# Patient Record
Sex: MALE | Race: WHITE | NOT HISPANIC OR LATINO | URBAN - METROPOLITAN AREA
[De-identification: names, ages, dates, MRNs, and addresses within clinical notes are randomized per-mention and may not be internally consistent; named-entity substitution may affect disease eponyms.]

---

## 2021-07-28 ENCOUNTER — INPATIENT (INPATIENT)
Facility: HOSPITAL | Age: 46
LOS: 14 days | Discharge: HOME | DRG: 20 | End: 2021-08-12
Attending: NEUROLOGICAL SURGERY | Admitting: NEUROLOGICAL SURGERY
Payer: SELF-PAY

## 2021-07-28 VITALS
OXYGEN SATURATION: 98 % | HEIGHT: 75 IN | WEIGHT: 227.08 LBS | DIASTOLIC BLOOD PRESSURE: 133 MMHG | RESPIRATION RATE: 18 BRPM | HEART RATE: 88 BPM | SYSTOLIC BLOOD PRESSURE: 213 MMHG

## 2021-07-28 LAB
A1C WITH ESTIMATED AVERAGE GLUCOSE RESULT: 5.2 % — SIGNIFICANT CHANGE UP (ref 4–5.6)
ALBUMIN SERPL ELPH-MCNC: 4.7 G/DL — SIGNIFICANT CHANGE UP (ref 3.3–5)
ALP SERPL-CCNC: 68 U/L — SIGNIFICANT CHANGE UP (ref 40–120)
ALT FLD-CCNC: 23 U/L — SIGNIFICANT CHANGE UP (ref 10–45)
AMPHET UR-MCNC: NEGATIVE — SIGNIFICANT CHANGE UP
ANION GAP SERPL CALC-SCNC: 13 MMOL/L — SIGNIFICANT CHANGE UP (ref 5–17)
ANION GAP SERPL CALC-SCNC: 15 MMOL/L — SIGNIFICANT CHANGE UP (ref 5–17)
APPEARANCE UR: CLEAR — SIGNIFICANT CHANGE UP
APTT BLD: 26.8 SEC — LOW (ref 27.5–35.5)
AST SERPL-CCNC: 25 U/L — SIGNIFICANT CHANGE UP (ref 10–40)
BACTERIA # UR AUTO: PRESENT /HPF
BARBITURATES UR SCN-MCNC: NEGATIVE — SIGNIFICANT CHANGE UP
BASOPHILS # BLD AUTO: 0.08 K/UL — SIGNIFICANT CHANGE UP (ref 0–0.2)
BASOPHILS NFR BLD AUTO: 1 % — SIGNIFICANT CHANGE UP (ref 0–2)
BENZODIAZ UR-MCNC: NEGATIVE — SIGNIFICANT CHANGE UP
BILIRUB SERPL-MCNC: 1 MG/DL — SIGNIFICANT CHANGE UP (ref 0.2–1.2)
BILIRUB UR-MCNC: NEGATIVE — SIGNIFICANT CHANGE UP
BLD GP AB SCN SERPL QL: NEGATIVE — SIGNIFICANT CHANGE UP
BUN SERPL-MCNC: 14 MG/DL — SIGNIFICANT CHANGE UP (ref 7–23)
BUN SERPL-MCNC: 15 MG/DL — SIGNIFICANT CHANGE UP (ref 7–23)
CALCIUM SERPL-MCNC: 9.1 MG/DL — SIGNIFICANT CHANGE UP (ref 8.4–10.5)
CALCIUM SERPL-MCNC: 9.2 MG/DL — SIGNIFICANT CHANGE UP (ref 8.4–10.5)
CHLORIDE SERPL-SCNC: 102 MMOL/L — SIGNIFICANT CHANGE UP (ref 96–108)
CHLORIDE SERPL-SCNC: 104 MMOL/L — SIGNIFICANT CHANGE UP (ref 96–108)
CHOLEST SERPL-MCNC: 218 MG/DL — HIGH
CO2 SERPL-SCNC: 23 MMOL/L — SIGNIFICANT CHANGE UP (ref 22–31)
CO2 SERPL-SCNC: 23 MMOL/L — SIGNIFICANT CHANGE UP (ref 22–31)
COCAINE METAB.OTHER UR-MCNC: NEGATIVE — SIGNIFICANT CHANGE UP
COLOR SPEC: YELLOW — SIGNIFICANT CHANGE UP
CREAT SERPL-MCNC: 1.22 MG/DL — SIGNIFICANT CHANGE UP (ref 0.5–1.3)
CREAT SERPL-MCNC: 1.37 MG/DL — HIGH (ref 0.5–1.3)
DIFF PNL FLD: ABNORMAL
EOSINOPHIL # BLD AUTO: 0.13 K/UL — SIGNIFICANT CHANGE UP (ref 0–0.5)
EOSINOPHIL NFR BLD AUTO: 1.7 % — SIGNIFICANT CHANGE UP (ref 0–6)
EPI CELLS # UR: SIGNIFICANT CHANGE UP /HPF (ref 0–5)
ESTIMATED AVERAGE GLUCOSE: 103 MG/DL — SIGNIFICANT CHANGE UP (ref 68–114)
GLUCOSE BLDC GLUCOMTR-MCNC: 122 MG/DL — HIGH (ref 70–99)
GLUCOSE SERPL-MCNC: 128 MG/DL — HIGH (ref 70–99)
GLUCOSE SERPL-MCNC: 145 MG/DL — HIGH (ref 70–99)
GLUCOSE UR QL: NEGATIVE — SIGNIFICANT CHANGE UP
HCT VFR BLD CALC: 47.5 % — SIGNIFICANT CHANGE UP (ref 39–50)
HCT VFR BLD CALC: 49.8 % — SIGNIFICANT CHANGE UP (ref 39–50)
HDLC SERPL-MCNC: 57 MG/DL — SIGNIFICANT CHANGE UP
HGB BLD-MCNC: 15.9 G/DL — SIGNIFICANT CHANGE UP (ref 13–17)
HGB BLD-MCNC: 16.5 G/DL — SIGNIFICANT CHANGE UP (ref 13–17)
IMM GRANULOCYTES NFR BLD AUTO: 0.3 % — SIGNIFICANT CHANGE UP (ref 0–1.5)
INR BLD: 1.03 — SIGNIFICANT CHANGE UP (ref 0.88–1.16)
KETONES UR-MCNC: NEGATIVE — SIGNIFICANT CHANGE UP
LEUKOCYTE ESTERASE UR-ACNC: NEGATIVE — SIGNIFICANT CHANGE UP
LIPID PNL WITH DIRECT LDL SERPL: 124 MG/DL — HIGH
LYMPHOCYTES # BLD AUTO: 3.7 K/UL — HIGH (ref 1–3.3)
LYMPHOCYTES # BLD AUTO: 47.3 % — HIGH (ref 13–44)
MCHC RBC-ENTMCNC: 29.1 PG — SIGNIFICANT CHANGE UP (ref 27–34)
MCHC RBC-ENTMCNC: 30.1 PG — SIGNIFICANT CHANGE UP (ref 27–34)
MCHC RBC-ENTMCNC: 33.1 GM/DL — SIGNIFICANT CHANGE UP (ref 32–36)
MCHC RBC-ENTMCNC: 33.5 GM/DL — SIGNIFICANT CHANGE UP (ref 32–36)
MCV RBC AUTO: 87.8 FL — SIGNIFICANT CHANGE UP (ref 80–100)
MCV RBC AUTO: 89.8 FL — SIGNIFICANT CHANGE UP (ref 80–100)
METHADONE UR-MCNC: NEGATIVE — SIGNIFICANT CHANGE UP
MONOCYTES # BLD AUTO: 0.52 K/UL — SIGNIFICANT CHANGE UP (ref 0–0.9)
MONOCYTES NFR BLD AUTO: 6.6 % — SIGNIFICANT CHANGE UP (ref 2–14)
NEUTROPHILS # BLD AUTO: 3.37 K/UL — SIGNIFICANT CHANGE UP (ref 1.8–7.4)
NEUTROPHILS NFR BLD AUTO: 43.1 % — SIGNIFICANT CHANGE UP (ref 43–77)
NITRITE UR-MCNC: NEGATIVE — SIGNIFICANT CHANGE UP
NON HDL CHOLESTEROL: 161 MG/DL — HIGH
NRBC # BLD: 0 /100 WBCS — SIGNIFICANT CHANGE UP (ref 0–0)
NRBC # BLD: 0 /100 WBCS — SIGNIFICANT CHANGE UP (ref 0–0)
OPIATES UR-MCNC: NEGATIVE — SIGNIFICANT CHANGE UP
PCP SPEC-MCNC: SIGNIFICANT CHANGE UP
PCP UR-MCNC: NEGATIVE — SIGNIFICANT CHANGE UP
PH UR: 8 — SIGNIFICANT CHANGE UP (ref 5–8)
PLATELET # BLD AUTO: 240 K/UL — SIGNIFICANT CHANGE UP (ref 150–400)
PLATELET # BLD AUTO: 242 K/UL — SIGNIFICANT CHANGE UP (ref 150–400)
POTASSIUM SERPL-MCNC: 3.7 MMOL/L — SIGNIFICANT CHANGE UP (ref 3.5–5.3)
POTASSIUM SERPL-MCNC: 4.8 MMOL/L — SIGNIFICANT CHANGE UP (ref 3.5–5.3)
POTASSIUM SERPL-SCNC: 3.7 MMOL/L — SIGNIFICANT CHANGE UP (ref 3.5–5.3)
POTASSIUM SERPL-SCNC: 4.8 MMOL/L — SIGNIFICANT CHANGE UP (ref 3.5–5.3)
PROT SERPL-MCNC: 7.1 G/DL — SIGNIFICANT CHANGE UP (ref 6–8.3)
PROT UR-MCNC: 100 MG/DL
PROTHROM AB SERPL-ACNC: 12.3 SEC — SIGNIFICANT CHANGE UP (ref 10.6–13.6)
RBC # BLD: 5.29 M/UL — SIGNIFICANT CHANGE UP (ref 4.2–5.8)
RBC # BLD: 5.67 M/UL — SIGNIFICANT CHANGE UP (ref 4.2–5.8)
RBC # FLD: 12.9 % — SIGNIFICANT CHANGE UP (ref 10.3–14.5)
RBC # FLD: 13.3 % — SIGNIFICANT CHANGE UP (ref 10.3–14.5)
RBC CASTS # UR COMP ASSIST: < 5 /HPF — SIGNIFICANT CHANGE UP
RH IG SCN BLD-IMP: POSITIVE — SIGNIFICANT CHANGE UP
SARS-COV-2 RNA SPEC QL NAA+PROBE: NEGATIVE — SIGNIFICANT CHANGE UP
SODIUM SERPL-SCNC: 140 MMOL/L — SIGNIFICANT CHANGE UP (ref 135–145)
SODIUM SERPL-SCNC: 140 MMOL/L — SIGNIFICANT CHANGE UP (ref 135–145)
SP GR SPEC: 1.01 — SIGNIFICANT CHANGE UP (ref 1–1.03)
THC UR QL: NEGATIVE — SIGNIFICANT CHANGE UP
TRIGL SERPL-MCNC: 184 MG/DL — HIGH
TROPONIN T SERPL-MCNC: 0.01 NG/ML — SIGNIFICANT CHANGE UP (ref 0–0.01)
TROPONIN T SERPL-MCNC: <0.01 NG/ML — SIGNIFICANT CHANGE UP (ref 0–0.01)
TSH SERPL-MCNC: 1.54 UIU/ML — SIGNIFICANT CHANGE UP (ref 0.27–4.2)
UROBILINOGEN FLD QL: 0.2 E.U./DL — SIGNIFICANT CHANGE UP
WBC # BLD: 14.19 K/UL — HIGH (ref 3.8–10.5)
WBC # BLD: 7.82 K/UL — SIGNIFICANT CHANGE UP (ref 3.8–10.5)
WBC # FLD AUTO: 14.19 K/UL — HIGH (ref 3.8–10.5)
WBC # FLD AUTO: 7.82 K/UL — SIGNIFICANT CHANGE UP (ref 3.8–10.5)
WBC UR QL: < 5 /HPF — SIGNIFICANT CHANGE UP

## 2021-07-28 PROCEDURE — 71045 X-RAY EXAM CHEST 1 VIEW: CPT | Mod: 26,76

## 2021-07-28 PROCEDURE — 70498 CT ANGIOGRAPHY NECK: CPT | Mod: 26,MA

## 2021-07-28 PROCEDURE — 70450 CT HEAD/BRAIN W/O DYE: CPT | Mod: 26,76,59

## 2021-07-28 PROCEDURE — 99291 CRITICAL CARE FIRST HOUR: CPT | Mod: 25

## 2021-07-28 PROCEDURE — 31500 INSERT EMERGENCY AIRWAY: CPT | Mod: 59

## 2021-07-28 PROCEDURE — 99291 CRITICAL CARE FIRST HOUR: CPT

## 2021-07-28 PROCEDURE — 93010 ELECTROCARDIOGRAM REPORT: CPT | Mod: 59

## 2021-07-28 PROCEDURE — 70496 CT ANGIOGRAPHY HEAD: CPT | Mod: 26,MA

## 2021-07-28 RX ORDER — ACETAMINOPHEN 500 MG
650 TABLET ORAL EVERY 6 HOURS
Refills: 0 | Status: DISCONTINUED | OUTPATIENT
Start: 2021-07-28 | End: 2021-07-28

## 2021-07-28 RX ORDER — DEXTROSE 50 % IN WATER 50 %
25 SYRINGE (ML) INTRAVENOUS ONCE
Refills: 0 | Status: DISCONTINUED | OUTPATIENT
Start: 2021-07-28 | End: 2021-07-29

## 2021-07-28 RX ORDER — SODIUM CHLORIDE 5 G/100ML
500 INJECTION, SOLUTION INTRAVENOUS
Refills: 0 | Status: DISCONTINUED | OUTPATIENT
Start: 2021-07-28 | End: 2021-07-28

## 2021-07-28 RX ORDER — PROPOFOL 10 MG/ML
10 INJECTION, EMULSION INTRAVENOUS
Qty: 1000 | Refills: 0 | Status: DISCONTINUED | OUTPATIENT
Start: 2021-07-28 | End: 2021-07-29

## 2021-07-28 RX ORDER — SENNA PLUS 8.6 MG/1
10 TABLET ORAL AT BEDTIME
Refills: 0 | Status: DISCONTINUED | OUTPATIENT
Start: 2021-07-28 | End: 2021-07-31

## 2021-07-28 RX ORDER — ONDANSETRON 8 MG/1
4 TABLET, FILM COATED ORAL EVERY 6 HOURS
Refills: 0 | Status: DISCONTINUED | OUTPATIENT
Start: 2021-07-28 | End: 2021-08-12

## 2021-07-28 RX ORDER — SENNA PLUS 8.6 MG/1
2 TABLET ORAL AT BEDTIME
Refills: 0 | Status: DISCONTINUED | OUTPATIENT
Start: 2021-07-28 | End: 2021-07-28

## 2021-07-28 RX ORDER — GLUCAGON INJECTION, SOLUTION 0.5 MG/.1ML
1 INJECTION, SOLUTION SUBCUTANEOUS ONCE
Refills: 0 | Status: DISCONTINUED | OUTPATIENT
Start: 2021-07-28 | End: 2021-08-12

## 2021-07-28 RX ORDER — DEXTROSE 50 % IN WATER 50 %
12.5 SYRINGE (ML) INTRAVENOUS ONCE
Refills: 0 | Status: DISCONTINUED | OUTPATIENT
Start: 2021-07-28 | End: 2021-07-29

## 2021-07-28 RX ORDER — MANNITOL
50 POWDER (GRAM) MISCELLANEOUS ONCE
Refills: 0 | Status: COMPLETED | OUTPATIENT
Start: 2021-07-28 | End: 2021-07-28

## 2021-07-28 RX ORDER — DEXTROSE 50 % IN WATER 50 %
15 SYRINGE (ML) INTRAVENOUS ONCE
Refills: 0 | Status: DISCONTINUED | OUTPATIENT
Start: 2021-07-28 | End: 2021-07-29

## 2021-07-28 RX ORDER — ACETAMINOPHEN 500 MG
650 TABLET ORAL EVERY 6 HOURS
Refills: 0 | Status: DISCONTINUED | OUTPATIENT
Start: 2021-07-28 | End: 2021-07-31

## 2021-07-28 RX ORDER — CHLORHEXIDINE GLUCONATE 213 G/1000ML
15 SOLUTION TOPICAL EVERY 12 HOURS
Refills: 0 | Status: DISCONTINUED | OUTPATIENT
Start: 2021-07-28 | End: 2021-07-29

## 2021-07-28 RX ORDER — LABETALOL HCL 100 MG
20 TABLET ORAL ONCE
Refills: 0 | Status: COMPLETED | OUTPATIENT
Start: 2021-07-28 | End: 2021-07-28

## 2021-07-28 RX ORDER — ETOMIDATE 2 MG/ML
30 INJECTION INTRAVENOUS ONCE
Refills: 0 | Status: COMPLETED | OUTPATIENT
Start: 2021-07-28 | End: 2021-07-28

## 2021-07-28 RX ORDER — SUCCINYLCHOLINE CHLORIDE 100 MG/5ML
150 SYRINGE (ML) INTRAVENOUS ONCE
Refills: 0 | Status: COMPLETED | OUTPATIENT
Start: 2021-07-28 | End: 2021-07-28

## 2021-07-28 RX ORDER — METOCLOPRAMIDE HCL 10 MG
10 TABLET ORAL ONCE
Refills: 0 | Status: COMPLETED | OUTPATIENT
Start: 2021-07-28 | End: 2021-07-28

## 2021-07-28 RX ORDER — PANTOPRAZOLE SODIUM 20 MG/1
40 TABLET, DELAYED RELEASE ORAL DAILY
Refills: 0 | Status: DISCONTINUED | OUTPATIENT
Start: 2021-07-28 | End: 2021-07-29

## 2021-07-28 RX ORDER — SODIUM CHLORIDE 9 MG/ML
1000 INJECTION, SOLUTION INTRAVENOUS
Refills: 0 | Status: DISCONTINUED | OUTPATIENT
Start: 2021-07-28 | End: 2021-07-29

## 2021-07-28 RX ORDER — LEVETIRACETAM 250 MG/1
1000 TABLET, FILM COATED ORAL ONCE
Refills: 0 | Status: COMPLETED | OUTPATIENT
Start: 2021-07-28 | End: 2021-07-28

## 2021-07-28 RX ORDER — FENTANYL CITRATE 50 UG/ML
100 INJECTION INTRAVENOUS ONCE
Refills: 0 | Status: DISCONTINUED | OUTPATIENT
Start: 2021-07-28 | End: 2021-07-28

## 2021-07-28 RX ORDER — INSULIN LISPRO 100/ML
VIAL (ML) SUBCUTANEOUS EVERY 6 HOURS
Refills: 0 | Status: DISCONTINUED | OUTPATIENT
Start: 2021-07-28 | End: 2021-08-01

## 2021-07-28 RX ORDER — SENNA PLUS 8.6 MG/1
10 TABLET ORAL AT BEDTIME
Refills: 0 | Status: DISCONTINUED | OUTPATIENT
Start: 2021-07-28 | End: 2021-07-28

## 2021-07-28 RX ORDER — DEXTROSE 50 % IN WATER 50 %
25 SYRINGE (ML) INTRAVENOUS ONCE
Refills: 0 | Status: DISCONTINUED | OUTPATIENT
Start: 2021-07-28 | End: 2021-08-12

## 2021-07-28 RX ORDER — ONDANSETRON 8 MG/1
4 TABLET, FILM COATED ORAL ONCE
Refills: 0 | Status: COMPLETED | OUTPATIENT
Start: 2021-07-28 | End: 2021-07-28

## 2021-07-28 RX ORDER — FENTANYL CITRATE 50 UG/ML
50 INJECTION INTRAVENOUS ONCE
Refills: 0 | Status: DISCONTINUED | OUTPATIENT
Start: 2021-07-28 | End: 2021-07-28

## 2021-07-28 RX ORDER — NICARDIPINE HYDROCHLORIDE 30 MG/1
5 CAPSULE, EXTENDED RELEASE ORAL
Qty: 40 | Refills: 0 | Status: DISCONTINUED | OUTPATIENT
Start: 2021-07-28 | End: 2021-07-28

## 2021-07-28 RX ORDER — SODIUM CHLORIDE 5 G/100ML
500 INJECTION, SOLUTION INTRAVENOUS
Refills: 0 | Status: DISCONTINUED | OUTPATIENT
Start: 2021-07-28 | End: 2021-07-29

## 2021-07-28 RX ORDER — ATORVASTATIN CALCIUM 80 MG/1
40 TABLET, FILM COATED ORAL AT BEDTIME
Refills: 0 | Status: DISCONTINUED | OUTPATIENT
Start: 2021-07-28 | End: 2021-07-31

## 2021-07-28 RX ORDER — NICARDIPINE HYDROCHLORIDE 30 MG/1
5 CAPSULE, EXTENDED RELEASE ORAL
Qty: 40 | Refills: 0 | Status: DISCONTINUED | OUTPATIENT
Start: 2021-07-28 | End: 2021-07-29

## 2021-07-28 RX ORDER — FENTANYL CITRATE 50 UG/ML
0.5 INJECTION INTRAVENOUS
Qty: 2500 | Refills: 0 | Status: DISCONTINUED | OUTPATIENT
Start: 2021-07-28 | End: 2021-07-29

## 2021-07-28 RX ADMIN — LEVETIRACETAM 400 MILLIGRAM(S): 250 TABLET, FILM COATED ORAL at 15:50

## 2021-07-28 RX ADMIN — NICARDIPINE HYDROCHLORIDE 25 MG/HR: 30 CAPSULE, EXTENDED RELEASE ORAL at 17:14

## 2021-07-28 RX ADMIN — SODIUM CHLORIDE 55 MILLILITER(S): 5 INJECTION, SOLUTION INTRAVENOUS at 17:58

## 2021-07-28 RX ADMIN — NICARDIPINE HYDROCHLORIDE 25 MG/HR: 30 CAPSULE, EXTENDED RELEASE ORAL at 17:43

## 2021-07-28 RX ADMIN — PROPOFOL 6.18 MICROGRAM(S)/KG/MIN: 10 INJECTION, EMULSION INTRAVENOUS at 23:14

## 2021-07-28 RX ADMIN — Medication 500 GRAM(S): at 16:55

## 2021-07-28 RX ADMIN — FENTANYL CITRATE 5.15 MICROGRAM(S)/KG/HR: 50 INJECTION INTRAVENOUS at 17:13

## 2021-07-28 RX ADMIN — FENTANYL CITRATE 50 MICROGRAM(S): 50 INJECTION INTRAVENOUS at 20:31

## 2021-07-28 RX ADMIN — ONDANSETRON 4 MILLIGRAM(S): 8 TABLET, FILM COATED ORAL at 16:00

## 2021-07-28 RX ADMIN — FENTANYL CITRATE 50 MICROGRAM(S): 50 INJECTION INTRAVENOUS at 14:37

## 2021-07-28 RX ADMIN — ETOMIDATE 30 MILLIGRAM(S): 2 INJECTION INTRAVENOUS at 16:12

## 2021-07-28 RX ADMIN — Medication 20 MILLIGRAM(S): at 15:16

## 2021-07-28 RX ADMIN — PROPOFOL 6.18 MICROGRAM(S)/KG/MIN: 10 INJECTION, EMULSION INTRAVENOUS at 17:14

## 2021-07-28 RX ADMIN — FENTANYL CITRATE 100 MICROGRAM(S): 50 INJECTION INTRAVENOUS at 16:36

## 2021-07-28 RX ADMIN — ONDANSETRON 4 MILLIGRAM(S): 8 TABLET, FILM COATED ORAL at 15:09

## 2021-07-28 RX ADMIN — Medication 104 MILLIGRAM(S): at 15:50

## 2021-07-28 RX ADMIN — Medication 150 MILLIGRAM(S): at 16:13

## 2021-07-28 RX ADMIN — FENTANYL CITRATE 100 MICROGRAM(S): 50 INJECTION INTRAVENOUS at 20:56

## 2021-07-28 RX ADMIN — Medication 20 MILLIGRAM(S): at 15:35

## 2021-07-28 NOTE — H&P ADULT - HISTORY OF PRESENT ILLNESS
46 M pt with PMHx of HTN, noncompliant with meds (Metoprolol, Nifedipine, and ASA), reports no recent use of ASA or metoprolol, presents to ED c/o acute onset and worsening dizziness, R arm paresthesia, and vomiting x 1hr prior to arrival while at work. Pt somnolent in ED, difficulty obtaining hx from pt. Per EMS, pt had SBP in the 200s, given nitro SL x 2 in field. Patient found to have a left side cerebellar hemmorrhage and left pontine hemmorrhage with extension to the subarachnoid space. Patient was started on a nicardipene drip in the ED for SBP to 230's, given manitol 50g and intubated for concern of potential for aspiration with declining exam. Patient reported that he lives alone and has no close contacts in the area.     NIHSS 6  ICH 2

## 2021-07-28 NOTE — PROGRESS NOTE ADULT - ASSESSMENT
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 Paitient was admitted for femoral cerebral angiogram.    Plan:  Neuro:   - NC/VS q1hr  - neurologically stable  - repeat CT head stable  - sBP 110-140 mmHg (ideal 130's)   - Plans for femoral cerebral angio 7/29  - Fentanyl, propopfol infusions for sedation --> precedex infusion  - Tylenol prn   - HOB 15-30    CV:   - Cardene @ 5mgl/hr for -140   - inverted T waves noted on telemetry   - restart home MTP, CCB in AM    - pending TTE   - keep Mg > 2    Pulm:  - currently AC ventilation  - CXR clear    Renal:   - Cr 1.4 - monitor with hydration and HTN (end-organ disease)   - assess for abdo US to r/o renal artery stenosis   - Repeat BMP @ 10pm 7/28   - 3% @ 75cc/hr   - Na goal 140-145 (closer to 145)   - Farias in place    GI:   - bowel reg   - protonix   - NGT placed    Endo:   - ISS   - HbA1c 5.2, , TSH 1.54    Heme:   - SCD's   - Hb 16.5 --> will hydrate    ID:   - afebrile, no leukocytosis    Disposition: ICU status, full code    *****    CORE MEASURES    ICH  1.  NIHSS = 6  2.  ICH Score = 2  3.  VTE Prophylaxis:  [] administered within 24 hours of admission OR [X] reason not done:  fresh ICH  4.  Dysphagia screening:  [X] performed before any oral meds / liquids / food    *****    ATTENDING ATTESTATION:  I was physically present for the key portions of the evaluation and management (E/M) service provided.  I agree with the above history, physical and plan, which I have reviewed and edited where appropriate.    Patient at high risk for neurological deterioration or death due to:  ICH, DVT.  Critical Care Time:  I have personally provided 45 minutes of critical care time excluding time spent on separate procedures.    *****

## 2021-07-28 NOTE — ED ADULT NURSE NOTE - OBJECTIVE STATEMENT
46y M, hx of HTN, hasn't been compliant with medications, presents to ed for notification as possible stemi alert. PT presented x1 hr prior to arrival of numbness, diaphoresis and n/v. On arrival, cardiology at bedside and ekg performed with dr jenny mike. Noted no stemi however pt endorsed headache, noted mild slurring of speech with left sided facial droop and weakness to arm. Stroke code activated and brought to CT per protocol.

## 2021-07-28 NOTE — ED PROVIDER NOTE - CLINICAL SUMMARY MEDICAL DECISION MAKING FREE TEXT BOX
Pt w nausea, vomiting, dizziness, headache, R arm numbness, made code stroke +cerebllar bleed w extension. BP goals disc with nsgy <140 SBP. Intubated for airway protection. mannitol and 3% saline to be initiated after disc w nsgy. Pt w nausea, vomiting, dizziness, headache, R arm numbness, made code stroke +cerebellar bleed w extension. BP goals disc with nsgy <140 SBP. Intubated for airway protection. mannitol and 3% saline to be initiated after disc w nsgy.

## 2021-07-28 NOTE — ED ADULT NURSE REASSESSMENT NOTE - NS ED NURSE REASSESS COMMENT FT1
Discussed with Dr. Cullen and NEurosurgy paged. stated that repeat CT can be performed enroute to room upstairs. PEnding bed assignment. Continues to be intubated in ED, responsive to verbal stimuli.

## 2021-07-28 NOTE — H&P ADULT - ASSESSMENT
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 Paitient was admitted for femoral cerebral angiogram.    Plan:  Neuro:   - NC/VS q1hr  - Repeat CTH for stability 2-4hr post initial.  - Plans for femoral cerebral angio 7/29 pending consent (?)  - Fentanyl, propropfol infusions for sedation  - Tylenol prn   - HOB 15-30    CV:   - Cardene @ 5mgl/hr for -140   - A-line pending    Pulm:  - Intubated/sedated    Renal:   - Repeat BMP @ 10pm 7/28   - 3% @ 50cc/hr   - Na goal 140-145   - Farias in place   - trend lytes    GI:   - bowel reg   - protonix   - NGT placed    Endo:   - ISS    Heme:   - SCD's   - trend CBC    ID:   - afebrile, trend WBC    Disposition: ICU status, full code    Plan d/w Dr. D'Amico and Dr. Gonsalves

## 2021-07-28 NOTE — H&P ADULT - NSHPPHYSICALEXAM_GEN_ALL_CORE
Constitutional: A&Ox3, intermittently somnolent and nauseas  Respiratory: breathing non-labored, symmetrical chest wall movement  Cardiovascular: RRR, + S1, S2  Gastrointestinal: abdomen soft, non tender  Neurological: left facial asymmetry, mildly dysarthric, tongue midline, PERRL 3mm brisk symmetric, EOMI w/slight R upper gaze preference, visual fields intact to threat in all 4 quadrants  Cranial Nerves: II-XII grossly intact  Motor: 5/5 power in b/l lower extremities and 4/5 b/l upper extremities  Sensation: with acception of Right thumb, IF and distal forearm sensation symmetric and intact to light touch throughout upper and lower extremities  Pronator Drift: RUE   Dysmetria: L>R Dysmetria UE and R LE dysmetria  Extremities: distal pulses 2+ RP/DP

## 2021-07-28 NOTE — CONSULT NOTE ADULT - SUBJECTIVE AND OBJECTIVE BOX
**STROKE CODE CONSULT NOTE**    Last known well time/Time of onset of symptoms:    HPI: 46y Male with PMH HTN (noncompliant with meds), presented in hypertensive emergency and right sided numbness. Initially STEMI code called, emergent echo at bedside was unremarkable, then stroke code was called. Pt also had nausea s/p zofran in the ER.  Pt lethargic but following commands and answering questions with multiple prompts. States that his right arm feels tingly. Denies any headache or chest pain. Otherwise pt not forthcoming with history, mostly keeping his eyes closed.         T(C): --  HR: 88 (07-28-21 @ 15:01) (88 - 88)  BP: 213/133 (07-28-21 @ 15:01) (213/133 - 213/133)  RR: 18 (07-28-21 @ 15:01) (18 - 18)  SpO2: 98% (07-28-21 @ 15:01) (98% - 98%)    PAST MEDICAL & SURGICAL HISTORY:  HTN     FAMILY HISTORY:  family history of "heart problems"      SOCIAL HISTORY:   Smoking status: denies  Drug Use: denies    ROS:   Constitutional: No fever, weight loss or fatigue  Eyes: No eye pain  ENMT:  No difficulty hearing  Neck: No pain or stiffness  Respiratory: No cough, wheezing, chills or hemoptysis  Cardiovascular: No chest pain, palpitations, shortness of breath, or leg swelling  Gastrointestinal: No abdominal pain. No hematemesis; No diarrhea or constipation. Nohematochezia.  Genitourinary: No dysuria  Neurological: As per HPI      MEDICATIONS  (STANDING):  etomidate Injectable 30 milliGRAM(s) IV Push once  fentaNYL    Injectable 100 MICROGram(s) IV Push Once  labetalol Injectable 20 milliGRAM(s) IV Push Once  labetalol Injectable 20 milliGRAM(s) IV Push Once  levETIRAcetam  IVPB 1000 milliGRAM(s) IV Intermittent once  metoclopramide  IVPB 10 milliGRAM(s) IV Intermittent Once  niCARdipine Infusion 5 mG/Hr (25 mL/Hr) IV Continuous <Continuous>  ondansetron Injectable 4 milliGRAM(s) IV Push once  propofol Infusion 10 MICROgram(s)/kG/Min (6.18 mL/Hr) IV Continuous <Continuous>  succinylcholine Injectable 150 milliGRAM(s) IV Push Once    MEDICATIONS  (PRN):    Allergies    No Known Allergies    Intolerances      Vital Signs Last 24 Hrs  T(C): --  T(F): --  HR: 88 (28 Jul 2021 15:01) (88 - 88)  BP: 213/133 (28 Jul 2021 15:01) (213/133 - 213/133)  BP(mean): --  RR: 18 (28 Jul 2021 15:01) (18 - 18)  SpO2: 98% (28 Jul 2021 15:01) (98% - 98%)    Physical exam:  Constitutional: Some distress evident  Eyes: Anicteric sclerae, moist conjunctivae, see below for CNs  Neck: trachea midline  Cardiovascular: Regular rate and rhythm on monitor  Pulmonary:  No use of accessory muscles  GI: Abdomen soft, non-distended, non-tender    Neurologic:  -Mental status: lethargic but awakens to voice, alert, oriented to person, place, and time. Speech is fluent with intact naming, follows commands. +dysarthria.  -Cranial nerves:   II: Visual fields are full to confrontation.  III, IV, VI: Extraocular movements are intact with slight right gaze, but able to bury left. +rotary nystagmus. Pupils equally round and reactive to light  VII: Slight left nasolabial fold flattening  Motor: Normal bulk and tone. No pronator drift, +left cerebellar drift. Strength bilateral upper extremity at least 4/5 and bilateral lower extremities at least 4/5, no drift  Sensation: Intact to light touch bilaterally. No neglect or extinction on double simultaneous testing.  Coordination: +dysmetria left finger to nose with left cerebellar drift    NIHSS: 5 ASPECT Score: 10 ICH Score: 2 (GCS 14)    Fingerstick Blood Glucose: CAPILLARY BLOOD GLUCOSE  141 (28 Jul 2021 15:38)      POCT Blood Glucose.: 141 mg/dL (28 Jul 2021 15:01)    LABS:                        16.5   7.82  )-----------( 242      ( 28 Jul 2021 15:14 )             49.8     07-28    140  |  102  |  14  ----------------------------<  145<H>  3.7   |  23  |  1.22    Ca    9.2      28 Jul 2021 15:14    TPro  7.1  /  Alb  4.7  /  TBili  1.0  /  DBili  x   /  AST  25  /  ALT  23  /  AlkPhos  68  07-28    PT/INR - ( 28 Jul 2021 15:14 )   PT: 12.3 sec;   INR: 1.03          PTT - ( 28 Jul 2021 15:14 )  PTT:26.8 sec  CARDIAC MARKERS ( 28 Jul 2021 15:14 )  x     / 0.01 ng/mL / x     / x     / x              RADIOLOGY & ADDITIONAL STUDIES:  < from: CT Brain Stroke Protocol (07.28.21 @ 15:22) >  IMPRESSION:    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    < end of copied text >      -----------------------------------------------------------------------------------------------------------------  IV-tPA (Y/N):    no               Reason IV-tPA not given: intracerebral hemorrhage

## 2021-07-28 NOTE — PROGRESS NOTE ADULT - SUBJECTIVE AND OBJECTIVE BOX
NEUROCRITICAL CARE ATTENDING NOTE (2021 Evening)    CRISTI JENNINGS  MRN-[id number]    46 M pt with PMHx of HTN, noncompliant with meds (Metoprolol, Nifedipine, and ASA), reports no recent use of ASA or metoprolol, presents to ED c/o acute onset and worsening dizziness, R arm paresthesia, and vomiting x 1hr prior to arrival while at work. Pt somnolent in ED, difficulty obtaining hx from pt. Per EMS, pt had SBP in the 200s, given nitro SL x 2 in field. 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 Patient was started on a nicardipine drip in the ED for SBP to 230's, given mannitol 50g and intubated for concern of potential for aspiration with declining exam. Patient reported that he lives alone and has no close contacts in the area. NIHSS 6.  ICH 2    . - currently intubated and sedated on propofol/fentanyl, nicardipine 7.5 mg/h, sBP 120s.  Pupils 2 mm reactive, no vertical skew, no horizontal/vertical nystagmus.  LE w/d to stimuli.  Repeat CT stable.    Past Medical History:  HTN (hypertension)    Allergies:  Unknown    Home Meds:    Current Meds:  MEDICATIONS  (STANDING):  fentaNYL   Infusion. 0.5 MICROgram(s)/kG/Hr (5.15 mL/Hr) IV Continuous <Continuous>  insulin lispro (ADMELOG) corrective regimen sliding scale   SubCutaneous every 6 hours  multivitamin 1 Tablet(s) Oral daily  niCARdipine Infusion 5 mG/Hr (25 mL/Hr) IV Continuous <Continuous>  pantoprazole  Injectable 40 milliGRAM(s) IV Push daily  propofol Infusion 10 MICROgram(s)/kG/Min (6.18 mL/Hr) IV Continuous <Continuous>  sodium chloride 3%. 500 milliLiter(s) (55 mL/Hr) IV Continuous <Continuous>    MEDICATIONS  (PRN):  acetaminophen   Tablet .. 650 milliGRAM(s) Oral every 6 hours PRN Temp greater or equal to 38C (100.4F), Mild Pain (1 - 3)  ondansetron Injectable 4 milliGRAM(s) IV Push every 6 hours PRN Nausea and/or Vomiting  senna Syrup 10 milliLiter(s) Oral at bedtime PRN Constipation    PHYSICAL EXAMINATION    ICU Vital Signs Last 24 Hrs  T(C): 36.6 (2021 15:38), Max: 36.6 (2021 15:38)  T(F): 97.9 (2021 15:38), Max: 97.9 (2021 15:38)  HR: 89 (2021 21:47) (70 - 104)  BP: 133/73 (2021 21:47) (116/66 - 213/133)  RR: 15 (2021 20:59) (14 - 20)  SpO2: 96% (2021 20:59) (94% - 98%)    ED Examination:  Neurologic:  -Mental status: lethargic but awakens to voice, alert, oriented to person, place, and time. Speech is fluent with intact naming, follows commands. +dysarthria.  -Cranial nerves:   II: Visual fields are full to confrontation.  III, IV, VI: Extraocular movements are intact with slight right gaze, but able to bury left. +rotary nystagmus. Pupils equally round and reactive to light  VII: Slight left nasolabial fold flattening  Motor: Normal bulk and tone. No pronator drift, +left cerebellar drift. Strength bilateral upper extremity at least 4/5 and bilateral lower extremities at least 4/5, no drift  Sensation: Intact to light touch bilaterally. No neglect or extinction on double simultaneous testing.  Coordination: +dysmetria left finger to nose with left cerebellar drift  NIHSS: 5 ASPECT Score: 10 ICH Score: 2 (GCS 14)    NeuroICU Exam: LAZARUS 2 mm reactive, no vertical skew, no vertical/horizontal/rotatory nystagmus, +oculocephalics, left LMN facial asymmetry, UE no reaction to stimuli, LE withdrawal to stimuli, intact tone  Mode: AC, RR (machine): 15, TV (machine): 500, FiO2: 100, PEEP: 5, ITime: 1, MAP: 8, PIP: 17  CVS:  S1S2 noS3S4 noM (inverted T waves)  PUL:  equal to bases  ABDO:  soft, +BS  EXTREM:  warm PPP    I/O's    21 @ 07:01  -  07-28-21 @ 22:43  --------------------------------------------------------  IN: 0 mL / OUT: 750 mL / NET: -750 mL    LABS:                        16.5   7.82  )-----------( 242      ( 2021 15:14 )             49.8         140  |  104  |  15  ----------------------------<  128<H>  x    |  23  |  1.37<H>    Ca    9.1      2021 21:55    TPro  7.1  /  Alb  4.7  /  TBili  1.0  /  DBili  x   /  AST  25  /  ALT  23  /  AlkPhos  68      PT/INR - ( 2021 15:14 )   PT: 12.3 sec;   INR: 1.03     PTT - ( 2021 15:14 )  PTT:26.8 sec    Urinalysis Basic - ( 2021 16:33 )    Color: Yellow / Appearance: Clear / S.015 / pH: x  Gluc: x / Ketone: NEGATIVE  / Bili: Negative / Urobili: 0.2 E.U./dL   Blood: x / Protein: 100 mg/dL / Nitrite: NEGATIVE   Leuk Esterase: NEGATIVE / RBC: < 5 /HPF / WBC < 5 /HPF   Sq Epi: x / Non Sq Epi: 0-5 /HPF / Bacteria: Present /HPF      CARDIAC MARKERS ( 2021 15:14 )  x     / 0.01 ng/mL / x     / x     / x          CAPILLARY BLOOD GLUCOSE  141 (2021 15:38)      POCT Blood Glucose.: 122 mg/dL (2021 20:53)  POCT Blood Glucose.: 141 mg/dL (2021 15:01)        MEDICATIONS: []    IV FLUIDS:  []  DRIPS:  []  LINES:  []  DRAINS:  []  WOUNDS:  []    CODE STATUS:  []  GOALS OF CARE:  []  DISPOSITION:  []    ASSESSMENT & PLAN    ASSESSMENT    PLAN -   NEURO -   CV -  PUL -  ENDO -  GI/ -  DIET -   HEM/ONC -  VTE Prophylaxis -  ID -   Social -    CORE MEASURES    SAH  1.  Hall and Godfrey Score =  2.  VTE Prophylaxis:  [ ] administered within 24 hours or admission OR [ ] reason not done: _____  3.  Dysphagia Screening:  [] performed before any oral meds / liquids / food  4.  Nimodipine treatment:  [] administered within 24 hours of admission OR [] reason not done    ICH  1.  NIHSS =  2.  ICH Score =  3.  VTE Prophylaxis:  [] administered within 24 hours of admission OR [] reason not done  4.  Dysphagia screening:  [] performed before any oral meds / liquids / food    STROKE  1.  NIHSS =  2.  Antithrombotic therapy:  [] administered within 24 hours of admission OR [] reason not done:  _____  3.  VTE prophylaxis:  [] administered within 24 hours of admission or [] reason not done:  _____  4.  Dysphagia screening:  [] performed before any oral meds / liquids / food  5.  HbA1c and lipid panel:  [] ordered on admission    *****    Quality Measures    Feeding  Analgesia  Sedation  Thromboprophylaxis  HOB  Ulcer prevention  Glucose control    SAT (Spontaneous Awakening Trial)  SBT (Sponteneous Breathing Trial)    *****    ATTENDING ATTESTATION:  I was physically present for the key portions of the evaluation and management (E/M) service provided.  I agree with the above history, physical and plan, which I have reviewed and edited where appropriate.    Patient at high risk for neurological deterioriation or death due to:  _____.  Critical Care Time:  I have personally provided ___ minutes of critical care time excluding time spent on separate procedures.    *****    Discharge Checklist:    Completed:     [X] hemodynamically stable – VS WNL and stable x 24hours, UO adequate  [X] if  previously on HDA - off pressors x 24h with stable neuro exam    [X] no new symptoms x 24h (i.e. new fever, new-onset nausea/vomiting)  [X] stable labs: (i.e. WBC not rising, sodium not dropping)  [X] if high aspiration risk (modified MASA score):                  [ ] should have definitive plans for trach/PEG (alternative option is to discharge from ICU to facilty)                  [ ] stepdown to bed close to nurse’s station  [X] low suctioning requirements (i.e. q4h or less)  [X] sign-off from primary RN*  [X] drains do not require ICU level of care  [X] if patient previously agitated or with behavioral issues – controlled   [X] pain control    *****    Lateralizing Patient Criteria:  [X] does not require hemodynamic augmentation for cerebrovascular insufficiency  [X] does not require close neurologic monitoring and hemodynamic augmentation for vasospasm or delayed cerebral ischemia in subarachnoid hemorrhage  [X] does not have an external ventricular drain  [X] does not have a lumbar drain  [X] does not require active titration of medications for uncontrolled sympathetic storming   NEUROCRITICAL CARE ATTENDING NOTE (2021 Evening)    CRISTI JENNINGS  MRN-[id number]    46 M pt with PMHx of HTN, noncompliant with meds (Metoprolol, Nifedipine, and ASA), reports no recent use of ASA or metoprolol, presents to ED c/o acute onset and worsening dizziness, R arm paresthesia, and vomiting x 1hr prior to arrival while at work. Pt somnolent in ED, difficulty obtaining hx from pt. Per EMS, pt had SBP in the 200s, given nitro SL x 2 in field. 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 Patient was started on a nicardipine drip in the ED for SBP to 230's, given mannitol 50g and intubated for concern of potential for aspiration with declining exam. Patient reported that he lives alone and has no close contacts in the area. NIHSS 6.  ICH 2    . - currently intubated and sedated on propofol/fentanyl, nicardipine 7.5 mg/h, sBP 120s.  Pupils 2 mm reactive, no vertical skew, no horizontal/vertical nystagmus.  LE w/d to stimuli.  Repeat CT stable.    Past Medical History:  HTN (hypertension)    Allergies:  Unknown    Home Meds:    Current Meds:  MMEDICATIONS  (STANDING):  atorvastatin 40 milliGRAM(s) Oral at bedtime  fentaNYL   Infusion. 0.5 MICROgram(s)/kG/Hr (5.15 mL/Hr) IV Continuous <Continuous>  insulin lispro (ADMELOG) corrective regimen sliding scale   SubCutaneous every 6 hours  multivitamin 1 Tablet(s) Oral daily  niCARdipine Infusion 5 mG/Hr (25 mL/Hr) IV Continuous <Continuous>  pantoprazole  Injectable 40 milliGRAM(s) IV Push daily  propofol Infusion 10 MICROgram(s)/kG/Min (6.18 mL/Hr) IV Continuous <Continuous>  sodium chloride 3%. 500 milliLiter(s) (75 mL/Hr) IV Continuous <Continuous>    MEDICATIONS  (PRN):  acetaminophen   Tablet .. 650 milliGRAM(s) Oral every 6 hours PRN Temp greater or equal to 38C (100.4F), Mild Pain (1 - 3)  ondansetron Injectable 4 milliGRAM(s) IV Push every 6 hours PRN Nausea and/or Vomiting  senna Syrup 10 milliLiter(s) Oral at bedtime PRN Constipation    PHYSICAL EXAMINATION    ICU Vital Signs Last 24 Hrs  T(C): 36.6 (2021 15:38), Max: 36.6 (2021 15:38)  T(F): 97.9 (2021 15:38), Max: 97.9 (2021 15:38)  HR: 89 (2021 21:47) (70 - 104)  BP: 133/73 (2021 21:47) (116/66 - 213/133)  RR: 15 (2021 20:59) (14 - 20)  SpO2: 96% (2021 20:59) (94% - 98%)    ED Examination:  Neurologic:  -Mental status: lethargic but awakens to voice, alert, oriented to person, place, and time. Speech is fluent with intact naming, follows commands. +dysarthria.  -Cranial nerves:   II: Visual fields are full to confrontation.  III, IV, VI: Extraocular movements are intact with slight right gaze, but able to bury left. +rotary nystagmus. Pupils equally round and reactive to light  VII: Slight left nasolabial fold flattening  Motor: Normal bulk and tone. No pronator drift, +left cerebellar drift. Strength bilateral upper extremity at least 4/5 and bilateral lower extremities at least 4/5, no drift  Sensation: Intact to light touch bilaterally. No neglect or extinction on double simultaneous testing.  Coordination: +dysmetria left finger to nose with left cerebellar drift  NIHSS: 5 ASPECT Score: 10 ICH Score: 2 (GCS 14)    NeuroICU Exam: LAZARUS 2 mm reactive, no vertical skew, no vertical/horizontal/rotatory nystagmus, +oculocephalics, EOMI, left LMN facial asymmetry, tongue midline, obeys 1st order commands X 4 (equal strength X 4 extremities)  Mode: AC, RR (machine): 15, TV (machine): 500, FiO2: 100, PEEP: 5, ITime: 1, MAP: 8, PIP: 17  CVS:  S1S2 noS3S4 noM (inverted T waves)  PUL:  equal to bases  ABDO:  soft, +BS  EXTREM:  warm PPP    I/O's    07-28-21 @ 07:01  -  21 @ 22:43  --------------------------------------------------------  IN: 0 mL / OUT: 750 mL / NET: -750 mL    LABS:                        16.5   7.82  )-----------( 242      ( 2021 15:14 )             49.8         140  |  104  |  15  ----------------------------<  128<H>  x    |  23  |  1.37<H>    Ca    9.1      2021 21:55    TPro  7.1  /  Alb  4.7  /  TBili  1.0  /  DBili  x   /  AST  25  /  ALT  23  /  AlkPhos  68      PT/INR - ( 2021 15:14 )   PT: 12.3 sec;   INR: 1.03     PTT - ( 2021 15:14 )  PTT:26.8 sec    Urinalysis Basic - ( 2021 16:33 )    Color: Yellow / Appearance: Clear / S.015 / pH: x  Gluc: x / Ketone: NEGATIVE  / Bili: Negative / Urobili: 0.2 E.U./dL   Blood: x / Protein: 100 mg/dL / Nitrite: NEGATIVE   Leuk Esterase: NEGATIVE / RBC: < 5 /HPF / WBC < 5 /HPF   Sq Epi: x / Non Sq Epi: 0-5 /HPF / Bacteria: Present /HPF    CARDIAC MARKERS ( 2021 15:14 )  x     / 0.01 ng/mL / x     / x     / x        CAPILLARY BLOOD GLUCOSE  141 (2021 15:38)    POCT Blood Glucose.: 122 mg/dL (2021 20:53)  POCT Blood Glucose.: 141 mg/dL (2021 15:01)    IV FLUIDS:  3% NaCl 75 mL/h  DRIPS:  propofol, fentanyl  LINES:  rad art line  DRAINS:  []  WOUNDS:  []    CODE STATUS:  [Full]  GOALS OF CARE:  [Aggressive]  DISPOSITION:  [ICU]    ASSESSMENT & PLAN    46 M PMH HTN, non-compliant on home meds nifedipine, ASA 81, and metoprolol presented with 2 hr onset gradual worsening R UE paresthesia, weakness, dizziness, N&V found to have an Acute left cerebellar intraparenchymal hemorrhage and left pontine hemorrhage with extension into the subarachnoid space. Paitient was admitted for femoral cerebral angiogram.    Plan:  Neuro:   - NC/VS q1hr  - neurologically stable  - repeat CT head stable  - sBP 110-140 mmHg (ideal 130's)   - Plans for femoral cerebral angio   - Fentanyl, propopfol infusions for sedation --> precedex infusion  - Tylenol prn   - HOB 15-    CV:   - Cardene @ 5mgl/hr for -140   - inverted T waves noted on telemetry   - restart home MTP, CCB in AM    - pending TTE   - keep Mg > 2    Pulm:  - currently AC ventilation  - CXR clear    Renal:   - Cr 1.4 - monitor with hydration and HTN (end-organ disease)   - assess for abdo US to r/o renal artery stenosis   - Repeat BMP @ 10pm    - 3% @ 75cc/hr   - Na goal 140-145 (closer to 145)   - Farias in place    GI:   - bowel reg   - protonix   - NGT placed    Endo:   - ISS   - HbA1c 5.2, , TSH 1.54    Heme:   - SCD's   - Hb 16.5 --> will hydrate    ID:   - afebrile, no leukocytosis    Disposition: ICU status, full code        *****    CORE MEASURES    ICH  1.  NIHSS = 6  2.  ICH Score = 2  3.  VTE Prophylaxis:  [] administered within 24 hours of admission OR [X] reason not done:  fresh ICH  4.  Dysphagia screening:  [X] performed before any oral meds / liquids / food    *****    ATTENDING ATTESTATION:  I was physically present for the key portions of the evaluation and management (E/M) service provided.  I agree with the above history, physical and plan, which I have reviewed and edited where appropriate.    Patient at high risk for neurological deterioration or death due to:  ICH, DVT.  Critical Care Time:  I have personally provided 45 minutes of critical care time excluding time spent on separate procedures.    *****     NEUROCRITICAL CARE ATTENDING NOTE (2021 Evening)    CRISTI JENNINGS  [id number]    46 M pt with PMHx of HTN, noncompliant with meds (Metoprolol, Nifedipine, and ASA), reports no recent use of ASA or metoprolol, presents to ED c/o acute onset and worsening dizziness, R arm paresthesia, and vomiting x 1hr prior to arrival while at work. Pt somnolent in ED, difficulty obtaining hx from pt. Per EMS, pt had SBP in the 200s, given nitro SL x 2 in field. 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 Patient was started on a nicardipine drip in the ED for SBP to 230's, given mannitol 50g and intubated for concern of potential for aspiration with declining exam. Patient reported that he lives alone and has no close contacts in the area. NIHSS 6.  ICH 2    2021 - currently intubated and sedated on propofol/fentanyl, nicardipine 7.5 mg/h, sBP 120s. Repeat CT stable.    Past Medical History:  HTN (hypertension)    Allergies:  Unknown    Home Meds:    Current Meds:  MMEDICATIONS  (STANDING):  atorvastatin 40 milliGRAM(s) Oral at bedtime  fentaNYL   Infusion. 0.5 MICROgram(s)/kG/Hr (5.15 mL/Hr) IV Continuous <Continuous>  insulin lispro (ADMELOG) corrective regimen sliding scale   SubCutaneous every 6 hours  multivitamin 1 Tablet(s) Oral daily  niCARdipine Infusion 5 mG/Hr (25 mL/Hr) IV Continuous <Continuous>  pantoprazole  Injectable 40 milliGRAM(s) IV Push daily  propofol Infusion 10 MICROgram(s)/kG/Min (6.18 mL/Hr) IV Continuous <Continuous>  sodium chloride 3%. 500 milliLiter(s) (75 mL/Hr) IV Continuous <Continuous>    MEDICATIONS  (PRN):  acetaminophen   Tablet .. 650 milliGRAM(s) Oral every 6 hours PRN Temp greater or equal to 38C (100.4F), Mild Pain (1 - 3)  ondansetron Injectable 4 milliGRAM(s) IV Push every 6 hours PRN Nausea and/or Vomiting  senna Syrup 10 milliLiter(s) Oral at bedtime PRN Constipation    PHYSICAL EXAMINATION    ICU Vital Signs Last 24 Hrs  T(C): 36.6 (2021 15:38), Max: 36.6 (2021 15:38)  T(F): 97.9 (2021 15:38), Max: 97.9 (2021 15:38)  HR: 89 (2021 21:47) (70 - 104)  BP: 133/73 (2021 21:47) (116/66 - 213/133)  RR: 15 (2021 20:59) (14 - 20)  SpO2: 96% (2021 20:59) (94% - 98%)    ED Examination:  Neurologic:  -Mental status: lethargic but awakens to voice, alert, oriented to person, place, and time. Speech is fluent with intact naming, follows commands. +dysarthria.  -Cranial nerves:   II: Visual fields are full to confrontation.  III, IV, VI: Extraocular movements are intact with slight right gaze, but able to bury left. +rotary nystagmus. Pupils equally round and reactive to light  VII: Slight left nasolabial fold flattening  Motor: Normal bulk and tone. No pronator drift, +left cerebellar drift. Strength bilateral upper extremity at least 4/5 and bilateral lower extremities at least 4/5, no drift  Sensation: Intact to light touch bilaterally. No neglect or extinction on double simultaneous testing.  Coordination: +dysmetria left finger to nose with left cerebellar drift  NIHSS: 5 ASPECT Score: 10 ICH Score: 2 (GCS 14)    NeuroICU Exam: LAZARUS 2 mm reactive, no vertical skew, no vertical/horizontal/rotatory nystagmus, +oculocephalics, EOMI, left LMN facial asymmetry, tongue midline, obeys 1st order commands X 4 (equal strength X 4 extremities)  Mode: AC, RR (machine): 15, TV (machine): 500, FiO2: 100, PEEP: 5, ITime: 1, MAP: 8, PIP: 17  CVS:  S1S2 noS3S4 noM (inverted T waves)  PUL:  equal to bases  ABDO:  soft, +BS  EXTREM:  warm PPP    I/O's    21 @ 07:01  -  21 @ 22:43  --------------------------------------------------------  IN: 0 mL / OUT: 750 mL / NET: -750 mL    LABS:                        16.5   7.82  )-----------( 242      ( 2021 15:14 )             49.8         140  |  104  |  15  ----------------------------<  128<H>  x    |  23  |  1.37<H>    Ca    9.1      2021 21:55    TPro  7.1  /  Alb  4.7  /  TBili  1.0  /  DBili  x   /  AST  25  /  ALT  23  /  AlkPhos  68      PT/INR - ( 2021 15:14 )   PT: 12.3 sec;   INR: 1.03     PTT - ( 2021 15:14 )  PTT:26.8 sec    Urinalysis Basic - ( 2021 16:33 )    Color: Yellow / Appearance: Clear / S.015 / pH: x  Gluc: x / Ketone: NEGATIVE  / Bili: Negative / Urobili: 0.2 E.U./dL   Blood: x / Protein: 100 mg/dL / Nitrite: NEGATIVE   Leuk Esterase: NEGATIVE / RBC: < 5 /HPF / WBC < 5 /HPF   Sq Epi: x / Non Sq Epi: 0-5 /HPF / Bacteria: Present /HPF    CARDIAC MARKERS ( 2021 15:14 )  x     / 0.01 ng/mL / x     / x     / x        CAPILLARY BLOOD GLUCOSE  141 (2021 15:38)    POCT Blood Glucose.: 122 mg/dL (2021 20:53)  POCT Blood Glucose.: 141 mg/dL (2021 15:01)    IV FLUIDS:  3% NaCl 75 mL/h  DRIPS:  propofol, fentanyl  LINES:  rad art line  DRAINS:  []  WOUNDS:  []    Acute left cerebellar intraparenchymal hemorrhage and left pontine hemorrhage with extension into the subarachnoid space.  Irregularity of the intradural segment of the left vertebral artery with suggestion of a intimal flap and this may be secondary to nonocclusive dissection injury.    CODE STATUS:  [Full]  GOALS OF CARE:  [Aggressive]  DISPOSITION:  [ICU]

## 2021-07-28 NOTE — ED PROVIDER NOTE - PHYSICAL EXAMINATION
CONSTITUTIONAL: somnolent, vomiting,   HEENT: Head is atraumatic. Eyes clear bilaterally, normal EOMI. Airway patent.  CARDIAC: Normal rate, regular rhythm.  Heart sounds S1, S2.   RESPIRATORY: Breath sounds clear and equal bilaterally. no tachypnea, respiratory distress.   GASTROINTESTINAL: Abdomen soft, non-tender, no guarding, distension.  MUSCULOSKELETAL: Spine appears normal, no midline spinal tenderness, range of motion is not limited, no muscle or joint tenderness. no bony tenderness.   SKIN: Skin normal color for race, warm, dry and intact. No evidence of rash.  PSYCHIATRIC: unable to assess  Neuro:  somnolent, vomiting, eye opening to pain, oriented, obeys commands  +L minor paralysis on L side, flattened nasal labial fold, dysarthria CONSTITUTIONAL: somnolent, vomiting,   HEENT: Head is atraumatic. Eyes clear bilaterally, normal EOMI. Airway patent.  CARDIAC: Normal rate, regular rhythm.  Heart sounds S1, S2.   RESPIRATORY: Breath sounds clear and equal bilaterally. no tachypnea, respiratory distress.   GASTROINTESTINAL: Abdomen soft, non-tender, no guarding, distension.  MUSCULOSKELETAL: Spine appears normal, no midline spinal tenderness, range of motion is not limited, no muscle or joint tenderness. no bony tenderness.   SKIN: Skin normal color for race, warm, dry and intact. No evidence of rash.  PSYCHIATRIC: unable to assess  Neuro:  somnolent, vomiting, eye opening to pain, oriented, obeys commands  +L minor paralysis on L side, flattened nasal labial fold, dysarthria  4/5 strength in upper ext and lower ext  rotary nystagmus  subjective numbness to R arm

## 2021-07-28 NOTE — ED PROVIDER NOTE - OBJECTIVE STATEMENT
45 y/o M pt with PMHx of HTN, noncompliant with meds (Metoprolol, Nifedipine, and ASA), presents to ED c/o dizziness, R arm numbness, and vomiting x 1hr prior to arrival. Pt somewhat somnolent in ED, difficulty obtaining hx from pt. Per EMS, pt had SBP in the 200s. 45 y/o M pt with PMHx of HTN, noncompliant with meds (Metoprolol, Nifedipine, and ASA), presents to ED c/o dizziness, R arm numbness, and vomiting x 1hr prior to arrival while at work. Pt somolent in ED, difficulty obtaining hx from pt. Per EMS, pt had SBP in the 200s, given nitroSL x2. 45 y/o M pt with PMHx of HTN, noncompliant with meds (Metoprolol, Nifedipine, and ASA), presents to ED c/o dizziness, R arm numbness, and vomiting x 1hr prior to arrival while at work. Pt somolent in ED, difficulty obtaining hx from pt. Per EMS, pt had SBP in the 200s, given nitroSL x2. Initial concern for STEMI per EMS but rpt EKG not showing STEMI, ECHO completed bedside w no gross wall motion abnormalities.

## 2021-07-28 NOTE — ED ADULT NURSE REASSESSMENT NOTE - NS ED NURSE REASSESS COMMENT FT1
PT educated that he will be intubated for prophalaxis of airway protection. Pt is drowsy by arousable. Intubated by dr alvarado, respiratory at bedside, rn arlene, pct and pharm tucker at bedside. Intubated 7.5 ET tube at 26 lip. +color change, auscultated and confirmed by xray. Vent settings FIo2 100  peeep 5 rate 15. tolerated well. on end tidal co2 40-45.

## 2021-07-28 NOTE — ED ADULT TRIAGE NOTE - OTHER COMPLAINTS
biba from work c/o sudden onset right sided numbness and nausea, EMS activated stemi alert for elevations in ekg

## 2021-07-28 NOTE — H&P ADULT - NSHPLABSRESULTS_GEN_ALL_CORE
CT BRAIN STROKE PROTOCOL 07/28/2021 @ 3:14 PM        INTERPRETATION: I, Asha Cobb MD, have reviewed the images and the report and agree with the findings, with the following modification:    Curvilinear hyperdense focus in the left midbrain and quadrigeminal plate cistern appears contiguous with hemorrhage in the fourth ventricle. There is also an additional thin linear focus of hyperdensity in the anterior third ventricle and the medial aspect of the right frontal horn, presumed hemorrhage. There is subarachnoid extension of the left cerebellar hematoma into cerebellar sulci.    FINDINGS:  VENTRICLES AND SULCI: The ventricles and sulci are normal in appearance and commensurate with the patient's age. No hydrocephalus.  EXTRA-AXIAL: No extra-axial fluid collection is present.  INTRA-AXIAL: Acute left cerebellar intraparenchymal hemorrhage. Left pontine hemorrhage with extension into the subarachnoid space. Patchy periventricular and subcortical white matter hypodensities consistent with microvascular disease.  VISUALIZED ORBITS: Grossly unremarkable.  VISUALIZED SINUSES: Clear.  VISUALIZED MASTOIDS: Well aerated.  CALVARIUM: No fracture identified.    IMPRESSION:  Acute left cerebellar intraparenchymal hemorrhage and left pontine hemorrhage with extension into the subarachnoid space.      EXAM: CT ANGIO BRAIN (W)AW IC & NECK (W)AW IC    PROCEDURE DATE: 07/28/2021    FINDINGS: The CTA examination demonstrates the internal carotid arteries to be normal in caliber. There is a normal bifurcation into the A1 and M1 segments. 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 There is a normal MCA bifurcation. There is dolichoectasia of the vertebrobasilar system. There is irregularity of the intradural segment of the left vertebral artery (V4) segment prior to the of the left posterior inferior cerebellar artery (PICA). There is a suggestion of an intimal flap within the mesial aspect of the vessel (series 10 images [id number]). The right vertebral and basilar artery appear intact. There is a normal bifurcation into the posterior cerebral arteries bilaterally.    No nidus or arteriovenous shunting is identified.    IMPRESSION: Irregularity of the intradural segment of the left vertebral artery with suggestion of a intimal flap and this may be secondary to nonocclusive dissection injury.    CT ANGIO NECK (W)AW IC    PROCEDURE DATE: 07/28/2021    FINDINGS: The CTA examination demonstrates the right common carotid artery to be normal in caliber. There is a normal bifurcation into the right internal and external carotid arteries. There is no hemodynamically significant stenosis.    The left common carotid artery is normal in caliber. There is a normal bifurcation into the left internal and external carotid arteries. There is no hemodynamically significant stenosis.    The vertebral arteries are normal in caliber.    The aortic arch appears intact without narrowing of the origin of the great vessels.    IMPRESSION: Normal CTA of the neck.

## 2021-07-28 NOTE — ED ADULT NURSE REASSESSMENT NOTE - NS ED NURSE REASSESS COMMENT FT1
OG tube placed by Dr. Cullen. taped to ET tube. Tolerated well. COnfirmed with auscultation and xray per dr. cullen.

## 2021-07-29 LAB
A1C WITH ESTIMATED AVERAGE GLUCOSE RESULT: 5.1 % — SIGNIFICANT CHANGE UP (ref 4–5.6)
ANION GAP SERPL CALC-SCNC: 10 MMOL/L — SIGNIFICANT CHANGE UP (ref 5–17)
ANION GAP SERPL CALC-SCNC: 13 MMOL/L — SIGNIFICANT CHANGE UP (ref 5–17)
ANION GAP SERPL CALC-SCNC: 9 MMOL/L — SIGNIFICANT CHANGE UP (ref 5–17)
APPEARANCE UR: CLEAR — SIGNIFICANT CHANGE UP
BACTERIA # UR AUTO: PRESENT /HPF
BASE EXCESS BLDA CALC-SCNC: -0.8 MMOL/L — SIGNIFICANT CHANGE UP (ref -2–3)
BASE EXCESS BLDA CALC-SCNC: 1.7 MMOL/L — SIGNIFICANT CHANGE UP (ref -2–3)
BILIRUB UR-MCNC: NEGATIVE — SIGNIFICANT CHANGE UP
BUN SERPL-MCNC: 16 MG/DL — SIGNIFICANT CHANGE UP (ref 7–23)
BUN SERPL-MCNC: 17 MG/DL — SIGNIFICANT CHANGE UP (ref 7–23)
CALCIUM SERPL-MCNC: 8.4 MG/DL — SIGNIFICANT CHANGE UP (ref 8.4–10.5)
CALCIUM SERPL-MCNC: 8.5 MG/DL — SIGNIFICANT CHANGE UP (ref 8.4–10.5)
CALCIUM SERPL-MCNC: 8.6 MG/DL — SIGNIFICANT CHANGE UP (ref 8.4–10.5)
CALCIUM SERPL-MCNC: 8.8 MG/DL — SIGNIFICANT CHANGE UP (ref 8.4–10.5)
CHLORIDE SERPL-SCNC: 102 MMOL/L — SIGNIFICANT CHANGE UP (ref 96–108)
CHLORIDE SERPL-SCNC: 111 MMOL/L — HIGH (ref 96–108)
CHLORIDE SERPL-SCNC: 115 MMOL/L — HIGH (ref 96–108)
CHLORIDE SERPL-SCNC: 117 MMOL/L — HIGH (ref 96–108)
CO2 BLDA-SCNC: 26 MMOL/L — HIGH (ref 19–24)
CO2 SERPL-SCNC: 20 MMOL/L — LOW (ref 22–31)
CO2 SERPL-SCNC: 24 MMOL/L — SIGNIFICANT CHANGE UP (ref 22–31)
CO2 SERPL-SCNC: 25 MMOL/L — SIGNIFICANT CHANGE UP (ref 22–31)
CO2 SERPL-SCNC: 25 MMOL/L — SIGNIFICANT CHANGE UP (ref 22–31)
COLOR SPEC: YELLOW — SIGNIFICANT CHANGE UP
COVID-19 SPIKE DOMAIN AB INTERP: POSITIVE
COVID-19 SPIKE DOMAIN ANTIBODY RESULT: 209 U/ML — HIGH
CREAT SERPL-MCNC: 1.36 MG/DL — HIGH (ref 0.5–1.3)
CREAT SERPL-MCNC: 1.39 MG/DL — HIGH (ref 0.5–1.3)
CREAT SERPL-MCNC: 1.39 MG/DL — HIGH (ref 0.5–1.3)
CREAT SERPL-MCNC: 1.42 MG/DL — HIGH (ref 0.5–1.3)
DIFF PNL FLD: ABNORMAL
EPI CELLS # UR: SIGNIFICANT CHANGE UP /HPF (ref 0–5)
ESTIMATED AVERAGE GLUCOSE: 100 MG/DL — SIGNIFICANT CHANGE UP (ref 68–114)
GAS PNL BLDA: SIGNIFICANT CHANGE UP
GAS PNL BLDA: SIGNIFICANT CHANGE UP
GLUCOSE BLDC GLUCOMTR-MCNC: 104 MG/DL — HIGH (ref 70–99)
GLUCOSE BLDC GLUCOMTR-MCNC: 119 MG/DL — HIGH (ref 70–99)
GLUCOSE BLDC GLUCOMTR-MCNC: 134 MG/DL — HIGH (ref 70–99)
GLUCOSE BLDC GLUCOMTR-MCNC: 91 MG/DL — SIGNIFICANT CHANGE UP (ref 70–99)
GLUCOSE BLDC GLUCOMTR-MCNC: 99 MG/DL — SIGNIFICANT CHANGE UP (ref 70–99)
GLUCOSE SERPL-MCNC: 102 MG/DL — HIGH (ref 70–99)
GLUCOSE SERPL-MCNC: 107 MG/DL — HIGH (ref 70–99)
GLUCOSE SERPL-MCNC: 123 MG/DL — HIGH (ref 70–99)
GLUCOSE SERPL-MCNC: 176 MG/DL — HIGH (ref 70–99)
GLUCOSE UR QL: NEGATIVE — SIGNIFICANT CHANGE UP
HCO3 BLDA-SCNC: 24 MMOL/L — SIGNIFICANT CHANGE UP (ref 21–28)
HCO3 BLDA-SCNC: 28 MMOL/L — SIGNIFICANT CHANGE UP (ref 21–28)
HCT VFR BLD CALC: 38.3 % — LOW (ref 39–50)
HCT VFR BLD CALC: 41.4 % — SIGNIFICANT CHANGE UP (ref 39–50)
HGB BLD-MCNC: 12.3 G/DL — LOW (ref 13–17)
HGB BLD-MCNC: 13.8 G/DL — SIGNIFICANT CHANGE UP (ref 13–17)
HYALINE CASTS # UR AUTO: ABNORMAL /LPF (ref 0–2)
KETONES UR-MCNC: ABNORMAL MG/DL
LEUKOCYTE ESTERASE UR-ACNC: NEGATIVE — SIGNIFICANT CHANGE UP
MAGNESIUM SERPL-MCNC: 1.8 MG/DL — SIGNIFICANT CHANGE UP (ref 1.6–2.6)
MAGNESIUM SERPL-MCNC: 1.9 MG/DL — SIGNIFICANT CHANGE UP (ref 1.6–2.6)
MAGNESIUM SERPL-MCNC: 1.9 MG/DL — SIGNIFICANT CHANGE UP (ref 1.6–2.6)
MCHC RBC-ENTMCNC: 29.2 PG — SIGNIFICANT CHANGE UP (ref 27–34)
MCHC RBC-ENTMCNC: 30.1 PG — SIGNIFICANT CHANGE UP (ref 27–34)
MCHC RBC-ENTMCNC: 32.1 GM/DL — SIGNIFICANT CHANGE UP (ref 32–36)
MCHC RBC-ENTMCNC: 33.3 GM/DL — SIGNIFICANT CHANGE UP (ref 32–36)
MCV RBC AUTO: 90.4 FL — SIGNIFICANT CHANGE UP (ref 80–100)
MCV RBC AUTO: 91 FL — SIGNIFICANT CHANGE UP (ref 80–100)
NITRITE UR-MCNC: NEGATIVE — SIGNIFICANT CHANGE UP
NRBC # BLD: 0 /100 WBCS — SIGNIFICANT CHANGE UP (ref 0–0)
NRBC # BLD: 0 /100 WBCS — SIGNIFICANT CHANGE UP (ref 0–0)
PCO2 BLDA: 41 MMHG — SIGNIFICANT CHANGE UP (ref 35–48)
PCO2 BLDA: 48 MMHG — SIGNIFICANT CHANGE UP (ref 35–48)
PH BLDA: 7.37 — SIGNIFICANT CHANGE UP (ref 7.35–7.45)
PH BLDA: 7.38 — SIGNIFICANT CHANGE UP (ref 7.35–7.45)
PH UR: 5.5 — SIGNIFICANT CHANGE UP (ref 5–8)
PHOSPHATE SERPL-MCNC: 4.3 MG/DL — SIGNIFICANT CHANGE UP (ref 2.5–4.5)
PHOSPHATE SERPL-MCNC: 4.8 MG/DL — HIGH (ref 2.5–4.5)
PHOSPHATE SERPL-MCNC: 5.7 MG/DL — HIGH (ref 2.5–4.5)
PLATELET # BLD AUTO: 179 K/UL — SIGNIFICANT CHANGE UP (ref 150–400)
PLATELET # BLD AUTO: 197 K/UL — SIGNIFICANT CHANGE UP (ref 150–400)
PO2 BLDA: 341 MMHG — HIGH (ref 83–108)
PO2 BLDA: 67 MMHG — LOW (ref 83–108)
POTASSIUM SERPL-MCNC: 4.2 MMOL/L — SIGNIFICANT CHANGE UP (ref 3.5–5.3)
POTASSIUM SERPL-MCNC: 4.3 MMOL/L — SIGNIFICANT CHANGE UP (ref 3.5–5.3)
POTASSIUM SERPL-MCNC: 4.4 MMOL/L — SIGNIFICANT CHANGE UP (ref 3.5–5.3)
POTASSIUM SERPL-MCNC: 4.9 MMOL/L — SIGNIFICANT CHANGE UP (ref 3.5–5.3)
POTASSIUM SERPL-SCNC: 4.2 MMOL/L — SIGNIFICANT CHANGE UP (ref 3.5–5.3)
POTASSIUM SERPL-SCNC: 4.3 MMOL/L — SIGNIFICANT CHANGE UP (ref 3.5–5.3)
POTASSIUM SERPL-SCNC: 4.4 MMOL/L — SIGNIFICANT CHANGE UP (ref 3.5–5.3)
POTASSIUM SERPL-SCNC: 4.9 MMOL/L — SIGNIFICANT CHANGE UP (ref 3.5–5.3)
PROT UR-MCNC: ABNORMAL MG/DL
RBC # BLD: 4.21 M/UL — SIGNIFICANT CHANGE UP (ref 4.2–5.8)
RBC # BLD: 4.58 M/UL — SIGNIFICANT CHANGE UP (ref 4.2–5.8)
RBC # FLD: 13.2 % — SIGNIFICANT CHANGE UP (ref 10.3–14.5)
RBC # FLD: 13.6 % — SIGNIFICANT CHANGE UP (ref 10.3–14.5)
RBC CASTS # UR COMP ASSIST: > 10 /HPF
SAO2 % BLDA: 100 % — HIGH (ref 94–98)
SAO2 % BLDA: 94.3 % — SIGNIFICANT CHANGE UP (ref 94–98)
SARS-COV-2 IGG+IGM SERPL QL IA: 209 U/ML — HIGH
SARS-COV-2 IGG+IGM SERPL QL IA: POSITIVE
SODIUM SERPL-SCNC: 140 MMOL/L — SIGNIFICANT CHANGE UP (ref 135–145)
SODIUM SERPL-SCNC: 145 MMOL/L — SIGNIFICANT CHANGE UP (ref 135–145)
SODIUM SERPL-SCNC: 146 MMOL/L — HIGH (ref 135–145)
SODIUM SERPL-SCNC: 150 MMOL/L — HIGH (ref 135–145)
SP GR SPEC: 1.02 — SIGNIFICANT CHANGE UP (ref 1–1.03)
UROBILINOGEN FLD QL: 0.2 E.U./DL — SIGNIFICANT CHANGE UP
WBC # BLD: 10.77 K/UL — HIGH (ref 3.8–10.5)
WBC # BLD: 8.06 K/UL — SIGNIFICANT CHANGE UP (ref 3.8–10.5)
WBC # FLD AUTO: 10.77 K/UL — HIGH (ref 3.8–10.5)
WBC # FLD AUTO: 8.06 K/UL — SIGNIFICANT CHANGE UP (ref 3.8–10.5)
WBC UR QL: < 5 /HPF — SIGNIFICANT CHANGE UP

## 2021-07-29 PROCEDURE — 93306 TTE W/DOPPLER COMPLETE: CPT | Mod: 26

## 2021-07-29 PROCEDURE — 70450 CT HEAD/BRAIN W/O DYE: CPT | Mod: 26

## 2021-07-29 PROCEDURE — 75898 FOLLOW-UP ANGIOGRAPHY: CPT | Mod: 26

## 2021-07-29 PROCEDURE — 36227 PLACE CATH XTRNL CAROTID: CPT | Mod: LT

## 2021-07-29 PROCEDURE — 36224 PLACE CATH CAROTD ART: CPT | Mod: LT

## 2021-07-29 PROCEDURE — 75894 X-RAYS TRANSCATH THERAPY: CPT | Mod: 26

## 2021-07-29 PROCEDURE — 71045 X-RAY EXAM CHEST 1 VIEW: CPT | Mod: 26,77

## 2021-07-29 PROCEDURE — 99291 CRITICAL CARE FIRST HOUR: CPT

## 2021-07-29 PROCEDURE — 61624 TCAT PERM OCCLS/EMBOLJ CNS: CPT

## 2021-07-29 PROCEDURE — 71045 X-RAY EXAM CHEST 1 VIEW: CPT | Mod: 26

## 2021-07-29 PROCEDURE — 36223 PLACE CATH CAROTID/INOM ART: CPT | Mod: 59,RT

## 2021-07-29 PROCEDURE — 76377 3D RENDER W/INTRP POSTPROCES: CPT | Mod: 26

## 2021-07-29 PROCEDURE — 36226 PLACE CATH VERTEBRAL ART: CPT | Mod: 50

## 2021-07-29 PROCEDURE — 99291 CRITICAL CARE FIRST HOUR: CPT | Mod: 24

## 2021-07-29 PROCEDURE — 93970 EXTREMITY STUDY: CPT | Mod: 26

## 2021-07-29 RX ORDER — SODIUM CHLORIDE 9 MG/ML
1000 INJECTION INTRAMUSCULAR; INTRAVENOUS; SUBCUTANEOUS
Refills: 0 | Status: DISCONTINUED | OUTPATIENT
Start: 2021-07-29 | End: 2021-07-29

## 2021-07-29 RX ORDER — IPRATROPIUM/ALBUTEROL SULFATE 18-103MCG
3 AEROSOL WITH ADAPTER (GRAM) INHALATION EVERY 6 HOURS
Refills: 0 | Status: DISCONTINUED | OUTPATIENT
Start: 2021-07-29 | End: 2021-08-02

## 2021-07-29 RX ORDER — MAGNESIUM SULFATE 500 MG/ML
1 VIAL (ML) INJECTION ONCE
Refills: 0 | Status: COMPLETED | OUTPATIENT
Start: 2021-07-29 | End: 2021-07-29

## 2021-07-29 RX ORDER — SODIUM CHLORIDE 9 MG/ML
1000 INJECTION INTRAMUSCULAR; INTRAVENOUS; SUBCUTANEOUS
Refills: 0 | Status: DISCONTINUED | OUTPATIENT
Start: 2021-07-29 | End: 2021-07-30

## 2021-07-29 RX ORDER — FENTANYL CITRATE 50 UG/ML
0.5 INJECTION INTRAVENOUS
Qty: 2500 | Refills: 0 | Status: DISCONTINUED | OUTPATIENT
Start: 2021-07-29 | End: 2021-07-31

## 2021-07-29 RX ORDER — FENTANYL CITRATE 50 UG/ML
100 INJECTION INTRAVENOUS ONCE
Refills: 0 | Status: DISCONTINUED | OUTPATIENT
Start: 2021-07-29 | End: 2021-07-29

## 2021-07-29 RX ORDER — PIPERACILLIN AND TAZOBACTAM 4; .5 G/20ML; G/20ML
3.38 INJECTION, POWDER, LYOPHILIZED, FOR SOLUTION INTRAVENOUS EVERY 6 HOURS
Refills: 0 | Status: DISCONTINUED | OUTPATIENT
Start: 2021-07-29 | End: 2021-08-04

## 2021-07-29 RX ORDER — SODIUM CHLORIDE 9 MG/ML
1000 INJECTION INTRAMUSCULAR; INTRAVENOUS; SUBCUTANEOUS ONCE
Refills: 0 | Status: COMPLETED | OUTPATIENT
Start: 2021-07-29 | End: 2021-07-29

## 2021-07-29 RX ORDER — FENTANYL CITRATE 50 UG/ML
25 INJECTION INTRAVENOUS
Refills: 0 | Status: DISCONTINUED | OUTPATIENT
Start: 2021-07-29 | End: 2021-07-29

## 2021-07-29 RX ORDER — PROPOFOL 10 MG/ML
10 INJECTION, EMULSION INTRAVENOUS
Qty: 1000 | Refills: 0 | Status: DISCONTINUED | OUTPATIENT
Start: 2021-07-29 | End: 2021-07-29

## 2021-07-29 RX ORDER — ACETAMINOPHEN 500 MG
1000 TABLET ORAL ONCE
Refills: 0 | Status: COMPLETED | OUTPATIENT
Start: 2021-07-29 | End: 2021-07-29

## 2021-07-29 RX ORDER — SODIUM CHLORIDE 5 G/100ML
500 INJECTION, SOLUTION INTRAVENOUS
Refills: 0 | Status: DISCONTINUED | OUTPATIENT
Start: 2021-07-29 | End: 2021-07-29

## 2021-07-29 RX ORDER — PIPERACILLIN AND TAZOBACTAM 4; .5 G/20ML; G/20ML
3.38 INJECTION, POWDER, LYOPHILIZED, FOR SOLUTION INTRAVENOUS ONCE
Refills: 0 | Status: COMPLETED | OUTPATIENT
Start: 2021-07-29 | End: 2021-07-29

## 2021-07-29 RX ORDER — PROPOFOL 10 MG/ML
0.02 INJECTION, EMULSION INTRAVENOUS
Qty: 1000 | Refills: 0 | Status: DISCONTINUED | OUTPATIENT
Start: 2021-07-29 | End: 2021-07-29

## 2021-07-29 RX ORDER — DEXMEDETOMIDINE HYDROCHLORIDE IN 0.9% SODIUM CHLORIDE 4 UG/ML
0.2 INJECTION INTRAVENOUS
Qty: 400 | Refills: 0 | Status: DISCONTINUED | OUTPATIENT
Start: 2021-07-29 | End: 2021-07-29

## 2021-07-29 RX ORDER — DEXMEDETOMIDINE HYDROCHLORIDE IN 0.9% SODIUM CHLORIDE 4 UG/ML
0.2 INJECTION INTRAVENOUS
Qty: 400 | Refills: 0 | Status: DISCONTINUED | OUTPATIENT
Start: 2021-07-29 | End: 2021-08-01

## 2021-07-29 RX ORDER — VANCOMYCIN HCL 1 G
1500 VIAL (EA) INTRAVENOUS EVERY 12 HOURS
Refills: 0 | Status: DISCONTINUED | OUTPATIENT
Start: 2021-07-29 | End: 2021-08-04

## 2021-07-29 RX ORDER — FENTANYL CITRATE 50 UG/ML
25 INJECTION INTRAVENOUS
Refills: 0 | Status: DISCONTINUED | OUTPATIENT
Start: 2021-07-29 | End: 2021-07-31

## 2021-07-29 RX ORDER — NOREPINEPHRINE BITARTRATE/D5W 8 MG/250ML
0.05 PLASTIC BAG, INJECTION (ML) INTRAVENOUS
Qty: 8 | Refills: 0 | Status: DISCONTINUED | OUTPATIENT
Start: 2021-07-29 | End: 2021-07-30

## 2021-07-29 RX ORDER — CHLORHEXIDINE GLUCONATE 213 G/1000ML
15 SOLUTION TOPICAL EVERY 12 HOURS
Refills: 0 | Status: DISCONTINUED | OUTPATIENT
Start: 2021-07-29 | End: 2021-07-30

## 2021-07-29 RX ADMIN — SODIUM CHLORIDE 1000 MILLILITER(S): 9 INJECTION INTRAMUSCULAR; INTRAVENOUS; SUBCUTANEOUS at 19:57

## 2021-07-29 RX ADMIN — CHLORHEXIDINE GLUCONATE 15 MILLILITER(S): 213 SOLUTION TOPICAL at 05:28

## 2021-07-29 RX ADMIN — Medication 1000 MILLIGRAM(S): at 17:45

## 2021-07-29 RX ADMIN — Medication 400 MILLIGRAM(S): at 17:15

## 2021-07-29 RX ADMIN — DEXMEDETOMIDINE HYDROCHLORIDE IN 0.9% SODIUM CHLORIDE 5.15 MICROGRAM(S)/KG/HR: 4 INJECTION INTRAVENOUS at 10:48

## 2021-07-29 RX ADMIN — CHLORHEXIDINE GLUCONATE 15 MILLILITER(S): 213 SOLUTION TOPICAL at 18:20

## 2021-07-29 RX ADMIN — PROPOFOL 6.18 MICROGRAM(S)/KG/MIN: 10 INJECTION, EMULSION INTRAVENOUS at 18:00

## 2021-07-29 RX ADMIN — SODIUM CHLORIDE 75 MILLILITER(S): 5 INJECTION, SOLUTION INTRAVENOUS at 02:26

## 2021-07-29 RX ADMIN — PANTOPRAZOLE SODIUM 40 MILLIGRAM(S): 20 TABLET, DELAYED RELEASE ORAL at 13:16

## 2021-07-29 RX ADMIN — Medication 3 MILLILITER(S): at 21:42

## 2021-07-29 RX ADMIN — PIPERACILLIN AND TAZOBACTAM 200 GRAM(S): 4; .5 INJECTION, POWDER, LYOPHILIZED, FOR SOLUTION INTRAVENOUS at 18:21

## 2021-07-29 RX ADMIN — FENTANYL CITRATE 5.15 MICROGRAM(S)/KG/HR: 50 INJECTION INTRAVENOUS at 19:00

## 2021-07-29 RX ADMIN — Medication 300 MILLIGRAM(S): at 20:45

## 2021-07-29 RX ADMIN — Medication 100 GRAM(S): at 10:48

## 2021-07-29 RX ADMIN — ATORVASTATIN CALCIUM 40 MILLIGRAM(S): 80 TABLET, FILM COATED ORAL at 00:09

## 2021-07-29 RX ADMIN — PROPOFOL 6.18 MICROGRAM(S)/KG/MIN: 10 INJECTION, EMULSION INTRAVENOUS at 05:31

## 2021-07-29 NOTE — PROGRESS NOTE ADULT - ASSESSMENT
46y/M with  L cerebellar hematoma, VA dissection, brain comprestable CTssion; IVH  HTN (hypertension)    PLAN:   NEURO: neurochecks q1h, PRN pain meds with Tylenol  angio, VA takedown?  REHAB:  physical therapy evaluation and management    EARLY MOB:  bed rest    PULM:  intubated  CARDIO:  SBP goal 100-140mm Hg, off cardene, [place]  ENDO:  Blood sugar goals 140-180 mg/dL, continue insulin sliding scale  GI:  PPI for GI prophylaxis  DIET: NPO for procedure  RENAL:  140-145, on 3%; Farias   HEM/ONC: Hb stable  VTE Prophylaxis: SCDs, no DVT chemoprophylaxis for now as patient is high risk for bleed (fresh bleed)  ID: afebrile, no leukocytosis  Social: will update family; 2 physician consent    ATTENDING ATTESTATION:  I was physically present for the key portions of the evaluation and management (E/M) service provided.  I agree with the above history, physical and plan, which I have reviewed and edited where appropriate.    Patient at high risk for neurological deterioration or death due to:  ICU delirium, aspiration PNA, DVT / PE.  Critical care time:  I have personally provided 45 minutes of critical care time, excluding time spent on separate procedures.      Plan discussed with RN, house staff. 46y/M with  L cerebellar hematoma, VA dissection, brain compression, IVH  HTN (hypertension)  elevated creatinine    PLAN:   NEURO: neurochecks q1h, PRN pain meds with acetaminophen, propofol to RASS of 0 to -1 - switch to precedex   angio, VA takedown?; BP control, MRI on stepdown  REHAB:  physical therapy evaluation and management    EARLY MOB:  bed rest    PULM:  intubated - possible extubation   CARDIO:  SBP goal 100-140mm Hg, continue cardene, [place]  ENDO:  Blood sugar goals 140-180 mg/dL, continue insulin sliding scale  GI:  PPI for GI prophylaxis  DIET: NPO for procedure  RENAL:  140-145, Farias, NS@100cc/hr, recheck BMP   HEM/ONC: Hb stable  VTE Prophylaxis: SCDs, no DVT chemoprophylaxis for now as patient is high risk for bleed (fresh bleed)  ID: afebrile, no leukocytosis  Social: will update family; 2 physician consent    ATTENDING ATTESTATION:  I was physically present for the key portions of the evaluation and management (E/M) service provided.  I agree with the above history, physical and plan, which I have reviewed and edited where appropriate.    Patient at high risk for neurological deterioration or death due to:  ICU delirium, aspiration PNA, DVT / PE.  Critical care time:  I have personally provided 45 minutes of critical care time, excluding time spent on separate procedures.      Plan discussed with RN, house staff.

## 2021-07-29 NOTE — PROGRESS NOTE ADULT - SUBJECTIVE AND OBJECTIVE BOX
=================================  NEUROCRITICAL CARE ATTENDING NOTE  =================================    CRISTI JENNINGS   MRN-[id number]  Summary:  46y/M  with PMHx of HTN, noncompliant with meds (Metoprolol, Nifedipine, and ASA), reports no recent use of ASA or metoprolol, presents to ED c/o acute onset and worsening dizziness, R arm paresthesia, and vomiting x 1hr prior to arrival while at work. Pt somnolent in ED, difficulty obtaining hx from pt. Per EMS, pt had SBP in the 200s, given nitro SL x 2 in field. Patient found to have a left side cerebellar hemmorrhage and left pontine hemmorrhage with extension to the subarachnoid space. Patient was started on a nicardipene drip in the ED for SBP to 230's, given manitol 50g and intubated for concern of potential for aspiration with declining exam. Patient reported that he lives alone and has no close contacts in the area.  (28 Jul 2021 20:40)    COURSE IN THE HOSPITAL:  07/28 intubated  07/29 remained intubated    Past Medical History: HTN (hypertension)  Allergies:  No Known Allergies  Home meds:     PHYSICAL EXAMINATION  T(C): 37.3 (07-29 @ 05:45), Max: 37.3 (07-29 @ 05:45) HR: 88 (07-29 @ 06:09) (70 - 110) BP: 118/75 (07-29 @ 06:00) (88/49 - 213/133) RR: 15 (07-29 @ 06:00) (13 - 24) SpO2: 99% (07-29 @ 06:09) (94% - 100%)  NEUROLOGIC EXAMINATION:  Patient is awake, alert, fully oriented, pupils 2-3mm equal and briskly reactive to light, following commands, EOMs intact, moves all 4s equally  GENERAL: not intubated, not in cardiorespiratory distress  EENT:  anicteric  CARDIOVASCULAR: (+) S1 S2, normal rate and regular rhythm  PULMONARY: clear to auscultation bilaterally  ABDOMEN: soft, nontender with normoactive bowel sounds  EXTREMITIES: no edema  SKIN: no rash    LABS:  CAPILLARY BLOOD GLUCOSE 91 104 122 141                        13.8   10.77 )-----------( 197      ( 29 Jul 2021 03:56 )             41.4     146<H>  |  111<H>  |  16  ----------------------------<  107<H>  4.4   |  25  |  1.36<H>    Ca    8.6      29 Jul 2021 03:56  Phos  4.8     07-29  Mg     1.9     07-29    TPro  7.1  /  Alb  4.7  /  TBili  1.0  /  DBili  x   /  AST  25  /  ALT  23  /  AlkPhos  68  07-28 07-28 @ 07:01  -  07-29 @ 06:43  IN: 752.5 mL / OUT: 1325 mL / NET: -572.5 mL    Bacteriology:  CSF studies:  EEG:  Neuroimaging:  Other imaging:    MEDICATIONS:  ·	acetaminophen   Tablet .. 650 milliGRAM(s) Oral every 6 hours PRN  ·	fentaNYL   Infusion. 0.5 MICROgram(s)/kG/Hr IV Continuous <Continuous>  ·	ondansetron Injectable 4 milliGRAM(s) IV Push every 6 hours PRN  ·	pantoprazole  Injectable 40 milliGRAM(s) IV Push daily  ·	senna Syrup 10 milliLiter(s) Oral at bedtime PRN  ·	atorvastatin 40 milliGRAM(s) Oral at bedtime  ·	insulin lispro (ADMELOG) corrective regimen sliding scale   SubCutaneous every 6 hours  ·	multivitamin 1 Tablet(s) Oral daily    IV FLUIDS: 3%  DRIPS:  ·	propofol Infusion 10 MICROgram(s)/kG/Min IV Continuous <Continuous>  ·	niCARdipine Infusion 5 mG/Hr IV Continuous <Continuous>  DIET:  Lines: Steffanie  Drains:      Wounds:    CODE STATUS:  Full Code                       GOALS OF CARE:  aggressive                      DISPOSITION:  ICU  NIHSS 6 ICH 2 =================================  NEUROCRITICAL CARE ATTENDING NOTE  =================================    CRISTI JENNINGS   MRN-[id number]  Summary:  46y/M  with PMHx of HTN, noncompliant with meds (Metoprolol, Nifedipine, and ASA), reports no recent use of ASA or metoprolol, presents to ED c/o acute onset and worsening dizziness, R arm paresthesia, and vomiting x 1hr prior to arrival while at work. Pt somnolent in ED, difficulty obtaining hx from pt. Per EMS, pt had SBP in the 200s, given nitro SL x 2 in field. Patient found to have a left side cerebellar hemmorrhage and left pontine hemmorrhage with extension to the subarachnoid space. Patient was started on a nicardipene drip in the ED for SBP to 230's, given manitol 50g and intubated for concern of potential for aspiration with declining exam. Patient reported that he lives alone and has no close contacts in the area.  (28 Jul 2021 20:40)    COURSE IN THE HOSPITAL:  07/28 intubated  07/29 remained intubated    Past Medical History: HTN (hypertension)  Allergies:  No Known Allergies  Home meds:     PHYSICAL EXAMINATION  T(C): 37.3 (07-29 @ 05:45), Max: 37.3 (07-29 @ 05:45) HR: 88 (07-29 @ 06:09) (70 - 110) BP: 118/75 (07-29 @ 06:00) (88/49 - 213/133) RR: 15 (07-29 @ 06:00) (13 - 24) SpO2: 99% (07-29 @ 06:09) (94% - 100%)  NEUROLOGIC EXAMINATION:  Patient is following commands commands  GENERAL: AC full code  EENT:  anicteric  CARDIOVASCULAR: (+) S1 S2, normal rate and regular rhythm  PULMONARY: clear to auscultation bilaterally  ABDOMEN: soft, nontender with normoactive bowel sounds  EXTREMITIES: no edema  SKIN: no rash    LABS:  CAPILLARY BLOOD GLUCOSE 91 104 122 141             (14)     13.8   10.77 )-----------( 197      ( 29 Jul 2021 03:56 )             41.4     146<H>  |  111<H>  |  16  ----------------------------<  107<H>  4.4   |  25  |  1.36<H> (1.39)    Ca    8.6      29 Jul 2021 03:56  Phos  4.8     07-29  Mg     1.9     07-29    TPro  7.1  /  Alb  4.7  /  TBili  1.0  /  DBili  x   /  AST  25  /  ALT  23  /  AlkPhos  68  07-28 07-28 @ 07:01  -  07-29 @ 06:43  IN: 752.5 mL / OUT: 1325 mL / NET: -572.5 mL    7.37/48/341/28    Bacteriology:  CSF studies:  EEG:  Neuroimaging:  Other imaging:    MEDICATIONS:  ·	acetaminophen   Tablet .. 650 milliGRAM(s) Oral every 6 hours PRN  ·	fentaNYL   Infusion. 0.5 MICROgram(s)/kG/Hr IV Continuous <Continuous>  ·	ondansetron Injectable 4 milliGRAM(s) IV Push every 6 hours PRN  ·	pantoprazole  Injectable 40 milliGRAM(s) IV Push daily  ·	senna Syrup 10 milliLiter(s) Oral at bedtime PRN  ·	atorvastatin 40 milliGRAM(s) Oral at bedtime  ·	insulin lispro (ADMELOG) corrective regimen sliding scale   SubCutaneous every 6 hours  ·	multivitamin 1 Tablet(s) Oral daily    IV FLUIDS: NS@100cc/hr (was on 3%@50)  DRIPS:  ·	propofol Infusion 10 MICROgram(s)/kG/Min IV Continuous <Continuous>  ·	niCARdipine Infusion 5 mG/Hr IV Continuous <Continuous>  DIET:  Lines: Steffanie  Drains:  Farias  Wounds:    CODE STATUS:  Full Code                       GOALS OF CARE:  aggressive                      DISPOSITION:  ICU  NIHSS 6 ICH 2

## 2021-07-29 NOTE — BRIEF OPERATIVE NOTE - NSICDXBRIEFPREOP_GEN_ALL_CORE_FT
PRE-OP DIAGNOSIS:  SAH (subarachnoid hemorrhage) 29-Jul-2021 09:10:46  Eligio Quiñones  Dissection of cerebral artery 29-Jul-2021 09:11:52 Left Vertebral artery dissection Eligio Quiñones

## 2021-07-29 NOTE — BRIEF OPERATIVE NOTE - NSICDXBRIEFPROCEDURE_GEN_ALL_CORE_FT
PROCEDURES:  Angiogram, blood vessel, cerebral, with embolization 29-Jul-2021 09:09:53 Left Vertebral artery occlusion Eligio Quiñones

## 2021-07-29 NOTE — PROGRESS NOTE ADULT - SUBJECTIVE AND OBJECTIVE BOX
Surgery: femoral cerebral angiogram for possible vertebral artery take down   Consent: Signed by: 2 physician consent, in chart                    NAME/NUMBER of HCP: unkown at this time, patient intubated and sedated     No Known Allergies      OVERNIGHT EVENTS:    T(C): 36.9 (07-29-21 @ 00:41), Max: 36.9 (07-29-21 @ 00:41)  HR: 89 (07-29-21 @ 02:00) (70 - 110)  BP: 120/68 (07-29-21 @ 02:00) (88/49 - 213/133)  RR: 16 (07-29-21 @ 02:00) (13 - 24)  SpO2: 100% (07-29-21 @ 02:00) (94% - 100%)  Wt(kg): --    EXAM:      07-28    140  |  102  |  16  ----------------------------<  123<H>  4.9   |  25  |  1.39<H>    Ca    8.8      28 Jul 2021 23:36  Phos  5.7     07-28  Mg     1.9     07-28    TPro  7.1  /  Alb  4.7  /  TBili  1.0  /  DBili  x   /  AST  25  /  ALT  23  /  AlkPhos  68  07-28    CBC Full  -  ( 28 Jul 2021 23:36 )  WBC Count : 14.19 K/uL  RBC Count : 5.29 M/uL  Hemoglobin : 15.9 g/dL  Hematocrit : 47.5 %  Platelet Count - Automated : 240 K/uL  Mean Cell Volume : 89.8 fl  Mean Cell Hemoglobin : 30.1 pg  Mean Cell Hemoglobin Concentration : 33.5 gm/dL  Auto Neutrophil # : x  Auto Lymphocyte # : x  Auto Monocyte # : x  Auto Eosinophil # : x  Auto Basophil # : x  Auto Neutrophil % : x  Auto Lymphocyte % : x  Auto Monocyte % : x  Auto Eosinophil % : x  Auto Basophil % : x    PT/INR - ( 28 Jul 2021 15:14 )   PT: 12.3 sec;   INR: 1.03          PTT - ( 28 Jul 2021 15:14 )  PTT:26.8 sec    Pregnancy test: N/A   Type & Screen (in past 72hrs):   Type + Screen (07.28.21 @ 15:38)   ABO Interpretation: B   Rh Interpretation: Positive   Antibody Screen: Negative   CXR: no acute infiltrates   EKG: NSR, inverted T-waves throughout   ECHO: pend   Medical Clearances: ICU patient   Other Clearances:      Last dose of antiplatelet/anticoagulation drug: unknown, patient non-compliant with medications     Implanted Devices (pacemaker, drug pump...etc):  []YES   [X] NO                   If yes --> EPS consulted to interrogate/adjust device:    3M nasal swab ordered?  _Y  _N -     N/A   Cranial surgery: Order written for hair to be shampooed night before surgery  [] yes   []no - N/A                  Assessment:   46 M PMH HTN, non-compliant on home meds nifedipine, ASA 81, and metoprolol presented with 2 hr onset gradual worsening R UE paresthesia, weakness, dizziness, N&V found to have an Acute left cerebellar intraparenchymal hemorrhage and left pontine hemorrhage with extension into the subarachnoid space. Patient was admitted for femoral cerebral angiogram  for possible vertebral artery cutdown.       Plan:    - NPO after midnight for procedure  - IVF while NPO   - angio consent signed and in chart   - CXR and EKG completed   - type and screen and coags on admission  - COVID negative   - rahel braun   - SBP goal 100-140     Assessment and plan discussed w/ Dr. D'Amico, Dr. Rubio, and Dr. Oneill       Assessment:  Present when checked    []  GCS  E   V  M     Heart Failure: []Acute, [] acute on chronic , []chronic  Heart Failure:  [] Diastolic (HFpEF), [] Systolic (HFrEF), []Combined (HFpEF and HFrEF), [] RHF, [] Pulm HTN, [] Other    [] TATIANA, [] ATN, [] AIN, [] other  [] CKD1, [] CKD2, [] CKD 3, [] CKD 4, [] CKD 5, []ESRD    Encephalopathy: [] Metabolic, [] Hepatic, [] toxic, [] Neurological, [] Other    Abnormal Nurtitional Status: [] malnurtition (see nutrition note), [ ]underweight: BMI < 19, [] morbid obesity: BMI >40, [] Cachexia    [] Sepsis  [] hypovolemic shock,[] cardiogenic shock, [] hemorrhagic shock, [] neuogenic shock  [] Acute Respiratory Failure  []Cerebral edema, [] Brain compression/ herniation,   [] Functional quadriplegia  [] Acute blood loss anemia

## 2021-07-29 NOTE — BRIEF OPERATIVE NOTE - NSICDXBRIEFPOSTOP_GEN_ALL_CORE_FT
POST-OP DIAGNOSIS:  SAH (subarachnoid hemorrhage) 29-Jul-2021 09:10:56  Eligio Quiñones  Dissection of cerebral artery 29-Jul-2021 09:12:24 Left Vertebral artery dissection Eligio Quiñones

## 2021-07-29 NOTE — PROGRESS NOTE ADULT - SUBJECTIVE AND OBJECTIVE BOX
NEUROCRITICAL CARE ATTENDING NOTE (2021 Evening)    CRISTI JENNINGS  MRN-[id number]    46 M pt with PMHx of HTN, noncompliant with meds (Metoprolol, Nifedipine, and ASA), reports no recent use of ASA or metoprolol, presents to ED c/o acute onset and worsening dizziness, R arm paresthesia, and vomiting x 1hr prior to arrival while at work. Pt somnolent in ED, difficulty obtaining hx from pt. Per EMS, pt had SBP in the 200s, given nitro SL x 2 in field. 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 Patient was started on a nicardipine drip in the ED for SBP to 230's, given mannitol 50g and intubated for concern of potential for aspiration with declining exam. Patient reported that he lives alone and has no close contacts in the area. NIHSS 6.  ICH 2    2021 - currently intubated and sedated on propofol/fentanyl, nicardipine 7.5 mg/h, sBP 120s. Repeat CT stable.  2021 - POD# 0 S/P femoral cerebral angiogram, findings of left vertebral artery irregularity at level below PICA with segment suggestive of intimal flap, left vertebral artery sacrifice performed. CT shows stable ICH.  Trial extubation and reintubation during dayshift (volume overload), neurologically stable.      Past Medical History:  HTN (hypertension)    Allergies:  Unknown    Home Meds:    Current Meds:  MEDICATIONS  (STANDING):  albuterol/ipratropium for Nebulization 3 milliLiter(s) Nebulizer every 6 hours  atorvastatin 40 milliGRAM(s) Oral at bedtime  insulin lispro (ADMELOG) corrective regimen sliding scale   SubCutaneous every 6 hours  multivitamin 1 Tablet(s) Oral daily  piperacillin/tazobactam IVPB.. 3.375 Gram(s) IV Intermittent every 6 hours  sodium chloride 0.9%. 1000 milliLiter(s) (100 mL/Hr) IV Continuous <Continuous>  vancomycin  IVPB 1500 milliGRAM(s) IV Intermittent every 12 hours    MEDICATIONS  (PRN):  acetaminophen   Tablet .. 650 milliGRAM(s) Oral every 6 hours PRN Temp greater or equal to 38C (100.4F), Mild Pain (1 - 3)  fentaNYL    Injectable 25 MICROGram(s) IV Push every 3 hours PRN tachypneia, agitation  ondansetron Injectable 4 milliGRAM(s) IV Push every 6 hours PRN Nausea and/or Vomiting  senna Syrup 10 milliLiter(s) Oral at bedtime PRN Constipation    PHYSICAL EXAMINATION    ICU Vital Signs Last 24 Hrs  T(C): 38.9 (2021 17:21), Max: 38.9 (2021 17:21)  T(F): 102 (2021 17:21), Max: 102 (2021 17:21)  HR: 66 (2021 17:00) (63 - 110)  BP: 117/79 (2021 17:00) (88/49 - 155/88)  BP(mean): 94 (2021 17:00) (64 - 101)  ABP: 110/79 (2021 17:00) (91/57 - 156/89)  ABP(mean): 92 (2021 17:00) (68 - 111)  RR: 23 (2021 17:00) (13 - 24)  SpO2: 90% (2021 17:00) (90% - 100%)    ED Examination:  Neurologic:  -Mental status: lethargic but awakens to voice, alert, oriented to person, place, and time. Speech is fluent with intact naming, follows commands. +dysarthria.  -Cranial nerves:   II: Visual fields are full to confrontation.  III, IV, VI: Extraocular movements are intact with slight right gaze, but able to bury left. +rotary nystagmus. Pupils equally round and reactive to light  VII: Slight left nasolabial fold flattening  Motor: Normal bulk and tone. No pronator drift, +left cerebellar drift. Strength bilateral upper extremity at least 4/5 and bilateral lower extremities at least 4/5, no drift  Sensation: Intact to light touch bilaterally. No neglect or extinction on double simultaneous testing.  Coordination: +dysmetria left finger to nose with left cerebellar drift  NIHSS: 5 ASPECT Score: 10 ICH Score: 2 (GCS 14)    NeuroICU Exam: LAZARUS 2 mm reactive, no vertical skew, no vertical/horizontal/rotatory nystagmus, EOMI, left LMN facial asymmetry, tongue midline, no scanning speech, obeys 1st order commands X 4 (equal strength X 4 extremities)  CVS:  S1S2 noS3S4 noM  PUL:  equal to bases  ABDO:  soft, +BS  EXTREM:  warm PPP    I/O's    21 @ 07:01  -  21 @ 07:00  --------------------------------------------------------  IN: 958.7 mL / OUT: 1375 mL / NET: -416.3 mL    21 @ :  -  21 @ 18:20  --------------------------------------------------------  IN: 1024.4 mL / OUT: 565 mL / NET: 459.4 mL    LABS:                        12.3   8.06  )-----------( 179      ( 2021 10:17 )             38.3         150<H>  |  117<H>  |  16  ----------------------------<  102<H>  4.2   |  24  |  1.39<H>    Ca    8.5      2021 10:17  Phos  4.3       Mg     1.8         TPro  7.1  /  Alb  4.7  /  TBili  1.0  /  DBili  x   /  AST  25  /  ALT  23  /  AlkPhos  68      PT/INR - ( 2021 15:14 )   PT: 12.3 sec;   INR: 1.03     PTT - ( 2021 15:14 )  PTT:26.8 sec    Urinalysis Basic - ( 2021 16:33 )    Color: Yellow / Appearance: Clear / S.015 / pH: x  Gluc: x / Ketone: NEGATIVE  / Bili: Negative / Urobili: 0.2 E.U./dL   Blood: x / Protein: 100 mg/dL / Nitrite: NEGATIVE   Leuk Esterase: NEGATIVE / RBC: < 5 /HPF / WBC < 5 /HPF   Sq Epi: x / Non Sq Epi: 0-5 /HPF / Bacteria: Present /HPF    CARDIAC MARKERS ( 2021 23:36 )  x     / <0.01 ng/mL / x     / x     / x      CARDIAC MARKERS ( 2021 15:14 )  x     / 0.01 ng/mL / x     / x     / x        CAPILLARY BLOOD GLUCOSE    POCT Blood Glucose.: 119 mg/dL (2021 16:00)  POCT Blood Glucose.: 99 mg/dL (2021 11:28)  POCT Blood Glucose.: 91 mg/dL (2021 06:00)  POCT Blood Glucose.: 104 mg/dL (2021 00:16)  POCT Blood Glucose.: 122 mg/dL (2021 20:53)    IV FLUIDS:  []  DRIPS:    LINES:  rad art line  DRAINS:  []  WOUNDS:  []    Cerebral angiogram:  Left vertebral artery irregularity at level below PICA with segment suggestive of intimal flap ( Intradural segment).  Large right vertebral artery, anterior spinal artery demonstrated comes off right vertebral artery.  Following d/w vascular neurosurgeon, it was agreed to proceed with left vertebral artery sacrifice.  Patient was systemically heparinized to therapeutic goal, and endovascular sacrifice of left vertebral artery was performed using a combination of Microvascular plugs and microcoils.  Post procedure injections demonstrated complete antegrade occlusion of left VA with contralateral robust filling of left Vert and left PICA.  No evidence of thromboembolic events.    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  Irregularity of the intradural segment of the left vertebral artery with suggestion of a intimal flap and this may be secondary to nonocclusive dissection injury.    TTE:   1. Normal left ventricular systolic function.   2. Normal right ventricular size and systolic function.   3. Dilated left atrium.   4. No evidence of pulmonary hypertension.   5. No pericardial effusion.   6. Hypertrophic cardiomyopathy with assymetric septal hypertrophy and systolic anterior motion of the mitral valve.   7. There is severe left ventricular outflow tract obstruction with a resting gradient of 90 mm Hg.   8. There is at least moderate eccentric mitral regurgitation associated with the MACRINA.    Dopplers pending    CODE STATUS:  [Full]  GOALS OF CARE:  [Aggressive]  DISPOSITION:  [ICU]   NEUROCRITICAL CARE ATTENDING NOTE (2021 Evening)    CRISTI JENNINGS  [id number]    46 M pt with PMHx of HTN, noncompliant with meds (Metoprolol, Nifedipine, and ASA), reports no recent use of ASA or metoprolol, presents to ED c/o acute onset and worsening dizziness, R arm paresthesia, and vomiting x 1hr prior to arrival while at work. Pt somnolent in ED, difficulty obtaining hx from pt. Per EMS, pt had SBP in the 200s, given nitro SL x 2 in field. 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 Patient was started on a nicardipine drip in the ED for SBP to 230's, given mannitol 50g and intubated for concern of potential for aspiration with declining exam. Patient reported that he lives alone and has no close contacts in the area. NIHSS 6.  ICH 2    2021 - currently intubated and sedated on propofol/fentanyl, nicardipine 7.5 mg/h, sBP 120s. Repeat CT stable.  2021 - POD# 0 S/P femoral cerebral angiogram, findings of left vertebral artery irregularity at level below PICA with segment suggestive of intimal flap, left vertebral artery sacrifice performed. CT shows stable ICH.  Trial/self extubation and reintubation during dayshift (aspiration/volume), neurologically stable.      Past Medical History:  HTN (hypertension)    Allergies:  Unknown    Home Meds:    Current Meds:  MEDICATIONS  (STANDING):  albuterol/ipratropium for Nebulization 3 milliLiter(s) Nebulizer every 6 hours  atorvastatin 40 milliGRAM(s) Oral at bedtime  fentaNYL   Infusion. 0.5 MICROgram(s)/kG/Hr (5.15 mL/Hr) IV Continuous <Continuous>  insulin lispro (ADMELOG) corrective regimen sliding scale   SubCutaneous every 6 hours  multivitamin 1 Tablet(s) Oral daily  piperacillin/tazobactam IVPB.. 3.375 Gram(s) IV Intermittent every 6 hours  propofol Infusion 10 MICROgram(s)/kG/Min (6.18 mL/Hr) IV Continuous <Continuous>  sodium chloride 0.9%. 1000 milliLiter(s) (100 mL/Hr) IV Continuous <Continuous>  vancomycin  IVPB 1500 milliGRAM(s) IV Intermittent every 12 hours    MEDICATIONS  (PRN):  acetaminophen   Tablet .. 650 milliGRAM(s) Oral every 6 hours PRN Temp greater or equal to 38C (100.4F), Mild Pain (1 - 3)  fentaNYL    Injectable 25 MICROGram(s) IV Push every 2 hours PRN tachypnea, agitation  ondansetron Injectable 4 milliGRAM(s) IV Push every 6 hours PRN Nausea and/or Vomiting  senna Syrup 10 milliLiter(s) Oral at bedtime PRN Constipation    PHYSICAL EXAMINATION    ICU Vital Signs Last 24 Hrs  T(C): 38.9 (2021 17:21), Max: 38.9 (2021 17:21)  T(F): 102 (2021 17:21), Max: 102 (2021 17:21)  HR: 66 (2021 17:00) (63 - 110)  BP: 117/79 (2021 17:00) (88/49 - 155/88)  BP(mean): 94 (2021 17:00) (64 - 101)  ABP: 110/79 (2021 17:00) (91/57 - 156/89)  ABP(mean): 92 (2021 17:00) (68 - 111)  RR: 23 (2021 17:00) (13 - 24)  SpO2: 90% (2021 17:00) (90% - 100%)    ED Examination:  Neurologic:  -Mental status: lethargic but awakens to voice, alert, oriented to person, place, and time. Speech is fluent with intact naming, follows commands. +dysarthria.  -Cranial nerves:   II: Visual fields are full to confrontation.  III, IV, VI: Extraocular movements are intact with slight right gaze, but able to bury left. +rotary nystagmus. Pupils equally round and reactive to light  VII: Slight left nasolabial fold flattening  Motor: Normal bulk and tone. No pronator drift, +left cerebellar drift. Strength bilateral upper extremity at least 4/5 and bilateral lower extremities at least 4/5, no drift  Sensation: Intact to light touch bilaterally. No neglect or extinction on double simultaneous testing.  Coordination: +dysmetria left finger to nose with left cerebellar drift  NIHSS: 5 ASPECT Score: 10 ICH Score: 2 (GCS 14)    NeuroICU Exam (post-extubation): LAZARUS 2 mm reactive, no vertical skew, no vertical/horizontal/rotatory nystagmus, EOMI, left LMN facial asymmetry, tongue midline, no scanning speech, obeys 1st order commands X 4 (equal strength X 4 extremities)  CVS:  S1S2 noS3S4 noM  PUL:  equal to bases  ABDO:  soft, +BS  EXTREM:  warm PPP    I/O's    21 @ 07:01  -  21 @ 07:00  --------------------------------------------------------  IN: 958.7 mL / OUT: 1375 mL / NET: -416.3 mL    21 @ 07:  -  21 @ 18:20  --------------------------------------------------------  IN: 1024.4 mL / OUT: 565 mL / NET: 459.4 mL    LABS:                        12.3   8.06  )-----------( 179      ( 2021 10:17 )             38.3         150<H>  |  117<H>  |  16  ----------------------------<  102<H>  4.2   |  24  |  1.39<H>    Ca    8.5      2021 10:17  Phos  4.3       Mg     1.8         TPro  7.1  /  Alb  4.7  /  TBili  1.0  /  DBili  x   /  AST  25  /  ALT  23  /  AlkPhos  68      PT/INR - ( 2021 15:14 )   PT: 12.3 sec;   INR: 1.03     PTT - ( 2021 15:14 )  PTT:26.8 sec    Urinalysis Basic - ( 2021 16:33 )    Color: Yellow / Appearance: Clear / S.015 / pH: x  Gluc: x / Ketone: NEGATIVE  / Bili: Negative / Urobili: 0.2 E.U./dL   Blood: x / Protein: 100 mg/dL / Nitrite: NEGATIVE   Leuk Esterase: NEGATIVE / RBC: < 5 /HPF / WBC < 5 /HPF   Sq Epi: x / Non Sq Epi: 0-5 /HPF / Bacteria: Present /HPF    CARDIAC MARKERS ( 2021 23:36 )  x     / <0.01 ng/mL / x     / x     / x      CARDIAC MARKERS ( 2021 15:14 )  x     / 0.01 ng/mL / x     / x     / x        CAPILLARY BLOOD GLUCOSE    POCT Blood Glucose.: 119 mg/dL (2021 16:00)  POCT Blood Glucose.: 99 mg/dL (2021 11:28)  POCT Blood Glucose.: 91 mg/dL (2021 06:00)  POCT Blood Glucose.: 104 mg/dL (2021 00:16)  POCT Blood Glucose.: 122 mg/dL (2021 20:53)    IV FLUIDS:  []  DRIPS:    LINES:  rad art line  DRAINS:  []  WOUNDS:  []    Cerebral angiogram:  Left vertebral artery irregularity at level below PICA with segment suggestive of intimal flap ( Intradural segment).  Large right vertebral artery, anterior spinal artery demonstrated comes off right vertebral artery.  Following d/w vascular neurosurgeon, it was agreed to proceed with left vertebral artery sacrifice.  Patient was systemically heparinized to therapeutic goal, and endovascular sacrifice of left vertebral artery was performed using a combination of Microvascular plugs and microcoils.  Post procedure injections demonstrated complete antegrade occlusion of left VA with contralateral robust filling of left Vert and left PICA.  No evidence of thromboembolic events.    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  Irregularity of the intradural segment of the left vertebral artery with suggestion of a intimal flap and this may be secondary to nonocclusive dissection injury.    TTE:   1. Normal left ventricular systolic function.   2. Normal right ventricular size and systolic function.   3. Dilated left atrium.   4. No evidence of pulmonary hypertension.   5. No pericardial effusion.   6. Hypertrophic cardiomyopathy with assymetric septal hypertrophy and systolic anterior motion of the mitral valve.   7. There is severe left ventricular outflow tract obstruction with a resting gradient of 90 mm Hg.   8. There is at least moderate eccentric mitral regurgitation associated with the MACRINA.    CXR:  Congestion and/or infiltrates.    Dopplers pending    CODE STATUS:  [Full]  GOALS OF CARE:  [Aggressive]  DISPOSITION:  [ICU]   NEUROCRITICAL CARE ATTENDING NOTE (2021 Evening)    CRISTI JENNINGS  [id number]    46 M pt with PMHx of HTN, noncompliant with meds (Metoprolol, Nifedipine, and ASA), reports no recent use of ASA or metoprolol, presents to ED c/o acute onset and worsening dizziness, R arm paresthesia, and vomiting x 1hr prior to arrival while at work. Pt somnolent in ED, difficulty obtaining hx from pt. Per EMS, pt had SBP in the 200s, given nitro SL x 2 in field. 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 Patient was started on a nicardipine drip in the ED for SBP to 230's, given mannitol 50g and intubated for concern of potential for aspiration with declining exam. Patient reported that he lives alone and has no close contacts in the area. NIHSS 6.  ICH 2    2021 - currently intubated and sedated on propofol/fentanyl, nicardipine 7.5 mg/h, sBP 120s. Repeat CT stable.  2021 - POD# 0 S/P femoral cerebral angiogram, findings of left vertebral artery irregularity at level below PICA with segment suggestive of intimal flap, left vertebral artery sacrifice performed. CT shows stable ICH.  Trial/self extubation and reintubation during dayshift (aspiration/volume), neurologically stable.      Past Medical History:  HTN (hypertension)    Allergies:  Unknown    Home Meds:    Current Meds:  MEDICATIONS  (STANDING):  albuterol/ipratropium for Nebulization 3 milliLiter(s) Nebulizer every 6 hours  atorvastatin 40 milliGRAM(s) Oral at bedtime  dexMEDEtomidine Infusion 0.2 MICROgram(s)/kG/Hr (5.15 mL/Hr) IV Continuous <Continuous>  fentaNYL   Infusion. 0.5 MICROgram(s)/kG/Hr (5.15 mL/Hr) IV Continuous <Continuous>  insulin lispro (ADMELOG) corrective regimen sliding scale   SubCutaneous every 6 hours  multivitamin 1 Tablet(s) Oral daily  norepinephrine Infusion 0.05 MICROgram(s)/kG/Min (9.66 mL/Hr) IV Continuous <Continuous>  piperacillin/tazobactam IVPB.. 3.375 Gram(s) IV Intermittent every 6 hours  sodium chloride 0.9%. 1000 milliLiter(s) (50 mL/Hr) IV Continuous <Continuous>  vancomycin  IVPB 1500 milliGRAM(s) IV Intermittent every 12 hours    MEDICATIONS  (PRN):  acetaminophen   Tablet .. 650 milliGRAM(s) Oral every 6 hours PRN Temp greater or equal to 38C (100.4F), Mild Pain (1 - 3)  fentaNYL    Injectable 25 MICROGram(s) IV Push every 2 hours PRN tachypnea, agitation  ondansetron Injectable 4 milliGRAM(s) IV Push every 6 hours PRN Nausea and/or Vomiting  senna Syrup 10 milliLiter(s) Oral at bedtime PRN Constipation      PHYSICAL EXAMINATION    ICU Vital Signs Last 24 Hrs  T(C): 38.9 (2021 17:21), Max: 38.9 (2021 17:21)  T(F): 102 (2021 17:21), Max: 102 (2021 17:21)  HR: 66 (2021 17:00) (63 - 110)  BP: 117/79 (2021 17:00) (88/49 - 155/88)  BP(mean): 94 (2021 17:00) (64 - 101)  ABP: 110/79 (2021 17:00) (91/57 - 156/89)  ABP(mean): 92 (2021 17:00) (68 - 111)  RR: 23 (2021 17:00) (13 - 24)  SpO2: 90% (2021 17:00) (90% - 100%)    ED Examination:  Neurologic:  -Mental status: lethargic but awakens to voice, alert, oriented to person, place, and time. Speech is fluent with intact naming, follows commands. +dysarthria.  -Cranial nerves:   II: Visual fields are full to confrontation.  III, IV, VI: Extraocular movements are intact with slight right gaze, but able to bury left. +rotary nystagmus. Pupils equally round and reactive to light  VII: Slight left nasolabial fold flattening  Motor: Normal bulk and tone. No pronator drift, +left cerebellar drift. Strength bilateral upper extremity at least 4/5 and bilateral lower extremities at least 4/5, no drift  Sensation: Intact to light touch bilaterally. No neglect or extinction on double simultaneous testing.  Coordination: +dysmetria left finger to nose with left cerebellar drift  NIHSS: 5 ASPECT Score: 10 ICH Score: 2 (GCS 14)    NeuroICU Exam (post-extubation): LAZARUS 2 mm reactive, no vertical skew, no vertical/horizontal/rotatory nystagmus, EOMI, left LMN facial asymmetry, tongue midline, no scanning speech, obeys 1st order commands X 4 (equal strength X 4 extremities)  CVS:  S1S2 noS3S4 noM  PUL:  equal to bases  ABDO:  soft, +BS  EXTREM:  warm PPP    I/O's    21 @ 07:  -  21 @ 07:00  --------------------------------------------------------  IN: 958.7 mL / OUT: 1375 mL / NET: -416.3 mL    21 @ 07:  -  21 @ 18:20  --------------------------------------------------------  IN: 1024.4 mL / OUT: 565 mL / NET: 459.4 mL    LABS:                        12.3   8.06  )-----------( 179      ( 2021 10:17 )             38.3         150<H>  |  117<H>  |  16  ----------------------------<  102<H>  4.2   |  24  |  1.39<H>    Ca    8.5      2021 10:17  Phos  4.3       Mg     1.8         TPro  7.1  /  Alb  4.7  /  TBili  1.0  /  DBili  x   /  AST  25  /  ALT  23  /  AlkPhos  68      PT/INR - ( 2021 15:14 )   PT: 12.3 sec;   INR: 1.03     PTT - ( 2021 15:14 )  PTT:26.8 sec    Urinalysis Basic - ( 2021 16:33 )    Color: Yellow / Appearance: Clear / S.015 / pH: x  Gluc: x / Ketone: NEGATIVE  / Bili: Negative / Urobili: 0.2 E.U./dL   Blood: x / Protein: 100 mg/dL / Nitrite: NEGATIVE   Leuk Esterase: NEGATIVE / RBC: < 5 /HPF / WBC < 5 /HPF   Sq Epi: x / Non Sq Epi: 0-5 /HPF / Bacteria: Present /HPF    CARDIAC MARKERS ( 2021 23:36 )  x     / <0.01 ng/mL / x     / x     / x      CARDIAC MARKERS ( 2021 15:14 )  x     / 0.01 ng/mL / x     / x     / x        CAPILLARY BLOOD GLUCOSE    POCT Blood Glucose.: 119 mg/dL (2021 16:00)  POCT Blood Glucose.: 99 mg/dL (2021 11:28)  POCT Blood Glucose.: 91 mg/dL (2021 06:00)  POCT Blood Glucose.: 104 mg/dL (2021 00:16)  POCT Blood Glucose.: 122 mg/dL (2021 20:53)    IV FLUIDS:  []  DRIPS:    LINES:  rad art line  DRAINS:  []  WOUNDS:  []    Cerebral angiogram:  Left vertebral artery irregularity at level below PICA with segment suggestive of intimal flap ( Intradural segment).  Large right vertebral artery, anterior spinal artery demonstrated comes off right vertebral artery.  Following d/w vascular neurosurgeon, it was agreed to proceed with left vertebral artery sacrifice.  Patient was systemically heparinized to therapeutic goal, and endovascular sacrifice of left vertebral artery was performed using a combination of Microvascular plugs and microcoils.  Post procedure injections demonstrated complete antegrade occlusion of left VA with contralateral robust filling of left Vert and left PICA.  No evidence of thromboembolic events.    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  Irregularity of the intradural segment of the left vertebral artery with suggestion of a intimal flap and this may be secondary to nonocclusive dissection injury.    TTE:   1. Normal left ventricular systolic function.   2. Normal right ventricular size and systolic function.   3. Dilated left atrium.   4. No evidence of pulmonary hypertension.   5. No pericardial effusion.   6. Hypertrophic cardiomyopathy with assymetric septal hypertrophy and systolic anterior motion of the mitral valve.   7. There is severe left ventricular outflow tract obstruction with a resting gradient of 90 mm Hg.   8. There is at least moderate eccentric mitral regurgitation associated with the MACRINA.    CXR:  Congestion and/or infiltrates.    Dopplers pending    CODE STATUS:  [Full]  GOALS OF CARE:  [Aggressive]  DISPOSITION:  [ICU]

## 2021-07-29 NOTE — PROGRESS NOTE ADULT - ASSESSMENT
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 Paitient was admitted for femoral cerebral angiogram.    Plan:  Neuro:   - NC/VS q1hr  - neurologically stable  - sBP 110-140 mmHg (ideal 130's)   - s/p L VA sacrifice with adequate contralateral filling of L LA and L PICA  - Tylenol prn   - HOB 15-30    CV:   - Cardene @ 5mgl/hr for -140   - inverted T waves noted on telemetry on admission   - restart home MTP, CCB    - HOCM, asymmetric septal hypertrophy, MR with MACRINA (mitral valve), severe LVOT (resting gradient 90 mmHg)   - previously preload repleted, post-extubation volume overload   - requires judicious volume preload management and beta-blockade for management of HOCM MACRINA MR LVOT    Pulm:  - patient reintubated - CXR pulmonary congestion as above    Renal:   - Cr 1.4 --> 1.39 stable - monitor with hydration and HTN (end-organ disease)   - assess for abdo US to r/o renal artery stenosis   - Na goal 140-145 (150) - hypertonic therapy discontinued (07.28 evening CT no cerebellar perihematoma edema)   - Farias in place    GI:   - bowel reg   - protonix   - NGT placed    Endo:   - ISS   - HbA1c 5.2, , TSH 1.54    Heme:   - SCD's   - Hb 16.5 --> 12.3 with hydration    ID:   - Tmax 102, no leukocytosis; pancultures work-up pending (UA neg)    Disposition: ICU status, full code    *****    CORE MEASURES    ICH  1.  NIHSS = 6  2.  ICH Score = 2  3.  VTE Prophylaxis:  [] administered within 24 hours of admission OR [X] reason not done:  fresh ICH  4.  Dysphagia screening:  [X] performed before any oral meds / liquids / food    *****    ATTENDING ATTESTATION:  I was physically present for the key portions of the evaluation and management (E/M) service provided.  I agree with the above history, physical and plan, which I have reviewed and edited where appropriate.    Patient at high risk for neurological deterioration or death due to:  ICH, DVT.  Critical Care Time:  I have personally provided 45 minutes of critical care time excluding time spent on separate procedures.    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 Paitient was admitted for femoral cerebral angiogram.    Plan:  Neuro:   - NC/VS q1hr  - neurologically stable  - sBP 110-140 mmHg (ideal 130's)   - s/p L VA sacrifice with adequate contralateral filling of L LA and L PICA  - Tylenol prn   - HOB 15-30    CV:   - Cardene @ 5mgl/hr for -140   - inverted T waves noted on telemetry on admission   - restart home MTP, CCB when ready, volume management   - HOCM, asymmetric septal hypertrophy, MR with MACRINA (mitral valve), severe LVOT (resting gradient 90 mmHg)    Pulm:  - patient reintubated - CXR pulmonary congestion and infiltrates as above    Renal:   - Cr 1.4 --> 1.39 stable - monitor with hydration and HTN (end-organ disease)   - assess for abdo US to r/o renal artery stenosis   - Na goal 140-145 (150) - hypertonic therapy discontinued (07.28 evening CT no cerebellar perihematoma edema)   - Farias in place    GI:   - bowel reg   - protonix   - NGT placed    Endo:   - ISS   - HbA1c 5.2, , TSH 1.54    Heme:   - SCD's   - Hb 16.5 --> 12.3 with hydration    ID:   - Tmax 102, no leukocytosis; pancultures work-up pending (UA neg)    Disposition: ICU status, full code    *****    CORE MEASURES    ICH  1.  NIHSS = 6  2.  ICH Score = 2  3.  VTE Prophylaxis:  [] administered within 24 hours of admission OR [X] reason not done:  fresh ICH  4.  Dysphagia screening:  [X] performed before any oral meds / liquids / food    *****    ATTENDING ATTESTATION:  I was physically present for the key portions of the evaluation and management (E/M) service provided.  I agree with the above history, physical and plan, which I have reviewed and edited where appropriate.    Patient at high risk for neurological deterioration or death due to:  ICH, DVT.  Critical Care Time:  I have personally provided 45 minutes of critical care time excluding time spent on separate procedures.    *****

## 2021-07-29 NOTE — BRIEF OPERATIVE NOTE - OPERATION/FINDINGS
Under GA, using a 6 fr sheath right CFA, cerebral angiogram was performed.  Findings: No aneurysm, AVM, or early venous shunting.  There is a left vertebral artery irregularity at level below PICA with segment suggestive of intimal flap ( Intradural segment).  Large right vertebral artery, anterior spinal artery demonstrated comes off right vertebral artery.  Following d/w vascular neurosurgeon, it was agreed to proceed with left vertebral artery sacrifice.  Patient was systemically heparinized to therapeutic goal, and endovascular sacrifice of left vertebral artery was performed using a combination of Microvascular plugs and microcoils.   Post procedure injections demonstrated complete antegrade occlusion of left VA with cotralateral robust filling of left Vert and left PICA.  No evidence of thromboembolic events.  Full report to follow.  Patient tolerated procedure well, hemodynamically stable.  Right groin/vasc access site: Exoseal and  manual compression applied, hemostasis achieved, no hematoma.  Patient was transferred back to NSICU, remains intubated and sedated.  Above d/w Dr. Rubio

## 2021-07-29 NOTE — PROGRESS NOTE ADULT - SUBJECTIVE AND OBJECTIVE BOX
NEUROSURGERY POST OP NOTE:    POD# 0 S/P femoral cerebral angiogram, findings of left vertebral artery irregularity at level below PICA with segment suggestive of intimal flap, left vertebral artery sacrifice performed. Xper CT shows stable ICH.    S: Pt seen and examined at bedside in NSICU postop. Pt agitated and reaching for ETT. Brief CPAP trial given, and Pt was successfully extubated. Neuro exam stable. Pt denies pain or acute weakness of extremities.      T(C): 37.9 (07-29-21 @ 13:29), Max: 37.9 (07-29-21 @ 13:29)  HR: 77 (07-29-21 @ 14:00) (70 - 110)  BP: 118/77 (07-29-21 @ 14:00) (88/49 - 213/133)  RR: 24 (07-29-21 @ 14:00) (13 - 24)  SpO2: 99% (07-29-21 @ 14:00) (94% - 100%)      07-28-21 @ 07:01  -  07-29-21 @ 07:00  --------------------------------------------------------  IN: 958.7 mL / OUT: 1375 mL / NET: -416.3 mL    07-29-21 @ 07:01  -  07-29-21 @ 14:43  --------------------------------------------------------  IN: 616.7 mL / OUT: 305 mL / NET: 311.7 mL        acetaminophen   Tablet .. 650 milliGRAM(s) Oral every 6 hours PRN  atorvastatin 40 milliGRAM(s) Oral at bedtime  chlorhexidine 0.12% Liquid 15 milliLiter(s) Oral Mucosa every 12 hours  dexMEDEtomidine Infusion 0.2 MICROgram(s)/kG/Hr IV Continuous <Continuous>  dextrose 50% Injectable 25 Gram(s) IV Push once  fentaNYL   Infusion. 0.5 MICROgram(s)/kG/Hr IV Continuous <Continuous>  glucagon  Injectable 1 milliGRAM(s) IntraMuscular once  insulin lispro (ADMELOG) corrective regimen sliding scale   SubCutaneous every 6 hours  multivitamin 1 Tablet(s) Oral daily  niCARdipine Infusion 5 mG/Hr IV Continuous <Continuous>  ondansetron Injectable 4 milliGRAM(s) IV Push every 6 hours PRN  pantoprazole  Injectable 40 milliGRAM(s) IV Push daily  senna Syrup 10 milliLiter(s) Oral at bedtime PRN  sodium chloride 0.9%. 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 Pt is now s/p  femoral cerebral angiogram, findings of left vertebral artery irregularity at level below PICA with segment suggestive of intimal flap, left vertebral artery sacrifice performed.    Plan:  Neuro:   - Neuro checks/vital checks/groin/vascular checks q1hr  - Tylenol prn for pain control    CV:   - Cardene prn -140    Pulm:  - NC prn     Renal:   - Na goal 140-150   - Farias in place    GI:   - bowel reg    Endo:   - ISS    Heme:   - SCD's   - trend CBC    ID:   - afebrile, trend WBC    Disposition: ICU status, full code, dispo pending    Plan d/w Dr. D'Amico and Dr. Martinez         NEUROSURGERY POST OP NOTE:    POD# 0 S/P femoral cerebral angiogram, findings of left vertebral artery irregularity at level below PICA with segment suggestive of intimal flap, left vertebral artery sacrifice performed. Xper CT shows stable ICH.    S: Pt seen and examined at bedside in NSICU postop. Pt agitated and reaching for ETT. Brief CPAP trial given, and Pt was successfully extubated. Neuro exam stable. Pt denies pain or acute weakness of extremities.      T(C): 37.9 (07-29-21 @ 13:29), Max: 37.9 (07-29-21 @ 13:29)  HR: 77 (07-29-21 @ 14:00) (70 - 110)  BP: 118/77 (07-29-21 @ 14:00) (88/49 - 213/133)  RR: 24 (07-29-21 @ 14:00) (13 - 24)  SpO2: 99% (07-29-21 @ 14:00) (94% - 100%)      07-28-21 @ 07:01  -  07-29-21 @ 07:00  --------------------------------------------------------  IN: 958.7 mL / OUT: 1375 mL / NET: -416.3 mL    07-29-21 @ 07:01  -  07-29-21 @ 14:43  --------------------------------------------------------  IN: 616.7 mL / OUT: 305 mL / NET: 311.7 mL        acetaminophen   Tablet .. 650 milliGRAM(s) Oral every 6 hours PRN  atorvastatin 40 milliGRAM(s) Oral at bedtime  chlorhexidine 0.12% Liquid 15 milliLiter(s) Oral Mucosa every 12 hours  dexMEDEtomidine Infusion 0.2 MICROgram(s)/kG/Hr IV Continuous <Continuous>  dextrose 50% Injectable 25 Gram(s) IV Push once  fentaNYL   Infusion. 0.5 MICROgram(s)/kG/Hr IV Continuous <Continuous>  glucagon  Injectable 1 milliGRAM(s) IntraMuscular once  insulin lispro (ADMELOG) corrective regimen sliding scale   SubCutaneous every 6 hours  multivitamin 1 Tablet(s) Oral daily  niCARdipine Infusion 5 mG/Hr IV Continuous <Continuous>  ondansetron Injectable 4 milliGRAM(s) IV Push every 6 hours PRN  pantoprazole  Injectable 40 milliGRAM(s) IV Push daily  senna Syrup 10 milliLiter(s) Oral at bedtime PRN  sodium chloride 0.9%. 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 Pt is now s/p  femoral cerebral angiogram, findings of left vertebral artery irregularity at level below PICA with segment suggestive of intimal flap, left vertebral artery sacrifice performed.    Plan:  Neuro:   - Neuro checks/vital checks/groin/vascular checks q1hr  - Tylenol prn for pain control    CV:   - Cardene prn -140    Pulm:  - NC prn     Renal:   - Na goal 140-150   - Farias in place    GI:   - bowel reg    Endo:   - ISS    Heme:   - SCD's   - trend CBC    ID:   - afebrile, trend WBC    Disposition: ICU status, full code, dispo pending    Plan d/w Dr. D'Amico and Dr. Martinez

## 2021-07-30 LAB
ANION GAP SERPL CALC-SCNC: 8 MMOL/L — SIGNIFICANT CHANGE UP (ref 5–17)
APTT BLD: 29.9 SEC — SIGNIFICANT CHANGE UP (ref 27.5–35.5)
BASE EXCESS BLDA CALC-SCNC: -0.5 MMOL/L — SIGNIFICANT CHANGE UP (ref -2–3)
BASE EXCESS BLDA CALC-SCNC: 0 MMOL/L — SIGNIFICANT CHANGE UP (ref -2–3)
BASE EXCESS BLDA CALC-SCNC: 2 MMOL/L — SIGNIFICANT CHANGE UP (ref -2–3)
BUN SERPL-MCNC: 16 MG/DL — SIGNIFICANT CHANGE UP (ref 7–23)
CALCIUM SERPL-MCNC: 8.6 MG/DL — SIGNIFICANT CHANGE UP (ref 8.4–10.5)
CHLORIDE SERPL-SCNC: 116 MMOL/L — HIGH (ref 96–108)
CO2 BLDA-SCNC: 25 MMOL/L — HIGH (ref 19–24)
CO2 BLDA-SCNC: 27 MMOL/L — HIGH (ref 19–24)
CO2 BLDA-SCNC: 28 MMOL/L — HIGH (ref 19–24)
CO2 SERPL-SCNC: 23 MMOL/L — SIGNIFICANT CHANGE UP (ref 22–31)
CREAT SERPL-MCNC: 1.42 MG/DL — HIGH (ref 0.5–1.3)
CULTURE RESULTS: SIGNIFICANT CHANGE UP
GAS PNL BLDA: SIGNIFICANT CHANGE UP
GLUCOSE BLDC GLUCOMTR-MCNC: 114 MG/DL — HIGH (ref 70–99)
GLUCOSE BLDC GLUCOMTR-MCNC: 115 MG/DL — HIGH (ref 70–99)
GLUCOSE BLDC GLUCOMTR-MCNC: 128 MG/DL — HIGH (ref 70–99)
GLUCOSE BLDC GLUCOMTR-MCNC: 131 MG/DL — HIGH (ref 70–99)
GLUCOSE BLDC GLUCOMTR-MCNC: 134 MG/DL — HIGH (ref 70–99)
GLUCOSE SERPL-MCNC: 133 MG/DL — HIGH (ref 70–99)
GRAM STN FLD: SIGNIFICANT CHANGE UP
HCO3 BLDA-SCNC: 24 MMOL/L — SIGNIFICANT CHANGE UP (ref 21–28)
HCO3 BLDA-SCNC: 25 MMOL/L — SIGNIFICANT CHANGE UP (ref 21–28)
HCO3 BLDA-SCNC: 26 MMOL/L — SIGNIFICANT CHANGE UP (ref 21–28)
HCT VFR BLD CALC: 43.3 % — SIGNIFICANT CHANGE UP (ref 39–50)
HGB BLD-MCNC: 13.9 G/DL — SIGNIFICANT CHANGE UP (ref 13–17)
INR BLD: 1.13 — SIGNIFICANT CHANGE UP (ref 0.88–1.16)
MAGNESIUM SERPL-MCNC: 1.8 MG/DL — SIGNIFICANT CHANGE UP (ref 1.6–2.6)
MCHC RBC-ENTMCNC: 29.5 PG — SIGNIFICANT CHANGE UP (ref 27–34)
MCHC RBC-ENTMCNC: 32.1 GM/DL — SIGNIFICANT CHANGE UP (ref 32–36)
MCV RBC AUTO: 91.9 FL — SIGNIFICANT CHANGE UP (ref 80–100)
NRBC # BLD: 0 /100 WBCS — SIGNIFICANT CHANGE UP (ref 0–0)
PCO2 BLDA: 39 MMHG — SIGNIFICANT CHANGE UP (ref 35–48)
PCO2 BLDA: 40 MMHG — SIGNIFICANT CHANGE UP (ref 35–48)
PCO2 BLDA: 43 MMHG — SIGNIFICANT CHANGE UP (ref 35–48)
PH BLDA: 7.38 — SIGNIFICANT CHANGE UP (ref 7.35–7.45)
PH BLDA: 7.4 — SIGNIFICANT CHANGE UP (ref 7.35–7.45)
PH BLDA: 7.43 — SIGNIFICANT CHANGE UP (ref 7.35–7.45)
PHOSPHATE SERPL-MCNC: 3.2 MG/DL — SIGNIFICANT CHANGE UP (ref 2.5–4.5)
PLATELET # BLD AUTO: 168 K/UL — SIGNIFICANT CHANGE UP (ref 150–400)
PO2 BLDA: 100 MMHG — SIGNIFICANT CHANGE UP (ref 83–108)
PO2 BLDA: 130 MMHG — HIGH (ref 83–108)
PO2 BLDA: 133 MMHG — HIGH (ref 83–108)
POTASSIUM SERPL-MCNC: 3.8 MMOL/L — SIGNIFICANT CHANGE UP (ref 3.5–5.3)
POTASSIUM SERPL-SCNC: 3.8 MMOL/L — SIGNIFICANT CHANGE UP (ref 3.5–5.3)
PROTHROM AB SERPL-ACNC: 13.5 SEC — SIGNIFICANT CHANGE UP (ref 10.6–13.6)
RBC # BLD: 4.71 M/UL — SIGNIFICANT CHANGE UP (ref 4.2–5.8)
RBC # FLD: 13.6 % — SIGNIFICANT CHANGE UP (ref 10.3–14.5)
SAO2 % BLDA: 98.3 % — HIGH (ref 94–98)
SAO2 % BLDA: 98.8 % — HIGH (ref 94–98)
SAO2 % BLDA: 99.6 % — HIGH (ref 94–98)
SODIUM SERPL-SCNC: 147 MMOL/L — HIGH (ref 135–145)
SPECIMEN SOURCE: SIGNIFICANT CHANGE UP
VANCOMYCIN TROUGH SERPL-MCNC: 11 UG/ML — SIGNIFICANT CHANGE UP (ref 10–20)
WBC # BLD: 14.77 K/UL — HIGH (ref 3.8–10.5)
WBC # FLD AUTO: 14.77 K/UL — HIGH (ref 3.8–10.5)

## 2021-07-30 PROCEDURE — 71045 X-RAY EXAM CHEST 1 VIEW: CPT | Mod: 26,77

## 2021-07-30 PROCEDURE — 99291 CRITICAL CARE FIRST HOUR: CPT

## 2021-07-30 PROCEDURE — 70450 CT HEAD/BRAIN W/O DYE: CPT | Mod: 26

## 2021-07-30 PROCEDURE — 71045 X-RAY EXAM CHEST 1 VIEW: CPT | Mod: 26

## 2021-07-30 RX ORDER — FUROSEMIDE 40 MG
30 TABLET ORAL ONCE
Refills: 0 | Status: COMPLETED | OUTPATIENT
Start: 2021-07-30 | End: 2021-07-30

## 2021-07-30 RX ORDER — HYDRALAZINE HCL 50 MG
10 TABLET ORAL EVERY 6 HOURS
Refills: 0 | Status: DISCONTINUED | OUTPATIENT
Start: 2021-07-30 | End: 2021-07-30

## 2021-07-30 RX ORDER — METOPROLOL TARTRATE 50 MG
12.5 TABLET ORAL EVERY 12 HOURS
Refills: 0 | Status: DISCONTINUED | OUTPATIENT
Start: 2021-07-30 | End: 2021-07-31

## 2021-07-30 RX ORDER — ACETAMINOPHEN 500 MG
650 TABLET ORAL EVERY 6 HOURS
Refills: 0 | Status: DISCONTINUED | OUTPATIENT
Start: 2021-07-30 | End: 2021-08-03

## 2021-07-30 RX ORDER — POTASSIUM PHOSPHATE, MONOBASIC POTASSIUM PHOSPHATE, DIBASIC 236; 224 MG/ML; MG/ML
15 INJECTION, SOLUTION INTRAVENOUS ONCE
Refills: 0 | Status: COMPLETED | OUTPATIENT
Start: 2021-07-30 | End: 2021-07-30

## 2021-07-30 RX ORDER — HYDRALAZINE HCL 50 MG
5 TABLET ORAL ONCE
Refills: 0 | Status: COMPLETED | OUTPATIENT
Start: 2021-07-30 | End: 2021-07-30

## 2021-07-30 RX ORDER — MAGNESIUM SULFATE 500 MG/ML
1 VIAL (ML) INJECTION ONCE
Refills: 0 | Status: COMPLETED | OUTPATIENT
Start: 2021-07-30 | End: 2021-07-30

## 2021-07-30 RX ORDER — QUETIAPINE FUMARATE 200 MG/1
25 TABLET, FILM COATED ORAL ONCE
Refills: 0 | Status: COMPLETED | OUTPATIENT
Start: 2021-07-30 | End: 2021-07-30

## 2021-07-30 RX ORDER — CHLORHEXIDINE GLUCONATE 213 G/1000ML
1 SOLUTION TOPICAL DAILY
Refills: 0 | Status: DISCONTINUED | OUTPATIENT
Start: 2021-07-30 | End: 2021-08-05

## 2021-07-30 RX ORDER — PANTOPRAZOLE SODIUM 20 MG/1
40 TABLET, DELAYED RELEASE ORAL DAILY
Refills: 0 | Status: DISCONTINUED | OUTPATIENT
Start: 2021-07-30 | End: 2021-07-31

## 2021-07-30 RX ORDER — METOPROLOL TARTRATE 50 MG
5 TABLET ORAL ONCE
Refills: 0 | Status: COMPLETED | OUTPATIENT
Start: 2021-07-30 | End: 2021-07-30

## 2021-07-30 RX ORDER — NICARDIPINE HYDROCHLORIDE 30 MG/1
5 CAPSULE, EXTENDED RELEASE ORAL
Qty: 40 | Refills: 0 | Status: DISCONTINUED | OUTPATIENT
Start: 2021-07-30 | End: 2021-08-01

## 2021-07-30 RX ORDER — FUROSEMIDE 40 MG
40 TABLET ORAL ONCE
Refills: 0 | Status: COMPLETED | OUTPATIENT
Start: 2021-07-30 | End: 2021-07-30

## 2021-07-30 RX ADMIN — PIPERACILLIN AND TAZOBACTAM 200 GRAM(S): 4; .5 INJECTION, POWDER, LYOPHILIZED, FOR SOLUTION INTRAVENOUS at 11:04

## 2021-07-30 RX ADMIN — Medication 650 MILLIGRAM(S): at 07:11

## 2021-07-30 RX ADMIN — Medication 40 MILLIGRAM(S): at 04:00

## 2021-07-30 RX ADMIN — QUETIAPINE FUMARATE 25 MILLIGRAM(S): 200 TABLET, FILM COATED ORAL at 22:50

## 2021-07-30 RX ADMIN — FENTANYL CITRATE 25 MICROGRAM(S): 50 INJECTION INTRAVENOUS at 21:55

## 2021-07-30 RX ADMIN — CHLORHEXIDINE GLUCONATE 15 MILLILITER(S): 213 SOLUTION TOPICAL at 06:07

## 2021-07-30 RX ADMIN — Medication 3 MILLILITER(S): at 21:20

## 2021-07-30 RX ADMIN — Medication 300 MILLIGRAM(S): at 06:07

## 2021-07-30 RX ADMIN — Medication 5 MILLIGRAM(S): at 21:45

## 2021-07-30 RX ADMIN — DEXMEDETOMIDINE HYDROCHLORIDE IN 0.9% SODIUM CHLORIDE 5.15 MICROGRAM(S)/KG/HR: 4 INJECTION INTRAVENOUS at 04:24

## 2021-07-30 RX ADMIN — Medication 1 TABLET(S): at 11:04

## 2021-07-30 RX ADMIN — Medication 300 MILLIGRAM(S): at 18:29

## 2021-07-30 RX ADMIN — PANTOPRAZOLE SODIUM 40 MILLIGRAM(S): 20 TABLET, DELAYED RELEASE ORAL at 11:04

## 2021-07-30 RX ADMIN — PIPERACILLIN AND TAZOBACTAM 200 GRAM(S): 4; .5 INJECTION, POWDER, LYOPHILIZED, FOR SOLUTION INTRAVENOUS at 00:04

## 2021-07-30 RX ADMIN — Medication 100 GRAM(S): at 10:51

## 2021-07-30 RX ADMIN — Medication 3 MILLILITER(S): at 04:09

## 2021-07-30 RX ADMIN — Medication 5 MILLIGRAM(S): at 18:43

## 2021-07-30 RX ADMIN — POTASSIUM PHOSPHATE, MONOBASIC POTASSIUM PHOSPHATE, DIBASIC 62.5 MILLIMOLE(S): 236; 224 INJECTION, SOLUTION INTRAVENOUS at 10:55

## 2021-07-30 RX ADMIN — Medication 650 MILLIGRAM(S): at 07:36

## 2021-07-30 RX ADMIN — PIPERACILLIN AND TAZOBACTAM 200 GRAM(S): 4; .5 INJECTION, POWDER, LYOPHILIZED, FOR SOLUTION INTRAVENOUS at 18:29

## 2021-07-30 RX ADMIN — Medication 3 MILLILITER(S): at 10:41

## 2021-07-30 RX ADMIN — Medication 3 MILLILITER(S): at 15:59

## 2021-07-30 RX ADMIN — FENTANYL CITRATE 25 MICROGRAM(S): 50 INJECTION INTRAVENOUS at 22:20

## 2021-07-30 RX ADMIN — ATORVASTATIN CALCIUM 40 MILLIGRAM(S): 80 TABLET, FILM COATED ORAL at 21:24

## 2021-07-30 RX ADMIN — Medication 30 MILLIGRAM(S): at 21:24

## 2021-07-30 RX ADMIN — PIPERACILLIN AND TAZOBACTAM 200 GRAM(S): 4; .5 INJECTION, POWDER, LYOPHILIZED, FOR SOLUTION INTRAVENOUS at 06:07

## 2021-07-30 NOTE — PROGRESS NOTE ADULT - ASSESSMENT
46y/M with  L cerebellar hematoma, VA dissection, brain compression, IVH  HTN (hypertension)  elevated creatinine  HOCM    PLAN:   NEURO: neurochecks q1h, PRN pain meds with acetaminophen, fentanyl drip, continue Dexmedetomidine to RASS of 0 to -1  angio, VA takedown;  BP control, MRI on stepdown  REHAB:  physical therapy evaluation and management    EARLY MOB:  bed rest    PULM:  intubated - possible extubation; full vent support 10  60% 12 - repeat ABG  CARDIO:  SBP goal 100-140mm Hg, [place]; start metoprolol 12.5 BID with hold parameters  and HR 60  ENDO:  Blood sugar goals 140-180 mg/dL, continue insulin sliding scale  GI:  PPI for GI prophylaxis  DIET: tube feeds to start, insert NGT  RENAL:  140-150, d/c Farias, d/c IVF  HEM/ONC: Hb stable  VTE Prophylaxis: SCDs, start SQH if cleared by NS  ID: febrile, leukocytosis; piperacillin/tazobactam vancomycin (last day 08/03), vanc trough prior to 4th dose  Social: sister is [facility] [place] update      ATTENDING ATTESTATION:  I was physically present for the key portions of the evaluation and management (E/M) service provided.  I agree with the above history, physical and plan, which I have reviewed and edited where appropriate.    Patient at high risk for neurological deterioration or death due to:  ICU delirium, aspiration PNA, DVT / PE.  Critical care time:  I have personally provided 30 minutes of critical care time, excluding time spent on separate procedures.      Plan discussed with RN, house staff. 46y/M with  L cerebellar hematoma, VA dissection, brain compression, IVH  HTN (hypertension)  elevated creatinine  HOCM    PLAN:   NEURO: neurochecks q1h, PRN pain meds with acetaminophen, fentanyl drip, continue Dexmedetomidine to RASS of 0 to -1  angio, VA takedown;  BP control, MRI on stepdown  REHAB:  physical therapy evaluation and management    EARLY MOB:  bed rest    PULM:  intubated - possible extubation; full vent support 10  60% 12 - repeat ABG  CARDIO:  SBP goal 120-160 mm Hg, [place]; start metoprolol 12.5 BID with hold parameters  and HR 60  ENDO:  Blood sugar goals 140-180 mg/dL, continue insulin sliding scale  GI:  PPI for GI prophylaxis  DIET: tube feeds to start, insert NGT  RENAL:  140-150, d/c Farias, d/c IVF  HEM/ONC: Hb stable  VTE Prophylaxis: SCDs, start SQH if cleared by NS  ID: febrile, leukocytosis; piperacillin/tazobactam vancomycin (last day 08/03), vanc trough prior to 4th dose  Social: sister is [facility] [place] update      ATTENDING ATTESTATION:  I was physically present for the key portions of the evaluation and management (E/M) service provided.  I agree with the above history, physical and plan, which I have reviewed and edited where appropriate.    Patient at high risk for neurological deterioration or death due to:  ICU delirium, aspiration PNA, DVT / PE.  Critical care time:  I have personally provided 30 minutes of critical care time, excluding time spent on separate procedures.      Plan discussed with RN, house staff. 46y/M with  L cerebellar hematoma, VA dissection, brain compression, IVH  HTN (hypertension)  elevated creatinine  HOCM    PLAN:   NEURO: neurochecks q1h, PRN pain meds with acetaminophen  neurologically stable (ICU delirium)  angio, VA takedown;  BP control, MRI on stepdown  REHAB:  physical therapy evaluation and management    EARLY MOB:  bed rest    PULM:  self extubated on nasal cannula high flow  CARDIO:  SBP goal 120-160 mm Hg, [place]; start metoprolol 12.5 BID/5 mg IV q6h with hold parameters  and HR 60  ENDO:  Blood sugar goals 140-180 mg/dL, continue insulin sliding scale  GI:  PPI for GI prophylaxis  DIET: NPO overnight  RENAL:  Na 140-150 (Na 147), d/c Farisa, d/c IVF  HEM/ONC: Hb stable 13.9  VTE Prophylaxis: SCDs, start SQH if cleared by NS  ID: febrile, leukocytosis; piperacillin/tazobactam vancomycin (last day 08/03), vanc trough prior to 4th dose  Social: sister is [employer] update      ATTENDING ATTESTATION:  I was physically present for the key portions of the evaluation and management (E/M) service provided.  I agree with the above history, physical and plan, which I have reviewed and edited where appropriate.    Patient at high risk for neurological deterioration or death due to:  ICU delirium, aspiration PNA, DVT / PE.  Critical care time:  I have personally provided 30 minutes of critical care time, excluding time spent on separate procedures.      Plan discussed with RN, house staff.

## 2021-07-30 NOTE — PROGRESS NOTE ADULT - SUBJECTIVE AND OBJECTIVE BOX
HPI:  46 M pt with PMHx of HTN, noncompliant with meds (Metoprolol, Nifedipine, and ASA), reports no recent use of ASA or metoprolol, presents to ED c/o acute onset and worsening dizziness, R arm paresthesia, and vomiting x 1hr prior to arrival while at work. Pt somnolent in ED, difficulty obtaining hx from pt. Per EMS, pt had SBP in the 200s, given nitro SL x 2 in field. Patient found to have a left side cerebellar hemmorrhage and left pontine hemmorrhage with extension to the subarachnoid space. Patient was started on a nicardipene drip in the ED for SBP to 230's, given manitol 50g and intubated for concern of potential for aspiration with declining exam. Patient reported that he lives alone and has no close contacts in the area.     NIHSS 6  ICH 2   (2021 20:40)    OVERNIGHT EVENTS: OLESYA overnight, repeat CTH wet read stable     HOSPITAL COURSE:  : Admitted with left cerebellar ICH. Intubated in ED for lethargy and concern for airway protection. Repeat CTH stable.  : POD# 0 S/P femoral cerebral angiogram, findings of left vertebral artery irregularity at level below PICA with segment suggestive of intimal flap, left vertebral artery sacrifice performed. Hypertensive episode during angio case, Xper CT shows stable ICH. Pt seen and examined at bedside in NSICU postop. Pt agitated and reaching for ETT. Brief CPAP trial given, and Pt was successfully extubated. Patient then with many blood tinged secretions and CXR with infiltrated. Patient was reintubated. He then self-extubated and was emergently reintubated, placed in restraints now sedated on precedex and fentanyl. Received 1LNS bolus for hypotension and was also started on levophed for SBPs in 70s.   : POD1 cerebral angiogram with left vertebral takedown, OLESYA overnight, neuro stable remains sedated on precedex and intubated on full vent support. Repeat CTH stable.       Vital Signs Last 24 Hrs  T(C): 37.9 (2021 22:06), Max: 38.9 (2021 17:00)  T(F): 100.2 (2021 22:06), Max: 102 (2021 17:00)  HR: 69 (2021 02:00) (63 - 99)  BP: 121/79 (2021 02:00) (79/52 - 140/83)  BP(mean): 93 (2021 02:00) (62 - 103)  RR: 20 (2021 02:00) (14 - 25)  SpO2: 99% (2021 02:00) (90% - 100%)    I&O's Summary    2021 07:01  -  2021 07:00  --------------------------------------------------------  IN: 958.7 mL / OUT: 1375 mL / NET: -416.3 mL    2021 07:01  -  2021 02:35  --------------------------------------------------------  IN: 2433.4 mL / OUT: 1060 mL / NET: 1373.4 mL        PHYSICAL EXAM:  General: NAD, patient sedated on propofol, intubated to vent   HEENT: PERRL 3mm briskly reactive, EOMI b/l, +ET tube  Cardiovascular: RRR, normal S1 and S2   Respiratory: lungs with diminished breath sounds at bilateral bases, no wheezing.  GI: normoactive BS to auscultation, abd soft, NTND   Neuro: On sedation, opens eyes to noxious, CONWAY anti-gravity, localizes with bilateral upper extremities (tries to pull out ET tube), RUE shows thumbs up, RLE wiggle toes to command, LUE/LLE briskly withdraw to noxious, does not follow commands.    Extremities: distal pulses 2+ x4, DP and PT 2+ bilaterally.   Wound/incision: +right groin puncture site with dressing in place C/D/I      TUBES/LINES:  [] Farias  [] Lumbar Drain  [] Wound Drains  [X] Others +ET tube       DIET:  [X] NPO  [] Mechanical  [] Tube feeds    LABS:                        12.3   8.06  )-----------( 179      ( 2021 10:17 )             38.3         145  |  115<H>  |  17  ----------------------------<  176<H>  4.3   |  20<L>  |  1.42<H>    Ca    8.4      2021 22:23  Phos  4.3       Mg     1.8         TPro  7.1  /  Alb  4.7  /  TBili  1.0  /  DBili  x   /  AST  25  /  ALT  23  /  AlkPhos  68      PT/INR - ( 2021 15:14 )   PT: 12.3 sec;   INR: 1.03          PTT - ( 2021 15:14 )  PTT:26.8 sec  Urinalysis Basic - ( 2021 18:26 )    Color: Yellow / Appearance: Clear / S.025 / pH: x  Gluc: x / Ketone: Trace mg/dL  / Bili: Negative / Urobili: 0.2 E.U./dL   Blood: x / Protein: Trace mg/dL / Nitrite: NEGATIVE   Leuk Esterase: NEGATIVE / RBC: > 10 /HPF / WBC < 5 /HPF   Sq Epi: x / Non Sq Epi: 0-5 /HPF / Bacteria: Present /HPF      CARDIAC MARKERS ( 2021 23:36 )  x     / <0.01 ng/mL / x     / x     / x      CARDIAC MARKERS ( 2021 15:14 )  x     / 0.01 ng/mL / x     / x     / x          CAPILLARY BLOOD GLUCOSE      POCT Blood Glucose.: 134 mg/dL (2021 21:24)  POCT Blood Glucose.: 119 mg/dL (2021 16:00)  POCT Blood Glucose.: 99 mg/dL (2021 11:28)  POCT Blood Glucose.: 91 mg/dL (2021 06:00)      Drug Levels: [] N/A    CSF Analysis: [] N/A      Allergies    No Known Allergies    Intolerances      MEDICATIONS:  Antibiotics:  piperacillin/tazobactam IVPB.. 3.375 Gram(s) IV Intermittent every 6 hours  vancomycin  IVPB 1500 milliGRAM(s) IV Intermittent every 12 hours    Neuro:  acetaminophen   Tablet .. 650 milliGRAM(s) Oral every 6 hours PRN  dexMEDEtomidine Infusion 0.2 MICROgram(s)/kG/Hr IV Continuous <Continuous>  fentaNYL    Injectable 25 MICROGram(s) IV Push every 2 hours PRN  fentaNYL   Infusion. 0.5 MICROgram(s)/kG/Hr IV Continuous <Continuous>  ondansetron Injectable 4 milliGRAM(s) IV Push every 6 hours PRN    Anticoagulation:    OTHER:  albuterol/ipratropium for Nebulization 3 milliLiter(s) Nebulizer every 6 hours  atorvastatin 40 milliGRAM(s) Oral at bedtime  chlorhexidine 0.12% Liquid 15 milliLiter(s) Oral Mucosa every 12 hours  dextrose 50% Injectable 25 Gram(s) IV Push once  glucagon  Injectable 1 milliGRAM(s) IntraMuscular once  insulin lispro (ADMELOG) corrective regimen sliding scale   SubCutaneous every 6 hours  norepinephrine Infusion 0.05 MICROgram(s)/kG/Min IV Continuous <Continuous>  senna Syrup 10 milliLiter(s) Oral at bedtime PRN    IVF:  multivitamin 1 Tablet(s) Oral daily  sodium chloride 0.9%. 1000 milliLiter(s) IV Continuous <Continuous>    CULTURES:    RADIOLOGY & ADDITIONAL TESTS:  CTH 2021: Wet read stable in comparison to CTH on 2021    CXR 2021: Frontal examination of the chest demonstrates the heart to be within normal limits in transverse diameter. Congestion and/or infiltrates. Visualized osseous structures are within normal limits.  IMPRESSION: Congestion and/or infiltrates    ASSESSMENT:  46 M PMH HTN, non-compliant on home meds nifedipine, ASA 81, and metoprolol presented with 2 hr onset gradual worsening R UE paresthesia, weakness, dizziness, N&V found to have an Acute left cerebellar intraparenchymal hemorrhage and left pontine hemorrhage with extension into the subarachnoid space. Pt is now s/p  femoral cerebral angiogram, findings of left vertebral artery irregularity at level below PICA with segment suggestive of intimal flap, left vertebral artery sacrifice performed (2021), post op was extubated, needed to be reintubated for secretion burden and unable to protect airway, patient self-extubated and then reintubated again now sedated on precedex and fentanyl.     CHEST PAIN    No pertinent family history in first degree relatives    Handoff    MEWS Score    HTN (hypertension)    HTN (hypertension)    SAH (subarachnoid hemorrhage)    Dissection of cerebral artery    SAH (subarachnoid hemorrhage)    Dissection of cerebral artery    Cerebellar bleed    Angiogram, blood vessel, cerebral, with embolization    No significant past surgical history    CHEST PAIN    SysAdmin_VisitLink      PLAN:  Neuro:   - Neuro checks/vital checks/groin/vascular checks q1hr  - Tylenol prn for pain control  - Precedex/fentanyl for sedation  - Repeat CTH  wet read stable f/u final read     CV:   - Levophed for -140  - TTE- hypertrophic cardiomyopathy with severe left ventricular outflow tract obstruction and moderate mitral regurg   - Careful fluid balance    Pulm:  - full vent support  - blood tinged sputum  - Repeat CXR  - ABG in AM  - Obtain sputum culture if possible     Renal:   - Na goal 140-145   - Farias in place  - Replete electrolytes PRN    GI:   - bowel reg  - NPO     Endo:   - ISS    Heme:   - SCD's   - trend CBC    ID:   - afebrile, trend WBC    Disposition: ICU status, full code, dispo pending, family updated with plan.    Plan d/w Dr. D'Amico and Dr. Oneill    Assessment:  Present when checked    []  GCS  E   V  M     Heart Failure: []Acute, [] acute on chronic , []chronic  Heart Failure:  [] Diastolic (HFpEF), [] Systolic (HFrEF), []Combined (HFpEF and HFrEF), [] RHF, [] Pulm HTN, [] Other    [] TATIANA, [] ATN, [] AIN, [] other  [] CKD1, [] CKD2, [] CKD 3, [] CKD 4, [] CKD 5, []ESRD    Encephalopathy: [] Metabolic, [] Hepatic, [] toxic, [] Neurological, [] Other    Abnormal Nurtitional Status: [] malnurtition (see nutrition note), [ ]underweight: BMI < 19, [] morbid obesity: BMI >40, [] Cachexia    [] Sepsis  [] hypovolemic shock,[] cardiogenic shock, [] hemorrhagic shock, [] neuogenic shock  [] Acute Respiratory Failure  []Cerebral edema, [] Brain compression/ herniation,   [] Functional quadriplegia  [] Acute blood loss anemia   HPI:  46 M pt with PMHx of HTN, noncompliant with meds (Metoprolol, Nifedipine, and ASA), reports no recent use of ASA or metoprolol, presents to ED c/o acute onset and worsening dizziness, R arm paresthesia, and vomiting x 1hr prior to arrival while at work. Pt somnolent in ED, difficulty obtaining hx from pt. Per EMS, pt had SBP in the 200s, given nitro SL x 2 in field. Patient found to have a left side cerebellar hemmorrhage and left pontine hemmorrhage with extension to the subarachnoid space. Patient was started on a nicardipene drip in the ED for SBP to 230's, given manitol 50g and intubated for concern of potential for aspiration with declining exam. Patient reported that he lives alone and has no close contacts in the area.     NIHSS 6  ICH 2   (2021 20:40)    OVERNIGHT EVENTS: OLESYA overnight, repeat CTH wet read stable     HOSPITAL COURSE:  : Admitted with left cerebellar ICH. Intubated in ED for lethargy and concern for airway protection. Repeat CTH stable.  : POD# 0 S/P femoral cerebral angiogram, findings of left vertebral artery irregularity at level below PICA with segment suggestive of intimal flap, left vertebral artery sacrifice performed. Hypertensive episode during angio case, Xper CT shows stable ICH. Pt seen and examined at bedside in NSICU postop. Pt agitated and reaching for ETT. Brief CPAP trial given, and Pt was successfully extubated. Patient then with many blood tinged secretions and CXR with infiltrated. Patient was reintubated. He then self-extubated and was emergently reintubated, placed in restraints now sedated on precedex and fentanyl. Received 1LNS bolus for hypotension and was also started on levophed for SBPs in 70s.   : POD1 cerebral angiogram with left vertebral takedown, OLESYA overnight, neuro stable remains sedated on precedex and intubated on full vent support. Repeat CTH stable.       Vital Signs Last 24 Hrs  T(C): 37.9 (2021 22:06), Max: 38.9 (2021 17:00)  T(F): 100.2 (2021 22:06), Max: 102 (2021 17:00)  HR: 69 (2021 02:00) (63 - 99)  BP: 121/79 (2021 02:00) (79/52 - 140/83)  BP(mean): 93 (2021 02:00) (62 - 103)  RR: 20 (2021 02:00) (14 - 25)  SpO2: 99% (2021 02:00) (90% - 100%)    I&O's Summary    2021 07:01  -  2021 07:00  --------------------------------------------------------  IN: 958.7 mL / OUT: 1375 mL / NET: -416.3 mL    2021 07:01  -  2021 02:35  --------------------------------------------------------  IN: 2433.4 mL / OUT: 1060 mL / NET: 1373.4 mL        PHYSICAL EXAM:  General: NAD, patient sedated on propofol, intubated to vent   HEENT: PERRL 3mm briskly reactive, EOMI b/l, +ET tube  Cardiovascular: RRR, normal S1 and S2   Respiratory: lungs with diminished breath sounds at bilateral bases, no wheezing.  GI: normoactive BS to auscultation, abd soft, NTND   Neuro: On sedation, opens eyes to noxious, CONWAY anti-gravity, localizes with bilateral upper extremities (tries to pull out ET tube), RUE shows thumbs up, RLE wiggle toes to command, LUE/LLE briskly withdraw to noxious, does not follow commands.    Extremities: distal pulses 2+ x4, DP and PT 2+ bilaterally.   Wound/incision: +right groin puncture site with dressing in place C/D/I      TUBES/LINES:  [X] Farias  [] Lumbar Drain  [] Wound Drains  [X] Others +ET tube       DIET:  [X] NPO  [] Mechanical  [] Tube feeds    LABS:                        12.3   8.06  )-----------( 179      ( 2021 10:17 )             38.3         145  |  115<H>  |  17  ----------------------------<  176<H>  4.3   |  20<L>  |  1.42<H>    Ca    8.4      2021 22:23  Phos  4.3       Mg     1.8         TPro  7.1  /  Alb  4.7  /  TBili  1.0  /  DBili  x   /  AST  25  /  ALT  23  /  AlkPhos  68      PT/INR - ( 2021 15:14 )   PT: 12.3 sec;   INR: 1.03          PTT - ( 2021 15:14 )  PTT:26.8 sec  Urinalysis Basic - ( 2021 18:26 )    Color: Yellow / Appearance: Clear / S.025 / pH: x  Gluc: x / Ketone: Trace mg/dL  / Bili: Negative / Urobili: 0.2 E.U./dL   Blood: x / Protein: Trace mg/dL / Nitrite: NEGATIVE   Leuk Esterase: NEGATIVE / RBC: > 10 /HPF / WBC < 5 /HPF   Sq Epi: x / Non Sq Epi: 0-5 /HPF / Bacteria: Present /HPF      CARDIAC MARKERS ( 2021 23:36 )  x     / <0.01 ng/mL / x     / x     / x      CARDIAC MARKERS ( 2021 15:14 )  x     / 0.01 ng/mL / x     / x     / x          CAPILLARY BLOOD GLUCOSE      POCT Blood Glucose.: 134 mg/dL (2021 21:24)  POCT Blood Glucose.: 119 mg/dL (2021 16:00)  POCT Blood Glucose.: 99 mg/dL (2021 11:28)  POCT Blood Glucose.: 91 mg/dL (2021 06:00)      Drug Levels: [] N/A    CSF Analysis: [] N/A      Allergies    No Known Allergies    Intolerances      MEDICATIONS:  Antibiotics:  piperacillin/tazobactam IVPB.. 3.375 Gram(s) IV Intermittent every 6 hours  vancomycin  IVPB 1500 milliGRAM(s) IV Intermittent every 12 hours    Neuro:  acetaminophen   Tablet .. 650 milliGRAM(s) Oral every 6 hours PRN  dexMEDEtomidine Infusion 0.2 MICROgram(s)/kG/Hr IV Continuous <Continuous>  fentaNYL    Injectable 25 MICROGram(s) IV Push every 2 hours PRN  fentaNYL   Infusion. 0.5 MICROgram(s)/kG/Hr IV Continuous <Continuous>  ondansetron Injectable 4 milliGRAM(s) IV Push every 6 hours PRN    Anticoagulation:    OTHER:  albuterol/ipratropium for Nebulization 3 milliLiter(s) Nebulizer every 6 hours  atorvastatin 40 milliGRAM(s) Oral at bedtime  chlorhexidine 0.12% Liquid 15 milliLiter(s) Oral Mucosa every 12 hours  dextrose 50% Injectable 25 Gram(s) IV Push once  glucagon  Injectable 1 milliGRAM(s) IntraMuscular once  insulin lispro (ADMELOG) corrective regimen sliding scale   SubCutaneous every 6 hours  norepinephrine Infusion 0.05 MICROgram(s)/kG/Min IV Continuous <Continuous>  senna Syrup 10 milliLiter(s) Oral at bedtime PRN    IVF:  multivitamin 1 Tablet(s) Oral daily  sodium chloride 0.9%. 1000 milliLiter(s) IV Continuous <Continuous>    CULTURES:    RADIOLOGY & ADDITIONAL TESTS:  CTH 2021: Wet read stable in comparison to CTH on 2021    CXR 2021: Frontal examination of the chest demonstrates the heart to be within normal limits in transverse diameter. Congestion and/or infiltrates. Visualized osseous structures are within normal limits.  IMPRESSION: Congestion and/or infiltrates    ASSESSMENT:  46 M PMH HTN, non-compliant on home meds nifedipine, ASA 81, and metoprolol presented with 2 hr onset gradual worsening R UE paresthesia, weakness, dizziness, N&V found to have an Acute left cerebellar intraparenchymal hemorrhage and left pontine hemorrhage with extension into the subarachnoid space. Pt is now s/p  femoral cerebral angiogram, findings of left vertebral artery irregularity at level below PICA with segment suggestive of intimal flap, left vertebral artery sacrifice performed (2021), post op was extubated, needed to be reintubated for secretion burden and unable to protect airway, patient self-extubated and then reintubated again now sedated on precedex and fentanyl.     CHEST PAIN    No pertinent family history in first degree relatives    Handoff    MEWS Score    HTN (hypertension)    HTN (hypertension)    SAH (subarachnoid hemorrhage)    Dissection of cerebral artery    SAH (subarachnoid hemorrhage)    Dissection of cerebral artery    Cerebellar bleed    Angiogram, blood vessel, cerebral, with embolization    No significant past surgical history    CHEST PAIN    SysAdmin_VisitLink      PLAN:  Neuro:   - Neuro checks/vital checks/groin/vascular checks q1hr  - Tylenol prn for pain control  - Precedex/fentanyl for sedation  - Repeat CTH  wet read stable f/u final read     CV:   - Levophed for -140  - TTE- hypertrophic cardiomyopathy with severe left ventricular outflow tract obstruction and moderate mitral regurg   - Careful fluid balance    Pulm:  - full vent support  - blood tinged sputum  - Repeat CXR  - ABG in AM  - Obtain sputum culture if possible     Renal:   - Na goal 140-145   - Farias in place  - Replete electrolytes PRN    GI:   - bowel reg  - NPO     Endo:   - ISS    Heme:   - SCD's   - trend CBC    ID:   - afebrile, trend WBC    Disposition: ICU status, full code, dispo pending, family updated with plan.    Plan d/w Dr. D'Amico and Dr. Oneill    Assessment:  Present when checked    []  GCS  E   V  M     Heart Failure: []Acute, [] acute on chronic , []chronic  Heart Failure:  [] Diastolic (HFpEF), [] Systolic (HFrEF), []Combined (HFpEF and HFrEF), [] RHF, [] Pulm HTN, [] Other    [] TATIANA, [] ATN, [] AIN, [] other  [] CKD1, [] CKD2, [] CKD 3, [] CKD 4, [] CKD 5, []ESRD    Encephalopathy: [] Metabolic, [] Hepatic, [] toxic, [] Neurological, [] Other    Abnormal Nurtitional Status: [] malnurtition (see nutrition note), [ ]underweight: BMI < 19, [] morbid obesity: BMI >40, [] Cachexia    [] Sepsis  [] hypovolemic shock,[] cardiogenic shock, [] hemorrhagic shock, [] neuogenic shock  [] Acute Respiratory Failure  []Cerebral edema, [] Brain compression/ herniation,   [] Functional quadriplegia  [] Acute blood loss anemia

## 2021-07-30 NOTE — DIETITIAN INITIAL EVALUATION ADULT. - ENTERAL
If team wishes to initiate EN, recommend Jevity 1.2 @ 85 ml/hr x24hrs via accessible route with x1 LPS daily (100 kcal, 15g pro) providing a total of 2040 ml TV, 2548 kcal, 128g pro, 1646 ml free water. FWF per team.

## 2021-07-30 NOTE — DIETITIAN INITIAL EVALUATION ADULT. - OTHER INFO
46M PMHx of HTN, noncompliant with meds (Metoprolol, Nifedipine, and ASA), reports no recent use of ASA or metoprolol, presents to ED c/o acute onset and worsening dizziness, R arm paresthesia, and vomiting x 1hr prior to arrival while at work. Pt somnolent in ED, difficulty obtaining hx from pt. Per EMS, pt had SBP in the 200s, given nitro SL x 2 in field. Patient found to have a left side cerebellar hemorrhage and left pontine hemorrhage with extension to the subarachnoid space- started on nicardipine drip, and mannitol. Pt was intubated for concern of potential aspiration and was transferred to SICU for further assessment. Pt s/p cerebral angiogram with left vertebral takedown 7/29. pt started on brief CPAP trial given, and was successfully extubated. Team noticed pt had many blood tinged secretions and CXR with infiltrated. Patient was reintubated. He then self-extubated and was emergently reintubated, placed in restraints now sedated on precedex and fentanyl. Repeat CTH stable.     On assessment, pt resting in bed resting comfortably. Currently on full vent support. Vent mode: AC/VC. Tmax 103H, MAP 89- currently ordered for precedex, fentanyl, and levo. Currently NPO- if team wishes to initiate EN,  please see recs below. No reported n/v/d/c. No abd distention. Skin: surgical incision, alfonso score 19. No pain noted. Unable to assess UBW nor appetite PTA. NKFA. Education deferred. Pertinent Labs: POCT 115H, Na 147H, CL 116H, Cre 1.42H, Glu 133H. Please see nutr recs below. RD to follow.

## 2021-07-30 NOTE — PROGRESS NOTE ADULT - ASSESSMENT
46y/M with  L cerebellar hematoma, VA dissection, brain compression, IVH  HTN (hypertension)  elevated creatinine    PLAN:   NEURO: neurochecks q1h, PRN pain meds with acetaminophen, propofol to RASS of 0 to -1 - switch to precedex   angio, VA takedown?; BP control, MRI on stepdown  REHAB:  physical therapy evaluation and management    EARLY MOB:  bed rest    PULM:  intubated - possible extubation   CARDIO:  SBP goal 100-140mm Hg, continue cardene, Ennis  ENDO:  Blood sugar goals 140-180 mg/dL, continue insulin sliding scale  GI:  PPI for GI prophylaxis  DIET: NPO for procedure  RENAL:  140-145, Farias, NS@100cc/hr, recheck BMP   HEM/ONC: Hb stable  VTE Prophylaxis: SCDs, no DVT chemoprophylaxis for now as patient is high risk for bleed (fresh bleed)  ID: afebrile, no leukocytosis  Social: will update family; 2 physician consent    ATTENDING ATTESTATION:  I was physically present for the key portions of the evaluation and management (E/M) service provided.  I agree with the above history, physical and plan, which I have reviewed and edited where appropriate.    Patient at high risk for neurological deterioration or death due to:  ICU delirium, aspiration PNA, DVT / PE.  Critical care time:  I have personally provided 45 minutes of critical care time, excluding time spent on separate procedures.      Plan discussed with RN, house staff. 46y/M with  L cerebellar hematoma, VA dissection, brain compression, IVH  HTN (hypertension)  elevated creatinine  HOCM    PLAN:   NEURO: neurochecks q1h, PRN pain meds with acetaminophen, fentanyl drip, continue Dexmedetomidine to RASS of 0 to -1  angio, VA takedown;  BP control, MRI on stepdown  REHAB:  physical therapy evaluation and management    EARLY MOB:  bed rest    PULM:  intubated - possible extubation; full vent support 10  60% 12 - repeat ABG  CARDIO:  SBP goal 100-140mm Hg, Steffanie; start metoprolol 12.5 BID with hold parameters  and HR 60  ENDO:  Blood sugar goals 140-180 mg/dL, continue insulin sliding scale  GI:  PPI for GI prophylaxis  DIET: tube feeds to start, insert NGT  RENAL:  140-150, d/c Farias, d/c IVF  HEM/ONC: Hb stable  VTE Prophylaxis: SCDs, start SQH if cleared by NS  ID: febrile, leukocytosis; piperacillin/tazobactam vancomycin (last day 08/03), vanc trough prior to 4th dose  Social: sister is [facility] - [place] update      ATTENDING ATTESTATION:  I was physically present for the key portions of the evaluation and management (E/M) service provided.  I agree with the above history, physical and plan, which I have reviewed and edited where appropriate.    Patient at high risk for neurological deterioration or death due to:  ICU delirium, aspiration PNA, DVT / PE.  Critical care time:  I have personally provided 45 minutes of critical care time, excluding time spent on separate procedures.      Plan discussed with RN, house staff.

## 2021-07-30 NOTE — DIETITIAN INITIAL EVALUATION ADULT. - ADD RECOMMEND
1. For EN regimen, recommend start at 20 ml/hr and increase 10 ml q4hrs until goal rate of 85 ml/hr is met. 2. Maintain aspiration precautions at all times 3. Pain and bowel regimen per team 4. Cont to monitor lytes and replete prn

## 2021-07-30 NOTE — PROGRESS NOTE ADULT - SUBJECTIVE AND OBJECTIVE BOX
=================================  NEUROCRITICAL CARE ATTENDING NOTE  =================================    CRISTI JENNINGS   MRN-[id number]  Summary:  46y/M  with PMHx of HTN, noncompliant with meds (Metoprolol, Nifedipine, and ASA), reports no recent use of ASA or metoprolol, presents to ED c/o acute onset and worsening dizziness, R arm paresthesia, and vomiting x 1hr prior to arrival while at work. Pt somnolent in ED, difficulty obtaining hx from pt. Per EMS, pt had SBP in the 200s, given nitro SL x 2 in field. Patient found to have a left side cerebellar hemmorrhage and left pontine hemmorrhage with extension to the subarachnoid space. Patient was started on a nicardipene drip in the ED for SBP to 230's, given manitol 50g and intubated for concern of potential for aspiration with declining exam. Patient reported that he lives alone and has no close contacts in the area.  (28 Jul 2021 20:40)    COURSE IN THE HOSPITAL:  07/28 intubated  07/29 remained intubated - s/p angio, vertebral artery takedown, extubated, desaturated, reintubated, self-extubated, reintubated  07/30 Tmax 39.4, remained intubated, CT overnight    Past Medical History: HTN (hypertension)  Allergies:  No Known Allergies  Home meds:     PHYSICAL EXAMINATION  T(C): 39.4 (07-30 @ 07:00), Max: 39.4 (07-30 @ 07:00) HR: 80 (07-30 @ 05:48) (63 - 99) BP: 116/74 (07-30 @ 05:00) (79/52 - 140/83) RR: 20 (07-30 @ 05:48) (14 - 25) SpO2: 99% (07-30 @ 05:48) (90% - 100%)   NEUROLOGIC EXAMINATION:  Patient is   GENERAL:   EENT:  anicteric  CARDIOVASCULAR: (+) S1 S2, normal rate and regular rhythm  PULMONARY: clear to auscultation bilaterally  ABDOMEN: soft, nontender with normoactive bowel sounds  EXTREMITIES: no edema  SKIN: no rash    LABS: 07-30  CAPILLARY BLOOD GLUCOSE 134 119 99            (8.06)   13.9   14.77 )-----------( 168      ( 30 Jul 2021 05:36 )             43.3   (145)  147<H>  |  116<H>  |  16  ----------------------------<  133<H>  3.8   |  23  |  1.42<H> (1.42)    Ca    8.6      30 Jul 2021 05:36  Phos  3.2     07-30  Mg     1.8     07-30    TPro  7.1  /  Alb  4.7  /  TBili  1.0  /  DBili  x   /  AST  25  /  ALT  23  /  AlkPhos  68  07-28 07-28 @ 07:01  -  07-29 @ 07:00  IN: 958.7 mL / OUT: 1375 mL / NET: -416.3 mL    7.37/48/341/28    Bacteriology:  CSF studies:  EEG:  Neuroimaging:  Other imaging:    MEDICATIONS: 07-30    ·	piperacillin/tazobactam IVPB.. 3.375 IV Intermittent every 6 hours  ·	vancomycin  IVPB 1500 IV Intermittent every 12 hours  ·	albuterol/ipratropium for Nebulization 3 Nebulizer every 6 hours  ·	atorvastatin 40 Oral at bedtime  ·	dextrose 50% Injectable 25 IV Push once  ·	glucagon  Injectable 1 IntraMuscular once  ·	insulin lispro (ADMELOG) corrective regimen sliding scale  SubCutaneous every 6 hours  ·	multivitamin 1 Oral daily  ·	acetaminophen   Tablet .. 650 Oral every 6 hours PRN  ·	fentaNYL    Injectable 25 IV Push every 2 hours PRN  ·	ondansetron Injectable 4 IV Push every 6 hours PRN  ·	senna Syrup 10 Oral at bedtime PRN  ·	dexMEDEtomidine Infusion 0.2 MICROgram(s)/kG/Hr (5.15 mL/Hr) IV Continuous <Continuous>  ·	norepinephrine Infusion 0.05 MICROgram(s)/kG/Min (9.66 mL/Hr) IV Continuous <Continuous>      MEDICATIONS:  ·	acetaminophen   Tablet .. 650 milliGRAM(s) Oral every 6 hours PRN  ·	fentaNYL   Infusion. 0.5 MICROgram(s)/kG/Hr IV Continuous <Continuous>  ·	ondansetron Injectable 4 milliGRAM(s) IV Push every 6 hours PRN  ·	pantoprazole  Injectable 40 milliGRAM(s) IV Push daily  ·	senna Syrup 10 milliLiter(s) Oral at bedtime PRN  ·	atorvastatin 40 milliGRAM(s) Oral at bedtime  ·	insulin lispro (ADMELOG) corrective regimen sliding scale   SubCutaneous every 6 hours  ·	multivitamin 1 Tablet(s) Oral daily    IV FLUIDS: NS@100cc/hr (was on 3%@50)  DRIPS:  ·	dexMEDEtomidine Infusion 0.2 IV Continuous <Continuous>  ·	fentaNYL   Infusion. 0.5 IV Continuous <Continuous>  ·	norepinephrine Infusion 0.05 MICROgram(s)/kG/Min IV Continuous <Continuous>  DIET:  Lines: [place]  Drains:  [place]  Wounds:    CODE STATUS:  Full Code                       GOALS OF CARE:  aggressive                      DISPOSITION:  ICU  NIHSS 6 ICH 2 =================================  NEUROCRITICAL CARE ATTENDING NOTE  =================================    CRISTI JENNINGS   MRN-[id number]  Summary:  46y/M  with PMHx of HTN, noncompliant with meds (Metoprolol, Nifedipine, and ASA), reports no recent use of ASA or metoprolol, presents to ED c/o acute onset and worsening dizziness, R arm paresthesia, and vomiting x 1hr prior to arrival while at work. Pt somnolent in ED, difficulty obtaining hx from pt. Per EMS, pt had SBP in the 200s, given nitro SL x 2 in field. Patient found to have a left side cerebellar hemmorrhage and left pontine hemmorrhage with extension to the subarachnoid space. Patient was started on a nicardipene drip in the ED for SBP to 230's, given manitol 50g and intubated for concern of potential for aspiration with declining exam. Patient reported that he lives alone and has no close contacts in the area.  (28 Jul 2021 20:40)    COURSE IN THE HOSPITAL:  07/28 intubated  07/29 remained intubated - s/p angio, vertebral artery takedown, extubated, desaturated, reintubated, self-extubated, reintubated  07/30 Tmax 39.4, remained intubated, CT overnight; given lasix overnight for pulmo congestion, SBP dropped with propofol, started on levophed, switched to precedex    Past Medical History: HTN (hypertension)  Allergies:  No Known Allergies  Home meds:     PHYSICAL EXAMINATION  T(C): 39.4 (07-30 @ 07:00), Max: 39.4 (07-30 @ 07:00) HR: 80 (07-30 @ 05:48) (63 - 99) BP: 116/74 (07-30 @ 05:00) (79/52 - 140/83) RR: 20 (07-30 @ 05:48) (14 - 25) SpO2: 99% (07-30 @ 05:48) (90% - 100%)   NEUROLOGIC EXAMINATION:  Patient is   GENERAL: intubated 60% 500 20 +8  EENT:  anicteric  CARDIOVASCULAR: (+) S1 S2, normal rate and regular rhythm  PULMONARY: clear to auscultation bilaterally  ABDOMEN: soft, nontender with normoactive bowel sounds  EXTREMITIES: no edema  SKIN: no rash    LABS: 07-30  CAPILLARY BLOOD GLUCOSE 134 119 99            (8.06)   13.9   14.77 )-----------( 168      ( 30 Jul 2021 05:36 )             43.3   (145)  147<H>  |  116<H>  |  16  ----------------------------<  133<H>  3.8   |  23  |  1.42<H> (1.42)    Ca    8.6      30 Jul 2021 05:36  Phos  3.2     07-30  Mg     1.8     07-30    TPro  7.1  /  Alb  4.7  /  TBili  1.0  /  DBili  x   /  AST  25  /  ALT  23  /  AlkPhos  68  07-28 07-28 @ 07:01  -  07-29 @ 07:00  IN: 958.7 mL / OUT: 1375 mL / NET: -416.3 mL    7.37/48/341/28  7.43/40/133/26 - 100%    Bacteriology:  CSF studies:  EEG:  Neuroimaging:  Other imaging:    MEDICATIONS: 07-30    ·	piperacillin/tazobactam IVPB.. 3.375 IV Intermittent every 6 hours  ·	vancomycin  IVPB 1500 IV Intermittent every 12 hours  ·	albuterol/ipratropium for Nebulization 3 Nebulizer every 6 hours  ·	atorvastatin 40 Oral at bedtime  ·	glucagon  Injectable 1 IntraMuscular once  ·	insulin lispro (ADMELOG) corrective regimen sliding scale  SubCutaneous every 6 hours  ·	multivitamin 1 Oral daily  ·	acetaminophen   Tablet .. 650 Oral every 6 hours PRN  ·	fentaNYL    Injectable 25 IV Push every 2 hours PRN  ·	ondansetron Injectable 4 IV Push every 6 hours PRN  ·	senna Syrup 10 Oral at bedtime PRN    IV FLUIDS: NS@50cc/hr  DRIPS:  ·	dexMEDEtomidine Infusion 0.2 IV Continuous <Continuous> 0.2  ·	fentaNYL   Infusion. 0.5 IV Continuous <Continuous> - 1.9  ·	norepinephrine Infusion 0.05 MICROgram(s)/kG/Min IV Continuous <Continuous>  DIET: NPO  Lines: [place]  Drains:  Farias  Wounds:    CODE STATUS:  Full Code                       GOALS OF CARE:  aggressive                      DISPOSITION:  ICU  NIHSS 6 ICH 2

## 2021-07-30 NOTE — DIETITIAN INITIAL EVALUATION ADULT. - OTHER CALCULATIONS
IBW used for calculations as pt >100% of IBW (116%). Needs adjusted for critical care requiring intubation. **Fluids per team.

## 2021-07-30 NOTE — PROGRESS NOTE ADULT - SUBJECTIVE AND OBJECTIVE BOX
===============================================   NEUROCRITICAL CARE ATTENDING NOTE (2021 Evening)  ===============================================    CRISTI JENNINGS   MRN-[id number]  Summary:  46y/M  with PMHx of HTN, noncompliant with meds (Metoprolol, Nifedipine, and ASA), reports no recent use of ASA or metoprolol, presents to ED c/o acute onset and worsening dizziness, R arm paresthesia, and vomiting x 1hr prior to arrival while at work. Pt somnolent in ED, difficulty obtaining hx from pt. Per EMS, pt had SBP in the 200s, given nitro SL x 2 in field. Patient found to have a left side cerebellar hemmorrhage and left pontine hemmorrhage with extension to the subarachnoid space. Patient was started on a nicardipene drip in the ED for SBP to 230's, given manitol 50g and intubated for concern of potential for aspiration with declining exam. Patient reported that he lives alone and has no close contacts in the area.  (2021 20:40)    COURSE IN THE HOSPITAL:   intubated   remained intubated - s/p angio, vertebral artery takedown, extubated, desaturated, reintubated, self-extubated, reintubated   Tmax 39.4, remained intubated, CT overnight; given lasix overnight for pulmo congestion, SBP dropped with propofol, started on levophed, switched to precedex    Past Medical History: HTN (hypertension)  Allergies:  No Known Allergies  Home meds:     Current Meds:  MEDICATIONS  (STANDING):  albuterol/ipratropium for Nebulization 3 milliLiter(s) Nebulizer every 6 hours  atorvastatin 40 milliGRAM(s) Oral at bedtime  dexMEDEtomidine Infusion 0.2 MICROgram(s)/kG/Hr (5.15 mL/Hr) IV Continuous <Continuous>  fentaNYL   Infusion. 0.5 MICROgram(s)/kG/Hr (5.15 mL/Hr) IV Continuous <Continuous>  insulin lispro (ADMELOG) corrective regimen sliding scale   SubCutaneous every 6 hours  metoprolol tartrate 12.5 milliGRAM(s) Oral every 12 hours  multivitamin 1 Tablet(s) Oral daily  pantoprazole   Suspension 40 milliGRAM(s) Oral daily  piperacillin/tazobactam IVPB.. 3.375 Gram(s) IV Intermittent every 6 hours  vancomycin  IVPB 1500 milliGRAM(s) IV Intermittent every 12 hours    MEDICATIONS  (PRN):  acetaminophen   Tablet .. 650 milliGRAM(s) Oral every 6 hours PRN Temp greater or equal to 38C (100.4F), Mild Pain (1 - 3)  acetaminophen  Suppository .. 650 milliGRAM(s) Rectal every 6 hours PRN Temp greater or equal to 38.5C (101.3F), Moderate Pain (4 - 6)  fentaNYL    Injectable 25 MICROGram(s) IV Push every 2 hours PRN tachypnea, agitation  ondansetron Injectable 4 milliGRAM(s) IV Push every 6 hours PRN Nausea and/or Vomiting  senna Syrup 10 milliLiter(s) Oral at bedtime PRN Constipation    PHYSICAL EXAMINATION    ICU Vital Signs Last 24 Hrs  T(C): 37.3 (2021 14:56), Max: 39.4 (2021 07:00)  T(F): 99.1 (2021 14:56), Max: 103 (2021 07:00)  HR: 60 (2021 16:00) (56 - 99)  BP: 126/79 (2021 16:00) (79/52 - 140/83)  BP(mean): 97 (2021 16:00) (62 - 103)  ABP: 136/84 (2021 16:00) (83/57 - 152/97)  ABP(mean): 101 (2021 16:00) (66 - 134)  RR: 16 (2021 16:00) (12 - 25)  SpO2: 99% (2021 16:00) (90% - 100%)    Neuro: On sedation, opens eyes to noxious, CONWAY anti-gravity, localizes with bilateral upper extremities (tries to pull out ET tube), RUE shows thumbs up, RLE wiggle toes to command, LUE/LLE briskly withdraw to noxious, does not follow commands.    GENERAL: AC FiO2 60  RR 12 PEEP 10 MAP 13 PIP 20  EENT:  anicteric  CARDIOVASCULAR: (+) S1 S2, normal rate and regular rhythm  PULMONARY: clear to auscultation bilaterally  ABDOMEN: soft, nontender with normoactive bowel sounds  EXTREMITIES: no edema  SKIN: no rash    I/O's    21 @ 07:  -  21 @ 07:00  --------------------------------------------------------  IN: 3296.4 mL / OUT: 1945 mL / NET: 1351.4 mL    21 @ 07:  -  21 @ 16:48  --------------------------------------------------------  IN: 774.6 mL / OUT: 305 mL / NET: 469.6 mL    LABS:                        13.9   14.77 )-----------( 168      ( 2021 05:36 )             43.3     07-30    147<H>  |  116<H>  |  16  ----------------------------<  133<H>  3.8   |  23  |  1.42<H>    Ca    8.6      2021 05:36  Phos  3.2     07-30  Mg     1.8     07-30    PT/INR - ( 2021 05:36 )   PT: 13.5 sec;   INR: 1.13     PTT - ( 2021 05:36 )  PTT:29.9 sec    Urinalysis Basic - ( 2021 18:26 )    Color: Yellow / Appearance: Clear / S.025 / pH: x  Gluc: x / Ketone: Trace mg/dL  / Bili: Negative / Urobili: 0.2 E.U./dL   Blood: x / Protein: Trace mg/dL / Nitrite: NEGATIVE   Leuk Esterase: NEGATIVE / RBC: > 10 /HPF / WBC < 5 /HPF   Sq Epi: x / Non Sq Epi: 0-5 /HPF / Bacteria: Present /HPF    CARDIAC MARKERS ( 2021 23:36 )  x     / <0.01 ng/mL / x     / x     / x        CAPILLARY BLOOD GLUCOSE    POCT Blood Glucose.: 131 mg/dL (2021 11:06)  POCT Blood Glucose.: 115 mg/dL (2021 07:35)  POCT Blood Glucose.: 134 mg/dL (2021 21:24)    Culture - Blood (collected 21 @ 21:27)  Source: .Blood Blood  Preliminary Report (21 @ 10:00):    No growth at 12 hours    Culture - Blood (collected 21 @ 21:27)  Source: .Blood Blood  Preliminary Report (21 @ 10:00):    No growth at 12 hours    CSF studies:  EEG:  Neuroimaging:  CT - Redemonstration of multi compartmental intraparenchymal/subarachnoid hemorrhage centered within the left cerebellar hemisphere with surrounding edema and extending into the cerebellar sulci, left midbrain/left quadrigeminalcistern, fourth ventricle, and proximal cisterna magna. Previously described areas of trace hemorrhage within the bilateral lateral ventricles and third ventricle are not well visualized on the current exam. Overall extent of hemorrhage is otherwise not significantly changed compared when compared to CT head 2021 approximately 10:00 PM.   Other imaging:  CXR - Persistent pleural effusions, right greater than left.    IV FLUIDS: NS@50cc/hr  DRIPS:  ·	dexMEDEtomidine Infusion 0.2 IV Continuous <Continuous> 0.2  ·	fentaNYL   Infusion. 0.5 IV Continuous <Continuous> - 1.9  DIET: NPO  Lines: [place]  Drains:  [place]  Wounds:    CODE STATUS:  Full Code                       GOALS OF CARE:  aggressive                      DISPOSITION:  ICU  NIHSS 6 ICH 2 ===============================================   NEUROCRITICAL CARE ATTENDING NOTE (2021 Evening)  ===============================================    CRISTI JENNINGS   MRN-[id number]  Summary:  46y/M  with PMHx of HTN, noncompliant with meds (Metoprolol, Nifedipine, and ASA), reports no recent use of ASA or metoprolol, presents to ED c/o acute onset and worsening dizziness, R arm paresthesia, and vomiting x 1hr prior to arrival while at work. Pt somnolent in ED, difficulty obtaining hx from pt. Per EMS, pt had SBP in the 200s, given nitro SL x 2 in field. Patient found to have a left side cerebellar hemmorrhage and left pontine hemmorrhage with extension to the subarachnoid space. Patient was started on a nicardipene drip in the ED for SBP to 230's, given manitol 50g and intubated for concern of potential for aspiration with declining exam. Patient reported that he lives alone and has no close contacts in the area.  (2021 20:40)    COURSE IN THE HOSPITAL:   intubated   remained intubated - s/p angio, vertebral artery takedown, extubated, desaturated, reintubated, self-extubated, reintubated   Tmax 39.4, remained intubated, CT overnight; given lasix overnight for pulmo congestion, SBP dropped with propofol, started on levophed, switched to precedex; self-extubated this evening, NC high flow    Past Medical History: HTN (hypertension)  Allergies:  No Known Allergies  Home meds:     Current Meds:  MEDICATIONS  (STANDING):  albuterol/ipratropium for Nebulization 3 milliLiter(s) Nebulizer every 6 hours  atorvastatin 40 milliGRAM(s) Oral at bedtime  furosemide   Injectable 30 milliGRAM(s) IV Push once  insulin lispro (ADMELOG) corrective regimen sliding scale   SubCutaneous every 6 hours  metoprolol tartrate 12.5 milliGRAM(s) Oral every 12 hours/5 mg IV q6h  multivitamin 1 Tablet(s) Oral daily  pantoprazole   Suspension 40 milliGRAM(s) Oral daily  piperacillin/tazobactam IVPB.. 3.375 Gram(s) IV Intermittent every 6 hours  vancomycin  IVPB 1500 milliGRAM(s) IV Intermittent every 12 hours    MEDICATIONS  (PRN):  acetaminophen   Tablet .. 650 milliGRAM(s) Oral every 6 hours PRN Temp greater or equal to 38C (100.4F), Mild Pain (1 - 3)  acetaminophen  Suppository .. 650 milliGRAM(s) Rectal every 6 hours PRN Temp greater or equal to 38.5C (101.3F), Moderate Pain (4 - 6)  fentaNYL    Injectable 25 MICROGram(s) IV Push every 2 hours PRN tachypnea, agitation  hydrALAZINE Injectable 10 milliGRAM(s) IV Push every 6 hours PRN SBP > 140  ondansetron Injectable 4 milliGRAM(s) IV Push every 6 hours PRN Nausea and/or Vomiting  senna Syrup 10 milliLiter(s) Oral at bedtime PRN Constipation    PHYSICAL EXAMINATION    ICU Vital Signs Last 24 Hrs  T(C): 37.3 (2021 14:56), Max: 39.4 (2021 07:00)  T(F): 99.1 (2021 14:56), Max: 103 (2021 07:00)  HR: 60 (2021 16:00) (56 - 99)  BP: 126/79 (2021 16:00) (79/52 - 140/83)  BP(mean): 97 (2021 16:00) (62 - 103)  ABP: 136/84 (2021 16:00) (83/57 - 152/97)  ABP(mean): 101 (2021 16:00) (66 - 134)  RR: 16 (2021 16:00) (12 - 25)  SpO2: 99% (2021 16:00) (90% - 100%)    Neuro: On sedation, opens eyes to noxious, CONWAY anti-gravity, localizes with bilateral upper extremities (tries to pull out ET tube), RUE shows thumbs up, RLE wiggle toes to command, LUE/LLE briskly withdraw to noxious, does not follow commands.    GENERAL: high flow NC  EENT:  anicteric  CARDIOVASCULAR: (+) S1 S2, normal rate and regular rhythm  PULMONARY: decreased bilaterally no adventitia  ABDOMEN: soft, nontender with normoactive bowel sounds  EXTREMITIES: no edema  SKIN: no rash    I/O's    21 @ 07:  -  21 @ 07:00  --------------------------------------------------------  IN: 3296.4 mL / OUT: 1945 mL / NET: 1351.4 mL    21 @ 07:  -  21 @ 16:48  --------------------------------------------------------  IN: 774.6 mL / OUT: 305 mL / NET: 469.6 mL    LABS:                        13.9   14.77 )-----------( 168      ( 2021 05:36 )             43.3     07-30    147<H>  |  116<H>  |  16  ----------------------------<  133<H>  3.8   |  23  |  1.42<H>    Ca    8.6      2021 05:36  Phos  3.2     07-30  Mg     1.8     07-30    PT/INR - ( 2021 05:36 )   PT: 13.5 sec;   INR: 1.13     PTT - ( 2021 05:36 )  PTT:29.9 sec    Urinalysis Basic - ( 2021 18:26 )    Color: Yellow / Appearance: Clear / S.025 / pH: x  Gluc: x / Ketone: Trace mg/dL  / Bili: Negative / Urobili: 0.2 E.U./dL   Blood: x / Protein: Trace mg/dL / Nitrite: NEGATIVE   Leuk Esterase: NEGATIVE / RBC: > 10 /HPF / WBC < 5 /HPF   Sq Epi: x / Non Sq Epi: 0-5 /HPF / Bacteria: Present /HPF    CARDIAC MARKERS ( 2021 23:36 )  x     / <0.01 ng/mL / x     / x     / x        CAPILLARY BLOOD GLUCOSE    POCT Blood Glucose.: 131 mg/dL (2021 11:06)  POCT Blood Glucose.: 115 mg/dL (2021 07:35)  POCT Blood Glucose.: 134 mg/dL (2021 21:24)    Culture - Blood (collected 21 @ 21:27)  Source: .Blood Blood  Preliminary Report (21 @ 10:00):    No growth at 12 hours    Culture - Blood (collected 21 @ 21:27)  Source: .Blood Blood  Preliminary Report (21 @ 10:00):    No growth at 12 hours    CSF studies:  EEG:  Neuroimaging:  CT - Redemonstration of multi compartmental intraparenchymal/subarachnoid hemorrhage centered within the left cerebellar hemisphere with surrounding edema and extending into the cerebellar sulci, left midbrain/left quadrigeminalcistern, fourth ventricle, and proximal cisterna magna. Previously described areas of trace hemorrhage within the bilateral lateral ventricles and third ventricle are not well visualized on the current exam. Overall extent of hemorrhage is otherwise not significantly changed compared when compared to CT head 2021 approximately 10:00 PM.   Other imaging:  CXR - Persistent pleural effusions, right greater than left (improved from prior).    IV FLUIDS:  DRIPS:  DIET: NPO  Lines: [place]  Drains:  [place]  Wounds:    CODE STATUS:  Full Code                       GOALS OF CARE:  aggressive                      DISPOSITION:  ICU  NIHSS 6 ICH 2

## 2021-07-31 LAB
ANION GAP SERPL CALC-SCNC: 10 MMOL/L — SIGNIFICANT CHANGE UP (ref 5–17)
ANION GAP SERPL CALC-SCNC: 7 MMOL/L — SIGNIFICANT CHANGE UP (ref 5–17)
BUN SERPL-MCNC: 15 MG/DL — SIGNIFICANT CHANGE UP (ref 7–23)
BUN SERPL-MCNC: 17 MG/DL — SIGNIFICANT CHANGE UP (ref 7–23)
CALCIUM SERPL-MCNC: 8.6 MG/DL — SIGNIFICANT CHANGE UP (ref 8.4–10.5)
CALCIUM SERPL-MCNC: 8.7 MG/DL — SIGNIFICANT CHANGE UP (ref 8.4–10.5)
CHLORIDE SERPL-SCNC: 112 MMOL/L — HIGH (ref 96–108)
CHLORIDE SERPL-SCNC: 113 MMOL/L — HIGH (ref 96–108)
CK MB CFR SERPL CALC: 1.5 NG/ML — SIGNIFICANT CHANGE UP (ref 0–6.7)
CK SERPL-CCNC: 157 U/L — SIGNIFICANT CHANGE UP (ref 30–200)
CO2 SERPL-SCNC: 26 MMOL/L — SIGNIFICANT CHANGE UP (ref 22–31)
CO2 SERPL-SCNC: 27 MMOL/L — SIGNIFICANT CHANGE UP (ref 22–31)
CREAT SERPL-MCNC: 1.04 MG/DL — SIGNIFICANT CHANGE UP (ref 0.5–1.3)
CREAT SERPL-MCNC: 1.12 MG/DL — SIGNIFICANT CHANGE UP (ref 0.5–1.3)
GLUCOSE BLDC GLUCOMTR-MCNC: 104 MG/DL — HIGH (ref 70–99)
GLUCOSE BLDC GLUCOMTR-MCNC: 110 MG/DL — HIGH (ref 70–99)
GLUCOSE BLDC GLUCOMTR-MCNC: 118 MG/DL — HIGH (ref 70–99)
GLUCOSE SERPL-MCNC: 116 MG/DL — HIGH (ref 70–99)
GLUCOSE SERPL-MCNC: 131 MG/DL — HIGH (ref 70–99)
HCT VFR BLD CALC: 42 % — SIGNIFICANT CHANGE UP (ref 39–50)
HGB BLD-MCNC: 13.5 G/DL — SIGNIFICANT CHANGE UP (ref 13–17)
MAGNESIUM SERPL-MCNC: 1.6 MG/DL — SIGNIFICANT CHANGE UP (ref 1.6–2.6)
MAGNESIUM SERPL-MCNC: 1.8 MG/DL — SIGNIFICANT CHANGE UP (ref 1.6–2.6)
MCHC RBC-ENTMCNC: 29.3 PG — SIGNIFICANT CHANGE UP (ref 27–34)
MCHC RBC-ENTMCNC: 32.1 GM/DL — SIGNIFICANT CHANGE UP (ref 32–36)
MCV RBC AUTO: 91.3 FL — SIGNIFICANT CHANGE UP (ref 80–100)
NRBC # BLD: 0 /100 WBCS — SIGNIFICANT CHANGE UP (ref 0–0)
PHOSPHATE SERPL-MCNC: 1.8 MG/DL — LOW (ref 2.5–4.5)
PHOSPHATE SERPL-MCNC: 2.5 MG/DL — SIGNIFICANT CHANGE UP (ref 2.5–4.5)
PLATELET # BLD AUTO: 153 K/UL — SIGNIFICANT CHANGE UP (ref 150–400)
POTASSIUM SERPL-MCNC: 3.4 MMOL/L — LOW (ref 3.5–5.3)
POTASSIUM SERPL-MCNC: 3.4 MMOL/L — LOW (ref 3.5–5.3)
POTASSIUM SERPL-SCNC: 3.4 MMOL/L — LOW (ref 3.5–5.3)
POTASSIUM SERPL-SCNC: 3.4 MMOL/L — LOW (ref 3.5–5.3)
RBC # BLD: 4.6 M/UL — SIGNIFICANT CHANGE UP (ref 4.2–5.8)
RBC # FLD: 13.3 % — SIGNIFICANT CHANGE UP (ref 10.3–14.5)
SODIUM SERPL-SCNC: 147 MMOL/L — HIGH (ref 135–145)
SODIUM SERPL-SCNC: 148 MMOL/L — HIGH (ref 135–145)
TROPONIN T SERPL-MCNC: 0.1 NG/ML — CRITICAL HIGH (ref 0–0.01)
TROPONIN T SERPL-MCNC: 0.13 NG/ML — CRITICAL HIGH (ref 0–0.01)
VANCOMYCIN TROUGH SERPL-MCNC: 14.5 UG/ML — SIGNIFICANT CHANGE UP (ref 10–20)
WBC # BLD: 13.57 K/UL — HIGH (ref 3.8–10.5)
WBC # FLD AUTO: 13.57 K/UL — HIGH (ref 3.8–10.5)

## 2021-07-31 PROCEDURE — 99024 POSTOP FOLLOW-UP VISIT: CPT

## 2021-07-31 PROCEDURE — 99291 CRITICAL CARE FIRST HOUR: CPT

## 2021-07-31 PROCEDURE — 71045 X-RAY EXAM CHEST 1 VIEW: CPT | Mod: 26

## 2021-07-31 PROCEDURE — 36415 COLL VENOUS BLD VENIPUNCTURE: CPT

## 2021-07-31 RX ORDER — METOPROLOL TARTRATE 50 MG
2.5 TABLET ORAL EVERY 6 HOURS
Refills: 0 | Status: DISCONTINUED | OUTPATIENT
Start: 2021-07-31 | End: 2021-07-31

## 2021-07-31 RX ORDER — POTASSIUM CHLORIDE 20 MEQ
10 PACKET (EA) ORAL
Refills: 0 | Status: DISCONTINUED | OUTPATIENT
Start: 2021-07-31 | End: 2021-07-31

## 2021-07-31 RX ORDER — ACETAMINOPHEN 500 MG
1000 TABLET ORAL ONCE
Refills: 0 | Status: COMPLETED | OUTPATIENT
Start: 2021-07-31 | End: 2021-07-31

## 2021-07-31 RX ORDER — HEPARIN SODIUM 5000 [USP'U]/ML
5000 INJECTION INTRAVENOUS; SUBCUTANEOUS EVERY 8 HOURS
Refills: 0 | Status: DISCONTINUED | OUTPATIENT
Start: 2021-07-31 | End: 2021-08-10

## 2021-07-31 RX ORDER — FUROSEMIDE 40 MG
40 TABLET ORAL ONCE
Refills: 0 | Status: COMPLETED | OUTPATIENT
Start: 2021-07-31 | End: 2021-07-31

## 2021-07-31 RX ORDER — MAGNESIUM SULFATE 500 MG/ML
2 VIAL (ML) INJECTION
Refills: 0 | Status: COMPLETED | OUTPATIENT
Start: 2021-07-31 | End: 2021-07-31

## 2021-07-31 RX ORDER — POTASSIUM PHOSPHATE, MONOBASIC POTASSIUM PHOSPHATE, DIBASIC 236; 224 MG/ML; MG/ML
30 INJECTION, SOLUTION INTRAVENOUS ONCE
Refills: 0 | Status: COMPLETED | OUTPATIENT
Start: 2021-07-31 | End: 2021-07-31

## 2021-07-31 RX ORDER — QUETIAPINE FUMARATE 200 MG/1
25 TABLET, FILM COATED ORAL EVERY 12 HOURS
Refills: 0 | Status: DISCONTINUED | OUTPATIENT
Start: 2021-07-31 | End: 2021-07-31

## 2021-07-31 RX ORDER — POTASSIUM CHLORIDE 20 MEQ
20 PACKET (EA) ORAL
Refills: 0 | Status: COMPLETED | OUTPATIENT
Start: 2021-07-31 | End: 2021-07-31

## 2021-07-31 RX ORDER — PANTOPRAZOLE SODIUM 20 MG/1
40 TABLET, DELAYED RELEASE ORAL DAILY
Refills: 0 | Status: DISCONTINUED | OUTPATIENT
Start: 2021-07-31 | End: 2021-07-31

## 2021-07-31 RX ORDER — METOPROLOL TARTRATE 50 MG
12.5 TABLET ORAL ONCE
Refills: 0 | Status: COMPLETED | OUTPATIENT
Start: 2021-07-31 | End: 2021-07-31

## 2021-07-31 RX ORDER — POTASSIUM PHOSPHATE, MONOBASIC POTASSIUM PHOSPHATE, DIBASIC 236; 224 MG/ML; MG/ML
30 INJECTION, SOLUTION INTRAVENOUS ONCE
Refills: 0 | Status: DISCONTINUED | OUTPATIENT
Start: 2021-07-31 | End: 2021-07-31

## 2021-07-31 RX ORDER — POTASSIUM CHLORIDE 20 MEQ
10 PACKET (EA) ORAL
Refills: 0 | Status: COMPLETED | OUTPATIENT
Start: 2021-07-31 | End: 2021-07-31

## 2021-07-31 RX ORDER — ATORVASTATIN CALCIUM 80 MG/1
40 TABLET, FILM COATED ORAL AT BEDTIME
Refills: 0 | Status: DISCONTINUED | OUTPATIENT
Start: 2021-07-31 | End: 2021-08-12

## 2021-07-31 RX ORDER — ACETAMINOPHEN 500 MG
650 TABLET ORAL EVERY 6 HOURS
Refills: 0 | Status: DISCONTINUED | OUTPATIENT
Start: 2021-07-31 | End: 2021-08-12

## 2021-07-31 RX ORDER — METOPROLOL TARTRATE 50 MG
12.5 TABLET ORAL EVERY 12 HOURS
Refills: 0 | Status: DISCONTINUED | OUTPATIENT
Start: 2021-07-31 | End: 2021-07-31

## 2021-07-31 RX ORDER — METOPROLOL TARTRATE 50 MG
2.5 TABLET ORAL EVERY 6 HOURS
Refills: 0 | Status: DISCONTINUED | OUTPATIENT
Start: 2021-07-31 | End: 2021-08-01

## 2021-07-31 RX ORDER — POTASSIUM CHLORIDE 20 MEQ
40 PACKET (EA) ORAL EVERY 4 HOURS
Refills: 0 | Status: COMPLETED | OUTPATIENT
Start: 2021-07-31 | End: 2021-07-31

## 2021-07-31 RX ORDER — SENNA PLUS 8.6 MG/1
2 TABLET ORAL AT BEDTIME
Refills: 0 | Status: DISCONTINUED | OUTPATIENT
Start: 2021-07-31 | End: 2021-08-12

## 2021-07-31 RX ADMIN — Medication 50 MILLIEQUIVALENT(S): at 17:31

## 2021-07-31 RX ADMIN — Medication 3 MILLILITER(S): at 15:54

## 2021-07-31 RX ADMIN — HEPARIN SODIUM 5000 UNIT(S): 5000 INJECTION INTRAVENOUS; SUBCUTANEOUS at 14:51

## 2021-07-31 RX ADMIN — Medication 40 MILLIEQUIVALENT(S): at 09:42

## 2021-07-31 RX ADMIN — Medication 50 MILLIEQUIVALENT(S): at 22:00

## 2021-07-31 RX ADMIN — Medication 300 MILLIGRAM(S): at 17:31

## 2021-07-31 RX ADMIN — Medication 40 MILLIGRAM(S): at 09:42

## 2021-07-31 RX ADMIN — Medication 3 MILLILITER(S): at 09:17

## 2021-07-31 RX ADMIN — Medication 3 MILLILITER(S): at 21:12

## 2021-07-31 RX ADMIN — Medication 1000 MILLIGRAM(S): at 10:35

## 2021-07-31 RX ADMIN — Medication 40 MILLIEQUIVALENT(S): at 07:03

## 2021-07-31 RX ADMIN — PIPERACILLIN AND TAZOBACTAM 200 GRAM(S): 4; .5 INJECTION, POWDER, LYOPHILIZED, FOR SOLUTION INTRAVENOUS at 11:29

## 2021-07-31 RX ADMIN — DEXMEDETOMIDINE HYDROCHLORIDE IN 0.9% SODIUM CHLORIDE 5.15 MICROGRAM(S)/KG/HR: 4 INJECTION INTRAVENOUS at 12:39

## 2021-07-31 RX ADMIN — PIPERACILLIN AND TAZOBACTAM 200 GRAM(S): 4; .5 INJECTION, POWDER, LYOPHILIZED, FOR SOLUTION INTRAVENOUS at 00:05

## 2021-07-31 RX ADMIN — Medication 3 MILLILITER(S): at 04:31

## 2021-07-31 RX ADMIN — PIPERACILLIN AND TAZOBACTAM 200 GRAM(S): 4; .5 INJECTION, POWDER, LYOPHILIZED, FOR SOLUTION INTRAVENOUS at 05:39

## 2021-07-31 RX ADMIN — Medication 100 MILLIEQUIVALENT(S): at 12:40

## 2021-07-31 RX ADMIN — Medication 400 MILLIGRAM(S): at 10:05

## 2021-07-31 RX ADMIN — HEPARIN SODIUM 5000 UNIT(S): 5000 INJECTION INTRAVENOUS; SUBCUTANEOUS at 21:59

## 2021-07-31 RX ADMIN — NICARDIPINE HYDROCHLORIDE 25 MG/HR: 30 CAPSULE, EXTENDED RELEASE ORAL at 10:05

## 2021-07-31 RX ADMIN — Medication 2.5 MILLIGRAM(S): at 22:01

## 2021-07-31 RX ADMIN — Medication 100 MILLIEQUIVALENT(S): at 12:00

## 2021-07-31 RX ADMIN — Medication 100 MILLIEQUIVALENT(S): at 14:50

## 2021-07-31 RX ADMIN — NICARDIPINE HYDROCHLORIDE 25 MG/HR: 30 CAPSULE, EXTENDED RELEASE ORAL at 08:04

## 2021-07-31 RX ADMIN — Medication 2.5 MILLIGRAM(S): at 15:01

## 2021-07-31 RX ADMIN — Medication 300 MILLIGRAM(S): at 06:58

## 2021-07-31 RX ADMIN — Medication 50 GRAM(S): at 15:52

## 2021-07-31 RX ADMIN — DEXMEDETOMIDINE HYDROCHLORIDE IN 0.9% SODIUM CHLORIDE 5.15 MICROGRAM(S)/KG/HR: 4 INJECTION INTRAVENOUS at 07:03

## 2021-07-31 RX ADMIN — PIPERACILLIN AND TAZOBACTAM 200 GRAM(S): 4; .5 INJECTION, POWDER, LYOPHILIZED, FOR SOLUTION INTRAVENOUS at 17:30

## 2021-07-31 RX ADMIN — Medication 50 GRAM(S): at 16:47

## 2021-07-31 RX ADMIN — Medication 2.5 MILLIGRAM(S): at 10:17

## 2021-07-31 RX ADMIN — DEXMEDETOMIDINE HYDROCHLORIDE IN 0.9% SODIUM CHLORIDE 5.15 MICROGRAM(S)/KG/HR: 4 INJECTION INTRAVENOUS at 15:00

## 2021-07-31 RX ADMIN — ATORVASTATIN CALCIUM 40 MILLIGRAM(S): 80 TABLET, FILM COATED ORAL at 22:00

## 2021-07-31 RX ADMIN — CHLORHEXIDINE GLUCONATE 1 APPLICATION(S): 213 SOLUTION TOPICAL at 05:40

## 2021-07-31 RX ADMIN — SENNA PLUS 2 TABLET(S): 8.6 TABLET ORAL at 21:59

## 2021-07-31 RX ADMIN — POTASSIUM PHOSPHATE, MONOBASIC POTASSIUM PHOSPHATE, DIBASIC 62.5 MILLIMOLE(S): 236; 224 INJECTION, SOLUTION INTRAVENOUS at 09:42

## 2021-07-31 RX ADMIN — Medication 50 MILLIEQUIVALENT(S): at 16:47

## 2021-07-31 NOTE — PROGRESS NOTE ADULT - SUBJECTIVE AND OBJECTIVE BOX
HPI: 46 M pt with PMHx of HTN, noncompliant with meds (Metoprolol, Nifedipine, and ASA), reports no recent use of ASA or metoprolol, presents to ED c/o acute onset and worsening dizziness, R arm paresthesia, and vomiting x 1hr prior to arrival while at work. Pt somnolent in ED, difficulty obtaining hx from pt. Per EMS, pt had SBP in the 200s, given nitro SL x 2 in field. Patient found to have a left side cerebellar hemmorrhage and left pontine hemmorrhage with extension to the subarachnoid space. Patient was started on a nicardipene drip in the ED for SBP to 230's, given manitol 50g and intubated for concern of potential for aspiration with declining exam. Patient reported that he lives alone and has no close contacts in the area.     NIHSS 6  ICH 2   (2021 20:40)      Hospital Course:   : Admitted with left cerebellar ICH. Intubated in ED for lethargy and concern for airway protection. Repeat CTH stable.  : POD# 0 S/P femoral cerebral angiogram, findings of left vertebral artery irregularity at level below PICA with segment suggestive of intimal flap, left vertebral artery sacrifice performed. Hypertensive episode during angio case, Xper CT shows stable ICH. Pt seen and examined at bedside in NSICU postop. Pt agitated and reaching for ETT. Brief CPAP trial given, and Pt was successfully extubated. Patient then with many blood tinged secretions and CXR with infiltrated. Patient was reintubated. He then self-extubated and was emergently reintubated, placed in restraints now sedated on precedex and fentanyl. Received 1LNS bolus for hypotension and was also started on levophed for SBPs in 70s.   : POD1 cerebral angiogram with left vertebral takedown, OLESYA overnight, neuro stable remains sedated on precedex and intubated on full vent support. Repeat CTH stable. Received 40 of lasix, increased PEEP from 7 to 8 and tapering off fentanyl drip. Self extubated and doing well on high flow NC, Kept on Precedex for agitation, started on Cardene for 's, will wean as appropriate.   : POD2 cerebral angio with L verterbal takedown. S/p Seroquel 25 and Precedex overnight for agitation, otherwise OLESYA. On Cardene gtt. Neuro stable. O2 sat stable on HFNC. S/p 30 Lasix.         Vital Signs Last 24 Hrs  T(C): 37.8 (2021 01:19), Max: 39.4 (2021 07:00)  T(F): 100 (2021 01:19), Max: 103 (2021 07:00)  HR: 75 (2021 02:00) (56 - 90)  BP: 148/98 (2021 02:00) (112/68 - 192/111)  BP(mean): 119 (:00) (84 - 142)  RR: 15 (:00) (11 - 29)  SpO2: 100% (2021 02:00) (96% - 100%)    I&O's Detail    2021 07:01  -  2021 07:00  --------------------------------------------------------  IN:    Dexmedetomidine: 38.5 mL    Dexmedetomidine: 83 mL    FentaNYL: 64 mL    FentaNYL: 18.6 mL    IV PiggyBack: 1250 mL    Norepinephrine: 41 mL    Propofol: 17.3 mL    Propofol: 34 mL    sodium chloride 0.9%: 1300 mL    sodium chloride 0.9%: 450 mL  Total IN: 3296.4 mL    OUT:    Indwelling Catheter - Urethral (mL): 1945 mL    NiCARdipine: 0 mL  Total OUT: 1945 mL    Total NET: 1351.4 mL      2021 07:01  -  2021 02:59  --------------------------------------------------------  IN:    Dexmedetomidine: 175.3 mL    FentaNYL: 38.5 mL    IV PiggyBack: 450 mL    Jevity 1.2: 80 mL    NiCARdipine: 80 mL    sodium chloride 0.9%: 150 mL  Total IN: 973.8 mL    OUT:    Indwelling Catheter - Urethral (mL): 305 mL    Intermittent Catheterization - Urethral (mL): 175 mL    Voided (mL): 1300 mL  Total OUT: 1780 mL    Total NET: -806.2 mL        I&O's Summary    2021 07:01  -  2021 07:00  --------------------------------------------------------  IN: 3296.4 mL / OUT: 1945 mL / NET: 1351.4 mL    2021 07:01  -  2021 02:59  --------------------------------------------------------  IN: 973.8 mL / OUT: 1780 mL / NET: -806.2 mL        PHYSICAL EXAM:  Constitutional: NAD, well groomed, well nourished  Respiratory: breathing non-labored, symmetrical chest wall movement  Cardiovascuar: RRR, no murmurs  Gastrointestinal: abdomen soft, non tender  Neurological:  AAOX3. Face symmetric, Verbal function intact, speech mildly dysarthric but comprehendible   Cranial Nerves: II-XII intact  Motor: 5/5 power in b/l upper extremities and lower extremities  Sensation: intact to light touch in all extremities  Pronator Drift: none  Dysmetria: none  Extremities: distal pulses 2+ x4  Wound/incision: angio puncture site C/D/I    TUBES/LINES:  [] CVC  [x] A-line  [] Lumbar Drain  [] Ventriculostomy  [] Other    DIET:  [x] NPO  [] Mechanical  [] Tube feeds    LABS:                        13.9   14.77 )-----------( 168      ( 2021 05:36 )             43.3         147<H>  |  116<H>  |  16  ----------------------------<  133<H>  3.8   |  23  |  1.42<H>    Ca    8.6      2021 05:36  Phos  3.2     07-30  Mg     1.8     07-30      PT/INR - ( 2021 05:36 )   PT: 13.5 sec;   INR: 1.13          PTT - ( 2021 05:36 )  PTT:29.9 sec  Urinalysis Basic - ( 2021 18:26 )    Color: Yellow / Appearance: Clear / S.025 / pH: x  Gluc: x / Ketone: Trace mg/dL  / Bili: Negative / Urobili: 0.2 E.U./dL   Blood: x / Protein: Trace mg/dL / Nitrite: NEGATIVE   Leuk Esterase: NEGATIVE / RBC: > 10 /HPF / WBC < 5 /HPF   Sq Epi: x / Non Sq Epi: 0-5 /HPF / Bacteria: Present /HPF          CAPILLARY BLOOD GLUCOSE      POCT Blood Glucose.: 114 mg/dL (2021 23:38)  POCT Blood Glucose.: 128 mg/dL (2021 19:48)  POCT Blood Glucose.: 134 mg/dL (2021 17:14)  POCT Blood Glucose.: 131 mg/dL (2021 11:06)  POCT Blood Glucose.: 115 mg/dL (2021 07:35)      Drug Levels: [] N/A  Vancomycin Level, Trough: 11.0 ug/mL ( @ 05:36)    CSF Analysis: [] N/A      Allergies    No Known Allergies    Intolerances      MEDICATIONS:  Antibiotics:  piperacillin/tazobactam IVPB.. 3.375 Gram(s) IV Intermittent every 6 hours  vancomycin  IVPB 1500 milliGRAM(s) IV Intermittent every 12 hours    Neuro:  acetaminophen   Tablet .. 650 milliGRAM(s) Oral every 6 hours PRN  acetaminophen  Suppository .. 650 milliGRAM(s) Rectal every 6 hours PRN  dexMEDEtomidine Infusion 0.2 MICROgram(s)/kG/Hr IV Continuous <Continuous>  fentaNYL    Injectable 25 MICROGram(s) IV Push every 2 hours PRN  ondansetron Injectable 4 milliGRAM(s) IV Push every 6 hours PRN    Anticoagulation:    OTHER:  albuterol/ipratropium for Nebulization 3 milliLiter(s) Nebulizer every 6 hours  atorvastatin 40 milliGRAM(s) Oral at bedtime  chlorhexidine 2% Cloths 1 Application(s) Topical daily  dextrose 50% Injectable 25 Gram(s) IV Push once  glucagon  Injectable 1 milliGRAM(s) IntraMuscular once  insulin lispro (ADMELOG) corrective regimen sliding scale   SubCutaneous every 6 hours  metoprolol tartrate 12.5 milliGRAM(s) Oral every 12 hours  niCARdipine Infusion 5 mG/Hr IV Continuous <Continuous>  pantoprazole   Suspension 40 milliGRAM(s) Oral daily  senna 2 Tablet(s) Oral at bedtime    IVF:  multivitamin 1 Tablet(s) Oral daily    CULTURES:  Culture Results:   Sputum specimen rejected.  Microscopic examination indicates  oropharyngeal contamination.  Please repeat. ( @ 14:46)  Culture Results:   No growth at 1 day. ( @ 21:27)    RADIOLOGY & ADDITIONAL TESTS:      ASSESSMENT:  ASSESSMENT:  46 M PMH HTN, non-compliant on home meds nifedipine, ASA 81, and metoprolol presented with 2 hr onset gradual worsening R UE paresthesia, weakness, dizziness, N&V found to have an Acute left cerebellar intraparenchymal hemorrhage and left pontine hemorrhage with extension into the subarachnoid space. Pt is now s/p  femoral cerebral angiogram, findings of left vertebral artery irregularity at level below PICA with segment suggestive of intimal flap, left vertebral artery sacrifice performed (2021), post op was extubated, needed to be reintubated for secretion burden and unable to protect airway, patient self-extubated X 2, now on high flow NC, satting well.    CHEST PAIN    No pertinent family history in first degree relatives    Handoff    MEWS Score    HTN (hypertension)    HTN (hypertension)    SAH (subarachnoid hemorrhage)    Dissection of cerebral artery    SAH (subarachnoid hemorrhage)    Dissection of cerebral artery    Cerebellar bleed    Angiogram, blood vessel, cerebral, with embolization    No significant past surgical history    CHEST PAIN    [email or web address]        Plan:  Neuro:   - Neuro checks/vital checks/groin/vascular checks q1hr  - Tylenol prn for pain control  - Precedex for agigtation   - Repeat CTH  wet read stable f/u final read     CV:   - -160, on cardene gtt  - TTE- hypertrophic cardiomyopathy with severe left ventricular outflow tract obstruction and moderate mitral regurg   - Careful fluid balance  - metoprolol 12.5mg BID started     Pulm:  - Extubated , on high flow nasal cannula  - blood tinged sputum  - Repeat CXR  - Pendng repeat sputum culture    Renal:   - Na goal 140-150  -  Replete electrolytes PRN    GI:  - bowel reg  - NPO overnight s/p self extubation, passed bedside dysphagia  - PPI while intubated    Endo:   - ISS    Heme:   - SCD's   - trend CBC  - f/u LE doppler  neg    ID:   - afebrile, trend WBC    Disposition: ICU status, full code, dispo pending, family updated with plan.    Plan d/w Dr. D'Amico and Dr. Oneill      FALL RISK:  [] Low Risk                                    [] Impulsive    DISPOSITION:       Assessment:  Present when checked    []  GCS  E   V  M     Heart Failure: []Acute, [] acute on chronic , []chronic  Heart Failure:  [] Diastolic (HFpEF), [] Systolic (HFrEF), []Combined (HFpEF and HFrEF), [] RHF, [] Pulm HTN, [] Other    [] TATIANA, [] ATN, [] AIN, [] other  [] CKD1, [] CKD2, [] CKD 3, [] CKD 4, [] CKD 5, []ESRD    Encephalopathy: [] Metabolic, [] Hepatic, [] toxic, [] Neurological, [] Other    Abnormal Nurtitional Status: [] malnurtition (see nutrition note), [ ]underweight: BMI < 19, [] morbid obesity: BMI >40, [] Cachexia    [] Sepsis  [] hypovolemic shock,[] cardiogenic shock, [] hemorrhagic shock, [] neuogenic shock  [] Acute Respiratory Failure  []Cerebral edema, [] Brain compression/ herniation,   [] Functional quadriplegia  [] Acute blood loss anemia

## 2021-07-31 NOTE — PROGRESS NOTE ADULT - ASSESSMENT
46y/M with  L cerebellar hematoma, VA dissection, brain compression, IVH  HTN (hypertension)  elevated creatinine  HOCM    PLAN:   NEURO: neurochecks q1h, PRN pain meds with acetaminophen, fentanyl drip, continue Dexmedetomidine to RASS of 0 to -1  angio, VA takedown;  BP control, MRI on stepdown  REHAB:  physical therapy evaluation and management    EARLY MOB:  bed rest    PULM:  intubated - possible extubation; full vent support 10  60% 12 - repeat ABG  CARDIO:  SBP goal 100-140mm Hg, [place]; start metoprolol 12.5 BID with hold parameters  and HR 60  ENDO:  Blood sugar goals 140-180 mg/dL, continue insulin sliding scale  GI:  PPI for GI prophylaxis  DIET: tube feeds to start, insert NGT  RENAL:  140-150, d/c Farias, d/c IVF  HEM/ONC: Hb stable  VTE Prophylaxis: SCDs, start SQH if cleared by NS  ID: febrile, leukocytosis; piperacillin/tazobactam vancomycin (last day 08/03), vanc trough prior to 4th dose  Social: sister is [facility] [place] update      ATTENDING ATTESTATION:  I was physically present for the key portions of the evaluation and management (E/M) service provided.  I agree with the above history, physical and plan, which I have reviewed and edited where appropriate.    Patient at high risk for neurological deterioration or death due to:  ICU delirium, aspiration PNA, DVT / PE.  Critical care time:  I have personally provided 45 minutes of critical care time, excluding time spent on separate procedures.      Plan discussed with RN, house staff. 46y/M with  L cerebellar hematoma, VA dissection, brain compression, IVH  HTN (hypertension)  elevated creatinine  HOCM    PLAN:   NEURO: neurochecks q1h, PRN pain meds with acetaminophen, continue Dexmedetomidine to RASS of 0 to -1  angio, VA takedown;  BP control, MRI on stepdown  agitation - likely delirium + congestion - seroquel 25 BID  REHAB:  physical therapy evaluation and management    EARLY MOB:  bed rest    PULM:  continue high flow, standing duonebs  CARDIO:  SBP goal 100-160mm Hg, [place]; continue metoprolol 12.5 BID with hold parameters  and HR 60; taper cardene off, EKG  ENDO:  Blood sugar goals 140-180 mg/dL, continue insulin sliding scale  GI:  d/c PPI for GI prophylaxis  DIET: start diet  RENAL:  140-150; furosemide 40mg IV x1, CXR in AM  HEM/ONC: Hb stable  VTE Prophylaxis: SCDs, start SQH if cleared by NS  ID: febrile, leukocytosis; piperacillin/tazobactam vancomycin (last day 08/03), vanc trough prior to 4th dose  Social: sister is [facility] [place] update      ATTENDING ATTESTATION:  I was physically present for the key portions of the evaluation and management (E/M) service provided.  I agree with the above history, physical and plan, which I have reviewed and edited where appropriate.    Patient at high risk for neurological deterioration or death due to:  ICU delirium, aspiration PNA, DVT / PE.  Critical care time:  I have personally provided 45 minutes of critical care time, excluding time spent on separate procedures.      Plan discussed with RN, house staff.

## 2021-07-31 NOTE — PROGRESS NOTE ADULT - SUBJECTIVE AND OBJECTIVE BOX
=================================  NEUROCRITICAL CARE ATTENDING NOTE  =================================    CRISTI JENNINGS   MRN-[id number]  Summary:  46y/M  with PMHx of HTN, noncompliant with meds (Metoprolol, Nifedipine, and ASA), reports no recent use of ASA or metoprolol, presents to ED c/o acute onset and worsening dizziness, R arm paresthesia, and vomiting x 1hr prior to arrival while at work. Pt somnolent in ED, difficulty obtaining hx from pt. Per EMS, pt had SBP in the 200s, given nitro SL x 2 in field. Patient found to have a left side cerebellar hemmorrhage and left pontine hemmorrhage with extension to the subarachnoid space. Patient was started on a nicardipene drip in the ED for SBP to 230's, given manitol 50g and intubated for concern of potential for aspiration with declining exam. Patient reported that he lives alone and has no close contacts in the area.  (28 Jul 2021 20:40)    COURSE IN THE HOSPITAL:  07/28 intubated  07/29 remained intubated - s/p angio, vertebral artery takedown, extubated, desaturated, reintubated, self-extubated, reintubated  07/30 Tmax 39.4, remained intubated, CT overnight; given lasix overnight for pulmo congestion, SBP dropped with propofol, started on levophed, switched to precedex; self-extubated, on NIV  07/31 tmax 38.4 No significant events overnight.     Past Medical History: HTN (hypertension)  Allergies:  No Known Allergies  Home meds:     PHYSICAL EXAMINATION  T(C): 37.6 (07-31 @ 04:56), Max: 38.4 (07-30 @ 08:00) HR: 79 (07-31 @ 07:00) (56 - 90) BP: 129/82 (07-31 @ 07:00) (112/68 - 192/111) RR: 19 (07-31 @ 07:00) (11 - 29) SpO2: 95% (07-31 @ 07:00) (95% - 100%)  NEUROLOGIC EXAMINATION:  Patient is   GENERAL:    EENT:  anicteric  CARDIOVASCULAR: (+) S1 S2, normal rate and regular rhythm  PULMONARY: clear to auscultation bilaterally  ABDOMEN: soft, nontender with normoactive bowel sounds  EXTREMITIES: no edema  SKIN: no rash    LABS: 07-31  CAPILLARY BLOOD GLUCOSE 118 114 128 134 131 115     (14.77)   13.5   13.57 )-----------( 153      ( 31 Jul 2021 03:16 )             42.0   (147)  147<H>  |  113<H>  |  17  ----------------------------<  131<H>  3.4<L>   |  27  |  1.12 (1.42)    Ca    8.7      31 Jul 2021 03:16  Phos  1.8     07-31  Mg     1.8     07-31 07-30 @ 07:01  -  07-31 @ 07:00  IN: 1734.8 mL / OUT: 2880 mL / NET: -1145.2 mL    7.37/48/341/28  7.43/40/133/26 - 100%    Bacteriology:  CSF studies:  EEG:  Neuroimaging:  Other imaging:    MEDICATIONS: 07-31    ·	piperacillin/tazobactam IVPB.. 3.375 IV Intermittent every 6 hours  ·	vancomycin  IVPB 1500 IV Intermittent every 12 hours  ·	metoprolol tartrate 12.5 milliGRAM(s) Oral every 12 hours  ·	albuterol/ipratropium for Nebulization 3 Nebulizer every 6 hours  ·	pantoprazole   Suspension 40 Oral daily  ·	senna 2 Oral at bedtime  ·	atorvastatin 40 Oral at bedtime  ·	insulin lispro (ADMELOG) corrective regimen sliding scale  SubCutaneous every 6 hours  ·	multivitamin 1 Oral daily  ·	acetaminophen   Tablet .. 650 Oral every 6 hours PRN  ·	acetaminophen  Suppository .. 650 Rectal every 6 hours PRN  ·	fentaNYL    Injectable 25 IV Push every 2 hours PRN  ·	ondansetron Injectable 4 IV Push every 6 hours PRN    IV FLUIDS: NS@50cc/hr  DRIPS:  ·	dexMEDEtomidine Infusion 0.2 IV Continuous <Continuous> 0.2  ·	niCARdipine Infusion 5 mG/Hr IV Continuous <Continuous>  DIET: NPO  Lines: Steffanie  Drains:  Farias  Wounds:    CODE STATUS:  Full Code                       GOALS OF CARE:  aggressive                      DISPOSITION:  ICU  NIHSS 6 ICH 2 =================================  NEUROCRITICAL CARE ATTENDING NOTE  =================================    CRISTI JENNINGS   MRN-[id number]  Summary:  46y/M  with PMHx of HTN, noncompliant with meds (Metoprolol, Nifedipine, and ASA), reports no recent use of ASA or metoprolol, presents to ED c/o acute onset and worsening dizziness, R arm paresthesia, and vomiting x 1hr prior to arrival while at work. Pt somnolent in ED, difficulty obtaining hx from pt. Per EMS, pt had SBP in the 200s, given nitro SL x 2 in field. Patient found to have a left side cerebellar hemmorrhage and left pontine hemmorrhage with extension to the subarachnoid space. Patient was started on a nicardipene drip in the ED for SBP to 230's, given manitol 50g and intubated for concern of potential for aspiration with declining exam. Patient reported that he lives alone and has no close contacts in the area.  (28 Jul 2021 20:40)    COURSE IN THE HOSPITAL:  07/28 intubated  07/29 remained intubated - s/p angio, vertebral artery takedown, extubated, desaturated, reintubated, self-extubated, reintubated  07/30 Tmax 39.4, remained intubated, CT overnight; given lasix overnight for pulmo congestion, SBP dropped with propofol, started on levophed, switched to precedex; self-extubated, on NIV  07/31 tmax 38.4 Agitated overnight.    Past Medical History: HTN (hypertension)  Allergies:  No Known Allergies  Home meds:     PHYSICAL EXAMINATION  T(C): 37.6 (07-31 @ 04:56), Max: 38.4 (07-30 @ 08:00) HR: 79 (07-31 @ 07:00) (56 - 90) BP: 129/82 (07-31 @ 07:00) (112/68 - 192/111) RR: 19 (07-31 @ 07:00) (11 - 29) SpO2: 95% (07-31 @ 07:00) (95% - 100%)  NEUROLOGIC EXAMINATION:  Patient is lethargic, easily rousable, moves all 4s, DEEDEE, oriented x3  GENERAL:  high flow O2 40% 40LPM  EENT:  anicteric  CARDIOVASCULAR: (+) S1 S2, normal rate and regular rhythm  PULMONARY: diminished  ABDOMEN: soft, nontender with normoactive bowel sounds  EXTREMITIES: no edema  SKIN: no rash    LABS: 07-31  CAPILLARY BLOOD GLUCOSE 118 114 128 134 131 115     (14.77)   13.5   13.57 )-----------( 153      ( 31 Jul 2021 03:16 )             42.0   (147)  147<H>  |  113<H>  |  17  ----------------------------<  131<H>  3.4<L>   |  27  |  1.12 (1.42)    Ca    8.7      31 Jul 2021 03:16  Phos  1.8     07-31  Mg     1.8     07-31 07-30 @ 07:01  -  07-31 @ 07:00  IN: 1734.8 mL / OUT: 2880 mL / NET: -1145.2 mL    7.37/48/341/28  7.43/40/133/26 - 100%    Bacteriology:  07/30 sputum: oropharyngeal contamination  07/29 Blood CS NG1D x2    Culture - Blood (collected 07-29)  CSF studies:  EEG:  Neuroimaging:  Other imaging:    MEDICATIONS: 07-31    ·	piperacillin/tazobactam IVPB.. 3.375 IV Intermittent every 6 hours  ·	vancomycin  IVPB 1500 IV Intermittent every 12 hours  ·	metoprolol tartrate 12.5 milliGRAM(s) Oral every 12 hours  ·	albuterol/ipratropium for Nebulization 3 Nebulizer every 6 hours  ·	pantoprazole   Suspension 40 Oral daily  ·	senna 2 Oral at bedtime  ·	atorvastatin 40 Oral at bedtime  ·	insulin lispro (ADMELOG) corrective regimen sliding scale  SubCutaneous every 6 hours  ·	multivitamin 1 Oral daily  ·	acetaminophen   Tablet .. 650 Oral every 6 hours PRN  ·	acetaminophen  Suppository .. 650 Rectal every 6 hours PRN  ·	fentaNYL    Injectable 25 IV Push every 2 hours PRN  ·	ondansetron Injectable 4 IV Push every 6 hours PRN    IV FLUIDS: NS@50cc/hr  DRIPS:  ·	dexMEDEtomidine Infusion 0.2 IV Continuous <Continuous> 0.2  ·	niCARdipine Infusion 5 mG/Hr IV Continuous <Continuous>  DIET: NPO  Lines: [place]  Drains:  Farias  Wounds:    CODE STATUS:  Full Code                       GOALS OF CARE:  aggressive                      DISPOSITION:  ICU  NIHSS 6 ICH 2

## 2021-08-01 LAB
ANION GAP SERPL CALC-SCNC: 10 MMOL/L — SIGNIFICANT CHANGE UP (ref 5–17)
ANION GAP SERPL CALC-SCNC: 10 MMOL/L — SIGNIFICANT CHANGE UP (ref 5–17)
BASE EXCESS BLDA CALC-SCNC: -0.2 MMOL/L — SIGNIFICANT CHANGE UP (ref -2–3)
BUN SERPL-MCNC: 21 MG/DL — SIGNIFICANT CHANGE UP (ref 7–23)
BUN SERPL-MCNC: 24 MG/DL — HIGH (ref 7–23)
CALCIUM SERPL-MCNC: 8.6 MG/DL — SIGNIFICANT CHANGE UP (ref 8.4–10.5)
CALCIUM SERPL-MCNC: 8.7 MG/DL — SIGNIFICANT CHANGE UP (ref 8.4–10.5)
CHLORIDE SERPL-SCNC: 112 MMOL/L — HIGH (ref 96–108)
CHLORIDE SERPL-SCNC: 112 MMOL/L — HIGH (ref 96–108)
CO2 BLDA-SCNC: 25 MMOL/L — HIGH (ref 19–24)
CO2 SERPL-SCNC: 23 MMOL/L — SIGNIFICANT CHANGE UP (ref 22–31)
CO2 SERPL-SCNC: 27 MMOL/L — SIGNIFICANT CHANGE UP (ref 22–31)
CREAT SERPL-MCNC: 1.07 MG/DL — SIGNIFICANT CHANGE UP (ref 0.5–1.3)
CREAT SERPL-MCNC: 1.23 MG/DL — SIGNIFICANT CHANGE UP (ref 0.5–1.3)
GAS PNL BLDA: SIGNIFICANT CHANGE UP
GLUCOSE BLDC GLUCOMTR-MCNC: 98 MG/DL — SIGNIFICANT CHANGE UP (ref 70–99)
GLUCOSE SERPL-MCNC: 109 MG/DL — HIGH (ref 70–99)
GLUCOSE SERPL-MCNC: 85 MG/DL — SIGNIFICANT CHANGE UP (ref 70–99)
HCO3 BLDA-SCNC: 24 MMOL/L — SIGNIFICANT CHANGE UP (ref 21–28)
HCT VFR BLD CALC: 41.3 % — SIGNIFICANT CHANGE UP (ref 39–50)
HGB BLD-MCNC: 13.5 G/DL — SIGNIFICANT CHANGE UP (ref 13–17)
MAGNESIUM SERPL-MCNC: 2.1 MG/DL — SIGNIFICANT CHANGE UP (ref 1.6–2.6)
MCHC RBC-ENTMCNC: 29.4 PG — SIGNIFICANT CHANGE UP (ref 27–34)
MCHC RBC-ENTMCNC: 32.7 GM/DL — SIGNIFICANT CHANGE UP (ref 32–36)
MCV RBC AUTO: 90 FL — SIGNIFICANT CHANGE UP (ref 80–100)
NRBC # BLD: 0 /100 WBCS — SIGNIFICANT CHANGE UP (ref 0–0)
PCO2 BLDA: 37 MMHG — SIGNIFICANT CHANGE UP (ref 35–48)
PH BLDA: 7.42 — SIGNIFICANT CHANGE UP (ref 7.35–7.45)
PHOSPHATE SERPL-MCNC: 3.2 MG/DL — SIGNIFICANT CHANGE UP (ref 2.5–4.5)
PLATELET # BLD AUTO: 163 K/UL — SIGNIFICANT CHANGE UP (ref 150–400)
PO2 BLDA: 80 MMHG — LOW (ref 83–108)
POTASSIUM SERPL-MCNC: 3.4 MMOL/L — LOW (ref 3.5–5.3)
POTASSIUM SERPL-MCNC: 3.5 MMOL/L — SIGNIFICANT CHANGE UP (ref 3.5–5.3)
POTASSIUM SERPL-SCNC: 3.4 MMOL/L — LOW (ref 3.5–5.3)
POTASSIUM SERPL-SCNC: 3.5 MMOL/L — SIGNIFICANT CHANGE UP (ref 3.5–5.3)
RBC # BLD: 4.59 M/UL — SIGNIFICANT CHANGE UP (ref 4.2–5.8)
RBC # FLD: 13 % — SIGNIFICANT CHANGE UP (ref 10.3–14.5)
SAO2 % BLDA: 96.7 % — SIGNIFICANT CHANGE UP (ref 94–98)
SODIUM SERPL-SCNC: 145 MMOL/L — SIGNIFICANT CHANGE UP (ref 135–145)
SODIUM SERPL-SCNC: 149 MMOL/L — HIGH (ref 135–145)
TROPONIN T SERPL-MCNC: 0.09 NG/ML — CRITICAL HIGH (ref 0–0.01)
WBC # BLD: 12.35 K/UL — HIGH (ref 3.8–10.5)
WBC # FLD AUTO: 12.35 K/UL — HIGH (ref 3.8–10.5)

## 2021-08-01 PROCEDURE — 71045 X-RAY EXAM CHEST 1 VIEW: CPT | Mod: 26

## 2021-08-01 PROCEDURE — 99024 POSTOP FOLLOW-UP VISIT: CPT

## 2021-08-01 PROCEDURE — 99291 CRITICAL CARE FIRST HOUR: CPT

## 2021-08-01 RX ORDER — POTASSIUM CHLORIDE 20 MEQ
20 PACKET (EA) ORAL
Refills: 0 | Status: COMPLETED | OUTPATIENT
Start: 2021-08-01 | End: 2021-08-01

## 2021-08-01 RX ORDER — AMLODIPINE BESYLATE 2.5 MG/1
5 TABLET ORAL DAILY
Refills: 0 | Status: DISCONTINUED | OUTPATIENT
Start: 2021-08-01 | End: 2021-08-01

## 2021-08-01 RX ORDER — METOPROLOL TARTRATE 50 MG
5 TABLET ORAL EVERY 6 HOURS
Refills: 0 | Status: DISCONTINUED | OUTPATIENT
Start: 2021-08-01 | End: 2021-08-01

## 2021-08-01 RX ORDER — METOPROLOL TARTRATE 50 MG
5 TABLET ORAL ONCE
Refills: 0 | Status: COMPLETED | OUTPATIENT
Start: 2021-08-01 | End: 2021-08-01

## 2021-08-01 RX ORDER — FUROSEMIDE 40 MG
20 TABLET ORAL ONCE
Refills: 0 | Status: COMPLETED | OUTPATIENT
Start: 2021-08-01 | End: 2021-08-01

## 2021-08-01 RX ORDER — NICARDIPINE HYDROCHLORIDE 30 MG/1
5 CAPSULE, EXTENDED RELEASE ORAL
Qty: 40 | Refills: 0 | Status: DISCONTINUED | OUTPATIENT
Start: 2021-08-01 | End: 2021-08-02

## 2021-08-01 RX ORDER — VERAPAMIL HCL 240 MG
40 CAPSULE, EXTENDED RELEASE PELLETS 24 HR ORAL EVERY 8 HOURS
Refills: 0 | Status: DISCONTINUED | OUTPATIENT
Start: 2021-08-01 | End: 2021-08-02

## 2021-08-01 RX ORDER — POTASSIUM CHLORIDE 20 MEQ
20 PACKET (EA) ORAL ONCE
Refills: 0 | Status: COMPLETED | OUTPATIENT
Start: 2021-08-01 | End: 2021-08-01

## 2021-08-01 RX ORDER — METOPROLOL TARTRATE 50 MG
12.5 TABLET ORAL
Refills: 0 | Status: DISCONTINUED | OUTPATIENT
Start: 2021-08-01 | End: 2021-08-01

## 2021-08-01 RX ORDER — FUROSEMIDE 40 MG
20 TABLET ORAL ONCE
Refills: 0 | Status: DISCONTINUED | OUTPATIENT
Start: 2021-08-01 | End: 2021-08-01

## 2021-08-01 RX ADMIN — Medication 650 MILLIGRAM(S): at 01:21

## 2021-08-01 RX ADMIN — Medication 3 MILLILITER(S): at 04:59

## 2021-08-01 RX ADMIN — Medication 20 MILLIGRAM(S): at 09:55

## 2021-08-01 RX ADMIN — Medication 12.5 MILLIGRAM(S): at 17:03

## 2021-08-01 RX ADMIN — Medication 20 MILLIEQUIVALENT(S): at 21:07

## 2021-08-01 RX ADMIN — Medication 3 MILLILITER(S): at 10:46

## 2021-08-01 RX ADMIN — Medication 650 MILLIGRAM(S): at 17:14

## 2021-08-01 RX ADMIN — CHLORHEXIDINE GLUCONATE 1 APPLICATION(S): 213 SOLUTION TOPICAL at 06:12

## 2021-08-01 RX ADMIN — Medication 5 MILLIGRAM(S): at 17:19

## 2021-08-01 RX ADMIN — PIPERACILLIN AND TAZOBACTAM 200 GRAM(S): 4; .5 INJECTION, POWDER, LYOPHILIZED, FOR SOLUTION INTRAVENOUS at 17:04

## 2021-08-01 RX ADMIN — Medication 650 MILLIGRAM(S): at 18:00

## 2021-08-01 RX ADMIN — Medication 300 MILLIGRAM(S): at 17:04

## 2021-08-01 RX ADMIN — HEPARIN SODIUM 5000 UNIT(S): 5000 INJECTION INTRAVENOUS; SUBCUTANEOUS at 21:07

## 2021-08-01 RX ADMIN — PIPERACILLIN AND TAZOBACTAM 200 GRAM(S): 4; .5 INJECTION, POWDER, LYOPHILIZED, FOR SOLUTION INTRAVENOUS at 06:03

## 2021-08-01 RX ADMIN — PIPERACILLIN AND TAZOBACTAM 200 GRAM(S): 4; .5 INJECTION, POWDER, LYOPHILIZED, FOR SOLUTION INTRAVENOUS at 00:03

## 2021-08-01 RX ADMIN — DEXMEDETOMIDINE HYDROCHLORIDE IN 0.9% SODIUM CHLORIDE 5.15 MICROGRAM(S)/KG/HR: 4 INJECTION INTRAVENOUS at 05:59

## 2021-08-01 RX ADMIN — ONDANSETRON 4 MILLIGRAM(S): 8 TABLET, FILM COATED ORAL at 00:58

## 2021-08-01 RX ADMIN — Medication 300 MILLIGRAM(S): at 06:12

## 2021-08-01 RX ADMIN — AMLODIPINE BESYLATE 5 MILLIGRAM(S): 2.5 TABLET ORAL at 09:55

## 2021-08-01 RX ADMIN — Medication 3 MILLILITER(S): at 16:39

## 2021-08-01 RX ADMIN — HEPARIN SODIUM 5000 UNIT(S): 5000 INJECTION INTRAVENOUS; SUBCUTANEOUS at 06:04

## 2021-08-01 RX ADMIN — Medication 650 MILLIGRAM(S): at 11:03

## 2021-08-01 RX ADMIN — Medication 20 MILLIEQUIVALENT(S): at 17:14

## 2021-08-01 RX ADMIN — Medication 20 MILLIEQUIVALENT(S): at 17:27

## 2021-08-01 RX ADMIN — ATORVASTATIN CALCIUM 40 MILLIGRAM(S): 80 TABLET, FILM COATED ORAL at 21:07

## 2021-08-01 RX ADMIN — Medication 40 MILLIGRAM(S): at 21:18

## 2021-08-01 RX ADMIN — Medication 5 MILLIGRAM(S): at 11:58

## 2021-08-01 RX ADMIN — SENNA PLUS 2 TABLET(S): 8.6 TABLET ORAL at 21:07

## 2021-08-01 RX ADMIN — Medication 20 MILLIEQUIVALENT(S): at 19:42

## 2021-08-01 RX ADMIN — Medication 20 MILLIEQUIVALENT(S): at 12:53

## 2021-08-01 RX ADMIN — PIPERACILLIN AND TAZOBACTAM 200 GRAM(S): 4; .5 INJECTION, POWDER, LYOPHILIZED, FOR SOLUTION INTRAVENOUS at 11:02

## 2021-08-01 RX ADMIN — Medication 20 MILLIEQUIVALENT(S): at 09:04

## 2021-08-01 RX ADMIN — Medication 650 MILLIGRAM(S): at 02:21

## 2021-08-01 RX ADMIN — HEPARIN SODIUM 5000 UNIT(S): 5000 INJECTION INTRAVENOUS; SUBCUTANEOUS at 13:00

## 2021-08-01 RX ADMIN — PIPERACILLIN AND TAZOBACTAM 200 GRAM(S): 4; .5 INJECTION, POWDER, LYOPHILIZED, FOR SOLUTION INTRAVENOUS at 23:16

## 2021-08-01 RX ADMIN — Medication 650 MILLIGRAM(S): at 11:33

## 2021-08-01 RX ADMIN — Medication 3 MILLILITER(S): at 22:10

## 2021-08-01 RX ADMIN — Medication 1 TABLET(S): at 11:03

## 2021-08-01 RX ADMIN — Medication 20 MILLIEQUIVALENT(S): at 11:03

## 2021-08-01 RX ADMIN — Medication 2.5 MILLIGRAM(S): at 06:04

## 2021-08-01 NOTE — PROGRESS NOTE ADULT - ASSESSMENT
46y/M with  L cerebellar hematoma, VA dissection, brain compression, IVH  HTN (hypertension)  elevated creatinine  HOCM    PLAN:   NEURO: neurochecks q1h, PRN pain meds with acetaminophen, continue Dexmedetomidine to RASS of 0 to -1  angio, VA takedown;  BP control, MRI on stepdown  agitation - likely delirium + congestion - seroquel 25 BID  REHAB:  physical therapy evaluation and management    EARLY MOB:  bed rest    PULM:  continue high flow, standing duonebs  CARDIO:  SBP goal 100-160mm Hg, [place]; continue metoprolol 12.5 BID with hold parameters  and HR 60; taper cardene off, EKG  ENDO:  Blood sugar goals 140-180 mg/dL, continue insulin sliding scale  GI:  d/c PPI for GI prophylaxis  DIET: start diet  RENAL:  140-150; furosemide 40mg IV x1, CXR in AM  HEM/ONC: Hb stable  VTE Prophylaxis: SCDs, start SQH if cleared by NS  ID: febrile, leukocytosis; piperacillin/tazobactam vancomycin (last day 08/03), vanc trough prior to 4th dose  Social: sister is [facility] [place] update      ATTENDING ATTESTATION:  I was physically present for the key portions of the evaluation and management (E/M) service provided.  I agree with the above history, physical and plan, which I have reviewed and edited where appropriate.    Patient at high risk for neurological deterioration or death due to:  ICU delirium, aspiration PNA, DVT / PE.  Critical care time:  I have personally provided 45 minutes of critical care time, excluding time spent on separate procedures.      Plan discussed with RN, house staff. 46y/M with  L cerebellar hematoma, VA dissection, brain compression, IVH  HTN (hypertension)  elevated creatinine  HOCM    PLAN:   NEURO: neurochecks q2h, VSq1h, PRN pain meds with acetaminophen, d/c Dexmedetomidine   angio, VA takedown;  BP control, MRI on stepdown  agitation - resolving  REHAB:  physical therapy evaluation and management    EARLY MOB:  OOB to chair ambulate    PULM:  continue high flow, drop FiO2 to 30%, may wean to nasal cannula, standing ipratropium / albuterol; 20 IV furosemide x1   CARDIO:  SBP goal 100-160mm Hg, Steffanie; switch metoprolol 12.5 BID with hold parameters  and HR 60; taper cardene off,   ENDO:  Blood sugar goals 140-180 mg/dL, d/c insulin sliding scale  GI:  bowel regimen   DIET: regular diet   RENAL:  140-150; CXR in AM; BMP 2pm  HEM/ONC: Hb stable  VTE Prophylaxis: SCDs, SQH   ID: febrile, leukocytosis improving; piperacillin/tazobactam vancomycin (last day 08/03), vanc trough prior to 4th dose  Social: sister is [facility] [place] update      ATTENDING ATTESTATION:  I was physically present for the key portions of the evaluation and management (E/M) service provided.  I agree with the above history, physical and plan, which I have reviewed and edited where appropriate.    Patient at high risk for neurological deterioration or death due to:  ICU delirium, aspiration PNA, DVT / PE.  Critical care time:  I have personally provided 45 minutes of critical care time, excluding time spent on separate procedures.      Plan discussed with RN, house staff.

## 2021-08-01 NOTE — CONSULT NOTE ADULT - SUBJECTIVE AND OBJECTIVE BOX
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 Also had been on cardene gtt for SBP target 100-160. TTE shows nl EF, asymmetric septal hypertrophy with HCM physiology with resting gradient 90 and MACRINA with at least mod eccentric MR with LA dilation. Pt currently denies CP/SOB/palpitations/lightheadedness. Baseline ET is 1-2 flights of stairs before dyspnea. Had mother who passed away unexpectedly from unknown cause.       HPI:  46 M pt with PMHx of HTN, noncompliant with meds (Metoprolol, Nifedipine, and ASA), reports no recent use of ASA or metoprolol, presents to ED c/o acute onset and worsening dizziness, R arm paresthesia, and vomiting x 1hr prior to arrival while at work. Pt somnolent in ED, difficulty obtaining hx from pt. Per EMS, pt had SBP in the 200s, given nitro SL x 2 in field. Patient found to have a left side cerebellar hemmorrhage and left pontine hemmorrhage with extension to the subarachnoid space. Patient was started on a nicardipene drip in the ED for SBP to 230's, given manitol 50g and intubated for concern of potential for aspiration with declining exam. Patient reported that he lives alone and has no close contacts in the area.     NIHSS 6  ICH 2   (28 Jul 2021 20:40)    PAST MEDICAL & SURGICAL HISTORY:  HTN (hypertension)    No significant past surgical history      FAMILY HISTORY:  No pertinent family history in first degree relatives    ALLERGIES/INTOLERANCES:  No Known Allergies    HOME MEDICATIONS:    INPATIENT MEDICATIONS:  amLODIPine   Tablet 5 milliGRAM(s) Oral daily  metoprolol tartrate Injectable 5 milliGRAM(s) IV Push every 6 hours    heparin   Injectable 5000 Unit(s) SubCutaneous every 8 hours    acetaminophen   Tablet .. 650 milliGRAM(s) Oral every 6 hours PRN  acetaminophen  Suppository .. 650 milliGRAM(s) Rectal every 6 hours PRN  albuterol/ipratropium for Nebulization 3 milliLiter(s) Nebulizer every 6 hours  atorvastatin 40 milliGRAM(s) Oral at bedtime  chlorhexidine 2% Cloths 1 Application(s) Topical daily  dextrose 50% Injectable 25 Gram(s) IV Push once  glucagon  Injectable 1 milliGRAM(s) IntraMuscular once  multivitamin 1 Tablet(s) Oral daily  ondansetron Injectable 4 milliGRAM(s) IV Push every 6 hours PRN  piperacillin/tazobactam IVPB.. 3.375 Gram(s) IV Intermittent every 6 hours  potassium chloride    Tablet ER 20 milliEquivalent(s) Oral every 2 hours  senna 2 Tablet(s) Oral at bedtime  vancomycin  IVPB 1500 milliGRAM(s) IV Intermittent every 12 hours      REVIEW OF SYSTEMS:    CONSTITUTIONAL: No weakness, F/C, wt loss/gain  EYES: No visual changes/disturbances  ENMT: No dry mouth, no vertigo  NECK: No pain or stiffness  RESPIRATORY: No cough, wheezing, hemoptysis; No shortness of breath  CARDIOVASCULAR: No chest pain, palpitations, lightheadedness/dizziness, LOC, or leg swelling  GASTROINTESTINAL: No abdominal or epigastric pain. No N/V/D/C. No melena, hematochezia, or hematemesis.  GENITOURINARY: No dysuria, increased frequency, hematuria, or incontinence  NEUROLOGICAL: No lightheadedness/dizziness, LOC, headaches, numbness or weakness  MUSCULOSKELETAL: No joint pain or swelling; No muscle, back, or extremity pain  SKIN: No itching, burning, rashes, or lesions   ENDOCRINE: No heat or cold intolerance; No hair loss  HEME/LYMPH: No easy bruising, or bleeding gums  PSYCHIATRIC: No depression, anxiety, mood swings, or difficulty sleeping    [ ] All other review of systems are negative unless indicated above.  [ ] Unable to obtain due to:    PHYSICAL EXAM:    T(C): 38.4 (08-01-21 @ 14:29), Max: 38.5 (08-01-21 @ 01:00)  HR: 114 (08-01-21 @ 18:00) (62 - 115)  BP: 129/80 (08-01-21 @ 18:00) (111/85 - 185/112)  RR: 18 (08-01-21 @ 18:00) (15 - 32)  SpO2: 95% (08-01-21 @ 18:00) (90% - 100%)  Wt(kg): --    I&O's Summary    31 Jul 2021 07:01  -  01 Aug 2021 07:00  --------------------------------------------------------  IN: 2725.3 mL / OUT: 2675 mL / NET: 50.3 mL    01 Aug 2021 07:01  -  01 Aug 2021 18:25  --------------------------------------------------------  IN: 1068 mL / OUT: 1760 mL / NET: -692 mL    NAD  No JVD  +crackles  Regular tachycardic, 3/6 late systolic murmur LLSB, 3/6 systolic murmur at apex  No edema          TELEMETRY: 	      ECG:  	  	  LABS:                        13.5   12.35 )-----------( 163      ( 01 Aug 2021 05:58 )             41.3     08-01    149<H>  |  112<H>  |  24<H>  ----------------------------<  85  3.4<L>   |  27  |  1.23    Ca    8.7      01 Aug 2021 15:18  Phos  3.2     08-01  Mg     2.1     08-01    TTE:   1. Normal left ventricular systolic function.   2. Normal right ventricular size and systolic function.   3. Dilated left atrium.   4. No evidence of pulmonary hypertension.   5. No pericardial effusion.   6. Hypertrophic cardiomyopathy with assymetric septal hypertrophy and systolic anterior motion of the mitral valve.   7. There is severe left ventricular outflow tract obstruction with a resting gradient of 90 mm Hg.   8. There is at least moderate eccentric mitral regurgitation associated with the MACRINA.      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 Possible family hx SCD (mother). Currently denies SOB but tachycardic to 110. SBP goal 100-160 by neuro.     #HCM: avoid dehydration, vasodilation, and tachycardia in HCM in order to improve gradient. 90 resting gradient is significant and explains baseline poor ET as well as hospital course (tenuous respiratory status, extreme sensitivity to diuresis). Improvement in gradient will also improve MR which will help pulmonary edema.   - start lopressor 12.5 bid. Verapamil can be added if BP trend goes up  - will diurse prn. No lasix now.  - if hypertensive, for SBP goals, cardene is reasonable.   - K>4, Mg>2  - outpatient f/u with cardiology upon DC. consider genetic testing for family members as well.    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 Also had been on cardene gtt for SBP target 100-160. TTE shows nl EF, asymmetric septal hypertrophy with HCM physiology with resting gradient 90 and MACRINA with at least mod eccentric MR with LA dilation. Pt currently denies CP/SOB/palpitations/lightheadedness. Baseline ET is 1-2 flights of stairs before dyspnea. Had mother who passed away unexpectedly from unknown cause.       HPI:  46 M pt with PMHx of HTN, noncompliant with meds (Metoprolol, Nifedipine, and ASA), reports no recent use of ASA or metoprolol, presents to ED c/o acute onset and worsening dizziness, R arm paresthesia, and vomiting x 1hr prior to arrival while at work. Pt somnolent in ED, difficulty obtaining hx from pt. Per EMS, pt had SBP in the 200s, given nitro SL x 2 in field. Patient found to have a left side cerebellar hemmorrhage and left pontine hemmorrhage with extension to the subarachnoid space. Patient was started on a nicardipene drip in the ED for SBP to 230's, given manitol 50g and intubated for concern of potential for aspiration with declining exam. Patient reported that he lives alone and has no close contacts in the area.     NIHSS 6  ICH 2   (28 Jul 2021 20:40)    PAST MEDICAL & SURGICAL HISTORY:  HTN (hypertension)    No significant past surgical history      FAMILY HISTORY:  No pertinent family history in first degree relatives    ALLERGIES/INTOLERANCES:  No Known Allergies    HOME MEDICATIONS:    INPATIENT MEDICATIONS:  amLODIPine   Tablet 5 milliGRAM(s) Oral daily  metoprolol tartrate Injectable 5 milliGRAM(s) IV Push every 6 hours    heparin   Injectable 5000 Unit(s) SubCutaneous every 8 hours    acetaminophen   Tablet .. 650 milliGRAM(s) Oral every 6 hours PRN  acetaminophen  Suppository .. 650 milliGRAM(s) Rectal every 6 hours PRN  albuterol/ipratropium for Nebulization 3 milliLiter(s) Nebulizer every 6 hours  atorvastatin 40 milliGRAM(s) Oral at bedtime  chlorhexidine 2% Cloths 1 Application(s) Topical daily  dextrose 50% Injectable 25 Gram(s) IV Push once  glucagon  Injectable 1 milliGRAM(s) IntraMuscular once  multivitamin 1 Tablet(s) Oral daily  ondansetron Injectable 4 milliGRAM(s) IV Push every 6 hours PRN  piperacillin/tazobactam IVPB.. 3.375 Gram(s) IV Intermittent every 6 hours  potassium chloride    Tablet ER 20 milliEquivalent(s) Oral every 2 hours  senna 2 Tablet(s) Oral at bedtime  vancomycin  IVPB 1500 milliGRAM(s) IV Intermittent every 12 hours      REVIEW OF SYSTEMS:    CONSTITUTIONAL: No weakness, F/C, wt loss/gain  EYES: No visual changes/disturbances  ENMT: No dry mouth, no vertigo  NECK: No pain or stiffness  RESPIRATORY: No cough, wheezing, hemoptysis; No shortness of breath  CARDIOVASCULAR: No chest pain, palpitations, lightheadedness/dizziness, LOC, or leg swelling  GASTROINTESTINAL: No abdominal or epigastric pain. No N/V/D/C. No melena, hematochezia, or hematemesis.  GENITOURINARY: No dysuria, increased frequency, hematuria, or incontinence  NEUROLOGICAL: No lightheadedness/dizziness, LOC, headaches, numbness or weakness  MUSCULOSKELETAL: No joint pain or swelling; No muscle, back, or extremity pain  SKIN: No itching, burning, rashes, or lesions   ENDOCRINE: No heat or cold intolerance; No hair loss  HEME/LYMPH: No easy bruising, or bleeding gums  PSYCHIATRIC: No depression, anxiety, mood swings, or difficulty sleeping    [ ] All other review of systems are negative unless indicated above.  [ ] Unable to obtain due to:    PHYSICAL EXAM:    T(C): 38.4 (08-01-21 @ 14:29), Max: 38.5 (08-01-21 @ 01:00)  HR: 114 (08-01-21 @ 18:00) (62 - 115)  BP: 129/80 (08-01-21 @ 18:00) (111/85 - 185/112)  RR: 18 (08-01-21 @ 18:00) (15 - 32)  SpO2: 95% (08-01-21 @ 18:00) (90% - 100%)  Wt(kg): --    I&O's Summary    31 Jul 2021 07:01  -  01 Aug 2021 07:00  --------------------------------------------------------  IN: 2725.3 mL / OUT: 2675 mL / NET: 50.3 mL    01 Aug 2021 07:01  -  01 Aug 2021 18:25  --------------------------------------------------------  IN: 1068 mL / OUT: 1760 mL / NET: -692 mL    NAD  No JVD  +crackles  Regular tachycardic, 3/6 late systolic murmur LLSB, 3/6 systolic murmur at apex  No edema          TELEMETRY: 	      ECG:  	  	  LABS:                        13.5   12.35 )-----------( 163      ( 01 Aug 2021 05:58 )             41.3     08-01    149<H>  |  112<H>  |  24<H>  ----------------------------<  85  3.4<L>   |  27  |  1.23    Ca    8.7      01 Aug 2021 15:18  Phos  3.2     08-01  Mg     2.1     08-01    TTE:   1. Normal left ventricular systolic function.   2. Normal right ventricular size and systolic function.   3. Dilated left atrium.   4. No evidence of pulmonary hypertension.   5. No pericardial effusion.   6. Hypertrophic cardiomyopathy with assymetric septal hypertrophy and systolic anterior motion of the mitral valve.   7. There is severe left ventricular outflow tract obstruction with a resting gradient of 90 mm Hg.   8. There is at least moderate eccentric mitral regurgitation associated with the MACRINA.      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 Possible family hx SCD (mother). Currently denies SOB but tachycardic to 110. SBP goal 100-160 by neuro.     #HCM: avoid dehydration, vasodilation, and tachycardia in HCM in order to improve gradient. 90 resting gradient is significant and explains baseline poor ET as well as hospital course (tenuous respiratory status, extreme sensitivity to diuresis). Improvement in gradient will also improve MR which will help pulmonary edema.   - dc amlodipine and lopressor. Start verapamil 40 q8h, will plan to uptitrate.  - will diurse prn. No lasix now.  - if hypertensive, for SBP goals, cardene is reasonable.   - K>4, Mg>2  - outpatient f/u with cardiology upon DC. consider genetic testing for family members as well.    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 Also had been on cardene gtt for SBP target 100-160. TTE shows nl EF, asymmetric septal hypertrophy with HCM physiology with resting gradient 90 and MACRINA with at least mod eccentric MR with LA dilation. Pt currently denies CP/SOB/palpitations/lightheadedness. Baseline ET is 1-2 flights of stairs before dyspnea. Had mother who passed away unexpectedly from unknown cause.       HPI:  46 M pt with PMHx of HTN, noncompliant with meds (Metoprolol, Nifedipine, and ASA), reports no recent use of ASA or metoprolol, presents to ED c/o acute onset and worsening dizziness, R arm paresthesia, and vomiting x 1hr prior to arrival while at work. Pt somnolent in ED, difficulty obtaining hx from pt. Per EMS, pt had SBP in the 200s, given nitro SL x 2 in field. Patient found to have a left side cerebellar hemmorrhage and left pontine hemmorrhage with extension to the subarachnoid space. Patient was started on a nicardipene drip in the ED for SBP to 230's, given manitol 50g and intubated for concern of potential for aspiration with declining exam. Patient reported that he lives alone and has no close contacts in the area.     NIHSS 6  ICH 2   (28 Jul 2021 20:40)    PAST MEDICAL & SURGICAL HISTORY:  HTN (hypertension)    No significant past surgical history      FAMILY HISTORY:  No pertinent family history in first degree relatives    ALLERGIES/INTOLERANCES:  No Known Allergies    HOME MEDICATIONS:    INPATIENT MEDICATIONS:  amLODIPine   Tablet 5 milliGRAM(s) Oral daily  metoprolol tartrate Injectable 5 milliGRAM(s) IV Push every 6 hours    heparin   Injectable 5000 Unit(s) SubCutaneous every 8 hours    acetaminophen   Tablet .. 650 milliGRAM(s) Oral every 6 hours PRN  acetaminophen  Suppository .. 650 milliGRAM(s) Rectal every 6 hours PRN  albuterol/ipratropium for Nebulization 3 milliLiter(s) Nebulizer every 6 hours  atorvastatin 40 milliGRAM(s) Oral at bedtime  chlorhexidine 2% Cloths 1 Application(s) Topical daily  dextrose 50% Injectable 25 Gram(s) IV Push once  glucagon  Injectable 1 milliGRAM(s) IntraMuscular once  multivitamin 1 Tablet(s) Oral daily  ondansetron Injectable 4 milliGRAM(s) IV Push every 6 hours PRN  piperacillin/tazobactam IVPB.. 3.375 Gram(s) IV Intermittent every 6 hours  potassium chloride    Tablet ER 20 milliEquivalent(s) Oral every 2 hours  senna 2 Tablet(s) Oral at bedtime  vancomycin  IVPB 1500 milliGRAM(s) IV Intermittent every 12 hours      REVIEW OF SYSTEMS:    CONSTITUTIONAL: No weakness, F/C, wt loss/gain  EYES: No visual changes/disturbances  ENMT: No dry mouth, no vertigo  NECK: No pain or stiffness  RESPIRATORY: No cough, wheezing, hemoptysis; No shortness of breath  CARDIOVASCULAR: No chest pain, palpitations, lightheadedness/dizziness, LOC, or leg swelling  GASTROINTESTINAL: No abdominal or epigastric pain. No N/V/D/C. No melena, hematochezia, or hematemesis.  GENITOURINARY: No dysuria, increased frequency, hematuria, or incontinence  NEUROLOGICAL: No lightheadedness/dizziness, LOC, headaches, numbness or weakness  MUSCULOSKELETAL: No joint pain or swelling; No muscle, back, or extremity pain  SKIN: No itching, burning, rashes, or lesions   ENDOCRINE: No heat or cold intolerance; No hair loss  HEME/LYMPH: No easy bruising, or bleeding gums  PSYCHIATRIC: No depression, anxiety, mood swings, or difficulty sleeping    [ ] All other review of systems are negative unless indicated above.  [ ] Unable to obtain due to:    PHYSICAL EXAM:    T(C): 38.4 (08-01-21 @ 14:29), Max: 38.5 (08-01-21 @ 01:00)  HR: 114 (08-01-21 @ 18:00) (62 - 115)  BP: 129/80 (08-01-21 @ 18:00) (111/85 - 185/112)  RR: 18 (08-01-21 @ 18:00) (15 - 32)  SpO2: 95% (08-01-21 @ 18:00) (90% - 100%)  Wt(kg): --    I&O's Summary    31 Jul 2021 07:01  -  01 Aug 2021 07:00  --------------------------------------------------------  IN: 2725.3 mL / OUT: 2675 mL / NET: 50.3 mL    01 Aug 2021 07:01  -  01 Aug 2021 18:25  --------------------------------------------------------  IN: 1068 mL / OUT: 1760 mL / NET: -692 mL    NAD  No JVD  +crackles  Regular tachycardic, 3/6 late systolic murmur LLSB, 3/6 systolic murmur at apex  No edema          TELEMETRY: 	      ECG:  	  	  LABS:                        13.5   12.35 )-----------( 163      ( 01 Aug 2021 05:58 )             41.3     08-01    149<H>  |  112<H>  |  24<H>  ----------------------------<  85  3.4<L>   |  27  |  1.23    Ca    8.7      01 Aug 2021 15:18  Phos  3.2     08-01  Mg     2.1     08-01    TTE:   1. Normal left ventricular systolic function.   2. Normal right ventricular size and systolic function.   3. Dilated left atrium.   4. No evidence of pulmonary hypertension.   5. No pericardial effusion.   6. Hypertrophic cardiomyopathy with assymetric septal hypertrophy and systolic anterior motion of the mitral valve.   7. There is severe left ventricular outflow tract obstruction with a resting gradient of 90 mm Hg.   8. There is at least moderate eccentric mitral regurgitation associated with the MACRINA.      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 Possible family hx SCD (mother). Currently denies SOB but tachycardic to 110. SBP goal 100-160 by neuro.     #HCM: avoid dehydration, vasodilation, and tachycardia in HCM in order to improve gradient. 90 resting gradient is significant and explains baseline poor ET as well as hospital course (tenuous respiratory status, extreme sensitivity to diuresis). Improvement in gradient will also improve MR which will help pulmonary edema.   - dc amlodipine and lopressor. Start verapamil 40 q8h, will plan to uptitrate.  - will diurse prn. No lasix now.  - if hypertensive, for SBP goals, cardene is reasonable.   - K>4, Mg>2  - outpatient f/u with cardiology upon DC. consider genetic testing for family members as well.    D/w cardiology attending  Austin Parker MD PGY5    Overnight, called to evaluate patient with chest pain and tachycardia (HR in the 120-130s). Recommend increasing verapamil to 80 PO Q8H.   Luis Antonio Benz MD   Cardiology Fellow

## 2021-08-01 NOTE — PROGRESS NOTE ADULT - SUBJECTIVE AND OBJECTIVE BOX
HPI: 46 M pt with PMHx of HTN, noncompliant with meds (Metoprolol, Nifedipine, and ASA), reports no recent use of ASA or metoprolol, presents to ED c/o acute onset and worsening dizziness, R arm paresthesia, and vomiting x 1hr prior to arrival while at work. Pt somnolent in ED, difficulty obtaining hx from pt. Per EMS, pt had SBP in the 200s, given nitro SL x 2 in field. Patient found to have a left side cerebellar hemmorrhage and left pontine hemmorrhage with extension to the subarachnoid space. Patient was started on a nicardipene drip in the ED for SBP to 230's, given manitol 50g and intubated for concern of potential for aspiration with declining exam. Patient reported that he lives alone and has no close contacts in the area.     NIHSS 6  ICH 2   (28 Jul 2021 20:40)      Hospital Course:   7/28: Admitted with left cerebellar ICH. Intubated in ED for lethargy and concern for airway protection. Repeat CTH stable.  7/29: POD# 0 S/P femoral cerebral angiogram, findings of left vertebral artery irregularity at level below PICA with segment suggestive of intimal flap, left vertebral artery sacrifice performed. Hypertensive episode during angio case, Xper CT shows stable ICH. Pt seen and examined at bedside in NSICU postop. Pt agitated and reaching for ETT. Brief CPAP trial given, and Pt was successfully extubated. Patient then with many blood tinged secretions and CXR with infiltrated. Patient was reintubated. He then self-extubated and was emergently reintubated, placed in restraints now sedated on precedex and fentanyl. Received 1LNS bolus for hypotension and was also started on levophed for SBPs in 70s.   7/30: POD1 cerebral angiogram with left vertebral takedown, OLESYA overnight, neuro stable remains sedated on precedex and intubated on full vent support. Repeat CTH stable. Received 40 of lasix, increased PEEP from 7 to 8 and tapering off fentanyl drip. Self extubated and doing well on high flow NC, Kept on Precedex for agitation, started on Cardene for 's, will wean as appropriate.   7/31: POD2 cerebral angio with L verterbal takedown. S/p Seroquel 25 and Precedex overnight for agitation, otherwise OLESYA. On Cardene gtt. Neuro stable. O2 sat stable on HFNC. S/p 30 Lasix. Uptrending troponin, EKG stable, trending q6H  8/1: POD3, OLESYA ovenright, on Precedex. Neuro stable.      Vital Signs Last 24 Hrs  T(C): 38.2 (31 Jul 2021 22:01), Max: 38.7 (31 Jul 2021 15:00)  T(F): 100.8 (31 Jul 2021 22:01), Max: 101.6 (31 Jul 2021 15:00)  HR: 88 (01 Aug 2021 01:00) (69 - 88)  BP: 123/76 (01 Aug 2021 01:00) (107/69 - 185/112)  BP(mean): 94 (01 Aug 2021 01:00) (81 - 143)  RR: 24 (01 Aug 2021 01:00) (15 - 30)  SpO2: 92% (01 Aug 2021 01:00) (92% - 100%)    I&O's Detail    30 Jul 2021 07:01  -  31 Jul 2021 07:00  --------------------------------------------------------  IN:    Dexmedetomidine: 265.3 mL    FentaNYL: 38.5 mL    IV PiggyBack: 1050 mL    Jevity 1.2: 80 mL    NiCARdipine: 151 mL    sodium chloride 0.9%: 150 mL  Total IN: 1734.8 mL    OUT:    Incontinent per Condom Catheter (mL): 1100 mL    Indwelling Catheter - Urethral (mL): 305 mL    Intermittent Catheterization - Urethral (mL): 175 mL    Voided (mL): 1300 mL  Total OUT: 2880 mL    Total NET: -1145.2 mL      31 Jul 2021 07:01  -  01 Aug 2021 01:34  --------------------------------------------------------  IN:    Dexmedetomidine: 254.8 mL    IV PiggyBack: 1700 mL    NiCARdipine: 122.5 mL  Total IN: 2077.3 mL    OUT:    Incontinent per Condom Catheter (mL): 1925 mL  Total OUT: 1925 mL    Total NET: 152.3 mL        I&O's Summary    30 Jul 2021 07:01  -  31 Jul 2021 07:00  --------------------------------------------------------  IN: 1734.8 mL / OUT: 2880 mL / NET: -1145.2 mL    31 Jul 2021 07:01  -  01 Aug 2021 01:34  --------------------------------------------------------  IN: 2077.3 mL / OUT: 1925 mL / NET: 152.3 mL      PHYSICAL EXAM: on precedex  Constitutional: NAD, well groomed, well nourished  Respiratory: breathing non-labored, symmetrical chest wall movement  Cardiovascuar: RRR, no murmurs  Gastrointestinal: abdomen soft, non tender  Neurological:  AAOX3. Face symmetric, Verbal function intact, speech mildly dysarthric but comprehendible   Cranial Nerves: II-XII intact  Motor: 5/5 power in b/l upper extremities and lower extremities  Sensation: intact to light touch in all extremities  Pronator Drift: none  Dysmetria: none  Extremities: distal pulses 2+ x4  Wound/incision: angio puncture site C/D/I    TUBES/LINES:  [] CVC  [x] A-line  [] Lumbar Drain  [] Ventriculostomy  [] Other    DIET:  [x] NPO  [] Mechanical  [] Tube feeds    LABS:                        13.5   13.57 )-----------( 153      ( 31 Jul 2021 03:16 )             42.0     07-31    148<H>  |  112<H>  |  15  ----------------------------<  116<H>  3.4<L>   |  26  |  1.04    Ca    8.6      31 Jul 2021 14:49  Phos  2.5     07-31  Mg     1.6     07-31      PT/INR - ( 30 Jul 2021 05:36 )   PT: 13.5 sec;   INR: 1.13          PTT - ( 30 Jul 2021 05:36 )  PTT:29.9 sec    CARDIAC MARKERS ( 31 Jul 2021 22:34 )  x     / 0.13 ng/mL / x     / x     / x      CARDIAC MARKERS ( 31 Jul 2021 14:49 )  x     / 0.10 ng/mL / 157 U/L / x     / 1.5 ng/mL      CAPILLARY BLOOD GLUCOSE      POCT Blood Glucose.: 104 mg/dL (31 Jul 2021 16:41)  POCT Blood Glucose.: 110 mg/dL (31 Jul 2021 11:25)  POCT Blood Glucose.: 118 mg/dL (31 Jul 2021 05:32)      Drug Levels: [] N/A  Vancomycin Level, Trough: 14.5 ug/mL (07-31 @ 17:00)  Vancomycin Level, Trough: 11.0 ug/mL (07-30 @ 05:36)    CSF Analysis: [] N/A      Allergies    No Known Allergies    Intolerances      MEDICATIONS:  Antibiotics:  piperacillin/tazobactam IVPB.. 3.375 Gram(s) IV Intermittent every 6 hours  vancomycin  IVPB 1500 milliGRAM(s) IV Intermittent every 12 hours    Neuro:  acetaminophen   Tablet .. 650 milliGRAM(s) Oral every 6 hours PRN  acetaminophen  Suppository .. 650 milliGRAM(s) Rectal every 6 hours PRN  dexMEDEtomidine Infusion 0.2 MICROgram(s)/kG/Hr IV Continuous <Continuous>  ondansetron Injectable 4 milliGRAM(s) IV Push every 6 hours PRN    Anticoagulation:  heparin   Injectable 5000 Unit(s) SubCutaneous every 8 hours    OTHER:  albuterol/ipratropium for Nebulization 3 milliLiter(s) Nebulizer every 6 hours  atorvastatin 40 milliGRAM(s) Oral at bedtime  chlorhexidine 2% Cloths 1 Application(s) Topical daily  dextrose 50% Injectable 25 Gram(s) IV Push once  glucagon  Injectable 1 milliGRAM(s) IntraMuscular once  insulin lispro (ADMELOG) corrective regimen sliding scale   SubCutaneous every 6 hours  metoprolol tartrate Injectable 2.5 milliGRAM(s) IV Push every 6 hours  niCARdipine Infusion 5 mG/Hr IV Continuous <Continuous>  senna 2 Tablet(s) Oral at bedtime    IVF:  multivitamin 1 Tablet(s) Oral daily    CULTURES:  Culture Results:   Sputum specimen rejected.  Microscopic examination indicates  oropharyngeal contamination.  Please repeat. (07-30 @ 14:46)  Culture Results:   No growth at 2 days. (07-29 @ 21:27)    RADIOLOGY & ADDITIONAL TESTS:      ASSESSMENT:  46 M PMH HTN, non-compliant on home meds nifedipine, ASA 81, and metoprolol presented with 2 hr onset gradual worsening R UE paresthesia, weakness, dizziness, N&V found to have an Acute left cerebellar intraparenchymal hemorrhage and left pontine hemorrhage with extension into the subarachnoid space. Pt is now s/p  femoral cerebral angiogram, findings of left vertebral artery irregularity at level below PICA with segment suggestive of intimal flap, left vertebral artery sacrifice performed (7/29/2021), post op was extubated, needed to be reintubated for secretion burden and unable to protect airway, patient self-extubated X 2, now on high flow NC, satting well.    CHEST PAIN    No pertinent family history in first degree relatives    Handoff    MEWS Score    HTN (hypertension)    HTN (hypertension)    SAH (subarachnoid hemorrhage)    Dissection of cerebral artery    SAH (subarachnoid hemorrhage)    Dissection of cerebral artery    Cerebellar bleed    Angiogram, blood vessel, cerebral, with embolization    No significant past surgical history    CHEST PAIN    [email or web address]        Plan:  Neuro:   - Neuro checks/vital checks/groin/vascular checks q1hr  - Tylenol prn for pain control  - Precedex for agigtation   - Repeat CTH 7/30 wet read stable f/u final read     CV:   - -160, on cardene gtt wean as tolerated  - TTE- hypertrophic cardiomyopathy with severe left ventricular outflow tract obstruction and moderate mitral regurg   - trending troponin q6 H  - Careful fluid balance  - metoprolol 12.5mg BID started     Pulm:  - Extubated 7/30, on high flow nasal cannula  - blood tinged sputum  - f/u Repeat CXR in AM  - Pendng repeat sputum culture    Renal:   - Na goal 140-150  -  Replete electrolytes PRN    GI:  - bowel reg  - NPO overnight s/p self extubation, passed bedside dysphagia    Endo:   - ISS    Heme:   - SCD's, SQH   - trend CBC  - f/u LE doppler 7/29 neg    ID:   - f/u panculture 7/31  - trend WBC    Disposition: ICU status, full code, dispo pending, family updated with plan.    Plan d/w Dr. D'Amico and Dr. Martinez  []  GCS  E   V  M     Heart Failure: []Acute, [] acute on chronic , []chronic  Heart Failure:  [] Diastolic (HFpEF), [] Systolic (HFrEF), []Combined (HFpEF and HFrEF), [] RHF, [] Pulm HTN, [] Other    [] TATIANA, [] ATN, [] AIN, [] other  [] CKD1, [] CKD2, [] CKD 3, [] CKD 4, [] CKD 5, []ESRD    Encephalopathy: [] Metabolic, [] Hepatic, [] toxic, [] Neurological, [] Other    Abnormal Nurtitional Status: [] malnurtition (see nutrition note), [ ]underweight: BMI < 19, [] morbid obesity: BMI >40, [] Cachexia    [] Sepsis  [] hypovolemic shock,[] cardiogenic shock, [] hemorrhagic shock, [] neuogenic shock  [] Acute Respiratory Failure  []Cerebral edema, [] Brain compression/ herniation,   [] Functional quadriplegia  [] Acute blood loss anemia

## 2021-08-01 NOTE — PROGRESS NOTE ADULT - SUBJECTIVE AND OBJECTIVE BOX
=================================  NEUROCRITICAL CARE ATTENDING NOTE  =================================    CRISTI JENNINGS   [id number]  Summary:  46y/M  with PMHx of HTN, noncompliant with meds (Metoprolol, Nifedipine, and ASA), reports no recent use of ASA or metoprolol, presents to ED c/o acute onset and worsening dizziness, R arm paresthesia, and vomiting x 1hr prior to arrival while at work. Pt somnolent in ED, difficulty obtaining hx from pt. Per EMS, pt had SBP in the 200s, given nitro SL x 2 in field. Patient found to have a left side cerebellar hemmorrhage and left pontine hemmorrhage with extension to the subarachnoid space. Patient was started on a nicardipene drip in the ED for SBP to 230's, given manitol 50g and intubated for concern of potential for aspiration with declining exam. Patient reported that he lives alone and has no close contacts in the area.  (28 Jul 2021 20:40)    COURSE IN THE HOSPITAL:  07/28 intubated  07/29 remained intubated - s/p angio, vertebral artery takedown, extubated, desaturated, reintubated, self-extubated, reintubated  07/30 Tmax 39.4, remained intubated, CT overnight; given lasix overnight for pulmo congestion, SBP dropped with propofol, started on levophed, switched to precedex; self-extubated, on NIV  07/31 Tmax 38.4 Agitated overnight.  08/01 Tmax 38.7    Past Medical History: HTN (hypertension)  Allergies:  No Known Allergies  Home meds:     PHYSICAL EXAMINATION  T(C): 37.7 (08-01 @ 06:00), Max: 38.7 (07-31 @ 15:00) HR: 71 (08-01 @ 07:00) (69 - 88) BP: 134/83 (08-01 @ 07:00) (107/69 - 185/112) RR: 21 (08-01 @ 07:00) (15 - 31) SpO2: 100% (08-01 @ 07:00) (90% - 100%)  NEUROLOGIC EXAMINATION:  Patient is lethargic, easily rousable, moves all 4s, DEEDEE, oriented x3  GENERAL:  high flow O2 40% 40LPM  EENT:  anicteric  CARDIOVASCULAR: (+) S1 S2, normal rate and regular rhythm  PULMONARY: diminished  ABDOMEN: soft, nontender with normoactive bowel sounds  EXTREMITIES: no edema  SKIN: no rash    LABS: 08-01  CAPILLARY BLOOD GLUCOSE 98 104 110     (13.57)  13.5   12.35 )-----------( 163      ( 01 Aug 2021 05:58 )             41.3   (148)  145  |  112<H>  |  21  ----------------------------<  109<H>  3.5   |  23  |  1.07    Ca    8.6      01 Aug 2021 05:58  Phos  3.2     08-01  Mg     2.1     08-01 07-31 @ 07:01  -  08-01 @ 07:00  IN: 2709.3 mL / OUT: 2675 mL / NET: 34.3 mL    Bacteriology:  07/30 sputum: oropharyngeal contamination  07/29 Blood CS NG2D x2     CSF studies:  EEG:  Neuroimaging:  Other imaging:    MEDICATIONS: 08-01    ·	heparin   Injectable 5000 SubCutaneous every 8 hours  ·	piperacillin/tazobactam IVPB.. 3.375 IV Intermittent every 6 hours  ·	vancomycin  IVPB 1500 IV Intermittent every 12 hours  ·	metoprolol tartrate Injectable 2.5 milliGRAM(s) IV Push every 6 hours  ·	albuterol/ipratropium for Nebulization 3 Nebulizer every 6 hours  ·	senna 2 Oral at bedtime  ·	atorvastatin 40 Oral at bedtime  ·	insulin lispro (ADMELOG) corrective regimen sliding scale  SubCutaneous every 6 hours  ·	multivitamin 1 Oral daily  ·	acetaminophen   Tablet .. 650 Oral every 6 hours PRN  ·	acetaminophen  Suppository .. 650 Rectal every 6 hours PRN  ·	ondansetron Injectable 4 IV Push every 6 hours PRN    IV FLUIDS: NS@50cc/hr  DRIPS:  ·	dexMEDEtomidine Infusion 0.2 IV Continuous <Continuous> 0.2  ·	niCARdipine Infusion 5 mG/Hr IV Continuous <Continuous>  DIET: NPO  Lines: Steffanie  Drains:  Farias  Wounds:    CODE STATUS:  Full Code                       GOALS OF CARE:  aggressive                      DISPOSITION:  ICU  NIHSS 6 ICH 2 =================================  NEUROCRITICAL CARE ATTENDING NOTE  =================================    CRISTI JENNINGS   [id number]  Summary:  46y/M  with PMHx of HTN, noncompliant with meds (Metoprolol, Nifedipine, and ASA), reports no recent use of ASA or metoprolol, presents to ED c/o acute onset and worsening dizziness, R arm paresthesia, and vomiting x 1hr prior to arrival while at work. Pt somnolent in ED, difficulty obtaining hx from pt. Per EMS, pt had SBP in the 200s, given nitro SL x 2 in field. Patient found to have a left side cerebellar hemmorrhage and left pontine hemmorrhage with extension to the subarachnoid space. Patient was started on a nicardipene drip in the ED for SBP to 230's, given manitol 50g and intubated for concern of potential for aspiration with declining exam. Patient reported that he lives alone and has no close contacts in the area.  (28 Jul 2021 20:40)    COURSE IN THE HOSPITAL:  07/28 intubated  07/29 remained intubated - s/p angio, vertebral artery takedown, extubated, desaturated, reintubated, self-extubated, reintubated  07/30 Tmax 39.4, remained intubated, CT overnight; given lasix overnight for pulmo congestion, SBP dropped with propofol, started on levophed, switched to precedex; self-extubated, on NIV  07/31 Tmax 38.4 Agitated overnight.  08/01 Tmax 38.7 improved agitation    Past Medical History: HTN (hypertension)  Allergies:  No Known Allergies  Home meds: metoprolol / nifedipine     PHYSICAL EXAMINATION  T(C): 37.7 (08-01 @ 06:00), Max: 38.7 (07-31 @ 15:00) HR: 71 (08-01 @ 07:00) (69 - 88) BP: 134/83 (08-01 @ 07:00) (107/69 - 185/112) RR: 21 (08-01 @ 07:00) (15 - 31) SpO2: 100% (08-01 @ 07:00) (90% - 100%)  NEUROLOGIC EXAMINATION:  Patient is awake, alert, oriented x4, moves all 4s, DEEDEE, oriented x3; moves all 4s with good strength, garbled speech  GENERAL:  high flow O2 40% 40LPM  EENT:  anicteric  CARDIOVASCULAR: (+) S1 S2, normal rate and regular rhythm  PULMONARY: rhonchi all lung fields, crackles   ABDOMEN: soft, nontender with normoactive bowel sounds  EXTREMITIES: no edema  SKIN: no rash    LABS: 08-01  CAPILLARY BLOOD GLUCOSE 98 104 110     (13.57)  13.5   12.35 )-----------( 163      ( 01 Aug 2021 05:58 )             41.3   (148)  145  |  112<H>  |  21  ----------------------------<  109<H>  3.5   |  23  |  1.07 (1.04)    Ca    8.6      01 Aug 2021 05:58  Phos  3.2     08-01  Mg     2.1     08-01 07-31 @ 07:01  -  08-01 @ 07:00  IN: 2709.3 mL / OUT: 2675 mL / NET: 34.3 mL    Bacteriology:  07/30 sputum: oropharyngeal contamination  07/29 Blood CS NG2D x2     CSF studies:  EEG:  Neuroimaging:  Other imaging:    MEDICATIONS: 08-01    ·	heparin   Injectable 5000 SubCutaneous every 8 hours  ·	piperacillin/tazobactam IVPB.. 3.375 IV Intermittent every 6 hours  ·	vancomycin  IVPB 1500 IV Intermittent every 12 hours  ·	metoprolol tartrate Injectable 2.5 milliGRAM(s) IV Push every 6 hours  ·	albuterol/ipratropium for Nebulization 3 Nebulizer every 6 hours  ·	senna 2 Oral at bedtime  ·	atorvastatin 40 Oral at bedtime  ·	insulin lispro (ADMELOG) corrective regimen sliding scale  SubCutaneous every 6 hours  ·	multivitamin 1 Oral daily  ·	acetaminophen   Tablet .. 650 Oral every 6 hours PRN  ·	acetaminophen  Suppository .. 650 Rectal every 6 hours PRN  ·	ondansetron Injectable 4 IV Push every 6 hours PRN    IV FLUIDS: IVL  DRIPS:  DIET: regular  Lines: Steffanie  Drains:    Wounds:    CODE STATUS:  Full Code                       GOALS OF CARE:  aggressive                      DISPOSITION:  ICU  NIHSS 6 ICH 2

## 2021-08-02 LAB
ANION GAP SERPL CALC-SCNC: 11 MMOL/L — SIGNIFICANT CHANGE UP (ref 5–17)
ANION GAP SERPL CALC-SCNC: 15 MMOL/L — SIGNIFICANT CHANGE UP (ref 5–17)
BUN SERPL-MCNC: 16 MG/DL — SIGNIFICANT CHANGE UP (ref 7–23)
BUN SERPL-MCNC: 21 MG/DL — SIGNIFICANT CHANGE UP (ref 7–23)
CALCIUM SERPL-MCNC: 8.4 MG/DL — SIGNIFICANT CHANGE UP (ref 8.4–10.5)
CALCIUM SERPL-MCNC: 8.8 MG/DL — SIGNIFICANT CHANGE UP (ref 8.4–10.5)
CHLORIDE SERPL-SCNC: 108 MMOL/L — SIGNIFICANT CHANGE UP (ref 96–108)
CHLORIDE SERPL-SCNC: 113 MMOL/L — HIGH (ref 96–108)
CO2 SERPL-SCNC: 21 MMOL/L — LOW (ref 22–31)
CO2 SERPL-SCNC: 22 MMOL/L — SIGNIFICANT CHANGE UP (ref 22–31)
CREAT SERPL-MCNC: 0.9 MG/DL — SIGNIFICANT CHANGE UP (ref 0.5–1.3)
CREAT SERPL-MCNC: 1.01 MG/DL — SIGNIFICANT CHANGE UP (ref 0.5–1.3)
GLUCOSE SERPL-MCNC: 105 MG/DL — HIGH (ref 70–99)
GLUCOSE SERPL-MCNC: 99 MG/DL — SIGNIFICANT CHANGE UP (ref 70–99)
HCT VFR BLD CALC: 45.8 % — SIGNIFICANT CHANGE UP (ref 39–50)
HGB BLD-MCNC: 15.4 G/DL — SIGNIFICANT CHANGE UP (ref 13–17)
MAGNESIUM SERPL-MCNC: 1.9 MG/DL — SIGNIFICANT CHANGE UP (ref 1.6–2.6)
MAGNESIUM SERPL-MCNC: 1.9 MG/DL — SIGNIFICANT CHANGE UP (ref 1.6–2.6)
MCHC RBC-ENTMCNC: 29.4 PG — SIGNIFICANT CHANGE UP (ref 27–34)
MCHC RBC-ENTMCNC: 33.6 GM/DL — SIGNIFICANT CHANGE UP (ref 32–36)
MCV RBC AUTO: 87.6 FL — SIGNIFICANT CHANGE UP (ref 80–100)
NRBC # BLD: 0 /100 WBCS — SIGNIFICANT CHANGE UP (ref 0–0)
PHOSPHATE SERPL-MCNC: 2.4 MG/DL — LOW (ref 2.5–4.5)
PLATELET # BLD AUTO: 201 K/UL — SIGNIFICANT CHANGE UP (ref 150–400)
POTASSIUM SERPL-MCNC: 3.7 MMOL/L — SIGNIFICANT CHANGE UP (ref 3.5–5.3)
POTASSIUM SERPL-MCNC: 3.8 MMOL/L — SIGNIFICANT CHANGE UP (ref 3.5–5.3)
POTASSIUM SERPL-MCNC: SIGNIFICANT CHANGE UP (ref 3.5–5.3)
POTASSIUM SERPL-SCNC: 3.7 MMOL/L — SIGNIFICANT CHANGE UP (ref 3.5–5.3)
POTASSIUM SERPL-SCNC: 3.8 MMOL/L — SIGNIFICANT CHANGE UP (ref 3.5–5.3)
POTASSIUM SERPL-SCNC: SIGNIFICANT CHANGE UP (ref 3.5–5.3)
PROCALCITONIN SERPL-MCNC: 0.5 NG/ML — HIGH (ref 0.02–0.1)
RBC # BLD: 5.23 M/UL — SIGNIFICANT CHANGE UP (ref 4.2–5.8)
RBC # FLD: 12.9 % — SIGNIFICANT CHANGE UP (ref 10.3–14.5)
SODIUM SERPL-SCNC: 144 MMOL/L — SIGNIFICANT CHANGE UP (ref 135–145)
SODIUM SERPL-SCNC: 146 MMOL/L — HIGH (ref 135–145)
TROPONIN T SERPL-MCNC: 0.08 NG/ML — CRITICAL HIGH (ref 0–0.01)
TROPONIN T SERPL-MCNC: 0.1 NG/ML — CRITICAL HIGH (ref 0–0.01)
TROPONIN T SERPL-MCNC: 0.11 NG/ML — CRITICAL HIGH (ref 0–0.01)
VANCOMYCIN TROUGH SERPL-MCNC: 11.6 UG/ML — SIGNIFICANT CHANGE UP (ref 10–20)
WBC # BLD: 8.97 K/UL — SIGNIFICANT CHANGE UP (ref 3.8–10.5)
WBC # FLD AUTO: 8.97 K/UL — SIGNIFICANT CHANGE UP (ref 3.8–10.5)

## 2021-08-02 PROCEDURE — 71045 X-RAY EXAM CHEST 1 VIEW: CPT | Mod: 26

## 2021-08-02 PROCEDURE — 99291 CRITICAL CARE FIRST HOUR: CPT

## 2021-08-02 PROCEDURE — 99233 SBSQ HOSP IP/OBS HIGH 50: CPT

## 2021-08-02 PROCEDURE — 36620 INSERTION CATHETER ARTERY: CPT

## 2021-08-02 RX ORDER — METOPROLOL TARTRATE 50 MG
5 TABLET ORAL ONCE
Refills: 0 | Status: COMPLETED | OUTPATIENT
Start: 2021-08-02 | End: 2021-08-02

## 2021-08-02 RX ORDER — SODIUM CHLORIDE 9 MG/ML
500 INJECTION INTRAMUSCULAR; INTRAVENOUS; SUBCUTANEOUS ONCE
Refills: 0 | Status: COMPLETED | OUTPATIENT
Start: 2021-08-02 | End: 2021-08-02

## 2021-08-02 RX ORDER — POTASSIUM CHLORIDE 20 MEQ
40 PACKET (EA) ORAL ONCE
Refills: 0 | Status: COMPLETED | OUTPATIENT
Start: 2021-08-02 | End: 2021-08-02

## 2021-08-02 RX ORDER — ADENOSINE 3 MG/ML
0.6 INJECTION INTRAVENOUS ONCE
Refills: 0 | Status: COMPLETED | OUTPATIENT
Start: 2021-08-02 | End: 2021-08-02

## 2021-08-02 RX ORDER — VERAPAMIL HCL 240 MG
80 CAPSULE, EXTENDED RELEASE PELLETS 24 HR ORAL EVERY 8 HOURS
Refills: 0 | Status: DISCONTINUED | OUTPATIENT
Start: 2021-08-02 | End: 2021-08-02

## 2021-08-02 RX ORDER — POTASSIUM PHOSPHATE, MONOBASIC POTASSIUM PHOSPHATE, DIBASIC 236; 224 MG/ML; MG/ML
30 INJECTION, SOLUTION INTRAVENOUS ONCE
Refills: 0 | Status: COMPLETED | OUTPATIENT
Start: 2021-08-02 | End: 2021-08-02

## 2021-08-02 RX ORDER — AMIODARONE HYDROCHLORIDE 400 MG/1
400 TABLET ORAL EVERY 8 HOURS
Refills: 0 | Status: DISCONTINUED | OUTPATIENT
Start: 2021-08-02 | End: 2021-08-02

## 2021-08-02 RX ORDER — AMIODARONE HYDROCHLORIDE 400 MG/1
TABLET ORAL
Refills: 0 | Status: DISCONTINUED | OUTPATIENT
Start: 2021-08-02 | End: 2021-08-05

## 2021-08-02 RX ORDER — AMIODARONE HYDROCHLORIDE 400 MG/1
400 TABLET ORAL
Refills: 0 | Status: DISCONTINUED | OUTPATIENT
Start: 2021-08-02 | End: 2021-08-05

## 2021-08-02 RX ORDER — POLYETHYLENE GLYCOL 3350 17 G/17G
17 POWDER, FOR SOLUTION ORAL DAILY
Refills: 0 | Status: DISCONTINUED | OUTPATIENT
Start: 2021-08-02 | End: 2021-08-12

## 2021-08-02 RX ORDER — MAGNESIUM SULFATE 500 MG/ML
1 VIAL (ML) INJECTION ONCE
Refills: 0 | Status: COMPLETED | OUTPATIENT
Start: 2021-08-02 | End: 2021-08-02

## 2021-08-02 RX ORDER — LOSARTAN POTASSIUM 100 MG/1
25 TABLET, FILM COATED ORAL DAILY
Refills: 0 | Status: DISCONTINUED | OUTPATIENT
Start: 2021-08-02 | End: 2021-08-03

## 2021-08-02 RX ORDER — DILTIAZEM HCL 120 MG
5 CAPSULE, EXT RELEASE 24 HR ORAL
Qty: 125 | Refills: 0 | Status: DISCONTINUED | OUTPATIENT
Start: 2021-08-02 | End: 2021-08-02

## 2021-08-02 RX ORDER — VERAPAMIL HCL 240 MG
10 CAPSULE, EXTENDED RELEASE PELLETS 24 HR ORAL ONCE
Refills: 0 | Status: COMPLETED | OUTPATIENT
Start: 2021-08-02 | End: 2021-08-02

## 2021-08-02 RX ORDER — HYDROMORPHONE HYDROCHLORIDE 2 MG/ML
0.5 INJECTION INTRAMUSCULAR; INTRAVENOUS; SUBCUTANEOUS ONCE
Refills: 0 | Status: DISCONTINUED | OUTPATIENT
Start: 2021-08-02 | End: 2021-08-02

## 2021-08-02 RX ORDER — NICARDIPINE HYDROCHLORIDE 30 MG/1
5 CAPSULE, EXTENDED RELEASE ORAL
Qty: 40 | Refills: 0 | Status: DISCONTINUED | OUTPATIENT
Start: 2021-08-02 | End: 2021-08-04

## 2021-08-02 RX ORDER — AMIODARONE HYDROCHLORIDE 400 MG/1
TABLET ORAL
Refills: 0 | Status: DISCONTINUED | OUTPATIENT
Start: 2021-08-02 | End: 2021-08-02

## 2021-08-02 RX ORDER — DILTIAZEM HCL 120 MG
60 CAPSULE, EXT RELEASE 24 HR ORAL EVERY 8 HOURS
Refills: 0 | Status: DISCONTINUED | OUTPATIENT
Start: 2021-08-02 | End: 2021-08-04

## 2021-08-02 RX ADMIN — SENNA PLUS 2 TABLET(S): 8.6 TABLET ORAL at 21:39

## 2021-08-02 RX ADMIN — Medication 5 MILLIGRAM(S): at 09:37

## 2021-08-02 RX ADMIN — Medication 650 MILLIGRAM(S): at 12:30

## 2021-08-02 RX ADMIN — SODIUM CHLORIDE 2000 MILLILITER(S): 9 INJECTION INTRAMUSCULAR; INTRAVENOUS; SUBCUTANEOUS at 08:35

## 2021-08-02 RX ADMIN — Medication 5 MILLIGRAM(S): at 08:19

## 2021-08-02 RX ADMIN — HEPARIN SODIUM 5000 UNIT(S): 5000 INJECTION INTRAVENOUS; SUBCUTANEOUS at 14:25

## 2021-08-02 RX ADMIN — Medication 5 MILLIGRAM(S): at 09:54

## 2021-08-02 RX ADMIN — LOSARTAN POTASSIUM 25 MILLIGRAM(S): 100 TABLET, FILM COATED ORAL at 22:50

## 2021-08-02 RX ADMIN — Medication 650 MILLIGRAM(S): at 23:12

## 2021-08-02 RX ADMIN — Medication 60 MILLIGRAM(S): at 14:25

## 2021-08-02 RX ADMIN — POLYETHYLENE GLYCOL 3350 17 GRAM(S): 17 POWDER, FOR SOLUTION ORAL at 05:55

## 2021-08-02 RX ADMIN — Medication 40 MILLIEQUIVALENT(S): at 00:41

## 2021-08-02 RX ADMIN — Medication 300 MILLIGRAM(S): at 18:05

## 2021-08-02 RX ADMIN — CHLORHEXIDINE GLUCONATE 1 APPLICATION(S): 213 SOLUTION TOPICAL at 05:56

## 2021-08-02 RX ADMIN — HYDROMORPHONE HYDROCHLORIDE 0.5 MILLIGRAM(S): 2 INJECTION INTRAMUSCULAR; INTRAVENOUS; SUBCUTANEOUS at 04:33

## 2021-08-02 RX ADMIN — PIPERACILLIN AND TAZOBACTAM 200 GRAM(S): 4; .5 INJECTION, POWDER, LYOPHILIZED, FOR SOLUTION INTRAVENOUS at 18:04

## 2021-08-02 RX ADMIN — PIPERACILLIN AND TAZOBACTAM 200 GRAM(S): 4; .5 INJECTION, POWDER, LYOPHILIZED, FOR SOLUTION INTRAVENOUS at 23:11

## 2021-08-02 RX ADMIN — ATORVASTATIN CALCIUM 40 MILLIGRAM(S): 80 TABLET, FILM COATED ORAL at 21:39

## 2021-08-02 RX ADMIN — Medication 10 MILLIGRAM(S): at 08:20

## 2021-08-02 RX ADMIN — Medication 40 MILLIGRAM(S): at 05:55

## 2021-08-02 RX ADMIN — HYDROMORPHONE HYDROCHLORIDE 0.5 MILLIGRAM(S): 2 INJECTION INTRAMUSCULAR; INTRAVENOUS; SUBCUTANEOUS at 04:12

## 2021-08-02 RX ADMIN — Medication 650 MILLIGRAM(S): at 05:43

## 2021-08-02 RX ADMIN — SODIUM CHLORIDE 2000 MILLILITER(S): 9 INJECTION INTRAMUSCULAR; INTRAVENOUS; SUBCUTANEOUS at 09:21

## 2021-08-02 RX ADMIN — HEPARIN SODIUM 5000 UNIT(S): 5000 INJECTION INTRAVENOUS; SUBCUTANEOUS at 05:55

## 2021-08-02 RX ADMIN — ADENOSINE 0.6 MILLIGRAM(S): 3 INJECTION INTRAVENOUS at 08:44

## 2021-08-02 RX ADMIN — Medication 60 MILLIGRAM(S): at 21:39

## 2021-08-02 RX ADMIN — PIPERACILLIN AND TAZOBACTAM 200 GRAM(S): 4; .5 INJECTION, POWDER, LYOPHILIZED, FOR SOLUTION INTRAVENOUS at 05:23

## 2021-08-02 RX ADMIN — HEPARIN SODIUM 5000 UNIT(S): 5000 INJECTION INTRAVENOUS; SUBCUTANEOUS at 21:39

## 2021-08-02 RX ADMIN — Medication 650 MILLIGRAM(S): at 23:49

## 2021-08-02 RX ADMIN — Medication 1 TABLET(S): at 12:25

## 2021-08-02 RX ADMIN — Medication 3 MILLILITER(S): at 05:20

## 2021-08-02 RX ADMIN — AMIODARONE HYDROCHLORIDE 400 MILLIGRAM(S): 400 TABLET ORAL at 18:04

## 2021-08-02 RX ADMIN — Medication 650 MILLIGRAM(S): at 13:00

## 2021-08-02 RX ADMIN — POTASSIUM PHOSPHATE, MONOBASIC POTASSIUM PHOSPHATE, DIBASIC 83.33 MILLIMOLE(S): 236; 224 INJECTION, SOLUTION INTRAVENOUS at 07:47

## 2021-08-02 RX ADMIN — Medication 100 GRAM(S): at 00:42

## 2021-08-02 RX ADMIN — SODIUM CHLORIDE 2000 MILLILITER(S): 9 INJECTION INTRAMUSCULAR; INTRAVENOUS; SUBCUTANEOUS at 08:20

## 2021-08-02 RX ADMIN — PIPERACILLIN AND TAZOBACTAM 200 GRAM(S): 4; .5 INJECTION, POWDER, LYOPHILIZED, FOR SOLUTION INTRAVENOUS at 12:24

## 2021-08-02 RX ADMIN — Medication 300 MILLIGRAM(S): at 07:06

## 2021-08-02 RX ADMIN — Medication 650 MILLIGRAM(S): at 04:43

## 2021-08-02 NOTE — PROCEDURE NOTE - NSPROCDETAILS_GEN_ALL_CORE
location identified, draped/prepped, sterile technique used, needle inserted/introduced/positive blood return obtained via catheter/connected to a pressurized flush line/sutured in place/all materials/supplies accounted for at end of procedure
location identified, draped/prepped, sterile technique used, needle inserted/introduced/positive blood return obtained via catheter/connected to a pressurized flush line/sutured in place/Seldinger technique

## 2021-08-02 NOTE — PROGRESS NOTE ADULT - SUBJECTIVE AND OBJECTIVE BOX
=================================  NEUROCRITICAL CARE ATTENDING NOTE  =================================    CRISTI JENNINGS   [id number]  Summary:  46y/M  with PMHx of HTN, noncompliant with meds (Metoprolol, Nifedipine, and ASA), reports no recent use of ASA or metoprolol, presents to ED c/o acute onset and worsening dizziness, R arm paresthesia, and vomiting x 1hr prior to arrival while at work. Pt somnolent in ED, difficulty obtaining hx from pt. Per EMS, pt had SBP in the 200s, given nitro SL x 2 in field. Patient found to have a left side cerebellar hemmorrhage and left pontine hemmorrhage with extension to the subarachnoid space. Patient was started on a nicardipene drip in the ED for SBP to 230's, given manitol 50g and intubated for concern of potential for aspiration with declining exam. Patient reported that he lives alone and has no close contacts in the area.  (28 Jul 2021 20:40)    COURSE IN THE HOSPITAL:  07/28 intubated  07/29 remained intubated - s/p angio, vertebral artery takedown, extubated, desaturated, reintubated, self-extubated, reintubated  07/30 Tmax 39.4, remained intubated, CT overnight; given lasix overnight for pulmo congestion, SBP dropped with propofol, started on levophed, switched to precedex; self-extubated, on NIV  07/31 Tmax 38.4 Agitated overnight.  08/01 Tmax 38.7 improved agitation  08/02 Tmax 38.4 c/o chest pain this morning    Past Medical History: HTN (hypertension)  Allergies:  No Known Allergies  Home meds: metoprolol / nifedipine     PHYSICAL EXAMINATION  T(C): 38 (08-02 @ 05:12), Max: 38.4 (08-01 @ 14:29) HR: 126 (08-02 @ 06:00) (62 - 126) BP: 164/98 (08-02 @ 06:00) (111/85 - 164/102) RR: 37 (08-02 @ 06:00) (18 - 38) SpO2: 96% (08-02 @ 06:00) (92% - 100%)   NEUROLOGIC EXAMINATION:  Patient is awake, alert, oriented x4, moves all 4s, DEEDEE, oriented x3; moves all 4s with good strength, garbled speech  GENERAL:  high flow O2 40% 40LPM  EENT:  anicteric  CARDIOVASCULAR: (+) S1 S2, tachycardic rate and regular rhythm  PULMONARY: rhonchi all lung fields, crackles   ABDOMEN: soft, nontender with normoactive bowel sounds  EXTREMITIES: no edema  SKIN: no rash    LABS: 08-02    (12.35)  15.4  (13.5)  8.97  )-----------( 201      ( 02 Aug 2021 05:17 )             45.8     144  |  108  |  16  ----------------------------<  105<H>  See Note   |  21<L>  |  0.90    Ca    8.8      02 Aug 2021 05:17  Phos  2.4     08-02  Mg     1.9     08-02 07-31 @ 07:01  -  08-01 @ 07:00  IN: 2725.3 mL / OUT: 2675 mL / NET: 50.3 mL    Troponin T,  serum  0.10 (08-02 @ 05:17) 0.11 (08-02 @ 00:06) 0.09 (08-01 @ 03:28) 0.13 (07-31 @ 22:34) 0.10 (07-31 @ 14:49) <0.01 (07-28 @ 23:36) 0.01 (07-28 @ 15:14)    Bacteriology:  07/31 Blood CS NG1D x2  07/30 sputum: oropharyngeal contamination  07/29 Blood CS NG2D x2    CSF studies:  EEG:  Neuroimaging:  Other imaging:    MEDICATIONS: 08-01    ·	heparin   Injectable 5000 SubCutaneous every 8 hours  ·	piperacillin/tazobactam IVPB.. 3.375 IV Intermittent every 6 hours  ·	vancomycin  IVPB 1500 IV Intermittent every 12 hours  ·	metoprolol tartrate Injectable 2.5 milliGRAM(s) IV Push every 6 hours  ·	albuterol/ipratropium for Nebulization 3 Nebulizer every 6 hours  ·	senna 2 Oral at bedtime  ·	atorvastatin 40 Oral at bedtime  ·	insulin lispro (ADMELOG) corrective regimen sliding scale  SubCutaneous every 6 hours  ·	multivitamin 1 Oral daily  ·	acetaminophen   Tablet .. 650 Oral every 6 hours PRN  ·	acetaminophen  Suppository .. 650 Rectal every 6 hours PRN  ·	ondansetron Injectable 4 IV Push every 6 hours PRN    MEDICATIONS: 08-02    ·	heparin   Injectable 5000 SubCutaneous every 8 hours  ·	piperacillin/tazobactam IVPB.. 3.375 IV Intermittent every 6 hours  ·	vancomycin  IVPB 1500 IV Intermittent every 12 hours  ·	verapamil 40 milliGRAM(s) Oral every 8 hours  ·	albuterol/ipratropium for Nebulization 3 Nebulizer every 6 hours  ·	polyethylene glycol 3350 17 Oral daily  ·	senna 2 Oral at bedtime  ·	atorvastatin 40 Oral at bedtime  ·	multivitamin 1 Oral daily  ·	acetaminophen   Tablet .. 650 Oral every 6 hours PRN  ·	acetaminophen  Suppository .. 650 Rectal every 6 hours PRN  ·	bisacodyl 5 Oral every 12 hours PRN  ·	ondansetron Injectable 4 IV Push every 6 hours PRN    IV FLUIDS: IVL  DRIPS:  ·	niCARdipine Infusion 5 mG/Hr IV Continuous <Continuous>  DIET: regular  Lines: [place]  Drains:    Wounds:    CODE STATUS:  Full Code                       GOALS OF CARE:  aggressive                      DISPOSITION:  ICU  NIHSS 6 ICH 2 =================================  NEUROCRITICAL CARE ATTENDING NOTE  =================================    CRISTI JENNINGS   MRN-[id number]  Summary:  46y/M  with PMHx of HTN, noncompliant with meds (Metoprolol, Nifedipine, and ASA), reports no recent use of ASA or metoprolol, presents to ED c/o acute onset and worsening dizziness, R arm paresthesia, and vomiting x 1hr prior to arrival while at work. Pt somnolent in ED, difficulty obtaining hx from pt. Per EMS, pt had SBP in the 200s, given nitro SL x 2 in field. Patient found to have a left side cerebellar hemmorrhage and left pontine hemmorrhage with extension to the subarachnoid space. Patient was started on a nicardipene drip in the ED for SBP to 230's, given manitol 50g and intubated for concern of potential for aspiration with declining exam. Patient reported that he lives alone and has no close contacts in the area.  (28 Jul 2021 20:40)    COURSE IN THE HOSPITAL:  07/28 intubated  07/29 remained intubated - s/p angio, vertebral artery takedown, extubated, desaturated, reintubated, self-extubated, reintubated  07/30 Tmax 39.4, remained intubated, CT overnight; given lasix overnight for pulmo congestion, SBP dropped with propofol, started on levophed, switched to precedex; self-extubated, on NIV  07/31 Tmax 38.4 Agitated overnight.  08/01 Tmax 38.7 improved agitation  08/02 Tmax 38.4 c/o chest pain this morning, new onset atrial fibrillation    Past Medical History: HTN (hypertension)  Allergies:  No Known Allergies  Home meds: metoprolol / nifedipine     PHYSICAL EXAMINATION  T(C): 38 (08-02 @ 05:12), Max: 38.4 (08-01 @ 14:29) HR: 126 (08-02 @ 06:00) (62 - 126) BP: 164/98 (08-02 @ 06:00) (111/85 - 164/102) RR: 37 (08-02 @ 06:00) (18 - 38) SpO2: 96% (08-02 @ 06:00) (92% - 100%)   NEUROLOGIC EXAMINATION:  Patient is awake, alert, oriented x4, moves all 4s, DEEDEE, oriented x3; moves all 4s with good strength, garbled speech  GENERAL:  room air   EENT:  anicteric  CARDIOVASCULAR: (+) S1 S2, tachycardic rate and regular rhythm  PULMONARY: rhonchi all lung fields, crackles   ABDOMEN: soft, nontender with normoactive bowel sounds  EXTREMITIES: no edema  SKIN: no rash    LABS: 08-02    (12.35)  15.4  (13.5)  8.97  )-----------( 201      ( 02 Aug 2021 05:17 )             45.8     144  |  108  |  16  ----------------------------<  105<H>  See Note   |  21<L>  |  0.90    Ca    8.8      02 Aug 2021 05:17  Phos  2.4     08-02  Mg     1.9     08-02 07-31 @ 07:01  -  08-01 @ 07:00  IN: 2725.3 mL / OUT: 2675 mL / NET: 50.3 mL    Troponin T,  serum  0.10 (08-02 @ 05:17) 0.11 (08-02 @ 00:06) 0.09 (08-01 @ 03:28) 0.13 (07-31 @ 22:34) 0.10 (07-31 @ 14:49) <0.01 (07-28 @ 23:36) 0.01 (07-28 @ 15:14)    Bacteriology:  07/31 Blood CS NG1D x2  07/30 sputum: oropharyngeal contamination  07/29 Blood CS NG2D x2    CSF studies:  EEG:  Neuroimaging:  Other imaging:    MEDICATIONS: 08-02    ·	heparin   Injectable 5000 SubCutaneous every 8 hours  ·	piperacillin/tazobactam IVPB.. 3.375 IV Intermittent every 6 hours  ·	vancomycin  IVPB 1500 IV Intermittent every 12 hours  ·	verapamil 40 milliGRAM(s) Oral every 8 hours  ·	albuterol/ipratropium for Nebulization 3 Nebulizer every 6 hours  ·	polyethylene glycol 3350 17 Oral daily  ·	senna 2 Oral at bedtime  ·	atorvastatin 40 Oral at bedtime  ·	multivitamin 1 Oral daily  ·	acetaminophen   Tablet .. 650 Oral every 6 hours PRN  ·	acetaminophen  Suppository .. 650 Rectal every 6 hours PRN  ·	bisacodyl 5 Oral every 12 hours PRN  ·	ondansetron Injectable 4 IV Push every 6 hours PRN    IV FLUIDS: IVL  DRIPS:  ·	niCARdipine Infusion 5 mG/Hr IV Continuous <Continuous>  ·	diltiazem drip - 10mg/hr  DIET: regular  Lines:   Drains:    Wounds:    CODE STATUS:  Full Code                       GOALS OF CARE:  aggressive                      DISPOSITION:  ICU  NIHSS 6 ICH 2

## 2021-08-02 NOTE — PROGRESS NOTE ADULT - ATTENDING COMMENTS
start diltiazem 60 mg po q8  goal will be to get his heart rate down as much as possible to decrease his outlfow gradient  I would start him on amiodarone to maintain sinus rhythm for the short term   will need long DOAC when surgically allowable start diltiazem 60 mg po q8  goal will be to get his heart rate down as much as possible to decrease his outlfow gradient can start amiodarone 400 mg po bid for 5 gm load then decrease to 200 mg a day  I would start him on amiodarone to maintain sinus rhythm for the short term   will need long DOAC when surgically allowable start diltiazem 60 mg po q8  can stop IV diltiazem   goal will be to get his heart rate down as much as possible to decrease his outlfow gradient can start amiodarone 400 mg po bid for 5 gm load then decrease to 200 mg a day  I would start him on amiodarone to maintain sinus rhythm for the short term   will need long DOAC when surgically allowable

## 2021-08-02 NOTE — PROVIDER CONTACT NOTE (CHANGE IN STATUS NOTIFICATION) - SITUATION
Pt with sustained HR above 200 in SVT Pt with sustained HR above 200 in SVT followed by Sinus Tachy and then converted to rapid Afib

## 2021-08-02 NOTE — PROGRESS NOTE ADULT - ASSESSMENT
46y/M with  L cerebellar hematoma, VA dissection, brain compression, IVH  HTN (hypertension)  elevated creatinine  HOCM    PLAN:   NEURO: neurochecks q2h, VSq1h, PRN pain meds with acetaminophen, d/c Dexmedetomidine   angio, VA takedown;  BP control, MRI on stepdown  agitation - resolving  REHAB:  physical therapy evaluation and management    EARLY MOB:  OOB to chair ambulate    PULM:  continue high flow, drop FiO2 to 30%, may wean to nasal cannula, standing ipratropium / albuterol; 20 IV furosemide x1   CARDIO:  SBP goal 100-160mm Hg, [place]; switch metoprolol 12.5 BID with hold parameters  and HR 60; taper cardene off,   ENDO:  Blood sugar goals 140-180 mg/dL, d/c insulin sliding scale  GI:  bowel regimen   DIET: regular diet   RENAL:  140-150; CXR in AM; BMP 2pm  HEM/ONC: Hb stable  VTE Prophylaxis: SCDs, SQH   ID: febrile, leukocytosis improving; piperacillin/tazobactam vancomycin (last day 08/03), vanc trough prior to 4th dose  Social: sister is [facility] - [place] update      ATTENDING ATTESTATION:  I was physically present for the key portions of the evaluation and management (E/M) service provided.  I agree with the above history, physical and plan, which I have reviewed and edited where appropriate.    Patient at high risk for neurological deterioration or death due to:  ICU delirium, aspiration PNA, DVT / PE.  Critical care time:  I have personally provided 45 minutes of critical care time, excluding time spent on separate procedures.      Plan discussed with RN, house staff. 46y/M with  L cerebellar hematoma, VA dissection, brain compression, IVH  HTN (hypertension)  elevated creatinine  HOCM    PLAN:   NEURO: neurochecks q2h, VSq1h, PRN pain meds with acetaminophen  angio, VA takedown;  BP control, MRI on stepdown  agitation - resolved   REHAB:  physical therapy evaluation and management    EARLY MOB:  bedrest    PULM:  room air, d/c duonebs  CARDIO:  SBP goal 100-160mm Hg, [place]; switch metoprolol 12.5 BID with hold parameters  and HR 60; taper cardene off,   ENDO:  Blood sugar goals 140-180 mg/dL  GI:  bowel regimen   DIET: regular diet   RENAL:  140-150; CXR in AM  HEM/ONC: Hb stable  VTE Prophylaxis: SCDs, SQH   ID: febrile - ?central, leukocytosis improving; piperacillin/tazobactam vancomycin (last day 08/03), vanc trough prior to 4th dose, check PCT  Social: sister is HCP - [place] update      ATTENDING ATTESTATION:  I was physically present for the key portions of the evaluation and management (E/M) service provided.  I agree with the above history, physical and plan, which I have reviewed and edited where appropriate.    Patient at high risk for neurological deterioration or death due to:  ICU delirium, aspiration PNA, DVT / PE.  Critical care time:  I have personally provided 45 minutes of critical care time, excluding time spent on separate procedures.      Plan discussed with RN, house staff.

## 2021-08-02 NOTE — PROCEDURE NOTE - NSINFORMCONSENT_GEN_A_CORE
Benefits, risks, and possible complications of procedure explained to patient/caregiver who verbalized understanding and gave verbal consent.
Benefits, risks, and possible complications of procedure explained to patient/caregiver who verbalized understanding and gave written consent.
This was an emergent procedure.
Benefits, risks, and possible complications of procedure explained to patient/caregiver who verbalized understanding and gave written consent.

## 2021-08-02 NOTE — PROGRESS NOTE ADULT - SUBJECTIVE AND OBJECTIVE BOX
O: today had SVT, high rate a.fib started on diltiazem   low grade fever     T(C): 37.6 (08-02-21 @ 18:01), Max: 38.1 (08-02-21 @ 12:00)  HR: 109 (08-02-21 @ 19:00) (84 - 214)  BP: 149/92 (08-02-21 @ 19:00) (108/64 - 191/128)  RR: 30 (08-02-21 @ 19:00) (20 - 39)  SpO2: 95% (08-02-21 @ 19:00) (59% - 100%)  08-01-21 @ 07:01  -  08-02-21 @ 07:00  --------------------------------------------------------  IN: 1930.5 mL / OUT: 4360 mL / NET: -2429.5 mL    08-02-21 @ 07:01  -  08-02-21 @ 19:51  --------------------------------------------------------  IN: 3214.9 mL / OUT: 2250 mL / NET: 964.9 mL    acetaminophen   Tablet .. 650 milliGRAM(s) Oral every 6 hours PRN  acetaminophen  Suppository .. 650 milliGRAM(s) Rectal every 6 hours PRN  aMIOdarone    Tablet   Oral   aMIOdarone    Tablet 400 milliGRAM(s) Oral two times a day  atorvastatin 40 milliGRAM(s) Oral at bedtime  bisacodyl 5 milliGRAM(s) Oral every 12 hours PRN  chlorhexidine 2% Cloths 1 Application(s) Topical daily  dextrose 50% Injectable 25 Gram(s) IV Push once  diltiazem    Tablet 60 milliGRAM(s) Oral every 8 hours  glucagon  Injectable 1 milliGRAM(s) IntraMuscular once  heparin   Injectable 5000 Unit(s) SubCutaneous every 8 hours  multivitamin 1 Tablet(s) Oral daily  niCARdipine Infusion 5 mG/Hr IV Continuous <Continuous>  ondansetron Injectable 4 milliGRAM(s) IV Push every 6 hours PRN  piperacillin/tazobactam IVPB.. 3.375 Gram(s) IV Intermittent every 6 hours  polyethylene glycol 3350 17 Gram(s) Oral daily  senna 2 Tablet(s) Oral at bedtime  vancomycin  IVPB 1500 milliGRAM(s) IV Intermittent every 12 hours    NEURO EXAM   Patient is awake, alert, oriented x4, moves all 4s, DEEDEE, oriented x3; moves all 4s with good strength, garbled speech    LABS:  Na: 144 (08-02 @ 05:17), 146 (08-02 @ 00:06), 149 (08-01 @ 15:18), 145 (08-01 @ 05:58), 148 (07-31 @ 14:49), 147 (07-31 @ 03:16)  K: 3.8 (08-02 @ 06:44), See Note (08-02 @ 05:17), 3.7 (08-02 @ 00:06), 3.4 (08-01 @ 15:18), 3.5 (08-01 @ 05:58), 3.4 (07-31 @ 14:49), 3.4 (07-31 @ 03:16)  Cl: 108 (08-02 @ 05:17), 113 (08-02 @ 00:06), 112 (08-01 @ 15:18), 112 (08-01 @ 05:58), 112 (07-31 @ 14:49), 113 (07-31 @ 03:16)  CO2: 21 (08-02 @ 05:17), 22 (08-02 @ 00:06), 27 (08-01 @ 15:18), 23 (08-01 @ 05:58), 26 (07-31 @ 14:49), 27 (07-31 @ 03:16)  BUN: 16 (08-02 @ 05:17), 21 (08-02 @ 00:06), 24 (08-01 @ 15:18), 21 (08-01 @ 05:58), 15 (07-31 @ 14:49), 17 (07-31 @ 03:16)  Cr: 0.90 (08-02 @ 05:17), 1.01 (08-02 @ 00:06), 1.23 (08-01 @ 15:18), 1.07 (08-01 @ 05:58), 1.04 (07-31 @ 14:49), 1.12 (07-31 @ 03:16)  Glu: 105(08-02 @ 05:17), 99(08-02 @ 00:06), 85(08-01 @ 15:18), 109(08-01 @ 05:58), 116(07-31 @ 14:49), 131(07-31 @ 03:16)    Hgb: 15.4 (08-02 @ 05:17), 13.5 (08-01 @ 05:58), 13.5 (07-31 @ 03:16)  Hct: 45.8 (08-02 @ 05:17), 41.3 (08-01 @ 05:58), 42.0 (07-31 @ 03:16)  WBC: 8.97 (08-02 @ 05:17), 12.35 (08-01 @ 05:58), 13.57 (07-31 @ 03:16)  Plt: 201 (08-02 @ 05:17), 163 (08-01 @ 05:58), 153 (07-31 @ 03:16)    INR:   PTT:             Culture - Blood (collected 31 Jul 2021 19:24)  Source: .Blood Blood-Peripheral  Preliminary Report (01 Aug 2021 20:00):    No growth at 1 day.    Culture - Blood (collected 31 Jul 2021 19:24)  Source: .Blood Blood-Peripheral  Preliminary Report (01 Aug 2021 20:00):    No growth at 1 day.      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   Brain edema,  , keep sodium in    140-150      neuro checks q 2 hr   CV : SBP goal   100-160 mmhg   has HOCM and was being diuresed, went into high rate a.fib today, now on amiodarone oral loading dose and diltiazem   avoid dehydration and tachycardia   cardiology on consult    Pulm: pulmonary congestion s/p diureses was on high flow, now improved  on RA   GI: regular diet  Renal: IVL , see neuro    ID low grade fever, on zosyn and vancomycin till tomorrow , emperic treatement, cx neg so far   Endo: target sugar 120-180   Hem: SCD,   heparin sc  thrombocytopenia stable, continue to monitor , vince medication induced     30 critical care time as patient is at risk for brain henrniation, ICP crisis, seizures, ICH  O: today had SVT, high rate a.fib started on diltiazem   low grade fever     T(C): 37.6 (08-02-21 @ 18:01), Max: 38.1 (08-02-21 @ 12:00)  HR: 109 (08-02-21 @ 19:00) (84 - 214)  BP: 149/92 (08-02-21 @ 19:00) (108/64 - 191/128)  RR: 30 (08-02-21 @ 19:00) (20 - 39)  SpO2: 95% (08-02-21 @ 19:00) (59% - 100%)  08-01-21 @ 07:01  -  08-02-21 @ 07:00  --------------------------------------------------------  IN: 1930.5 mL / OUT: 4360 mL / NET: -2429.5 mL      08-02-21 @ 07:01  -  08-02-21 @ 19:51  --------------------------------------------------------  IN: 3214.9 mL / OUT: 2250 mL / NET: 964.9 mL    acetaminophen   Tablet .. 650 milliGRAM(s) Oral every 6 hours PRN  acetaminophen  Suppository .. 650 milliGRAM(s) Rectal every 6 hours PRN  aMIOdarone    Tablet   Oral   aMIOdarone    Tablet 400 milliGRAM(s) Oral two times a day  atorvastatin 40 milliGRAM(s) Oral at bedtime  bisacodyl 5 milliGRAM(s) Oral every 12 hours PRN  chlorhexidine 2% Cloths 1 Application(s) Topical daily  dextrose 50% Injectable 25 Gram(s) IV Push once  diltiazem    Tablet 60 milliGRAM(s) Oral every 8 hours  glucagon  Injectable 1 milliGRAM(s) IntraMuscular once  heparin   Injectable 5000 Unit(s) SubCutaneous every 8 hours  multivitamin 1 Tablet(s) Oral daily  niCARdipine Infusion 5 mG/Hr IV Continuous <Continuous>  ondansetron Injectable 4 milliGRAM(s) IV Push every 6 hours PRN  piperacillin/tazobactam IVPB.. 3.375 Gram(s) IV Intermittent every 6 hours  polyethylene glycol 3350 17 Gram(s) Oral daily  senna 2 Tablet(s) Oral at bedtime  vancomycin  IVPB 1500 milliGRAM(s) IV Intermittent every 12 hours    NEURO EXAM   Patient is awake, alert, oriented x4, moves all 4s, DEEDEE, oriented x3; moves all 4s with good strength, dysmetria in the left hand     LABS:  Na: 144 (08-02 @ 05:17), 146 (08-02 @ 00:06), 149 (08-01 @ 15:18), 145 (08-01 @ 05:58), 148 (07-31 @ 14:49), 147 (07-31 @ 03:16)  K: 3.8 (08-02 @ 06:44), See Note (08-02 @ 05:17), 3.7 (08-02 @ 00:06), 3.4 (08-01 @ 15:18), 3.5 (08-01 @ 05:58), 3.4 (07-31 @ 14:49), 3.4 (07-31 @ 03:16)  Cl: 108 (08-02 @ 05:17), 113 (08-02 @ 00:06), 112 (08-01 @ 15:18), 112 (08-01 @ 05:58), 112 (07-31 @ 14:49), 113 (07-31 @ 03:16)  CO2: 21 (08-02 @ 05:17), 22 (08-02 @ 00:06), 27 (08-01 @ 15:18), 23 (08-01 @ 05:58), 26 (07-31 @ 14:49), 27 (07-31 @ 03:16)  BUN: 16 (08-02 @ 05:17), 21 (08-02 @ 00:06), 24 (08-01 @ 15:18), 21 (08-01 @ 05:58), 15 (07-31 @ 14:49), 17 (07-31 @ 03:16)  Cr: 0.90 (08-02 @ 05:17), 1.01 (08-02 @ 00:06), 1.23 (08-01 @ 15:18), 1.07 (08-01 @ 05:58), 1.04 (07-31 @ 14:49), 1.12 (07-31 @ 03:16)  Glu: 105(08-02 @ 05:17), 99(08-02 @ 00:06), 85(08-01 @ 15:18), 109(08-01 @ 05:58), 116(07-31 @ 14:49), 131(07-31 @ 03:16)    Hgb: 15.4 (08-02 @ 05:17), 13.5 (08-01 @ 05:58), 13.5 (07-31 @ 03:16)  Hct: 45.8 (08-02 @ 05:17), 41.3 (08-01 @ 05:58), 42.0 (07-31 @ 03:16)  WBC: 8.97 (08-02 @ 05:17), 12.35 (08-01 @ 05:58), 13.57 (07-31 @ 03:16)  Plt: 201 (08-02 @ 05:17), 163 (08-01 @ 05:58), 153 (07-31 @ 03:16)    INR:   PTT:             Culture - Blood (collected 31 Jul 2021 19:24)  Source: .Blood Blood-Peripheral  Preliminary Report (01 Aug 2021 20:00):    No growth at 1 day.    Culture - Blood (collected 31 Jul 2021 19:24)  Source: .Blood Blood-Peripheral  Preliminary Report (01 Aug 2021 20:00):    No growth at 1 day.      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   Brain edema,  , keep sodium in    140-150      neuro checks q 2 hr   CV : SBP goal   120-160 mmhg   has HOCM and was being diuresed, went into high rate a.fib today, now on amiodarone oral loading dose and diltiazem   avoid dehydration and tachycardia   cardiology on consult  on nicardipine drip, losartan 25 mg   back in sinus rythm     Pulm: pulmonary congestion s/p diureses was on high flow, now improved  on RA   GI: regular diet  Renal: IVL , see neuro    ID low grade fever, on zosyn and vancomycin till tomorrow , empiric treatement, cx neg so far   Endo: target sugar 120-180   Hem: SCD,   heparin sc    30 critical care time as patient is at risk for brain henrniation, ICP crisis, seizures, ICH

## 2021-08-02 NOTE — PROCEDURE NOTE - NSINDICATIONS_GEN_A_CORE
feeding difficulties
arterial puncture to obtain ABG's/critical patient/monitoring purposes
monitoring purposes

## 2021-08-02 NOTE — PROGRESS NOTE ADULT - SUBJECTIVE AND OBJECTIVE BOX
HPI:   46 M pt with PMHx of HTN, noncompliant with meds (Metoprolol, Nifedipine, and ASA), reports no recent use of ASA or metoprolol, presents to ED c/o acute onset and worsening dizziness, R arm paresthesia, and vomiting x 1hr prior to arrival while at work. Pt somnolent in ED, difficulty obtaining hx from pt. Per EMS, pt had SBP in the 200s, given nitro SL x 2 in field. Patient found to have a left side cerebellar hemmorrhage and left pontine hemmorrhage with extension to the subarachnoid space. Patient was started on a nicardipene drip in the ED for SBP to 230's, given manitol 50g and intubated for concern of potential for aspiration with declining exam. Patient reported that he lives alone and has no close contacts in the area.     NIHSS 6  ICH 2      S/Overnight events:  OLESYA overnight, neuro exam stable. 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 Per cardiology, rec starting verapamil 40mg q8hrs, and cardene gtt for SBP goal. Will uptitrate with goal of getting off of cardene.       Hospital Course:   7/28: Admitted with left cerebellar ICH. Intubated in ED for lethargy and concern for airway protection. Repeat CTH stable.  7/29: POD# 0 S/P femoral cerebral angiogram, findings of left vertebral artery irregularity at level below PICA with segment suggestive of intimal flap, left vertebral artery sacrifice performed. Hypertensive episode during angio case, Xper CT shows stable ICH. Pt seen and examined at bedside in NSICU postop. Pt agitated and reaching for ETT. Brief CPAP trial given, and Pt was successfully extubated. Patient then with many blood tinged secretions and CXR with infiltrated. Patient was reintubated. He then self-extubated and was emergently reintubated, placed in restraints now sedated on precedex and fentanyl. Received 1LNS bolus for hypotension and was also started on levophed for SBPs in 70s.   7/30: POD1 cerebral angiogram with left vertebral takedown, OLESYA overnight, neuro stable remains sedated on precedex and intubated on full vent support. Repeat CTH stable. Received 40 of lasix, increased PEEP from 7 to 8 and tapering off fentanyl drip. Self extubated and doing well on high flow NC, Kept on Precedex for agitation, started on Cardene for 's, will wean as appropriate.   7/31: POD2 cerebral angio with L verterbal takedown. S/p Seroquel 25 and Precedex overnight for agitation, otherwise OLESYA. On Cardene gtt. Neuro stable. O2 sat stable on HFNC. S/p 30 Lasix. Uptrending troponin, EKG stable, trending q6H  8/1: POD3, OLESYA overnight, on Precedex. Neuro stable.  8/2: POD#4, OLESYA overnight, neuro stable. Precedex off, tolerating RA. 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 Per cardiology, rec starting verapamil 40mg q8hrs, and cardene gtt for SBP goal. Will uptitrate with goal of getting off of cardene.       Vital Signs Last 24 Hrs  T(C): 37.9 (02 Aug 2021 00:30), Max: 38.4 (01 Aug 2021 14:29)  T(F): 100.2 (02 Aug 2021 00:30), Max: 101.1 (01 Aug 2021 14:29)  HR: 109 (02 Aug 2021 04:00) (62 - 115)  BP: 164/102 (02 Aug 2021 04:00) (111/85 - 164/102)  BP(mean): 128 (02 Aug 2021 04:00) (89 - 131)  RR: 34 (02 Aug 2021 04:00) (18 - 38)  SpO2: 96% (02 Aug 2021 04:00) (92% - 100%)    I&O's Detail    31 Jul 2021 07:01  -  01 Aug 2021 07:00  --------------------------------------------------------  IN:    Dexmedetomidine: 302.8 mL    IV PiggyBack: 2200 mL    IV PiggyBack: 100 mL    NiCARdipine: 122.5 mL  Total IN: 2725.3 mL    OUT:    Incontinent per Condom Catheter (mL): 2675 mL  Total OUT: 2675 mL    Total NET: 50.3 mL      01 Aug 2021 07:01  -  02 Aug 2021 04:48  --------------------------------------------------------  IN:    Dexmedetomidine: 8 mL    IV PiggyBack: 300 mL    NiCARdipine: 125 mL    Oral Fluid: 960 mL  Total IN: 1393 mL    OUT:    Intermittent Catheterization - Urethral (mL): 560 mL    NiCARdipine: 0 mL    Voided (mL): 3400 mL  Total OUT: 3960 mL    Total NET: -2567 mL        I&O's Summary    31 Jul 2021 07:01  -  01 Aug 2021 07:00  --------------------------------------------------------  IN: 2725.3 mL / OUT: 2675 mL / NET: 50.3 mL    01 Aug 2021 07:01  -  02 Aug 2021 04:48  --------------------------------------------------------  IN: 1393 mL / OUT: 3960 mL / NET: -2567 mL        PHYSICAL EXAM:  General: NAD, pt is comfortably sitting up in bed, A&O x3, on RA  HEENT: CN II-XII grossly intact, PERRL 3mm, EOMI b/l, +diplopia with downward gaze, face symmetric, tongue midline, neck FROM  Cardiovascular: RRR, normal S1 and S2   Respiratory: lungs CTAB, no wheezing, rhonchi, or crackles   GI: normoactive BS to auscultation, abd soft, NTND   Neuro: no aphasia, speech clear, no dysmetria, no pronator drift  strength 5/5 throughout all 4 extremities  sensation intact to light touch throughout   Extremities: distal pulses 2+ x4   Wound/incision: R groin site, no hematoma, C/D/I       LABS:                        13.5   12.35 )-----------( 163      ( 01 Aug 2021 05:58 )             41.3     08-02    146<H>  |  113<H>  |  21  ----------------------------<  99  3.7   |  22  |  1.01    Ca    8.4      02 Aug 2021 00:06  Phos  3.2     08-01  Mg     1.9     08-02          CARDIAC MARKERS ( 02 Aug 2021 00:06 )  x     / 0.11 ng/mL / x     / x     / x      CARDIAC MARKERS ( 01 Aug 2021 03:28 )  x     / 0.09 ng/mL / x     / x     / x      CARDIAC MARKERS ( 31 Jul 2021 22:34 )  x     / 0.13 ng/mL / x     / x     / x      CARDIAC MARKERS ( 31 Jul 2021 14:49 )  x     / 0.10 ng/mL / 157 U/L / x     / 1.5 ng/mL      CAPILLARY BLOOD GLUCOSE      POCT Blood Glucose.: 98 mg/dL (01 Aug 2021 06:05)      Drug Levels: [] N/A  Vancomycin Level, Trough: 14.5 ug/mL (07-31 @ 17:00)  Vancomycin Level, Trough: 11.0 ug/mL (07-30 @ 05:36)    CSF Analysis: [] N/A      Allergies    No Known Allergies    Intolerances      MEDICATIONS:  Antibiotics:  piperacillin/tazobactam IVPB.. 3.375 Gram(s) IV Intermittent every 6 hours  vancomycin  IVPB 1500 milliGRAM(s) IV Intermittent every 12 hours    Neuro:  acetaminophen   Tablet .. 650 milliGRAM(s) Oral every 6 hours PRN  acetaminophen  Suppository .. 650 milliGRAM(s) Rectal every 6 hours PRN  ondansetron Injectable 4 milliGRAM(s) IV Push every 6 hours PRN    Anticoagulation:  heparin   Injectable 5000 Unit(s) SubCutaneous every 8 hours    OTHER:  albuterol/ipratropium for Nebulization 3 milliLiter(s) Nebulizer every 6 hours  atorvastatin 40 milliGRAM(s) Oral at bedtime  bisacodyl 5 milliGRAM(s) Oral every 12 hours PRN  chlorhexidine 2% Cloths 1 Application(s) Topical daily  dextrose 50% Injectable 25 Gram(s) IV Push once  glucagon  Injectable 1 milliGRAM(s) IntraMuscular once  niCARdipine Infusion 5 mG/Hr IV Continuous <Continuous>  polyethylene glycol 3350 17 Gram(s) Oral daily  senna 2 Tablet(s) Oral at bedtime  verapamil 40 milliGRAM(s) Oral every 8 hours    IVF:  multivitamin 1 Tablet(s) Oral daily    CULTURES:  Culture Results:   No growth at 1 day. (07-31 @ 19:24)  Culture Results:   No growth at 1 day. (07-31 @ 19:24)    RADIOLOGY & ADDITIONAL TESTS:      ASSESSMENT:  46 M PMH HTN, non-compliant on home meds nifedipine, ASA 81, and metoprolol presented with 2 hr onset gradual worsening R UE paresthesia, weakness, dizziness, N&V found to have an Acute left cerebellar intraparenchymal hemorrhage and left pontine hemorrhage with extension into the subarachnoid space. Pt is now s/p  femoral cerebral angiogram, findings of left vertebral artery irregularity at level below PICA with segment suggestive of intimal flap, left vertebral artery sacrifice performed (7/29/2021), post op was extubated, needed to be reintubated for secretion burden and unable to protect airway,  patient self-extubated X 2, now on high flow NC, satting well.      CHEST PAIN    No pertinent family history in first degree relatives    Handoff    MEWS Score    HTN (hypertension)    HTN (hypertension)    SAH (subarachnoid hemorrhage)    Dissection of cerebral artery    SAH (subarachnoid hemorrhage)    Dissection of cerebral artery    Cerebellar bleed    Angiogram, blood vessel, cerebral, with embolization    No significant past surgical history    CHEST PAIN    [email or web address]        PLAN:  Neuro:   - Neuro checks/vital checks/groin/vascular checks q1hr  - Tylenol prn for pain control  - Precedex off   - Repeat CTH 7/30 wet read stable f/u final read     CV:   - -160, on cardene gtt wean as tolerated  - TTE- hypertrophic cardiomyopathy with severe left ventricular outflow tract obstruction and moderate mitral regurg   - trending troponin q6 H  - Careful fluid balance  - metoprolol and amlodipine d/c'd, start verapamil 40mg q8hrs per cardiology, ok to cont cardene gtt for SBP goal. Goal to avoid tachycardia in setting of HOCM.     Pulm:  - Extubated 7/30, off high flow nasal cannula  - blood tinged sputum  - f/u Repeat CXR in AM  - Pendng repeat sputum culture    Renal:   - Na goal 140-150  -  Replete electrolytes PRN    GI:  - bowel reg  - regular diet     Endo:   - ISS    Heme:   - SCD's, SQH   - trend CBC  - f/u LE doppler 7/29 neg    ID:   - f/u panculture 7/31  - trend WBC    Disposition: ICU status, full code, dispo pending, family updated with plan.    Plan d/w Dr. D'Amico and Dr. Martinez

## 2021-08-02 NOTE — PROGRESS NOTE ADULT - ASSESSMENT
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 Possible family hx SCD (mother). Course complicated by Afib RVR     #Afib RVR  - Currently on Dilt gtt @20  - start PO diltiazem 60mg Q8hr, will uptitrate  - wean cardene gtt as tolerated     #HCM: avoid dehydration, vasodilation, and tachycardia in HCM in order to improve gradient. 90 resting gradient is significant and explains baseline poor ET as well as hospital course (tenuous respiratory status, extreme sensitivity to diuresis). Improvement in gradient will also improve MR which will help pulmonary edema.   - K>4, Mg>2  - outpatient f/u with cardiology upon DC. consider genetic testing for family members as well.         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 Possible family hx SCD (mother). Course complicated by Afib RVR     #Afib RVR  - d/c Dilt gtt   - start amio load 400mg PO BID for 5 gm load then reduce to 200mg daily to maintain SR    - start PO diltiazem 60mg Q8hr, will uptitrate  - wean cardene gtt as tolerated     #HCM: avoid dehydration, vasodilation, and tachycardia in HCM in order to improve gradient. 90 resting gradient is significant and explains baseline poor ET as well as hospital course (tenuous respiratory status, extreme sensitivity to diuresis). Improvement in gradient will also improve MR which will help pulmonary edema.   - K>4, Mg>2  - outpatient f/u with cardiology upon DC. consider genetic testing of HOCM for family members as well.         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 Possible family hx SCD (mother). Course complicated by Afib RVR     #Afib RVR  - d/c Dilt gtt   - start amio load 400mg PO BID for 5 gm load then reduce to 200mg daily to maintain SR    - start PO diltiazem 60mg Q8hr, will uptitrate  - wean cardene gtt as tolerated   - Given HOCM and Afib will need AC, start DOAC when surgically allowable    #HCM: avoid dehydration, vasodilation, and tachycardia in HCM in order to improve gradient. 90 resting gradient is significant and explains baseline poor ET as well as hospital course (tenuous respiratory status, extreme sensitivity to diuresis). Improvement in gradient will also improve MR which will help pulmonary edema.   - K>4, Mg>2  - outpatient f/u with cardiology upon DC. consider genetic testing of HOCM for family members as well.

## 2021-08-03 LAB
ANION GAP SERPL CALC-SCNC: 12 MMOL/L — SIGNIFICANT CHANGE UP (ref 5–17)
BUN SERPL-MCNC: 13 MG/DL — SIGNIFICANT CHANGE UP (ref 7–23)
CALCIUM SERPL-MCNC: 8.9 MG/DL — SIGNIFICANT CHANGE UP (ref 8.4–10.5)
CHLORIDE SERPL-SCNC: 107 MMOL/L — SIGNIFICANT CHANGE UP (ref 96–108)
CO2 SERPL-SCNC: 22 MMOL/L — SIGNIFICANT CHANGE UP (ref 22–31)
CREAT SERPL-MCNC: 0.84 MG/DL — SIGNIFICANT CHANGE UP (ref 0.5–1.3)
CULTURE RESULTS: SIGNIFICANT CHANGE UP
CULTURE RESULTS: SIGNIFICANT CHANGE UP
GLUCOSE BLDC GLUCOMTR-MCNC: 88 MG/DL — SIGNIFICANT CHANGE UP (ref 70–99)
GLUCOSE BLDC GLUCOMTR-MCNC: 95 MG/DL — SIGNIFICANT CHANGE UP (ref 70–99)
GLUCOSE SERPL-MCNC: 109 MG/DL — HIGH (ref 70–99)
HCT VFR BLD CALC: 47.4 % — SIGNIFICANT CHANGE UP (ref 39–50)
HGB BLD-MCNC: 15.6 G/DL — SIGNIFICANT CHANGE UP (ref 13–17)
MAGNESIUM SERPL-MCNC: 1.9 MG/DL — SIGNIFICANT CHANGE UP (ref 1.6–2.6)
MCHC RBC-ENTMCNC: 29.1 PG — SIGNIFICANT CHANGE UP (ref 27–34)
MCHC RBC-ENTMCNC: 32.9 GM/DL — SIGNIFICANT CHANGE UP (ref 32–36)
MCV RBC AUTO: 88.4 FL — SIGNIFICANT CHANGE UP (ref 80–100)
NRBC # BLD: 0 /100 WBCS — SIGNIFICANT CHANGE UP (ref 0–0)
PHOSPHATE SERPL-MCNC: 3.2 MG/DL — SIGNIFICANT CHANGE UP (ref 2.5–4.5)
PLATELET # BLD AUTO: 229 K/UL — SIGNIFICANT CHANGE UP (ref 150–400)
POTASSIUM SERPL-MCNC: 3.4 MMOL/L — LOW (ref 3.5–5.3)
POTASSIUM SERPL-SCNC: 3.4 MMOL/L — LOW (ref 3.5–5.3)
PROCALCITONIN SERPL-MCNC: 0.4 NG/ML — HIGH (ref 0.02–0.1)
RBC # BLD: 5.36 M/UL — SIGNIFICANT CHANGE UP (ref 4.2–5.8)
RBC # FLD: 13.1 % — SIGNIFICANT CHANGE UP (ref 10.3–14.5)
SODIUM SERPL-SCNC: 141 MMOL/L — SIGNIFICANT CHANGE UP (ref 135–145)
SPECIMEN SOURCE: SIGNIFICANT CHANGE UP
SPECIMEN SOURCE: SIGNIFICANT CHANGE UP
TROPONIN T SERPL-MCNC: 0.06 NG/ML — CRITICAL HIGH (ref 0–0.01)
TROPONIN T SERPL-MCNC: 0.06 NG/ML — CRITICAL HIGH (ref 0–0.01)
WBC # BLD: 10.33 K/UL — SIGNIFICANT CHANGE UP (ref 3.8–10.5)
WBC # FLD AUTO: 10.33 K/UL — SIGNIFICANT CHANGE UP (ref 3.8–10.5)

## 2021-08-03 PROCEDURE — 99233 SBSQ HOSP IP/OBS HIGH 50: CPT

## 2021-08-03 PROCEDURE — 70450 CT HEAD/BRAIN W/O DYE: CPT | Mod: 26

## 2021-08-03 PROCEDURE — 71045 X-RAY EXAM CHEST 1 VIEW: CPT | Mod: 26

## 2021-08-03 PROCEDURE — 36415 COLL VENOUS BLD VENIPUNCTURE: CPT

## 2021-08-03 PROCEDURE — 93010 ELECTROCARDIOGRAM REPORT: CPT

## 2021-08-03 RX ORDER — POTASSIUM CHLORIDE 20 MEQ
20 PACKET (EA) ORAL
Refills: 0 | Status: COMPLETED | OUTPATIENT
Start: 2021-08-03 | End: 2021-08-03

## 2021-08-03 RX ORDER — LOSARTAN POTASSIUM 100 MG/1
75 TABLET, FILM COATED ORAL DAILY
Refills: 0 | Status: DISCONTINUED | OUTPATIENT
Start: 2021-08-04 | End: 2021-08-05

## 2021-08-03 RX ORDER — HYDRALAZINE HCL 50 MG
5 TABLET ORAL ONCE
Refills: 0 | Status: COMPLETED | OUTPATIENT
Start: 2021-08-03 | End: 2021-08-03

## 2021-08-03 RX ORDER — LOSARTAN POTASSIUM 100 MG/1
25 TABLET, FILM COATED ORAL ONCE
Refills: 0 | Status: COMPLETED | OUTPATIENT
Start: 2021-08-03 | End: 2021-08-03

## 2021-08-03 RX ORDER — POTASSIUM CHLORIDE 20 MEQ
40 PACKET (EA) ORAL EVERY 4 HOURS
Refills: 0 | Status: DISCONTINUED | OUTPATIENT
Start: 2021-08-03 | End: 2021-08-03

## 2021-08-03 RX ORDER — LOSARTAN POTASSIUM 100 MG/1
25 TABLET, FILM COATED ORAL DAILY
Refills: 0 | Status: DISCONTINUED | OUTPATIENT
Start: 2021-08-03 | End: 2021-08-03

## 2021-08-03 RX ORDER — TRAMADOL HYDROCHLORIDE 50 MG/1
25 TABLET ORAL EVERY 6 HOURS
Refills: 0 | Status: DISCONTINUED | OUTPATIENT
Start: 2021-08-03 | End: 2021-08-05

## 2021-08-03 RX ORDER — LOSARTAN POTASSIUM 100 MG/1
75 TABLET, FILM COATED ORAL DAILY
Refills: 0 | Status: DISCONTINUED | OUTPATIENT
Start: 2021-08-03 | End: 2021-08-03

## 2021-08-03 RX ORDER — TRAMADOL HYDROCHLORIDE 50 MG/1
50 TABLET ORAL EVERY 6 HOURS
Refills: 0 | Status: DISCONTINUED | OUTPATIENT
Start: 2021-08-03 | End: 2021-08-03

## 2021-08-03 RX ADMIN — Medication 60 MILLIGRAM(S): at 13:20

## 2021-08-03 RX ADMIN — HEPARIN SODIUM 5000 UNIT(S): 5000 INJECTION INTRAVENOUS; SUBCUTANEOUS at 05:14

## 2021-08-03 RX ADMIN — AMIODARONE HYDROCHLORIDE 400 MILLIGRAM(S): 400 TABLET ORAL at 05:14

## 2021-08-03 RX ADMIN — ATORVASTATIN CALCIUM 40 MILLIGRAM(S): 80 TABLET, FILM COATED ORAL at 21:38

## 2021-08-03 RX ADMIN — Medication 300 MILLIGRAM(S): at 18:13

## 2021-08-03 RX ADMIN — LOSARTAN POTASSIUM 25 MILLIGRAM(S): 100 TABLET, FILM COATED ORAL at 22:50

## 2021-08-03 RX ADMIN — HEPARIN SODIUM 5000 UNIT(S): 5000 INJECTION INTRAVENOUS; SUBCUTANEOUS at 21:38

## 2021-08-03 RX ADMIN — TRAMADOL HYDROCHLORIDE 50 MILLIGRAM(S): 50 TABLET ORAL at 21:38

## 2021-08-03 RX ADMIN — LOSARTAN POTASSIUM 25 MILLIGRAM(S): 100 TABLET, FILM COATED ORAL at 05:14

## 2021-08-03 RX ADMIN — Medication 50 MILLIEQUIVALENT(S): at 14:18

## 2021-08-03 RX ADMIN — AMIODARONE HYDROCHLORIDE 400 MILLIGRAM(S): 400 TABLET ORAL at 18:13

## 2021-08-03 RX ADMIN — NICARDIPINE HYDROCHLORIDE 25 MG/HR: 30 CAPSULE, EXTENDED RELEASE ORAL at 04:02

## 2021-08-03 RX ADMIN — ONDANSETRON 4 MILLIGRAM(S): 8 TABLET, FILM COATED ORAL at 03:45

## 2021-08-03 RX ADMIN — Medication 60 MILLIGRAM(S): at 21:38

## 2021-08-03 RX ADMIN — PIPERACILLIN AND TAZOBACTAM 200 GRAM(S): 4; .5 INJECTION, POWDER, LYOPHILIZED, FOR SOLUTION INTRAVENOUS at 18:13

## 2021-08-03 RX ADMIN — HEPARIN SODIUM 5000 UNIT(S): 5000 INJECTION INTRAVENOUS; SUBCUTANEOUS at 13:20

## 2021-08-03 RX ADMIN — Medication 650 MILLIGRAM(S): at 13:20

## 2021-08-03 RX ADMIN — SENNA PLUS 2 TABLET(S): 8.6 TABLET ORAL at 21:38

## 2021-08-03 RX ADMIN — Medication 650 MILLIGRAM(S): at 13:50

## 2021-08-03 RX ADMIN — Medication 60 MILLIGRAM(S): at 05:14

## 2021-08-03 RX ADMIN — Medication 5 MILLIGRAM(S): at 21:38

## 2021-08-03 RX ADMIN — LOSARTAN POTASSIUM 25 MILLIGRAM(S): 100 TABLET, FILM COATED ORAL at 14:18

## 2021-08-03 RX ADMIN — Medication 50 MILLIEQUIVALENT(S): at 18:13

## 2021-08-03 RX ADMIN — CHLORHEXIDINE GLUCONATE 1 APPLICATION(S): 213 SOLUTION TOPICAL at 06:42

## 2021-08-03 RX ADMIN — TRAMADOL HYDROCHLORIDE 50 MILLIGRAM(S): 50 TABLET ORAL at 14:18

## 2021-08-03 RX ADMIN — PIPERACILLIN AND TAZOBACTAM 200 GRAM(S): 4; .5 INJECTION, POWDER, LYOPHILIZED, FOR SOLUTION INTRAVENOUS at 11:17

## 2021-08-03 RX ADMIN — Medication 300 MILLIGRAM(S): at 05:14

## 2021-08-03 RX ADMIN — Medication 50 MILLIEQUIVALENT(S): at 11:25

## 2021-08-03 RX ADMIN — PIPERACILLIN AND TAZOBACTAM 200 GRAM(S): 4; .5 INJECTION, POWDER, LYOPHILIZED, FOR SOLUTION INTRAVENOUS at 05:14

## 2021-08-03 RX ADMIN — Medication 650 MILLIGRAM(S): at 05:42

## 2021-08-03 RX ADMIN — Medication 650 MILLIGRAM(S): at 04:42

## 2021-08-03 NOTE — PROVIDER CONTACT NOTE (CHANGE IN STATUS NOTIFICATION) - ACTION/TREATMENT ORDERED:
Cardiology called to bedside STAT; Adenosine 0.6 given x2, Verapamil 10mG IVP, Metoprolol 5MG IVP and NS 500mL Bolus given x2. Diltiazem drip to be started.
Neurosurgical team came to bedside
Per EDWIGE Luu, transient confusion previously seen. Continue to monitor and If persists then recall provider.

## 2021-08-03 NOTE — PROGRESS NOTE ADULT - SUBJECTIVE AND OBJECTIVE BOX
=================================  NEUROCRITICAL CARE ATTENDING NOTE  =================================    CRISTI JENNINGS   MRN-[id number]  Summary:  46y/M  with PMHx of HTN, noncompliant with meds (Metoprolol, Nifedipine, and ASA), reports no recent use of ASA or metoprolol, presents to ED c/o acute onset and worsening dizziness, R arm paresthesia, and vomiting x 1hr prior to arrival while at work. Pt somnolent in ED, difficulty obtaining hx from pt. Per EMS, pt had SBP in the 200s, given nitro SL x 2 in field. Patient found to have a left side cerebellar hemmorrhage and left pontine hemmorrhage with extension to the subarachnoid space. Patient was started on a nicardipene drip in the ED for SBP to 230's, given manitol 50g and intubated for concern of potential for aspiration with declining exam. Patient reported that he lives alone and has no close contacts in the area.  (28 Jul 2021 20:40)    COURSE IN THE HOSPITAL:  07/28 intubated  07/29 remained intubated - s/p angio, vertebral artery takedown, extubated, desaturated, reintubated, self-extubated, reintubated  07/30 Tmax 39.4, remained intubated, CT overnight; given lasix overnight for pulmo congestion, SBP dropped with propofol, started on levophed, switched to precedex; self-extubated, on NIV  07/31 Tmax 38.4 Agitated overnight.  08/01 Tmax 38.7 improved agitation  08/02 Tmax 38.4 c/o chest pain this morning, new onset atrial fibrillation      PHYSICAL EXAMINATION  T(C): 36.9 (08-03 @ 07:00), Max: 38.1 (08-02 @ 12:00)  HR: 91 (08-03 @ 07:00) (88 - 214)  BP: 112/75 (08-03 @ 07:00) (108/64 - 191/128)  BP(mean): 89 (08-03 @ 07:00)  ABP:  (114/71 - 180/97)  ABP(mean):  (87 - 130)  RR: 18 (08-03 @ 07:00) (18 - 39)  SpO2: 92% (08-03 @ 07:00) (59% - 100%)  CVP(mm Hg): --      08-02-21 @ 07:01  -  08-03-21 @ 07:00  --------------------------------------------------------  IN:    Diltiazem: 165 mL    IV PiggyBack: 1549.9 mL    NiCARdipine: 360 mL    Oral Fluid: 1220 mL    Sodium Chloride 0.9% Bolus: 1500 mL  Total IN: 4794.9 mL    OUT:    Voided (mL): 3850 mL  Total OUT: 3850 mL    Total NET: 944.9 mL                  LABS:  CAPILLARY BLOOD GLUCOSE                              15.6   10.33 )-----------( 229      ( 03 Aug 2021 05:29 )             47.4     08-03    141  |  107  |  13  ----------------------------<  109<H>  3.4<L>   |  22  |  0.84    Ca    8.9      03 Aug 2021 05:32  Phos  3.2     08-03  Mg     1.9     08-03 08-01-21 @ 07:47  pH7.42  pCO37  pO237  [id number]  FiO2--  Sat96.7      MEDICATIONS:  MEDICATIONS  (STANDING):  aMIOdarone    Tablet   Oral   aMIOdarone    Tablet 400 milliGRAM(s) Oral two times a day  atorvastatin 40 milliGRAM(s) Oral at bedtime  chlorhexidine 2% Cloths 1 Application(s) Topical daily  dextrose 50% Injectable 25 Gram(s) IV Push once  diltiazem    Tablet 60 milliGRAM(s) Oral every 8 hours  glucagon  Injectable 1 milliGRAM(s) IntraMuscular once  heparin   Injectable 5000 Unit(s) SubCutaneous every 8 hours  losartan 25 milliGRAM(s) Oral daily  multivitamin 1 Tablet(s) Oral daily  niCARdipine Infusion 5 mG/Hr (25 mL/Hr) IV Continuous <Continuous>  piperacillin/tazobactam IVPB.. 3.375 Gram(s) IV Intermittent every 6 hours  polyethylene glycol 3350 17 Gram(s) Oral daily  potassium chloride    Tablet ER 40 milliEquivalent(s) Oral every 4 hours  senna 2 Tablet(s) Oral at bedtime  vancomycin  IVPB 1500 milliGRAM(s) IV Intermittent every 12 hours    MEDICATIONS  (PRN):  acetaminophen   Tablet .. 650 milliGRAM(s) Oral every 6 hours PRN Temp greater or equal to 38C (100.4F), Mild Pain (1 - 3)  acetaminophen  Suppository .. 650 milliGRAM(s) Rectal every 6 hours PRN Temp greater or equal to 38.5C (101.3F), Moderate Pain (4 - 6)  bisacodyl 5 milliGRAM(s) Oral every 12 hours PRN Constipation  ondansetron Injectable 4 milliGRAM(s) IV Push every 6 hours PRN Nausea and/or Vomiting          Past Medical History: HTN (hypertension)  Allergies:  No Known Allergies  Home meds: metoprolol / nifedipine     PHYSICAL EXAMINATION  T(C): 38 (08-02 @ 05:12), Max: 38.4 (08-01 @ 14:29) HR: 126 (08-02 @ 06:00) (62 - 126) BP: 164/98 (08-02 @ 06:00) (111/85 - 164/102) RR: 37 (08-02 @ 06:00) (18 - 38) SpO2: 96% (08-02 @ 06:00) (92% - 100%)   NEUROLOGIC EXAMINATION:  Patient is awake, alert, oriented x4, moves all 4s, DEEDEE, oriented x3; moves all 4s with good strength, garbled speech  GENERAL:  room air   EENT:  anicteric  CARDIOVASCULAR: (+) S1 S2, tachycardic rate and regular rhythm  PULMONARY: rhonchi all lung fields, crackles   ABDOMEN: soft, nontender with normoactive bowel sounds  EXTREMITIES: no edema  SKIN: no rash       =================================  NEUROCRITICAL CARE ATTENDING NOTE  =================================    CRISTI JENNINGS   MRN-[id number]  Summary:  46y/M  with PMHx of HTN, noncompliant with meds (Metoprolol, Nifedipine, and ASA), reports no recent use of ASA or metoprolol, presents to ED c/o acute onset and worsening dizziness, R arm paresthesia, and vomiting x 1hr prior to arrival while at work. Pt somnolent in ED, difficulty obtaining hx from pt. Per EMS, pt had SBP in the 200s, given nitro SL x 2 in field. Patient found to have a left side cerebellar hemmorrhage and left pontine hemmorrhage with extension to the subarachnoid space. Patient was started on a nicardipene drip in the ED for SBP to 230's, given manitol 50g and intubated for concern of potential for aspiration with declining exam. Patient reported that he lives alone and has no close contacts in the area.  (28 Jul 2021 20:40)    COURSE IN THE HOSPITAL:  07/28 intubated  07/29 remained intubated - s/p angio, vertebral artery takedown, extubated, desaturated, reintubated, self-extubated, reintubated  07/30 Tmax 39.4, remained intubated, CT overnight; given lasix overnight for pulmo congestion, SBP dropped with propofol, started on levophed, switched to precedex; self-extubated, on NIV  07/31 Tmax 38.4 Agitated overnight.  08/01 Tmax 38.7 improved agitation  08/02 Tmax 38.4 c/o chest pain this morning, new onset atrial fibrillation      PHYSICAL EXAMINATION  T(C): 36.9 (08-03 @ 07:00), Max: 38.1 (08-02 @ 12:00)  HR: 91 (08-03 @ 07:00) (88 - 214)  BP: 112/75 (08-03 @ 07:00) (108/64 - 191/128)  BP(mean): 89 (08-03 @ 07:00)  ABP:  (114/71 - 180/97)  ABP(mean):  (87 - 130)  RR: 18 (08-03 @ 07:00) (18 - 39)  SpO2: 92% (08-03 @ 07:00) (59% - 100%)  CVP(mm Hg): --      08-02-21 @ 07:01  -  08-03-21 @ 07:00  --------------------------------------------------------  IN:    Diltiazem: 165 mL    IV PiggyBack: 1549.9 mL    NiCARdipine: 360 mL    Oral Fluid: 1220 mL    Sodium Chloride 0.9% Bolus: 1500 mL  Total IN: 4794.9 mL    OUT:    Voided (mL): 3850 mL  Total OUT: 3850 mL    Total NET: 944.9 mL                  LABS:  CAPILLARY BLOOD GLUCOSE                              15.6   10.33 )-----------( 229      ( 03 Aug 2021 05:29 )             47.4     08-03    141  |  107  |  13  ----------------------------<  109<H>  3.4<L>   |  22  |  0.84    Ca    8.9      03 Aug 2021 05:32  Phos  3.2     08-03  Mg     1.9     08-03 08-01-21 @ 07:47  pH7.42  pCO37  pO237  [id number]  FiO2--  Sat96.7      MEDICATIONS:  MEDICATIONS  (STANDING):  aMIOdarone    Tablet   Oral   aMIOdarone    Tablet 400 milliGRAM(s) Oral two times a day  atorvastatin 40 milliGRAM(s) Oral at bedtime  chlorhexidine 2% Cloths 1 Application(s) Topical daily  dextrose 50% Injectable 25 Gram(s) IV Push once  diltiazem    Tablet 60 milliGRAM(s) Oral every 8 hours  glucagon  Injectable 1 milliGRAM(s) IntraMuscular once  heparin   Injectable 5000 Unit(s) SubCutaneous every 8 hours  losartan 25 milliGRAM(s) Oral daily  multivitamin 1 Tablet(s) Oral daily  niCARdipine Infusion 5 mG/Hr (25 mL/Hr) IV Continuous <Continuous>  piperacillin/tazobactam IVPB.. 3.375 Gram(s) IV Intermittent every 6 hours  polyethylene glycol 3350 17 Gram(s) Oral daily  potassium chloride    Tablet ER 40 milliEquivalent(s) Oral every 4 hours  senna 2 Tablet(s) Oral at bedtime  vancomycin  IVPB 1500 milliGRAM(s) IV Intermittent every 12 hours    MEDICATIONS  (PRN):  acetaminophen   Tablet .. 650 milliGRAM(s) Oral every 6 hours PRN Temp greater or equal to 38C (100.4F), Mild Pain (1 - 3)  acetaminophen  Suppository .. 650 milliGRAM(s) Rectal every 6 hours PRN Temp greater or equal to 38.5C (101.3F), Moderate Pain (4 - 6)  bisacodyl 5 milliGRAM(s) Oral every 12 hours PRN Constipation  ondansetron Injectable 4 milliGRAM(s) IV Push every 6 hours PRN Nausea and/or Vomiting          Past Medical History: HTN (hypertension)  Allergies:  No Known Allergies  Home meds: metoprolol / nifedipine     PHYSICAL EXAMINATION  T(C): 38 (08-02 @ 05:12), Max: 38.4 (08-01 @ 14:29) HR: 126 (08-02 @ 06:00) (62 - 126) BP: 164/98 (08-02 @ 06:00) (111/85 - 164/102) RR: 37 (08-02 @ 06:00) (18 - 38) SpO2: 96% (08-02 @ 06:00) (92% - 100%)   NEUROLOGIC EXAMINATION:  Patient is awake, alert, oriented x4, moves all 4s, DEEDEE, oriented x3; moves all 4s with good strength, garbled speech  GENERAL:  room air   EENT:  anicteric  CARDIOVASCULAR: (+) S1 S2, tachycardic rate and regular rhythm  PULMONARY: rhonchi all lung fields, crackles   ABDOMEN: soft, nontender with normoactive bowel sounds  EXTREMITIES: no edema  SKIN: no rash

## 2021-08-03 NOTE — PROCEDURE NOTE - NSPROCNAME_GEN_A_CORE
General
Arterial Puncture/Cannulation
Feeding Tube Placement
Arterial Puncture/Cannulation

## 2021-08-03 NOTE — PROCEDURE NOTE - PROCEDURE DATE TIME, MLM
02-Aug-2021 15:45
29-Jul-2021 01:42
30-Jul-2021
31-Jul-2021 19:34
29-Jul-2021 21:15
03-Aug-2021 15:56

## 2021-08-03 NOTE — PROVIDER CONTACT NOTE (CHANGE IN STATUS NOTIFICATION) - ASSESSMENT
VSS, mild S. Tach 104; afebrile 37.5; Cardene at 4 mg
Pt in bed eating, denies CP but endorses dizzinezz

## 2021-08-03 NOTE — PROGRESS NOTE ADULT - ASSESSMENT
46y/M with  L cerebellar hematoma, VA dissection, brain compression, IVH  HTN (hypertension)  elevated creatinine  HOCM    PLAN:   NEURO: neurochecks q2h, VSq1h, PRN pain meds with acetaminophen  angio, VA takedown;  BP control, MRI on stepdown  agitation - resolved   REHAB:  physical therapy evaluation and management    EARLY MOB:  bedrest    PULM:  room air, d/c duonebs  CARDIO:  SBP goal 100-160mm Hg, [place]; switch metoprolol 12.5 BID with hold parameters  and HR 60; taper cardene off,   ENDO:  Blood sugar goals 140-180 mg/dL  GI:  bowel regimen   DIET: regular diet   RENAL:  140-150; CXR in AM  HEM/ONC: Hb stable  VTE Prophylaxis: SCDs, SQH   ID: febrile - ?central, leukocytosis improving; piperacillin/tazobactam vancomycin (last day 08/03), vanc trough prior to 4th dose, check PCT  Social: sister is HCP - [place] update      ATTENDING ATTESTATION:  I was physically present for the key portions of the evaluation and management (E/M) service provided.  I agree with the above history, physical and plan, which I have reviewed and edited where appropriate.    Patient at high risk for neurological deterioration or death due to:  ICU delirium, aspiration PNA, DVT / PE.  Critical care time:  I have personally provided 45 minutes of critical care time, excluding time spent on separate procedures.      Plan discussed with RN, house staff. 46y/M with  L cerebellar hematoma, VA dissection, brain compression, IVH  HTN (hypertension)  elevated creatinine  HOCM    PLAN:   NEURO: neurochecks q2h, VSq1h, PRN pain meds with acetaminophen  angio, VA takedown;  BP control, MRI on stepdown  agitation - resolved   REHAB:  physical therapy evaluation and management    EARLY MOB:  bedrest    PULM:  room air, d/c duonebs    CARDIO:  SBP goal 100-160mm Hg, [place]; increase losartan, taper cardene off,   cont diltiazem, amiodarone     ENDO:  Blood sugar goals 140-180 mg/dL    GI:  bowel regimen     DIET: regular diet     RENAL:  140-150; CXR in AM  HEM/ONC: Hb stable    VTE Prophylaxis: SCDs, SQH     ID: febrile - ?central, leukocytosis improving; piperacillin/tazobactam vancomycin (last day 08/03), vanc trough prior to 4th dose, check PCT    Social: sister is NICOLAS - [place]o update        I have reviewed the patient’s history, performed a clinical examination and assessed the patient to be stable for transfer to a non-neurosurgical intensive care unit.      Specifically, the patient:  [x ] does not have cerebrovascular insufficiency or require hemodynamic augmentation  [x] does not have vasospasm or delayed cerebral ischemia with subarachnoid hemorrhage  [x ] does not have an external ventricular or lumbar drain   [x ] does not have an external ventricular drain (except lateral transfers to [facility])  [x] does not require active titration of meds for uncontrolled sympathetic storming  [x] does not have a lumbar drain  [x] did not have a complex skull base surgery or complex intracranial tumor in the immediate post-operative period        ATTENDING ATTESTATION:  I was physically present for the key portions of the evaluation and management (E/M) service provided.  I agree with the above history, physical and plan, which I have reviewed and edited where appropriate.    Patient at high risk for neurological deterioration or death due to:  ICU delirium, aspiration PNA, DVT / PE.  Critical care time:  I have personally provided 45 minutes of critical care time, excluding time spent on separate procedures.      Plan discussed with RN, house staff.

## 2021-08-03 NOTE — PROGRESS NOTE ADULT - ATTENDING COMMENTS
change diltiazem to 180 mg long acting once a day  DOAC when allowable  echo prior to discharge for LVOT gradient

## 2021-08-03 NOTE — PROGRESS NOTE ADULT - SUBJECTIVE AND OBJECTIVE BOX
Cardiology Consult Service Progress Note     Nickolas Shipman MD JOSE  Cardiology Fellow   Pager [phone]    Patient is a 46y old  Male who presents with a chief complaint of Cerebellar Stroke (03 Aug 2021 07:38)    SUBJECTIVE/OVERNIGHT EVENTS   No acute events overnight. Remained in ST overnight, also noted to be febrile overnight. Denies palpitations.     Tele: ST , PVCs     OBJECTIVE  Vital Signs Last 24 Hrs  T(C): 38.3 (03 Aug 2021 13:00), Max: 38.3 (03 Aug 2021 13:00)  T(F): 101 (03 Aug 2021 13:00), Max: 101 (03 Aug 2021 13:00)  HR: 103 (03 Aug 2021 13:00) (90 - 109)  BP: 127/94 (03 Aug 2021 13:00) (112/75 - 174/97)  BP(mean): 107 (03 Aug 2021 13:00) (89 - 126)  RR: 26 (03 Aug 2021 13:00) (18 - 35)  SpO2: 98% (03 Aug 2021 13:00) (92% - 99%)    I&O's Summary    02 Aug 2021 07:01  -  03 Aug 2021 07:00  --------------------------------------------------------  IN: 4794.9 mL / OUT: 3850 mL / NET: 944.9 mL    03 Aug 2021 07:01  -  03 Aug 2021 13:46  --------------------------------------------------------  IN: 0 mL / OUT: 200 mL / NET: -200 mL    PHYSICAL EXAM:  GENERAL: NAD  HEAD:  Atraumatic, Normocephalic  EYES: EOMI, conjunctiva and sclera clear  NECK: Supple, No JVD  CHEST/LUNG: Clear to auscultation bilaterally; No wheeze  HEART: +Tachy, regular rhythm; +holosystolic murmur   ABDOMEN: Soft, Nontender, Nondistended; Bowel sounds present  EXTREMITIES:  2+ Peripheral Pulses, No clubbing, cyanosis, or edema  PSYCH: AAOx3  NEUROLOGY: non-focal  SKIN: No rashes or lesions    LABS                        15.6   10.33 )-----------( 229      ( 03 Aug 2021 05:29 )             47.4                         15.4   8.97  )-----------( 201      ( 02 Aug 2021 05:17 )             45.8     08-03    141  |  107  |  13  ----------------------------<  109<H>  3.4<L>   |  22  |  0.84  08-02    x   |  x   |  x   ----------------------------<  x   3.8   |  x   |  x     Ca    8.9      03 Aug 2021 05:32  Ca    8.8      02 Aug 2021 05:17  Phos  3.2     08-03  Mg     1.9     08-03      CAPILLARY BLOOD GLUCOSE  POCT Blood Glucose.: 88 mg/dL (03 Aug 2021 10:40)  POCT Blood Glucose.: 95 mg/dL (03 Aug 2021 09:51)     03 Aug 2021 05:32 Troponin 0.06 ng/mL / CK x     / CKMB x     / CKMB Units x       03 Aug 2021 02:32 Troponin 0.06 ng/mL / CK x     / CKMB x     / CKMB Units x       02 Aug 2021 14:47 Troponin 0.08 ng/mL / CK x     / CKMB x     / CKMB Units x        ( 01 Aug 2021 07:47 ) pH: 7.42  /  pCO2: 37    /  pO2: 80    / HCO3: 24    / Base Excess: -0.2  /  SaO2: 96.7      ( 30 Jul 2021 20:50 ) pH: 7.40  /  pCO2: 39    /  pO2: 130   / HCO3: 24    / Base Excess: -0.5  /  SaO2: 99.6      ( 30 Jul 2021 09:43 ) pH: 7.38  /  pCO2: 43    /  pO2: 100   / HCO3: 25    / Base Excess: 0.0   /  SaO2: 98.3      MEDICATIONS  (STANDING):  aMIOdarone    Tablet   Oral   aMIOdarone    Tablet 400 milliGRAM(s) Oral two times a day  atorvastatin 40 milliGRAM(s) Oral at bedtime  chlorhexidine 2% Cloths 1 Application(s) Topical daily  dextrose 50% Injectable 25 Gram(s) IV Push once  diltiazem    Tablet 60 milliGRAM(s) Oral every 8 hours  glucagon  Injectable 1 milliGRAM(s) IntraMuscular once  heparin   Injectable 5000 Unit(s) SubCutaneous every 8 hours  losartan 25 milliGRAM(s) Oral daily  multivitamin 1 Tablet(s) Oral daily  niCARdipine Infusion 5 mG/Hr (25 mL/Hr) IV Continuous <Continuous>  piperacillin/tazobactam IVPB.. 3.375 Gram(s) IV Intermittent every 6 hours  polyethylene glycol 3350 17 Gram(s) Oral daily  potassium chloride  20 mEq/100 mL IVPB 20 milliEquivalent(s) IV Intermittent every 2 hours  senna 2 Tablet(s) Oral at bedtime  vancomycin  IVPB 1500 milliGRAM(s) IV Intermittent every 12 hours    MEDICATIONS  (PRN):  acetaminophen   Tablet .. 650 milliGRAM(s) Oral every 6 hours PRN Temp greater or equal to 38C (100.4F), Mild Pain (1 - 3)  acetaminophen  Suppository .. 650 milliGRAM(s) Rectal every 6 hours PRN Temp greater or equal to 38.5C (101.3F), Moderate Pain (4 - 6)  bisacodyl 5 milliGRAM(s) Oral every 12 hours PRN Constipation  ondansetron Injectable 4 milliGRAM(s) IV Push every 6 hours PRN Nausea and/or Vomiting

## 2021-08-03 NOTE — PROGRESS NOTE ADULT - SUBJECTIVE AND OBJECTIVE BOX
HPI:  46 M pt with PMHx of HTN, noncompliant with meds (Metoprolol, Nifedipine, and ASA), reports no recent use of ASA or metoprolol, presents to ED c/o acute onset and worsening dizziness, R arm paresthesia, and vomiting x 1hr prior to arrival while at work. Pt somnolent in ED, difficulty obtaining hx from pt. Per EMS, pt had SBP in the 200s, given nitro SL x 2 in field. Patient found to have a left side cerebellar hemmorrhage and left pontine hemmorrhage with extension to the subarachnoid space. Patient was started on a nicardipene drip in the ED for SBP to 230's, given manitol 50g and intubated for concern of potential for aspiration with declining exam. Patient reported that he lives alone and has no close contacts in the area.     NIHSS 6  ICH 2   (28 Jul 2021 20:40)        S/Overnight events: Tolerating room air, dilt gtt stopped. sinus tach to low 100s now. PO amiodareone and diltaezem started. pt is on cardene, overnight losartan started.       Hospital Course:   7/28: Admitted with left cerebellar ICH. Intubated in ED for lethargy and concern for airway protection. Repeat CTH stable.  7/29: POD# 0 S/P femoral cerebral angiogram, findings of left vertebral artery irregularity at level below PICA with segment suggestive of intimal flap, left vertebral artery sacrifice performed. Hypertensive episode during angio case, Xper CT shows stable ICH. Pt seen and examined at bedside in NSICU postop. Pt agitated and reaching for ETT. Brief CPAP trial given, and Pt was successfully extubated. Patient then with many blood tinged secretions and CXR with infiltrated. Patient was reintubated. He then self-extubated and was emergently reintubated, placed in restraints now sedated on precedex and fentanyl. Received 1LNS bolus for hypotension and was also started on levophed for SBPs in 70s.   7/30: POD1 cerebral angiogram with left vertebral takedown, OLESYA overnight, neuro stable remains sedated on precedex and intubated on full vent support. Repeat CTH stable. Received 40 of lasix, increased PEEP from 7 to 8 and tapering off fentanyl drip. Self extubated and doing well on high flow NC, Kept on Precedex for agitation, started on Cardene for 's, will wean as appropriate.   7/31: POD2 cerebral angio with L verterbal takedown. S/p Seroquel 25 and Precedex overnight for agitation, otherwise OLESYA. On Cardene gtt. Neuro stable. O2 sat stable on HFNC. S/p 30 Lasix. Uptrending troponin, EKG stable, trending q6H  8/1: POD3, OLESYA overnight, on Precedex. Neuro stable.  8/2: POD#4, OLESYA overnight, neuro stable. Precedex off, tolerating RA. 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 SVT to 200s, on verapamil 80 tid, given adenosine, lopressor, verapamil, diltiazem gtt started. started on PO dilt 60 tid.  8/3: POD#5: tolerating room air, dilt gtt stopped. sinus tach to low 100s now. PO amiodareone and diltaezem started. pt is on cardene, overnight losartan started.         Vital Signs Last 24 Hrs  T(C): 37.4 (03 Aug 2021 01:12), Max: 38.1 (02 Aug 2021 12:00)  T(F): 99.3 (03 Aug 2021 01:12), Max: 100.6 (02 Aug 2021 12:00)  HR: 103 (03 Aug 2021 01:15) (88 - 214)  BP: 144/91 (03 Aug 2021 01:15) (108/64 - 191/128)  BP(mean): 111 (03 Aug 2021 01:15) (80 - 150)  RR: 32 (03 Aug 2021 01:15) (20 - 39)  SpO2: 97% (03 Aug 2021 01:15) (59% - 100%)    I&O's Detail    01 Aug 2021 07:01  -  02 Aug 2021 07:00  --------------------------------------------------------  IN:    Dexmedetomidine: 8 mL    IV PiggyBack: 800 mL    NiCARdipine: 162.5 mL    Oral Fluid: 960 mL  Total IN: 1930.5 mL    OUT:    Intermittent Catheterization - Urethral (mL): 560 mL    NiCARdipine: 0 mL    Voided (mL): 3800 mL  Total OUT: 4360 mL    Total NET: -2429.5 mL      02 Aug 2021 07:01  -  03 Aug 2021 01:56  --------------------------------------------------------  IN:    Diltiazem: 165 mL    IV PiggyBack: 949.9 mL    NiCARdipine: 250 mL    Oral Fluid: 500 mL    Sodium Chloride 0.9% Bolus: 1500 mL  Total IN: 3364.9 mL    OUT:    Voided (mL): 3450 mL  Total OUT: 3450 mL    Total NET: -85.1 mL        I&O's Summary    01 Aug 2021 07:01  -  02 Aug 2021 07:00  --------------------------------------------------------  IN: 1930.5 mL / OUT: 4360 mL / NET: -2429.5 mL    02 Aug 2021 07:01  -  03 Aug 2021 01:56  --------------------------------------------------------  IN: 3364.9 mL / OUT: 3450 mL / NET: -85.1 mL        PHYSICAL EXAM:  Neurological:    Motor exam:         [] Upper extremity              Bi(c5)  WE(c6)  EE(c7)   FF(c8)                                                R         5/5        5/5        5/5       5/5                                               L          5/5        5/5        5/5       5/5         [] Lower extremeity          HF(l2)   KE(l3)    TA(l4)   EHL(l5)  GS(s1)                                                 R        5/5        5/5        5/5       5/5         5/5                                               L         5/5        5/5       5/5       5/5          5/5                                                        [] warm well perfused; capillary refill <3 seconds     Sensation: [] intact to light touch  [] decreased:       Cardiovascular:  Respiratory:  Gastrointestinal:  Genitourinary:  Extremities:    Incision/Wound:    DEVICE/DRAIN DRESSING:    TUBES/LINES:  [] CVC  [] A-line  [] Lumbar Drain  [] Ventriculostomy  [] Other    DIET:  [] NPO  [] Mechanical  [] Tube feeds    LABS:                        15.4   8.97  )-----------( 201      ( 02 Aug 2021 05:17 )             45.8     08-02    x   |  x   |  x   ----------------------------<  x   3.8   |  x   |  x     Ca    8.8      02 Aug 2021 05:17  Phos  2.4     08-02  Mg     1.9     08-02          CARDIAC MARKERS ( 02 Aug 2021 14:47 )  x     / 0.08 ng/mL / x     / x     / x      CARDIAC MARKERS ( 02 Aug 2021 05:17 )  x     / 0.10 ng/mL / x     / x     / x      CARDIAC MARKERS ( 02 Aug 2021 00:06 )  x     / 0.11 ng/mL / x     / x     / x      CARDIAC MARKERS ( 01 Aug 2021 03:28 )  x     / 0.09 ng/mL / x     / x     / x          CAPILLARY BLOOD GLUCOSE          Drug Levels: [] N/A  Vancomycin Level, Trough: 11.6 ug/mL (08-02 @ 05:17)  Vancomycin Level, Trough: 14.5 ug/mL (07-31 @ 17:00)  Vancomycin Level, Trough: 11.0 ug/mL (07-30 @ 05:36)    CSF Analysis: [] N/A      Allergies    No Known Allergies    Intolerances      MEDICATIONS:  Antibiotics:  piperacillin/tazobactam IVPB.. 3.375 Gram(s) IV Intermittent every 6 hours  vancomycin  IVPB 1500 milliGRAM(s) IV Intermittent every 12 hours    Neuro:  acetaminophen   Tablet .. 650 milliGRAM(s) Oral every 6 hours PRN  acetaminophen  Suppository .. 650 milliGRAM(s) Rectal every 6 hours PRN  ondansetron Injectable 4 milliGRAM(s) IV Push every 6 hours PRN    Anticoagulation:  heparin   Injectable 5000 Unit(s) SubCutaneous every 8 hours    OTHER:  aMIOdarone    Tablet   Oral   aMIOdarone    Tablet 400 milliGRAM(s) Oral two times a day  atorvastatin 40 milliGRAM(s) Oral at bedtime  bisacodyl 5 milliGRAM(s) Oral every 12 hours PRN  chlorhexidine 2% Cloths 1 Application(s) Topical daily  dextrose 50% Injectable 25 Gram(s) IV Push once  diltiazem    Tablet 60 milliGRAM(s) Oral every 8 hours  glucagon  Injectable 1 milliGRAM(s) IntraMuscular once  losartan 25 milliGRAM(s) Oral daily  niCARdipine Infusion 5 mG/Hr IV Continuous <Continuous>  polyethylene glycol 3350 17 Gram(s) Oral daily  senna 2 Tablet(s) Oral at bedtime    IVF:  multivitamin 1 Tablet(s) Oral daily    CULTURES:  Culture Results:   No growth at 2 days. (07-31 @ 19:24)  Culture Results:   No growth at 2 days. (07-31 @ 19:24)    RADIOLOGY & ADDITIONAL TESTS:      ASSESSMENT:  46y Male s/p    CHEST PAIN    No pertinent family history in first degree relatives    Handoff    MEWS Score    HTN (hypertension)    HTN (hypertension)    SAH (subarachnoid hemorrhage)    Dissection of cerebral artery    SAH (subarachnoid hemorrhage)    Dissection of cerebral artery    Cerebellar bleed    Angiogram, blood vessel, cerebral, with embolization    No significant past surgical history    CHEST PAIN    SysAdmin_VisitLink        Plan:     Neuro:   - neurochecks  - vitals   - pain control prn with   - OOB/amb/PT   - activities  - diet   - final path?   - post op MRI in 48 hours   - post op CT?   - cont. decadron/keppra ?   - DI watch  - Stroke core measure   Cardio:       Pulm:   - IS     Renal:   - IVF? IVL     GI:   - Diet  - PPI for ulcer prophylaxis?   - Bowel regimen?     Heme:   - H&H stable     ID:   - afebrile, no leukocytosis     Endo:   - ISS       Misc:     DVT ppx:     Fall risk     Dispo:       DVT PROPHYLAXIS:  [] Venodynes                                [] Heparin/Lovenox    FALL RISK:  [] Low Risk                                    [] Impulsive    DISPOSITION:       Assessment:  Present when checked    []  GCS  E   V  M     Heart Failure: []Acute, [] acute on chronic , []chronic  Heart Failure:  [] Diastolic (HFpEF), [] Systolic (HFrEF), []Combined (HFpEF and HFrEF), [] RHF, [] Pulm HTN, [] Other    [] TATIANA, [] ATN, [] AIN, [] other  [] CKD1, [] CKD2, [] CKD 3, [] CKD 4, [] CKD 5, []ESRD    Encephalopathy: [] Metabolic, [] Hepatic, [] toxic, [] Neurological, [] Other    Abnormal Nurtitional Status: [] malnurtition (see nutrition note), [ ]underweight: BMI < 19, [] morbid obesity: BMI >40, [] Cachexia    [] Sepsis  [] hypovolemic shock,[] cardiogenic shock, [] hemorrhagic shock, [] neuogenic shock  [] Acute Respiratory Failure  []Cerebral edema, [] Brain compression/ herniation,   [] Functional quadriplegia  [] Acute blood loss anemia   HPI:  46 M pt with PMHx of HTN, noncompliant with meds (Metoprolol, Nifedipine, and ASA), reports no recent use of ASA or metoprolol, presents to ED c/o acute onset and worsening dizziness, R arm paresthesia, and vomiting x 1hr prior to arrival while at work. Pt somnolent in ED, difficulty obtaining hx from pt. Per EMS, pt had SBP in the 200s, given nitro SL x 2 in field. Patient found to have a left side cerebellar hemmorrhage and left pontine hemmorrhage with extension to the subarachnoid space. Patient was started on a nicardipene drip in the ED for SBP to 230's, given manitol 50g and intubated for concern of potential for aspiration with declining exam. Patient reported that he lives alone and has no close contacts in the area.     NIHSS 6  ICH 2   (28 Jul 2021 20:40)        S/Overnight events: Tolerating room air, dilt gtt stopped. sinus tach to low 100s now. PO amiodareone and diltaezem started. pt is on cardene, overnight losartan started.       Hospital Course:   7/28: Admitted with left cerebellar ICH. Intubated in ED for lethargy and concern for airway protection. Repeat CTH stable.  7/29: POD# 0 S/P femoral cerebral angiogram, findings of left vertebral artery irregularity at level below PICA with segment suggestive of intimal flap, left vertebral artery sacrifice performed. Hypertensive episode during angio case, Xper CT shows stable ICH. Pt seen and examined at bedside in NSICU postop. Pt agitated and reaching for ETT. Brief CPAP trial given, and Pt was successfully extubated. Patient then with many blood tinged secretions and CXR with infiltrated. Patient was reintubated. He then self-extubated and was emergently reintubated, placed in restraints now sedated on precedex and fentanyl. Received 1LNS bolus for hypotension and was also started on levophed for SBPs in 70s.   7/30: POD1 cerebral angiogram with left vertebral takedown, OLESYA overnight, neuro stable remains sedated on precedex and intubated on full vent support. Repeat CTH stable. Received 40 of lasix, increased PEEP from 7 to 8 and tapering off fentanyl drip. Self extubated and doing well on high flow NC, Kept on Precedex for agitation, started on Cardene for 's, will wean as appropriate.   7/31: POD2 cerebral angio with L verterbal takedown. S/p Seroquel 25 and Precedex overnight for agitation, otherwise OLESYA. On Cardene gtt. Neuro stable. O2 sat stable on HFNC. S/p 30 Lasix. Uptrending troponin, EKG stable, trending q6H  8/1: POD3, OLESYA overnight, on Precedex. Neuro stable.  8/2: POD#4, OLESYA overnight, neuro stable. Precedex off, tolerating RA. 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 SVT to 200s, on verapamil 80 tid, given adenosine, lopressor, verapamil, diltiazem gtt started. started on PO dilt 60 tid.  8/3: POD#5: tolerating room air, dilt gtt stopped. sinus tach to low 100s now. PO amiodareone and diltaezem started. pt is on cardene, overnight losartan started.         Vital Signs Last 24 Hrs  T(C): 37.4 (03 Aug 2021 01:12), Max: 38.1 (02 Aug 2021 12:00)  T(F): 99.3 (03 Aug 2021 01:12), Max: 100.6 (02 Aug 2021 12:00)  HR: 103 (03 Aug 2021 01:15) (88 - 214)  BP: 144/91 (03 Aug 2021 01:15) (108/64 - 191/128)  BP(mean): 111 (03 Aug 2021 01:15) (80 - 150)  RR: 32 (03 Aug 2021 01:15) (20 - 39)  SpO2: 97% (03 Aug 2021 01:15) (59% - 100%)    I&O's Detail    01 Aug 2021 07:01  -  02 Aug 2021 07:00  --------------------------------------------------------  IN:    Dexmedetomidine: 8 mL    IV PiggyBack: 800 mL    NiCARdipine: 162.5 mL    Oral Fluid: 960 mL  Total IN: 1930.5 mL    OUT:    Intermittent Catheterization - Urethral (mL): 560 mL    NiCARdipine: 0 mL    Voided (mL): 3800 mL  Total OUT: 4360 mL    Total NET: -2429.5 mL      02 Aug 2021 07:01  -  03 Aug 2021 01:56  --------------------------------------------------------  IN:    Diltiazem: 165 mL    IV PiggyBack: 949.9 mL    NiCARdipine: 250 mL    Oral Fluid: 500 mL    Sodium Chloride 0.9% Bolus: 1500 mL  Total IN: 3364.9 mL    OUT:    Voided (mL): 3450 mL  Total OUT: 3450 mL    Total NET: -85.1 mL        I&O's Summary    01 Aug 2021 07:01  -  02 Aug 2021 07:00  --------------------------------------------------------  IN: 1930.5 mL / OUT: 4360 mL / NET: -2429.5 mL    02 Aug 2021 07:01  -  03 Aug 2021 01:56  --------------------------------------------------------  IN: 3364.9 mL / OUT: 3450 mL / NET: -85.1 mL        PHYSICAL EXAM:  Exam: intact, downward diplopia L> R     TUBES/LINES:  [] CVC  [] A-line  [] Lumbar Drain  [] Ventriculostomy  [] Other    DIET:  [] NPO  [x] Mechanical  [] Tube feeds    LABS:                        15.4   8.97  )-----------( 201      ( 02 Aug 2021 05:17 )             45.8     08-02    x   |  x   |  x   ----------------------------<  x   3.8   |  x   |  x     Ca    8.8      02 Aug 2021 05:17  Phos  2.4     08-02  Mg     1.9     08-02    CARDIAC MARKERS ( 02 Aug 2021 14:47 )  x     / 0.08 ng/mL / x     / x     / x      CARDIAC MARKERS ( 02 Aug 2021 05:17 )  x     / 0.10 ng/mL / x     / x     / x      CARDIAC MARKERS ( 02 Aug 2021 00:06 )  x     / 0.11 ng/mL / x     / x     / x      CARDIAC MARKERS ( 01 Aug 2021 03:28 )  x     / 0.09 ng/mL / x     / x     / x          CAPILLARY BLOOD GLUCOSE          Drug Levels: [] N/A  Vancomycin Level, Trough: 11.6 ug/mL (08-02 @ 05:17)  Vancomycin Level, Trough: 14.5 ug/mL (07-31 @ 17:00)  Vancomycin Level, Trough: 11.0 ug/mL (07-30 @ 05:36)    CSF Analysis: [] N/A      Allergies    No Known Allergies    Intolerances      MEDICATIONS:  Antibiotics:  piperacillin/tazobactam IVPB.. 3.375 Gram(s) IV Intermittent every 6 hours  vancomycin  IVPB 1500 milliGRAM(s) IV Intermittent every 12 hours    Neuro:  acetaminophen   Tablet .. 650 milliGRAM(s) Oral every 6 hours PRN  acetaminophen  Suppository .. 650 milliGRAM(s) Rectal every 6 hours PRN  ondansetron Injectable 4 milliGRAM(s) IV Push every 6 hours PRN    Anticoagulation:  heparin   Injectable 5000 Unit(s) SubCutaneous every 8 hours    OTHER:  aMIOdarone    Tablet   Oral   aMIOdarone    Tablet 400 milliGRAM(s) Oral two times a day  atorvastatin 40 milliGRAM(s) Oral at bedtime  bisacodyl 5 milliGRAM(s) Oral every 12 hours PRN  chlorhexidine 2% Cloths 1 Application(s) Topical daily  dextrose 50% Injectable 25 Gram(s) IV Push once  diltiazem    Tablet 60 milliGRAM(s) Oral every 8 hours  glucagon  Injectable 1 milliGRAM(s) IntraMuscular once  losartan 25 milliGRAM(s) Oral daily  niCARdipine Infusion 5 mG/Hr IV Continuous <Continuous>  polyethylene glycol 3350 17 Gram(s) Oral daily  senna 2 Tablet(s) Oral at bedtime    IVF:  multivitamin 1 Tablet(s) Oral daily    CULTURES:  Culture Results:   No growth at 2 days. (07-31 @ 19:24)  Culture Results:   No growth at 2 days. (07-31 @ 19:24)    RADIOLOGY & ADDITIONAL TESTS:      Assessment:  46 M PMH HTN, non-compliant on home meds nifedipine, ASA 81, and metoprolol presented with 2 hr onset gradual worsening R UE paresthesia, weakness, dizziness, N&V found to have an Acute left cerebellar intraparenchymal hemorrhage and left pontine hemorrhage with extension into the subarachnoid space. Pt is now s/p  femoral cerebral angiogram, findings of left vertebral artery irregularity at level below PICA with segment suggestive of intimal flap, left vertebral artery sacrifice performed (7/29/2021), post op was extubated, needed to be reintubated for secretion burden and unable to protect airway,  patient self-extubated X 2, now on high flow NC, satting well.       CHEST PAIN    No pertinent family history in first degree relatives    Handoff    MEWS Score    HTN (hypertension)    HTN (hypertension)    SAH (subarachnoid hemorrhage)    Dissection of cerebral artery    SAH (subarachnoid hemorrhage)    Dissection of cerebral artery    Cerebellar bleed    Angiogram, blood vessel, cerebral, with embolization    No significant past surgical history    CHEST PAIN    SysAdmin_VisitLink        Plan:   Plan:  Neuro:   - Neuro checks/vital checks/groin/vascular checks q2hr  - Tylenol prn for pain control  - Precedex off   - Repeat CTH 7/30 wet read stable f/u final read     CV:  HCM resting grad 90 with MACRINA causing at least mod MR. Possible family hx SCD (mother). Course complicated by Afib RVR      - -160, on cardene gtt   - Cards following  - TTE- hypertrophic cardiomyopathy with severe left ventricular outflow tract obstruction and moderate mitral regurg  - trending troponin q6 H  - Careful fluid balance  - cont verapamil 80 q8hrs  - 8/2: SVT to 200s, s/p adenosine 1.2, verapamil 10, lopressor 5, dilt gtt, 1L bolus, started on dilt PO 60 tid  - dilt gtt stopped, started amio load 400 bid x 12 does then 200 daily    Per cardiology: #HCM: avoid dehydration, vasodilation, and tachycardia in HCM in order to improve gradient. 90 resting gradient is significant and explains baseline poor ET as well as hospital course (tenuous respiratory status, extreme sensitivity to diuresis). Improvement in gradient will also improve MR which will help pulmonary edema.     Pulm:  - satting well RA  - blood tinged sputum    Renal:   - normonatremia goal   -  Replete electrolytes PRN    GI:  - bowel reg  - regular diet     Endo:   - ISS    Heme:   - SCD's, SQH   - trend CBC  - f/u LE doppler 7/29 neg    ID:   - f/u panculture 7/31  - trend WBC  - empiric vanc/zosyn end date 8/3     Disposition: ICU status, full code, dispo pending, family updated with plan.    Plan d/w Dr. D'Amico and Dr. Martinez          Assessment:  Present when checked    []  GCS  E   V  M     Heart Failure: []Acute, [] acute on chronic , []chronic  Heart Failure:  [] Diastolic (HFpEF), [] Systolic (HFrEF), []Combined (HFpEF and HFrEF), [] RHF, [] Pulm HTN, [] Other    [] TATIANA, [] ATN, [] AIN, [] other  [] CKD1, [] CKD2, [] CKD 3, [] CKD 4, [] CKD 5, []ESRD    Encephalopathy: [] Metabolic, [] Hepatic, [] toxic, [] Neurological, [] Other    Abnormal Nurtitional Status: [] malnurtition (see nutrition note), [ ]underweight: BMI < 19, [] morbid obesity: BMI >40, [] Cachexia    [] Sepsis  [] hypovolemic shock,[] cardiogenic shock, [] hemorrhagic shock, [] neuogenic shock  [] Acute Respiratory Failure  []Cerebral edema, [] Brain compression/ herniation,   [] Functional quadriplegia  [] Acute blood loss anemia

## 2021-08-03 NOTE — PROCEDURE NOTE - NSSITEPREP_SKIN_A_CORE
septic technique/chlorhexidine
chlorhexidine
chlorhexidine/Adherence to aseptic technique: hand hygiene prior to donning barriers (gown, gloves), don cap and mask, sterile drape over patient
chlorhexidine

## 2021-08-03 NOTE — PROVIDER CONTACT NOTE (CHANGE IN STATUS NOTIFICATION) - SITUATION
patient found to be oriented x1, lethargic, confused, slight drift on left upper extremity. patient is known to me and this is A change from the patients baseline. pupils remain 2mm and brisk.

## 2021-08-03 NOTE — PROGRESS NOTE ADULT - SUBJECTIVE AND OBJECTIVE BOX
Patient seen and examined    T(C): 36.5 (08-04-21 @ 00:00), Max: 38.3 (08-03-21 @ 13:00)  HR: 100 (08-04-21 @ 00:00) (84 - 106)  BP: 138/102 (08-04-21 @ 00:00) (112/75 - 179/108)  RR: 20 (08-04-21 @ 00:00) (18 - 32)  SpO2: 98% (08-04-21 @ 00:00) (92% - 100%)  08-02-21 @ 07:01  -  08-03-21 @ 07:00  --------------------------------------------------------  IN: 4794.9 mL / OUT: 3850 mL / NET: 944.9 mL    08-03-21 @ 07:01  -  08-04-21 @ 00:41  --------------------------------------------------------  IN: 800 mL / OUT: 1400 mL / NET: -600 mL    acetaminophen   Tablet .. 650 milliGRAM(s) Oral every 6 hours PRN  aMIOdarone    Tablet   Oral   aMIOdarone    Tablet 400 milliGRAM(s) Oral two times a day  atorvastatin 40 milliGRAM(s) Oral at bedtime  bisacodyl 5 milliGRAM(s) Oral every 12 hours PRN  chlorhexidine 2% Cloths 1 Application(s) Topical daily  dextrose 50% Injectable 25 Gram(s) IV Push once  diltiazem    Tablet 60 milliGRAM(s) Oral every 8 hours  glucagon  Injectable 1 milliGRAM(s) IntraMuscular once  heparin   Injectable 5000 Unit(s) SubCutaneous every 8 hours  losartan 75 milliGRAM(s) Oral daily  multivitamin 1 Tablet(s) Oral daily  niCARdipine Infusion 5 mG/Hr IV Continuous <Continuous>  ondansetron Injectable 4 milliGRAM(s) IV Push every 6 hours PRN  piperacillin/tazobactam IVPB.. 3.375 Gram(s) IV Intermittent every 6 hours  polyethylene glycol 3350 17 Gram(s) Oral daily  senna 2 Tablet(s) Oral at bedtime  traMADol 25 milliGRAM(s) Oral every 6 hours PRN  traMADol 50 milliGRAM(s) Oral every 6 hours PRN  vancomycin  IVPB 1500 milliGRAM(s) IV Intermittent every 12 hours        Exam stable    labs and imaging reviewed     Continue same management     Roselia Phillips NSCU attending

## 2021-08-03 NOTE — PROGRESS NOTE ADULT - ASSESSMENT
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 Possible family hx SCD (mother). Course complicated by Afib RVR     #Afib RVR- Now in Sinus Tach   - c/w  amio load 400mg PO BID for 5 gm load then reduce to 200mg daily to maintain SR    - c/w PO diltiazem 60mg Q8hr  - wean cardene gtt as tolerated   - Given HOCM and Afib will need AC, start DOAC when surgically allowable    #HCM: avoid dehydration, vasodilation, and tachycardia in HCM in order to improve gradient. 90 resting gradient is significant and explains baseline poor ET as well as hospital course (tenuous respiratory status, extreme sensitivity to diuresis). Improvement in gradient will also improve MR which will help pulmonary edema.   - K>4, Mg>2  - outpatient f/u with cardiology upon DC; Consider genetic testing of HOCM for family members as well.         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 Possible family hx SCD (mother). Course complicated by Afib RVR     #Afib- Now in Sinus Tach   - c/w  amio load 400mg PO BID for 5 gm load then reduce to 200mg daily to maintain SR    - Switch to Diltiazem XR 180mg PO daily   - Given HOCM and Afib will need AC, start DOAC when surgically allowable    #HCM: avoid dehydration, vasodilation, and tachycardia in HCM in order to improve gradient. 90 resting gradient is significant and explains baseline poor ET as well as hospital course (tenuous respiratory status, extreme sensitivity to diuresis). Improvement in gradient will also improve MR which will help pulmonary edema.   - Will need to repeat TTE prior to discharge when HR are more controlled to accurately assess gradient.   - K>4, Mg>2  - outpatient f/u with cardiology upon DC; Consider genetic testing of HOCM for family members as well.

## 2021-08-03 NOTE — PROVIDER CONTACT NOTE (CHANGE IN STATUS NOTIFICATION) - SITUATION
Pt seen picking at IV tape, when asked, appears confused. Further orientation questioning met with blank stares. Photophobic with pupil exam, states HA, slight nausea. Previously given Tylenol but does not recall having received dose. After several minutes confusion abated and pt correctly answered all questions.

## 2021-08-04 DIAGNOSIS — E87.2 ACIDOSIS: ICD-10-CM

## 2021-08-04 DIAGNOSIS — Q24.8 OTHER SPECIFIED CONGENITAL MALFORMATIONS OF HEART: ICD-10-CM

## 2021-08-04 DIAGNOSIS — I48.91 UNSPECIFIED ATRIAL FIBRILLATION: ICD-10-CM

## 2021-08-04 DIAGNOSIS — I61.4 NONTRAUMATIC INTRACEREBRAL HEMORRHAGE IN CEREBELLUM: ICD-10-CM

## 2021-08-04 DIAGNOSIS — D72.829 ELEVATED WHITE BLOOD CELL COUNT, UNSPECIFIED: ICD-10-CM

## 2021-08-04 DIAGNOSIS — I10 ESSENTIAL (PRIMARY) HYPERTENSION: ICD-10-CM

## 2021-08-04 LAB
ANION GAP SERPL CALC-SCNC: 13 MMOL/L — SIGNIFICANT CHANGE UP (ref 5–17)
ANION GAP SERPL CALC-SCNC: 16 MMOL/L — SIGNIFICANT CHANGE UP (ref 5–17)
APPEARANCE UR: CLEAR — SIGNIFICANT CHANGE UP
BASOPHILS # BLD AUTO: 0.11 K/UL — SIGNIFICANT CHANGE UP (ref 0–0.2)
BASOPHILS NFR BLD AUTO: 0.9 % — SIGNIFICANT CHANGE UP (ref 0–2)
BILIRUB UR-MCNC: NEGATIVE — SIGNIFICANT CHANGE UP
BUN SERPL-MCNC: 15 MG/DL — SIGNIFICANT CHANGE UP (ref 7–23)
BUN SERPL-MCNC: 15 MG/DL — SIGNIFICANT CHANGE UP (ref 7–23)
CALCIUM SERPL-MCNC: 8.9 MG/DL — SIGNIFICANT CHANGE UP (ref 8.4–10.5)
CALCIUM SERPL-MCNC: 9 MG/DL — SIGNIFICANT CHANGE UP (ref 8.4–10.5)
CHLORIDE SERPL-SCNC: 107 MMOL/L — SIGNIFICANT CHANGE UP (ref 96–108)
CHLORIDE SERPL-SCNC: 109 MMOL/L — HIGH (ref 96–108)
CO2 SERPL-SCNC: 17 MMOL/L — LOW (ref 22–31)
CO2 SERPL-SCNC: 18 MMOL/L — LOW (ref 22–31)
COLOR SPEC: YELLOW — SIGNIFICANT CHANGE UP
CREAT SERPL-MCNC: 1.13 MG/DL — SIGNIFICANT CHANGE UP (ref 0.5–1.3)
CREAT SERPL-MCNC: 1.14 MG/DL — SIGNIFICANT CHANGE UP (ref 0.5–1.3)
DIFF PNL FLD: NEGATIVE — SIGNIFICANT CHANGE UP
EOSINOPHIL # BLD AUTO: 0.22 K/UL — SIGNIFICANT CHANGE UP (ref 0–0.5)
EOSINOPHIL NFR BLD AUTO: 1.9 % — SIGNIFICANT CHANGE UP (ref 0–6)
GLUCOSE SERPL-MCNC: 91 MG/DL — SIGNIFICANT CHANGE UP (ref 70–99)
GLUCOSE SERPL-MCNC: 95 MG/DL — SIGNIFICANT CHANGE UP (ref 70–99)
GLUCOSE UR QL: NEGATIVE — SIGNIFICANT CHANGE UP
HCT VFR BLD CALC: 46.5 % — SIGNIFICANT CHANGE UP (ref 39–50)
HGB BLD-MCNC: 15.3 G/DL — SIGNIFICANT CHANGE UP (ref 13–17)
IMM GRANULOCYTES NFR BLD AUTO: 1 % — SIGNIFICANT CHANGE UP (ref 0–1.5)
KETONES UR-MCNC: ABNORMAL MG/DL
LEUKOCYTE ESTERASE UR-ACNC: NEGATIVE — SIGNIFICANT CHANGE UP
LYMPHOCYTES # BLD AUTO: 1.85 K/UL — SIGNIFICANT CHANGE UP (ref 1–3.3)
LYMPHOCYTES # BLD AUTO: 15.8 % — SIGNIFICANT CHANGE UP (ref 13–44)
MAGNESIUM SERPL-MCNC: 1.8 MG/DL — SIGNIFICANT CHANGE UP (ref 1.6–2.6)
MCHC RBC-ENTMCNC: 29.5 PG — SIGNIFICANT CHANGE UP (ref 27–34)
MCHC RBC-ENTMCNC: 32.9 GM/DL — SIGNIFICANT CHANGE UP (ref 32–36)
MCV RBC AUTO: 89.8 FL — SIGNIFICANT CHANGE UP (ref 80–100)
MONOCYTES # BLD AUTO: 1.19 K/UL — HIGH (ref 0–0.9)
MONOCYTES NFR BLD AUTO: 10.2 % — SIGNIFICANT CHANGE UP (ref 2–14)
NEUTROPHILS # BLD AUTO: 8.21 K/UL — HIGH (ref 1.8–7.4)
NEUTROPHILS NFR BLD AUTO: 70.2 % — SIGNIFICANT CHANGE UP (ref 43–77)
NITRITE UR-MCNC: NEGATIVE — SIGNIFICANT CHANGE UP
NRBC # BLD: 0 /100 WBCS — SIGNIFICANT CHANGE UP (ref 0–0)
PH UR: 6 — SIGNIFICANT CHANGE UP (ref 5–8)
PHOSPHATE SERPL-MCNC: 3.8 MG/DL — SIGNIFICANT CHANGE UP (ref 2.5–4.5)
PLATELET # BLD AUTO: 265 K/UL — SIGNIFICANT CHANGE UP (ref 150–400)
POTASSIUM SERPL-MCNC: 4.3 MMOL/L — SIGNIFICANT CHANGE UP (ref 3.5–5.3)
POTASSIUM SERPL-MCNC: SIGNIFICANT CHANGE UP (ref 3.5–5.3)
POTASSIUM SERPL-SCNC: 4.3 MMOL/L — SIGNIFICANT CHANGE UP (ref 3.5–5.3)
POTASSIUM SERPL-SCNC: SIGNIFICANT CHANGE UP (ref 3.5–5.3)
PROCALCITONIN SERPL-MCNC: 0.31 NG/ML — HIGH (ref 0.02–0.1)
PROT UR-MCNC: NEGATIVE MG/DL — SIGNIFICANT CHANGE UP
RBC # BLD: 5.18 M/UL — SIGNIFICANT CHANGE UP (ref 4.2–5.8)
RBC # FLD: 13.2 % — SIGNIFICANT CHANGE UP (ref 10.3–14.5)
SODIUM SERPL-SCNC: 140 MMOL/L — SIGNIFICANT CHANGE UP (ref 135–145)
SODIUM SERPL-SCNC: 140 MMOL/L — SIGNIFICANT CHANGE UP (ref 135–145)
SP GR SPEC: 1.02 — SIGNIFICANT CHANGE UP (ref 1–1.03)
UROBILINOGEN FLD QL: 1 E.U./DL — SIGNIFICANT CHANGE UP
VANCOMYCIN TROUGH SERPL-MCNC: 17.3 UG/ML — SIGNIFICANT CHANGE UP (ref 10–20)
WBC # BLD: 11.7 K/UL — HIGH (ref 3.8–10.5)
WBC # FLD AUTO: 11.7 K/UL — HIGH (ref 3.8–10.5)

## 2021-08-04 PROCEDURE — 99255 IP/OBS CONSLTJ NEW/EST HI 80: CPT | Mod: GC

## 2021-08-04 PROCEDURE — 93971 EXTREMITY STUDY: CPT | Mod: 26,RT

## 2021-08-04 PROCEDURE — 99233 SBSQ HOSP IP/OBS HIGH 50: CPT

## 2021-08-04 RX ORDER — DILTIAZEM HCL 120 MG
180 CAPSULE, EXT RELEASE 24 HR ORAL DAILY
Refills: 0 | Status: DISCONTINUED | OUTPATIENT
Start: 2021-08-05 | End: 2021-08-12

## 2021-08-04 RX ORDER — DILTIAZEM HCL 120 MG
90 CAPSULE, EXT RELEASE 24 HR ORAL ONCE
Refills: 0 | Status: COMPLETED | OUTPATIENT
Start: 2021-08-04 | End: 2021-08-04

## 2021-08-04 RX ORDER — DILTIAZEM HCL 120 MG
90 CAPSULE, EXT RELEASE 24 HR ORAL EVERY 8 HOURS
Refills: 0 | Status: DISCONTINUED | OUTPATIENT
Start: 2021-08-04 | End: 2021-08-04

## 2021-08-04 RX ORDER — LABETALOL HCL 100 MG
10 TABLET ORAL ONCE
Refills: 0 | Status: COMPLETED | OUTPATIENT
Start: 2021-08-04 | End: 2021-08-04

## 2021-08-04 RX ORDER — METOPROLOL TARTRATE 50 MG
50 TABLET ORAL
Refills: 0 | Status: DISCONTINUED | OUTPATIENT
Start: 2021-08-04 | End: 2021-08-06

## 2021-08-04 RX ORDER — MAGNESIUM SULFATE 500 MG/ML
2 VIAL (ML) INJECTION ONCE
Refills: 0 | Status: COMPLETED | OUTPATIENT
Start: 2021-08-04 | End: 2021-08-04

## 2021-08-04 RX ORDER — DILTIAZEM HCL 120 MG
30 CAPSULE, EXT RELEASE 24 HR ORAL ONCE
Refills: 0 | Status: COMPLETED | OUTPATIENT
Start: 2021-08-04 | End: 2021-08-04

## 2021-08-04 RX ADMIN — HEPARIN SODIUM 5000 UNIT(S): 5000 INJECTION INTRAVENOUS; SUBCUTANEOUS at 14:15

## 2021-08-04 RX ADMIN — Medication 90 MILLIGRAM(S): at 22:08

## 2021-08-04 RX ADMIN — Medication 90 MILLIGRAM(S): at 06:06

## 2021-08-04 RX ADMIN — Medication 90 MILLIGRAM(S): at 14:14

## 2021-08-04 RX ADMIN — PIPERACILLIN AND TAZOBACTAM 200 GRAM(S): 4; .5 INJECTION, POWDER, LYOPHILIZED, FOR SOLUTION INTRAVENOUS at 11:06

## 2021-08-04 RX ADMIN — Medication 30 MILLIGRAM(S): at 01:13

## 2021-08-04 RX ADMIN — ATORVASTATIN CALCIUM 40 MILLIGRAM(S): 80 TABLET, FILM COATED ORAL at 22:08

## 2021-08-04 RX ADMIN — HEPARIN SODIUM 5000 UNIT(S): 5000 INJECTION INTRAVENOUS; SUBCUTANEOUS at 06:06

## 2021-08-04 RX ADMIN — HEPARIN SODIUM 5000 UNIT(S): 5000 INJECTION INTRAVENOUS; SUBCUTANEOUS at 22:08

## 2021-08-04 RX ADMIN — LOSARTAN POTASSIUM 75 MILLIGRAM(S): 100 TABLET, FILM COATED ORAL at 04:35

## 2021-08-04 RX ADMIN — POLYETHYLENE GLYCOL 3350 17 GRAM(S): 17 POWDER, FOR SOLUTION ORAL at 09:05

## 2021-08-04 RX ADMIN — Medication 50 GRAM(S): at 06:05

## 2021-08-04 RX ADMIN — Medication 300 MILLIGRAM(S): at 06:04

## 2021-08-04 RX ADMIN — TRAMADOL HYDROCHLORIDE 50 MILLIGRAM(S): 50 TABLET ORAL at 09:30

## 2021-08-04 RX ADMIN — Medication 10 MILLIGRAM(S): at 20:59

## 2021-08-04 RX ADMIN — AMIODARONE HYDROCHLORIDE 400 MILLIGRAM(S): 400 TABLET ORAL at 06:06

## 2021-08-04 RX ADMIN — PIPERACILLIN AND TAZOBACTAM 200 GRAM(S): 4; .5 INJECTION, POWDER, LYOPHILIZED, FOR SOLUTION INTRAVENOUS at 06:06

## 2021-08-04 RX ADMIN — CHLORHEXIDINE GLUCONATE 1 APPLICATION(S): 213 SOLUTION TOPICAL at 06:06

## 2021-08-04 RX ADMIN — PIPERACILLIN AND TAZOBACTAM 200 GRAM(S): 4; .5 INJECTION, POWDER, LYOPHILIZED, FOR SOLUTION INTRAVENOUS at 00:04

## 2021-08-04 RX ADMIN — Medication 1 TABLET(S): at 06:06

## 2021-08-04 NOTE — PROGRESS NOTE ADULT - ASSESSMENT
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 Possible family hx SCD (mother). Course complicated by Afib RVR     #Afib- Now in Sinus Tach   - c/w  amio load 400mg PO BID for 5 gm load then reduce to 200mg daily to maintain SR    -c/w diltiazem 90 mg q8h   -Given HOCM and Afib will need AC, start DOAC when surgically allowable    #HCM:   -avoid dehydration, vasodilation, and tachycardia in HCM in order to improve gradient. 90 resting gradient is significant and explains baseline poor ET as well as hospital course (tenuous respiratory status, extreme sensitivity to diuresis). Improvement in gradient will also improve MR which will help pulmonary edema.   - Will need to repeat TTE prior to discharge when HR are more controlled to accurately assess gradient.   - K>4, Mg>2  - outpatient f/u with cardiology upon DC; Consider genetic testing of HOCM for family members as well.     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 Possible family hx SCD (mother). Course complicated by Afib RVR     #Afib- Now in Sinus Tach   - c/w  amio load 400mg PO BID for 5 gm load then reduce to 200mg daily to maintain SR    -c/w diltiazem XR 180mg qd  - start lopressor 50mg PO BID with holding parameters   -Given HOCM and Afib will need AC, start DOAC when surgically allowable    #HCM:   -avoid dehydration, vasodilation, and tachycardia in HCM in order to improve gradient. 90 resting gradient is significant and explains baseline poor ET as well as hospital course (tenuous respiratory status, extreme sensitivity to diuresis). Improvement in gradient will also improve MR which will help pulmonary edema.   - Will need to repeat TTE prior to discharge when HR are more controlled to accurately assess gradient.   - K>4, Mg>2  - outpatient f/u with cardiology upon DC; Consider genetic testing of HOCM for family members as well.

## 2021-08-04 NOTE — CONSULT NOTE ADULT - PROBLEM SELECTOR RECOMMENDATION 5
Pt being treated with empiric Vanc/Zosyn  -End date on 8/5  -Cultures negative to date  -WBC likely reactive

## 2021-08-04 NOTE — PROGRESS NOTE ADULT - ASSESSMENT
46y/M with  L cerebellar hematoma, VA dissection, brain compression, IVH  HTN (hypertension)  elevated creatinine  HOCM    PLAN:   NEURO: neurochecks q 4 h, VSq 4 h, PRN pain meds with acetaminophen  angio, VA takedown;  BP control, MRI on stepdown  agitation - resolved     REHAB:  physical therapy evaluation and management    EARLY MOB:  bedrest    PULM:  room air, d/c duonebs    CARDIO:  SBP goal 100-160mm Hg, Steffanie; increase losartan, taper cardene off,   cont diltiazem, amiodarone     ENDO:  Blood sugar goals 140-180 mg/dL    GI:  bowel regimen     DIET: regular diet     RENAL:  140-150; CXR in AM  HEM/ONC: Hb stable    VTE Prophylaxis: SCDs, SQH     ID: febrile - central, leukocytosis improving; piperacillin/tazobactam vancomycin (last day 08/03), vanc trough prior to 4th dose, check PCT    Social: sister is NICOLAS - Gabo update      Discharge Checklist:    Completed: 08-04 @ 08:01    [X] hemodynamically stable – VS WNL and stable x 24hours, UO adequate  [n/a ] if  previously on HDA - off pressors x 24h with stable neuro exam    [X] no new symptoms x 24h (i.e. new fever, new-onset nausea/vomiting)  [X] stable labs: (i.e. WBC not rising, sodium not dropping)  [X] patient not at high risk for aspiration, if high risk then:                  [ ] should have definitive plans for trach/PEG (alternative option is to discharge from ICU to facilty)                  [ ] stepdown to bed close to nurse’s station  [n/a] low suctioning requirements (i.e. q4h or less)  [X] sign-off from primary RN*  [X] drains do not require ICU level of care  [X] if patient previously agitated or with behavioral issues – controlled   [X] pain controlled

## 2021-08-04 NOTE — CONSULT NOTE ADULT - ASSESSMENT
46 M PMH L CVA? 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 Pt is now s/p  femoral cerebral angiogram, findings of left vertebral artery irregularity at level below PICA with segment suggestive of intimal flap, left vertebral artery sacrifice performed (7/29/2021), post op was extubated, needed to be reintubated for secretion burden and unable to protect airway,  patient self-extubated X 2, now on high flow NC, satting well. 
46y Male with PMH HTN (noncompliant with meds), presented in hypertensive emergency and right sided numbness. Initially STEMI code called, emergent echo at bedside was unremarkable, then stroke code was called.   CT head showed left cerebellar bleed with ventricular extension, and pontine hemorrhage  Initial NIHSS: 5  ICH score 2, GCS 14  Pt was lethargic and intubated for airway protection in the ER.    1)Secondary stroke prevention  - NO antiplatelets given bleed. Given pt does not take meds, no medications to reverse.     3) Further management  - neurosurgery consult stat - likely will need NSICU admission with possible surgery  - recommend SBP goal <160  - recommend q1hr stroke neuro checks  -Recommend Utox  - may need outpt neurology follow up  - provide stroke education    DVT prophylaxis   -SCDs    Case discussed with Dr. Phelan
46 M PMH L CVA? 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 Pt is now s/p  femoral cerebral angiogram, findings of left vertebral artery irregularity at level below PICA with segment suggestive of intimal flap, left vertebral artery sacrifice performed (7/29/2021), post op was extubated, needed to be reintubated for secretion burden and unable to protect airway,  patient self-extubated X 2.  Hematology consulted for hypercoagulable workup. Denies history of DVT, PE or other thrombosis. Denies family history of thrombosis.    #Stroke  - Unlikely to have underlying hypercoagulable state. Can check Factor V Leiden and Prothrombin gene mutations   - Coags on admission with decreased PTT likely from increased Factor VIII activity (Is an acute phase reactant), Repeat Coags were within normal limits.   - Most likely hypertension related  - Can check ESR/BRIA to screen for vasculitic cause  - Already has Cardiac indication for Anticoagulation and will defer to cardiology/neurosurgery to clear patient to start DOAC for Afib    D/w Dr. Scales

## 2021-08-04 NOTE — PROGRESS NOTE ADULT - ATTENDING COMMENTS
avoid vasodilators such as losartan can make his LVOT obstruction worse  would use diltiazem and metoprolol for BP control and rate control aim to improve his heart rate  DOAC when allowable avoid vasodilators such as losartan can make his LVOT obstruction worse  would use diltiazem and metoprolol for BP control and rate control aim to improve his heart rate goal hr less than 60 bpm  DOAC when allowable

## 2021-08-04 NOTE — CONSULT NOTE ADULT - SUBJECTIVE AND OBJECTIVE BOX
Hematology Consult Note    HPI:  46 M PMH L CVA? 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 Pt is now s/p  femoral cerebral angiogram, findings of left vertebral artery irregularity at level below PICA with segment suggestive of intimal flap, left vertebral artery sacrifice performed (7/29/2021), post op was extubated, needed to be reintubated for secretion burden and unable to protect airway,  patient self-extubated X 2.  Hematology consulted for hypercoagulable workup. Denies history of DVT, PE or other thrombosis. Denies family history of thrombosis        Allergies  No Known Allergies    PAST MEDICAL & SURGICAL HISTORY:  HTN (hypertension)  No significant past surgical history  FAMILY HISTORY:  No pertinent family history in first degree relatives  SOCIAL HISTORY: No EtOH, no tobacco    REVIEW OF SYSTEMS:      T(F): 98.4 (08-04-21 @ 09:00), Max: 101 (08-03-21 @ 13:00)  HR: 100 (08-04-21 @ 10:01)  BP: 151/94 (08-04-21 @ 10:01)  RR: 18 (08-04-21 @ 10:01)  SpO2: 100% (08-04-21 @ 10:01)  Wt(kg): --    GENERAL: NAD, well-developed  HEAD:  Atraumatic, Normocephalic  EYES:  conjunctiva and sclera clear  NECK: Supple, No JVD  CHEST/LUNG: Clear to auscultation bilaterally; No wheeze  HEART: Regular rate and rhythm;   ABDOMEN: Soft, Nontender, Nondistended;   EXTREMITIES:  2+ Peripheral Pulses, No clubbing, cyanosis, or edema  PSYCH: AAOx3  SKIN: No rashes or lesions                        15.3   11.70 )-----------( 265      ( 04 Aug 2021 05:01 )             46.5       08-04    140  |  109<H>  |  15  ----------------------------<  95  4.3   |  18<L>  |  1.13    Ca    8.9      04 Aug 2021 06:28  Phos  3.8     08-04  Mg     1.8     08-04        Magnesium, Serum: 1.8 mg/dL (08-04 @ 05:01)  Phosphorus Level, Serum: 3.8 mg/dL (08-04 @ 05:01)

## 2021-08-04 NOTE — CONSULT NOTE ADULT - SUBJECTIVE AND OBJECTIVE BOX
Patient is a 46y old  Male who presents with a chief complaint of Cerebellar Stroke (04 Aug 2021 08:00)      HPI:  46 M pt with PMHx of HTN, noncompliant with meds (Metoprolol, Nifedipine, and ASA), reports no recent use of ASA or metoprolol, presents to ED c/o acute onset and worsening dizziness, R arm paresthesia, and vomiting x 1hr prior to arrival while at work. Pt somnolent in ED, difficulty obtaining hx from pt. Per EMS, pt had SBP in the 200s, given nitro SL x 2 in field. Patient found to have a left side cerebellar hemmorrhage and left pontine hemmorrhage with extension to the subarachnoid space. Patient was started on a nicardipene drip in the ED for SBP to 230's, given manitol 50g and intubated for concern of potential for aspiration with declining exam. Patient reported that he lives alone and has no close contacts in the area.     NIHSS 6  ICH 2   (2021 20:40)    Subjective:      Allergies    No Known Allergies    Intolerances    Home meds:     MEDICATIONS  (STANDING):  aMIOdarone    Tablet   Oral   aMIOdarone    Tablet 400 milliGRAM(s) Oral two times a day  atorvastatin 40 milliGRAM(s) Oral at bedtime  chlorhexidine 2% Cloths 1 Application(s) Topical daily  dextrose 50% Injectable 25 Gram(s) IV Push once  diltiazem    Tablet 90 milliGRAM(s) Oral every 8 hours  glucagon  Injectable 1 milliGRAM(s) IntraMuscular once  heparin   Injectable 5000 Unit(s) SubCutaneous every 8 hours  losartan 75 milliGRAM(s) Oral daily  multivitamin 1 Tablet(s) Oral daily  niCARdipine Infusion 5 mG/Hr (25 mL/Hr) IV Continuous <Continuous>  piperacillin/tazobactam IVPB.. 3.375 Gram(s) IV Intermittent every 6 hours  polyethylene glycol 3350 17 Gram(s) Oral daily  senna 2 Tablet(s) Oral at bedtime  vancomycin  IVPB 1500 milliGRAM(s) IV Intermittent every 12 hours    MEDICATIONS  (PRN):  acetaminophen   Tablet .. 650 milliGRAM(s) Oral every 6 hours PRN Temp greater or equal to 38C (100.4F), Mild Pain (1 - 3)  bisacodyl 5 milliGRAM(s) Oral every 12 hours PRN Constipation  ondansetron Injectable 4 milliGRAM(s) IV Push every 6 hours PRN Nausea and/or Vomiting  traMADol 25 milliGRAM(s) Oral every 6 hours PRN Moderate Pain (4 - 6)  traMADol 50 milliGRAM(s) Oral every 6 hours PRN Severe Pain (7 - 10)      Drug Dosing Weight  Height (cm): 190.5 (2021 15:28)  Weight (kg): 103 (2021 15:01)  BMI (kg/m2): 28.4 (2021 15:28)  BSA (m2): 2.32 (2021 15:28)    PAST MEDICAL & SURGICAL HISTORY:  HTN (hypertension)    No significant past surgical history        FAMILY HISTORY:  No pertinent family history in first degree relatives of cardiac disease        SOCIAL HISTORY:    ADVANCE DIRECTIVES:    Vital Signs Last 24 Hrs  T(C): 36.2 (04 Aug 2021 04:32), Max: 38.3 (03 Aug 2021 13:00)  T(F): 97.1 (04 Aug 2021 04:32), Max: 101 (03 Aug 2021 13:00)  HR: 92 (04 Aug 2021 08:00) (84 - 110)  BP: 156/99 (04 Aug 2021 08:00) (127/94 - 179/122)  BP(mean): 119 (04 Aug 2021 08:00) (103 - 146)  ABP: 166/96 (04 Aug 2021 08:00) (108/96 - 170/98)  ABP(mean): 116 (04 Aug 2021 08:00) (100 - 132)  RR: 17 (04 Aug 2021 08:00) (16 - 28)  SpO2: 97% (04 Aug 2021 08:00) (94% - 100%)          I&O's Detail    03 Aug 2021 07:01  -  04 Aug 2021 07:00  --------------------------------------------------------  IN:    IV PiggyBack: 1450 mL  Total IN: 1450 mL    OUT:    Voided (mL): 2750 mL  Total OUT: 2750 mL    Total NET: -1300 mL          PHYSICAL EXAM:      Constitutional:    Eyes:    ENMT:    Neck:    Breasts:    Back:    Respiratory:    Cardiovascular:    Gastrointestinal:    Genitourinary:    Rectal:    Extremities:    Vascular:    Neurological:    Skin:    Lymph Nodes:    Musculoskeletal:    Psychiatric:        LABS:  CBC Full  -  ( 04 Aug 2021 05:01 )  WBC Count : 11.70 K/uL  RBC Count : 5.18 M/uL  Hemoglobin : 15.3 g/dL  Hematocrit : 46.5 %  Platelet Count - Automated : 265 K/uL  Mean Cell Volume : 89.8 fl  Mean Cell Hemoglobin : 29.5 pg  Mean Cell Hemoglobin Concentration : 32.9 gm/dL  Auto Neutrophil # : 8.21 K/uL  Auto Lymphocyte # : 1.85 K/uL  Auto Monocyte # : 1.19 K/uL  Auto Eosinophil # : 0.22 K/uL  Auto Basophil # : 0.11 K/uL  Auto Neutrophil % : 70.2 %  Auto Lymphocyte % : 15.8 %  Auto Monocyte % : 10.2 %  Auto Eosinophil % : 1.9 %  Auto Basophil % : 0.9 %    08-04    140  |  109<H>  |  15  ----------------------------<  95  4.3   |  18<L>  |  1.13    Ca    8.9      04 Aug 2021 06:28  Phos  3.8     08-04  Mg     1.8     08-04      CAPILLARY BLOOD GLUCOSE      POCT Blood Glucose.: 88 mg/dL (03 Aug 2021 10:40)      Urinalysis Basic - ( 04 Aug 2021 07:31 )    Color: Yellow / Appearance: Clear / S.020 / pH: x  Gluc: x / Ketone: Trace mg/dL  / Bili: Negative / Urobili: 1.0 E.U./dL   Blood: x / Protein: NEGATIVE mg/dL / Nitrite: NEGATIVE   Leuk Esterase: NEGATIVE / RBC: x / WBC x   Sq Epi: x / Non Sq Epi: x / Bacteria: x        EKG:    ECHO, US:    < from: TTE Echo Complete w/o Contrast w/ Doppler (21 @ 12:24) >  CONCLUSIONS:     1. Normal left ventricular systolic function.   2. Normal right ventricular size and systolic function.   3. Dilated left atrium.   4. No evidence of pulmonary hypertension.   5. No pericardial effusion.   6. Hypertrophic cardiomyopathy with assymetric septal hypertrophy and systolic anterior motion of the mitral valve.   7. There is severe left ventricular outflow tract obstruction with a resting gradient of 90 mm Hg.   8. There is at least moderate eccentric mitral regurgitation associated with the MACRINA.    < end of copied text >      RADIOLOGY:  < from: CT Head No Cont (21 @ 13:25) >  Since prior CT head 2021, continued evolution of left cerebellar parenchymal hemorrhage with intraventricular and subarachnoid extension. Interval mild decrease in size of the lateral and third ventricles. No new hemorrhage..    < end of copied text >

## 2021-08-04 NOTE — PROGRESS NOTE ADULT - SUBJECTIVE AND OBJECTIVE BOX
Cardiology Consult Service Progress Note     Nickolas Shipman MD JOSE  Cardiology Fellow   Pager [phone]    Patient is a 46y old  Male who presents with a chief complaint of Cerebellar Stroke (03 Aug 2021 23:41)      SUBJECTIVE/OVERNIGHT EVENTS   No acute events overnight. Patient tolerating meals, without n/v. Reports having daily BM. Denies fevers, chills, SOB. ROS otherwise negative.     OBJECTIVE  Vital Signs Last 24 Hrs  T(C): 36.2 (04 Aug 2021 04:32), Max: 38.3 (03 Aug 2021 13:00)  T(F): 97.1 (04 Aug 2021 04:32), Max: 101 (03 Aug 2021 13:00)  HR: 92 (04 Aug 2021 07:00) (84 - 110)  BP: 168/109 (04 Aug 2021 07:00) (127/94 - 179/122)  BP(mean): 131 (04 Aug 2021 07:00) (103 - 146)  RR: 19 (04 Aug 2021 07:00) (16 - 28)  SpO2: 97% (04 Aug 2021 07:00) (94% - 100%)    I&O's Summary    03 Aug 2021 07:01  -  04 Aug 2021 07:00  --------------------------------------------------------  IN: 1450 mL / OUT: 1950 mL / NET: -500 mL        PHYSICAL EXAM:  GENERAL: NAD, well-developed  HEAD:  Atraumatic, Normocephalic  EYES: EOMI, conjunctiva and sclera clear  NECK: Supple, No JVD  CHEST/LUNG: Clear to auscultation bilaterally; No wheeze  HEART: Regular rate and rhythm; No murmurs, rubs, or gallops  ABDOMEN: Soft, Nontender, Nondistended; Bowel sounds present  EXTREMITIES:  2+ Peripheral Pulses, No clubbing, cyanosis, or edema  PSYCH: AAOx3  NEUROLOGY: non-focal  SKIN: No rashes or lesions    LABS                        15.3   11.70 )-----------( 265      ( 04 Aug 2021 05:01 )             46.5                         15.6   10.33 )-----------( 229      ( 03 Aug 2021 05:29 )             47.4     08-04    140  |  109<H>  |  15  ----------------------------<  95  4.3   |  18<L>  |  1.13  08-04    140  |  107  |  15  ----------------------------<  91  See Note   |  17<L>  |  1.14    Ca    8.9      04 Aug 2021 06:28  Ca    9.0      04 Aug 2021 05:01  Phos  3.8     08-04  Mg     1.8     08-04      CAPILLARY BLOOD GLUCOSE      POCT Blood Glucose.: 88 mg/dL (03 Aug 2021 10:40)  POCT Blood Glucose.: 95 mg/dL (03 Aug 2021 09:51)       03 Aug 2021 05:32 Troponin 0.06 ng/mL / CK x     / CKMB x     / CKMB Units x       03 Aug 2021 02:32 Troponin 0.06 ng/mL / CK x     / CKMB x     / CKMB Units x       02 Aug 2021 14:47 Troponin 0.08 ng/mL / CK x     / CKMB x     / CKMB Units x            ( 01 Aug 2021 07:47 ) pH: 7.42  /  pCO2: 37    /  pO2: 80    / HCO3: 24    / Base Excess: -0.2  /  SaO2: 96.7            ( 30 Jul 2021 20:50 ) pH: 7.40  /  pCO2: 39    /  pO2: 130   / HCO3: 24    / Base Excess: -0.5  /  SaO2: 99.6            ( 30 Jul 2021 09:43 ) pH: 7.38  /  pCO2: 43    /  pO2: 100   / HCO3: 25    / Base Excess: 0.0   /  SaO2: 98.3                      RADIOLOGY & ADDITIONAL TESTS:    MEDICATIONS  (STANDING):  aMIOdarone    Tablet   Oral   aMIOdarone    Tablet 400 milliGRAM(s) Oral two times a day  atorvastatin 40 milliGRAM(s) Oral at bedtime  chlorhexidine 2% Cloths 1 Application(s) Topical daily  dextrose 50% Injectable 25 Gram(s) IV Push once  diltiazem    Tablet 90 milliGRAM(s) Oral every 8 hours  glucagon  Injectable 1 milliGRAM(s) IntraMuscular once  heparin   Injectable 5000 Unit(s) SubCutaneous every 8 hours  losartan 75 milliGRAM(s) Oral daily  multivitamin 1 Tablet(s) Oral daily  niCARdipine Infusion 5 mG/Hr (25 mL/Hr) IV Continuous <Continuous>  piperacillin/tazobactam IVPB.. 3.375 Gram(s) IV Intermittent every 6 hours  polyethylene glycol 3350 17 Gram(s) Oral daily  senna 2 Tablet(s) Oral at bedtime  vancomycin  IVPB 1500 milliGRAM(s) IV Intermittent every 12 hours    MEDICATIONS  (PRN):  acetaminophen   Tablet .. 650 milliGRAM(s) Oral every 6 hours PRN Temp greater or equal to 38C (100.4F), Mild Pain (1 - 3)  bisacodyl 5 milliGRAM(s) Oral every 12 hours PRN Constipation  ondansetron Injectable 4 milliGRAM(s) IV Push every 6 hours PRN Nausea and/or Vomiting  traMADol 25 milliGRAM(s) Oral every 6 hours PRN Moderate Pain (4 - 6)  traMADol 50 milliGRAM(s) Oral every 6 hours PRN Severe Pain (7 - 10)     Cardiology Consult Service Progress Note     Nickolas Shipman MD JOSE  Cardiology Fellow   Pager [phone]    Patient is a 46y old  Male who presents with a chief complaint of Cerebellar Stroke (03 Aug 2021 23:41)      SUBJECTIVE/OVERNIGHT EVENTS   No acute events overnight. Patient tolerating meals, without n/v. Reports having daily BM. Denies fevers, chills, SOB. ROS otherwise negative.     VITAL SIGNS  Vital Signs Last 24 Hrs  T(C): 36.2 (04 Aug 2021 04:32), Max: 38.3 (03 Aug 2021 13:00)  T(F): 97.1 (04 Aug 2021 04:32), Max: 101 (03 Aug 2021 13:00)  HR: 92 (04 Aug 2021 07:00) (84 - 110)  BP: 168/109 (04 Aug 2021 07:00) (127/94 - 179/122)  BP(mean): 131 (04 Aug 2021 07:00) (103 - 146)  RR: 19 (04 Aug 2021 07:00) (16 - 28)  SpO2: 97% (04 Aug 2021 07:00) (94% - 100%)    I&O's Summary    03 Aug 2021 07:01  -  04 Aug 2021 07:00  --------------------------------------------------------  IN: 1450 mL / OUT: 1950 mL / NET: -500 mL        PHYSICAL EXAM  GENERAL: NAD  HEAD:  Atraumatic, Normocephalic  EYES: EOMI, conjunctiva and sclera clear  NECK: Supple, No JVD  CHEST/LUNG: Clear to auscultation bilaterally; No wheeze  HEART: +Tachy, regular rhythm; +holosystolic murmur   ABDOMEN: Soft, Nontender, Nondistended; Bowel sounds present  EXTREMITIES:  2+ Peripheral Pulses, No clubbing, cyanosis, or edema  PSYCH: AAOx3  NEUROLOGY: non-focal  SKIN: No rashes or lesions    LABS                        15.3   11.70 )-----------( 265      ( 04 Aug 2021 05:01 )             46.5                         15.6   10.33 )-----------( 229      ( 03 Aug 2021 05:29 )             47.4     08-04    140  |  109<H>  |  15  ----------------------------<  95  4.3   |  18<L>  |  1.13  08-04    140  |  107  |  15  ----------------------------<  91  See Note   |  17<L>  |  1.14    Ca    8.9      04 Aug 2021 06:28  Ca    9.0      04 Aug 2021 05:01  Phos  3.8     08-04  Mg     1.8     08-04      CAPILLARY BLOOD GLUCOSE      POCT Blood Glucose.: 88 mg/dL (03 Aug 2021 10:40)  POCT Blood Glucose.: 95 mg/dL (03 Aug 2021 09:51)       03 Aug 2021 05:32 Troponin 0.06 ng/mL / CK x     / CKMB x     / CKMB Units x       03 Aug 2021 02:32 Troponin 0.06 ng/mL / CK x     / CKMB x     / CKMB Units x       02 Aug 2021 14:47 Troponin 0.08 ng/mL / CK x     / CKMB x     / CKMB Units x            ( 01 Aug 2021 07:47 ) pH: 7.42  /  pCO2: 37    /  pO2: 80    / HCO3: 24    / Base Excess: -0.2  /  SaO2: 96.7            ( 30 Jul 2021 20:50 ) pH: 7.40  /  pCO2: 39    /  pO2: 130   / HCO3: 24    / Base Excess: -0.5  /  SaO2: 99.6            ( 30 Jul 2021 09:43 ) pH: 7.38  /  pCO2: 43    /  pO2: 100   / HCO3: 25    / Base Excess: 0.0   /  SaO2: 98.3                      RADIOLOGY & ADDITIONAL TESTS:    MEDICATIONS  (STANDING):  aMIOdarone    Tablet   Oral   aMIOdarone    Tablet 400 milliGRAM(s) Oral two times a day  atorvastatin 40 milliGRAM(s) Oral at bedtime  chlorhexidine 2% Cloths 1 Application(s) Topical daily  dextrose 50% Injectable 25 Gram(s) IV Push once  diltiazem    Tablet 90 milliGRAM(s) Oral every 8 hours  glucagon  Injectable 1 milliGRAM(s) IntraMuscular once  heparin   Injectable 5000 Unit(s) SubCutaneous every 8 hours  losartan 75 milliGRAM(s) Oral daily  multivitamin 1 Tablet(s) Oral daily  niCARdipine Infusion 5 mG/Hr (25 mL/Hr) IV Continuous <Continuous>  piperacillin/tazobactam IVPB.. 3.375 Gram(s) IV Intermittent every 6 hours  polyethylene glycol 3350 17 Gram(s) Oral daily  senna 2 Tablet(s) Oral at bedtime  vancomycin  IVPB 1500 milliGRAM(s) IV Intermittent every 12 hours    MEDICATIONS  (PRN):  acetaminophen   Tablet .. 650 milliGRAM(s) Oral every 6 hours PRN Temp greater or equal to 38C (100.4F), Mild Pain (1 - 3)  bisacodyl 5 milliGRAM(s) Oral every 12 hours PRN Constipation  ondansetron Injectable 4 milliGRAM(s) IV Push every 6 hours PRN Nausea and/or Vomiting  traMADol 25 milliGRAM(s) Oral every 6 hours PRN Moderate Pain (4 - 6)  traMADol 50 milliGRAM(s) Oral every 6 hours PRN Severe Pain (7 - 10)     Cardiology Consult Service Progress Note     Nickolas Shipman MD JOSE  Cardiology Fellow   Pager [phone]    Patient is a 46y old  Male who presents with a chief complaint of Cerebellar Stroke (03 Aug 2021 23:41)    SUBJECTIVE/OVERNIGHT EVENTS   No acute events overnight. Patient tolerating meals, without n/v. Reports having daily BM. Denies fevers, chills, SOB. ROS otherwise negative.     VITAL SIGNS  Vital Signs Last 24 Hrs  T(C): 36.2 (04 Aug 2021 04:32), Max: 38.3 (03 Aug 2021 13:00)  T(F): 97.1 (04 Aug 2021 04:32), Max: 101 (03 Aug 2021 13:00)  HR: 92 (04 Aug 2021 07:00) (84 - 110)  BP: 168/109 (04 Aug 2021 07:00) (127/94 - 179/122)  BP(mean): 131 (04 Aug 2021 07:00) (103 - 146)  RR: 19 (04 Aug 2021 07:00) (16 - 28)  SpO2: 97% (04 Aug 2021 07:00) (94% - 100%)    I&O's Summary    03 Aug 2021 07:01  -  04 Aug 2021 07:00  --------------------------------------------------------  IN: 1450 mL / OUT: 1950 mL / NET: -500 mL    PHYSICAL EXAM  GENERAL: NAD  HEAD:  Atraumatic, Normocephalic  EYES: EOMI, conjunctiva and sclera clear  NECK: Supple, No JVD  CHEST/LUNG: Clear to auscultation bilaterally; No wheeze  HEART: +Tachy, regular rhythm; +holosystolic murmur   ABDOMEN: Soft, Nontender, Nondistended; Bowel sounds present  EXTREMITIES:  2+ Peripheral Pulses, No clubbing, cyanosis, or edema  PSYCH: AAOx3  NEUROLOGY: non-focal  SKIN: No rashes or lesions    LABS                        15.3   11.70 )-----------( 265      ( 04 Aug 2021 05:01 )             46.5                         15.6   10.33 )-----------( 229      ( 03 Aug 2021 05:29 )             47.4     08-04    140  |  109<H>  |  15  ----------------------------<  95  4.3   |  18<L>  |  1.13  08-04    140  |  107  |  15  ----------------------------<  91  See Note   |  17<L>  |  1.14    Ca    8.9      04 Aug 2021 06:28  Ca    9.0      04 Aug 2021 05:01  Phos  3.8     08-04  Mg     1.8     08-04      CAPILLARY BLOOD GLUCOSE      POCT Blood Glucose.: 88 mg/dL (03 Aug 2021 10:40)  POCT Blood Glucose.: 95 mg/dL (03 Aug 2021 09:51)       03 Aug 2021 05:32 Troponin 0.06 ng/mL / CK x     / CKMB x     / CKMB Units x       03 Aug 2021 02:32 Troponin 0.06 ng/mL / CK x     / CKMB x     / CKMB Units x       02 Aug 2021 14:47 Troponin 0.08 ng/mL / CK x     / CKMB x     / CKMB Units x            ( 01 Aug 2021 07:47 ) pH: 7.42  /  pCO2: 37    /  pO2: 80    / HCO3: 24    / Base Excess: -0.2  /  SaO2: 96.7            ( 30 Jul 2021 20:50 ) pH: 7.40  /  pCO2: 39    /  pO2: 130   / HCO3: 24    / Base Excess: -0.5  /  SaO2: 99.6            ( 30 Jul 2021 09:43 ) pH: 7.38  /  pCO2: 43    /  pO2: 100   / HCO3: 25    / Base Excess: 0.0   /  SaO2: 98.3                      RADIOLOGY & ADDITIONAL TESTS:    MEDICATIONS  (STANDING):  aMIOdarone    Tablet   Oral   aMIOdarone    Tablet 400 milliGRAM(s) Oral two times a day  atorvastatin 40 milliGRAM(s) Oral at bedtime  chlorhexidine 2% Cloths 1 Application(s) Topical daily  dextrose 50% Injectable 25 Gram(s) IV Push once  diltiazem    Tablet 90 milliGRAM(s) Oral every 8 hours  glucagon  Injectable 1 milliGRAM(s) IntraMuscular once  heparin   Injectable 5000 Unit(s) SubCutaneous every 8 hours  losartan 75 milliGRAM(s) Oral daily  multivitamin 1 Tablet(s) Oral daily  niCARdipine Infusion 5 mG/Hr (25 mL/Hr) IV Continuous <Continuous>  piperacillin/tazobactam IVPB.. 3.375 Gram(s) IV Intermittent every 6 hours  polyethylene glycol 3350 17 Gram(s) Oral daily  senna 2 Tablet(s) Oral at bedtime  vancomycin  IVPB 1500 milliGRAM(s) IV Intermittent every 12 hours    MEDICATIONS  (PRN):  acetaminophen   Tablet .. 650 milliGRAM(s) Oral every 6 hours PRN Temp greater or equal to 38C (100.4F), Mild Pain (1 - 3)  bisacodyl 5 milliGRAM(s) Oral every 12 hours PRN Constipation  ondansetron Injectable 4 milliGRAM(s) IV Push every 6 hours PRN Nausea and/or Vomiting  traMADol 25 milliGRAM(s) Oral every 6 hours PRN Moderate Pain (4 - 6)  traMADol 50 milliGRAM(s) Oral every 6 hours PRN Severe Pain (7 - 10)

## 2021-08-04 NOTE — PROGRESS NOTE ADULT - SUBJECTIVE AND OBJECTIVE BOX
=================================  NEUROCRITICAL CARE ATTENDING NOTE  =================================    CRISTI JENNINGS   MRN-[id number]  Summary:  46y/M  with PMHx of HTN, noncompliant with meds (Metoprolol, Nifedipine, and ASA), reports no recent use of ASA or metoprolol, presents to ED c/o acute onset and worsening dizziness, R arm paresthesia, and vomiting x 1hr prior to arrival while at work. Pt somnolent in ED, difficulty obtaining hx from pt. Per EMS, pt had SBP in the 200s, given nitro SL x 2 in field. Patient found to have a left side cerebellar hemmorrhage and left pontine hemmorrhage with extension to the subarachnoid space. Patient was started on a nicardipene drip in the ED for SBP to 230's, given manitol 50g and intubated for concern of potential for aspiration with declining exam. Patient reported that he lives alone and has no close contacts in the area.  (28 Jul 2021 20:40)    COURSE IN THE HOSPITAL:  07/28 intubated  07/29 remained intubated - s/p angio, vertebral artery takedown, extubated, desaturated, reintubated, self-extubated, reintubated  07/30 Tmax 39.4, remained intubated, CT overnight; given lasix overnight for pulmo congestion, SBP dropped with propofol, started on levophed, switched to precedex; self-extubated, on NIV  07/31 Tmax 38.4 Agitated overnight.  08/01 Tmax 38.7 improved agitation  08/02 Tmax 38.4 c/o chest pain this morning, new onset atrial fibrillation        PHYSICAL EXAMINATION  T(C): 36.2 (08-04 @ 04:32), Max: 38.3 (08-03 @ 13:00)  HR: 92 (08-04 @ 07:00) (84 - 110)  BP: 168/109 (08-04 @ 07:00) (127/94 - 179/122)  BP(mean): 131 (08-04 @ 07:00)  ABP:  (108/96 - 170/98)  ABP(mean):  (100 - 132)  RR: 19 (08-04 @ 07:00) (16 - 28)  SpO2: 97% (08-04 @ 07:00) (94% - 100%)  CVP(mm Hg): --      08-03-21 @ 07:01  -  08-04-21 @ 07:00  --------------------------------------------------------  IN:    IV PiggyBack: 1450 mL  Total IN: 1450 mL    OUT:    Voided (mL): 1950 mL  Total OUT: 1950 mL    Total NET: -500 mL                  LABS:  CAPILLARY BLOOD GLUCOSE      POCT Blood Glucose.: 88 mg/dL (03 Aug 2021 10:40)  POCT Blood Glucose.: 95 mg/dL (03 Aug 2021 09:51)                          15.3   11.70 )-----------( 265      ( 04 Aug 2021 05:01 )             46.5     08-04    140  |  109<H>  |  15  ----------------------------<  95  4.3   |  18<L>  |  1.13    Ca    8.9      04 Aug 2021 06:28  Phos  3.8     08-04  Mg     1.8     08-04            MEDICATIONS:  MEDICATIONS  (STANDING):  aMIOdarone    Tablet   Oral   aMIOdarone    Tablet 400 milliGRAM(s) Oral two times a day  atorvastatin 40 milliGRAM(s) Oral at bedtime  chlorhexidine 2% Cloths 1 Application(s) Topical daily  dextrose 50% Injectable 25 Gram(s) IV Push once  diltiazem    Tablet 90 milliGRAM(s) Oral every 8 hours  glucagon  Injectable 1 milliGRAM(s) IntraMuscular once  heparin   Injectable 5000 Unit(s) SubCutaneous every 8 hours  losartan 75 milliGRAM(s) Oral daily  multivitamin 1 Tablet(s) Oral daily  niCARdipine Infusion 5 mG/Hr (25 mL/Hr) IV Continuous <Continuous>  piperacillin/tazobactam IVPB.. 3.375 Gram(s) IV Intermittent every 6 hours  polyethylene glycol 3350 17 Gram(s) Oral daily  senna 2 Tablet(s) Oral at bedtime  vancomycin  IVPB 1500 milliGRAM(s) IV Intermittent every 12 hours    MEDICATIONS  (PRN):  acetaminophen   Tablet .. 650 milliGRAM(s) Oral every 6 hours PRN Temp greater or equal to 38C (100.4F), Mild Pain (1 - 3)  bisacodyl 5 milliGRAM(s) Oral every 12 hours PRN Constipation  ondansetron Injectable 4 milliGRAM(s) IV Push every 6 hours PRN Nausea and/or Vomiting  traMADol 25 milliGRAM(s) Oral every 6 hours PRN Moderate Pain (4 - 6)  traMADol 50 milliGRAM(s) Oral every 6 hours PRN Severe Pain (7 - 10)        Past Medical History: HTN (hypertension)  Allergies:  No Known Allergies  Home meds: metoprolol / nifedipine     PHYSICAL EXAMINATION    NEUROLOGIC EXAMINATION:  Patient is awake, alert, oriented x4, moves all 4s, DEEDEE, oriented x3; moves all 4s with good strength, garbled speech  GENERAL:  room air   EENT:  anicteric  CARDIOVASCULAR: (+) S1 S2, tachycardic rate and regular rhythm  PULMONARY: rhonchi all lung fields, crackles   ABDOMEN: soft, nontender with normoactive bowel sounds  EXTREMITIES: no edema  SKIN: no rash

## 2021-08-04 NOTE — PROGRESS NOTE ADULT - SUBJECTIVE AND OBJECTIVE BOX
HPI:  46 M pt with PMHx of HTN, noncompliant with meds (Metoprolol, Nifedipine, and ASA), reports no recent use of ASA or metoprolol, presents to ED c/o acute onset and worsening dizziness, R arm paresthesia, and vomiting x 1hr prior to arrival while at work. Pt somnolent in ED, difficulty obtaining hx from pt. Per EMS, pt had SBP in the 200s, given nitro SL x 2 in field. Patient found to have a left side cerebellar hemmorrhage and left pontine hemmorrhage with extension to the subarachnoid space. Patient was started on a nicardipene drip in the ED for SBP to 230's, given manitol 50g and intubated for concern of potential for aspiration with declining exam. Patient reported that he lives alone and has no close contacts in the area.     NIHSS 6  ICH 2   (28 Jul 2021 20:40)    INTERVAL EVENTS:    HOSPITAL COURSE:  7/28: Admitted with left cerebellar ICH. Intubated in ED for lethargy and concern for airway protection. Repeat CTH stable.  7/29: POD# 0 S/P femoral cerebral angiogram, findings of left vertebral artery irregularity at level below PICA with segment suggestive of intimal flap, left vertebral artery sacrifice performed. Hypertensive episode during angio case, Xper CT shows stable ICH. Pt seen and examined at bedside in NSICU postop. Pt agitated and reaching for ETT. Brief CPAP trial given, and Pt was successfully extubated. Patient then with many blood tinged secretions and CXR with infiltrated. Patient was reintubated. He then self-extubated and was emergently reintubated, placed in restraints now sedated on precedex and fentanyl. Received 1LNS bolus for hypotension and was also started on levophed for SBPs in 70s.   7/30: POD1 cerebral angiogram with left vertebral takedown, OLESYA overnight, neuro stable remains sedated on precedex and intubated on full vent support. Repeat CTH stable. Received 40 of lasix, increased PEEP from 7 to 8 and tapering off fentanyl drip. Self extubated and doing well on high flow NC, Kept on Precedex for agitation, started on Cardene for 's, will wean as appropriate.   7/31: POD2 cerebral angio with L verterbal takedown. S/p Seroquel 25 and Precedex overnight for agitation, otherwise OLESYA. On Cardene gtt. Neuro stable. O2 sat stable on HFNC. S/p 30 Lasix. Uptrending troponin, EKG stable, trending q6H  8/1: POD3, OLESYA overnight, on Precedex. Neuro stable.  8/2: POD#4, OLESYA overnight, neuro stable. Precedex off, tolerating RA. 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 SVT to 200s, on verapamil 80 tid, given adenosine, lopressor, verapamil, diltiazem gtt started. started on PO dilt 60 tid.  8/3: POD#5: tolerating room air, dilt gtt stopped. sinus tach to low 100s now. PO amiodareone and diltaezem started. pt is on cardene, overnight losartan started.   8/4:       Vital Signs Last 24 Hrs  T(C): 36.2 (04 Aug 2021 04:32), Max: 38.3 (03 Aug 2021 13:00)  T(F): 97.1 (04 Aug 2021 04:32), Max: 101 (03 Aug 2021 13:00)  HR: 92 (04 Aug 2021 07:00) (84 - 110)  BP: 168/109 (04 Aug 2021 07:00) (119/75 - 179/122)  BP(mean): 131 (04 Aug 2021 07:00) (92 - 146)  RR: 19 (04 Aug 2021 07:00) (16 - 28)  SpO2: 97% (04 Aug 2021 07:00) (94% - 100%)    I&O's Summary    03 Aug 2021 07:01  -  04 Aug 2021 07:00  --------------------------------------------------------  IN: 1450 mL / OUT: 1950 mL / NET: -500 mL        PHYSICAL EXAM:  Neurological: OE spontaneously, A&Ox3, responds appropriately, following commands, speech clear, CN ll-Xll grossly intact, 5/5 strength in upper and lower extremities b/l. L arm dysmetria, no pronator drift  Cardiovascular: S1, S2, RRR  Respiratory: clear to auscultation bilaterally  Gastrointestinal: soft, non-tender, non-distended  Extremities: appropriately warm, dry, intact  Incision/Wound:    TUBES/LINES:  [] Farias  [] Lumbar Drain  [] Wound Drains  [] Others      DIET:  [] NPO  [] Mechanical  [] Tube feeds    LABS:                        15.3   11.70 )-----------( 265      ( 04 Aug 2021 05:01 )             46.5     08-04    140  |  109<H>  |  15  ----------------------------<  95  4.3   |  18<L>  |  1.13    Ca    8.9      04 Aug 2021 06:28  Phos  3.8     08-04  Mg     1.8     08-04          CARDIAC MARKERS ( 03 Aug 2021 05:32 )  x     / 0.06 ng/mL / x     / x     / x      CARDIAC MARKERS ( 03 Aug 2021 02:32 )  x     / 0.06 ng/mL / x     / x     / x      CARDIAC MARKERS ( 02 Aug 2021 14:47 )  x     / 0.08 ng/mL / x     / x     / x          CAPILLARY BLOOD GLUCOSE      POCT Blood Glucose.: 88 mg/dL (03 Aug 2021 10:40)  POCT Blood Glucose.: 95 mg/dL (03 Aug 2021 09:51)      Drug Levels: [] N/A  Vancomycin Level, Trough: 17.3 ug/mL (08-04 @ 05:01)  Vancomycin Level, Trough: 11.6 ug/mL (08-02 @ 05:17)  Vancomycin Level, Trough: 14.5 ug/mL (07-31 @ 17:00)    CSF Analysis: [] N/A      Allergies    No Known Allergies    Intolerances      MEDICATIONS:  Antibiotics:  piperacillin/tazobactam IVPB.. 3.375 Gram(s) IV Intermittent every 6 hours  vancomycin  IVPB 1500 milliGRAM(s) IV Intermittent every 12 hours    Neuro:  acetaminophen   Tablet .. 650 milliGRAM(s) Oral every 6 hours PRN  ondansetron Injectable 4 milliGRAM(s) IV Push every 6 hours PRN  traMADol 25 milliGRAM(s) Oral every 6 hours PRN  traMADol 50 milliGRAM(s) Oral every 6 hours PRN    Anticoagulation:  heparin   Injectable 5000 Unit(s) SubCutaneous every 8 hours    OTHER:  aMIOdarone    Tablet   Oral   aMIOdarone    Tablet 400 milliGRAM(s) Oral two times a day  atorvastatin 40 milliGRAM(s) Oral at bedtime  bisacodyl 5 milliGRAM(s) Oral every 12 hours PRN  chlorhexidine 2% Cloths 1 Application(s) Topical daily  dextrose 50% Injectable 25 Gram(s) IV Push once  diltiazem    Tablet 90 milliGRAM(s) Oral every 8 hours  glucagon  Injectable 1 milliGRAM(s) IntraMuscular once  losartan 75 milliGRAM(s) Oral daily  niCARdipine Infusion 5 mG/Hr IV Continuous <Continuous>  polyethylene glycol 3350 17 Gram(s) Oral daily  senna 2 Tablet(s) Oral at bedtime    IVF:  multivitamin 1 Tablet(s) Oral daily    CULTURES:  Culture Results:   No growth at 12 hours (08-03 @ 16:24)  Culture Results:   No growth at 12 hours (08-03 @ 16:24)    RADIOLOGY & ADDITIONAL TESTS:  < from: CT Head No Cont (08.03.21 @ 13:25) >  Comparison:CT head 7/30/2021    Findings:    Again demonstrated is embolization of the intracranial left vertebral artery with adjacent streak artifact limiting evaluation of the posterior fossa. There is evolution of the intraparenchymal hemorrhage within the left cerebellar hemisphere with extension into the fourth ventricle and adjacent subarachnoid space. Again demonstrated is hemorrhage within the midbrain. Overall the degree of hemorrhage is not significant change from prior examination. There is surrounding hypodensity within the left cerebral hemisphere compatible with vasogenic edema. There is partialeffacement of the fourth ventricle. There is mild interval decrease in size of the lateral and third ventricles.    There is no new intracranial hemorrhage or extra-axial fluid collection. There is no midline shift..    There is no interval demarcated territorial infarction..    There is no acute calvarial fracture..    The paranasal sinuses and bilateral mastoid complexes are well aerated.    Impression:    Since prior CT head 7/30/2021, continued evolution of left cerebellar parenchymal hemorrhage with intraventricular and subarachnoid extension. Interval mild decrease in size of the lateral and third ventricles. No new hemorrhage.    ASSESSMENT:  46 M PMH L CVA? 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 Pt is now s/p  femoral cerebral angiogram, findings of left vertebral artery irregularity at level below PICA with segment suggestive of intimal flap, left vertebral artery sacrifice performed (7/29/2021), post op was extubated, needed to be reintubated for secretion burden and unable to protect airway,  patient self-extubated X 2, now on high flow NC, satting well.       CHEST PAIN    No pertinent family history in first degree relatives    Handoff    MEWS Score    HTN (hypertension)    HTN (hypertension)    SAH (subarachnoid hemorrhage)    Dissection of cerebral artery    SAH (subarachnoid hemorrhage)    Dissection of cerebral artery    Cerebellar bleed    Angiogram, blood vessel, cerebral, with embolization    No significant past surgical history    CHEST PAIN    SysAdmin_VisitLink        PLAN:  Neuro:   - Neuro checks/vital checks/groin/vascular checks q4hr  - Tylenol and Tramadol prn for pain control  - Repeat CTH from 8/3 stable    CV:  -HCM resting grad 90 with MACRINA causing at least mod MR. Possible family hx SCD (mother). Course complicated by Afib RVR  - -160, on cardene gtt   - Cards following  - TTE- hypertrophic cardiomyopathy with severe left ventricular outflow tract obstruction and moderate mitral regurg  - trending troponin q6 H  - Careful fluid balance  - cont verapamil 80 q8hrs  - 8/2: SVT to 200s, s/p adenosine 1.2 on 8/2, verapamil 10, lopressor 5, dilt gtt, 1L bolus, started on dilt PO 60 tid, losartan  - dilt gtt stopped, started amio load 400 bid x 12 does then 200 daily    Per cardiology: #HCM: avoid dehydration, vasodilation, and tachycardia in HCM in order to improve gradient. 90 resting gradient is significant and explains baseline poor ET as well as hospital course (tenuous respiratory status, extreme sensitivity to diuresis). Improvement in gradient will also improve MR which will help pulmonary edema.     Pulm:  - satting well RA  - blood tinged sputum    Renal:   - normonatremia goal   -  Replete electrolytes PRN    GI:  - bowel reg  - regular diet     Endo:   - ISS    Heme:  - SCD's, SQH  - trend CBC  - f/u LE doppler 7/29 neg    ID:  - pancultures 7/31 negative to date, recultured 8/3  - trend WBC  - empiric vanc/zosyn end date 8/5     Disposition: stepdown   HPI:  46 M pt with PMHx of HTN, noncompliant with meds (Metoprolol, Nifedipine, and ASA), reports no recent use of ASA or metoprolol, presents to ED c/o acute onset and worsening dizziness, R arm paresthesia, and vomiting x 1hr prior to arrival while at work. Pt somnolent in ED, difficulty obtaining hx from pt. Per EMS, pt had SBP in the 200s, given nitro SL x 2 in field. Patient found to have a left side cerebellar hemmorrhage and left pontine hemmorrhage with extension to the subarachnoid space. Patient was started on a nicardipene drip in the ED for SBP to 230's, given manitol 50g and intubated for concern of potential for aspiration with declining exam. Patient reported that he lives alone and has no close contacts in the area.     NIHSS 6  ICH 2   (28 Jul 2021 20:40)    INTERVAL EVENTS: OLESYA, tolerating room air, step down from ICU     HOSPITAL COURSE:  7/28: Admitted with left cerebellar ICH. Intubated in ED for lethargy and concern for airway protection. Repeat CTH stable.  7/29: POD# 0 S/P femoral cerebral angiogram, findings of left vertebral artery irregularity at level below PICA with segment suggestive of intimal flap, left vertebral artery sacrifice performed. Hypertensive episode during angio case, Xper CT shows stable ICH. Pt seen and examined at bedside in NSICU postop. Pt agitated and reaching for ETT. Brief CPAP trial given, and Pt was successfully extubated. Patient then with many blood tinged secretions and CXR with infiltrated. Patient was reintubated. He then self-extubated and was emergently reintubated, placed in restraints now sedated on precedex and fentanyl. Received 1LNS bolus for hypotension and was also started on levophed for SBPs in 70s.   7/30: POD1 cerebral angiogram with left vertebral takedown, OLESYA overnight, neuro stable remains sedated on precedex and intubated on full vent support. Repeat CTH stable. Received 40 of lasix, increased PEEP from 7 to 8 and tapering off fentanyl drip. Self extubated and doing well on high flow NC, Kept on Precedex for agitation, started on Cardene for 's, will wean as appropriate.   7/31: POD2 cerebral angio with L verterbal takedown. S/p Seroquel 25 and Precedex overnight for agitation, otherwise OLESYA. On Cardene gtt. Neuro stable. O2 sat stable on HFNC. S/p 30 Lasix. Uptrending troponin, EKG stable, trending q6H  8/1: POD3, OLESYA overnight, on Precedex. Neuro stable.  8/2: POD#4, OLESYA overnight, neuro stable. Precedex off, tolerating RA. 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 SVT to 200s, on verapamil 80 tid, given adenosine, lopressor, verapamil, diltiazem gtt started. started on PO dilt 60 tid.  8/3: POD#5: tolerating room air, dilt gtt stopped. sinus tach to low 100s now. PO amiodareone and diltaezem started. pt is on cardene, overnight losartan started.   8/4: POD6: tolerating room air, OLESYA, Heart rate mostly .  Patient stepped down from ICU.       Vital Signs Last 24 Hrs  T(C): 36.2 (04 Aug 2021 04:32), Max: 38.3 (03 Aug 2021 13:00)  T(F): 97.1 (04 Aug 2021 04:32), Max: 101 (03 Aug 2021 13:00)  HR: 92 (04 Aug 2021 07:00) (84 - 110)  BP: 168/109 (04 Aug 2021 07:00) (119/75 - 179/122)  BP(mean): 131 (04 Aug 2021 07:00) (92 - 146)  RR: 19 (04 Aug 2021 07:00) (16 - 28)  SpO2: 97% (04 Aug 2021 07:00) (94% - 100%)    I&O's Summary    03 Aug 2021 07:01  -  04 Aug 2021 07:00  --------------------------------------------------------  IN: 1450 mL / OUT: 1950 mL / NET: -500 mL        PHYSICAL EXAM:  General: Laying in bed, sleeping, NAD  Neurological: A&Ox3, responds appropriately, following commands, speech clear, CN ll-Xll grossly intact, 5/5 strength in upper and lower extremities b/l. Slight dysmetria on L, improved from yesterday, SILT, no pronator drift noted.  Cardiovascular: S1, S2, murmur heard best at apex  Respiratory: clear to auscultation bilaterally  Gastrointestinal: soft, non-tender, non-distended  Extremities: appropriately warm, dry, intact, 2+ distal pulses in upper and lower extremities bilaterally.      TUBES/LINES:  [] Farias  [] Lumbar Drain  [] Wound Drains  [] Others      DIET:  [] NPO  [x] Mechanical  [] Tube feeds    LABS:                        15.3   11.70 )-----------( 265      ( 04 Aug 2021 05:01 )             46.5     08-04    140  |  109<H>  |  15  ----------------------------<  95  4.3   |  18<L>  |  1.13    Ca    8.9      04 Aug 2021 06:28  Phos  3.8     08-04  Mg     1.8     08-04          CARDIAC MARKERS ( 03 Aug 2021 05:32 )  x     / 0.06 ng/mL / x     / x     / x      CARDIAC MARKERS ( 03 Aug 2021 02:32 )  x     / 0.06 ng/mL / x     / x     / x      CARDIAC MARKERS ( 02 Aug 2021 14:47 )  x     / 0.08 ng/mL / x     / x     / x          CAPILLARY BLOOD GLUCOSE      POCT Blood Glucose.: 88 mg/dL (03 Aug 2021 10:40)  POCT Blood Glucose.: 95 mg/dL (03 Aug 2021 09:51)      Drug Levels: [] N/A  Vancomycin Level, Trough: 17.3 ug/mL (08-04 @ 05:01)  Vancomycin Level, Trough: 11.6 ug/mL (08-02 @ 05:17)  Vancomycin Level, Trough: 14.5 ug/mL (07-31 @ 17:00)    CSF Analysis: [] N/A      Allergies    No Known Allergies    Intolerances      MEDICATIONS:  Antibiotics:  piperacillin/tazobactam IVPB.. 3.375 Gram(s) IV Intermittent every 6 hours  vancomycin  IVPB 1500 milliGRAM(s) IV Intermittent every 12 hours    Neuro:  acetaminophen   Tablet .. 650 milliGRAM(s) Oral every 6 hours PRN  ondansetron Injectable 4 milliGRAM(s) IV Push every 6 hours PRN  traMADol 25 milliGRAM(s) Oral every 6 hours PRN  traMADol 50 milliGRAM(s) Oral every 6 hours PRN    Anticoagulation:  heparin   Injectable 5000 Unit(s) SubCutaneous every 8 hours    OTHER:  aMIOdarone    Tablet   Oral   aMIOdarone    Tablet 400 milliGRAM(s) Oral two times a day  atorvastatin 40 milliGRAM(s) Oral at bedtime  bisacodyl 5 milliGRAM(s) Oral every 12 hours PRN  chlorhexidine 2% Cloths 1 Application(s) Topical daily  dextrose 50% Injectable 25 Gram(s) IV Push once  diltiazem    Tablet 90 milliGRAM(s) Oral every 8 hours  glucagon  Injectable 1 milliGRAM(s) IntraMuscular once  losartan 75 milliGRAM(s) Oral daily  niCARdipine Infusion 5 mG/Hr IV Continuous <Continuous>  polyethylene glycol 3350 17 Gram(s) Oral daily  senna 2 Tablet(s) Oral at bedtime    IVF:  multivitamin 1 Tablet(s) Oral daily    CULTURES:  Culture Results:   No growth at 12 hours (08-03 @ 16:24)  Culture Results:   No growth at 12 hours (08-03 @ 16:24)    RADIOLOGY & ADDITIONAL TESTS:  < from: CT Head No Cont (08.03.21 @ 13:25) >  Comparison:CT head 7/30/2021    Findings:    Again demonstrated is embolization of the intracranial left vertebral artery with adjacent streak artifact limiting evaluation of the posterior fossa. There is evolution of the intraparenchymal hemorrhage within the left cerebellar hemisphere with extension into the fourth ventricle and adjacent subarachnoid space. Again demonstrated is hemorrhage within the midbrain. Overall the degree of hemorrhage is not significant change from prior examination. There is surrounding hypodensity within the left cerebral hemisphere compatible with vasogenic edema. There is partialeffacement of the fourth ventricle. There is mild interval decrease in size of the lateral and third ventricles.    There is no new intracranial hemorrhage or extra-axial fluid collection. There is no midline shift..    There is no interval demarcated territorial infarction..    There is no acute calvarial fracture..    The paranasal sinuses and bilateral mastoid complexes are well aerated.    Impression:    Since prior CT head 7/30/2021, continued evolution of left cerebellar parenchymal hemorrhage with intraventricular and subarachnoid extension. Interval mild decrease in size of the lateral and third ventricles. No new hemorrhage.    ASSESSMENT:  46 M PMH L CVA? 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 Pt is now s/p  femoral cerebral angiogram, findings of left vertebral artery irregularity at level below PICA with segment suggestive of intimal flap, left vertebral artery sacrifice performed (7/29/2021), post op was extubated, needed to be reintubated for secretion burden and unable to protect airway,  patient self-extubated X 2, now on high flow NC, satting well.       CHEST PAIN    No pertinent family history in first degree relatives    Handoff    MEWS Score    HTN (hypertension)    HTN (hypertension)    SAH (subarachnoid hemorrhage)    Dissection of cerebral artery    SAH (subarachnoid hemorrhage)    Dissection of cerebral artery    Cerebellar bleed    Angiogram, blood vessel, cerebral, with embolization    No significant past surgical history    CHEST PAIN    SysAdmin_VisitLink        PLAN:  Neuro:   - Neuro checks/vital checks/groin/vascular checks q4hr  - Tylenol and Tramadol prn for pain control  - Repeat CTH from 8/3 stable    CV:  -HCM resting grad 90 with MACRINA causing at least mod MR. Possible family hx SCD (mother). Course complicated by Afib RVR  - -160, on cardene gtt   - Cards following  - TTE- hypertrophic cardiomyopathy with severe left ventricular outflow tract obstruction and moderate mitral regurg  - trending troponin q6 H  - Careful fluid balance  - cont verapamil 80 q8hrs  - 8/2: SVT to 200s, s/p adenosine 1.2 on 8/2, verapamil 10, lopressor 5, dilt gtt, 1L bolus, started on dilt PO 60 tid, losartan  - dilt gtt stopped, started amio load 400 bid x 12 does then 200 daily    Per cardiology: #HCM: avoid dehydration, vasodilation, and tachycardia in HCM in order to improve gradient. 90 resting gradient is significant and explains baseline poor ET as well as hospital course (tenuous respiratory status, extreme sensitivity to diuresis). Improvement in gradient will also improve MR which will help pulmonary edema.     Pulm:  - satting well RA  - blood tinged sputum    Renal:   - normonatremia goal   -  Replete electrolytes PRN    GI:  - bowel reg  - regular diet     Endo:   - ISS    Heme:  - SCD's, SQH  - trend CBC  - f/u LE doppler 7/29 neg    ID:  - pancultures 7/31 negative to date, recultured 8/3  - trend WBC  - empiric vanc/zosyn end date 8/5     Disposition: stepdown

## 2021-08-05 LAB
ANION GAP SERPL CALC-SCNC: 11 MMOL/L — SIGNIFICANT CHANGE UP (ref 5–17)
APTT BLD: 35.4 SEC — SIGNIFICANT CHANGE UP (ref 27.5–35.5)
BUN SERPL-MCNC: 18 MG/DL — SIGNIFICANT CHANGE UP (ref 7–23)
CALCIUM SERPL-MCNC: 8.8 MG/DL — SIGNIFICANT CHANGE UP (ref 8.4–10.5)
CHLORIDE SERPL-SCNC: 105 MMOL/L — SIGNIFICANT CHANGE UP (ref 96–108)
CO2 SERPL-SCNC: 25 MMOL/L — SIGNIFICANT CHANGE UP (ref 22–31)
CREAT SERPL-MCNC: 1.24 MG/DL — SIGNIFICANT CHANGE UP (ref 0.5–1.3)
CULTURE RESULTS: SIGNIFICANT CHANGE UP
CULTURE RESULTS: SIGNIFICANT CHANGE UP
ERYTHROCYTE [SEDIMENTATION RATE] IN BLOOD: 40 MM/HR — HIGH
GLUCOSE SERPL-MCNC: 90 MG/DL — SIGNIFICANT CHANGE UP (ref 70–99)
HCT VFR BLD CALC: 45.1 % — SIGNIFICANT CHANGE UP (ref 39–50)
HGB BLD-MCNC: 14.8 G/DL — SIGNIFICANT CHANGE UP (ref 13–17)
MAGNESIUM SERPL-MCNC: 2.1 MG/DL — SIGNIFICANT CHANGE UP (ref 1.6–2.6)
MCHC RBC-ENTMCNC: 30 PG — SIGNIFICANT CHANGE UP (ref 27–34)
MCHC RBC-ENTMCNC: 32.8 GM/DL — SIGNIFICANT CHANGE UP (ref 32–36)
MCV RBC AUTO: 91.3 FL — SIGNIFICANT CHANGE UP (ref 80–100)
NRBC # BLD: 0 /100 WBCS — SIGNIFICANT CHANGE UP (ref 0–0)
PHOSPHATE SERPL-MCNC: 5 MG/DL — HIGH (ref 2.5–4.5)
PLATELET # BLD AUTO: 264 K/UL — SIGNIFICANT CHANGE UP (ref 150–400)
POTASSIUM SERPL-MCNC: 4.1 MMOL/L — SIGNIFICANT CHANGE UP (ref 3.5–5.3)
POTASSIUM SERPL-SCNC: 4.1 MMOL/L — SIGNIFICANT CHANGE UP (ref 3.5–5.3)
RBC # BLD: 4.94 M/UL — SIGNIFICANT CHANGE UP (ref 4.2–5.8)
RBC # FLD: 13.2 % — SIGNIFICANT CHANGE UP (ref 10.3–14.5)
SODIUM SERPL-SCNC: 141 MMOL/L — SIGNIFICANT CHANGE UP (ref 135–145)
SPECIMEN SOURCE: SIGNIFICANT CHANGE UP
SPECIMEN SOURCE: SIGNIFICANT CHANGE UP
WBC # BLD: 10.36 K/UL — SIGNIFICANT CHANGE UP (ref 3.8–10.5)
WBC # FLD AUTO: 10.36 K/UL — SIGNIFICANT CHANGE UP (ref 3.8–10.5)

## 2021-08-05 PROCEDURE — 99233 SBSQ HOSP IP/OBS HIGH 50: CPT

## 2021-08-05 PROCEDURE — 99233 SBSQ HOSP IP/OBS HIGH 50: CPT | Mod: GC

## 2021-08-05 RX ORDER — AMIODARONE HYDROCHLORIDE 400 MG/1
200 TABLET ORAL DAILY
Refills: 0 | Status: DISCONTINUED | OUTPATIENT
Start: 2021-08-06 | End: 2021-08-12

## 2021-08-05 RX ADMIN — ATORVASTATIN CALCIUM 40 MILLIGRAM(S): 80 TABLET, FILM COATED ORAL at 21:19

## 2021-08-05 RX ADMIN — Medication 1 TABLET(S): at 05:23

## 2021-08-05 RX ADMIN — HEPARIN SODIUM 5000 UNIT(S): 5000 INJECTION INTRAVENOUS; SUBCUTANEOUS at 14:50

## 2021-08-05 RX ADMIN — TRAMADOL HYDROCHLORIDE 25 MILLIGRAM(S): 50 TABLET ORAL at 00:52

## 2021-08-05 RX ADMIN — TRAMADOL HYDROCHLORIDE 25 MILLIGRAM(S): 50 TABLET ORAL at 01:40

## 2021-08-05 RX ADMIN — AMIODARONE HYDROCHLORIDE 400 MILLIGRAM(S): 400 TABLET ORAL at 05:22

## 2021-08-05 RX ADMIN — HEPARIN SODIUM 5000 UNIT(S): 5000 INJECTION INTRAVENOUS; SUBCUTANEOUS at 05:23

## 2021-08-05 RX ADMIN — HEPARIN SODIUM 5000 UNIT(S): 5000 INJECTION INTRAVENOUS; SUBCUTANEOUS at 21:19

## 2021-08-05 RX ADMIN — Medication 50 MILLIGRAM(S): at 17:54

## 2021-08-05 RX ADMIN — Medication 50 MILLIGRAM(S): at 05:22

## 2021-08-05 RX ADMIN — Medication 180 MILLIGRAM(S): at 05:22

## 2021-08-05 NOTE — PROGRESS NOTE ADULT - ATTENDING COMMENTS
would decrease amiodarone 200 once a day goal HR less than 60 bpm amidoarone is just short term down the road can consider multaq this is all just in the setting of acute illness  stay on metoprolol 50 mg bid and diltiazem 180 once  a day  eliquis when allowable  repeat echo for LVOT prior to discharge would decrease amiodarone 200 once a day goal HR less than 60 bpm amidoarone is just short term down the road can consider multaq this is all just in the setting of acute illness risk of amiodarone discussed with patient he will need close follow up including MRI, genetics and possible myomectomy  stay on metoprolol 50 mg bid and diltiazem 180 once  a day  eliquis when allowable  repeat echo for LVOT prior to discharge

## 2021-08-05 NOTE — OCCUPATIONAL THERAPY INITIAL EVALUATION ADULT - DIAGNOSIS, OT EVAL
Pt presents with decreased motor control in the LUE, unsteady gait and decreased standing balance impacting safety and independence with ADL's and Fx mobility.

## 2021-08-05 NOTE — OCCUPATIONAL THERAPY INITIAL EVALUATION ADULT - EATING, PREVIOUS LEVEL OF FUNCTION, OT EVAL
Patient Education     Acute Otitis Media with Infection (Child)    Your child has a middle ear infection (acute otitis media). It is caused by bacteria or fungi. The middle ear is the space behind the eardrum. The eustachian tube connects the ear to the nasal passage. The eustachian tubes help drain fluid from the ears. They also keep the air pressure equal inside and outside the ears. These tubes are shorter and more horizontal in children. This makes it more likely for the tubes to become blocked. A blockage lets fluid and pressure build up in the middle ear. Bacteria or fungi can grow in this fluid and cause an ear infection. This infection is commonly known as an earache. The main symptom of an ear infection is ear pain. Â Other symptoms may include pulling at the ear, being more fussy than usual, decreased appetie, vomiting or diarrhea. Your childâs hearing may also be affected. Your child may have had a respiratory infection first.  An ear infection may clear up on its own. Or your child may need to take medicine. After the infection goes away, your child may still have fluid in the middle ear. It may take weeks or months for this fluid to go away. During that time, your child may have temporary hearing loss. But all other symptoms of the earache should be gone. Home care  Follow these guidelines when caring for your child at home:  Â· The health care provider will likely prescribe medicines for pain. The provider may also prescribe antibiotics or antifungals to treat the infection. These may be liquid medicines to give by mouth. Or they may be ear drops. Follow the providerâs instructions for giving these medicines to your child. Â· Because ear infections can clear up on their own, the provider may suggest waiting for a few days before giving your child medicines for infection. Â· To reduce pain, have your child rest in an upright position. Hot or cold compresses held against the ear may help ease pain.   Â· Keep the ear dry. Have your child wear a shower cap when bathing. To help prevent future infections:  Â· Avoid smoking near your child. Secondhand smoke raises the risk for ear infections in children. Â· Make sure your child gets all appropriate vaccinations. Â· Do not bottle feed while your baby is lying on his or her back. (This position can causeÂ  middle ear infections because it allows milk to run into the eustacian tubes.) Â  Â   Â· If you breastfeedÂ ccontinue until your child is 1012 months of age. To apply ear drops:  1. Put the bottle in warm water if the medicine is kept in the refrigerator. Cold drops in the ear are uncomfortable. 2. Have your child lie down on a flat surface. Gently hold your childâs head to one side. 3. Remove any drainage from the ear with a clean tissue or cotton swab. Clean only the outer ear. Donât put the cotton swab into the ear canal.  4. Straighten the ear canal by gently pulling the earlobe up and back. 5. Keep the dropper a half-inch above the ear canal. This will keep the dropper from becoming contaminated. Put the drops against the side of the ear canal.  6. Have your child stay lying down for 2 to 3 minutes. This gives time for the medicine to enter the ear canal. If your child doesnât have pain, gently massage the outer ear near the opening. 7. Wipe any extra medicine awayÂ from the outer ear with a clean cotton ball. Follow-up care  Follow up with your childâs healthcare provider as directed. Â Your child will need to have the ear rechecked to make sure the infection has resolved. Check with your doctor to see when they want to see your child. Special note to parents  If your child continues to get earaches, he or she may need ear tubes. The provider will put small tubes in your childâs eardrum to help keep fluid from building up. This procedure is a simple and works well.   When to seek medical advice  Unless advised otherwise, call your child's healthcare provider if:  Â· Your child is 1 months old or younger and has a fever of 100.4Â°F (38Â°C) or higher. Your child may need to see a healthcare provider. Â· Your child is of any age and has fevers higher than 104Â°F (40Â°C) that come back again and again. Call your child's healthcare provider for any of the following:  Â· New symptoms, especially swelling around the ear or weakness of face muscles  Â· Severe pain  Â· Infection seems to get worse, not betterÂ   Â· Neck pain  Â· Your child acts very sick or not themself  Â· Fever or pain do not improve with antibiotics after 48 hours  Â© 6705-7660 [employer] [address] [address]. All rights reserved. This information is not intended as a substitute for professional medical care. Always follow your healthcare professional's instructions. independent

## 2021-08-05 NOTE — OCCUPATIONAL THERAPY INITIAL EVALUATION ADULT - LIVES WITH, PROFILE
Pt reports living with sister in private house with 5 ALVA. At baseline Pt is independent with all ADL's and did not require use of AE

## 2021-08-05 NOTE — PROVIDER CONTACT NOTE (OTHER) - ACTION/TREATMENT ORDERED:
Labetalol 10mg was ordered.
Will order Troponin lab and EKG
Neuro team will discuss the next steps to address pt's current issue with HTN.

## 2021-08-05 NOTE — PROGRESS NOTE ADULT - SUBJECTIVE AND OBJECTIVE BOX
HPI:  46 M pt with PMHx of HTN, noncompliant with meds (Metoprolol, Nifedipine, and ASA), reports no recent use of ASA or metoprolol, presents to ED c/o acute onset and worsening dizziness, R arm paresthesia, and vomiting x 1hr prior to arrival while at work. Pt somnolent in ED, difficulty obtaining hx from pt. Per EMS, pt had SBP in the 200s, given nitro SL x 2 in field. Patient found to have a left side cerebellar hemmorrhage and left pontine hemmorrhage with extension to the subarachnoid space. Patient was started on a nicardipene drip in the ED for SBP to 230's, given manitol 50g and intubated for concern of potential for aspiration with declining exam. Patient reported that he lives alone and has no close contacts in the area.     NIHSS 6  ICH 2   (2021 20:40)    INTERVAL EVENTS:      HOSPITAL COURSE:    Vital Signs Last 24 Hrs  T(C): 36.6 (05 Aug 2021 00:50), Max: 36.9 (04 Aug 2021 09:00)  T(F): 97.9 (05 Aug 2021 00:50), Max: 98.4 (04 Aug 2021 09:00)  HR: 97 (05 Aug 2021 00:50) (82 - 100)  BP: 155/94 (05 Aug 2021 00:50) (132/97 - 179/122)  BP(mean): 114 (04 Aug 2021 15:44) (114 - 146)  RR: 16 (05 Aug 2021 00:50) (16 - 20)  SpO2: 97% (05 Aug 2021 00:50) (93% - 100%)    I&O's Summary    03 Aug 2021 07:01  -  04 Aug 2021 07:00  --------------------------------------------------------  IN: 1450 mL / OUT: 2750 mL / NET: -1300 mL    04 Aug 2021 07:01  -  05 Aug 2021 02:52  --------------------------------------------------------  IN: 100 mL / OUT: 700 mL / NET: -600 mL        PHYSICAL EXAM:        TUBES/LINES:  [] Farias  [] Trach  [] NGT  [] PEG  [] Wound Drains  [] Others    DIET:  [] NPO  [] Mechanical  [] Tube feeds    LABS:                        15.3   11.70 )-----------( 265      ( 04 Aug 2021 05:01 )             46.5     08-04    140  |  109<H>  |  15  ----------------------------<  95  4.3   |  18<L>  |  1.13    Ca    8.9      04 Aug 2021 06:28  Phos  3.8     08-04  Mg     1.8     08-04        Urinalysis Basic - ( 04 Aug 2021 07:31 )    Color: Yellow / Appearance: Clear / S.020 / pH: x  Gluc: x / Ketone: Trace mg/dL  / Bili: Negative / Urobili: 1.0 E.U./dL   Blood: x / Protein: NEGATIVE mg/dL / Nitrite: NEGATIVE   Leuk Esterase: NEGATIVE / RBC: x / WBC x   Sq Epi: x / Non Sq Epi: x / Bacteria: x      CARDIAC MARKERS ( 03 Aug 2021 05:32 )  x     / 0.06 ng/mL / x     / x     / x          CAPILLARY BLOOD GLUCOSE          Drug Levels: [] N/A  Vancomycin Level, Trough: 17.3 ug/mL ( @ 05:01)  Vancomycin Level, Trough: 11.6 ug/mL ( @ 05:17)  Vancomycin Level, Trough: 14.5 ug/mL ( @ 17:00)    CSF Analysis: [] N/A      Allergies    No Known Allergies    Intolerances      MEDICATIONS:  Antibiotics:    Neuro:  acetaminophen   Tablet .. 650 milliGRAM(s) Oral every 6 hours PRN  ondansetron Injectable 4 milliGRAM(s) IV Push every 6 hours PRN  traMADol 25 milliGRAM(s) Oral every 6 hours PRN  traMADol 50 milliGRAM(s) Oral every 6 hours PRN    Anticoagulation:  heparin   Injectable 5000 Unit(s) SubCutaneous every 8 hours    OTHER:  aMIOdarone    Tablet   Oral   aMIOdarone    Tablet 400 milliGRAM(s) Oral two times a day  atorvastatin 40 milliGRAM(s) Oral at bedtime  bisacodyl 5 milliGRAM(s) Oral every 12 hours PRN  dextrose 50% Injectable 25 Gram(s) IV Push once  diltiazem    milliGRAM(s) Oral daily  glucagon  Injectable 1 milliGRAM(s) IntraMuscular once  metoprolol tartrate 50 milliGRAM(s) Oral two times a day  polyethylene glycol 3350 17 Gram(s) Oral daily  senna 2 Tablet(s) Oral at bedtime    IVF:  multivitamin 1 Tablet(s) Oral daily    CULTURES:  Culture Results:   No growth at 1 day. ( @ 16:24)  Culture Results:   No growth at 1 day. ( @ 16:24)    RADIOLOGY & ADDITIONAL TESTS:      ASSESSMENT:  46y Male s/p    CHEST PAIN    No pertinent family history in first degree relatives    Handoff    MEWS Score    HTN (hypertension)    HTN (hypertension)    SAH (subarachnoid hemorrhage)    Dissection of cerebral artery    SAH (subarachnoid hemorrhage)    Dissection of cerebral artery    Cerebellar bleed    Cerebellar bleed    Essential hypertension    Left ventricular outflow obstruction    Atrial fibrillation with rapid ventricular response    Leukocytosis, unspecified type    Metabolic acidosis    Angiogram, blood vessel, cerebral, with embolization    No significant past surgical history    CHEST PAIN    [email or web address]        PLAN:  NEURO:    CARDIOVASCULAR:    PULMONARY:    RENAL:    GI:    HEME:    ID:    ENDO:    DVT PROPHYLAXIS:  [] Venodynes                                [] Heparin/Lovenox    DISPOSITION:    Assessment:  Present when checked    []  GCS  E   V  M     Heart Failure: []Acute, [] acute on chronic , []chronic  Heart Failure:  [] Diastolic (HFpEF), [] Systolic (HFrEF), []Combined (HFpEF and HFrEF), [] RHF, [] Pulm HTN, [] Other    [] TATIANA, [] ATN, [] AIN, [] other  [] CKD1, [] CKD2, [] CKD 3, [] CKD 4, [] CKD 5, []ESRD    Encephalopathy: [] Metabolic, [] Hepatic, [] toxic, [] Neurological, [] Other    Abnormal Nurtitional Status: [] malnurtition (see nutrition note), [ ]underweight: BMI < 19, [] morbid obesity: BMI >40, [] Cachexia    [] Sepsis  [] hypovolemic shock,[] cardiogenic shock, [] hemorrhagic shock, [] neuogenic shock  [] Acute Respiratory Failure  []Cerebral edema, [] Brain compression/ herniation,   [] Functional quadriplegia  [] Acute blood loss anemia

## 2021-08-05 NOTE — PHYSICAL THERAPY INITIAL EVALUATION ADULT - LIGHT TOUCH SENSATION, LLE, REHAB EVAL
OK to schedule on same day slot for 15 min.    Left message to return call to clinic.     within normal limits

## 2021-08-05 NOTE — PROGRESS NOTE ADULT - SUBJECTIVE AND OBJECTIVE BOX
HPI:  46 M pt with PMHx of HTN, noncompliant with meds (Metoprolol, Nifedipine, and ASA), reports no recent use of ASA or metoprolol, presents to ED c/o acute onset and worsening dizziness, R arm paresthesia, and vomiting x 1hr prior to arrival while at work. Pt somnolent in ED, difficulty obtaining hx from pt. Per EMS, pt had SBP in the 200s, given nitro SL x 2 in field. Patient found to have a left side cerebellar hemmorrhage and left pontine hemmorrhage with extension to the subarachnoid space. Patient was started on a nicardipene drip in the ED for SBP to 230's, given manitol 50g and intubated for concern of potential for aspiration with declining exam. Patient reported that he lives alone and has no close contacts in the area.     NIHSS 6  ICH 2   (2021 20:40)    INTERVAL EVENTS: Tx to SDU. Subjective weakness on L side      HOSPITAL COURSE:  : Admitted with left cerebellar ICH. Intubated in ED for lethargy and concern for airway protection. Repeat CTH stable.  : POD# 0 S/P femoral cerebral angiogram, findings of left vertebral artery irregularity at level below PICA with segment suggestive of intimal flap, left vertebral artery sacrifice performed. Hypertensive episode during angio case, Xper CT shows stable ICH. Pt seen and examined at bedside in NSICU postop. Pt agitated and reaching for ETT. Brief CPAP trial given, and Pt was successfully extubated. Patient then with many blood tinged secretions and CXR with infiltrated. Patient was reintubated. He then self-extubated and was emergently reintubated, placed in restraints now sedated on precedex and fentanyl. Received 1LNS bolus for hypotension and was also started on levophed for SBPs in 70s.   : POD1 cerebral angiogram with left vertebral takedown, OLESYA overnight, neuro stable remains sedated on precedex and intubated on full vent support. Repeat CTH stable. Received 40 of lasix, increased PEEP from 7 to 8 and tapering off fentanyl drip. Self extubated and doing well on high flow NC, Kept on Precedex for agitation, started on Cardene for 's, will wean as appropriate.   : POD2 cerebral angio with L verterbal takedown. S/p Seroquel 25 and Precedex overnight for agitation, otherwise OLESYA. On Cardene gtt. Neuro stable. O2 sat stable on HFNC. S/p 30 Lasix. Uptrending troponin, EKG stable, trending q6H  8: POD3, OLESYA overnight, on Precedex. Neuro stable.  8: POD#4, OLESYA overnight, neuro stable. Precedex off, tolerating RA. 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 SVT to 200s, on verapamil 80 tid, given adenosine, lopressor, verapamil, diltiazem gtt started. started on PO dilt 60 tid.  8/3: POD#5: tolerating room air, dilt gtt stopped. sinus tach to low 100s now. PO amiodareone and diltaezem started. pt is on cardene, overnight losartan started.   : POD 6. Hypertensive, given labetalol 10 mg x1 with improvement. Added metoprolol, d/c'ed losartan per cards recs  : POD 7. Tx to SDU overnight. OLESYA.    Vital Signs Last 24 Hrs  T(C): 36.6 (05 Aug 2021 00:50), Max: 36.9 (04 Aug 2021 09:00)  T(F): 97.9 (05 Aug 2021 00:50), Max: 98.4 (04 Aug 2021 09:00)  HR: 97 (05 Aug 2021 00:50) (82 - 100)  BP: 155/94 (05 Aug 2021 00:50) (132/97 - 179/122)  BP(mean): 114 (04 Aug 2021 15:44) (114 - 146)  RR: 16 (05 Aug 2021 00:50) (16 - 19)  SpO2: 97% (05 Aug 2021 00:50) (93% - 100%)    I&O's Summary    03 Aug 2021 07:01  -  04 Aug 2021 07:00  --------------------------------------------------------  IN: 1450 mL / OUT: 2750 mL / NET: -1300 mL    04 Aug 2021 07:01  -  05 Aug 2021 03:08  --------------------------------------------------------  IN: 100 mL / OUT: 700 mL / NET: -600 mL        PHYSICAL EXAM:  General: Laying in bed, sleeping, NAD  Neurological: A&Ox3, responds appropriately, following commands, speech clear, CN ll-Xll grossly intact, 5/5 strength in upper and lower extremities b/l. Slight dysmetria on L, SILT, no pronator drift noted.  Cardiovascular: S1, S2, murmur heard best at apex  Respiratory: clear to auscultation bilaterally  Gastrointestinal: soft, non-tender, non-distended  Extremities: appropriately warm, dry, intact, 2+ distal pulses in upper and lower extremities bilaterally.      TUBES/LINES:  [] Farias  [] Trach  [] NGT  [] PEG  [] Wound Drains  [] Others    DIET:  [] NPO  [x] Mechanical  [] Tube feeds    LABS:                        15.3   11.70 )-----------( 265      ( 04 Aug 2021 05:01 )             46.5     08-04    140  |  109<H>  |  15  ----------------------------<  95  4.3   |  18<L>  |  1.13    Ca    8.9      04 Aug 2021 06:28  Phos  3.8     08-04  Mg     1.8     08-04        Urinalysis Basic - ( 04 Aug 2021 07:31 )    Color: Yellow / Appearance: Clear / S.020 / pH: x  Gluc: x / Ketone: Trace mg/dL  / Bili: Negative / Urobili: 1.0 E.U./dL   Blood: x / Protein: NEGATIVE mg/dL / Nitrite: NEGATIVE   Leuk Esterase: NEGATIVE / RBC: x / WBC x   Sq Epi: x / Non Sq Epi: x / Bacteria: x      CARDIAC MARKERS ( 03 Aug 2021 05:32 )  x     / 0.06 ng/mL / x     / x     / x          CAPILLARY BLOOD GLUCOSE          Drug Levels: [] N/A  Vancomycin Level, Trough: 17.3 ug/mL ( @ 05:01)  Vancomycin Level, Trough: 11.6 ug/mL ( @ 05:17)  Vancomycin Level, Trough: 14.5 ug/mL ( @ 17:00)    CSF Analysis: [] N/A      Allergies    No Known Allergies    Intolerances      MEDICATIONS:  Antibiotics:    Neuro:  acetaminophen   Tablet .. 650 milliGRAM(s) Oral every 6 hours PRN  ondansetron Injectable 4 milliGRAM(s) IV Push every 6 hours PRN  traMADol 25 milliGRAM(s) Oral every 6 hours PRN  traMADol 50 milliGRAM(s) Oral every 6 hours PRN    Anticoagulation:  heparin   Injectable 5000 Unit(s) SubCutaneous every 8 hours    OTHER:  aMIOdarone    Tablet   Oral   aMIOdarone    Tablet 400 milliGRAM(s) Oral two times a day  atorvastatin 40 milliGRAM(s) Oral at bedtime  bisacodyl 5 milliGRAM(s) Oral every 12 hours PRN  dextrose 50% Injectable 25 Gram(s) IV Push once  diltiazem    milliGRAM(s) Oral daily  glucagon  Injectable 1 milliGRAM(s) IntraMuscular once  metoprolol tartrate 50 milliGRAM(s) Oral two times a day  polyethylene glycol 3350 17 Gram(s) Oral daily  senna 2 Tablet(s) Oral at bedtime    IVF:  multivitamin 1 Tablet(s) Oral daily    CULTURES:  Culture Results:   No growth at 1 day. ( @ 16:24)  Culture Results:   No growth at 1 day. ( @ 16:24)    RADIOLOGY & ADDITIONAL TESTS:      ASSESSMENT:  46 M PMH L CVA? 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 Pt is now s/p  femoral cerebral angiogram, findings of left vertebral artery irregularity at level below PICA with segment suggestive of intimal flap, left vertebral artery sacrifice performed (2021), post op was extubated, needed to be reintubated for secretion burden and unable to protect airway,  patient self-extubated X 2. Also with SVT vs AF with RVR, and HOCM. Stable on RA.    Plan:  Neuro:   - Neuro checks/vital checks/groin/vascular checks q4hr  - Tylenol prn for pain control  - Repeat CTH  stable     CV:  -HCM resting grad 90 with MACRINA causing at least mod MR. Possible family hx SCD (mother). Course complicated by Afib RVR  - -160, on cardene gtt   - Cards following  - TTE- hypertrophic cardiomyopathy with severe left ventricular outflow tract obstruction and moderate mitral regurg  - trending troponin q6 H  - Careful fluid balance    - : SVT to 200s, s/p adenosine 1.2 on , verapamil 10, lopressor 5, dilt gtt, 1L bolus, started on dilt PO 60 tid, losartan  - dilt gtt stopped, dilt 180 po  qd started  - tarted amio load 400 bid x 12 does then 200 daily  - started lopressor 50 BID  - d/c'ed losartan per cards recs    Per cardiology: #HCM: avoid dehydration, vasodilation, and tachycardia in HCM in order to improve gradient. 90 resting gradient is significant and explains baseline poor ET as well as hospital course (tenuous respiratory status, extreme sensitivity to diuresis). Improvement in gradient will also improve MR which will help pulmonary edema.     Pulm:  - satting well RA  - blood tinged sputum    Renal:   - normonatremia goal   -  Replete electrolytes PRN    GI:  - bowel reg  - regular diet     Endo:   - ISS    Heme:  - SCD's, SQH  - trend CBC  - f/u LE doppler  neg    ID:  - f/u panculture   - trend WBC  - empiric vanc/zosyn end date 8/3     Disposition: SDU status, full code, dispo pending, family updated with plan.    Plan d/w Dr. D'Amico

## 2021-08-05 NOTE — OCCUPATIONAL THERAPY INITIAL EVALUATION ADULT - PLANNED THERAPY INTERVENTIONS, OT EVAL
ADL retraining/balance training/neuromuscular re-education/parent/caregiver training.../transfer training

## 2021-08-05 NOTE — PROGRESS NOTE ADULT - SUBJECTIVE AND OBJECTIVE BOX
Patient is a 46y old  Male who presents with a chief complaint of Cerebellar Stroke (04 Aug 2021 08:00)      HPI:  46 M pt with PMHx of HTN, noncompliant with meds (Metoprolol, Nifedipine, and ASA), reports no recent use of ASA or metoprolol, presents to ED c/o acute onset and worsening dizziness, R arm paresthesia, and vomiting x 1hr prior to arrival while at work. Pt somnolent in ED, difficulty obtaining hx from pt. Per EMS, pt had SBP in the 200s, given nitro SL x 2 in field. Patient found to have a left side cerebellar hemmorrhage and left pontine hemmorrhage with extension to the subarachnoid space. Patient was started on a nicardipene drip in the ED for SBP to 230's, given manitol 50g and intubated for concern of potential for aspiration with declining exam. Patient reported that he lives alone and has no close contacts in the area.     NIHSS 6  ICH 2   (2021 20:40)    Subjective:      Allergies    No Known Allergies    Intolerances    Home meds:     MEDICATIONS  (STANDING):  aMIOdarone    Tablet   Oral   aMIOdarone    Tablet 400 milliGRAM(s) Oral two times a day  atorvastatin 40 milliGRAM(s) Oral at bedtime  dextrose 50% Injectable 25 Gram(s) IV Push once  diltiazem    milliGRAM(s) Oral daily  glucagon  Injectable 1 milliGRAM(s) IntraMuscular once  heparin   Injectable 5000 Unit(s) SubCutaneous every 8 hours  metoprolol tartrate 50 milliGRAM(s) Oral two times a day  multivitamin 1 Tablet(s) Oral daily  polyethylene glycol 3350 17 Gram(s) Oral daily  senna 2 Tablet(s) Oral at bedtime    MEDICATIONS  (PRN):  acetaminophen   Tablet .. 650 milliGRAM(s) Oral every 6 hours PRN Temp greater or equal to 38C (100.4F), Mild Pain (1 - 3)  bisacodyl 5 milliGRAM(s) Oral every 12 hours PRN Constipation  ondansetron Injectable 4 milliGRAM(s) IV Push every 6 hours PRN Nausea and/or Vomiting  traMADol 25 milliGRAM(s) Oral every 6 hours PRN Moderate Pain (4 - 6)  traMADol 50 milliGRAM(s) Oral every 6 hours PRN Severe Pain (7 - 10)      Drug Dosing Weight  Height (cm): 190.5 (2021 15:28)  Weight (kg): 103 (2021 15:01)  BMI (kg/m2): 28.4 (2021 15:28)  BSA (m2): 2.32 (2021 15:28)    PAST MEDICAL & SURGICAL HISTORY:  HTN (hypertension)    No significant past surgical history        FAMILY HISTORY:  No pertinent family history in first degree relatives of cardiac disease        SOCIAL HISTORY:    ADVANCE DIRECTIVES:      Vital Signs Last 24 Hrs  T(C): 36.4 (05 Aug 2021 08:34), Max: 36.6 (04 Aug 2021 20:35)  T(F): 97.5 (05 Aug 2021 08:34), Max: 97.9 (04 Aug 2021 20:35)  HR: 79 (05 Aug 2021 09:13) (67 - 97)  BP: 165/107 (05 Aug 2021 10:30) (132/97 - 176/104)  BP(mean): 114 (04 Aug 2021 15:44) (114 - 126)  RR: 18 (05 Aug 2021 08:34) (16 - 18)  SpO2: 96% (05 Aug 2021 08:34) (93% - 99%)        PHYSICAL EXAM:      Constitutional: NAD  Eyes: PERRLA  ENMT: MMM  Neck: supple  Back: midline  Respiratory: CTA b/l  Cardiovascular: rrr, s1s2, no m/r//g  Gastrointestinal: soft, NTND, + BS  Extremities: wwp  Vascular: + 2 pulses radial  Neurological: AAO x 4  Skin: no rash  Lymph Nodes: no LAD  Musculoskeletal: no joint swelling  Psychiatric: normal affect        LABS:                          14.8   10.36 )-----------( 264      ( 05 Aug 2021 06:44 )             45.1   08-05    141  |  105  |  18  ----------------------------<  90  4.1   |  25  |  1.24    Ca    8.8      05 Aug 2021 06:44  Phos  5.0     08-05  Mg     2.1     08-05          Urinalysis Basic - ( 04 Aug 2021 07:31 )    Color: Yellow / Appearance: Clear / S.020 / pH: x  Gluc: x / Ketone: Trace mg/dL  / Bili: Negative / Urobili: 1.0 E.U./dL   Blood: x / Protein: NEGATIVE mg/dL / Nitrite: NEGATIVE   Leuk Esterase: NEGATIVE / RBC: x / WBC x   Sq Epi: x / Non Sq Epi: x / Bacteria: x        EKG:    ECHO, US:    < from: TTE Echo Complete w/o Contrast w/ Doppler (21 @ 12:24) >  CONCLUSIONS:     1. Normal left ventricular systolic function.   2. Normal right ventricular size and systolic function.   3. Dilated left atrium.   4. No evidence of pulmonary hypertension.   5. No pericardial effusion.   6. Hypertrophic cardiomyopathy with assymetric septal hypertrophy and systolic anterior motion of the mitral valve.   7. There is severe left ventricular outflow tract obstruction with a resting gradient of 90 mm Hg.   8. There is at least moderate eccentric mitral regurgitation associated with the MACRINA.    < end of copied text >      RADIOLOGY:  < from: CT Head No Cont (21 @ 13:25) >  Since prior CT head 2021, continued evolution of left cerebellar parenchymal hemorrhage with intraventricular and subarachnoid extension. Interval mild decrease in size of the lateral and third ventricles. No new hemorrhage..    < end of copied text >

## 2021-08-05 NOTE — PROVIDER CONTACT NOTE (OTHER) - BACKGROUND
Femoral cerebral angiogram 7/29.
Pt admitted on 7/28/21 for cerebellar hemorrhage and left pontine hemorrhage. Pt has h/o rapid A.fib. Pt is on Diltiazem PO and Amiodarone PO.
Femoral cerebral angiogram 7/28.

## 2021-08-05 NOTE — PROGRESS NOTE ADULT - SUBJECTIVE AND OBJECTIVE BOX
INTERVAL HPI/OVERNIGHT EVENTS: Patient seen and examined at bedside. No acute events overnight.    VITAL SIGNS:  T(F): 97.5 (21 @ 08:34)  HR: 79 (21 @ 09:13)  BP: 165/107 (21 @ 10:30)  RR: 18 (21 @ 08:34)  SpO2: 96% (21 @ 08:34)  Wt(kg): --    21 @ 07:01  -  21 @ 07:00  --------------------------------------------------------  IN: 100 mL / OUT: 1150 mL / NET: -1050 mL    21 @ 07:01  -  21 @ 12:09  --------------------------------------------------------  IN: 350 mL / OUT: 0 mL / NET: 350 mL        PHYSICAL EXAM:    GEN: Awake, comfortable. NAD.   HEENT: NCAT, PERRL, EOMI. Mucosa moist.   NECK: Supple, no JVD.   RESP: CTA b/l  CV: RRR, normal s1/s2. No m/r/g.  ABD: Soft, NTND. BS+  EXT: Warm. No edema, clubbing, or cyanosis.   NEURO: AAOx3. No focal deficits.    MEDICATIONS  (STANDING):  aMIOdarone    Tablet   Oral   aMIOdarone    Tablet 400 milliGRAM(s) Oral two times a day  atorvastatin 40 milliGRAM(s) Oral at bedtime  dextrose 50% Injectable 25 Gram(s) IV Push once  diltiazem    milliGRAM(s) Oral daily  glucagon  Injectable 1 milliGRAM(s) IntraMuscular once  heparin   Injectable 5000 Unit(s) SubCutaneous every 8 hours  metoprolol tartrate 50 milliGRAM(s) Oral two times a day  multivitamin 1 Tablet(s) Oral daily  polyethylene glycol 3350 17 Gram(s) Oral daily  senna 2 Tablet(s) Oral at bedtime    MEDICATIONS  (PRN):  acetaminophen   Tablet .. 650 milliGRAM(s) Oral every 6 hours PRN Temp greater or equal to 38C (100.4F), Mild Pain (1 - 3)  bisacodyl 5 milliGRAM(s) Oral every 12 hours PRN Constipation  ondansetron Injectable 4 milliGRAM(s) IV Push every 6 hours PRN Nausea and/or Vomiting  traMADol 25 milliGRAM(s) Oral every 6 hours PRN Moderate Pain (4 - 6)  traMADol 50 milliGRAM(s) Oral every 6 hours PRN Severe Pain (7 - 10)      Allergies    No Known Allergies    Intolerances        LABS:                        14.8   10.36 )-----------( 264      ( 05 Aug 2021 06:44 )             45.1     08-05    141  |  105  |  18  ----------------------------<  90  4.1   |  25  |  1.24    Ca    8.8      05 Aug 2021 06:44  Phos  5.0     08-05  Mg     2.1     08-05        Urinalysis Basic - ( 04 Aug 2021 07:31 )    Color: Yellow / Appearance: Clear / S.020 / pH: x  Gluc: x / Ketone: Trace mg/dL  / Bili: Negative / Urobili: 1.0 E.U./dL   Blood: x / Protein: NEGATIVE mg/dL / Nitrite: NEGATIVE   Leuk Esterase: NEGATIVE / RBC: x / WBC x   Sq Epi: x / Non Sq Epi: x / Bacteria: x            EKG:    Echo:    Cath:    NST:    RADIOLOGY & ADDITIONAL TESTS:   INTERVAL HPI/OVERNIGHT EVENTS: Patient seen and examined at bedside. No acute events overnight. Denies chest pain, SOB, palpitations    VITAL SIGNS:  T(F): 97.5 (21 @ 08:34)  HR: 79 (21 @ 09:13)  BP: 165/107 (21 @ 10:30)  RR: 18 (21 @ 08:34)  SpO2: 96% (21 @ 08:34)  Wt(kg): --    21 @ 07:01  -  21 @ 07:00  --------------------------------------------------------  IN: 100 mL / OUT: 1150 mL / NET: -1050 mL    21 @ 07:01  -  21 @ 12:09  --------------------------------------------------------  IN: 350 mL / OUT: 0 mL / NET: 350 mL        PHYSICAL EXAM:    GEN: Awake, comfortable. NAD.   HEENT: NCAT, PERRL, EOMI. Mucosa moist.   NECK: Supple, no JVD.   RESP: CTA b/l  CV: RRR, normal s1/s2. 4/6 SHEBA. decreases with handgrip  ABD: Soft, NTND. BS+  EXT: Warm. No edema, clubbing, or cyanosis.   NEURO: AAOx3. No focal deficits.    MEDICATIONS  (STANDING):  aMIOdarone    Tablet   Oral   aMIOdarone    Tablet 400 milliGRAM(s) Oral two times a day  atorvastatin 40 milliGRAM(s) Oral at bedtime  dextrose 50% Injectable 25 Gram(s) IV Push once  diltiazem    milliGRAM(s) Oral daily  glucagon  Injectable 1 milliGRAM(s) IntraMuscular once  heparin   Injectable 5000 Unit(s) SubCutaneous every 8 hours  metoprolol tartrate 50 milliGRAM(s) Oral two times a day  multivitamin 1 Tablet(s) Oral daily  polyethylene glycol 3350 17 Gram(s) Oral daily  senna 2 Tablet(s) Oral at bedtime    MEDICATIONS  (PRN):  acetaminophen   Tablet .. 650 milliGRAM(s) Oral every 6 hours PRN Temp greater or equal to 38C (100.4F), Mild Pain (1 - 3)  bisacodyl 5 milliGRAM(s) Oral every 12 hours PRN Constipation  ondansetron Injectable 4 milliGRAM(s) IV Push every 6 hours PRN Nausea and/or Vomiting  traMADol 25 milliGRAM(s) Oral every 6 hours PRN Moderate Pain (4 - 6)  traMADol 50 milliGRAM(s) Oral every 6 hours PRN Severe Pain (7 - 10)      Allergies    No Known Allergies    Intolerances        LABS:                        14.8   10.36 )-----------( 264      ( 05 Aug 2021 06:44 )             45.1     08-05    141  |  105  |  18  ----------------------------<  90  4.1   |  25  |  1.24    Ca    8.8      05 Aug 2021 06:44  Phos  5.0     08-05  Mg     2.1     08-05        Urinalysis Basic - ( 04 Aug 2021 07:31 )    Color: Yellow / Appearance: Clear / S.020 / pH: x  Gluc: x / Ketone: Trace mg/dL  / Bili: Negative / Urobili: 1.0 E.U./dL   Blood: x / Protein: NEGATIVE mg/dL / Nitrite: NEGATIVE   Leuk Esterase: NEGATIVE / RBC: x / WBC x   Sq Epi: x / Non Sq Epi: x / Bacteria: x            EKG:    Echo:  < from: TTE Echo Complete w/o Contrast w/ Doppler (21 @ 12:24) >  --------------------------------------------------------------------------------  CONCLUSIONS:     1. Normal left ventricular systolic function.   2. Normal right ventricular size and systolic function.   3. Dilated left atrium.   4. No evidence of pulmonary hypertension.   5. No pericardial effusion.   6. Hypertrophic cardiomyopathy with assymetric septal hypertrophy and systolic anterior motion of the mitral valve.   7. There is severe left ventricular outflow tract obstruction with a resting gradient of 90 mm Hg.   8. There is at least moderate eccentric mitral regurgitation associated with the MACRINA.    --------------------------------------------------------------------------------    < end of copied text >    Cath:    NST:    RADIOLOGY & ADDITIONAL TESTS:

## 2021-08-05 NOTE — PHYSICAL THERAPY INITIAL EVALUATION ADULT - PERTINENT HX OF CURRENT PROBLEM, REHAB EVAL
46M PMH HTN (not taking medications) p/w FND, found with cerebellar and pontine hemorrhage, with periods of respiratory distress requiring intubation and hypotension i/s/o diuresis, found with HCM resting grad 90 with MACRINA causing at least mod MR.

## 2021-08-05 NOTE — PROGRESS NOTE ADULT - ASSESSMENT
46 M PMH L CVA? 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 Pt is now s/p  femoral cerebral angiogram, findings of left vertebral artery irregularity at level below PICA with segment suggestive of intimal flap, left vertebral artery sacrifice performed (7/29/2021), post op was extubated, needed to be reintubated for secretion burden and unable to protect airway,  patient self-extubated X 2, now on high flow NC, satting well.

## 2021-08-05 NOTE — PHYSICAL THERAPY INITIAL EVALUATION ADULT - GAIT DEVIATIONS NOTED, PT EVAL
Patient demo very unsteady gait pattern. Patient reaching for furniture in room during ambulation trial to improve steadiness. Narrow BRENDAN, decreased safety awareness when turning/changing of directions. No LOB observed and fair navigation of room obstacles./decreased ingrid/increased time in double stance/decreased velocity of limb motion/decreased step length/decreased stride length/decreased weight-shifting ability

## 2021-08-05 NOTE — PHYSICAL THERAPY INITIAL EVALUATION ADULT - PERSONAL SAFETY AND JUDGMENT, REHAB EVAL
intact

## 2021-08-05 NOTE — PHYSICAL THERAPY INITIAL EVALUATION ADULT - ADDITIONAL COMMENTS
Patient is a community ambulator who lives with his sister in a private house with ~5 ALVA. Patient reports being independent with all ADLs prior and denies use of any assistive devices during ambulation prior.

## 2021-08-05 NOTE — PHYSICAL THERAPY INITIAL EVALUATION ADULT - GENERAL OBSERVATIONS, REHAB EVAL
PT IE completed. MRS: 4. Patient received semi supine in bed +tele, +heplock IV, +(B) SCDs, NAD, willing to work with PT.

## 2021-08-05 NOTE — PROGRESS NOTE ADULT - ASSESSMENT
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 Possible family hx SCD (mother). Course complicated by Afib RVR     #Afib- Now in Sinus Tach   - decrease amio to 200mg PO QD  -c/w diltiazem XR 180mg qd  - c/w lopressor 50mg PO BID. Would not hold it for HR in 50s.  - Given HOCM and Afib will need AC, start DOAC (e.g. eliquis 5 bid)when surgically allowable    #HCM:   -avoid dehydration, vasodilation, and tachycardia in HCM in order to improve gradient. 90 resting gradient is significant and explains baseline poor ET as well as hospital course (tenuous respiratory status, extreme sensitivity to diuresis). Improvement in gradient will also improve MR which will help pulmonary edema.   - Will need to repeat TTE prior to discharge when HR are more controlled to accurately assess gradient.   - K>4, Mg>2  - outpatient f/u with cardiology upon DC; Consider genetic testing of HOCM for family members as well.

## 2021-08-05 NOTE — PHYSICAL THERAPY INITIAL EVALUATION ADULT - MODALITIES TREATMENT COMMENTS
(L) hand  5/5, (R) hand  5/5. CN Testing: B/L Frontalis intact; B/L buccinator intact; smile symmetrical; tongue protrusion at midline; B/L eyes open/close intact; Shoulder elevation: intact bilaterally; Vision H-Test: bilateral tracking and smooth pursuit intact; Convergence/Divergence: intact; Vision Quadrant Test: intact bilaterally

## 2021-08-05 NOTE — PROVIDER CONTACT NOTE (OTHER) - ASSESSMENT
Pt feels a slight headache and chills at the moment.
HR now 103, ST with ST depression. /91. Pt on 5mg/hr Nicardipine gtt. O2 97% in RA. RR in the 30s.
Pt is feeling dizzy, but denies any other symptoms.

## 2021-08-05 NOTE — OCCUPATIONAL THERAPY INITIAL EVALUATION ADULT - PERTINENT HX OF CURRENT PROBLEM, REHAB EVAL
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 Patient found to have a left side cerebellar hemmorrhage and left pontine hemmorrhage with extension to the subarachnoid space. S/P Angiogram, blood vessel, cerebral, with embolization.

## 2021-08-05 NOTE — PROVIDER CONTACT NOTE (OTHER) - SITUATION
PT BP was 165/110 when taken manually after morning BP medications were given.
Quality 431: Preventive Care And Screening: Unhealthy Alcohol Use - Screening: Patient screened for unhealthy alcohol use using a single question and scores less than 2 times per year
Quality 110: Preventive Care And Screening: Influenza Immunization: Influenza immunization was not ordered or administered, reason not given
PT is hypertensive as high as 175/120.
Quality 226: Preventive Care And Screening: Tobacco Use: Screening And Cessation Intervention: Patient screened for tobacco use and is an ex/non-smoker
Detail Level: Detailed
Pt had an episode of PAT lasted for 5 secs. Pt denies chest pain at the moment, but he said he does have  chest pain occasionally.
Quality 130: Documentation Of Current Medications In The Medical Record: Current Medications Documented

## 2021-08-06 DIAGNOSIS — R79.89 OTHER SPECIFIED ABNORMAL FINDINGS OF BLOOD CHEMISTRY: ICD-10-CM

## 2021-08-06 LAB
ANA TITR SER: NEGATIVE — SIGNIFICANT CHANGE UP
ANION GAP SERPL CALC-SCNC: 11 MMOL/L — SIGNIFICANT CHANGE UP (ref 5–17)
BUN SERPL-MCNC: 25 MG/DL — HIGH (ref 7–23)
CALCIUM SERPL-MCNC: 9.5 MG/DL — SIGNIFICANT CHANGE UP (ref 8.4–10.5)
CHLORIDE SERPL-SCNC: 107 MMOL/L — SIGNIFICANT CHANGE UP (ref 96–108)
CO2 SERPL-SCNC: 25 MMOL/L — SIGNIFICANT CHANGE UP (ref 22–31)
CREAT SERPL-MCNC: 1.31 MG/DL — HIGH (ref 0.5–1.3)
GLUCOSE SERPL-MCNC: 114 MG/DL — HIGH (ref 70–99)
HCT VFR BLD CALC: 44.2 % — SIGNIFICANT CHANGE UP (ref 39–50)
HGB BLD-MCNC: 14.3 G/DL — SIGNIFICANT CHANGE UP (ref 13–17)
MAGNESIUM SERPL-MCNC: 2 MG/DL — SIGNIFICANT CHANGE UP (ref 1.6–2.6)
MCHC RBC-ENTMCNC: 29.4 PG — SIGNIFICANT CHANGE UP (ref 27–34)
MCHC RBC-ENTMCNC: 32.4 GM/DL — SIGNIFICANT CHANGE UP (ref 32–36)
MCV RBC AUTO: 90.9 FL — SIGNIFICANT CHANGE UP (ref 80–100)
NRBC # BLD: 0 /100 WBCS — SIGNIFICANT CHANGE UP (ref 0–0)
PHOSPHATE SERPL-MCNC: 5.2 MG/DL — HIGH (ref 2.5–4.5)
PLATELET # BLD AUTO: 295 K/UL — SIGNIFICANT CHANGE UP (ref 150–400)
POTASSIUM SERPL-MCNC: 4.1 MMOL/L — SIGNIFICANT CHANGE UP (ref 3.5–5.3)
POTASSIUM SERPL-SCNC: 4.1 MMOL/L — SIGNIFICANT CHANGE UP (ref 3.5–5.3)
RBC # BLD: 4.86 M/UL — SIGNIFICANT CHANGE UP (ref 4.2–5.8)
RBC # FLD: 13.2 % — SIGNIFICANT CHANGE UP (ref 10.3–14.5)
SODIUM SERPL-SCNC: 143 MMOL/L — SIGNIFICANT CHANGE UP (ref 135–145)
WBC # BLD: 12.28 K/UL — HIGH (ref 3.8–10.5)
WBC # FLD AUTO: 12.28 K/UL — HIGH (ref 3.8–10.5)

## 2021-08-06 PROCEDURE — 93321 DOPPLER ECHO F-UP/LMTD STD: CPT | Mod: 26

## 2021-08-06 PROCEDURE — 93308 TTE F-UP OR LMTD: CPT | Mod: 26

## 2021-08-06 PROCEDURE — 99232 SBSQ HOSP IP/OBS MODERATE 35: CPT

## 2021-08-06 PROCEDURE — 99233 SBSQ HOSP IP/OBS HIGH 50: CPT

## 2021-08-06 PROCEDURE — 99233 SBSQ HOSP IP/OBS HIGH 50: CPT | Mod: GC

## 2021-08-06 RX ORDER — METOPROLOL TARTRATE 50 MG
100 TABLET ORAL EVERY 12 HOURS
Refills: 0 | Status: DISCONTINUED | OUTPATIENT
Start: 2021-08-06 | End: 2021-08-07

## 2021-08-06 RX ADMIN — ATORVASTATIN CALCIUM 40 MILLIGRAM(S): 80 TABLET, FILM COATED ORAL at 21:23

## 2021-08-06 RX ADMIN — Medication 1 TABLET(S): at 06:12

## 2021-08-06 RX ADMIN — Medication 100 MILLIGRAM(S): at 18:30

## 2021-08-06 RX ADMIN — HEPARIN SODIUM 5000 UNIT(S): 5000 INJECTION INTRAVENOUS; SUBCUTANEOUS at 21:23

## 2021-08-06 RX ADMIN — Medication 50 MILLIGRAM(S): at 06:43

## 2021-08-06 RX ADMIN — Medication 180 MILLIGRAM(S): at 06:43

## 2021-08-06 RX ADMIN — AMIODARONE HYDROCHLORIDE 200 MILLIGRAM(S): 400 TABLET ORAL at 06:12

## 2021-08-06 RX ADMIN — HEPARIN SODIUM 5000 UNIT(S): 5000 INJECTION INTRAVENOUS; SUBCUTANEOUS at 06:12

## 2021-08-06 RX ADMIN — HEPARIN SODIUM 5000 UNIT(S): 5000 INJECTION INTRAVENOUS; SUBCUTANEOUS at 13:44

## 2021-08-06 NOTE — PROGRESS NOTE ADULT - ATTENDING COMMENTS
will need more aggressive medication titration  can be done as outpatient  prefer to have increased diltiazem this can be done as outpatient  will need AC eventually  stay on current regimen  close cardiology fu will need more aggressive medication titration  can be done as outpatient  prefer to have increased diltiazem this can be done as outpatient  will need AC eventually  stay on current regimen  Palpable cord will need US to rule out DVT  check orthostatics on  him   close cardiology fu

## 2021-08-06 NOTE — PROGRESS NOTE ADULT - SUBJECTIVE AND OBJECTIVE BOX
HPI:  46 M pt with PMHx of HTN, noncompliant with meds (Metoprolol, Nifedipine, and ASA), reports no recent use of ASA or metoprolol, presents to ED c/o acute onset and worsening dizziness, R arm paresthesia, and vomiting x 1hr prior to arrival while at work. Pt somnolent in ED, difficulty obtaining hx from pt. Per EMS, pt had SBP in the 200s, given nitro SL x 2 in field. Patient found to have a left side cerebellar hemmorrhage and left pontine hemmorrhage with extension to the subarachnoid space. Patient was started on a nicardipene drip in the ED for SBP to 230's, given manitol 50g and intubated for concern of potential for aspiration with declining exam. Patient reported that he lives alone and has no close contacts in the area.     NIHSS 6  ICH 2      HOSPITAL COURSE:  : Admitted with left cerebellar ICH. Intubated in ED for lethargy and concern for airway protection. Repeat CTH stable.  : POD# 0 S/P femoral cerebral angiogram, findings of left vertebral artery irregularity at level below PICA with segment suggestive of intimal flap, left vertebral artery sacrifice performed. Hypertensive episode during angio case, Xper CT shows stable ICH. Pt seen and examined at bedside in NSICU postop. Pt agitated and reaching for ETT. Brief CPAP trial given, and Pt was successfully extubated. Patient then with many blood tinged secretions and CXR with infiltrated. Patient was reintubated. He then self-extubated and was emergently reintubated, placed in restraints now sedated on precedex and fentanyl. Received 1LNS bolus for hypotension and was also started on levophed for SBPs in 70s.   : POD1 cerebral angiogram with left vertebral takedown, OLESYA overnight, neuro stable remains sedated on precedex and intubated on full vent support. Repeat CTH stable. Received 40 of lasix, increased PEEP from 7 to 8 and tapering off fentanyl drip. Self extubated and doing well on high flow NC, Kept on Precedex for agitation, started on Cardene for 's, will wean as appropriate.   : POD2 cerebral angio with L verterbal takedown. S/p Seroquel 25 and Precedex overnight for agitation, otherwise OLESYA. On Cardene gtt. Neuro stable. O2 sat stable on HFNC. S/p 30 Lasix. Uptrending troponin, EKG stable, trending q6H  : POD3, OLESYA overnight, on Precedex. Neuro stable.  8: POD#4, OLESYA overnight, neuro stable. Precedex off, tolerating RA. 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 SVT to 200s, on verapamil 80 tid, given adenosine, lopressor, verapamil, diltiazem gtt started. started on PO dilt 60 tid.  8/3: POD#5: tolerating room air, dilt gtt stopped. sinus tach to low 100s now. PO amiodareone and diltaezem started. pt is on cardene, overnight losartan started.   : POD 6. Hypertensive, given labetalol 10 mg x1 with improvement. Added metoprolol, d/c'ed losartan per cards recs  : POD 7. Tx to SDU overnight. OLESYA.  8: POD #8. OLESYA overnight. RUE doppler neg for DVT. Dispo pending       Vital Signs Last 24 Hrs  T(C): 36.2 (06 Aug 2021 00:15), Max: 36.6 (05 Aug 2021 14:23)  T(F): 97.2 (06 Aug 2021 00:15), Max: 97.8 (05 Aug 2021 14:23)  HR: 78 (06 Aug 2021 00:15) (67 - 95)  BP: 167/105 (06 Aug 2021 00:15) (140/108 - 186/100)  BP(mean): --  RR: 18 (06 Aug 2021 00:15) (16 - 18)  SpO2: 95% (06 Aug 2021 00:15) (95% - 99%)    I&O's Detail    04 Aug 2021 07:01  -  05 Aug 2021 07:00  --------------------------------------------------------  IN:    IV PiggyBack: 100 mL  Total IN: 100 mL    OUT:    Voided (mL): 1150 mL  Total OUT: 1150 mL    Total NET: -1050 mL      05 Aug 2021 07:01  -  06 Aug 2021 03:34  --------------------------------------------------------  IN:    Oral Fluid: 1530 mL  Total IN: 1530 mL    OUT:    Voided (mL): 500 mL  Total OUT: 500 mL    Total NET: 1030 mL      PHYSICAL EXAM:  General: Laying in bed, sleeping, NAD  Neurological: A&Ox3, responds appropriately, following commands, speech clear, CN ll-Xll grossly intact, 5/5 strength in upper and lower extremities b/l. Slight dysmetria on L, SILT, no pronator drift noted.  Cardiovascular: S1, S2, murmur heard best at apex  Respiratory: clear to auscultation bilaterally  Gastrointestinal: soft, non-tender, non-distended  Extremities: appropriately warm, dry, intact, 2+ distal pulses in upper and lower extremities bilaterally.      I&O's Summary    04 Aug 2021 07:01  -  05 Aug 2021 07:00  --------------------------------------------------------  IN: 100 mL / OUT: 1150 mL / NET: -1050 mL    05 Aug 2021 07:01  -  06 Aug 2021 03:34  --------------------------------------------------------        TUBES/LINES:  [] CVC  [] A-line  [] Lumbar Drain  [] Ventriculostomy  [] Other    DIET:  [] NPO  [] Mechanical  [] Tube feeds    LABS:                        14.8   10.36 )-----------( 264      ( 05 Aug 2021 06:44 )             45.1     08-05    141  |  105  |  18  ----------------------------<  90  4.1   |  25  |  1.24    Ca    8.8      05 Aug 2021 06:44  Phos  5.0       Mg     2.1           PTT - ( 05 Aug 2021 16:53 )  PTT:35.4 sec  Urinalysis Basic - ( 04 Aug 2021 07:31 )    Color: Yellow / Appearance: Clear / S.020 / pH: x  Gluc: x / Ketone: Trace mg/dL  / Bili: Negative / Urobili: 1.0 E.U./dL   Blood: x / Protein: NEGATIVE mg/dL / Nitrite: NEGATIVE   Leuk Esterase: NEGATIVE / RBC: x / WBC x   Sq Epi: x / Non Sq Epi: x / Bacteria: x          CAPILLARY BLOOD GLUCOSE          Drug Levels: [] N/A  Vancomycin Level, Trough: 17.3 ug/mL ( @ 05:01)  Vancomycin Level, Trough: 11.6 ug/mL ( @ 05:17)  Vancomycin Level, Trough: 14.5 ug/mL ( @ 17:00)    CSF Analysis: [] N/A      Allergies    No Known Allergies    Intolerances      MEDICATIONS:  Antibiotics:    Neuro:  acetaminophen   Tablet .. 650 milliGRAM(s) Oral every 6 hours PRN  ondansetron Injectable 4 milliGRAM(s) IV Push every 6 hours PRN  traMADol 25 milliGRAM(s) Oral every 6 hours PRN  traMADol 50 milliGRAM(s) Oral every 6 hours PRN    Anticoagulation:  heparin   Injectable 5000 Unit(s) SubCutaneous every 8 hours    OTHER:  aMIOdarone    Tablet 200 milliGRAM(s) Oral daily  atorvastatin 40 milliGRAM(s) Oral at bedtime  bisacodyl 5 milliGRAM(s) Oral every 12 hours PRN  dextrose 50% Injectable 25 Gram(s) IV Push once  diltiazem    milliGRAM(s) Oral daily  glucagon  Injectable 1 milliGRAM(s) IntraMuscular once  metoprolol tartrate 50 milliGRAM(s) Oral two times a day  polyethylene glycol 3350 17 Gram(s) Oral daily  senna 2 Tablet(s) Oral at bedtime    IVF:  multivitamin 1 Tablet(s) Oral daily    CULTURES:  Culture Results:   No growth at 2 days. ( @ 16:24)  Culture Results:   No growth at 2 days. ( @ 16:24)    RADIOLOGY & ADDITIONAL TESTS:      ASSESSMENT:  46 M PMH L CVA? , HTN, non-compliant on home meds nifedipine, ASA 81, and metoprolol presented with 2 hr onset gradual worsening R UE paresthesia, weakness, dizziness, N&V found to have an Acute left cerebellar intraparenchymal hemorrhage and left pontine hemorrhage with extension into the subarachnoid space. Pt is now s/p  femoral cerebral angiogram, findings of left vertebral artery irregularity at level below PICA with segment suggestive of intimal flap, left vertebral artery sacrifice performed (2021), post op was extubated, needed to be reintubated for secretion burden and unable to protect airway,  patient self-extubated X 2. Also with SVT vs AF with RVR, and HOCM. Stable on RA.    CHEST PAIN    No pertinent family history in first degree relatives    Handoff    MEWS Score    HTN (hypertension)    HTN (hypertension)    SAH (subarachnoid hemorrhage)    Dissection of cerebral artery    SAH (subarachnoid hemorrhage)    Dissection of cerebral artery    Cerebellar bleed    Cerebellar bleed    Essential hypertension    Left ventricular outflow obstruction    Atrial fibrillation with rapid ventricular response    Leukocytosis, unspecified type    Metabolic acidosis    Angiogram, blood vessel, cerebral, with embolization    No significant past surgical history    CHEST PAIN    [email or web address]      Plan:  Neuro:   - Neuro checks/vital checks/groin/vascular checks q4hr  - Tylenol prn for pain control  - Repeat CTH  stable     CV:  -HCM resting grad 90 with MACRINA causing at least mod MR. Possible family hx SCD (mother). Course complicated by Afib RVR  - -160  - Cards following  - TTE- hypertrophic cardiomyopathy with severe left ventricular outflow tract obstruction and moderate mitral regurg  - trending troponin q6 H  - Careful fluid balance    - : SVT to 200s, s/p adenosine 1.2 on , verapamil 10, lopressor 5, dilt gtt, 1L bolus, started on dilt PO 60 tid, losartan  - dilt gtt stopped, dilt 180 po  qd started  - tarted amio load 400 bid x 12 does then 200 daily  - started lopressor 50 BID  - d/c'ed losartan per cards recs    Per cardiology: #HCM: avoid dehydration, vasodilation, and tachycardia in HCM in order to improve gradient. 90 resting gradient is significant and explains baseline poor ET as well as hospital course (tenuous respiratory status, extreme sensitivity to diuresis). Improvement in gradient will also improve MR which will help pulmonary edema.     Pulm:  - satting well RA  - blood tinged sputum    Renal:   - normonatremia goal   -  Replete electrolytes PRN    GI:  - bowel reg  - regular diet     Endo:   - ISS    Heme:  - SCD's, SQH  - trend CBC  - f/u LE doppler  neg    ID:  - f/u panculture   - trend WBC  - empiric vanc/zosyn end date 8/3     Disposition: SDU status, full code, dispo pending recs AR, family updated with plan.    Plan d/w Dr. D'Amico             Assessment:  Present when checked    []  GCS  E   V  M     Heart Failure: []Acute, [] acute on chronic , []chronic  Heart Failure:  [] Diastolic (HFpEF), [] Systolic (HFrEF), []Combined (HFpEF and HFrEF), [] RHF, [] Pulm HTN, [] Other    [] TATIANA, [] ATN, [] AIN, [] other  [] CKD1, [] CKD2, [] CKD 3, [] CKD 4, [] CKD 5, []ESRD    Encephalopathy: [] Metabolic, [] Hepatic, [] toxic, [] Neurological, [] Other    Abnormal Nurtitional Status: [] malnurtition (see nutrition note), [ ]underweight: BMI < 19, [] morbid obesity: BMI >40, [] Cachexia    [] Sepsis  [] hypovolemic shock,[] cardiogenic shock, [] hemorrhagic shock, [] neuogenic shock  [] Acute Respiratory Failure  []Cerebral edema, [] Brain compression/ herniation,   [] Functional quadriplegia  [] Acute blood loss anemia

## 2021-08-06 NOTE — PROGRESS NOTE ADULT - ASSESSMENT
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 Possible family hx SCD (mother). Course complicated by Afib RVR     #Afib-Coverted to Sinus Tach  - c/w amio to 200mg PO QD  - switch diltiazem to verapamil ER 180mg qd  - increase to lopressor 100mg PO BID. Would not hold it for HR in 50s.  - Given HOCM and Afib will need AC, start DOAC (e.g. eliquis 5 bid) when surgically allowable    #HCM:   -avoid dehydration, vasodilation, and tachycardia in HCM in order to improve gradient. 90 resting gradient is significant and explains baseline poor ET as well as hospital course (tenuous respiratory status, extreme sensitivity to diuresis). Improvement in gradient will also improve MR which will help pulmonary edema.   - Will need to repeat TTE prior to discharge when HR are more controlled to accurately assess gradient.   - K>4, Mg>2  - outpatient f/u with cardiology upon DC; Consider genetic testing of HOCM for family members as well.     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 Possible family hx SCD (mother). Course complicated by Afib RVR     #Afib-Coverted to Sinus Tach  - c/w amio to 200mg PO QD  - c/w diltiazem ER 180mg qd  - increase to lopressor 100mg PO BID. Would not hold it for HR in 50s.  - Given HOCM and Afib will need AC, start DOAC (e.g. eliquis 5 bid) when surgically allowable    #HCM:   -avoid dehydration, vasodilation, and tachycardia in HCM in order to improve gradient. 90 resting gradient is significant and explains baseline poor ET as well as hospital course (tenuous respiratory status, extreme sensitivity to diuresis). Improvement in gradient will also improve MR which will help pulmonary edema.   - Will need to repeat TTE prior to discharge when HR are more controlled to accurately assess gradient.   - K>4, Mg>2  - outpatient f/u with cardiology upon DC; Consider genetic testing of HOCM for family members as well.

## 2021-08-06 NOTE — PROGRESS NOTE ADULT - SUBJECTIVE AND OBJECTIVE BOX
Cardiology Consult Service Progress Note     Nickolas Shipman MD JOSE  Cardiology Fellow   Pager [phone]    Patient is a 46y old  Male who presents with a chief complaint of Cerebellar Stroke (06 Aug 2021 03:33)    SUBJECTIVE/OVERNIGHT EVENTS   No acute events overnight. No subjective complaints. Denies chest pain, palpitations. ROS otherwise negative.     Tele: NSR, ST, PVCs.    OBJECTIVE  Vital Signs Last 24 Hrs  T(C): 36.1 (06 Aug 2021 09:17), Max: 36.8 (06 Aug 2021 05:00)  T(F): 96.9 (06 Aug 2021 09:17), Max: 98.2 (06 Aug 2021 05:00)  HR: 83 (06 Aug 2021 09:17) (76 - 95)  BP: 155/110 (06 Aug 2021 09:17) (140/108 - 186/100)  RR: 16 (06 Aug 2021 09:17) (16 - 18)  SpO2: 96% (06 Aug 2021 09:17) (95% - 99%)    I&O's Summary    05 Aug 2021 07:01  -  06 Aug 2021 07:00  --------------------------------------------------------  IN: 1530 mL / OUT: 1750 mL / NET: -220 mL    PHYSICAL EXAM:  GENERAL: NAD  HEAD:  Atraumatic, Normocephalic  EYES: EOMI, conjunctiva and sclera clear  NECK: Supple, No JVD  CHEST/LUNG: Clear to auscultation bilaterally  HEART: Regular rate and rhythm; +midsystolic murmur best auscultated at apex  ABDOMEN: Soft, Nontender, Nondistended; Bowel sounds present  EXTREMITIES:  2+ Peripheral Pulses, No edema  PSYCH: AAOx3  NEUROLOGY: non-focal  SKIN: No rashes or lesions    LABS                        14.3   12.28 )-----------( 295      ( 06 Aug 2021 06:25 )             44.2                         14.8   10.36 )-----------( 264      ( 05 Aug 2021 06:44 )             45.1     08-06    143  |  107  |  25<H>  ----------------------------<  114<H>  4.1   |  25  |  1.31<H>  08-05    141  |  105  |  18  ----------------------------<  90  4.1   |  25  |  1.24    Ca    9.5      06 Aug 2021 06:25  Ca    8.8      05 Aug 2021 06:44  Phos  5.2     08-06  Mg     2.0     08-06    PTT - ( 05 Aug 2021 16:53 )  PTT:35.4 sec   03 Aug 2021 05:32 Troponin 0.06 ng/mL / CK x     / CKMB x     / CKMB Units x       03 Aug 2021 02:32 Troponin 0.06 ng/mL / CK x     / CKMB x     / CKMB Units x       02 Aug 2021 14:47 Troponin 0.08 ng/mL / CK x     / CKMB x     / CKMB Units x        ( 01 Aug 2021 07:47 ) pH: 7.42  /  pCO2: 37    /  pO2: 80    / HCO3: 24    / Base Excess: -0.2  /  SaO2: 96.7      ( 30 Jul 2021 20:50 ) pH: 7.40  /  pCO2: 39    /  pO2: 130   / HCO3: 24    / Base Excess: -0.5  /  SaO2: 99.6      MEDICATIONS  (STANDING):  aMIOdarone    Tablet 200 milliGRAM(s) Oral daily  atorvastatin 40 milliGRAM(s) Oral at bedtime  dextrose 50% Injectable 25 Gram(s) IV Push once  diltiazem    milliGRAM(s) Oral daily  glucagon  Injectable 1 milliGRAM(s) IntraMuscular once  heparin   Injectable 5000 Unit(s) SubCutaneous every 8 hours  metoprolol tartrate 50 milliGRAM(s) Oral two times a day  multivitamin 1 Tablet(s) Oral daily  polyethylene glycol 3350 17 Gram(s) Oral daily  senna 2 Tablet(s) Oral at bedtime    MEDICATIONS  (PRN):  acetaminophen   Tablet .. 650 milliGRAM(s) Oral every 6 hours PRN Temp greater or equal to 38C (100.4F), Mild Pain (1 - 3)  bisacodyl 5 milliGRAM(s) Oral every 12 hours PRN Constipation  ondansetron Injectable 4 milliGRAM(s) IV Push every 6 hours PRN Nausea and/or Vomiting  traMADol 25 milliGRAM(s) Oral every 6 hours PRN Moderate Pain (4 - 6)  traMADol 50 milliGRAM(s) Oral every 6 hours PRN Severe Pain (7 - 10)

## 2021-08-06 NOTE — PROGRESS NOTE ADULT - SUBJECTIVE AND OBJECTIVE BOX
Patient is a 46y old  Male who presents with a chief complaint of Cerebellar Stroke (04 Aug 2021 08:00)      HPI:  46 M pt with PMHx of HTN, noncompliant with meds (Metoprolol, Nifedipine, and ASA), reports no recent use of ASA or metoprolol, presents to ED c/o acute onset and worsening dizziness, R arm paresthesia, and vomiting x 1hr prior to arrival while at work. Pt somnolent in ED, difficulty obtaining hx from pt. Per EMS, pt had SBP in the 200s, given nitro SL x 2 in field. Patient found to have a left side cerebellar hemmorrhage and left pontine hemmorrhage with extension to the subarachnoid space. Patient was started on a nicardipene drip in the ED for SBP to 230's, given manitol 50g and intubated for concern of potential for aspiration with declining exam. Patient reported that he lives alone and has no close contacts in the area.     NIHSS 6  ICH 2   (2021 20:40)    Subjective:  Pt has no acute complaints. Denies pain. Denies SOB, CP. ROS is otherwise negative.     Allergies    No Known Allergies    Intolerances    Home meds:     MEDICATIONS  (STANDING):  aMIOdarone    Tablet 200 milliGRAM(s) Oral daily  atorvastatin 40 milliGRAM(s) Oral at bedtime  dextrose 50% Injectable 25 Gram(s) IV Push once  diltiazem    milliGRAM(s) Oral daily  glucagon  Injectable 1 milliGRAM(s) IntraMuscular once  heparin   Injectable 5000 Unit(s) SubCutaneous every 8 hours  metoprolol tartrate 100 milliGRAM(s) Oral every 12 hours  multivitamin 1 Tablet(s) Oral daily  polyethylene glycol 3350 17 Gram(s) Oral daily  senna 2 Tablet(s) Oral at bedtime    MEDICATIONS  (PRN):  acetaminophen   Tablet .. 650 milliGRAM(s) Oral every 6 hours PRN Temp greater or equal to 38C (100.4F), Mild Pain (1 - 3)  bisacodyl 5 milliGRAM(s) Oral every 12 hours PRN Constipation  ondansetron Injectable 4 milliGRAM(s) IV Push every 6 hours PRN Nausea and/or Vomiting  traMADol 25 milliGRAM(s) Oral every 6 hours PRN Moderate Pain (4 - 6)  traMADol 50 milliGRAM(s) Oral every 6 hours PRN Severe Pain (7 - 10)        Drug Dosing Weight  Height (cm): 190.5 (2021 15:28)  Weight (kg): 103 (2021 15:01)  BMI (kg/m2): 28.4 (2021 15:28)  BSA (m2): 2.32 (2021 15:28)    PAST MEDICAL & SURGICAL HISTORY:  HTN (hypertension)    No significant past surgical history        FAMILY HISTORY:  No pertinent family history in first degree relatives of cardiac disease        SOCIAL HISTORY:    ADVANCE DIRECTIVES:      Vital Signs Last 24 Hrs  T(C): 36.7 (06 Aug 2021 17:53), Max: 36.8 (06 Aug 2021 05:00)  T(F): 98 (06 Aug 2021 17:53), Max: 98.2 (06 Aug 2021 05:00)  HR: 100 (06 Aug 2021 17:53) (78 - 100)  BP: 132/102 (06 Aug 2021 17:53) (132/102 - 167/105)  BP(mean): --  RR: 18 (06 Aug 2021 17:53) (16 - 18)  SpO2: 96% (06 Aug 2021 17:53) (95% - 97%)      PHYSICAL EXAM:      Constitutional: NAD  Eyes: PERRLA  ENMT: MMM  Neck: supple  Back: midline  Respiratory: CTA b/l  Cardiovascular: rrr, s1s2, no m/r//g  Gastrointestinal: soft, NTND, + BS  Extremities: wwp  Vascular: + 2 pulses radial  Neurological: AAO x 4  Skin: no rash  Lymph Nodes: no LAD  Musculoskeletal: no joint swelling  Psychiatric: normal affect        LABS:                          14.3   12.28 )-----------( 295      ( 06 Aug 2021 06:25 )             44.2   08-06    143  |  107  |  25<H>  ----------------------------<  114<H>  4.1   |  25  |  1.31<H>    Ca    9.5      06 Aug 2021 06:25  Phos  5.2     08-06  Mg     2.0     08-06            Urinalysis Basic - ( 04 Aug 2021 07:31 )    Color: Yellow / Appearance: Clear / S.020 / pH: x  Gluc: x / Ketone: Trace mg/dL  / Bili: Negative / Urobili: 1.0 E.U./dL   Blood: x / Protein: NEGATIVE mg/dL / Nitrite: NEGATIVE   Leuk Esterase: NEGATIVE / RBC: x / WBC x   Sq Epi: x / Non Sq Epi: x / Bacteria: x        EKG:    ECHO, US:    < from: TTE Echo Complete w/o Contrast w/ Doppler (21 @ 12:24) >  CONCLUSIONS:     1. Normal left ventricular systolic function.   2. Normal right ventricular size and systolic function.   3. Dilated left atrium.   4. No evidence of pulmonary hypertension.   5. No pericardial effusion.   6. Hypertrophic cardiomyopathy with assymetric septal hypertrophy and systolic anterior motion of the mitral valve.   7. There is severe left ventricular outflow tract obstruction with a resting gradient of 90 mm Hg.   8. There is at least moderate eccentric mitral regurgitation associated with the MACRINA.    < end of copied text >      RADIOLOGY:  < from: CT Head No Cont (21 @ 13:25) >  Since prior CT head 2021, continued evolution of left cerebellar parenchymal hemorrhage with intraventricular and subarachnoid extension. Interval mild decrease in size of the lateral and third ventricles. No new hemorrhage..    < end of copied text >

## 2021-08-07 LAB
ANION GAP SERPL CALC-SCNC: 10 MMOL/L — SIGNIFICANT CHANGE UP (ref 5–17)
BUN SERPL-MCNC: 25 MG/DL — HIGH (ref 7–23)
CALCIUM SERPL-MCNC: 9.3 MG/DL — SIGNIFICANT CHANGE UP (ref 8.4–10.5)
CHLORIDE SERPL-SCNC: 105 MMOL/L — SIGNIFICANT CHANGE UP (ref 96–108)
CO2 SERPL-SCNC: 24 MMOL/L — SIGNIFICANT CHANGE UP (ref 22–31)
CREAT SERPL-MCNC: 1.32 MG/DL — HIGH (ref 0.5–1.3)
GLUCOSE SERPL-MCNC: 98 MG/DL — SIGNIFICANT CHANGE UP (ref 70–99)
HCT VFR BLD CALC: 45.8 % — SIGNIFICANT CHANGE UP (ref 39–50)
HGB BLD-MCNC: 14.9 G/DL — SIGNIFICANT CHANGE UP (ref 13–17)
MAGNESIUM SERPL-MCNC: 2 MG/DL — SIGNIFICANT CHANGE UP (ref 1.6–2.6)
MCHC RBC-ENTMCNC: 29.6 PG — SIGNIFICANT CHANGE UP (ref 27–34)
MCHC RBC-ENTMCNC: 32.5 GM/DL — SIGNIFICANT CHANGE UP (ref 32–36)
MCV RBC AUTO: 91.1 FL — SIGNIFICANT CHANGE UP (ref 80–100)
NRBC # BLD: 0 /100 WBCS — SIGNIFICANT CHANGE UP (ref 0–0)
PHOSPHATE SERPL-MCNC: 4 MG/DL — SIGNIFICANT CHANGE UP (ref 2.5–4.5)
PLATELET # BLD AUTO: 344 K/UL — SIGNIFICANT CHANGE UP (ref 150–400)
POTASSIUM SERPL-MCNC: 4.3 MMOL/L — SIGNIFICANT CHANGE UP (ref 3.5–5.3)
POTASSIUM SERPL-SCNC: 4.3 MMOL/L — SIGNIFICANT CHANGE UP (ref 3.5–5.3)
RBC # BLD: 5.03 M/UL — SIGNIFICANT CHANGE UP (ref 4.2–5.8)
RBC # FLD: 13.2 % — SIGNIFICANT CHANGE UP (ref 10.3–14.5)
SODIUM SERPL-SCNC: 139 MMOL/L — SIGNIFICANT CHANGE UP (ref 135–145)
WBC # BLD: 15 K/UL — HIGH (ref 3.8–10.5)
WBC # FLD AUTO: 15 K/UL — HIGH (ref 3.8–10.5)

## 2021-08-07 PROCEDURE — 93970 EXTREMITY STUDY: CPT | Mod: 26

## 2021-08-07 PROCEDURE — 93971 EXTREMITY STUDY: CPT | Mod: 26,RT

## 2021-08-07 PROCEDURE — 99233 SBSQ HOSP IP/OBS HIGH 50: CPT

## 2021-08-07 PROCEDURE — 99233 SBSQ HOSP IP/OBS HIGH 50: CPT | Mod: GC

## 2021-08-07 PROCEDURE — 99232 SBSQ HOSP IP/OBS MODERATE 35: CPT

## 2021-08-07 PROCEDURE — 93308 TTE F-UP OR LMTD: CPT | Mod: 26

## 2021-08-07 RX ORDER — METOPROLOL TARTRATE 50 MG
100 TABLET ORAL EVERY 12 HOURS
Refills: 0 | Status: DISCONTINUED | OUTPATIENT
Start: 2021-08-07 | End: 2021-08-12

## 2021-08-07 RX ADMIN — Medication 100 MILLIGRAM(S): at 20:33

## 2021-08-07 RX ADMIN — HEPARIN SODIUM 5000 UNIT(S): 5000 INJECTION INTRAVENOUS; SUBCUTANEOUS at 22:08

## 2021-08-07 RX ADMIN — HEPARIN SODIUM 5000 UNIT(S): 5000 INJECTION INTRAVENOUS; SUBCUTANEOUS at 06:25

## 2021-08-07 RX ADMIN — HEPARIN SODIUM 5000 UNIT(S): 5000 INJECTION INTRAVENOUS; SUBCUTANEOUS at 15:16

## 2021-08-07 RX ADMIN — ATORVASTATIN CALCIUM 40 MILLIGRAM(S): 80 TABLET, FILM COATED ORAL at 22:10

## 2021-08-07 RX ADMIN — Medication 1 TABLET(S): at 06:25

## 2021-08-07 RX ADMIN — SENNA PLUS 2 TABLET(S): 8.6 TABLET ORAL at 22:09

## 2021-08-07 RX ADMIN — AMIODARONE HYDROCHLORIDE 200 MILLIGRAM(S): 400 TABLET ORAL at 06:25

## 2021-08-07 RX ADMIN — Medication 100 MILLIGRAM(S): at 06:25

## 2021-08-07 RX ADMIN — Medication 180 MILLIGRAM(S): at 06:25

## 2021-08-07 NOTE — PROGRESS NOTE ADULT - SUBJECTIVE AND OBJECTIVE BOX
HPI:  46 M pt with PMHx of HTN, noncompliant with meds (Metoprolol, Nifedipine, and ASA), reports no recent use of ASA or metoprolol, presents to ED c/o acute onset and worsening dizziness, R arm paresthesia, and vomiting x 1hr prior to arrival while at work. Pt somnolent in ED, difficulty obtaining hx from pt. Per EMS, pt had SBP in the 200s, given nitro SL x 2 in field. Patient found to have a left side cerebellar hemmorrhage and left pontine hemmorrhage with extension to the subarachnoid space. Patient was started on a nicardipene drip in the ED for SBP to 230's, given manitol 50g and intubated for concern of potential for aspiration with declining exam. Patient reported that he lives alone and has no close contacts in the area.     NIHSS 6  ICH 2    HOSPITAL COURSE:  7/28: Admitted with left cerebellar ICH. Intubated in ED for lethargy and concern for airway protection. Repeat CTH stable.  7/29: POD# 0 S/P femoral cerebral angiogram, findings of left vertebral artery irregularity at level below PICA with segment suggestive of intimal flap, left vertebral artery sacrifice performed. Hypertensive episode during angio case, Xper CT shows stable ICH. Pt seen and examined at bedside in NSICU postop. Pt agitated and reaching for ETT. Brief CPAP trial given, and Pt was successfully extubated. Patient then with many blood tinged secretions and CXR with infiltrated. Patient was reintubated. He then self-extubated and was emergently reintubated, placed in restraints now sedated on precedex and fentanyl. Received 1LNS bolus for hypotension and was also started on levophed for SBPs in 70s.   7/30: POD1 cerebral angiogram with left vertebral takedown, OLESYA overnight, neuro stable remains sedated on precedex and intubated on full vent support. Repeat CTH stable. Received 40 of lasix, increased PEEP from 7 to 8 and tapering off fentanyl drip. Self extubated and doing well on high flow NC, Kept on Precedex for agitation, started on Cardene for 's, will wean as appropriate.   7/31: POD2 cerebral angio with L verterbal takedown. S/p Seroquel 25 and Precedex overnight for agitation, otherwise OLESYA. On Cardene gtt. Neuro stable. O2 sat stable on HFNC. S/p 30 Lasix. Uptrending troponin, EKG stable, trending q6H  8/1: POD3, OLESYA overnight, on Precedex. Neuro stable.  8/2: POD#4, OLESYA overnight, neuro stable. Precedex off, tolerating RA. 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 SVT to 200s, on verapamil 80 tid, given adenosine, lopressor, verapamil, diltiazem gtt started. started on PO dilt 60 tid.  8/3: POD#5: tolerating room air, dilt gtt stopped. sinus tach to low 100s now. PO amiodareone and diltaezem started. pt is on cardene, overnight losartan started.   8/4: POD 6. Hypertensive, given labetalol 10 mg x1 with improvement. Added metoprolol, d/c'ed losartan per cards recs  8/5: POD 7. Tx to SDU overnight. OLESYA.  8/6: POD #8. OLESYA overnight. RUE doppler neg for DVT. Dispo pending   8/7: POD9. OLESYA overnight. Dispo, emergency medicaid    Vital Signs Last 24 Hrs  T(C): 36.4 (06 Aug 2021 21:18), Max: 36.8 (06 Aug 2021 05:00)  T(F): 97.6 (06 Aug 2021 21:18), Max: 98.2 (06 Aug 2021 05:00)  HR: 74 (07 Aug 2021 00:23) (74 - 100)  BP: 157/99 (07 Aug 2021 00:23) (132/102 - 161/103)  BP(mean): --  RR: 17 (07 Aug 2021 00:23) (16 - 18)  SpO2: 99% (07 Aug 2021 00:23) (96% - 99%)    I&O's Detail    05 Aug 2021 07:01  -  06 Aug 2021 07:00  --------------------------------------------------------  IN:    Oral Fluid: 1530 mL  Total IN: 1530 mL    OUT:    Voided (mL): 1750 mL  Total OUT: 1750 mL    Total NET: -220 mL      06 Aug 2021 07:01  -  07 Aug 2021 02:45  --------------------------------------------------------  IN:    Oral Fluid: 1250 mL  Total IN: 1250 mL    OUT:    Voided (mL): 1300 mL  Total OUT: 1300 mL    Total NET: -50 mL        I&O's Summary    05 Aug 2021 07:01  -  06 Aug 2021 07:00  --------------------------------------------------------  IN: 1530 mL / OUT: 1750 mL / NET: -220 mL    06 Aug 2021 07:01  -  07 Aug 2021 02:45  --------------------------------------------------------  IN: 1250 mL / OUT: 1300 mL / NET: -50 mL      PHYSICAL EXAM:  General: Laying in bed, sleeping, NAD  Neurological: A&Ox3, responds appropriately, following commands, speech clear, CN ll-Xll grossly intact, 5/5 strength in upper and lower extremities b/l. Slight dysmetria on L, SILT, no pronator drift noted.  Cardiovascular: S1, S2, murmur heard best at apex  Respiratory: clear to auscultation bilaterally  Gastrointestinal: soft, non-tender, non-distended  Extremities: appropriately warm, dry, intact, 2+ distal pulses in upper and lower extremities bilaterally.      TUBES/LINES:  [] CVC  [] A-line  [] Lumbar Drain  [] Ventriculostomy  [] Other    DIET:  [] NPO  [] Mechanical  [] Tube feeds    LABS:                        14.3   12.28 )-----------( 295      ( 06 Aug 2021 06:25 )             44.2     08-06    143  |  107  |  25<H>  ----------------------------<  114<H>  4.1   |  25  |  1.31<H>    Ca    9.5      06 Aug 2021 06:25  Phos  5.2     08-06  Mg     2.0     08-06      PTT - ( 05 Aug 2021 16:53 )  PTT:35.4 sec        CAPILLARY BLOOD GLUCOSE          Drug Levels: [] N/A  Vancomycin Level, Trough: 17.3 ug/mL (08-04 @ 05:01)  Vancomycin Level, Trough: 11.6 ug/mL (08-02 @ 05:17)  Vancomycin Level, Trough: 14.5 ug/mL (07-31 @ 17:00)    CSF Analysis: [] N/A      Allergies    No Known Allergies    Intolerances      MEDICATIONS:  Antibiotics:    Neuro:  acetaminophen   Tablet .. 650 milliGRAM(s) Oral every 6 hours PRN  ondansetron Injectable 4 milliGRAM(s) IV Push every 6 hours PRN  traMADol 25 milliGRAM(s) Oral every 6 hours PRN  traMADol 50 milliGRAM(s) Oral every 6 hours PRN    Anticoagulation:  heparin   Injectable 5000 Unit(s) SubCutaneous every 8 hours    OTHER:  aMIOdarone    Tablet 200 milliGRAM(s) Oral daily  atorvastatin 40 milliGRAM(s) Oral at bedtime  bisacodyl 5 milliGRAM(s) Oral every 12 hours PRN  dextrose 50% Injectable 25 Gram(s) IV Push once  diltiazem    milliGRAM(s) Oral daily  glucagon  Injectable 1 milliGRAM(s) IntraMuscular once  metoprolol tartrate 100 milliGRAM(s) Oral every 12 hours  polyethylene glycol 3350 17 Gram(s) Oral daily  senna 2 Tablet(s) Oral at bedtime    IVF:  multivitamin 1 Tablet(s) Oral daily    CULTURES:  Culture Results:   No growth at 3 days. (08-03 @ 16:24)  Culture Results:   No growth at 3 days. (08-03 @ 16:24)    RADIOLOGY & ADDITIONAL TESTS:      ASSESSMENT:  46 M PMH L CVA? 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 Pt is now s/p  femoral cerebral angiogram, findings of left vertebral artery irregularity at level below PICA with segment suggestive of intimal flap, left vertebral artery sacrifice performed (7/29/2021), post op was extubated, needed to be reintubated for secretion burden and unable to protect airway,  patient self-extubated X 2. Also with SVT vs AF with RVR, and HOCM. Stable on RA.    CHEST PAIN    No pertinent family history in first degree relatives    Handoff    MEWS Score    HTN (hypertension)    HTN (hypertension)    SAH (subarachnoid hemorrhage)    Dissection of cerebral artery    SAH (subarachnoid hemorrhage)    Dissection of cerebral artery    Cerebellar bleed    Cerebellar bleed    Essential hypertension    Left ventricular outflow obstruction    Atrial fibrillation with rapid ventricular response    Leukocytosis, unspecified type    Metabolic acidosis    Creatinine elevation    Angiogram, blood vessel, cerebral, with embolization    No significant past surgical history    CHEST PAIN    [email or web address]        PLAN:  Neuro:   - Neuro checks/vital checks/groin/vascular checks q4hr  - Tylenol prn for pain control  - Repeat CTH 7/30 stable     CV:  -HCM resting grad 90 with MACRINA causing at least mod MR. Possible family hx SCD (mother). Course complicated by Afib RVR  - -160  - Cards following  - TTE- hypertrophic cardiomyopathy with severe left ventricular outflow tract obstruction and moderate mitral regurg  - trending troponin q6 H  - Careful fluid balance    - 8/2: SVT to 200s, s/p adenosine 1.2 on 8/2, verapamil 10, lopressor 5, dilt gtt, 1L bolus, started on dilt PO 60 tid, losartan  - dilt gtt stopped, dilt 180 po  qd started  - tarted amio load 400 bid x 12 does then 200 daily  - inc lopressor 100 BID  - d/c'ed losartan per cards recs    Per cardiology: #HCM: avoid dehydration, vasodilation, and tachycardia in HCM in order to improve gradient. 90 resting gradient is significant and explains baseline poor ET as well as hospital course (tenuous respiratory status, extreme sensitivity to diuresis). Improvement in gradient will also improve MR which will help pulmonary edema.     Pulm:  - satting well RA  - blood tinged sputum    Renal:   - normonatremia goal   -  Replete electrolytes PRN    GI:  - bowel reg  - regular diet     Endo:   - ISS    Heme:  - SCD's, SQH  - trend CBC  - f/u LE doppler 7/29 neg    ID:  - f/u panculture 7/31  - trend WBC  - empiric vanc/zosyn end date 8/3     Disposition: SDU status, full code, dispo pending pt has emergency medicaid, family updated with plan.    Plan d/w Dr. D'Amico         Assessment:  Present when checked    []  GCS  E   V  M     Heart Failure: []Acute, [] acute on chronic , []chronic  Heart Failure:  [] Diastolic (HFpEF), [] Systolic (HFrEF), []Combined (HFpEF and HFrEF), [] RHF, [] Pulm HTN, [] Other    [] TATIANA, [] ATN, [] AIN, [] other  [] CKD1, [] CKD2, [] CKD 3, [] CKD 4, [] CKD 5, []ESRD    Encephalopathy: [] Metabolic, [] Hepatic, [] toxic, [] Neurological, [] Other    Abnormal Nurtitional Status: [] malnurtition (see nutrition note), [ ]underweight: BMI < 19, [] morbid obesity: BMI >40, [] Cachexia    [] Sepsis  [] hypovolemic shock,[] cardiogenic shock, [] hemorrhagic shock, [] neuogenic shock  [] Acute Respiratory Failure  []Cerebral edema, [] Brain compression/ herniation,   [] Functional quadriplegia  [] Acute blood loss anemia

## 2021-08-07 NOTE — PROGRESS NOTE ADULT - SUBJECTIVE AND OBJECTIVE BOX
INTERVAL EVENTS:    PAST MEDICAL & SURGICAL HISTORY:  HTN (hypertension)    No significant past surgical history        MEDICATIONS  (STANDING):  aMIOdarone    Tablet 200 milliGRAM(s) Oral daily  atorvastatin 40 milliGRAM(s) Oral at bedtime  dextrose 50% Injectable 25 Gram(s) IV Push once  diltiazem    milliGRAM(s) Oral daily  glucagon  Injectable 1 milliGRAM(s) IntraMuscular once  heparin   Injectable 5000 Unit(s) SubCutaneous every 8 hours  metoprolol tartrate 100 milliGRAM(s) Oral every 12 hours  multivitamin 1 Tablet(s) Oral daily  polyethylene glycol 3350 17 Gram(s) Oral daily  senna 2 Tablet(s) Oral at bedtime    MEDICATIONS  (PRN):  acetaminophen   Tablet .. 650 milliGRAM(s) Oral every 6 hours PRN Temp greater or equal to 38C (100.4F), Mild Pain (1 - 3)  bisacodyl 5 milliGRAM(s) Oral every 12 hours PRN Constipation  ondansetron Injectable 4 milliGRAM(s) IV Push every 6 hours PRN Nausea and/or Vomiting  traMADol 25 milliGRAM(s) Oral every 6 hours PRN Moderate Pain (4 - 6)  traMADol 50 milliGRAM(s) Oral every 6 hours PRN Severe Pain (7 - 10)    Vital Signs Last 24 Hrs  T(C): 36.4 (07 Aug 2021 05:00), Max: 36.7 (06 Aug 2021 17:53)  T(F): 97.6 (07 Aug 2021 05:00), Max: 98 (06 Aug 2021 17:53)  HR: 74 (07 Aug 2021 06:24) (74 - 100)  BP: 166/98 (07 Aug 2021 06:24) (132/102 - 166/98)  BP(mean): --  RR: 18 (07 Aug 2021 06:24) (16 - 18)  SpO2: 96% (07 Aug 2021 06:24) (96% - 99%)    PHYSICAL EXAM:  GEN: Awake, alert. NAD.   HEENT: NCAT, PERRL, EOMI. Mucosa moist. No JVD.  RESP: CTA b/l  CV: RRR. Normal S1/S2. No m/r/g.  ABD: Soft. NT/ND. BS+  EXT: Warm. No edema, clubbing, or cyanosis.   NEURO: AAOx3. No focal deficits.     LABS:                        14.9   15.00 )-----------( 344      ( 07 Aug 2021 06:55 )             45.8     08-07    139  |  105  |  25<H>  ----------------------------<  98  4.3   |  24  |  1.32<H>    Ca    9.3      07 Aug 2021 06:55  Phos  4.0     08-07  Mg     2.0     08-07          PTT - ( 05 Aug 2021 16:53 )  PTT:35.4 sec    I&O's Summary    06 Aug 2021 07:01  -  07 Aug 2021 07:00  --------------------------------------------------------  IN: 1250 mL / OUT: 2500 mL / NET: -1250 mL      RADIOLOGY & ADDITIONAL STUDIES:  TELEMETRY:  EKG:         INTERVAL EVENTS: Overall, feels okay. Has no acute complaints. Denies any dizziness/ palpitations or SOB. Has no chest pain.     PAST MEDICAL & SURGICAL HISTORY:  HTN (hypertension)    No significant past surgical history        MEDICATIONS  (STANDING):  aMIOdarone    Tablet 200 milliGRAM(s) Oral daily  atorvastatin 40 milliGRAM(s) Oral at bedtime  dextrose 50% Injectable 25 Gram(s) IV Push once  diltiazem    milliGRAM(s) Oral daily  glucagon  Injectable 1 milliGRAM(s) IntraMuscular once  heparin   Injectable 5000 Unit(s) SubCutaneous every 8 hours  metoprolol tartrate 100 milliGRAM(s) Oral every 12 hours  multivitamin 1 Tablet(s) Oral daily  polyethylene glycol 3350 17 Gram(s) Oral daily  senna 2 Tablet(s) Oral at bedtime    MEDICATIONS  (PRN):  acetaminophen   Tablet .. 650 milliGRAM(s) Oral every 6 hours PRN Temp greater or equal to 38C (100.4F), Mild Pain (1 - 3)  bisacodyl 5 milliGRAM(s) Oral every 12 hours PRN Constipation  ondansetron Injectable 4 milliGRAM(s) IV Push every 6 hours PRN Nausea and/or Vomiting  traMADol 25 milliGRAM(s) Oral every 6 hours PRN Moderate Pain (4 - 6)  traMADol 50 milliGRAM(s) Oral every 6 hours PRN Severe Pain (7 - 10)    Vital Signs Last 24 Hrs  T(C): 36.4 (07 Aug 2021 05:00), Max: 36.7 (06 Aug 2021 17:53)  T(F): 97.6 (07 Aug 2021 05:00), Max: 98 (06 Aug 2021 17:53)  HR: 74 (07 Aug 2021 06:24) (74 - 100)  BP: 166/98 (07 Aug 2021 06:24) (132/102 - 166/98)  BP(mean): --  RR: 18 (07 Aug 2021 06:24) (16 - 18)  SpO2: 96% (07 Aug 2021 06:24) (96% - 99%)    PHYSICAL EXAM:  GEN: Awake, alert. NAD.   HEENT: NCAT, PERRL, EOMI. Mucosa moist. No JVD.  RESP: CTA b/l  CV: RRR, Grade 2 midsystolic murmur best auscultated at apex  ABD: Soft. NT/ND. BS+  EXT: Warm. No edema, clubbing, or cyanosis.   NEURO: AAOx3. No focal deficits.     LABS:                        14.9   15.00 )-----------( 344      ( 07 Aug 2021 06:55 )             45.8     08-07    139  |  105  |  25<H>  ----------------------------<  98  4.3   |  24  |  1.32<H>    Ca    9.3      07 Aug 2021 06:55  Phos  4.0     08-07  Mg     2.0     08-07          PTT - ( 05 Aug 2021 16:53 )  PTT:35.4 sec    I&O's Summary    06 Aug 2021 07:01  -  07 Aug 2021 07:00  --------------------------------------------------------  IN: 1250 mL / OUT: 2500 mL / NET: -1250 mL      CONCLUSIONS:     1. Limited study obtained for evaluation of LVOT gradients.   2. Severe asymmetric left ventricular hypertrophy suggestive of HOCM. Normal left ventricular systolic function.   3. Normal right ventricular systolic function.   4. Systolic anterior motion of the mitral valve resulting in a severe LVOT gradient of 60.00 mmHg.   5. Mild eccentric anteriorly directed mitral regurgitation associated with systolic anterior motion of mitral valve.   6. Aortic valve is not well visualized. Normal appearing aortic valve excursion in limited views however unable to evaluate for aortic valve stenosis due to sequential LVOT obstruction.   7. No pericardial effusion.    TELEMETRY: Normal sinus rhythm with episodes of sinus bradycardia. Average HR in 70's

## 2021-08-07 NOTE — PROGRESS NOTE ADULT - ASSESSMENT
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 Possible family hx SCD (mother). Course complicated by Afib RVR     #Afib-currently in sinus rhythm   - c/w amio to 200mg PO QD  - c/w diltiazem ER 180mg qd  - Please switch from lopressor 100mg PO BID to Toprol 100mg BID. Would not hold it for HR in 50s. With patients w/ HOCM, our goal is to keep HR low and prevent tachycardia.   - Given HOCM and Afib will need AC, DOAC (e.g. eliquis 5 bid) when surgically allowable     #HCM:   -avoid dehydration, vasodilation, and tachycardia in HCM in order to improve gradient. 90 resting gradient is significant and explains baseline poor ET as well as hospital course (tenuous respiratory status, extreme sensitivity to diuresis). Improvement in gradient will also improve MR which will help pulmonary edema.   - Echo was repeated today to re-assess gradients. Echo showed: Systolic anterior motion of the mitral valve resulting in a severe LVOT gradient of 60.00 mmHg  - Given family hx of SCD and HOCM, pt will need primary prophylaxis with AICD. Please consult EP on Monday for evaluation of ICD  - Given extent of resting gradients, eventually needs to be seen by Structural heart for possible myomectomy  - Consider genetic testing of HOCM for family members     Case discussed with attending. Primary team updated

## 2021-08-07 NOTE — PROGRESS NOTE ADULT - SUBJECTIVE AND OBJECTIVE BOX
Subjective/Interval events:  -No events overnight  -S/p dopplers today neg for DVT, TTE repeated- appreciate cards recs   -Patient reports no acute complaints, neg infectious ROS    MEDICATIONS  (STANDING):  aMIOdarone    Tablet 200 milliGRAM(s) Oral daily  atorvastatin 40 milliGRAM(s) Oral at bedtime  dextrose 50% Injectable 25 Gram(s) IV Push once  diltiazem    milliGRAM(s) Oral daily  glucagon  Injectable 1 milliGRAM(s) IntraMuscular once  heparin   Injectable 5000 Unit(s) SubCutaneous every 8 hours  metoprolol succinate  milliGRAM(s) Oral every 12 hours  multivitamin 1 Tablet(s) Oral daily  polyethylene glycol 3350 17 Gram(s) Oral daily  senna 2 Tablet(s) Oral at bedtime    MEDICATIONS  (PRN):  acetaminophen   Tablet .. 650 milliGRAM(s) Oral every 6 hours PRN Temp greater or equal to 38C (100.4F), Mild Pain (1 - 3)  bisacodyl 5 milliGRAM(s) Oral every 12 hours PRN Constipation  ondansetron Injectable 4 milliGRAM(s) IV Push every 6 hours PRN Nausea and/or Vomiting  traMADol 25 milliGRAM(s) Oral every 6 hours PRN Moderate Pain (4 - 6)  traMADol 50 milliGRAM(s) Oral every 6 hours PRN Severe Pain (7 - 10)      Vital Signs Last 24 Hrs  T(C): 36.5 (07 Aug 2021 16:53), Max: 36.7 (07 Aug 2021 14:36)  T(F): 97.7 (07 Aug 2021 16:53), Max: 98.1 (07 Aug 2021 14:36)  HR: 84 (07 Aug 2021 16:53) (74 - 84)  BP: 138/99 (07 Aug 2021 16:53) (112/68 - 166/98)  BP(mean): 18 (07 Aug 2021 14:36) (18 - 18)  RR: 18 (07 Aug 2021 16:53) (17 - 18)  SpO2: 95% (07 Aug 2021 16:53) (95% - 99%)    PHYSICAL EXAM:  GENERAL: pleasant, NAD  NEURO: Aox3, intact strength/sensation all 4 ext   HEENT: clear op, mmm  NECK:  No JVD, no lymphadenopathy  CHEST/LUNG: Clear to auscultation bilaterally; No rales, rhonchi, wheezing. Normal work of breathing, not tachypneic  HEART: Regular rate and rhythm; No murmurs, rubs, or gallops  ABDOMEN: Soft, Nontender, Nondistended. Normoactive bowel sounds  EXTREMITIES:  No sig le edema, wwp     LABS (reviewed) notable for WBC 15, otherwise creat stable 1.3     RADIOLOGY & ADDITIONAL TESTS:  Dopplers and TTE reviewed     ASSESSMENT AND PLAN: 46 M PMH L CVA, HTN, HOCM here with left cerebellar intraparenchymal hemorrhage and left pontine hemorrhage with extension into the subarachnoid space s/p angiogram, s/p left vertebral artery sacrifice postop course c/b reintubation and extubation, now breathing well on RA   #Cerebellar bleed- management per primary team  #HTN, HOCM- appreciate cards recs- c/w dilt, long acting metop, EP consult for ICD for px given HOCM hx   #Acute kidney injury- monitor creat  #Leukocytosis- neg infectious ROS- monitor for now, check diff tomorrow     #DVT px- SQH    Patient was seen and examined by me at bedside    Greater than 35 minutes spent on total encounter; more than 50% of the visit was spent counseling and/or coordinating care by the attending physician.    Desmond Herrera MD [phone]  Subjective/Interval events:  -No events overnight  -S/p dopplers today neg for DVT but R thrombophlebitis noted, TTE repeated- appreciate cards recs   -Patient reports no acute complaints, neg infectious ROS    MEDICATIONS  (STANDING):  aMIOdarone    Tablet 200 milliGRAM(s) Oral daily  atorvastatin 40 milliGRAM(s) Oral at bedtime  dextrose 50% Injectable 25 Gram(s) IV Push once  diltiazem    milliGRAM(s) Oral daily  glucagon  Injectable 1 milliGRAM(s) IntraMuscular once  heparin   Injectable 5000 Unit(s) SubCutaneous every 8 hours  metoprolol succinate  milliGRAM(s) Oral every 12 hours  multivitamin 1 Tablet(s) Oral daily  polyethylene glycol 3350 17 Gram(s) Oral daily  senna 2 Tablet(s) Oral at bedtime    MEDICATIONS  (PRN):  acetaminophen   Tablet .. 650 milliGRAM(s) Oral every 6 hours PRN Temp greater or equal to 38C (100.4F), Mild Pain (1 - 3)  bisacodyl 5 milliGRAM(s) Oral every 12 hours PRN Constipation  ondansetron Injectable 4 milliGRAM(s) IV Push every 6 hours PRN Nausea and/or Vomiting  traMADol 25 milliGRAM(s) Oral every 6 hours PRN Moderate Pain (4 - 6)  traMADol 50 milliGRAM(s) Oral every 6 hours PRN Severe Pain (7 - 10)      Vital Signs Last 24 Hrs  T(C): 36.5 (07 Aug 2021 16:53), Max: 36.7 (07 Aug 2021 14:36)  T(F): 97.7 (07 Aug 2021 16:53), Max: 98.1 (07 Aug 2021 14:36)  HR: 84 (07 Aug 2021 16:53) (74 - 84)  BP: 138/99 (07 Aug 2021 16:53) (112/68 - 166/98)  BP(mean): 18 (07 Aug 2021 14:36) (18 - 18)  RR: 18 (07 Aug 2021 16:53) (17 - 18)  SpO2: 95% (07 Aug 2021 16:53) (95% - 99%)    PHYSICAL EXAM:  GENERAL: pleasant, NAD  NEURO: Aox3, intact strength/sensation all 4 ext   HEENT: clear op, mmm  NECK:  No JVD, no lymphadenopathy  CHEST/LUNG: Clear to auscultation bilaterally; No rales, rhonchi, wheezing. Normal work of breathing, not tachypneic  HEART: Regular rate and rhythm; No murmurs, rubs, or gallops  ABDOMEN: Soft, Nontender, Nondistended. Normoactive bowel sounds  EXTREMITIES:  No sig le edema, wwp     LABS (reviewed) notable for WBC 15, otherwise creat stable 1.3     RADIOLOGY & ADDITIONAL TESTS:  Dopplers and TTE reviewed     ASSESSMENT AND PLAN: 46 M PMH L CVA, HTN, HOCM here with left cerebellar intraparenchymal hemorrhage and left pontine hemorrhage with extension into the subarachnoid space s/p angiogram, s/p left vertebral artery sacrifice postop course c/b reintubation and extubation, now breathing well on RA   #Cerebellar bleed- management per primary team  #HTN, HOCM- appreciate cards recs- c/w dilt, long acting metop, EP consult for ICD for px given HOCM hx   #Acute kidney injury- monitor creat  #Leukocytosis- neg infectious ROS- monitor for now, check diff tomorrow   #R thrombophlebitis- pt minimally symptomatic, warm compress and monitor for now    #DVT px- SQH    Patient was seen and examined by me at bedside    Greater than 35 minutes spent on total encounter; more than 50% of the visit was spent counseling and/or coordinating care by the attending physician.    Desmond Herrera MD [phone]

## 2021-08-08 LAB
ANION GAP SERPL CALC-SCNC: 14 MMOL/L — SIGNIFICANT CHANGE UP (ref 5–17)
BUN SERPL-MCNC: 21 MG/DL — SIGNIFICANT CHANGE UP (ref 7–23)
CALCIUM SERPL-MCNC: 9.6 MG/DL — SIGNIFICANT CHANGE UP (ref 8.4–10.5)
CHLORIDE SERPL-SCNC: 104 MMOL/L — SIGNIFICANT CHANGE UP (ref 96–108)
CO2 SERPL-SCNC: 20 MMOL/L — LOW (ref 22–31)
CREAT SERPL-MCNC: 1.23 MG/DL — SIGNIFICANT CHANGE UP (ref 0.5–1.3)
CULTURE RESULTS: SIGNIFICANT CHANGE UP
CULTURE RESULTS: SIGNIFICANT CHANGE UP
GLUCOSE SERPL-MCNC: 165 MG/DL — HIGH (ref 70–99)
HCT VFR BLD CALC: 48.3 % — SIGNIFICANT CHANGE UP (ref 39–50)
HGB BLD-MCNC: 15.8 G/DL — SIGNIFICANT CHANGE UP (ref 13–17)
MAGNESIUM SERPL-MCNC: 2 MG/DL — SIGNIFICANT CHANGE UP (ref 1.6–2.6)
MCHC RBC-ENTMCNC: 29.7 PG — SIGNIFICANT CHANGE UP (ref 27–34)
MCHC RBC-ENTMCNC: 32.7 GM/DL — SIGNIFICANT CHANGE UP (ref 32–36)
MCV RBC AUTO: 90.8 FL — SIGNIFICANT CHANGE UP (ref 80–100)
NRBC # BLD: 0 /100 WBCS — SIGNIFICANT CHANGE UP (ref 0–0)
PHOSPHATE SERPL-MCNC: 4.1 MG/DL — SIGNIFICANT CHANGE UP (ref 2.5–4.5)
PLATELET # BLD AUTO: 415 K/UL — HIGH (ref 150–400)
POTASSIUM SERPL-MCNC: 4.2 MMOL/L — SIGNIFICANT CHANGE UP (ref 3.5–5.3)
POTASSIUM SERPL-SCNC: 4.2 MMOL/L — SIGNIFICANT CHANGE UP (ref 3.5–5.3)
RBC # BLD: 5.32 M/UL — SIGNIFICANT CHANGE UP (ref 4.2–5.8)
RBC # FLD: 13.4 % — SIGNIFICANT CHANGE UP (ref 10.3–14.5)
SODIUM SERPL-SCNC: 138 MMOL/L — SIGNIFICANT CHANGE UP (ref 135–145)
SPECIMEN SOURCE: SIGNIFICANT CHANGE UP
SPECIMEN SOURCE: SIGNIFICANT CHANGE UP
WBC # BLD: 14.05 K/UL — HIGH (ref 3.8–10.5)
WBC # FLD AUTO: 14.05 K/UL — HIGH (ref 3.8–10.5)

## 2021-08-08 PROCEDURE — 99232 SBSQ HOSP IP/OBS MODERATE 35: CPT

## 2021-08-08 PROCEDURE — 99233 SBSQ HOSP IP/OBS HIGH 50: CPT | Mod: GC

## 2021-08-08 RX ADMIN — Medication 100 MILLIGRAM(S): at 05:37

## 2021-08-08 RX ADMIN — Medication 180 MILLIGRAM(S): at 05:38

## 2021-08-08 RX ADMIN — Medication 650 MILLIGRAM(S): at 22:50

## 2021-08-08 RX ADMIN — Medication 1 TABLET(S): at 05:37

## 2021-08-08 RX ADMIN — Medication 100 MILLIGRAM(S): at 17:37

## 2021-08-08 RX ADMIN — ATORVASTATIN CALCIUM 40 MILLIGRAM(S): 80 TABLET, FILM COATED ORAL at 21:52

## 2021-08-08 RX ADMIN — HEPARIN SODIUM 5000 UNIT(S): 5000 INJECTION INTRAVENOUS; SUBCUTANEOUS at 14:36

## 2021-08-08 RX ADMIN — HEPARIN SODIUM 5000 UNIT(S): 5000 INJECTION INTRAVENOUS; SUBCUTANEOUS at 05:41

## 2021-08-08 RX ADMIN — HEPARIN SODIUM 5000 UNIT(S): 5000 INJECTION INTRAVENOUS; SUBCUTANEOUS at 21:51

## 2021-08-08 RX ADMIN — AMIODARONE HYDROCHLORIDE 200 MILLIGRAM(S): 400 TABLET ORAL at 05:37

## 2021-08-08 RX ADMIN — Medication 650 MILLIGRAM(S): at 21:51

## 2021-08-08 NOTE — PROGRESS NOTE ADULT - SUBJECTIVE AND OBJECTIVE BOX
Subjective/Interval events:  -Feeling well today, no complaints, infectious ROS neg, having BMs   -We discussed possible ICD- patient saying he prefers not to have it done     MEDICATIONS  (STANDING):  aMIOdarone    Tablet 200 milliGRAM(s) Oral daily  atorvastatin 40 milliGRAM(s) Oral at bedtime  dextrose 50% Injectable 25 Gram(s) IV Push once  diltiazem    milliGRAM(s) Oral daily  glucagon  Injectable 1 milliGRAM(s) IntraMuscular once  heparin   Injectable 5000 Unit(s) SubCutaneous every 8 hours  metoprolol succinate  milliGRAM(s) Oral every 12 hours  multivitamin 1 Tablet(s) Oral daily  polyethylene glycol 3350 17 Gram(s) Oral daily  senna 2 Tablet(s) Oral at bedtime    MEDICATIONS  (PRN):  acetaminophen   Tablet .. 650 milliGRAM(s) Oral every 6 hours PRN Temp greater or equal to 38C (100.4F), Mild Pain (1 - 3)  bisacodyl 5 milliGRAM(s) Oral every 12 hours PRN Constipation  ondansetron Injectable 4 milliGRAM(s) IV Push every 6 hours PRN Nausea and/or Vomiting  traMADol 25 milliGRAM(s) Oral every 6 hours PRN Moderate Pain (4 - 6)  traMADol 50 milliGRAM(s) Oral every 6 hours PRN Severe Pain (7 - 10)      Vital Signs Last 24 Hrs  T(C): 36.6 (08 Aug 2021 13:46), Max: 36.7 (08 Aug 2021 08:40)  T(F): 97.9 (08 Aug 2021 13:46), Max: 98 (08 Aug 2021 08:40)  HR: 74 (08 Aug 2021 13:46) (71 - 88)  BP: 114/70 (08 Aug 2021 13:46) (114/70 - 161/101)  BP(mean): 102 (08 Aug 2021 05:00) (102 - 124)  RR: 20 (08 Aug 2021 13:46) (18 - 22)  SpO2: 96% (08 Aug 2021 13:46) (93% - 96%)    PHYSICAL EXAM:  GENERAL: pleasant, NAD  NEURO: Aox3, intact strength/sensation all 4 ext   HEENT: clear op, mmm  NECK:  No JVD, no lymphadenopathy  CHEST/LUNG: Clear to auscultation bilaterally; No rales, rhonchi, wheezing. Normal work of breathing, not tachypneic  HEART: Regular rate and rhythm; No murmurs, rubs, or gallops  ABDOMEN: Soft, Nontender, Nondistended. Normoactive bowel sounds  EXTREMITIES:  minimal swelling RUE     LABS (reviewed) - WBC slowly trending down, otherwise BMP stable    ASSESSMENT AND PLAN: 46 M PMH L CVA, HTN, HOCM here with left cerebellar intraparenchymal hemorrhage and left pontine hemorrhage with extension into the subarachnoid space s/p angiogram, s/p left vertebral artery sacrifice postop course c/b reintubation and extubation, now breathing well on RA   #Cerebellar bleed- management per primary team  #HTN, HOCM- appreciate cards recs- c/w dilt, long acting metop, EP consult for ICD for px given HOCM hx   #Acute kidney injury- monitor creat  #Leukocytosis- neg infectious ROS- monitor for now, possible 2/2 R arm thrombophlebitis    #R thrombophlebitis- warm compress     #DVT px- SQH      Patient was seen and examined by me at bedside    Greater than 35 minutes spent on total encounter; more than 50% of the visit was spent counseling and/or coordinating care by the attending physician.    Desmond Herrera MD [phone]

## 2021-08-08 NOTE — PROGRESS NOTE ADULT - SUBJECTIVE AND OBJECTIVE BOX
HPI:  46 M pt with PMHx of HTN, noncompliant with meds (Metoprolol, Nifedipine, and ASA), reports no recent use of ASA or metoprolol, presents to ED c/o acute onset and worsening dizziness, R arm paresthesia, and vomiting x 1hr prior to arrival while at work. Pt somnolent in ED, difficulty obtaining hx from pt. Per EMS, pt had SBP in the 200s, given nitro SL x 2 in field. Patient found to have a left side cerebellar hemmorrhage and left pontine hemmorrhage with extension to the subarachnoid space. Patient was started on a nicardipene drip in the ED for SBP to 230's, given manitol 50g and intubated for concern of potential for aspiration with declining exam. Patient reported that he lives alone and has no close contacts in the area.     NIHSS 6  ICH 2    HOSPITAL COURSE:  7/28: Admitted with left cerebellar ICH. Intubated in ED for lethargy and concern for airway protection. Repeat CTH stable.  7/29: POD# 0 S/P femoral cerebral angiogram, findings of left vertebral artery irregularity at level below PICA with segment suggestive of intimal flap, left vertebral artery sacrifice performed. Hypertensive episode during angio case, Xper CT shows stable ICH. Pt seen and examined at bedside in NSICU postop. Pt agitated and reaching for ETT. Brief CPAP trial given, and Pt was successfully extubated. Patient then with many blood tinged secretions and CXR with infiltrated. Patient was reintubated. He then self-extubated and was emergently reintubated, placed in restraints now sedated on precedex and fentanyl. Received 1LNS bolus for hypotension and was also started on levophed for SBPs in 70s.   7/30: POD1 cerebral angiogram with left vertebral takedown, OLESYA overnight, neuro stable remains sedated on precedex and intubated on full vent support. Repeat CTH stable. Received 40 of lasix, increased PEEP from 7 to 8 and tapering off fentanyl drip. Self extubated and doing well on high flow NC, Kept on Precedex for agitation, started on Cardene for 's, will wean as appropriate.   7/31: POD2 cerebral angio with L verterbal takedown. S/p Seroquel 25 and Precedex overnight for agitation, otherwise OLESYA. On Cardene gtt. Neuro stable. O2 sat stable on HFNC. S/p 30 Lasix. Uptrending troponin, EKG stable, trending q6H  8/1: POD3, OLESYA overnight, on Precedex. Neuro stable.  8/2: POD#4, OLESYA overnight, neuro stable. Precedex off, tolerating RA. 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 SVT to 200s, on verapamil 80 tid, given adenosine, lopressor, verapamil, diltiazem gtt started. started on PO dilt 60 tid.  8/3: POD#5: tolerating room air, dilt gtt stopped. sinus tach to low 100s now. PO amiodareone and diltaezem started. pt is on cardene, overnight losartan started.   8/4: POD 6. Hypertensive, given labetalol 10 mg x1 with improvement. Added metoprolol, d/c'ed losartan per cards recs  8/5: POD 7. Tx to SDU overnight. OLESYA.  8/6: POD #8. OLESYA overnight. RUE doppler neg for DVT. Dispo pending   8/7: POD9. OLESYA overnight. Dispo, emergency medicaid  8/8: POD10. OLESYA overnight. Exam stable. Dispo pending, PT recommends AR      Vital Signs Last 24 Hrs  T(C): 36.5 (07 Aug 2021 16:53), Max: 36.7 (07 Aug 2021 14:36)  T(F): 97.7 (07 Aug 2021 16:53), Max: 98.1 (07 Aug 2021 14:36)  HR: 80 (08 Aug 2021 01:00) (74 - 88)  BP: 138/105 (08 Aug 2021 01:00) (112/68 - 166/98)  BP(mean): 119 (08 Aug 2021 01:00) (18 - 124)  RR: 22 (08 Aug 2021 01:00) (17 - 22)  SpO2: 93% (08 Aug 2021 01:00) (93% - 97%)    I&O's Detail    06 Aug 2021 07:01  -  07 Aug 2021 07:00  --------------------------------------------------------  IN:    Oral Fluid: 1250 mL  Total IN: 1250 mL    OUT:    Voided (mL): 2500 mL  Total OUT: 2500 mL    Total NET: -1250 mL        I&O's Summary    06 Aug 2021 07:01  -  07 Aug 2021 07:00  --------------------------------------------------------  IN: 1250 mL / OUT: 2500 mL / NET: -1250 mL        PHYSICAL EXAM:  General: Laying in bed, sleeping, NAD  Neurological: A&Ox3, responds appropriately, following commands, speech clear, CN ll-Xll grossly intact, LLE 4+/5 o/w 5/5 throughout. Slight dysmetria on L, SILT, no pronator drift noted.  Cardiovascular: S1, S2, murmur heard best at apex  Respiratory: clear to auscultation bilaterally  Gastrointestinal: soft, non-tender, non-distended  Extremities: appropriately warm, dry, intact, 2+ distal pulses in upper and lower extremities bilaterally.      TUBES/LINES:  [] CVC  [] A-line  [] Lumbar Drain  [] Ventriculostomy  [] Other    DIET:  [] NPO  [] Mechanical  [] Tube feeds    LABS:                        14.9   15.00 )-----------( 344      ( 07 Aug 2021 06:55 )             45.8     08-07    139  |  105  |  25<H>  ----------------------------<  98  4.3   |  24  |  1.32<H>    Ca    9.3      07 Aug 2021 06:55  Phos  4.0     08-07  Mg     2.0     08-07              CAPILLARY BLOOD GLUCOSE          Drug Levels: [] N/A  Vancomycin Level, Trough: 17.3 ug/mL (08-04 @ 05:01)  Vancomycin Level, Trough: 11.6 ug/mL (08-02 @ 05:17)    CSF Analysis: [] N/A      Allergies    No Known Allergies    Intolerances      MEDICATIONS:  Antibiotics:    Neuro:  acetaminophen   Tablet .. 650 milliGRAM(s) Oral every 6 hours PRN  ondansetron Injectable 4 milliGRAM(s) IV Push every 6 hours PRN  traMADol 25 milliGRAM(s) Oral every 6 hours PRN  traMADol 50 milliGRAM(s) Oral every 6 hours PRN    Anticoagulation:  heparin   Injectable 5000 Unit(s) SubCutaneous every 8 hours    OTHER:  aMIOdarone    Tablet 200 milliGRAM(s) Oral daily  atorvastatin 40 milliGRAM(s) Oral at bedtime  bisacodyl 5 milliGRAM(s) Oral every 12 hours PRN  dextrose 50% Injectable 25 Gram(s) IV Push once  diltiazem    milliGRAM(s) Oral daily  glucagon  Injectable 1 milliGRAM(s) IntraMuscular once  metoprolol succinate  milliGRAM(s) Oral every 12 hours  polyethylene glycol 3350 17 Gram(s) Oral daily  senna 2 Tablet(s) Oral at bedtime    IVF:  multivitamin 1 Tablet(s) Oral daily    CULTURES:  Culture Results:   No growth at 4 days. (08-03 @ 16:24)  Culture Results:   No growth at 4 days. (08-03 @ 16:24)    RADIOLOGY & ADDITIONAL TESTS:      ASSESSMENT:  46 M PMH L CVA? 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 Pt is now s/p  femoral cerebral angiogram, findings of left vertebral artery irregularity at level below PICA with segment suggestive of intimal flap, left vertebral artery sacrifice performed (7/29/2021), post op was extubated, needed to be reintubated for secretion burden and unable to protect airway,  patient self-extubated X 2. Also with SVT vs AF with RVR, and HOCM. Stable on RA.      CHEST PAIN    No pertinent family history in first degree relatives    Handoff    MEWS Score    HTN (hypertension)    HTN (hypertension)    SAH (subarachnoid hemorrhage)    Dissection of cerebral artery    SAH (subarachnoid hemorrhage)    Dissection of cerebral artery    Cerebellar bleed    Cerebellar bleed    Essential hypertension    Left ventricular outflow obstruction    Atrial fibrillation with rapid ventricular response    Leukocytosis, unspecified type    Metabolic acidosis    Creatinine elevation    Angiogram, blood vessel, cerebral, with embolization    No significant past surgical history    CHEST PAIN    [email or web address]    PLAN:  Neuro:   - Neuro checks/vital checks/groin/vascular checks q4hr  - Tylenol prn for pain control  - Repeat CTH 7/30 stable     CV:  -HCM resting grad 90 with MACRINA causing at least mod MR. Possible family hx SCD (mother). Course complicated by Afib RVR  - -160  - Cards following  - TTE- hypertrophic cardiomyopathy with severe left ventricular outflow tract obstruction and moderate mitral regurg  - trending troponin q6 H  - Careful fluid balance    - 8/2: SVT to 200s, s/p adenosine 1.2 on 8/2, verapamil 10, lopressor 5, dilt gtt, 1L bolus, started on dilt PO 60 tid, losartan  - dilt gtt stopped, dilt 180 po  qd started  - tarted amio load 400 bid x 12 does then 200 daily  - Toprol 100mg BID  - d/c'ed losartan per cards recs    Per cardiology: #HCM: avoid dehydration, vasodilation, and tachycardia in HCM in order to improve gradient. 90 resting gradient is significant and explains baseline poor ET as well as hospital course (tenuous respiratory status, extreme sensitivity to diuresis). Improvement in gradient will also improve MR which will help pulmonary edema. S    Structural Cardiology/ EP evaluation planned for Monday 8/9    Pulm:  - satting well RA  - blood tinged sputum    Renal:   - normonatremia goal   -  Replete electrolytes PRN    GI:  - bowel reg  - regular diet     Endo:   - ISS    Heme:  - SCD's, SQH  - trend CBC  - f/u LE doppler 7/29 neg    ID:  - f/u panculture 7/31  - trend WBC  - empiric vanc/zosyn end date 8/3     Disposition: SDU status, full code, dispo pending pt has emergency medicaid, family updated with plan.    Plan d/w Dr. D'Amico         Assessment:  Present when checked    []  GCS  E   V  M     Heart Failure: []Acute, [] acute on chronic , []chronic  Heart Failure:  [] Diastolic (HFpEF), [] Systolic (HFrEF), []Combined (HFpEF and HFrEF), [] RHF, [] Pulm HTN, [] Other    [] TATIANA, [] ATN, [] AIN, [] other  [] CKD1, [] CKD2, [] CKD 3, [] CKD 4, [] CKD 5, []ESRD    Encephalopathy: [] Metabolic, [] Hepatic, [] toxic, [] Neurological, [] Other    Abnormal Nurtitional Status: [] malnurtition (see nutrition note), [ ]underweight: BMI < 19, [] morbid obesity: BMI >40, [] Cachexia    [] Sepsis  [] hypovolemic shock,[] cardiogenic shock, [] hemorrhagic shock, [] neuogenic shock  [] Acute Respiratory Failure  []Cerebral edema, [] Brain compression/ herniation,   [] Functional quadriplegia  [] Acute blood loss anemia

## 2021-08-09 LAB
ANION GAP SERPL CALC-SCNC: 7 MMOL/L — SIGNIFICANT CHANGE UP (ref 5–17)
BUN SERPL-MCNC: 27 MG/DL — HIGH (ref 7–23)
CALCIUM SERPL-MCNC: 9.6 MG/DL — SIGNIFICANT CHANGE UP (ref 8.4–10.5)
CHLORIDE SERPL-SCNC: 105 MMOL/L — SIGNIFICANT CHANGE UP (ref 96–108)
CO2 SERPL-SCNC: 29 MMOL/L — SIGNIFICANT CHANGE UP (ref 22–31)
CREAT SERPL-MCNC: 1.35 MG/DL — HIGH (ref 0.5–1.3)
GLUCOSE SERPL-MCNC: 98 MG/DL — SIGNIFICANT CHANGE UP (ref 70–99)
HCT VFR BLD CALC: 48.2 % — SIGNIFICANT CHANGE UP (ref 39–50)
HGB BLD-MCNC: 15.3 G/DL — SIGNIFICANT CHANGE UP (ref 13–17)
MAGNESIUM SERPL-MCNC: 2.1 MG/DL — SIGNIFICANT CHANGE UP (ref 1.6–2.6)
MCHC RBC-ENTMCNC: 29.3 PG — SIGNIFICANT CHANGE UP (ref 27–34)
MCHC RBC-ENTMCNC: 31.7 GM/DL — LOW (ref 32–36)
MCV RBC AUTO: 92.2 FL — SIGNIFICANT CHANGE UP (ref 80–100)
NRBC # BLD: 0 /100 WBCS — SIGNIFICANT CHANGE UP (ref 0–0)
PHOSPHATE SERPL-MCNC: 4.6 MG/DL — HIGH (ref 2.5–4.5)
PLATELET # BLD AUTO: 390 K/UL — SIGNIFICANT CHANGE UP (ref 150–400)
POTASSIUM SERPL-MCNC: 4.5 MMOL/L — SIGNIFICANT CHANGE UP (ref 3.5–5.3)
POTASSIUM SERPL-SCNC: 4.5 MMOL/L — SIGNIFICANT CHANGE UP (ref 3.5–5.3)
RBC # BLD: 5.23 M/UL — SIGNIFICANT CHANGE UP (ref 4.2–5.8)
RBC # FLD: 13.5 % — SIGNIFICANT CHANGE UP (ref 10.3–14.5)
SODIUM SERPL-SCNC: 141 MMOL/L — SIGNIFICANT CHANGE UP (ref 135–145)
WBC # BLD: 15.15 K/UL — HIGH (ref 3.8–10.5)
WBC # FLD AUTO: 15.15 K/UL — HIGH (ref 3.8–10.5)

## 2021-08-09 PROCEDURE — 99233 SBSQ HOSP IP/OBS HIGH 50: CPT | Mod: GC

## 2021-08-09 PROCEDURE — 99024 POSTOP FOLLOW-UP VISIT: CPT

## 2021-08-09 PROCEDURE — 99233 SBSQ HOSP IP/OBS HIGH 50: CPT

## 2021-08-09 RX ADMIN — Medication 180 MILLIGRAM(S): at 05:44

## 2021-08-09 RX ADMIN — AMIODARONE HYDROCHLORIDE 200 MILLIGRAM(S): 400 TABLET ORAL at 05:44

## 2021-08-09 RX ADMIN — HEPARIN SODIUM 5000 UNIT(S): 5000 INJECTION INTRAVENOUS; SUBCUTANEOUS at 13:57

## 2021-08-09 RX ADMIN — POLYETHYLENE GLYCOL 3350 17 GRAM(S): 17 POWDER, FOR SOLUTION ORAL at 18:44

## 2021-08-09 RX ADMIN — Medication 100 MILLIGRAM(S): at 18:45

## 2021-08-09 RX ADMIN — HEPARIN SODIUM 5000 UNIT(S): 5000 INJECTION INTRAVENOUS; SUBCUTANEOUS at 22:23

## 2021-08-09 RX ADMIN — Medication 1 TABLET(S): at 05:44

## 2021-08-09 RX ADMIN — Medication 100 MILLIGRAM(S): at 05:45

## 2021-08-09 RX ADMIN — HEPARIN SODIUM 5000 UNIT(S): 5000 INJECTION INTRAVENOUS; SUBCUTANEOUS at 05:45

## 2021-08-09 RX ADMIN — ATORVASTATIN CALCIUM 40 MILLIGRAM(S): 80 TABLET, FILM COATED ORAL at 22:23

## 2021-08-09 NOTE — PROGRESS NOTE ADULT - ASSESSMENT
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 Possible family hx SCD (mother). Course complicated by Afib RVR     #Afib-currently in sinus rhythm   - c/w amio to 200mg PO QD  - c/w diltiazem ER 180mg qd  - c/w Toprol XR 100mg PO BID   - Given HOCM and Afib will need AC, DOAC (e.g. eliquis 5 bid) when surgically allowable     #HCM:   - TTE with Systolic anterior motion of the mitral valve resulting in a severe LVOT gradient of 60.00 mmHg  - medical management as described above  - Consider genetic testing of HOCM for family members   - Follow up with Dr. Coreas upon discharge, call [phone]    Case discussed with attending

## 2021-08-09 NOTE — PROGRESS NOTE ADULT - ATTENDING COMMENTS
Initial attending contact date   8/9/21   . See fellow note written above for details. I reviewed the fellow documentation. I have personally seen and examined this patient. I reviewed vitals, labs, medications, cardiac studies, and additional imaging. I agree with the above fellow's findings and plans as written above with the following additions/statements.      -46M PMH HTN (not taking medications) p/w FND, found with cerebellar and pontine hemorrhage, with periods of respiratory distress requiring intubation and hypotension i/s/o diuresis, found with HCM resting grad 90 with MACRINA causing at least mod MR. . Course complicated by Afib RVR   -on tele: maintaining predominantly NSR, no ectopy noted   -cont amio 200 mg po qd, diltiazem  mg qd and toprol 100 mg bid  -Will need to be on AC (eliquis 5 mg bid) when cleared by neurosurg  -Upon questioning, pt states mother passed away in late 60s of unknown cause? Unclear if death was caused by HOCM, therefore does not fit with true fam hx SCD  -Pt denies syncope in past, reports occ SOB with exertion  -Would cont with current meds for now, if pt develops symptoms, would refer to CTS at that time for poss myomectomy   -Pt can fu with Dr Coreas for cardiology  [phone]

## 2021-08-09 NOTE — PROGRESS NOTE ADULT - SUBJECTIVE AND OBJECTIVE BOX
Subjective/Interval events:  -No events overnight   -This am pt frustrated and considering leaving the hospital at this time- with insight into risks and benefits of this decision   -Neg infectious ROS, having BMs    MEDICATIONS  (STANDING):  aMIOdarone    Tablet 200 milliGRAM(s) Oral daily  atorvastatin 40 milliGRAM(s) Oral at bedtime  dextrose 50% Injectable 25 Gram(s) IV Push once  diltiazem    milliGRAM(s) Oral daily  glucagon  Injectable 1 milliGRAM(s) IntraMuscular once  heparin   Injectable 5000 Unit(s) SubCutaneous every 8 hours  metoprolol succinate  milliGRAM(s) Oral every 12 hours  multivitamin 1 Tablet(s) Oral daily  polyethylene glycol 3350 17 Gram(s) Oral daily  senna 2 Tablet(s) Oral at bedtime    MEDICATIONS  (PRN):  acetaminophen   Tablet .. 650 milliGRAM(s) Oral every 6 hours PRN Temp greater or equal to 38C (100.4F), Mild Pain (1 - 3)  bisacodyl 5 milliGRAM(s) Oral every 12 hours PRN Constipation  ondansetron Injectable 4 milliGRAM(s) IV Push every 6 hours PRN Nausea and/or Vomiting  traMADol 25 milliGRAM(s) Oral every 6 hours PRN Moderate Pain (4 - 6)  traMADol 50 milliGRAM(s) Oral every 6 hours PRN Severe Pain (7 - 10)      Vital Signs Last 24 Hrs  T(C): 36.4 (09 Aug 2021 17:17), Max: 36.5 (08 Aug 2021 18:11)  T(F): 97.5 (09 Aug 2021 17:17), Max: 97.7 (08 Aug 2021 18:11)  HR: 90 (09 Aug 2021 17:17) (70 - 90)  BP: 125/87 (09 Aug 2021 17:17) (115/74 - 146/86)  BP(mean): --  RR: 17 (09 Aug 2021 17:17) (17 - 20)  SpO2: 96% (09 Aug 2021 17:17) (95% - 99%)    PHYSICAL EXAM:  GENERAL: pleasant, NAD  NEURO: Aox3, intact strength/sensation all 4 ext   HEENT: clear op, mmm  NECK:  No JVD, no lymphadenopathy  CHEST/LUNG: Clear to auscultation bilaterally; No rales, rhonchi, wheezing. Normal work of breathing, not tachypneic  HEART: Systolic murmur throughout precordium, regular rate   ABDOMEN: Soft, Nontender, Nondistended. Normoactive bowel sounds  EXTREMITIES:  No sig le edema, wwp     LABS (reviewed)- stable leukocytosis, BMP    ASSESSMENT AND PLAN: 46 M PMH L CVA, HTN, HOCM here with left cerebellar intraparenchymal hemorrhage and left pontine hemorrhage with extension into the subarachnoid space s/p angiogram, s/p left vertebral artery sacrifice postop course c/b reintubation and extubation, now breathing well on RA   #Cerebellar bleed- management per primary team  #HTN, HOCM, Afib with CVA hx- appreciate cards recs- c/w dilt, long acting metop, no need for ICD, cards recommend anticoag with noac- if unable to be done can have structural involvement regarding watchman   #Acute kidney injury- monitor creat  #Leukocytosis- neg infectious ROS- monitor for now, possible 2/2 R arm thrombophlebitis    #R thrombophlebitis- warm compress     #DVT px- SQH for now  #Dispo- uninsured, working with PT, likely dc home      Patient was seen and examined by me at bedside    Greater than 35 minutes spent on total encounter; more than 50% of the visit was spent counseling and/or coordinating care by the attending physician.    Desmond Herrera MD [phone]

## 2021-08-09 NOTE — CHART NOTE - NSCHARTNOTEFT_GEN_A_CORE
Limited TTE    Cardiology fellow contacted for ?MI.  Limited TTE performed to evaluate wall motion.    Impression:  Limited views (parasternal and apical) performed using ED u/s machine as patient was having EKG performed and was transported to CT immediately thereafter.  No regional wall motion abnormalities appreciated in PLAx, PSAx, A4Ch and A2Ch windows.  No pericardial effusion appreciated.    Complete TTE ordered and expedited.  Full report to follow.
Admitting Diagnosis:   Patient is a 46y old  Male who presents with a chief complaint of Cerebellar Stroke (04 Aug 2021 10:15)      PAST MEDICAL & SURGICAL HISTORY:  HTN (hypertension)    No significant past surgical history        Current Nutrition Order:   Regular diet    PO Intake: Good (%) [   ]  Fair (50-75%) [   ] Poor (<25%) [ x  ]    GI Issues: WDL, pt reported to have had BM yesterday 8/4. Denies n/v/d/c    Pain: denies any pain    Skin Integrity: Adi 18, surgical incision noted    Labs:   08-04    140  |  109<H>  |  15  ----------------------------<  95  4.3   |  18<L>  |  1.13    Ca    8.9      04 Aug 2021 06:28  Phos  3.8     08-04  Mg     1.8     08-04      CAPILLARY BLOOD GLUCOSE          Medications:  MEDICATIONS  (STANDING):  aMIOdarone    Tablet   Oral   aMIOdarone    Tablet 400 milliGRAM(s) Oral two times a day  atorvastatin 40 milliGRAM(s) Oral at bedtime  chlorhexidine 2% Cloths 1 Application(s) Topical daily  dextrose 50% Injectable 25 Gram(s) IV Push once  diltiazem    Tablet 90 milliGRAM(s) Oral every 8 hours  glucagon  Injectable 1 milliGRAM(s) IntraMuscular once  heparin   Injectable 5000 Unit(s) SubCutaneous every 8 hours  losartan 75 milliGRAM(s) Oral daily  multivitamin 1 Tablet(s) Oral daily  polyethylene glycol 3350 17 Gram(s) Oral daily  senna 2 Tablet(s) Oral at bedtime    MEDICATIONS  (PRN):  acetaminophen   Tablet .. 650 milliGRAM(s) Oral every 6 hours PRN Temp greater or equal to 38C (100.4F), Mild Pain (1 - 3)  bisacodyl 5 milliGRAM(s) Oral every 12 hours PRN Constipation  ondansetron Injectable 4 milliGRAM(s) IV Push every 6 hours PRN Nausea and/or Vomiting  traMADol 25 milliGRAM(s) Oral every 6 hours PRN Moderate Pain (4 - 6)  traMADol 50 milliGRAM(s) Oral every 6 hours PRN Severe Pain (7 - 10)    Adm Anthropometrics:   · Height for BMI (FEET)	6 Feet  · Height for BMI (INCHES)	3 Inch(s)  · Weight for BMI (lbs)	227 lb  · Weight for BMI (kg)	103 kg  · Body Mass Index	28.3  · Ideal Body Weight (lbs)	196  · Ideal Body Weight (kg)	88.9    Weight Change: no new wt in EMR. Please obtain wt biweekly to assess for wt changes    Estimated energy needs:   8186-0043 kcals (25-30 kcals/kg, IBW)   g protein (1.0-1.2 g/kg, IBW)    IBW used for calculations as pt >100% of IBW (116%). Needs based on age/adults and wt. Fluids per team.    Subjective:   46M PMHx of HTN, noncompliant with meds (Metoprolol, Nifedipine, and ASA), reports no recent use of ASA or metoprolol, presents to ED c/o acute onset and worsening dizziness, R arm paresthesia, and vomiting x 1hr prior to arrival while at work. Pt somnolent in ED, difficulty obtaining hx from pt. Per EMS, pt had SBP in the 200s, given nitro SL x 2 in field. Patient found to have a left side cerebellar hemorrhage and left pontine hemorrhage with extension to the subarachnoid space- started on nicardipine drip, and mannitol. Pt was intubated for concern of potential aspiration and was transferred to SICU for further assessment. Pt s/p cerebral angiogram with left vertebral takedown 7/29. pt started on brief CPAP trial given, and was successfully extubated. Team noticed pt had many blood tinged secretions and CXR with infiltrated. Patient was reintubated. He then self-extubated and was emergently reintubated, placed in restraints now sedated on precedex and fentanyl. Repeat CTH stable. Pt self extubated 7/30, on HFNC. Course complicated by Afib RVR. Now on room air, doing well.     On assessment, pt seen resting in room. No reported discomfort at this time, and reports during well. Observed breakfast this AM, pt with finished bowl of fruits, did not eat anything else. Pt reports that he is monitoring his tolerance to foods, as he doesn't want to eat too much at a time as he says that it might cause discomfort for him. He reports that he has a good appetite, but will slowly increase PO intake based on what he is able to tolerate. He reports that he will likely do better with food soon. Will continue to follow per RD protocol.    Previous Nutrition Diagnosis:  Other Inadequate energy intake.     Etiology RT inability to meet est needs on current diet.     Signs/Symptoms AEB meeting 0% est needs while NPO.     Goal/Expected Outcome 1. Diet will advance within 24-48hrs 2. Consistently meet >75% est    Active [   ]  Resolved [ x  ]    If resolved, new PES:   Inadequate energy intake RT not meeting estimated needs AEB poor PO intake as observed.    Goal: Consistently meet >75% est    Recommendations:  1. Continue with regular diet 2. Monitor %PO intake, and need of ONS 3. Pain and bowel regimen per team 4. Cont to monitor lytes and replete prn    Education: Encourage PO    Risk Level: High [ x ] Moderate [   ] Low [   ]
Admitting Diagnosis:   Patient is a 46y old  Male who presents with a chief complaint of Cerebellar Stroke (09 Aug 2021 14:23)      PAST MEDICAL & SURGICAL HISTORY:  HTN (hypertension)    Current Nutrition Order:  Diet, Regular (08-01-21 @ 09:18)    PO Intake: Good (%) [ x  ]  Fair (50-75%) [   ] Poor (<25%) [   ]  -Per RN, pt with good po intake. No issues noted. Denies N/V     GI Issues: +BM 8/08, abd-soft  Pain: controlled  Skin Integrity: surgical incision noted, no breakdown    Labs:   08-09    141  |  105  |  27<H>  ----------------------------<  98  4.5   |  29  |  1.35<H>    Ca    9.6      09 Aug 2021 07:13  Phos  4.6     08-09  Mg     2.1     08-09      Medications:  MEDICATIONS  (STANDING):  aMIOdarone    Tablet 200 milliGRAM(s) Oral daily  atorvastatin 40 milliGRAM(s) Oral at bedtime  dextrose 50% Injectable 25 Gram(s) IV Push once  diltiazem    milliGRAM(s) Oral daily  glucagon  Injectable 1 milliGRAM(s) IntraMuscular once  heparin   Injectable 5000 Unit(s) SubCutaneous every 8 hours  metoprolol succinate  milliGRAM(s) Oral every 12 hours  multivitamin 1 Tablet(s) Oral daily  polyethylene glycol 3350 17 Gram(s) Oral daily  senna 2 Tablet(s) Oral at bedtime    MEDICATIONS  (PRN):  acetaminophen   Tablet .. 650 milliGRAM(s) Oral every 6 hours PRN Temp greater or equal to 38C (100.4F), Mild Pain (1 - 3)  bisacodyl 5 milliGRAM(s) Oral every 12 hours PRN Constipation  ondansetron Injectable 4 milliGRAM(s) IV Push every 6 hours PRN Nausea and/or Vomiting  traMADol 25 milliGRAM(s) Oral every 6 hours PRN Moderate Pain (4 - 6)  traMADol 50 milliGRAM(s) Oral every 6 hours PRN Severe Pain (7 - 10)    Anthropometrics:  Weight- 103kg (7/28) Admit    IBW: 196# (89.1kg)   % IBW: 116%    Weight Change: No new weight since admission. Unable to assess weight changes since admit     Nutrition Focused Physical Exam: Completed [   ]  Not Pertinent [  x ]    Estimated energy needs: 103kg (Actual BW)  Calories: 2000-2100kcals (20kcal/kg)  Protein: 100-125g (1.0-1.2g/kg)  Fluid: +2000ml/day    Subjective:   46M PMHx of HTN, noncompliant with meds (Metoprolol, Nifedipine, and ASA), reports no recent use of ASA or metoprolol, presents to ED c/o acute onset and worsening dizziness, R arm paresthesia, and vomiting x 1hr prior to arrival while at work. Pt somnolent in ED, difficulty obtaining hx from pt. Per EMS, pt had SBP in the 200s, given nitro SL x 2 in field. Patient found to have a left side cerebellar hemorrhage and left pontine hemorrhage with extension to the subarachnoid space- started on nicardipine drip, and mannitol. Pt was intubated for concern of potential aspiration and was transferred to SICU for further assessment. Pt s/p cerebral angiogram with left vertebral takedown 7/29. Neuro currently stable, plan for d/c to acute rehab pending placement.     Previous Nutrition Diagnosis: No PES  -Clinical and nutrition status improving.     Recommendations:  1. Continue regular diet   2. Bowel regimen PRN    Risk Level: High [   ] Moderate [ x  ] Low [   ]    Will continue to monitor.   Pretty Page RD,,CNSC
Neurosurgical Indications for Screening Dopplers on Admission:       1) Known hypercoagulation disorder (h/o VTE, thrombophilia, HIT, etc.)   2) Admitted from prolonged stay from another institution (straight forward ER transfers not included)  3) Presenting with significant leg immobility   4) Presenting with signs and symptoms of VTE?    5) With significant critical illness, Including "found down" for unknown period of time in HPI  6) With significant neurotrauma (TBI, SCI / TLS spine fractures)   7) Who are comatose   8) With known malignancy (e.g. glioblastoma multiforme, meningioma, etc.). Excludes IA chemo 23hr observation stays  9) On hemodialysis   10) Who have received platelet transfusion or antithrombotic reversal agents recently   11) Who have had recent major orthopedic surgery          Screening dopplers indicated?   Y x   N _    DVT Prophylaxis:  x SCD's   _ chemoprophylaxis    All above d/w attending.

## 2021-08-09 NOTE — PROGRESS NOTE ADULT - SUBJECTIVE AND OBJECTIVE BOX
Cardiology Consult Service Progress Note     Nickolas Shipman MD JOSE  Cardiology Fellow   Pager [phone]    Patient is a 46y old  Male who presents with a chief complaint of Cerebellar Stroke (09 Aug 2021 02:33)    SUBJECTIVE/OVERNIGHT EVENTS   No acute events overnight. Denies chest pain and palpitations. ROS otherwise negative.    OBJECTIVE  Vital Signs Last 24 Hrs  T(C): 36.3 (09 Aug 2021 05:42), Max: 36.5 (08 Aug 2021 18:11)  T(F): 97.4 (09 Aug 2021 05:42), Max: 97.7 (08 Aug 2021 18:11)  HR: 77 (09 Aug 2021 12:55) (70 - 81)  BP: 123/78 (09 Aug 2021 12:55) (115/74 - 146/86)  RR: 17 (09 Aug 2021 05:42) (17 - 20)  SpO2: 98% (09 Aug 2021 09:51) (95% - 99%)    I&O's Summary    08 Aug 2021 07:01  -  09 Aug 2021 07:00  --------------------------------------------------------  IN: 0 mL / OUT: 790 mL / NET: -790 mL    09 Aug 2021 07:01  -  09 Aug 2021 14:24  --------------------------------------------------------  IN: 240 mL / OUT: 500 mL / NET: -260 mL    PHYSICAL EXAM:  GENERAL: NAD  HEAD:  Atraumatic, Normocephalic  EYES: EOMI, conjunctiva and sclera clear  NECK: Supple, No JVD  CHEST/LUNG: Clear to auscultation bilaterally;  HEART: Regular rate and rhythm; +midsystolic murmur  ABDOMEN: Soft, Nontender, Nondistended; Bowel sounds present  EXTREMITIES:  2+ Peripheral Pulses, No peripheral edema  PSYCH: AAOx3  NEUROLOGY: non-focal  SKIN: No rashes or lesions    LABS                        15.3   15.15 )-----------( 390      ( 09 Aug 2021 07:13 )             48.2                         15.8   14.05 )-----------( 415      ( 08 Aug 2021 10:30 )             48.3     08-09    141  |  105  |  27<H>  ----------------------------<  98  4.5   |  29  |  1.35<H>  08-08    138  |  104  |  21  ----------------------------<  165<H>  4.2   |  20<L>  |  1.23    Ca    9.6      09 Aug 2021 07:13  Ca    9.6      08 Aug 2021 10:30  Phos  4.6     08-09  Mg     2.1     08-09    CAPILLARY BLOOD GLUCOSE   03 Aug 2021 05:32 Troponin 0.06 ng/mL / CK x     / CKMB x     / CKMB Units x       03 Aug 2021 02:32 Troponin 0.06 ng/mL / CK x     / CKMB x     / CKMB Units x       02 Aug 2021 14:47 Troponin 0.08 ng/mL / CK x     / CKMB x     / CKMB Units x        MEDICATIONS  (STANDING):  aMIOdarone    Tablet 200 milliGRAM(s) Oral daily  atorvastatin 40 milliGRAM(s) Oral at bedtime  dextrose 50% Injectable 25 Gram(s) IV Push once  diltiazem    milliGRAM(s) Oral daily  glucagon  Injectable 1 milliGRAM(s) IntraMuscular once  heparin   Injectable 5000 Unit(s) SubCutaneous every 8 hours  metoprolol succinate  milliGRAM(s) Oral every 12 hours  multivitamin 1 Tablet(s) Oral daily  polyethylene glycol 3350 17 Gram(s) Oral daily  senna 2 Tablet(s) Oral at bedtime    MEDICATIONS  (PRN):  acetaminophen   Tablet .. 650 milliGRAM(s) Oral every 6 hours PRN Temp greater or equal to 38C (100.4F), Mild Pain (1 - 3)  bisacodyl 5 milliGRAM(s) Oral every 12 hours PRN Constipation  ondansetron Injectable 4 milliGRAM(s) IV Push every 6 hours PRN Nausea and/or Vomiting  traMADol 25 milliGRAM(s) Oral every 6 hours PRN Moderate Pain (4 - 6)  traMADol 50 milliGRAM(s) Oral every 6 hours PRN Severe Pain (7 - 10)

## 2021-08-09 NOTE — PROGRESS NOTE ADULT - SUBJECTIVE AND OBJECTIVE BOX
HPI:  46 M pt with PMHx of HTN, noncompliant with meds (Metoprolol, Nifedipine, and ASA), reports no recent use of ASA or metoprolol, presents to ED c/o acute onset and worsening dizziness, R arm paresthesia, and vomiting x 1hr prior to arrival while at work. Pt somnolent in ED, difficulty obtaining hx from pt. Per EMS, pt had SBP in the 200s, given nitro SL x 2 in field. Patient found to have a left side cerebellar hemmorrhage and left pontine hemmorrhage with extension to the subarachnoid space. Patient was started on a nicardipene drip in the ED for SBP to 230's, given manitol 50g and intubated for concern of potential for aspiration with declining exam. Patient reported that he lives alone and has no close contacts in the area.     NIHSS 6  ICH 2   (28 Jul 2021 20:40)    Hospital Course:  7/28: Admitted with left cerebellar ICH. Intubated in ED for lethargy and concern for airway protection. Repeat CTH stable.  7/29: POD# 0 S/P femoral cerebral angiogram, findings of left vertebral artery irregularity at level below PICA with segment suggestive of intimal flap, left vertebral artery sacrifice performed. Hypertensive episode during angio case, Xper CT shows stable ICH. Pt seen and examined at bedside in NSICU postop. Pt agitated and reaching for ETT. Brief CPAP trial given, and Pt was successfully extubated. Patient then with many blood tinged secretions and CXR with infiltrated. Patient was reintubated. He then self-extubated and was emergently reintubated, placed in restraints now sedated on precedex and fentanyl. Received 1LNS bolus for hypotension and was also started on levophed for SBPs in 70s.   7/30: POD1 cerebral angiogram with left vertebral takedown, OLESYA overnight, neuro stable remains sedated on precedex and intubated on full vent support. Repeat CTH stable. Received 40 of lasix, increased PEEP from 7 to 8 and tapering off fentanyl drip. Self extubated and doing well on high flow NC, Kept on Precedex for agitation, started on Cardene for 's, will wean as appropriate.   7/31: POD2 cerebral angio with L verterbal takedown. S/p Seroquel 25 and Precedex overnight for agitation, otherwise OLESYA. On Cardene gtt. Neuro stable. O2 sat stable on HFNC. S/p 30 Lasix. Uptrending troponin, EKG stable, trending q6H  8/1: POD3, OLESYA overnight, on Precedex. Neuro stable.  8/2: POD#4, OLESYA overnight, neuro stable. Precedex off, tolerating RA. 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 SVT to 200s, on verapamil 80 tid, given adenosine, lopressor, verapamil, diltiazem gtt started. started on PO dilt 60 tid.  8/3: POD#5: tolerating room air, dilt gtt stopped. sinus tach to low 100s now. PO amiodareone and diltaezem started. pt is on cardene, overnight losartan started.   8/4: POD 6. Hypertensive, given labetalol 10 mg x1 with improvement. Added metoprolol, d/c'ed losartan per cards recs  8/5: POD 7. Tx to SDU overnight. OLESYA.Amiodarone decreased to 200mg daily and repeat echo ordered to evaluate EF per cards recs  8/6: POD #8. OLESYA overnight. RUE doppler neg for DVT. Dispo pending   8/7: POD9. OLESYA overnight. Dispo, emergency medicaid  8/8: POD10. OLESYA overnight. Exam stable. Dispo pending, PT recommends AR  8/9: POD11 OLESYA overnight. Neuro exam stable. Pending AR placement    Vital Signs Last 24 Hrs  T(C): 36.4 (09 Aug 2021 00:00), Max: 36.7 (08 Aug 2021 08:40)  T(F): 97.5 (09 Aug 2021 00:00), Max: 98 (08 Aug 2021 08:40)  HR: 81 (09 Aug 2021 00:00) (71 - 81)  BP: 139/86 (09 Aug 2021 00:00) (114/70 - 146/86)  BP(mean): 102 (08 Aug 2021 05:00) (102 - 102)  RR: 20 (09 Aug 2021 00:00) (20 - 20)  SpO2: 97% (09 Aug 2021 00:00) (95% - 97%)    I&O's Summary    07 Aug 2021 07:01  -  08 Aug 2021 07:00  --------------------------------------------------------  IN: 0 mL / OUT: 260 mL / NET: -260 mL    08 Aug 2021 07:01  -  09 Aug 2021 02:33  --------------------------------------------------------  IN: 0 mL / OUT: 360 mL / NET: -360 mL      PHYSICAL EXAM:  General: Laying in bed, sleeping, NAD  Neurological: A&Ox3, responds appropriately, following commands, speech clear, CN ll-Xll grossly intact, LUE 4+/5 o/w 5/5 throughout. Slight dysmetria on LUE, SILT, no pronator drift noted.  Cardiovascular: S1, S2, murmur heard best at apex  Respiratory: clear to auscultation bilaterally  Gastrointestinal: soft, non-tender, non-distended  Extremities: appropriately warm, dry, intact, 2+ distal pulses in upper and lower extremities bilaterally    TUBES/LINES:  [] Farias  [] Lumbar Drain  [] Wound Drains  [] Others    DIET:  [] NPO  [x] Mechanical  [] Tube feeds    LABS:                        15.8   14.05 )-----------( 415      ( 08 Aug 2021 10:30 )             48.3     08-08    138  |  104  |  21  ----------------------------<  165<H>  4.2   |  20<L>  |  1.23    Ca    9.6      08 Aug 2021 10:30  Phos  4.1     08-08  Mg     2.0     08-08              CAPILLARY BLOOD GLUCOSE          Drug Levels: [] N/A  Vancomycin Level, Trough: 17.3 ug/mL (08-04 @ 05:01)  Vancomycin Level, Trough: 11.6 ug/mL (08-02 @ 05:17)    CSF Analysis: [] N/A      Allergies    No Known Allergies    Intolerances      MEDICATIONS:  Antibiotics:    Neuro:  acetaminophen   Tablet .. 650 milliGRAM(s) Oral every 6 hours PRN  ondansetron Injectable 4 milliGRAM(s) IV Push every 6 hours PRN  traMADol 25 milliGRAM(s) Oral every 6 hours PRN  traMADol 50 milliGRAM(s) Oral every 6 hours PRN    Anticoagulation:  heparin   Injectable 5000 Unit(s) SubCutaneous every 8 hours    OTHER:  aMIOdarone    Tablet 200 milliGRAM(s) Oral daily  atorvastatin 40 milliGRAM(s) Oral at bedtime  bisacodyl 5 milliGRAM(s) Oral every 12 hours PRN  dextrose 50% Injectable 25 Gram(s) IV Push once  diltiazem    milliGRAM(s) Oral daily  glucagon  Injectable 1 milliGRAM(s) IntraMuscular once  metoprolol succinate  milliGRAM(s) Oral every 12 hours  polyethylene glycol 3350 17 Gram(s) Oral daily  senna 2 Tablet(s) Oral at bedtime    IVF:  multivitamin 1 Tablet(s) Oral daily    CULTURES:  Culture Results:   No growth at 5 days. (08-03 @ 16:24)  Culture Results:   No growth at 5 days. (08-03 @ 16:24)    RADIOLOGY & ADDITIONAL TESTS:      ASSESSMENT:  46 M PMH L CVA? 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 Pt is now s/p  femoral cerebral angiogram, findings of left vertebral artery irregularity at level below PICA with segment suggestive of intimal flap, left vertebral artery sacrifice performed (7/29/2021), post op was extubated, needed to be reintubated for secretion burden and unable to protect airway,  patient self-extubated X 2. Also with SVT vs AF with RVR, and HOCM. Stable on RA.    CHEST PAIN    No pertinent family history in first degree relatives    Handoff    MEWS Score    HTN (hypertension)    HTN (hypertension)    SAH (subarachnoid hemorrhage)    Dissection of cerebral artery    SAH (subarachnoid hemorrhage)    Dissection of cerebral artery    Cerebellar bleed    Cerebellar bleed    Essential hypertension    Left ventricular outflow obstruction    Atrial fibrillation with rapid ventricular response    Leukocytosis, unspecified type    Metabolic acidosis    Creatinine elevation    Angiogram, blood vessel, cerebral, with embolization    No significant past surgical history    CHEST PAIN    [email or web address]      Plan:  Neuro:   - Neuro checks/vital checks/groin/vascular checks q4hr  - Tylenol prn for pain control  - Repeat CTH 7/30 stable     CV:  -HCM resting grad 90 with MACRINA causing at least mod MR. Possible family hx SCD (mother). Course complicated by Afib RVR  - -160  - Cards following  - TTE- hypertrophic cardiomyopathy with severe left ventricular outflow tract obstruction and moderate mitral regurg  - Careful fluid balance  - 8/2: SVT to 200s, s/p adenosine 1.2 on 8/2, verapamil 10, lopressor 5, dilt gtt, 1L bolus, started on dilt PO 60 tid, losartan  - dilt gtt stopped, dilt 180 po  qd started  - started amio load 400 bid x 12 does then 200 daily  - Toprol 100 BID   - d/c'ed losartan per cards recs    Per cardiology: #HCM: avoid dehydration, vasodilation, and tachycardia in HCM in order to improve gradient. 90 resting gradient is significant and explains baseline poor ET as well as hospital course (tenuous respiratory status, extreme sensitivity to diuresis). Improvement in gradient will also improve MR which will help pulmonary edema.     Pulm:  - satting well RA  - blood tinged sputum    Renal:   - normonatremia goal   -  Replete electrolytes PRN    GI:  - bowel reg  - regular diet     Endo:   - ISS    Heme:  - SCD's, SQH  - trend CBC  - f/u LE doppler 7/29 neg    ID:  - f/u panculture 7/31  - trend WBC  - empiric vanc/zosyn end date 8/3     Disposition: SDU status, full code, dispo pending pt has emergency medicaid, family updated with plan.    Plan d/w Dr. D'Amico

## 2021-08-10 PROCEDURE — 99233 SBSQ HOSP IP/OBS HIGH 50: CPT | Mod: GC

## 2021-08-10 PROCEDURE — 99233 SBSQ HOSP IP/OBS HIGH 50: CPT

## 2021-08-10 RX ORDER — APIXABAN 2.5 MG/1
5 TABLET, FILM COATED ORAL EVERY 12 HOURS
Refills: 0 | Status: DISCONTINUED | OUTPATIENT
Start: 2021-08-10 | End: 2021-08-12

## 2021-08-10 RX ADMIN — Medication 1 TABLET(S): at 06:11

## 2021-08-10 RX ADMIN — HEPARIN SODIUM 5000 UNIT(S): 5000 INJECTION INTRAVENOUS; SUBCUTANEOUS at 06:11

## 2021-08-10 RX ADMIN — ATORVASTATIN CALCIUM 40 MILLIGRAM(S): 80 TABLET, FILM COATED ORAL at 22:30

## 2021-08-10 RX ADMIN — AMIODARONE HYDROCHLORIDE 200 MILLIGRAM(S): 400 TABLET ORAL at 06:11

## 2021-08-10 RX ADMIN — Medication 100 MILLIGRAM(S): at 19:00

## 2021-08-10 RX ADMIN — Medication 180 MILLIGRAM(S): at 06:12

## 2021-08-10 RX ADMIN — Medication 100 MILLIGRAM(S): at 06:11

## 2021-08-10 RX ADMIN — POLYETHYLENE GLYCOL 3350 17 GRAM(S): 17 POWDER, FOR SOLUTION ORAL at 19:00

## 2021-08-10 RX ADMIN — HEPARIN SODIUM 5000 UNIT(S): 5000 INJECTION INTRAVENOUS; SUBCUTANEOUS at 14:02

## 2021-08-10 RX ADMIN — APIXABAN 5 MILLIGRAM(S): 2.5 TABLET, FILM COATED ORAL at 19:00

## 2021-08-10 NOTE — PROGRESS NOTE ADULT - ASSESSMENT
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 Possible family hx SCD (mother). Course complicated by Afib RVR     #Afib-currently in sinus rhythm   - c/w amio to 200mg PO QD to maintain sinus rhythm   - c/w diltiazem ER 180mg qd  - c/w Toprol XR 100mg PO BID   - c/w Eliquis 5mg PO BID, given Afib and HOCM    #HOCM:   - TTE with Systolic anterior motion of the mitral valve resulting in a severe LVOT gradient of 60.00 mmHg  - No need for EP, Structural Heart eval at this time.   - medical management as described above  - Consider genetic testing of HOCM for family members   - Follow up with Dr. Coreas upon discharge, call [phone]    Case discussed with attending

## 2021-08-10 NOTE — PROGRESS NOTE ADULT - SUBJECTIVE AND OBJECTIVE BOX
Cardiology Consult Service Progress Note     Nickolas Shipman MD JOSE  Cardiology Fellow   Pager [phone]    Patient is a 46y old  Male who presents with a chief complaint of Cerebellar Stroke (10 Aug 2021 13:50)      SUBJECTIVE/OVERNIGHT EVENTS   No acute events overnight.  No subjective complaints.     OBJECTIVE  Tele: NSR, PVCs   Vital Signs Last 24 Hrs  T(C): 36.2 (10 Aug 2021 16:35), Max: 36.8 (10 Aug 2021 01:06)  T(F): 97.2 (10 Aug 2021 16:35), Max: 98.2 (10 Aug 2021 01:06)  HR: 86 (10 Aug 2021 17:25) (74 - 87)  BP: 141/101 (10 Aug 2021 17:25) (116/87 - 142/92)  RR: 17 (10 Aug 2021 16:35) (17 - 18)  SpO2: 96% (10 Aug 2021 16:35) (95% - 98%)    I&O's Summary    09 Aug 2021 07:01  -  10 Aug 2021 07:00  --------------------------------------------------------  IN: 720 mL / OUT: 920 mL / NET: -200 mL    10 Aug 2021 07:01  -  10 Aug 2021 18:36  --------------------------------------------------------  IN: 0 mL / OUT: 400 mL / NET: -400 mL    PHYSICAL EXAM:  GENERAL: NAD, well-developed  HEAD:  Atraumatic, Normocephalic  EYES: EOMI, conjunctiva and sclera clear  NECK: Supple, No JVD  CHEST/LUNG: Clear to auscultation bilaterally; No wheeze  HEART: Regular rate and rhythm; +Grade IV mid-Systolic murmur  ABDOMEN: Soft, Nontender, Nondistended; Bowel sounds present  EXTREMITIES:  2+ Peripheral Pulses, No clubbing, cyanosis, or edema  PSYCH: AAOx3  NEUROLOGY: non-focal  SKIN: No rashes or lesions    LABS                        15.3   15.15 )-----------( 390      ( 09 Aug 2021 07:13 )             48.2     08-09    141  |  105  |  27<H>  ----------------------------<  98  4.5   |  29  |  1.35<H>    Ca    9.6      09 Aug 2021 07:13  Phos  4.6     08-09  Mg     2.1     08-09      CAPILLARY BLOOD GLUCOSE   03 Aug 2021 05:32 Troponin 0.06 ng/mL / CK x     / CKMB x     / CKMB Units x       03 Aug 2021 02:32 Troponin 0.06 ng/mL / CK x     / CKMB x     / CKMB Units x       02 Aug 2021 14:47 Troponin 0.08 ng/mL / CK x     / CKMB x     / CKMB Units x        MEDICATIONS  (STANDING):  aMIOdarone    Tablet 200 milliGRAM(s) Oral daily  apixaban 5 milliGRAM(s) Oral every 12 hours  atorvastatin 40 milliGRAM(s) Oral at bedtime  dextrose 50% Injectable 25 Gram(s) IV Push once  diltiazem    milliGRAM(s) Oral daily  glucagon  Injectable 1 milliGRAM(s) IntraMuscular once  metoprolol succinate  milliGRAM(s) Oral every 12 hours  multivitamin 1 Tablet(s) Oral daily  polyethylene glycol 3350 17 Gram(s) Oral daily  senna 2 Tablet(s) Oral at bedtime    MEDICATIONS  (PRN):  acetaminophen   Tablet .. 650 milliGRAM(s) Oral every 6 hours PRN Temp greater or equal to 38C (100.4F), Mild Pain (1 - 3)  bisacodyl 5 milliGRAM(s) Oral every 12 hours PRN Constipation  ondansetron Injectable 4 milliGRAM(s) IV Push every 6 hours PRN Nausea and/or Vomiting  traMADol 25 milliGRAM(s) Oral every 6 hours PRN Moderate Pain (4 - 6)  traMADol 50 milliGRAM(s) Oral every 6 hours PRN Severe Pain (7 - 10)

## 2021-08-10 NOTE — PROGRESS NOTE ADULT - SUBJECTIVE AND OBJECTIVE BOX
Patient is a 46y old  Male who presents with a chief complaint of Cerebellar Stroke (04 Aug 2021 08:00)      HPI:  46 M pt with PMHx of HTN, noncompliant with meds (Metoprolol, Nifedipine, and ASA), reports no recent use of ASA or metoprolol, presents to ED c/o acute onset and worsening dizziness, R arm paresthesia, and vomiting x 1hr prior to arrival while at work. Pt somnolent in ED, difficulty obtaining hx from pt. Per EMS, pt had SBP in the 200s, given nitro SL x 2 in field. Patient found to have a left side cerebellar hemmorrhage and left pontine hemmorrhage with extension to the subarachnoid space. Patient was started on a nicardipene drip in the ED for SBP to 230's, given manitol 50g and intubated for concern of potential for aspiration with declining exam. Patient reported that he lives alone and has no close contacts in the area.     NIHSS 6  ICH 2   (2021 20:40)    Subjective:  Pt has no acute complaints. Denies pain. Denies SOB, CP. ROS is otherwise negative.     Allergies    No Known Allergies    Intolerances    Home meds:       MEDICATIONS  (STANDING):  aMIOdarone    Tablet 200 milliGRAM(s) Oral daily  atorvastatin 40 milliGRAM(s) Oral at bedtime  dextrose 50% Injectable 25 Gram(s) IV Push once  diltiazem    milliGRAM(s) Oral daily  glucagon  Injectable 1 milliGRAM(s) IntraMuscular once  heparin   Injectable 5000 Unit(s) SubCutaneous every 8 hours  metoprolol succinate  milliGRAM(s) Oral every 12 hours  multivitamin 1 Tablet(s) Oral daily  polyethylene glycol 3350 17 Gram(s) Oral daily  senna 2 Tablet(s) Oral at bedtime    MEDICATIONS  (PRN):  acetaminophen   Tablet .. 650 milliGRAM(s) Oral every 6 hours PRN Temp greater or equal to 38C (100.4F), Mild Pain (1 - 3)  bisacodyl 5 milliGRAM(s) Oral every 12 hours PRN Constipation  ondansetron Injectable 4 milliGRAM(s) IV Push every 6 hours PRN Nausea and/or Vomiting  traMADol 25 milliGRAM(s) Oral every 6 hours PRN Moderate Pain (4 - 6)  traMADol 50 milliGRAM(s) Oral every 6 hours PRN Severe Pain (7 - 10)          Drug Dosing Weight  Height (cm): 190.5 (2021 15:28)  Weight (kg): 103 (2021 15:01)  BMI (kg/m2): 28.4 (2021 15:28)  BSA (m2): 2.32 (2021 15:28)    PAST MEDICAL & SURGICAL HISTORY:  HTN (hypertension)    No significant past surgical history        FAMILY HISTORY:  No pertinent family history in first degree relatives of cardiac disease        SOCIAL HISTORY:    ADVANCE DIRECTIVES:      Vital Signs Last 24 Hrs  T(C): 36.1 (10 Aug 2021 08:56), Max: 36.8 (10 Aug 2021 01:06)  T(F): 97 (10 Aug 2021 08:56), Max: 98.2 (10 Aug 2021 01:06)  HR: 84 (10 Aug 2021 08:56) (74 - 90)  BP: 117/76 (10 Aug 2021 08:56) (117/76 - 137/83)  BP(mean): --  RR: 17 (10 Aug 2021 08:56) (17 - 18)  SpO2: 96% (10 Aug 2021 08:56) (95% - 98%)      PHYSICAL EXAM:      Constitutional: NAD  Eyes: PERRLA  ENMT: MMM  Neck: supple  Back: midline  Respiratory: CTA b/l  Cardiovascular: rrr, s1s2, no m/r//g  Gastrointestinal: soft, NTND, + BS  Extremities: wwp  Vascular: + 2 pulses radial  Neurological: AAO x 4  Skin: no rash  Lymph Nodes: no LAD  Musculoskeletal: no joint swelling  Psychiatric: normal affect        LABS:                          15.3   15.15 )-----------( 390      ( 09 Aug 2021 07:13 )             48.2   08-09    141  |  105  |  27<H>  ----------------------------<  98  4.5   |  29  |  1.35<H>    Ca    9.6      09 Aug 2021 07:13  Phos  4.6     08-09  Mg     2.1     08-09              Urinalysis Basic - ( 04 Aug 2021 07:31 )    Color: Yellow / Appearance: Clear / S.020 / pH: x  Gluc: x / Ketone: Trace mg/dL  / Bili: Negative / Urobili: 1.0 E.U./dL   Blood: x / Protein: NEGATIVE mg/dL / Nitrite: NEGATIVE   Leuk Esterase: NEGATIVE / RBC: x / WBC x   Sq Epi: x / Non Sq Epi: x / Bacteria: x        EKG:    ECHO, US:    < from: TTE Echo Complete w/o Contrast w/ Doppler (21 @ 12:24) >  CONCLUSIONS:     1. Normal left ventricular systolic function.   2. Normal right ventricular size and systolic function.   3. Dilated left atrium.   4. No evidence of pulmonary hypertension.   5. No pericardial effusion.   6. Hypertrophic cardiomyopathy with assymetric septal hypertrophy and systolic anterior motion of the mitral valve.   7. There is severe left ventricular outflow tract obstruction with a resting gradient of 90 mm Hg.   8. There is at least moderate eccentric mitral regurgitation associated with the MACRINA.    < end of copied text >      RADIOLOGY:  < from: CT Head No Cont (21 @ 13:25) >  Since prior CT head 2021, continued evolution of left cerebellar parenchymal hemorrhage with intraventricular and subarachnoid extension. Interval mild decrease in size of the lateral and third ventricles. No new hemorrhage..    < end of copied text >

## 2021-08-10 NOTE — PROGRESS NOTE ADULT - NSICDXPILOT_GEN_ALL_CORE
[place]

## 2021-08-10 NOTE — PROGRESS NOTE ADULT - SUBJECTIVE AND OBJECTIVE BOX
HPI:  46 M pt with PMHx of HTN, noncompliant with meds (Metoprolol, Nifedipine, and ASA), reports no recent use of ASA or metoprolol, presents to ED c/o acute onset and worsening dizziness, R arm paresthesia, and vomiting x 1hr prior to arrival while at work. Pt somnolent in ED, difficulty obtaining hx from pt. Per EMS, pt had SBP in the 200s, given nitro SL x 2 in field. Patient found to have a left side cerebellar hemmorrhage and left pontine hemmorrhage with extension to the subarachnoid space. Patient was started on a nicardipene drip in the ED for SBP to 230's, given manitol 50g and intubated for concern of potential for aspiration with declining exam. Patient reported that he lives alone and has no close contacts in the area.     NIHSS 6  ICH 2   (28 Jul 2021 20:40)    Hospital course:   7/28: Admitted with left cerebellar ICH. Intubated in ED for lethargy and concern for airway protection. Repeat CTH stable.  7/29: POD# 0 S/P femoral cerebral angiogram, findings of left vertebral artery irregularity at level below PICA with segment suggestive of intimal flap, left vertebral artery sacrifice performed. Hypertensive episode during angio case, Xper CT shows stable ICH. Pt seen and examined at bedside in NSICU postop. Pt agitated and reaching for ETT. Brief CPAP trial given, and Pt was successfully extubated. Patient then with many blood tinged secretions and CXR with infiltrated. Patient was reintubated. He then self-extubated and was emergently reintubated, placed in restraints now sedated on precedex and fentanyl. Received 1LNS bolus for hypotension and was also started on levophed for SBPs in 70s.   7/30: POD1 cerebral angiogram with left vertebral takedown, OLESYA overnight, neuro stable remains sedated on precedex and intubated on full vent support. Repeat CTH stable. Received 40 of lasix, increased PEEP from 7 to 8 and tapering off fentanyl drip. Self extubated and doing well on high flow NC, Kept on Precedex for agitation, started on Cardene for 's, will wean as appropriate.   7/31: POD2 cerebral angio with L verterbal takedown. S/p Seroquel 25 and Precedex overnight for agitation, otherwise OLESYA. On Cardene gtt. Neuro stable. O2 sat stable on HFNC. S/p 30 Lasix. Uptrending troponin, EKG stable, trending q6H  8/1: POD3, OLESYA overnight, on Precedex. Neuro stable.  8/2: POD#4, OLESYA overnight, neuro stable. Precedex off, tolerating RA. 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 SVT to 200s, on verapamil 80 tid, given adenosine, lopressor, verapamil, diltiazem gtt started. started on PO dilt 60 tid.  8/3: POD#5: tolerating room air, dilt gtt stopped. sinus tach to low 100s now. PO amiodareone and diltaezem started. pt is on cardene, overnight losartan started.   8/4: POD 6. Hypertensive, given labetalol 10 mg x1 with improvement. Added metoprolol, d/c'ed losartan per cards recs  8/5: POD 7. Tx to SDU overnight. OLESYA.Amiodarone decreased to 200mg daily and repeat echo ordered to evaluate EF per cards recs  8/6: POD #8. OLESYA overnight. RUE doppler neg for DVT. Dispo pending   8/7: POD9. OLESYA overnight. Dispo, emergency medicaid  8/8: POD10. OLESYA overnight. Exam stable. Dispo pending, PT recommends AR  8/9: POD11 OLESYA overnight. Neuro exam stable. Pending AR placement  8/10: POD12 OLESYA overnight, neuro exam stable. Pend AR placement.       OVERNIGHT EVENTS:  Vital Signs Last 24 Hrs  T(C): 36.8 (10 Aug 2021 01:06), Max: 36.8 (10 Aug 2021 01:06)  T(F): 98.2 (10 Aug 2021 01:06), Max: 98.2 (10 Aug 2021 01:06)  HR: 74 (10 Aug 2021 01:06) (72 - 90)  BP: 137/83 (10 Aug 2021 01:06) (117/81 - 137/83)  BP(mean): --  RR: 17 (10 Aug 2021 01:06) (17 - 18)  SpO2: 98% (10 Aug 2021 01:06) (95% - 98%)    I&O's Summary    08 Aug 2021 07:01  -  09 Aug 2021 07:00  --------------------------------------------------------  IN: 0 mL / OUT: 790 mL / NET: -790 mL    09 Aug 2021 07:01  -  10 Aug 2021 06:25  --------------------------------------------------------  IN: 720 mL / OUT: 920 mL / NET: -200 mL        PHYSICAL EXAM:  General: Laying in bed, sleeping, NAD  Neurological: A&Ox3, responds appropriately, following commands, speech clear, CN ll-Xll grossly intact, LUE 4+/5 o/w 5/5 throughout. Slight dysmetria on LUE, SILT, no pronator drift noted.  Cardiovascular: S1, S2, murmur heard best at apex  Respiratory: clear to auscultation bilaterally  Gastrointestinal: soft, non-tender, non-distended  Extremities: appropriately warm, dry, intact, 2+ distal pulses in upper and lower extremities bilaterally    TUBES/LINES:  [] Farias  [] Lumbar Drain  [] Wound Drains  [] Others    DIET:  [] NPO  [x] Mechanical  [] Tube feeds      LABS:                        15.3   15.15 )-----------( 390      ( 09 Aug 2021 07:13 )             48.2     08-09    141  |  105  |  27<H>  ----------------------------<  98  4.5   |  29  |  1.35<H>    Ca    9.6      09 Aug 2021 07:13  Phos  4.6     08-09  Mg     2.1     08-09              CAPILLARY BLOOD GLUCOSE          Drug Levels: [] N/A  Vancomycin Level, Trough: 17.3 ug/mL (08-04 @ 05:01)    CSF Analysis: [] N/A      Allergies    No Known Allergies    Intolerances      MEDICATIONS:  Antibiotics:    Neuro:  acetaminophen   Tablet .. 650 milliGRAM(s) Oral every 6 hours PRN  ondansetron Injectable 4 milliGRAM(s) IV Push every 6 hours PRN  traMADol 25 milliGRAM(s) Oral every 6 hours PRN  traMADol 50 milliGRAM(s) Oral every 6 hours PRN    Anticoagulation:  heparin   Injectable 5000 Unit(s) SubCutaneous every 8 hours    OTHER:  aMIOdarone    Tablet 200 milliGRAM(s) Oral daily  atorvastatin 40 milliGRAM(s) Oral at bedtime  bisacodyl 5 milliGRAM(s) Oral every 12 hours PRN  dextrose 50% Injectable 25 Gram(s) IV Push once  diltiazem    milliGRAM(s) Oral daily  glucagon  Injectable 1 milliGRAM(s) IntraMuscular once  metoprolol succinate  milliGRAM(s) Oral every 12 hours  polyethylene glycol 3350 17 Gram(s) Oral daily  senna 2 Tablet(s) Oral at bedtime    IVF:  multivitamin 1 Tablet(s) Oral daily    CULTURES:  Culture Results:   No growth at 5 days. (08-03 @ 16:24)  Culture Results:   No growth at 5 days. (08-03 @ 16:24)    RADIOLOGY & ADDITIONAL TESTS:      ASSESSMENT:  46 M PMH L CVA? 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 Pt is now s/p  femoral cerebral angiogram, findings of left vertebral artery irregularity at level below PICA with segment suggestive of intimal flap, left vertebral artery sacrifice performed (7/29/2021), post op was extubated, needed to be reintubated for secretion burden and unable to protect airway,  patient self-extubated X 2. Also with SVT vs AF with RVR, and HOCM. Stable on RA.      CHEST PAIN    No pertinent family history in first degree relatives    Handoff    MEWS Score    HTN (hypertension)    HTN (hypertension)    SAH (subarachnoid hemorrhage)    Dissection of cerebral artery    SAH (subarachnoid hemorrhage)    Dissection of cerebral artery    Cerebellar bleed    Cerebellar bleed    Essential hypertension    Left ventricular outflow obstruction    Atrial fibrillation with rapid ventricular response    Leukocytosis, unspecified type    Metabolic acidosis    Creatinine elevation    Angiogram, blood vessel, cerebral, with embolization    No significant past surgical history    CHEST PAIN    [email or web address]        PLAN:  Neuro:   - Neuro checks/vital checks/groin/vascular checks q4hr  - Tylenol prn for pain control  - Repeat CTH 7/30 stable     CV:  -HCM resting grad 90 with MACRINA causing at least mod MR. Possible family hx SCD (mother). Course complicated by Afib RVR  - -160  - Cards following  - TTE- hypertrophic cardiomyopathy with severe left ventricular outflow tract obstruction and moderate mitral regurg  - Careful fluid balance  - 8/2: SVT to 200s, s/p adenosine 1.2 on 8/2, verapamil 10, lopressor 5, dilt gtt, 1L bolus, started on dilt PO 60 tid, losartan  - dilt gtt stopped, dilt 180 po  qd started  - started amio load 400 bid x 12 does then 200 daily  - Toprol 100 BID   - d/c'ed losartan per cards recs    Per cardiology: #HCM: avoid dehydration, vasodilation, and tachycardia in HCM in order to improve gradient. 90 resting gradient is significant and explains baseline poor ET as well as hospital course (tenuous respiratory status, extreme sensitivity to diuresis). Improvement in gradient will also improve MR which will help pulmonary edema.     Pulm:  - satting well RA  - blood tinged sputum    Renal:   - normonatremia goal   -  Replete electrolytes PRN    GI:  - bowel reg  - regular diet     Endo:   - ISS    Heme:  - SCD's, SQH  - trend CBC  - f/u LE doppler 7/29 neg    ID:  - f/u panculture 7/31  - trend WBC  - empiric vanc/zosyn end date 8/3     Disposition: SDU status, full code, dispo pending pt has emergency medicaid, family updated with plan.    Plan d/w Dr. D'Amico

## 2021-08-11 LAB
ANION GAP SERPL CALC-SCNC: 11 MMOL/L — SIGNIFICANT CHANGE UP (ref 5–17)
BUN SERPL-MCNC: 30 MG/DL — HIGH (ref 7–23)
CALCIUM SERPL-MCNC: 9.4 MG/DL — SIGNIFICANT CHANGE UP (ref 8.4–10.5)
CHLORIDE SERPL-SCNC: 104 MMOL/L — SIGNIFICANT CHANGE UP (ref 96–108)
CO2 SERPL-SCNC: 26 MMOL/L — SIGNIFICANT CHANGE UP (ref 22–31)
CREAT SERPL-MCNC: 1.38 MG/DL — HIGH (ref 0.5–1.3)
GLUCOSE SERPL-MCNC: 98 MG/DL — SIGNIFICANT CHANGE UP (ref 70–99)
HCT VFR BLD CALC: 44.5 % — SIGNIFICANT CHANGE UP (ref 39–50)
HGB BLD-MCNC: 14.2 G/DL — SIGNIFICANT CHANGE UP (ref 13–17)
MAGNESIUM SERPL-MCNC: 2.2 MG/DL — SIGNIFICANT CHANGE UP (ref 1.6–2.6)
MCHC RBC-ENTMCNC: 29.5 PG — SIGNIFICANT CHANGE UP (ref 27–34)
MCHC RBC-ENTMCNC: 31.9 GM/DL — LOW (ref 32–36)
MCV RBC AUTO: 92.5 FL — SIGNIFICANT CHANGE UP (ref 80–100)
NRBC # BLD: 0 /100 WBCS — SIGNIFICANT CHANGE UP (ref 0–0)
PHOSPHATE SERPL-MCNC: 4.6 MG/DL — HIGH (ref 2.5–4.5)
PLATELET # BLD AUTO: 420 K/UL — HIGH (ref 150–400)
POTASSIUM SERPL-MCNC: 4.4 MMOL/L — SIGNIFICANT CHANGE UP (ref 3.5–5.3)
POTASSIUM SERPL-SCNC: 4.4 MMOL/L — SIGNIFICANT CHANGE UP (ref 3.5–5.3)
RBC # BLD: 4.81 M/UL — SIGNIFICANT CHANGE UP (ref 4.2–5.8)
RBC # FLD: 13.6 % — SIGNIFICANT CHANGE UP (ref 10.3–14.5)
SODIUM SERPL-SCNC: 141 MMOL/L — SIGNIFICANT CHANGE UP (ref 135–145)
WBC # BLD: 14.11 K/UL — HIGH (ref 3.8–10.5)
WBC # FLD AUTO: 14.11 K/UL — HIGH (ref 3.8–10.5)

## 2021-08-11 PROCEDURE — 99233 SBSQ HOSP IP/OBS HIGH 50: CPT | Mod: GC

## 2021-08-11 PROCEDURE — 99024 POSTOP FOLLOW-UP VISIT: CPT

## 2021-08-11 RX ADMIN — Medication 100 MILLIGRAM(S): at 17:37

## 2021-08-11 RX ADMIN — AMIODARONE HYDROCHLORIDE 200 MILLIGRAM(S): 400 TABLET ORAL at 06:43

## 2021-08-11 RX ADMIN — APIXABAN 5 MILLIGRAM(S): 2.5 TABLET, FILM COATED ORAL at 17:37

## 2021-08-11 RX ADMIN — ATORVASTATIN CALCIUM 40 MILLIGRAM(S): 80 TABLET, FILM COATED ORAL at 21:48

## 2021-08-11 RX ADMIN — APIXABAN 5 MILLIGRAM(S): 2.5 TABLET, FILM COATED ORAL at 06:43

## 2021-08-11 RX ADMIN — Medication 100 MILLIGRAM(S): at 06:43

## 2021-08-11 RX ADMIN — Medication 180 MILLIGRAM(S): at 06:43

## 2021-08-11 RX ADMIN — Medication 1 TABLET(S): at 06:43

## 2021-08-11 NOTE — PROGRESS NOTE ADULT - SUBJECTIVE AND OBJECTIVE BOX
HPI:  46 M pt with PMHx of HTN, noncompliant with meds (Metoprolol, Nifedipine, and ASA), reports no recent use of ASA or metoprolol, presents to ED c/o acute onset and worsening dizziness, R arm paresthesia, and vomiting x 1hr prior to arrival while at work. Pt somnolent in ED, difficulty obtaining hx from pt. Per EMS, pt had SBP in the 200s, given nitro SL x 2 in field. Patient found to have a left side cerebellar hemmorrhage and left pontine hemmorrhage with extension to the subarachnoid space. Patient was started on a nicardipene drip in the ED for SBP to 230's, given manitol 50g and intubated for concern of potential for aspiration with declining exam. Patient reported that he lives alone and has no close contacts in the area.     NIHSS 6  ICH 2   (28 Jul 2021 20:40)    Hospital Course:  7/28: Admitted with left cerebellar ICH. Intubated in ED for lethargy and concern for airway protection. Repeat CTH stable.  7/29: POD# 0 S/P femoral cerebral angiogram, findings of left vertebral artery irregularity at level below PICA with segment suggestive of intimal flap, left vertebral artery sacrifice performed. Hypertensive episode during angio case, Xper CT shows stable ICH. Pt seen and examined at bedside in NSICU postop. Pt agitated and reaching for ETT. Brief CPAP trial given, and Pt was successfully extubated. Patient then with many blood tinged secretions and CXR with infiltrated. Patient was reintubated. He then self-extubated and was emergently reintubated, placed in restraints now sedated on precedex and fentanyl. Received 1LNS bolus for hypotension and was also started on levophed for SBPs in 70s.   7/30: POD1 cerebral angiogram with left vertebral takedown, OLESYA overnight, neuro stable remains sedated on precedex and intubated on full vent support. Repeat CTH stable. Received 40 of lasix, increased PEEP from 7 to 8 and tapering off fentanyl drip. Self extubated and doing well on high flow NC, Kept on Precedex for agitation, started on Cardene for 's, will wean as appropriate.   7/31: POD2 cerebral angio with L verterbal takedown. S/p Seroquel 25 and Precedex overnight for agitation, otherwise OLESYA. On Cardene gtt. Neuro stable. O2 sat stable on HFNC. S/p 30 Lasix. Uptrending troponin, EKG stable, trending q6H  8/1: POD3, OLESYA overnight, on Precedex. Neuro stable.  8/2: POD#4, OLESYA overnight, neuro stable. Precedex off, tolerating RA. 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 SVT to 200s, on verapamil 80 tid, given adenosine, lopressor, verapamil, diltiazem gtt started. started on PO dilt 60 tid.  8/3: POD#5: tolerating room air, dilt gtt stopped. sinus tach to low 100s now. PO amiodareone and diltaezem started. pt is on cardene, overnight losartan started.   8/4: POD 6. Hypertensive, given labetalol 10 mg x1 with improvement. Added metoprolol, d/c'ed losartan per cards recs  8/5: POD 7. Tx to SDU overnight. OLESYA.Amiodarone decreased to 200mg daily and repeat echo ordered to evaluate EF per cards recs  8/6: POD #8. OLESYA overnight. RUE doppler neg for DVT. Dispo pending   8/7: POD9. OLESYA overnight. Dispo, emergency medicaid  8/8: POD10. OLESYA overnight. Exam stable. Dispo pending, PT recommends AR  8/9: POD11 OLESYA overnight. Neuro exam stable. Pending AR placement  8/10: POD12 OLESYA overnight, neuro exam stable. Pend AR placement.   8/11: POD13 OLESYA overnight. Neuro exam stable. Pending AR    Vital Signs Last 24 Hrs  T(C): 36.4 (10 Aug 2021 20:16), Max: 36.8 (10 Aug 2021 01:06)  T(F): 97.5 (10 Aug 2021 20:16), Max: 98.2 (10 Aug 2021 01:06)  HR: 79 (10 Aug 2021 20:16) (60 - 87)  BP: 121/82 (10 Aug 2021 20:16) (116/80 - 142/92)  BP(mean): --  RR: 17 (10 Aug 2021 20:16) (17 - 17)  SpO2: 95% (10 Aug 2021 20:16) (95% - 98%)    I&O's Summary    09 Aug 2021 07:01  -  10 Aug 2021 07:00  --------------------------------------------------------  IN: 720 mL / OUT: 920 mL / NET: -200 mL    10 Aug 2021 07:01  -  11 Aug 2021 00:55  --------------------------------------------------------  IN: 0 mL / OUT: 400 mL / NET: -400 mL      PHYSICAL EXAM:  General: Laying in bed, sleeping, NAD  Neurological: A&Ox3, responds appropriately, following commands, speech clear, CN ll-Xll grossly intact, LUE 4+/5 o/w 5/5 throughout. Slight dysmetria on LUE, SILT, no pronator drift noted.  Cardiovascular: S1, S2, murmur heard best at apex  Respiratory: clear to auscultation bilaterally  Gastrointestinal: soft, non-tender, non-distended  Extremities: appropriately warm, dry, intact, 2+ distal pulses in upper and lower extremities bilaterally    TUBES/LINES:  [] Fairas  [] Lumbar Drain  [] Wound Drains  [] Others    DIET:  [] NPO  [x] Mechanical  [] Tube feeds    LABS:                        15.3   15.15 )-----------( 390      ( 09 Aug 2021 07:13 )             48.2     08-09    141  |  105  |  27<H>  ----------------------------<  98  4.5   |  29  |  1.35<H>    Ca    9.6      09 Aug 2021 07:13  Phos  4.6     08-09  Mg     2.1     08-09              CAPILLARY BLOOD GLUCOSE          Drug Levels: [] N/A  Vancomycin Level, Trough: 17.3 ug/mL (08-04 @ 05:01)    CSF Analysis: [] N/A      Allergies    No Known Allergies    Intolerances      MEDICATIONS:  Antibiotics:    Neuro:  acetaminophen   Tablet .. 650 milliGRAM(s) Oral every 6 hours PRN  ondansetron Injectable 4 milliGRAM(s) IV Push every 6 hours PRN  traMADol 25 milliGRAM(s) Oral every 6 hours PRN  traMADol 50 milliGRAM(s) Oral every 6 hours PRN    Anticoagulation:  apixaban 5 milliGRAM(s) Oral every 12 hours    OTHER:  aMIOdarone    Tablet 200 milliGRAM(s) Oral daily  atorvastatin 40 milliGRAM(s) Oral at bedtime  bisacodyl 5 milliGRAM(s) Oral every 12 hours PRN  dextrose 50% Injectable 25 Gram(s) IV Push once  diltiazem    milliGRAM(s) Oral daily  glucagon  Injectable 1 milliGRAM(s) IntraMuscular once  metoprolol succinate  milliGRAM(s) Oral every 12 hours  polyethylene glycol 3350 17 Gram(s) Oral daily  senna 2 Tablet(s) Oral at bedtime    IVF:  multivitamin 1 Tablet(s) Oral daily    CULTURES:  Culture Results:   No growth at 5 days. (08-03 @ 16:24)  Culture Results:   No growth at 5 days. (08-03 @ 16:24)    RADIOLOGY & ADDITIONAL TESTS:      ASSESSMENT:  46 M PMH L CVA? 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 Pt is now s/p  femoral cerebral angiogram, findings of left vertebral artery irregularity at level below PICA with segment suggestive of intimal flap, left vertebral artery sacrifice performed (7/29/2021), post op was extubated, needed to be reintubated for secretion burden and unable to protect airway,  patient self-extubated X 2. Also with SVT vs AF with RVR, and HOCM. Stable on RA.    CHEST PAIN    No pertinent family history in first degree relatives    Handoff    MEWS Score    HTN (hypertension)    HTN (hypertension)    SAH (subarachnoid hemorrhage)    Dissection of cerebral artery    SAH (subarachnoid hemorrhage)    Dissection of cerebral artery    Cerebellar bleed    Cerebellar bleed    Essential hypertension    Left ventricular outflow obstruction    Atrial fibrillation with rapid ventricular response    Leukocytosis, unspecified type    Metabolic acidosis    Creatinine elevation    Angiogram, blood vessel, cerebral, with embolization    No significant past surgical history    CHEST PAIN    [email or web address]      Plan:  Neuro:   - Neuro checks/vital checks/groin/vascular checks   - Tylenol prn for pain control  - Repeat CTH 7/30 stable     CV:  -HCM resting grad 90 with MACRINA causing at least mod MR. Possible family hx SCD (mother). Course complicated by Afib RVR  - -160  - Cards following  - TTE- hypertrophic cardiomyopathy with severe left ventricular outflow tract obstruction and moderate mitral regurg  - Careful fluid balance  - 8/2: SVT to 200s, s/p adenosine 1.2 on 8/2, verapamil 10, lopressor 5, dilt gtt, 1L bolus, started on dilt PO 60 tid, losartan  - dilt gtt stopped, dilt 180 po  qd started  - started amio load 400 bid x 12 does then 200 daily  - Toprol 100 BID   - d/c'ed losartan per cards recs    Per cardiology: #HCM: avoid dehydration, vasodilation, and tachycardia in HCM in order to improve gradient. 90 resting gradient is significant and explains baseline poor ET as well as hospital course (tenuous respiratory status, extreme sensitivity to diuresis). Improvement in gradient will also improve MR which will help pulmonary edema.     Pulm:  - satting well RA  - blood tinged sputum    Renal:   - normonatremia goal   -  Replete electrolytes PRN    GI:  - bowel reg  - regular diet     Endo:   - ISS    Heme:  - SCD's, eliquis 5mg q12h for afib  - trend CBC  - f/u LE doppler 7/29 neg    ID:  - f/u panculture 7/31  - trend WBC  - empiric vanc/zosyn end date 8/3     Disposition: SDU status, full code, dispo pending pt has emergency medicaid, family updated with plan.    Plan d/w Dr. D'Amico

## 2021-08-11 NOTE — PROGRESS NOTE ADULT - SUBJECTIVE AND OBJECTIVE BOX
Patient is a 46y old  Male who presents with a chief complaint of Cerebellar Stroke (04 Aug 2021 08:00)      HPI:  46 M pt with PMHx of HTN, noncompliant with meds (Metoprolol, Nifedipine, and ASA), reports no recent use of ASA or metoprolol, presents to ED c/o acute onset and worsening dizziness, R arm paresthesia, and vomiting x 1hr prior to arrival while at work. Pt somnolent in ED, difficulty obtaining hx from pt. Per EMS, pt had SBP in the 200s, given nitro SL x 2 in field. Patient found to have a left side cerebellar hemmorrhage and left pontine hemmorrhage with extension to the subarachnoid space. Patient was started on a nicardipene drip in the ED for SBP to 230's, given manitol 50g and intubated for concern of potential for aspiration with declining exam. Patient reported that he lives alone and has no close contacts in the area.     NIHSS 6  ICH 2   (2021 20:40)    Subjective:  Pt has no acute complaints. Denies pain. Denies SOB, CP. ROS is otherwise negative.     Allergies    No Known Allergies    Intolerances    Home meds:       MEDICATIONS  (STANDING):  aMIOdarone    Tablet 200 milliGRAM(s) Oral daily  apixaban 5 milliGRAM(s) Oral every 12 hours  atorvastatin 40 milliGRAM(s) Oral at bedtime  dextrose 50% Injectable 25 Gram(s) IV Push once  diltiazem    milliGRAM(s) Oral daily  glucagon  Injectable 1 milliGRAM(s) IntraMuscular once  metoprolol succinate  milliGRAM(s) Oral every 12 hours  multivitamin 1 Tablet(s) Oral daily  polyethylene glycol 3350 17 Gram(s) Oral daily  senna 2 Tablet(s) Oral at bedtime    MEDICATIONS  (PRN):  acetaminophen   Tablet .. 650 milliGRAM(s) Oral every 6 hours PRN Temp greater or equal to 38C (100.4F), Mild Pain (1 - 3)  bisacodyl 5 milliGRAM(s) Oral every 12 hours PRN Constipation  ondansetron Injectable 4 milliGRAM(s) IV Push every 6 hours PRN Nausea and/or Vomiting            Drug Dosing Weight  Height (cm): 190.5 (2021 15:28)  Weight (kg): 103 (2021 15:01)  BMI (kg/m2): 28.4 (2021 15:28)  BSA (m2): 2.32 (2021 15:28)    PAST MEDICAL & SURGICAL HISTORY:  HTN (hypertension)    No significant past surgical history        FAMILY HISTORY:  No pertinent family history in first degree relatives of cardiac disease        SOCIAL HISTORY:    ADVANCE DIRECTIVES:      Vital Signs Last 24 Hrs  T(C): 36.5 (11 Aug 2021 11:32), Max: 36.6 (11 Aug 2021 00:59)  T(F): 97.7 (11 Aug 2021 11:32), Max: 97.8 (11 Aug 2021 00:59)  HR: 87 (11 Aug 2021 11:32) (70 - 87)  BP: 122/76 (11 Aug 2021 11:32) (121/82 - 141/101)  BP(mean): --  RR: 16 (11 Aug 2021 11:32) (15 - 17)  SpO2: 98% (11 Aug 2021 11:32) (95% - 98%)      PHYSICAL EXAM:      Constitutional: NAD  Eyes: PERRLA  ENMT: MMM  Neck: supple  Back: midline  Respiratory: CTA b/l  Cardiovascular: rrr, s1s2, no m/r//g  Gastrointestinal: soft, NTND, + BS  Extremities: wwp  Vascular: + 2 pulses radial  Neurological: AAO x 4  Skin: no rash  Lymph Nodes: no LAD  Musculoskeletal: no joint swelling  Psychiatric: normal affect        LABS:                                     14.2   14.11 )-----------( 420      ( 11 Aug 2021 06:19 )             44.5   08-11    141  |  104  |  30<H>  ----------------------------<  98  4.4   |  26  |  1.38<H>    Ca    9.4      11 Aug 2021 06:19  Phos  4.6     08-11  Mg     2.2     08-11                  Urinalysis Basic - ( 04 Aug 2021 07:31 )    Color: Yellow / Appearance: Clear / S.020 / pH: x  Gluc: x / Ketone: Trace mg/dL  / Bili: Negative / Urobili: 1.0 E.U./dL   Blood: x / Protein: NEGATIVE mg/dL / Nitrite: NEGATIVE   Leuk Esterase: NEGATIVE / RBC: x / WBC x   Sq Epi: x / Non Sq Epi: x / Bacteria: x        EKG:    ECHO, US:    < from: TTE Echo Complete w/o Contrast w/ Doppler (21 @ 12:24) >  CONCLUSIONS:     1. Normal left ventricular systolic function.   2. Normal right ventricular size and systolic function.   3. Dilated left atrium.   4. No evidence of pulmonary hypertension.   5. No pericardial effusion.   6. Hypertrophic cardiomyopathy with assymetric septal hypertrophy and systolic anterior motion of the mitral valve.   7. There is severe left ventricular outflow tract obstruction with a resting gradient of 90 mm Hg.   8. There is at least moderate eccentric mitral regurgitation associated with the MACRINA.    < end of copied text >      RADIOLOGY:  < from: CT Head No Cont (21 @ 13:25) >  Since prior CT head 2021, continued evolution of left cerebellar parenchymal hemorrhage with intraventricular and subarachnoid extension. Interval mild decrease in size of the lateral and third ventricles. No new hemorrhage..    < end of copied text >

## 2021-08-12 ENCOUNTER — TRANSCRIPTION ENCOUNTER (OUTPATIENT)
Age: 46
End: 2021-08-12

## 2021-08-12 VITALS
RESPIRATION RATE: 17 BRPM | TEMPERATURE: 98 F | HEART RATE: 86 BPM | SYSTOLIC BLOOD PRESSURE: 122 MMHG | DIASTOLIC BLOOD PRESSURE: 90 MMHG | OXYGEN SATURATION: 98 %

## 2021-08-12 LAB
ANION GAP SERPL CALC-SCNC: 10 MMOL/L — SIGNIFICANT CHANGE UP (ref 5–17)
BUN SERPL-MCNC: 28 MG/DL — HIGH (ref 7–23)
CALCIUM SERPL-MCNC: 9.4 MG/DL — SIGNIFICANT CHANGE UP (ref 8.4–10.5)
CHLORIDE SERPL-SCNC: 104 MMOL/L — SIGNIFICANT CHANGE UP (ref 96–108)
CO2 SERPL-SCNC: 24 MMOL/L — SIGNIFICANT CHANGE UP (ref 22–31)
CREAT SERPL-MCNC: 1.3 MG/DL — SIGNIFICANT CHANGE UP (ref 0.5–1.3)
GLUCOSE SERPL-MCNC: 101 MG/DL — HIGH (ref 70–99)
HCT VFR BLD CALC: 46.1 % — SIGNIFICANT CHANGE UP (ref 39–50)
HGB BLD-MCNC: 14.8 G/DL — SIGNIFICANT CHANGE UP (ref 13–17)
MAGNESIUM SERPL-MCNC: 1.9 MG/DL — SIGNIFICANT CHANGE UP (ref 1.6–2.6)
MCHC RBC-ENTMCNC: 29.5 PG — SIGNIFICANT CHANGE UP (ref 27–34)
MCHC RBC-ENTMCNC: 32.1 GM/DL — SIGNIFICANT CHANGE UP (ref 32–36)
MCV RBC AUTO: 92 FL — SIGNIFICANT CHANGE UP (ref 80–100)
NRBC # BLD: 0 /100 WBCS — SIGNIFICANT CHANGE UP (ref 0–0)
PHOSPHATE SERPL-MCNC: 4.6 MG/DL — HIGH (ref 2.5–4.5)
PLATELET # BLD AUTO: 436 K/UL — HIGH (ref 150–400)
POTASSIUM SERPL-MCNC: 4.2 MMOL/L — SIGNIFICANT CHANGE UP (ref 3.5–5.3)
POTASSIUM SERPL-SCNC: 4.2 MMOL/L — SIGNIFICANT CHANGE UP (ref 3.5–5.3)
RBC # BLD: 5.01 M/UL — SIGNIFICANT CHANGE UP (ref 4.2–5.8)
RBC # FLD: 13.5 % — SIGNIFICANT CHANGE UP (ref 10.3–14.5)
SODIUM SERPL-SCNC: 138 MMOL/L — SIGNIFICANT CHANGE UP (ref 135–145)
WBC # BLD: 12.91 K/UL — HIGH (ref 3.8–10.5)
WBC # FLD AUTO: 12.91 K/UL — HIGH (ref 3.8–10.5)

## 2021-08-12 PROCEDURE — 93306 TTE W/DOPPLER COMPLETE: CPT

## 2021-08-12 PROCEDURE — 97116 GAIT TRAINING THERAPY: CPT

## 2021-08-12 PROCEDURE — 86769 SARS-COV-2 COVID-19 ANTIBODY: CPT

## 2021-08-12 PROCEDURE — 93321 DOPPLER ECHO F-UP/LMTD STD: CPT

## 2021-08-12 PROCEDURE — 93005 ELECTROCARDIOGRAM TRACING: CPT

## 2021-08-12 PROCEDURE — 85027 COMPLETE CBC AUTOMATED: CPT

## 2021-08-12 PROCEDURE — 94640 AIRWAY INHALATION TREATMENT: CPT

## 2021-08-12 PROCEDURE — 84145 PROCALCITONIN (PCT): CPT

## 2021-08-12 PROCEDURE — 81003 URINALYSIS AUTO W/O SCOPE: CPT

## 2021-08-12 PROCEDURE — 82962 GLUCOSE BLOOD TEST: CPT

## 2021-08-12 PROCEDURE — 86901 BLOOD TYPING SEROLOGIC RH(D): CPT

## 2021-08-12 PROCEDURE — 84100 ASSAY OF PHOSPHORUS: CPT

## 2021-08-12 PROCEDURE — 87635 SARS-COV-2 COVID-19 AMP PRB: CPT

## 2021-08-12 PROCEDURE — 96375 TX/PRO/DX INJ NEW DRUG ADDON: CPT

## 2021-08-12 PROCEDURE — 84443 ASSAY THYROID STIM HORMONE: CPT

## 2021-08-12 PROCEDURE — 87070 CULTURE OTHR SPECIMN AEROBIC: CPT

## 2021-08-12 PROCEDURE — 85610 PROTHROMBIN TIME: CPT

## 2021-08-12 PROCEDURE — 84484 ASSAY OF TROPONIN QUANT: CPT

## 2021-08-12 PROCEDURE — 80048 BASIC METABOLIC PNL TOTAL CA: CPT

## 2021-08-12 PROCEDURE — 71045 X-RAY EXAM CHEST 1 VIEW: CPT

## 2021-08-12 PROCEDURE — 81001 URINALYSIS AUTO W/SCOPE: CPT

## 2021-08-12 PROCEDURE — 87040 BLOOD CULTURE FOR BACTERIA: CPT

## 2021-08-12 PROCEDURE — 83735 ASSAY OF MAGNESIUM: CPT

## 2021-08-12 PROCEDURE — C1760: CPT

## 2021-08-12 PROCEDURE — 86038 ANTINUCLEAR ANTIBODIES: CPT

## 2021-08-12 PROCEDURE — 85025 COMPLETE CBC W/AUTO DIFF WBC: CPT

## 2021-08-12 PROCEDURE — C1876: CPT

## 2021-08-12 PROCEDURE — 70498 CT ANGIOGRAPHY NECK: CPT | Mod: MA

## 2021-08-12 PROCEDURE — 84132 ASSAY OF SERUM POTASSIUM: CPT

## 2021-08-12 PROCEDURE — 80061 LIPID PANEL: CPT

## 2021-08-12 PROCEDURE — 70450 CT HEAD/BRAIN W/O DYE: CPT

## 2021-08-12 PROCEDURE — 99291 CRITICAL CARE FIRST HOUR: CPT | Mod: 25

## 2021-08-12 PROCEDURE — 94002 VENT MGMT INPAT INIT DAY: CPT

## 2021-08-12 PROCEDURE — 31500 INSERT EMERGENCY AIRWAY: CPT

## 2021-08-12 PROCEDURE — 83036 HEMOGLOBIN GLYCOSYLATED A1C: CPT

## 2021-08-12 PROCEDURE — 96376 TX/PRO/DX INJ SAME DRUG ADON: CPT

## 2021-08-12 PROCEDURE — 70496 CT ANGIOGRAPHY HEAD: CPT | Mod: MA

## 2021-08-12 PROCEDURE — 86850 RBC ANTIBODY SCREEN: CPT

## 2021-08-12 PROCEDURE — 85652 RBC SED RATE AUTOMATED: CPT

## 2021-08-12 PROCEDURE — 97161 PT EVAL LOW COMPLEX 20 MIN: CPT

## 2021-08-12 PROCEDURE — 99233 SBSQ HOSP IP/OBS HIGH 50: CPT | Mod: GC

## 2021-08-12 PROCEDURE — 96374 THER/PROPH/DIAG INJ IV PUSH: CPT

## 2021-08-12 PROCEDURE — 80202 ASSAY OF VANCOMYCIN: CPT

## 2021-08-12 PROCEDURE — C1887: CPT

## 2021-08-12 PROCEDURE — 97530 THERAPEUTIC ACTIVITIES: CPT

## 2021-08-12 PROCEDURE — 93971 EXTREMITY STUDY: CPT

## 2021-08-12 PROCEDURE — 82803 BLOOD GASES ANY COMBINATION: CPT

## 2021-08-12 PROCEDURE — 36415 COLL VENOUS BLD VENIPUNCTURE: CPT

## 2021-08-12 PROCEDURE — 82553 CREATINE MB FRACTION: CPT

## 2021-08-12 PROCEDURE — 82550 ASSAY OF CK (CPK): CPT

## 2021-08-12 PROCEDURE — 97535 SELF CARE MNGMENT TRAINING: CPT

## 2021-08-12 PROCEDURE — 93970 EXTREMITY STUDY: CPT

## 2021-08-12 PROCEDURE — C1894: CPT

## 2021-08-12 PROCEDURE — 86900 BLOOD TYPING SEROLOGIC ABO: CPT

## 2021-08-12 PROCEDURE — C1889: CPT

## 2021-08-12 PROCEDURE — 85730 THROMBOPLASTIN TIME PARTIAL: CPT

## 2021-08-12 PROCEDURE — 93308 TTE F-UP OR LMTD: CPT

## 2021-08-12 PROCEDURE — 80053 COMPREHEN METABOLIC PANEL: CPT

## 2021-08-12 PROCEDURE — C1769: CPT

## 2021-08-12 PROCEDURE — 80307 DRUG TEST PRSMV CHEM ANLYZR: CPT

## 2021-08-12 RX ORDER — APIXABAN 2.5 MG/1
1 TABLET, FILM COATED ORAL
Qty: 60 | Refills: 0
Start: 2021-08-12 | End: 2021-09-10

## 2021-08-12 RX ORDER — ACETAMINOPHEN 500 MG
2 TABLET ORAL
Qty: 0 | Refills: 0 | DISCHARGE
Start: 2021-08-12

## 2021-08-12 RX ORDER — DILTIAZEM HCL 120 MG
1 CAPSULE, EXT RELEASE 24 HR ORAL
Qty: 30 | Refills: 0
Start: 2021-08-12 | End: 2021-09-10

## 2021-08-12 RX ORDER — POLYETHYLENE GLYCOL 3350 17 G/17G
17 POWDER, FOR SOLUTION ORAL
Qty: 0 | Refills: 0 | DISCHARGE
Start: 2021-08-12

## 2021-08-12 RX ORDER — ATORVASTATIN CALCIUM 80 MG/1
1 TABLET, FILM COATED ORAL
Qty: 30 | Refills: 0
Start: 2021-08-12 | End: 2021-09-10

## 2021-08-12 RX ORDER — AMIODARONE HYDROCHLORIDE 400 MG/1
1 TABLET ORAL
Qty: 30 | Refills: 0
Start: 2021-08-12 | End: 2021-09-10

## 2021-08-12 RX ORDER — MAGNESIUM SULFATE 500 MG/ML
1 VIAL (ML) INJECTION ONCE
Refills: 0 | Status: DISCONTINUED | OUTPATIENT
Start: 2021-08-12 | End: 2021-08-12

## 2021-08-12 RX ORDER — METOPROLOL TARTRATE 50 MG
1 TABLET ORAL
Qty: 60 | Refills: 0
Start: 2021-08-12 | End: 2021-09-10

## 2021-08-12 RX ADMIN — AMIODARONE HYDROCHLORIDE 200 MILLIGRAM(S): 400 TABLET ORAL at 05:12

## 2021-08-12 RX ADMIN — APIXABAN 5 MILLIGRAM(S): 2.5 TABLET, FILM COATED ORAL at 05:11

## 2021-08-12 RX ADMIN — Medication 1 TABLET(S): at 05:11

## 2021-08-12 RX ADMIN — Medication 100 MILLIGRAM(S): at 05:12

## 2021-08-12 RX ADMIN — Medication 180 MILLIGRAM(S): at 05:12

## 2021-08-12 RX ADMIN — Medication 100 MILLIGRAM(S): at 17:14

## 2021-08-12 RX ADMIN — APIXABAN 5 MILLIGRAM(S): 2.5 TABLET, FILM COATED ORAL at 17:14

## 2021-08-12 NOTE — DISCHARGE NOTE PROVIDER - HOSPITAL COURSE
HPI:  Hospital Course:    Patient evaluated by PT/OT who recommened:  Patient is going home? rehab? hospice? Facility Name:     Hospital course c/b:     Exam on day of discharge:    Checklist:   - Obtained follow up appointment from NP  - Reviewed final recommendations of inpatient consults  - review discharge planning on provider handoff  - post op imaging completed  - Neurologically stable for discharge  - Vitals stable for discharge   - Afebrile for discharge  - WBC is stable  - Sodium level is normal  - Pain is adequately controlled  - Pt has PICC/walker/brace/collar        HPI:  46 M pt with PMHx of HTN, noncompliant with meds (Metoprolol, Nifedipine, and ASA), reports no recent use of ASA or metoprolol, presents to ED c/o acute onset and worsening dizziness, R arm paresthesia, and vomiting x 1hr prior to arrival while at work. Pt somnolent in ED, difficulty obtaining hx from pt. Per EMS, pt had SBP in the 200s, given nitro SL x 2 in field. Patient found to have a left side cerebellar hemmorrhage and left pontine hemmorrhage with extension to the subarachnoid space. Patient was started on a nicardipene drip in the ED for SBP to 230's, given manitol 50g and intubated for concern of potential for aspiration with declining exam. Patient reported that he lives alone and has no close contacts in the area.     Hospital Course:  7/28: Admitted with left cerebellar ICH. Intubated in ED for lethargy and concern for airway protection. Repeat CTH stable.  7/29: POD# 0 S/P femoral cerebral angiogram, findings of left vertebral artery irregularity at level below PICA with segment suggestive of intimal flap, left vertebral artery sacrifice performed. Hypertensive episode during angio case, Xper CT shows stable ICH. Pt seen and examined at bedside in NSICU postop. Pt agitated and reaching for ETT. Brief CPAP trial given, and Pt was successfully extubated. Patient then with many blood tinged secretions and CXR with infiltrated. Patient was reintubated. He then self-extubated and was emergently reintubated, placed in restraints now sedated on precedex and fentanyl. Received 1LNS bolus for hypotension and was also started on levophed for SBPs in 70s.   7/30: POD1 cerebral angiogram with left vertebral takedown, OLESYA overnight, neuro stable remains sedated on precedex and intubated on full vent support. Repeat CTH stable. Received 40 of lasix, increased PEEP from 7 to 8 and tapering off fentanyl drip. Self extubated and doing well on high flow NC, Kept on Precedex for agitation, started on Cardene for 's, will wean as appropriate.   7/31: POD2 cerebral angio with L verterbal takedown. S/p Seroquel 25 and Precedex overnight for agitation, otherwise OLESYA. On Cardene gtt. Neuro stable. O2 sat stable on HFNC. S/p 30 Lasix. Uptrending troponin, EKG stable, trending q6H  8/1: POD3, OLESYA overnight, on Precedex. Neuro stable.  8/2: POD#4, OLESYA overnight, neuro stable. Precedex off, tolerating RA. 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 SVT to 200s, on verapamil 80 tid, given adenosine, lopressor, verapamil, diltiazem gtt started. started on PO dilt 60 tid.  8/3: POD#5: tolerating room air, dilt gtt stopped. sinus tach to low 100s now. PO amiodareone and diltaezem started. pt is on cardene, overnight losartan started.   8/4: POD 6. Hypertensive, given labetalol 10 mg x1 with improvement. Added metoprolol, d/c'ed losartan per cards recs  8/5: POD 7. Tx to SDU overnight. OLESYA.Amiodarone decreased to 200mg daily and repeat echo ordered to evaluate EF per cards recs  8/6: POD #8. OLESYA overnight. RUE doppler neg for DVT. Dispo pending   8/7: POD9. OLESYA overnight. Dispo, emergency medicaid  8/8: POD10. OLESYA overnight. Exam stable. Dispo pending, PT recommends AR  8/9: POD11 OLSEYA overnight. Neuro exam stable. Pending AR placement  8/10: POD12 OLESYA overnight, neuro exam stable. Pend AR placement.   8/11: POD13 OLESYA overnight. Neuro exam stable. Pending AR.   8/12: POD14 OLESYA overnight, neuro exam stable. Pend AR.     Patient evaluated by PT/OT who recommened: Home with home PT/OT  Patient is going home     Exam on day of discharge:  General: Laying in bed, sleeping, NAD  Neurological: A&Ox3, responds appropriately, following commands, speech clear, CN ll-Xll grossly intact, LUE 4+/5 o/w 5/5 throughout. Slight dysmetria on LUE, SILT, no pronator drift noted.  Cardiovascular: S1, S2, murmur heard best at apex  Respiratory: clear to auscultation bilaterally  Gastrointestinal: soft, non-tender, non-distended  Extremities: appropriately warm, dry, intact, 2+ distal pulses in upper and lower extremities bilaterally    Patient is neuro stable, vitals stable, afebrile, medically ready for discharge

## 2021-08-12 NOTE — PROGRESS NOTE ADULT - PROBLEM SELECTOR PLAN 2
Pt remains hypertensive  -C/w Cardizem 180 mg PO qd  -Metoprolol 100 mg PO BID    -Cardiology following appreciate further recs

## 2021-08-12 NOTE — DISCHARGE NOTE PROVIDER - CARE PROVIDERS DIRECT ADDRESSES
,DirectAddress_Unknown ,DirectAddress_Unknown,[email or web address]

## 2021-08-12 NOTE — PROGRESS NOTE ADULT - TIME BILLING
Medical management
HCM
as noted above
HCM
HCM
-Pt seen and examined  -VSS, in NAD, Mildly fatigued, euvolemic on exam  -p/w Pontine/Cerebellar hemorrhage, HTNive emergency now s/p L Vertebral art. sacrifice   -Post-op course c/b AF w/ RVR w/ w/u reveal HOCM w/ severe LVOTO  -Currently HD stable and euvolemic  -AF - s/p chemical conversion to NSR; c/w Amiodarone 200 Daily; Not a candidate for A/C currently; will need Neurosurgery clearance prior to initiation  -HOCM - Normal LVEF, Severe ELAINE w/ MACRINA and severe LVOTO - resting grad ~60mmHg; Mild eccentric MR, Dilated LA; ?h/o SCD in Mother; c/w Diltiazem 180 daily, change Lopressor to Toprol 100 BID; Consult EP re: ICD for primary prevention; Would also consult CTS - Myectomy eval  -Will continue to follow    Eliana Marshall MD  Cardiology Attending
HCM
Medical management

## 2021-08-12 NOTE — PROGRESS NOTE ADULT - PROBLEM SELECTOR PLAN 5
Unclear etiology, pt afebrile  -Completed antibiotics course  -Resolving
Unclear etiology, pt afebrile  -Completed antibiotics course

## 2021-08-12 NOTE — DISCHARGE NOTE PROVIDER - NSDCMRMEDTOKEN_GEN_ALL_CORE_FT
acetaminophen 325 mg oral tablet: 2 tab(s) orally every 6 hours, As needed, Temp greater or equal to 38C (100.4F), Mild Pain (1 - 3)  amiodarone 200 mg oral tablet: 1 tab(s) orally once a day  apixaban 5 mg oral tablet: 1 tab(s) orally every 12 hours  atorvastatin 40 mg oral tablet: 1 tab(s) orally once a day (at bedtime)  dilTIAZem 180 mg/24 hours oral capsule, extended release: 1 cap(s) orally once a day  metoprolol succinate 100 mg oral tablet, extended release: 1 tab(s) orally every 12 hours  Multiple Vitamins oral tablet: 1 tab(s) orally once a day  occupational therapy : Apply topically to affected area once a day   physical therapy: Apply topically to affected area once a day   polyethylene glycol 3350 oral powder for reconstitution: 17 gram(s) orally once a day  rolling walker: Apply topically to affected area once a day

## 2021-08-12 NOTE — DISCHARGE NOTE PROVIDER - NSDCFUADDINST_GEN_ALL_CORE_FT
Neurosurgery follow up appointment date/time:  - are staples/sutures in place?  - what day should staples/sutures be removed (POD 10-14)?  - please call the office to confirm appointment: [phone]     Wound Care:  - can patient shower?  - does dressing need to be changed/removed?    Devices:  - does patient need collar or brace?  - does collar/brace need to be worn at all times or just when OOB?    Drains/Lines:  - PICC in place? ID follow up? (Paper Rx for: antibiotics, heparin flush, weekly lab draws)  - RADHA in place? Management?  - braun in place? Management/Urology follow up?     Activity:  - fatigue is common after surgery, rest if you feel tired   - no bending, lifting, twisting or heavy lifting   - walking is recommended, ambulate as tolerated  - you may shower when you get home, keep your incision dry  - no bathing   - no driving within 24 hours of anesthesia or while taking prescription pain medications   - keep hydrated, drink plenty of water   - skullbase precautions: no nose blowing, sneeze with mouth open, no drinking out of a straw, no straining      Inpatient consults:  - final recommendations  - you will need follow up with....    Please also follow up with your primary care doctor.     Pain Expectations:  - pain after surgery is expected  - please take pain meds as prescribed     Medications:  - changes to home meds (ex. AED's)?  - new meds?  - pain meds?  - when can antiplatelets or antocoagulants be restarted?  - were adverse affects of meds discussed with patients?   - pain medications can cause constipation, you should eat a high fiber diet and may take a stool softener while on pain meds   - Avoid taking Advil (ibuprofen), Motrin (naproxen), or Aspirin for pain as they can cause bleeding     Call the office or come to ED if:  - wound has drainage or bleeding, increased redness or pain at incision site, neurological change, fever (>101), chills, night sweats, syncope, nausea/vomiting      Playback:  - are discharge instruction on playback?  - is a picture of the incision on playback?     WITHIN 24 HOURS OF DISCHARGE, PLEASE CONTACT NEURO PA  WITH ANY QUESTIONS OR CONCERNS: [phone]   OTHERWISE, PLEASE CALL THE OFFICE WITH ANY QUESTIONS OR CONCERNS: [phone] Neurosurgery follow up:   - please call the office to confirm appointment: [phone]      Activity:  - fatigue is common after surgery, rest if you feel tired   - no bending, lifting, twisting or heavy lifting   - walking is recommended, ambulate as tolerated  - you may shower when you get home, keep your incision dry  - no bathing   - no driving within 24 hours of anesthesia or while taking prescription pain medications   - keep hydrated, drink plenty of water     Inpatient consults:  Cardiology: continue Amiodarone, Diltiazem, Toprol, Eliquis     Please also follow up with your primary care doctor.     Pain Expectations:  - pain after surgery is expected  - please take pain meds as prescribed     Medications:  New Medications:  - Eliquis 5mg every 12 hours  - Metoprolol Succinate (Toprol XL) 100mg every 12 hours  - Amiodarone 200mg daily  - Diltiazem 180mg daily  - Lipitor 40mg at bedtime    - Tylenol as needed for pain   - pain medications can cause constipation, you should eat a high fiber diet and may take a stool softener while on pain meds   - Avoid taking Advil (ibuprofen), Motrin (naproxen), or Aspirin for pain as they can cause bleeding     Call the office or come to ED if:  - wound has drainage or bleeding, increased redness or pain at incision site, neurological change, fever (>101), chills, night sweats, syncope, nausea/vomiting      Playback:  - please see [facility] for a copy of your discharge paperwork     WITHIN 24 HOURS OF DISCHARGE, PLEASE CONTACT NEURO PA  WITH ANY QUESTIONS OR CONCERNS: [phone]   OTHERWISE, PLEASE CALL THE OFFICE WITH ANY QUESTIONS OR CONCERNS: [phone]

## 2021-08-12 NOTE — DISCHARGE NOTE PROVIDER - PROVIDER TOKENS
PROVIDER:[TOKEN:[[id number]]] PROVIDER:[TOKEN:[[id number]]],PROVIDER:[TOKEN:[[id number]]],PROVIDER:[TOKEN:[[id number]]]

## 2021-08-12 NOTE — PROGRESS NOTE ADULT - REASON FOR ADMISSION
Cerebellar Stroke
Cerebellar ICH
Cerebellar Stroke

## 2021-08-12 NOTE — DISCHARGE NOTE NURSING/CASE MANAGEMENT/SOCIAL WORK - PATIENT PORTAL LINK FT
You can access the FollowMyHealth Patient Portal offered by [facility] by registering at the following website: [email or web address]. By joining [facility]’s FollowMyHealth portal, you will also be able to view your health information using other applications (apps) compatible with our system.

## 2021-08-12 NOTE — DISCHARGE NOTE PROVIDER - NSDCCPTREATMENT_GEN_ALL_CORE_FT
PRINCIPAL PROCEDURE  Procedure: Angiogram, blood vessel, cerebral, with embolization  Findings and Treatment: Left Vertebral artery occlusion

## 2021-08-12 NOTE — PROGRESS NOTE ADULT - THIS PATIENT HAS THE FOLLOWING CONDITION(S)/DIAGNOSES ON THIS ADMISSION:
None
None
None/Brain Compression / Herniation
None
Cerebral Edema
None
None/Brain Compression / Herniation

## 2021-08-12 NOTE — DISCHARGE NOTE PROVIDER - NSDCFUADDAPPT_GEN_ALL_CORE_FT
Please follow up with Dr. D'Amico outpatient    Please follow up with Dr. Rubio outpatient     Please follow up with Dr. Coreas from cardiology outpatient for management of Atrial fibrillation and hypertrophic cardiomyopathy    Please also follow up with your primary care doctor

## 2021-08-12 NOTE — PROGRESS NOTE ADULT - PROBLEM SELECTOR PLAN 4
-Cardiology following  -C/w Amio, Cardizem to 180 mg PO qd, Metoprolol 100 mg PO BID  -Cardiology rec AC when cleared by NSG
-Cardiology following  -C/w Amio, Cardizem to 180 mg PO qd, Metoprolol 100 mg PO BID  -On Eliquis
-Cardiology following  -C/w Amio, Cardizem to 180 mg PO qd, Metoprolol 100 mg PO BID  -Cardiology rec AC when cleared by NSG
-Cardiology following  -C/w Amio, Cardizem to 180 mg PO qd, Metoprolol 100 mg PO BID  -On Eliquis

## 2021-08-12 NOTE — DISCHARGE NOTE PROVIDER - NSDCCPCAREPLAN_GEN_ALL_CORE_FT
PRINCIPAL DISCHARGE DIAGNOSIS  Diagnosis: Cerebellar bleed  Assessment and Plan of Treatment:       SECONDARY DISCHARGE DIAGNOSES  Diagnosis: Left ventricular outflow obstruction  Assessment and Plan of Treatment: Left ventricular outflow obstruction    Diagnosis: Atrial fibrillation with rapid ventricular response  Assessment and Plan of Treatment: Atrial fibrillation with rapid ventricular response    Diagnosis: Leukocytosis, unspecified type  Assessment and Plan of Treatment: Leukocytosis, unspecified type    Diagnosis: Metabolic acidosis  Assessment and Plan of Treatment: Metabolic acidosis    Diagnosis: Essential hypertension  Assessment and Plan of Treatment: Essential hypertension

## 2021-08-12 NOTE — PROGRESS NOTE ADULT - SUBJECTIVE AND OBJECTIVE BOX
HPI:  46 M pt with PMHx of HTN, noncompliant with meds (Metoprolol, Nifedipine, and ASA), reports no recent use of ASA or metoprolol, presents to ED c/o acute onset and worsening dizziness, R arm paresthesia, and vomiting x 1hr prior to arrival while at work. Pt somnolent in ED, difficulty obtaining hx from pt. Per EMS, pt had SBP in the 200s, given nitro SL x 2 in field. Patient found to have a left side cerebellar hemmorrhage and left pontine hemmorrhage with extension to the subarachnoid space. Patient was started on a nicardipene drip in the ED for SBP to 230's, given manitol 50g and intubated for concern of potential for aspiration with declining exam. Patient reported that he lives alone and has no close contacts in the area.     NIHSS 6  ICH 2   (28 Jul 2021 20:40)    Hospital Course:   7/28: Admitted with left cerebellar ICH. Intubated in ED for lethargy and concern for airway protection. Repeat CTH stable.  7/29: POD# 0 S/P femoral cerebral angiogram, findings of left vertebral artery irregularity at level below PICA with segment suggestive of intimal flap, left vertebral artery sacrifice performed. Hypertensive episode during angio case, Xper CT shows stable ICH. Pt seen and examined at bedside in NSICU postop. Pt agitated and reaching for ETT. Brief CPAP trial given, and Pt was successfully extubated. Patient then with many blood tinged secretions and CXR with infiltrated. Patient was reintubated. He then self-extubated and was emergently reintubated, placed in restraints now sedated on precedex and fentanyl. Received 1LNS bolus for hypotension and was also started on levophed for SBPs in 70s.   7/30: POD1 cerebral angiogram with left vertebral takedown, OLESYA overnight, neuro stable remains sedated on precedex and intubated on full vent support. Repeat CTH stable. Received 40 of lasix, increased PEEP from 7 to 8 and tapering off fentanyl drip. Self extubated and doing well on high flow NC, Kept on Precedex for agitation, started on Cardene for 's, will wean as appropriate.   7/31: POD2 cerebral angio with L verterbal takedown. S/p Seroquel 25 and Precedex overnight for agitation, otherwise OLESYA. On Cardene gtt. Neuro stable. O2 sat stable on HFNC. S/p 30 Lasix. Uptrending troponin, EKG stable, trending q6H  8/1: POD3, OLESYA overnight, on Precedex. Neuro stable.  8/2: POD#4, OLESYA overnight, neuro stable. Precedex off, tolerating RA. 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 SVT to 200s, on verapamil 80 tid, given adenosine, lopressor, verapamil, diltiazem gtt started. started on PO dilt 60 tid.  8/3: POD#5: tolerating room air, dilt gtt stopped. sinus tach to low 100s now. PO amiodareone and diltaezem started. pt is on cardene, overnight losartan started.   8/4: POD 6. Hypertensive, given labetalol 10 mg x1 with improvement. Added metoprolol, d/c'ed losartan per cards recs  8/5: POD 7. Tx to SDU overnight. OLESYA.Amiodarone decreased to 200mg daily and repeat echo ordered to evaluate EF per cards recs  8/6: POD #8. OLESYA overnight. RUE doppler neg for DVT. Dispo pending   8/7: POD9. OLESYA overnight. Dispo, emergency medicaid  8/8: POD10. OLESYA overnight. Exam stable. Dispo pending, PT recommends AR  8/9: POD11 OLESYA overnight. Neuro exam stable. Pending AR placement  8/10: POD12 OLESYA overnight, neuro exam stable. Pend AR placement.   8/11: POD13 OLESYA overnight. Neuro exam stable. Pending AR.   8/12: POD14 OLESYA overnight, neuro exam stable. Pend AR.     OVERNIGHT EVENTS:  Vital Signs Last 24 Hrs  T(C): 36.7 (12 Aug 2021 04:39), Max: 36.7 (12 Aug 2021 04:39)  T(F): 98 (12 Aug 2021 04:39), Max: 98 (12 Aug 2021 04:39)  HR: 70 (12 Aug 2021 04:39) (70 - 87)  BP: 122/81 (12 Aug 2021 04:39) (115/69 - 162/81)  BP(mean): --  RR: 16 (12 Aug 2021 04:39) (15 - 16)  SpO2: 97% (12 Aug 2021 04:39) (96% - 98%)    I&O's Summary    10 Aug 2021 07:01  -  11 Aug 2021 07:00  --------------------------------------------------------  IN: 0 mL / OUT: 850 mL / NET: -850 mL    11 Aug 2021 07:01  -  12 Aug 2021 05:29  --------------------------------------------------------  IN: 0 mL / OUT: 400 mL / NET: -400 mL        PHYSICAL EXAM:  General: Laying in bed, sleeping, NAD  Neurological: A&Ox3, responds appropriately, following commands, speech clear, CN ll-Xll grossly intact, LUE 4+/5 o/w 5/5 throughout. Slight dysmetria on LUE, SILT, no pronator drift noted.  Cardiovascular: S1, S2, murmur heard best at apex  Respiratory: clear to auscultation bilaterally  Gastrointestinal: soft, non-tender, non-distended  Extremities: appropriately warm, dry, intact, 2+ distal pulses in upper and lower extremities bilaterally    TUBES/LINES:  [] Farias  [] Lumbar Drain  [] Wound Drains  [] Others    DIET:  [] NPO  [x] Mechanical  [] Tube feeds      LABS:                        14.2   14.11 )-----------( 420      ( 11 Aug 2021 06:19 )             44.5     08-11    141  |  104  |  30<H>  ----------------------------<  98  4.4   |  26  |  1.38<H>    Ca    9.4      11 Aug 2021 06:19  Phos  4.6     08-11  Mg     2.2     08-11              CAPILLARY BLOOD GLUCOSE          Drug Levels: [] N/A    CSF Analysis: [] N/A      Allergies    No Known Allergies    Intolerances      MEDICATIONS:  Antibiotics:    Neuro:  acetaminophen   Tablet .. 650 milliGRAM(s) Oral every 6 hours PRN  ondansetron Injectable 4 milliGRAM(s) IV Push every 6 hours PRN    Anticoagulation:  apixaban 5 milliGRAM(s) Oral every 12 hours    OTHER:  aMIOdarone    Tablet 200 milliGRAM(s) Oral daily  atorvastatin 40 milliGRAM(s) Oral at bedtime  bisacodyl 5 milliGRAM(s) Oral every 12 hours PRN  dextrose 50% Injectable 25 Gram(s) IV Push once  diltiazem    milliGRAM(s) Oral daily  glucagon  Injectable 1 milliGRAM(s) IntraMuscular once  metoprolol succinate  milliGRAM(s) Oral every 12 hours  polyethylene glycol 3350 17 Gram(s) Oral daily  senna 2 Tablet(s) Oral at bedtime    IVF:  multivitamin 1 Tablet(s) Oral daily    CULTURES:  Culture Results:   No growth at 5 days. (08-03 @ 16:24)  Culture Results:   No growth at 5 days. (08-03 @ 16:24)    RADIOLOGY & ADDITIONAL TESTS:      ASSESSMENT:  46 M PMH L CVA? 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 Pt is now s/p  femoral cerebral angiogram, findings of left vertebral artery irregularity at level below PICA with segment suggestive of intimal flap, left vertebral artery sacrifice performed (7/29/2021), post op was extubated, needed to be reintubated for secretion burden and unable to protect airway,  patient self-extubated X 2. Also with SVT vs AF with RVR, and HOCM. Stable on RA.      CHEST PAIN    No pertinent family history in first degree relatives    Handoff    MEWS Score    HTN (hypertension)    HTN (hypertension)    SAH (subarachnoid hemorrhage)    Dissection of cerebral artery    SAH (subarachnoid hemorrhage)    Dissection of cerebral artery    Cerebellar bleed    Cerebellar bleed    Essential hypertension    Left ventricular outflow obstruction    Atrial fibrillation with rapid ventricular response    Leukocytosis, unspecified type    Metabolic acidosis    Creatinine elevation    Angiogram, blood vessel, cerebral, with embolization    No significant past surgical history    CHEST PAIN    [email or web address]        PLAN:  Neuro:   - Neuro checks/vital checks/groin/vascular checks q4hr  - Tylenol prn for pain control  - Repeat CTH 7/30 stable     CV:  -HCM resting grad 90 with MACRINA causing at least mod MR. Possible family hx SCD (mother). Course complicated by Afib RVR  - -160  - Cards following  - TTE- hypertrophic cardiomyopathy with severe left ventricular outflow tract obstruction and moderate mitral regurg  - Careful fluid balance  - 8/2: SVT to 200s, s/p adenosine 1.2 on 8/2, verapamil 10, lopressor 5, dilt gtt, 1L bolus, started on dilt PO 60 tid, losartan  - dilt gtt stopped, dilt 180 po  qd started  - started amio load 400 bid x 12 does then 200 daily  - Toprol 100 BID   - d/c'ed losartan per cards recs    Per cardiology: #HCM: avoid dehydration, vasodilation, and tachycardia in HCM in order to improve gradient. 90 resting gradient is significant and explains baseline poor ET as well as hospital course (tenuous respiratory status, extreme sensitivity to diuresis). Improvement in gradient will also improve MR which will help pulmonary edema.     Pulm:  - satting well RA  - blood tinged sputum    Renal:   - normonatremia goal   -  Replete electrolytes PRN    GI:  - bowel reg  - regular diet     Endo:   - ISS    Heme:  - SCD's, SQH  - trend CBC  - f/u LE doppler 7/29 neg    ID:  - f/u panculture 7/31  - trend WBC  - empiric vanc/zosyn end date 8/3     Disposition: SDU status, full code, dispo pending pt has emergency medicaid, family updated with plan.    Plan d/w Dr. D'Amico

## 2021-08-12 NOTE — PROGRESS NOTE ADULT - SUBJECTIVE AND OBJECTIVE BOX
Patient is a 46y old  Male who presents with a chief complaint of Cerebellar Stroke (04 Aug 2021 08:00)      HPI:  46 M pt with PMHx of HTN, noncompliant with meds (Metoprolol, Nifedipine, and ASA), reports no recent use of ASA or metoprolol, presents to ED c/o acute onset and worsening dizziness, R arm paresthesia, and vomiting x 1hr prior to arrival while at work. Pt somnolent in ED, difficulty obtaining hx from pt. Per EMS, pt had SBP in the 200s, given nitro SL x 2 in field. Patient found to have a left side cerebellar hemmorrhage and left pontine hemmorrhage with extension to the subarachnoid space. Patient was started on a nicardipene drip in the ED for SBP to 230's, given manitol 50g and intubated for concern of potential for aspiration with declining exam. Patient reported that he lives alone and has no close contacts in the area.     NIHSS 6  ICH 2   (2021 20:40)    Subjective:  Pt has no acute complaints. Denies pain. Denies SOB, CP. ROS is otherwise negative.     Allergies    No Known Allergies    Intolerances    Home meds:       MEDICATIONS  (STANDING):  aMIOdarone    Tablet 200 milliGRAM(s) Oral daily  apixaban 5 milliGRAM(s) Oral every 12 hours  atorvastatin 40 milliGRAM(s) Oral at bedtime  dextrose 50% Injectable 25 Gram(s) IV Push once  diltiazem    milliGRAM(s) Oral daily  glucagon  Injectable 1 milliGRAM(s) IntraMuscular once  magnesium sulfate  IVPB 1 Gram(s) IV Intermittent once  metoprolol succinate  milliGRAM(s) Oral every 12 hours  multivitamin 1 Tablet(s) Oral daily  polyethylene glycol 3350 17 Gram(s) Oral daily  senna 2 Tablet(s) Oral at bedtime    MEDICATIONS  (PRN):  acetaminophen   Tablet .. 650 milliGRAM(s) Oral every 6 hours PRN Temp greater or equal to 38C (100.4F), Mild Pain (1 - 3)  bisacodyl 5 milliGRAM(s) Oral every 12 hours PRN Constipation  ondansetron Injectable 4 milliGRAM(s) IV Push every 6 hours PRN Nausea and/or Vomiting              Drug Dosing Weight  Height (cm): 190.5 (2021 15:28)  Weight (kg): 103 (2021 15:01)  BMI (kg/m2): 28.4 (2021 15:28)  BSA (m2): 2.32 (2021 15:28)    PAST MEDICAL & SURGICAL HISTORY:  HTN (hypertension)    No significant past surgical history        FAMILY HISTORY:  No pertinent family history in first degree relatives of cardiac disease        SOCIAL HISTORY:    ADVANCE DIRECTIVES:      Vital Signs Last 24 Hrs  T(C): 36.5 (12 Aug 2021 08:39), Max: 36.7 (12 Aug 2021 04:39)  T(F): 97.7 (12 Aug 2021 08:39), Max: 98 (12 Aug 2021 04:39)  HR: 86 (12 Aug 2021 08:39) (70 - 87)  BP: 116/71 (12 Aug 2021 08:39) (115/69 - 162/81)  BP(mean): --  RR: 18 (12 Aug 2021 08:39) (16 - 18)  SpO2: 97% (12 Aug 2021 08:39) (96% - 98%)      PHYSICAL EXAM:      Constitutional: NAD  Eyes: PERRLA  ENMT: MMM  Neck: supple  Back: midline  Respiratory: CTA b/l  Cardiovascular: rrr, s1s2, no m/r//g  Gastrointestinal: soft, NTND, + BS  Extremities: wwp  Vascular: + 2 pulses radial  Neurological: AAO x 4  Skin: no rash  Lymph Nodes: no LAD  Musculoskeletal: no joint swelling  Psychiatric: normal affect        LABS:                          14.8   12.91 )-----------( 436      ( 12 Aug 2021 07:17 )             46.1   08-12    138  |  104  |  28<H>  ----------------------------<  101<H>  4.2   |  24  |  1.30    Ca    9.4      12 Aug 2021 07:17  Phos  4.6     08-12  Mg     1.9     08-12                      Urinalysis Basic - ( 04 Aug 2021 07:31 )    Color: Yellow / Appearance: Clear / S.020 / pH: x  Gluc: x / Ketone: Trace mg/dL  / Bili: Negative / Urobili: 1.0 E.U./dL   Blood: x / Protein: NEGATIVE mg/dL / Nitrite: NEGATIVE   Leuk Esterase: NEGATIVE / RBC: x / WBC x   Sq Epi: x / Non Sq Epi: x / Bacteria: x        EKG:    ECHO, US:    < from: TTE Echo Complete w/o Contrast w/ Doppler (21 @ 12:24) >  CONCLUSIONS:     1. Normal left ventricular systolic function.   2. Normal right ventricular size and systolic function.   3. Dilated left atrium.   4. No evidence of pulmonary hypertension.   5. No pericardial effusion.   6. Hypertrophic cardiomyopathy with assymetric septal hypertrophy and systolic anterior motion of the mitral valve.   7. There is severe left ventricular outflow tract obstruction with a resting gradient of 90 mm Hg.   8. There is at least moderate eccentric mitral regurgitation associated with the MACRINA.    < end of copied text >      RADIOLOGY:  < from: CT Head No Cont (21 @ 13:25) >  Since prior CT head 2021, continued evolution of left cerebellar parenchymal hemorrhage with intraventricular and subarachnoid extension. Interval mild decrease in size of the lateral and third ventricles. No new hemorrhage..    < end of copied text >

## 2021-08-12 NOTE — PROGRESS NOTE ADULT - PROBLEM SELECTOR PROBLEM 4
Atrial fibrillation with rapid ventricular response

## 2021-08-12 NOTE — PROGRESS NOTE ADULT - PROBLEM SELECTOR PLAN 7
Unclear what baseline is  -Continue to monitor UOP  -Trend w/ AM BMP  -Pt appears euvolemic and is eating all of his meals

## 2021-08-12 NOTE — PROGRESS NOTE ADULT - PROBLEM SELECTOR PLAN 3
Cardiology following  -Rec repeat TTE prior to discharge (when HR better controlled)  -Now rec EP/CTS consult as outpatient
Cardiology following  -Rec repeat TTE prior to discharge (when HR better controlled)
Cardiology following  -Rec repeat TTE prior to discharge (when HR better controlled)  -Now rec EP/CTS consult as outpatient
Cardiology following  -Rec repeat TTE prior to discharge (when HR better controlled)  -Now rec EP/CTS consult

## 2021-08-12 NOTE — PROGRESS NOTE ADULT - PROVIDER SPECIALTY LIST ADULT
Cardiology
NSICU
NSICU
Neurosurgery
Cardiology
Hospitalist
Hospitalist
NSICU
Neurosurgery
Cardiology
Hospitalist
Neurosurgery
Cardiology
NSICU
Neurosurgery
Cardiology
Cardiology
Hospitalist
NSICU
Neurosurgery
NSICU
NSICU
Hospitalist
Hospitalist

## 2021-08-12 NOTE — DISCHARGE NOTE PROVIDER - CARE PROVIDER_API CALL
DAmico, Randy S (MD)  Neurosurgery  [address]  [place], [place] [address]  Phone: [phone]  Fax: [phone]  Follow Up Time:    DAmico, Randy S (MD)  Neurosurgery  [address] [place] [address]  Phone: [phone]  Fax: [phone]  Follow Up Time:     Joseph Rubio)  Neurology; Vascular Neurology  [address], [address] [place] [address]  Phone: [phone]  Fax: [phone]  Follow Up Time:     Ivette Coreas  CARDIOLOGY  [address] [place] [address]  Phone: [phone]  Fax: [phone]  Follow Up Time:

## 2021-08-12 NOTE — PROGRESS NOTE ADULT - PROBLEM SELECTOR PLAN 1
Management as per primary team  -S/p L vertebral artery sacrifice

## 2021-08-17 DIAGNOSIS — N17.9 ACUTE KIDNEY FAILURE, UNSPECIFIED: ICD-10-CM

## 2021-08-17 DIAGNOSIS — E87.2 ACIDOSIS: ICD-10-CM

## 2021-08-17 DIAGNOSIS — G93.5 COMPRESSION OF BRAIN: ICD-10-CM

## 2021-08-17 DIAGNOSIS — I16.1 HYPERTENSIVE EMERGENCY: ICD-10-CM

## 2021-08-17 DIAGNOSIS — I61.3 NONTRAUMATIC INTRACEREBRAL HEMORRHAGE IN BRAIN STEM: ICD-10-CM

## 2021-08-17 DIAGNOSIS — I77.74 DISSECTION OF VERTEBRAL ARTERY: ICD-10-CM

## 2021-08-17 DIAGNOSIS — I48.91 UNSPECIFIED ATRIAL FIBRILLATION: ICD-10-CM

## 2021-08-17 DIAGNOSIS — I47.1 SUPRAVENTRICULAR TACHYCARDIA: ICD-10-CM

## 2021-08-17 DIAGNOSIS — G93.6 CEREBRAL EDEMA: ICD-10-CM

## 2021-08-17 DIAGNOSIS — I61.4 NONTRAUMATIC INTRACEREBRAL HEMORRHAGE IN CEREBELLUM: ICD-10-CM

## 2021-08-17 DIAGNOSIS — I80.8 PHLEBITIS AND THROMBOPHLEBITIS OF OTHER SITES: ICD-10-CM

## 2021-08-17 DIAGNOSIS — I95.9 HYPOTENSION, UNSPECIFIED: ICD-10-CM

## 2021-08-17 DIAGNOSIS — I61.9 NONTRAUMATIC INTRACEREBRAL HEMORRHAGE, UNSPECIFIED: ICD-10-CM

## 2021-08-17 DIAGNOSIS — Z78.1 PHYSICAL RESTRAINT STATUS: ICD-10-CM

## 2021-08-17 DIAGNOSIS — I10 ESSENTIAL (PRIMARY) HYPERTENSION: ICD-10-CM

## 2021-08-17 DIAGNOSIS — Z91.14 PATIENT'S OTHER NONCOMPLIANCE WITH MEDICATION REGIMEN: ICD-10-CM

## 2021-08-17 DIAGNOSIS — R29.706 NIHSS SCORE 6: ICD-10-CM

## 2021-08-17 DIAGNOSIS — D72.829 ELEVATED WHITE BLOOD CELL COUNT, UNSPECIFIED: ICD-10-CM

## 2021-08-17 DIAGNOSIS — I42.1 OBSTRUCTIVE HYPERTROPHIC CARDIOMYOPATHY: ICD-10-CM

## 2021-08-18 PROBLEM — Z00.00 ENCOUNTER FOR PREVENTIVE HEALTH EXAMINATION: Status: ACTIVE | Noted: 2021-08-18

## 2021-08-24 ENCOUNTER — NON-APPOINTMENT (OUTPATIENT)
Age: 46
End: 2021-08-24

## 2021-08-24 ENCOUNTER — APPOINTMENT (OUTPATIENT)
Dept: HEART AND VASCULAR | Facility: CLINIC | Age: 46
End: 2021-08-24
Payer: SELF-PAY

## 2021-08-24 VITALS
HEART RATE: 80 BPM | DIASTOLIC BLOOD PRESSURE: 120 MMHG | SYSTOLIC BLOOD PRESSURE: 172 MMHG | TEMPERATURE: 98.6 F | BODY MASS INDEX: 29.55 KG/M2 | OXYGEN SATURATION: 98 % | WEIGHT: 223 LBS | HEIGHT: 73 IN

## 2021-08-24 PROCEDURE — 93000 ELECTROCARDIOGRAM COMPLETE: CPT

## 2021-08-24 PROCEDURE — 99214 OFFICE O/P EST MOD 30 MIN: CPT

## 2021-08-24 NOTE — HISTORY OF PRESENT ILLNESS
[FreeTextEntry1] : 46 M HTN recent admission to [facility] for acute CVA with intracranial hemorrhage in the left cerebellar and left pontine region with extension to the subarachnoid space due to a dissected intracranial segment of the left vertebral artery.  Patient had presented with SBP in 200's, dizziness/vomiting, right arm paresthesias. He is now s/p endovascular embolization of the left vert artery on 21.  During admission course complicated by afib with rvr.  He had an echo which showed severe asymmetric septal hypertrophy with evidence of resting LVOT obstruction.  He was dc on bb and ccb.  He was discharged on 21.  \par \par Since dc symptoms have improved but still feels his balance, dexterity, and speech is not back to normal.  Walked > 1/2 mile today to get to this appt without difficulty.  No palps, cp, sob, or fainting.  Not taking the diltiazem because it makes him dizzy, lightheaded, cp, and short of breath.  Feels like he cannot get out of bed.  His BPs are in the 140s when he takes both the bb and ccb and that is when he feels the worst.  Has not gone for physical therapy yet\par \par Fhx: Mother afib, htn, valve disease.  Brother  at age 1 (unclear reasons).  Sister is healthy.  Does not know father, last saw him age 9.\par \par \par

## 2021-08-24 NOTE — ASSESSMENT
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  Will have EP evaluate patient.  Currently on amiodarone, may be better managed with sotalol or disopyramide, especially given young age and potential for amio toxicity over a long period of time.  \par - continue Toprol XL 100mg.  Not taking diltiazem 180mg ER due to significant symptoms. BP not controlled, will place 24 hour BP monitor for better gauge of pressures.  Trial lower dose DILTIAZEM 120MG ER \par - 48 hour zio monitor\par - cardiac mri with gadolinium to best assess LV wall thickness, evidence of scar \par  \par

## 2021-09-08 ENCOUNTER — APPOINTMENT (OUTPATIENT)
Dept: MRI IMAGING | Facility: HOSPITAL | Age: 46
End: 2021-09-08

## 2021-09-08 ENCOUNTER — RESULT REVIEW (OUTPATIENT)
Age: 46
End: 2021-09-08

## 2021-09-08 ENCOUNTER — OUTPATIENT (OUTPATIENT)
Dept: OUTPATIENT SERVICES | Facility: HOSPITAL | Age: 46
LOS: 1 days | End: 2021-09-08
Payer: COMMERCIAL

## 2021-09-08 PROCEDURE — A9577: CPT

## 2021-09-08 PROCEDURE — 75561 CARDIAC MRI FOR MORPH W/DYE: CPT

## 2021-09-08 PROCEDURE — 75561 CARDIAC MRI FOR MORPH W/DYE: CPT | Mod: 26

## 2021-09-24 ENCOUNTER — APPOINTMENT (OUTPATIENT)
Dept: HEART AND VASCULAR | Facility: CLINIC | Age: 46
End: 2021-09-24

## 2021-09-29 ENCOUNTER — NON-APPOINTMENT (OUTPATIENT)
Age: 46
End: 2021-09-29

## 2021-09-29 ENCOUNTER — APPOINTMENT (OUTPATIENT)
Dept: HEART AND VASCULAR | Facility: CLINIC | Age: 46
End: 2021-09-29
Payer: MEDICARE

## 2021-09-29 VITALS
HEIGHT: 73 IN | HEART RATE: 84 BPM | TEMPERATURE: 98 F | BODY MASS INDEX: 33.13 KG/M2 | OXYGEN SATURATION: 98 % | SYSTOLIC BLOOD PRESSURE: 182 MMHG | DIASTOLIC BLOOD PRESSURE: 140 MMHG | WEIGHT: 250 LBS

## 2021-09-29 DIAGNOSIS — I48.0 PAROXYSMAL ATRIAL FIBRILLATION: ICD-10-CM

## 2021-09-29 DIAGNOSIS — I42.2 OTHER HYPERTROPHIC CARDIOMYOPATHY: ICD-10-CM

## 2021-09-29 PROCEDURE — 99214 OFFICE O/P EST MOD 30 MIN: CPT

## 2021-09-29 NOTE — ASSESSMENT
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 Asymmetric septal hypertrophy max diameter 24mm, inferolateral wall 10mm. Transmural enhancement of basal to mid lateral wall. Severe MR with MACRINA.\par \par - given transmural enhancement of basal to mid lateral wall will obtain coronary cta.  No prior ischemic workup.\par - severe mitral regurgitation on CMR, noted to be mild on recent TTE.  Will obtain VERONIQUE.  \par - continue rate and rhythm control strategy for afib.  Will have EP evaluate patient.  Currently on amiodarone, may be better managed with sotalol or disopyramide, especially given young age and potential for amio toxicity over a long period of time.  Also to evaluate for possible ICD.  Pt does have fhx of SCD and scarring on CMR, though it is not located in the septum.  No syncope. No NSVT on holter.\par - continue Toprol XL 100mg.  Unable to tolerate low dose diltiazem due to severe symptoms. Check labs, will trial alternative low dose antihypertensive.  \par \par  \par

## 2021-09-29 NOTE — HISTORY OF PRESENT ILLNESS
Referral request for Romayne Duster provider done. [FreeTextEntry1] : 46 M HTN recent admission to [facility] for acute CVA with intracranial hemorrhage in the left cerebellar and left pontine region with extension to the subarachnoid space due to a dissected intracranial segment of the left vertebral artery.  Patient had presented with SBP in 200's, dizziness/vomiting, right arm paresthesias. He is now s/p endovascular embolization of the left vert artery on 21.  During admission course complicated by afib with rvr.  He had an echo which showed severe asymmetric septal hypertrophy with evidence of resting LVOT obstruction.  He was dc on bb and ccb.  He was discharged on 21.  \par \par Since dc symptoms have improved but still feels his balance, dexterity, and speech is not back to normal.  Walked > 1/2 mile today to get to this appt without difficulty.  No palps, cp, sob, or fainting.  Not taking the diltiazem because it makes him dizzy, lightheaded, cp, and short of breath.  Feels like he cannot get out of bed.  His BPs are in the 140s when he takes both the bb and ccb and that is when he feels the worst.  Has not gone for physical therapy yet\par \par 21:  not taking diltiazem due to sob, says he cant get out of bed, when he takes his BP it is in the 130s systolic, he says this is too low for him. feels like he has no limitations when he is just taking the bb, though his pressure is in the 160s-170s systolic.  did not follow up with ep after last visit. \par \par Fhx: Mother afib, htn, valve disease.  Brother  at age 1 (unclear reasons).  Sister is healthy.  Does not know father, last saw him age 9.\par \par \par \par

## 2021-09-29 NOTE — PHYSICAL EXAM
[Well Developed] : well developed [Well Nourished] : well nourished [No Acute Distress] : no acute distress [Normal Conjunctiva] : normal conjunctiva [Normal Venous Pressure] : normal venous pressure [No Carotid Bruit] : no carotid bruit [Normal S1, S2] : normal S1, S2 [No Rub] : no rub [No Gallop] : no gallop [Clear Lung Fields] : clear lung fields [Good Air Entry] : good air entry [No Respiratory Distress] : no respiratory distress  [Soft] : abdomen soft [Non Tender] : non-tender [No Masses/organomegaly] : no masses/organomegaly [Normal Bowel Sounds] : normal bowel sounds [Normal Gait] : normal gait [No Edema] : no edema [No Cyanosis] : no cyanosis [No Clubbing] : no clubbing [No Varicosities] : no varicosities [No Rash] : no rash [No Skin Lesions] : no skin lesions [Moves all extremities] : moves all extremities [No Focal Deficits] : no focal deficits [Normal Speech] : normal speech [Alert and Oriented] : alert and oriented [Normal memory] : normal memory [de-identified] : 3/6 systolic murmur across precordium

## 2021-09-30 LAB
25(OH)D3 SERPL-MCNC: 17.5 NG/ML
ALBUMIN SERPL ELPH-MCNC: 4.6 G/DL
ALP BLD-CCNC: 71 U/L
ALT SERPL-CCNC: 45 U/L
ANION GAP SERPL CALC-SCNC: 13 MMOL/L
AST SERPL-CCNC: 27 U/L
BASOPHILS # BLD AUTO: 0.11 K/UL
BASOPHILS NFR BLD AUTO: 1.3 %
BILIRUB SERPL-MCNC: 0.6 MG/DL
BUN SERPL-MCNC: 19 MG/DL
CALCIUM SERPL-MCNC: 9.6 MG/DL
CHLORIDE SERPL-SCNC: 104 MMOL/L
CHOLEST SERPL-MCNC: 290 MG/DL
CO2 SERPL-SCNC: 24 MMOL/L
CREAT SERPL-MCNC: 1.16 MG/DL
EOSINOPHIL # BLD AUTO: 0.21 K/UL
EOSINOPHIL NFR BLD AUTO: 2.4 %
ESTIMATED AVERAGE GLUCOSE: 103 MG/DL
GLUCOSE SERPL-MCNC: 83 MG/DL
HBA1C MFR BLD HPLC: 5.2 %
HCT VFR BLD CALC: 46.9 %
HDLC SERPL-MCNC: 63 MG/DL
HGB BLD-MCNC: 15.1 G/DL
IMM GRANULOCYTES NFR BLD AUTO: 0.7 %
LDLC SERPL CALC-MCNC: 187 MG/DL
LYMPHOCYTES # BLD AUTO: 2.62 K/UL
LYMPHOCYTES NFR BLD AUTO: 30.5 %
MAGNESIUM SERPL-MCNC: 1.8 MG/DL
MAN DIFF?: NORMAL
MCHC RBC-ENTMCNC: 29.3 PG
MCHC RBC-ENTMCNC: 32.2 GM/DL
MCV RBC AUTO: 91.1 FL
MONOCYTES # BLD AUTO: 0.73 K/UL
MONOCYTES NFR BLD AUTO: 8.5 %
NEUTROPHILS # BLD AUTO: 4.85 K/UL
NEUTROPHILS NFR BLD AUTO: 56.6 %
NONHDLC SERPL-MCNC: 227 MG/DL
NT-PROBNP SERPL-MCNC: 2323 PG/ML
PLATELET # BLD AUTO: 249 K/UL
POTASSIUM SERPL-SCNC: 4.7 MMOL/L
PROT SERPL-MCNC: 7 G/DL
RBC # BLD: 5.15 M/UL
RBC # FLD: 13.6 %
SODIUM SERPL-SCNC: 142 MMOL/L
TRIGL SERPL-MCNC: 200 MG/DL
TSH SERPL-ACNC: 4.44 UIU/ML
WBC # FLD AUTO: 8.58 K/UL

## 2021-09-30 RX ORDER — DILTIAZEM HYDROCHLORIDE 120 MG/1
120 CAPSULE, EXTENDED RELEASE ORAL DAILY
Qty: 90 | Refills: 3 | Status: DISCONTINUED | COMMUNITY
End: 2021-09-30

## 2021-10-04 ENCOUNTER — NON-APPOINTMENT (OUTPATIENT)
Age: 46
End: 2021-10-04

## 2021-10-04 ENCOUNTER — APPOINTMENT (OUTPATIENT)
Dept: HEART AND VASCULAR | Facility: CLINIC | Age: 46
End: 2021-10-04
Payer: COMMERCIAL

## 2021-10-04 VITALS
TEMPERATURE: 96.2 F | HEIGHT: 73 IN | SYSTOLIC BLOOD PRESSURE: 162 MMHG | DIASTOLIC BLOOD PRESSURE: 118 MMHG | BODY MASS INDEX: 29.16 KG/M2 | HEART RATE: 78 BPM | WEIGHT: 220 LBS | OXYGEN SATURATION: 98 %

## 2021-10-04 PROCEDURE — 93000 ELECTROCARDIOGRAM COMPLETE: CPT

## 2021-10-04 PROCEDURE — 99215 OFFICE O/P EST HI 40 MIN: CPT

## 2021-10-05 NOTE — CARDIOLOGY SUMMARY
[de-identified] : NSR @ 75 bpm, lateral TWI [de-identified] : 1.  The left ventricle (LV) is normal in size. . Left ventricular global systolic function is normal. The LV ejection fraction is 60 %.\par 2.  Asymmetric LV hypertrophy with thickening of the basal to mid septum of up to 24 mm in the basal anteroseptum.  The basal inferolateral wall measures 10 mm.  Additionally there is loss of the normal basal to apical tapering in wall thickness.  The apical segments  measure up to 13 mm in the inferior segment.  There is apical systolic cavity obliteration.\par 3.  The right ventricle (RV) is normal in size. RV global systolic function is normal. The RV ejection fraction is 69 %.\par 4.  Severe mitral regurgitation.  Systolic anterior motion with flow acceleration in the LVOT.  The calculated regurgitant volume by indirect quantitation is 60 mL and regurgitant fraction is 51%.  Recommend correlation with echo for assessment of outflow tract gradients/obstruction.\par 5.  No significant abnormalities of the visualized portions of the great vessels.\par 6.  On delayed enhancement imaging, there is near transmural enhancement of the basal to mid lateral wall. While this is typically coronary pattern, the wall motion and wall thickness appear relatively preserved in those regions. In the absence of coronary disease the differential includes hypertrophic cardiomyopathy (though atypical to have enhancement in non-hypertrophied regions), Pablo Fabry disease (typically midwall enhancement) and Danon's disease.  Recommend clinical correlation and additional evaluation for infiltrative process.  Additionally there is mild patchy enhancement of the basal RV insertion point which is non-specific finding.

## 2021-10-05 NOTE — DISCUSSION/SUMMARY
[FreeTextEntry1] : Mr. Basurto is a 46 year-old gentleman with a history of left spontaneous vertebral artery dissection complicated by ICH s/p embolization, uncontrolled HTN, atrial fibrillation, HOCM and severe MR.  Given finding of severe MR on TTE, this should be further evaluated with VERONIQUE and consideration for intervention.  If MV surgery is to be performed, the patient would benefit from a cryomaze and MARILYN occlusion at the time of the procedure.  If no intervention is planned, we discussed the utility of a catheter based ablation and Watchman device.  However, we would need to consider the safety of heparinization tobin procedure given his history of ICH.  He will likely require an ICD for primary prevention of SCD given findings of asymmetric LVH and transmural scar on noted on cardiac MRI but would defer until after surgery to avoid risk for lead dislodgement.  He was asked to return for follow-up in 4-6 weeks and knows to call with any questions or concerns in the interim.

## 2021-10-05 NOTE — HISTORY OF PRESENT ILLNESS
[FreeTextEntry1] : Mr. Basurto is a pleasant 46 year-old gentleman with a past medical history significant for HTN, admitted to [facility] 2021 with acute CVA with intracranial hemorrhage in the left cerebellar and left pontine region with extension to the subarachnoid space due to a dissected intracranial segment of the left vertebral artery.  Patient had presented with SBP in 200's, dizziness/vomiting, right arm paresthesias. He is now s/p endovascular embolization of the left vert artery on 21.  His hospital course was complicated by atrial fibrillation with RVR.  Started on Amiodarone.  Tolerating Eliquis without bleeding issues.  Awaiting coronary CTA given cardiac MRI findings.  He had an echo which showed severe asymmetric septal hypertrophy with evidence of resting LVOT obstruction and severe MR.\par \par Reports he had a brother who  when he was [age] year old of unknown causes.\par \par He had a 24 hour holter monitor with no sustained arrhythmias.

## 2021-10-06 ENCOUNTER — APPOINTMENT (OUTPATIENT)
Dept: CT IMAGING | Facility: CLINIC | Age: 46
End: 2021-10-06

## 2021-10-07 PROBLEM — Z82.49 FAMILY HISTORY OF HYPERTENSION: Status: ACTIVE | Noted: 2021-10-07

## 2021-10-07 PROBLEM — Z82.49 FAMILY HISTORY OF VALVULAR HEART DISEASE: Status: ACTIVE | Noted: 2021-10-07

## 2021-10-07 PROBLEM — Z82.3 FAMILY HISTORY OF CEREBROVASCULAR ACCIDENT (CVA): Status: ACTIVE | Noted: 2021-10-07

## 2021-10-07 PROBLEM — Z82.49 FAMILY HISTORY OF ATRIAL FIBRILLATION: Status: ACTIVE | Noted: 2021-10-07

## 2021-10-12 ENCOUNTER — APPOINTMENT (OUTPATIENT)
Dept: NEUROSURGERY | Facility: CLINIC | Age: 46
End: 2021-10-12
Payer: MEDICARE

## 2021-10-12 VITALS
TEMPERATURE: 97 F | BODY MASS INDEX: 29.16 KG/M2 | OXYGEN SATURATION: 98 % | WEIGHT: 220 LBS | SYSTOLIC BLOOD PRESSURE: 182 MMHG | RESPIRATION RATE: 18 BRPM | HEIGHT: 73 IN | HEART RATE: 80 BPM | DIASTOLIC BLOOD PRESSURE: 157 MMHG

## 2021-10-12 DIAGNOSIS — Z82.49 FAMILY HISTORY OF ISCHEMIC HEART DISEASE AND OTHER DISEASES OF THE CIRCULATORY SYSTEM: ICD-10-CM

## 2021-10-12 DIAGNOSIS — Z82.3 FAMILY HISTORY OF STROKE: ICD-10-CM

## 2021-10-12 PROCEDURE — 99213 OFFICE O/P EST LOW 20 MIN: CPT

## 2021-10-12 NOTE — HISTORY OF PRESENT ILLNESS
[FreeTextEntry1] : 47 y/o male with a hx of HTH, non compliant with meds ( Metoprolol, Nifedipine and ASA) who developed acute onset of dizziness, right arm paresthesia, vomiting and lethargy on July 28 2021.SBP in the 200s.  ED showed left sided cerebellar hemorrhage, pontine hemorrhage and SAH. CTA showed a dissection of the intracranial segment of the left vertebral artery. He underwent  femoral cerebral angiogram, endovascular MVP and coil embolization of dissected intradural left vertebral artery by Dr. Rubio on 7/29/2021.\par \par Hospital course c/b Afib with RVR (now on Eliquis). Cardiology consulted for severe L ventricular outflow obstruction due to HCOM (hypertrophic cardiomyopathy). SVT to 200s, on verapamil 80 tid, given adenosine, lopressor, verapamil, diltiazem gtt started. started on PO dilt 60 tid. TTE showed severe mitral valve regurgitation. He is pending management plan for this-- ablation and Watchman device vs. MV surgery, but the patient does not want to do it.\par \par Patient discharged to home with home PT/OT on 8/12/21 with an MRS score of 2 --slight disability. He has not gotten PT/OT since. He reports 100% improvement on the RUE/RLE strength, improved LUE but remains weak/ataxic. He denies unsteady gait.\par \par He is on Eliquis 5 mg q12h, Diltiazem, Amiodarone 200 mg daily, Lipitor 40 mg at HS. Today's blood pressure is 182/157, remains elevated, per baseline. He denies CP, new focal weakness, numbness/tingling.\par \par He is wishing to return to work in [employer].\par \par \par \par \par \par Handedness: RIGHT\par \par Patient Address: \par [address] [place] [address]\[place] [address]\par \par cc:\par Dr. Randy D'Amico\par \par Cardiology:\par Dr. Warren Altamirano\par [address]\[place] [address]\par \par EP:\par Dr. Nohelia Palm\par [address]\[place] [address]

## 2021-10-12 NOTE — ASSESSMENT
[FreeTextEntry1] : 46 years-old right-handed man with history of uncontrolled hypertension, atrial fibrillation (on Eliquis) and left cerebellar ICH, IVH and SAH in July 2021 due to dissection of the intracranial left vertebral artery.  At that time, he underwent endovascular coil occlusion of the intracranial segment of the left vertebral artery.  He has had a remarkable recovery since then.  He continues to have left arm dysmetria but is otherwise intact.  He is undergoing work up and management by the cardiology team.  We are recommending physical therapy.  A follow up MRA of the brain is recommended in the upcoming month and a follow up cerebral angiogram is recommended in January 2022.\par \par Patient was counseled on the importance of maintaining and being compliant with his BP/antiarrhythmic meds in preventing another stroke.\par \par Plan:\par 1. Three month follow up MRA brain s/p embolization this month and RTO to review it.\par 2. Follow up cerebral angiogram 6 months post embolization (Jan 2022).\par 3. Follow up with Cardiology re: cardiac procedure/surgery, clearance to return to work.\par 4. Order PT/OT for left arm ataxia. Rx given to patient.

## 2021-10-12 NOTE — REVIEW OF SYSTEMS
[As Noted in HPI] : as noted in HPI [Arm Weakness] : arm weakness [As noted in HPI] : as noted in HPI [Negative] : Integumentary [Melena] : no melena [Easy Bleeding] : no tendency for easy bleeding [Easy Bruising] : no tendency for easy bruising

## 2021-10-12 NOTE — PHYSICAL EXAM
[General Appearance - Alert] : alert [General Appearance - In No Acute Distress] : in no acute distress [Oriented To Time, Place, And Person] : oriented to person, place, and time [Impaired Insight] : insight and judgment were intact [Person] : oriented to person [Place] : oriented to place [Time] : oriented to time [Cranial Nerves Optic (II)] : visual acuity intact bilaterally,  pupils equal round and reactive to light [Cranial Nerves Oculomotor (III)] : extraocular motion intact [Cranial Nerves Vestibulocochlear (VIII)] : hearing was intact bilaterally [Cranial Nerves Glossopharyngeal (IX)] : tongue and palate midline [Cranial Nerves Accessory (XI - Cranial And Spinal)] : head turning and shoulder shrug symmetric [Cranial Nerves Hypoglossal (XII)] : there was no tongue deviation with protrusion [Motor Tone] : muscle tone was normal in all four extremities [Motor Strength] : muscle strength was normal in all four extremities [Balance] : balance was intact [] : no respiratory distress [Respiration, Rhythm And Depth] : normal respiratory rhythm and effort [Apical Impulse] : the apical impulse was normal [Heart Rate And Rhythm] : heart rate was normal and rhythm regular [Abnormal Walk] : normal gait [FreeTextEntry5] : 5/5 on RUE/RLE, 4-5/5 on the LUE/LLE, decreased sensation on the LUE, + LUE ataxia; No pronator drift; + mild LEFT facial asymmetry. [FreeTextEntry1] : 2+ bilateral ankle edema

## 2021-10-12 NOTE — ADDENDUM
[FreeTextEntry1] : 2021\par \par Randy D’Amico, MD\par [facility]\par [address]\par [address] Floor\[place] [address]\par \par Patient’s Name:  Hang Basurto\par : 1975\par \par Dear Dr. D’Amico:\par \par We saw Hang in our office at [facility].  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  At that time, he underwent endovascular coil occlusion of the intracranial segment of the left vertebral artery.  He has had a remarkable recovery since then.  He continues to have left arm dysmetria but is otherwise intact.  He is undergoing work up and management by the cardiology team.  We are recommending physical therapy.  A follow up MRA of the brain is recommended in the upcoming month and a follow up cerebral angiogram is recommended in 2022.\par \par I will keep you updated at all times.  Thank you for allowing us to participate in Hang’s care.\par \par Sincerely,\par \par \par Joseph Rubio MD\par Chief\par Neuro-Endovascular Surgery and \par Interventional Neuroradiology \par [facility]\par \par [facility]\par [address]\par [address] Floor\[place] [address]\par T:  [phone]\par F:  [phone]\par \par cc:	Warren Altamirano MD\par 	[address]\[place] 	[place] [address]\par \par 	Nohelia Palm MD\par 	[address]\[place] 	[place] [address]\par \par 	Hang Basurto\par 	[address]\[place] [address]\par \par \par \par 	\par \par \par 
Dr. Álvarez

## 2021-10-13 ENCOUNTER — APPOINTMENT (OUTPATIENT)
Dept: HEART AND VASCULAR | Facility: CLINIC | Age: 46
End: 2021-10-13

## 2021-10-26 ENCOUNTER — RESULT REVIEW (OUTPATIENT)
Age: 46
End: 2021-10-26

## 2021-10-26 ENCOUNTER — OUTPATIENT (OUTPATIENT)
Dept: OUTPATIENT SERVICES | Facility: HOSPITAL | Age: 46
LOS: 1 days | End: 2021-10-26

## 2021-10-26 ENCOUNTER — APPOINTMENT (OUTPATIENT)
Dept: MRI IMAGING | Facility: CLINIC | Age: 46
End: 2021-10-26
Payer: COMMERCIAL

## 2021-10-26 PROCEDURE — 70544 MR ANGIOGRAPHY HEAD W/O DYE: CPT | Mod: 26

## 2021-10-27 ENCOUNTER — NON-APPOINTMENT (OUTPATIENT)
Age: 46
End: 2021-10-27

## 2021-11-01 ENCOUNTER — APPOINTMENT (OUTPATIENT)
Dept: NEUROLOGY | Facility: CLINIC | Age: 46
End: 2021-11-01
Payer: COMMERCIAL

## 2021-11-01 ENCOUNTER — NON-APPOINTMENT (OUTPATIENT)
Age: 46
End: 2021-11-01

## 2021-11-01 VITALS
HEART RATE: 70 BPM | SYSTOLIC BLOOD PRESSURE: 150 MMHG | BODY MASS INDEX: 34.19 KG/M2 | OXYGEN SATURATION: 97 % | RESPIRATION RATE: 16 BRPM | HEIGHT: 73 IN | DIASTOLIC BLOOD PRESSURE: 100 MMHG | TEMPERATURE: 98.3 F | WEIGHT: 258 LBS

## 2021-11-01 PROCEDURE — 99205 OFFICE O/P NEW HI 60 MIN: CPT

## 2021-11-01 RX ORDER — ATORVASTATIN CALCIUM 40 MG/1
40 TABLET, FILM COATED ORAL DAILY
Refills: 0 | Status: DISCONTINUED | COMMUNITY
End: 2021-11-01

## 2021-11-01 NOTE — PHYSICAL EXAM
[FreeTextEntry1] : Alert.  Fully oriented.  Speech and language are intact.  Cranial nerves II-XII are intact.  Motor exam reveals intact strength with individual muscle testing in bilateral upper and lower extremities.  Tone is normal.  Reflexes are normal.  Toes are downgoing.  Sensation is intact to light touch in distal extremities.  Slight dysmetria with L finger-to-nose; heel-to-shin b/l intact.  Rapid alternating movements are normal mildly slow on L upper and lower extremities.  Gait is slightly off balanced which the patient reports is baseline following his ICH.\par

## 2021-11-01 NOTE — HISTORY OF PRESENT ILLNESS
[FreeTextEntry1] : The patient is a 46-year-old gentleman with a history of hypertension, atrial fibrillation, HOCM, and severe MV regurgitation with recent left cerebellar and pontine intracerebral hemorrhage with subarachnoid extension secondary to left intracranial vertebral artery dissection (nontraumatic) in the setting of 's status post embolization.  He presents for follow-up after being found to have a new acute ischemic stroke in the left corona radiata 10/26 (? small foci of left frontal cortical infarct as well but without clear ADC correlate) on a background of significant small vessel disease.\par \par The patient reports adherence to his Eliquis, Aspirin 325 mg daily, atorvastatin 40 milligrams daily, and his blood pressure regimen.  He has a recent lipid panel showing an LDL of 187, total cholesterol 290.  A1c 5.7.  He states he checks his blood pressure daily at home and it ranges 160-170 systolic.  He has not been exercising.  He has not returned to work which he is very disheartened about as he has many bills to pay related to his recent medical care. He states he tries to eat healthfully but eats too much.  He does not smoke or drink alcohol. \par \par Of note, patient recommended VERONIQUE by cardiology as well as Cardiac CT but has not yet done these as he is frustrated with the number and cost of tests he has undergone recently and is in debt because of them.  CTA in July showed no significant carotid disease. \par \par The patient denies any new neurologic symptoms.  He reports near 100% improvement in his symptoms from his hemorrhage back in July.  He was unable to obtain PT/OT.\par \par

## 2021-11-01 NOTE — DISCUSSION/SUMMARY
[FreeTextEntry1] : The patient is a very pleasant 46-year-old gentleman with multiple poorly controlled vascular risk factors presenting for evaluation of a recent ischemic stroke on a background of recent ICH related to spontaneous intracerebral VA dissection and HTN. \par \par Overall, given topography of the stroke and his significant vascular risk factors, etiology seems most likely small vessel. He should continue aspirin, statin (increase to 80 mg daily with goal LDL <70) I have encouraged him monitor his BP twice daily and bring these results to either his PCP/cardiology so that his BP regimen can be adjusted to obtain normotension. He was encouraged eat well and exercise.  \par \par Large vessel vs cardioembolic seem less likely mechanisms of stroke. Carotid imaging in July showed no significant stenosis at that time and it seems less likely that he would have developed new stenotic disease in such a short period of time. He has no history to suggest new dissection. Regardless, we did discuss possibly repeating CTA or carotid US out of extra precaution but patient defers further testing at this time.  Cardioembolic source is also felt less likely though VERONIQUE could be performed to evaluate this further.  For now, he is optimally medically treated in either case.  Hypercoagable state also seems less likely given patient's multiple risk factors and no prior history of blood clots/family history of clotting disorder.\par \par PT/OT referrals again sent. Given patient's strong desire to return to work, will reach out to his cardiologist at his request.  F/U in 2-3 months.

## 2021-11-02 ENCOUNTER — APPOINTMENT (OUTPATIENT)
Dept: NEUROSURGERY | Facility: CLINIC | Age: 46
End: 2021-11-02
Payer: MEDICARE

## 2021-11-02 ENCOUNTER — NON-APPOINTMENT (OUTPATIENT)
Age: 46
End: 2021-11-02

## 2021-11-02 VITALS
BODY MASS INDEX: 34.19 KG/M2 | OXYGEN SATURATION: 97 % | TEMPERATURE: 97.3 F | WEIGHT: 258 LBS | HEART RATE: 83 BPM | SYSTOLIC BLOOD PRESSURE: 176 MMHG | DIASTOLIC BLOOD PRESSURE: 144 MMHG | HEIGHT: 73 IN | RESPIRATION RATE: 16 BRPM

## 2021-11-02 DIAGNOSIS — I10 ESSENTIAL (PRIMARY) HYPERTENSION: ICD-10-CM

## 2021-11-02 DIAGNOSIS — I73.9 PERIPHERAL VASCULAR DISEASE, UNSPECIFIED: ICD-10-CM

## 2021-11-02 PROCEDURE — 99213 OFFICE O/P EST LOW 20 MIN: CPT

## 2021-11-02 NOTE — DATA REVIEWED
[de-identified] : [facility]\par    [facility] Department of Radiology\par   Radiology Report\par \par \par Patient Name: DICK COLIN   Report Date: 27-Oct-2021 09:14.00 \par Patient ID: [id number] ([id number]), [id number] ([id number])  Accession No.: [id number] \par Patient Birth Date: 1975  Report Status: F \par Referring Physician: [id number] JOSEPH BERKOWITZ   Reason For Study: I77.74  \par \par \par \par \par \par \par \par \par EXAM: MR ANGIO BRAIN\par \par PROCEDURE DATE: 10/26/2021\par \par \par \par \par INTERPRETATION: PROCEDURE: MRA brain without contrast.\par \par INDICATION: 3 month follow-up status post endovascular embolization of dissecting intradural left vertebral artery microvascular plugs and coils\par \par TECHNIQUE: The MRA of the brain was performed utilizing 3 D TOF technique. Axial FLAIR and diffusion weighted images of the brain were also obtained.\par \par COMPARISON: CTA brain 7/28/2021\par \par FINDINGS: The MRA examination demonstrates lack of visualization of the majority of the left vertebral artery with susceptibility along the proximal intradural segment compatible with patient's history of embolization. There is minimum signal within the distal left vertebral artery prior to the origin of the basilar artery (Key image 1) which may be secondary to retrograde flow. There is dolichoectasia of the remaining right vertebral artery and basilar artery. There is a normal bifurcation into the posterior cerebral arteries. There is mild narrowing involving the left P1 segment just beyond its origin.\par \par The internal carotid arteries are normal in caliber. There is a normal bifurcation into the A1 and M1 segments. There is a normal MCA bifurcation.\par \par Evaluation of the brain demonstrates the ventricles, cisternal spaces, and cortical sulci to be appropriate for the patient's stated age. There is no midline shift or extra-axial collection. The diffusion-weighted images demonstrate focal area of diffusion abnormality with ADC drop-off within the left corona radiata compatible with acute ischemia. There are encephalomalacia changes within the left cerebellum which may be secondary to previous hemorrhage. The FLAIR images demonstrate scattered punctate and confluent signal within the periventricular and subcortical white matter which is nonspecific and may represent the sequela of small vessel ischemic disease in this patient. There are additional foci within the anterior right medulla and left inferior middle cerebellar peduncle. There are old lacunar infarcts involving the basal ganglia and corona radiata bilaterally.\par \par IMPRESSION: Patient status post interval endovascular embolization of the left vertebral artery. Small focal area of acute ischemia within the left corona radiata.\par \par --- End of Report ---\par \par \par \par \par \par \par ANNIKA HUGHES MD; Attending Radiologist\par This document has been electronically signed. Oct 27 2021 9:14AM \par

## 2021-11-02 NOTE — HISTORY OF PRESENT ILLNESS
[FreeTextEntry1] : 45 y/o male with a hx of HTH, non compliant with meds ( Metoprolol, Nifedipine and ASA) who developed acute onset of dizziness, right arm paresthesia, vomiting and lethargy on July 28 2021.SBP in the 200s. ED showed left sided cerebellar hemorrhage, pontine hemorrhage and SAH. CTA showed a dissection of the intracranial segment of the left vertebral artery. He underwent femoral cerebral angiogram, endovascular MVP and coil embolization of dissected intradural left vertebral artery by Dr. Rubio on 7/29/2021.\par \par Hospital course c/b Afib with RVR (now on Eliquis). Cardiology consulted for severe L ventricular outflow obstruction due to HCOM (hypertrophic cardiomyopathy). SVT to 200s, on verapamil 80 tid, given adenosine, lopressor, verapamil, diltiazem gtt started. started on PO dilt 60 tid. TTE showed severe mitral valve regurgitation. He is pending management plan for this-- ablation and Watchman device vs. MV surgery, but the patient does not want to do it.\par \par Patient discharged to home with home PT/OT on 8/12/21 with an MRS score of 2 --slight disability. He has not gotten PT/OT since. He reports 100% improvement on the RUE/RLE strength, improved LUE but remains weak/ataxic. He denies unsteady gait.\par \par \par \par He is wishing to return to work in [employer].\par

## 2021-11-02 NOTE — ADDENDUM
PT 6A: PT order for evaluation and treatment acknowledged. PT evaluation attempted this AM. Pt declined stating she is in too much pain. Agreed to reschedule tomorrow.   [FreeTextEntry1] : \par 2021\par \par Randy D’Amico, MD\par [facility]\par [address]\par 3rd Floor\[place] [address]\par \par Patient’s Name:  Hang Basurto\par : 1975\par \par Dear Dr. D’Amico:\par \par We saw Hang in our office at [facility].  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  At that time, he underwent endovascular coil occlusion of the intracranial segment of the left vertebral artery.  He has had a remarkable recovery since then.  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  There is an incidental subcortical ischemic stroke in the left hemisphere most likely related to small vessel ischemic disease as he continues to have uncontrolled hypertension (SBP above 170s).  He is undergoing work up and management by the cardiology and neurology teams.  A follow up cerebral angiogram is recommended in 2022.  If the angiogram doesn’t happen because at this time he is reluctant to undergo further testing, then a follow up MRA of the brain and neck is recommended in 1 year.  He will continue Eliquis, aspirin and statin.\par \par I will keep you updated at all times.  Thank you for allowing us to participate in Hang’s care.\par \par Sincerely,\par \par \par Joseph Rubio MD\par Chief\par Neuro-Endovascular Surgery and \par Interventional Neuroradiology \par [facility]\par \par [facility]\par [address]\par 3rd Floor\[place] [address]\par T:  [phone]\par F:  [phone]\par \par cc:	Warren Altamirano MD\par 	[address]\[place] 	[place] [address]\par \par 	Nohelia Palm MD\par 	[address]\[place] 	[place] [address]\par \par 	Kandice Phelan MD\par 	[address] \par 	[address]th Floor\[place] 	[place] [address]\par \par 	Nazif Sb\par 	[address]\[place] 	[place] [address]\par \par \par \par 	\par \par \par \par \par

## 2021-11-02 NOTE — REASON FOR VISIT
[FreeTextEntry1] : 10/26/21 MRA brain shows a small focal area od acute ischemia w/in the L corona radiata. Pt asymptomatic. Result d/w Dr. Rubio and recommended pt being on antiplatelet with Cards, Neurology recs. Pt on  mg and Eliquis for AF. Pt non-compliant with cardio recs, as per Dr. Altamirano. He was referred to Dr. Kandice Phelan of Stroke Neurology for stroke w/u. He saw her on 11/1/21.\par \par Last visit's BP uncontrolled at 182/157. Today's BP is still elevated at 176/144. He is on Metoprolol for BP and reports compliance with the meds. He has not followed up with Dr. Altamirano since 9/29/21.  He has no new neuro complaints. He re-started PT and is pending OT.\par \par Pt is frustrated as he wants to return to work due to financial commitments but he is not cleared to return to work by [employer] as he is still pending further w/u. He was advised to follow up with Dr. Altamirano.\par \par

## 2021-11-02 NOTE — ASSESSMENT
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  There is an incidental subcortical ischemic stroke in the left hemisphere most likely related to small vessel ischemic disease as he continues to have uncontrolled hypertension (SBP above 170s).  He is undergoing work up and management by the cardiology and neurology teams.  A follow up cerebral angiogram is recommended in January 2022.  If the angiogram doesn’t happen because at this time he is reluctant to undergo further testing, then a follow up MRA of the brain and neck is recommended in 1 year.  He will continue Eliquis, aspirin and statin.\par \par \par Plan:\par 1. Follow up cerebral angiogram 6 months post embolization (Jan 2022). MRA head and neck x 1 year if pt does not want angiogram.\par 2. Continue ASA, statin and Eliquis as part of stroke prevention.

## 2021-11-08 ENCOUNTER — APPOINTMENT (OUTPATIENT)
Dept: HEART AND VASCULAR | Facility: CLINIC | Age: 46
End: 2021-11-08

## 2021-11-09 RX ORDER — DILTIAZEM HYDROCHLORIDE 90 MG/1
TABLET ORAL
Refills: 0 | Status: DISCONTINUED | COMMUNITY
End: 2021-11-09

## 2021-11-23 ENCOUNTER — NON-APPOINTMENT (OUTPATIENT)
Age: 46
End: 2021-11-23

## 2021-12-03 NOTE — PROGRESS NOTE ADULT - SUBJECTIVE AND OBJECTIVE BOX
Is This A New Presentation, Or A Follow-Up?: Skin Lesion Cardiology Consult Service Progress Note     Nickolas Shipman MD JOSE  Cardiology Fellow   Pager [phone]    Patient is a 46y old  Male who presents with a chief complaint of Cerebellar Stroke (02 Aug 2021 04:42)      SUBJECTIVE/OVERNIGHT EVENTS   Patient went into SVT this morning roughly 7:50 AM; RRT called received IV adenosine, verapamil, and lopressor, IV bolus administered as well. Tachy to 200, also febrile; underlying rhythm Afib. Patient reported palpitations and shortness of breath. Diltaizem gtt started @200 intermittently tachy to 120's.     OBJECTIVE  Vital Signs Last 24 Hrs  T(C): 37.8 (02 Aug 2021 09:19), Max: 38.4 (01 Aug 2021 14:29)  T(F): 100 (02 Aug 2021 09:19), Max: 101.1 (01 Aug 2021 14:29)  HR: 88 (02 Aug 2021 10:00) (84 - 195)  BP: 126/87 (02 Aug 2021 10:00) (111/85 - 191/128)  BP(mean): 102 (02 Aug 2021 10:00) (89 - 150)  RR: 25 (02 Aug 2021 10:00) (18 - 38)  SpO2: 92% (02 Aug 2021 10:00) (92% - 100%)    Tele: Afib RVR    I&O's Summary    01 Aug 2021 07:01  -  02 Aug 2021 07:00  --------------------------------------------------------  IN: 1930.5 mL / OUT: 4360 mL / NET: -2429.5 mL    PHYSICAL EXAM:  GENERAL: Acute distress   HEAD:  Atraumatic, Normocephalic  EYES: EOMI, conjunctiva and sclera clear  NECK: Supple, No JVD  CHEST/LUNG: Clear to auscultation bilaterally; No wheeze  HEART: Irregularly irregular rhythm; No murmurs, rubs, or gallops  ABDOMEN: Soft, Nontender, Nondistended; Bowel sounds present  EXTREMITIES:  2+ Peripheral Pulses, No clubbing, cyanosis, or edema  PSYCH: AAOx3  NEUROLOGY: non-focal  SKIN: No rashes or lesions    LABS                        15.4   8.97  )-----------( 201      ( 02 Aug 2021 05:17 )             45.8                         13.5   12.35 )-----------( 163      ( 01 Aug 2021 05:58 )             41.3     08-02    x   |  x   |  x   ----------------------------<  x   3.8   |  x   |  x   08-02    144  |  108  |  16  ----------------------------<  105<H>  See Note   |  21<L>  |  0.90    Ca    8.8      02 Aug 2021 05:17  Ca    8.4      02 Aug 2021 00:06  Phos  2.4     08-02  Mg     1.9     08-02    CAPILLARY BLOOD GLUCOSE   02 Aug 2021 05:17 Troponin 0.10 ng/mL / CK x     / CKMB x     / CKMB Units x       02 Aug 2021 00:06 Troponin 0.11 ng/mL / CK x     / CKMB x     / CKMB Units x       01 Aug 2021 03:28 Troponin 0.09 ng/mL / CK x     / CKMB x     / CKMB Units x      ( 01 Aug 2021 07:47 ) pH: 7.42  /  pCO2: 37    /  pO2: 80    / HCO3: 24    / Base Excess: -0.2  /  SaO2: 96.7    ( 30 Jul 2021 20:50 ) pH: 7.40  /  pCO2: 39    /  pO2: 130   / HCO3: 24    / Base Excess: -0.5  /  SaO2: 99.6      ( 30 Jul 2021 09:43 ) pH: 7.38  /  pCO2: 43    /  pO2: 100   / HCO3: 25    / Base Excess: 0.0   /  SaO2: 98.3      MEDICATIONS  (STANDING):  atorvastatin 40 milliGRAM(s) Oral at bedtime  chlorhexidine 2% Cloths 1 Application(s) Topical daily  dextrose 50% Injectable 25 Gram(s) IV Push once  diltiazem Infusion 5 mG/Hr (5 mL/Hr) IV Continuous <Continuous>  glucagon  Injectable 1 milliGRAM(s) IntraMuscular once  heparin   Injectable 5000 Unit(s) SubCutaneous every 8 hours  multivitamin 1 Tablet(s) Oral daily  niCARdipine Infusion 5 mG/Hr (25 mL/Hr) IV Continuous <Continuous>  piperacillin/tazobactam IVPB.. 3.375 Gram(s) IV Intermittent every 6 hours  polyethylene glycol 3350 17 Gram(s) Oral daily  senna 2 Tablet(s) Oral at bedtime  vancomycin  IVPB 1500 milliGRAM(s) IV Intermittent every 12 hours    MEDICATIONS  (PRN):  acetaminophen   Tablet .. 650 milliGRAM(s) Oral every 6 hours PRN Temp greater or equal to 38C (100.4F), Mild Pain (1 - 3)  acetaminophen  Suppository .. 650 milliGRAM(s) Rectal every 6 hours PRN Temp greater or equal to 38.5C (101.3F), Moderate Pain (4 - 6)  bisacodyl 5 milliGRAM(s) Oral every 12 hours PRN Constipation  ondansetron Injectable 4 milliGRAM(s) IV Push every 6 hours PRN Nausea and/or Vomiting     Cardiology Consult Service Progress Note     Nickolas Shipman MD JOSE  Cardiology Fellow   Pager [phone]    Patient is a 46y old  Male who presents with a chief complaint of Cerebellar Stroke (02 Aug 2021 04:42)      SUBJECTIVE/OVERNIGHT EVENTS   Patient went into SVT this morning roughly 7:50 AM; RRT called received IV adenosine, verapamil, and lopressor, IV bolus administered as well. Tachy to 200, also febrile; underlying rhythm Afib. Patient reported palpitations and shortness of breath. Diltaizem gtt started @200 intermittently tachy to 120's. Now converted to NSR with intermittent runs of Afib.     OBJECTIVE  Vital Signs Last 24 Hrs  T(C): 37.8 (02 Aug 2021 09:19), Max: 38.4 (01 Aug 2021 14:29)  T(F): 100 (02 Aug 2021 09:19), Max: 101.1 (01 Aug 2021 14:29)  HR: 88 (02 Aug 2021 10:00) (84 - 195)  BP: 126/87 (02 Aug 2021 10:00) (111/85 - 191/128)  BP(mean): 102 (02 Aug 2021 10:00) (89 - 150)  RR: 25 (02 Aug 2021 10:00) (18 - 38)  SpO2: 92% (02 Aug 2021 10:00) (92% - 100%)    Tele: Afib RVR    I&O's Summary    01 Aug 2021 07:01  -  02 Aug 2021 07:00  --------------------------------------------------------  IN: 1930.5 mL / OUT: 4360 mL / NET: -2429.5 mL    PHYSICAL EXAM:  GENERAL: Acute distress   HEAD:  Atraumatic, Normocephalic  EYES: EOMI, conjunctiva and sclera clear  NECK: Supple, No JVD  CHEST/LUNG: Clear to auscultation bilaterally; No wheeze  HEART: Irregularly irregular rhythm; +Systolic apical murmur  ABDOMEN: Soft, Nontender, Nondistended; Bowel sounds present  EXTREMITIES:  2+ Peripheral Pulses, No clubbing, cyanosis, or edema  PSYCH: AAOx3  NEUROLOGY: non-focal  SKIN: No rashes or lesions    LABS                        15.4   8.97  )-----------( 201      ( 02 Aug 2021 05:17 )             45.8                         13.5   12.35 )-----------( 163      ( 01 Aug 2021 05:58 )             41.3     08-02    x   |  x   |  x   ----------------------------<  x   3.8   |  x   |  x   08-02    144  |  108  |  16  ----------------------------<  105<H>  See Note   |  21<L>  |  0.90    Ca    8.8      02 Aug 2021 05:17  Ca    8.4      02 Aug 2021 00:06  Phos  2.4     08-02  Mg     1.9     08-02    CAPILLARY BLOOD GLUCOSE   02 Aug 2021 05:17 Troponin 0.10 ng/mL / CK x     / CKMB x     / CKMB Units x       02 Aug 2021 00:06 Troponin 0.11 ng/mL / CK x     / CKMB x     / CKMB Units x       01 Aug 2021 03:28 Troponin 0.09 ng/mL / CK x     / CKMB x     / CKMB Units x      ( 01 Aug 2021 07:47 ) pH: 7.42  /  pCO2: 37    /  pO2: 80    / HCO3: 24    / Base Excess: -0.2  /  SaO2: 96.7    ( 30 Jul 2021 20:50 ) pH: 7.40  /  pCO2: 39    /  pO2: 130   / HCO3: 24    / Base Excess: -0.5  /  SaO2: 99.6      ( 30 Jul 2021 09:43 ) pH: 7.38  /  pCO2: 43    /  pO2: 100   / HCO3: 25    / Base Excess: 0.0   /  SaO2: 98.3      MEDICATIONS  (STANDING):  atorvastatin 40 milliGRAM(s) Oral at bedtime  chlorhexidine 2% Cloths 1 Application(s) Topical daily  dextrose 50% Injectable 25 Gram(s) IV Push once  diltiazem Infusion 5 mG/Hr (5 mL/Hr) IV Continuous <Continuous>  glucagon  Injectable 1 milliGRAM(s) IntraMuscular once  heparin   Injectable 5000 Unit(s) SubCutaneous every 8 hours  multivitamin 1 Tablet(s) Oral daily  niCARdipine Infusion 5 mG/Hr (25 mL/Hr) IV Continuous <Continuous>  piperacillin/tazobactam IVPB.. 3.375 Gram(s) IV Intermittent every 6 hours  polyethylene glycol 3350 17 Gram(s) Oral daily  senna 2 Tablet(s) Oral at bedtime  vancomycin  IVPB 1500 milliGRAM(s) IV Intermittent every 12 hours    MEDICATIONS  (PRN):  acetaminophen   Tablet .. 650 milliGRAM(s) Oral every 6 hours PRN Temp greater or equal to 38C (100.4F), Mild Pain (1 - 3)  acetaminophen  Suppository .. 650 milliGRAM(s) Rectal every 6 hours PRN Temp greater or equal to 38.5C (101.3F), Moderate Pain (4 - 6)  bisacodyl 5 milliGRAM(s) Oral every 12 hours PRN Constipation  ondansetron Injectable 4 milliGRAM(s) IV Push every 6 hours PRN Nausea and/or Vomiting

## 2022-01-11 ENCOUNTER — NON-APPOINTMENT (OUTPATIENT)
Age: 47
End: 2022-01-11

## 2022-01-11 ENCOUNTER — APPOINTMENT (OUTPATIENT)
Dept: NEUROSURGERY | Facility: CLINIC | Age: 47
End: 2022-01-11
Payer: COMMERCIAL

## 2022-01-11 VITALS
HEART RATE: 66 BPM | RESPIRATION RATE: 18 BRPM | TEMPERATURE: 96.9 F | HEIGHT: 74 IN | BODY MASS INDEX: 28.88 KG/M2 | DIASTOLIC BLOOD PRESSURE: 121 MMHG | OXYGEN SATURATION: 98 % | WEIGHT: 225 LBS | SYSTOLIC BLOOD PRESSURE: 155 MMHG

## 2022-01-11 DIAGNOSIS — I63.9 CEREBRAL INFARCTION, UNSPECIFIED: ICD-10-CM

## 2022-01-11 PROCEDURE — 99213 OFFICE O/P EST LOW 20 MIN: CPT

## 2022-01-11 RX ORDER — AMIODARONE HYDROCHLORIDE 200 MG/1
200 TABLET ORAL DAILY
Refills: 0 | Status: DISCONTINUED | COMMUNITY
End: 2022-01-11

## 2022-01-11 NOTE — HISTORY OF PRESENT ILLNESS
[FreeTextEntry1] : 45 y/o male with a hx of HTH, non compliant with meds ( Metoprolol, Nifedipine and ASA) who developed acute onset of dizziness, right arm paresthesia, vomiting and lethargy on July 28 2021.SBP in the 200s. ED showed left sided cerebellar hemorrhage, pontine hemorrhage and SAH. CTA showed a dissection of the intracranial segment of the left vertebral artery. He underwent femoral cerebral angiogram, endovascular MVP and coil embolization of dissected intradural left vertebral artery by Dr. Rubio on 7/29/2021.\par \par Hospital course c/b Afib with RVR (now on Eliquis). Cardiology consulted for severe L ventricular outflow obstruction due to HCOM (hypertrophic cardiomyopathy). SVT to 200s, on verapamil 80 tid, given adenosine, lopressor, verapamil, diltiazem gtt started. started on PO dilt 60 tid. TTE showed severe mitral valve regurgitation. He is pending management plan for this-- ablation and Watchman device vs. MV surgery, but the patient does not want to do it.\par \par Patient discharged to home with home PT/OT on 8/12/21 with an MRS score of 2 --slight disability. He has not gotten PT/OT since. He reports 100% improvement on the RUE/RLE strength, improved LUE but remains weak/ataxic. 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 There is an incidental subcortical ischemic stroke in the left hemisphere most likely related to small vessel ischemic disease as he continues to have uncontrolled hypertension (SBP above 170s).

## 2022-01-11 NOTE — ADDENDUM
[FreeTextEntry1] : 2022\par \par Randy D’Amico, MD\par [facility]\par [address]\par [address] Floor\[place] [address]\par \par Patient’s Name:  Hang Basurto\par : 1975\par \par Dear Dr. D’Amico:\par \par We saw Hang in our office at [facility].  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  At that time, he underwent endovascular coil occlusion of the intracranial segment of the left vertebral artery.  He has had a remarkable recovery since then.  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  There was an incidental subcortical ischemic stroke in the left hemisphere most likely related to small vessel ischemic disease as he continues to have uncontrolled hypertension.  A follow up MRA of the brain and neck is recommended in 2022.  He will continue Eliquis, aspirin and statin.\par \par I will keep you updated at all times.  Thank you for allowing us to participate in Hang’s care.\par \par Sincerely,\par \par \par Joseph Rubio MD\par Chief\par Neuro-Endovascular Surgery and \par Interventional Neuroradiology \par [facility]\par \par [facility]\par [address]\par [address]rd Floor\[place] [address]\par T:  [phone]\par F:  [phone]\par \par cc:	Warren Altamirano MD\par 	[address]\[place] 	[place] [address]\par \par 	Nohelia Palm MD\par 	[address]\[place] 	[place] [address]\par \par 	Kandice Phelan MD\par 	[address] \par 	[address]th Floor\par 	[place] [address]\par \par 	Hang Basurto\par 	[address]\par 	[place] [address]\par \par \par \par 	\par \par \par

## 2022-01-11 NOTE — ASSESSMENT
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  At that time, he underwent endovascular coil occlusion of the intracranial segment of the left vertebral artery.  He has had a remarkable recovery since then.  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  There was an incidental subcortical ischemic stroke in the left hemisphere most likely related to small vessel ischemic disease as he continues to have uncontrolled hypertension.  A follow up MRA of the brain and neck is recommended in October 2022.  He will continue Eliquis, aspirin and statin.\par \par \par \par Plan:\par 1. MRA brain and neck in Oct 2022 and RTO to review it.\par 2. Continue Eliquis, ASA and statin\par 3. Notify the office for new headaches, focal weakness or numbness.

## 2022-01-11 NOTE — PHYSICAL EXAM
[General Appearance - Alert] : alert [General Appearance - In No Acute Distress] : in no acute distress [Oriented To Time, Place, And Person] : oriented to person, place, and time [Impaired Insight] : insight and judgment were intact [Motor Tone] : muscle tone was normal in all four extremities [Motor Strength] : muscle strength was normal in all four extremities [Sclera] : the sclera and conjunctiva were normal [PERRL With Normal Accommodation] : pupils were equal in size, round, reactive to light, with normal accommodation [Extraocular Movements] : extraocular movements were intact [Full Visual Field] : full visual field [Neck Appearance] : the appearance of the neck was normal [] : no respiratory distress [Respiration, Rhythm And Depth] : normal respiratory rhythm and effort [FreeTextEntry6] : 5/5 on all four extremities, Left hand weaker than right. +left arm ataxia

## 2022-01-11 NOTE — REASON FOR VISIT
[FreeTextEntry1] : He reports improvement of the left hand numbness and coordination. Denies headaches, no new focal weakness or numbness.\par He remains hypertensive. Today's BP is 150s/120s. Denies chest pain and SOB.  Stopped taking aspirin. Remains on Eliquis, Metoprolol, Amiodarone and Atorvastatin stopped ASA due to financial difficulty.\par He has not had a follow up with Cardiology for comprehensive cardiac work up

## 2022-01-25 ENCOUNTER — TRANSCRIPTION ENCOUNTER (OUTPATIENT)
Age: 47
End: 2022-01-25

## 2022-01-26 ENCOUNTER — APPOINTMENT (OUTPATIENT)
Dept: NEUROLOGY | Facility: CLINIC | Age: 47
End: 2022-01-26
Payer: COMMERCIAL

## 2022-01-26 VITALS
HEIGHT: 74 IN | OXYGEN SATURATION: 97 % | BODY MASS INDEX: 29.13 KG/M2 | DIASTOLIC BLOOD PRESSURE: 110 MMHG | WEIGHT: 227 LBS | SYSTOLIC BLOOD PRESSURE: 161 MMHG | HEART RATE: 67 BPM | TEMPERATURE: 96.7 F

## 2022-01-26 VITALS — DIASTOLIC BLOOD PRESSURE: 105 MMHG | SYSTOLIC BLOOD PRESSURE: 163 MMHG

## 2022-01-26 DIAGNOSIS — R27.0 ATAXIA, UNSPECIFIED: ICD-10-CM

## 2022-01-26 DIAGNOSIS — I77.74 DISSECTION OF VERTEBRAL ARTERY: ICD-10-CM

## 2022-01-26 DIAGNOSIS — I63.9 CEREBRAL INFARCTION, UNSPECIFIED: ICD-10-CM

## 2022-01-26 PROCEDURE — 99213 OFFICE O/P EST LOW 20 MIN: CPT

## 2022-01-26 RX ORDER — ASPIRIN 325 MG/1
325 TABLET, FILM COATED ORAL
Refills: 0 | Status: DISCONTINUED | COMMUNITY
End: 2022-01-26

## 2022-01-26 NOTE — ASSESSMENT
[FreeTextEntry1] : The patient is a pleasant 46-year-old gentleman with a history of poorly controlled hypertension, atrial fibrillation (on Eliquis), hypertrophic cardiomyopathy, severe MVR, left cerebellar and pontine intracerebral hemorrhage with subarachnoid extension secondary to left intracranial vertebral artery dissection (nontraumatic) in the setting of 's in 07/2021 status post embolization, and left corona radiata ischemic stroke- likely secondary to small vessel disease,  without residual deficits.  I encouraged him to continue to work with his primary care provider for optimal control of his multiple vascular risk factors, particularly his hypertension.  He states his cardiologist is no longer in the system and has requested a new referral which I am happy to provide.  He will bring his blood pressure log with him at his follow-up visit in 3 months at which time we will also recheck labs.

## 2022-01-26 NOTE — PHYSICAL EXAM
[FreeTextEntry1] : Alert.  Fully oriented.  Speech and language are intact.  Cranial nerves II-XII are intact.  Motor exam reveals intact strength with individual muscle testing in bilateral upper and lower extremities apart from 4+/5 weakness of left FE, interossei.  Tone is normal.  Reflexes are reduced throughout.  Sensation is intact to light touch in distal extremities.  Finger-to-nose ataxia in THERESA, and heel-to-shin are intact.  Rapid alternating movements are reduced in L UE.  Gait is normal. He is able to walk on heels, toes, and in tandem without difficulty. Romberg negative.\par

## 2022-01-26 NOTE — HISTORY OF PRESENT ILLNESS
[FreeTextEntry1] : The patient is a very pleasant 46-year-old gentleman with a history of hypertension, atrial fibrillation (on Eliquis), HOCM, severe MV regurgitation, left cerebellar and pontine intracerebral hemorrhage with subarachnoid extension secondary to left intracranial vertebral artery dissection (nontraumatic) in the setting of 's status post embolization, and left corona radiata ischemic stroke without residual deficits likely secondary to small vessel disease. He is here for follow-up. \par \par Since her last visit, the patient reports no new stroke or TIA-like symptoms.  He has been taking his Eliquis, atorvastatin, and blood pressure regimen but has not been taking aspirin as he cannot afford it.  His blood pressure is better controlled, typically in the 140s to 150s systolically though it can be quite variable.  He has a log at home and will bring it to his next visit.  He has not followed up with cardiology since his appointment in September/October.

## 2022-02-25 NOTE — REASON FOR VISIT
[place] ENCOUNTER        Pt Name: Cori Hand  MRN: [id number]  [place] 1968  Date of evaluation: 2/25/2022  Treating Resident Physician: Karyna Gonzalez DO  Supervising Physician: [facility]       Chief Complaint   Patient presents with    Back Pain     History obtained from the patient. HISTORY OF PRESENT ILLNESS    HPI  Cori Hand is a 48 y.o. male who presents to the emergency department for evaluation of persistent low back pain. Patient is out of his priorly prescribed pain medicine and is not sent to see his pain management doctor for establishment until the third of next month. Patient is followed by neurosurgery here at [facility] and was scheduled a lumbar CT scan at the outpatient imaging center today. He presented after laying on the table for the images and states he is having difficulty getting around at home secondary is persistent chronic low back pain. Rated 9 out of 10 without radiation. Located primarily to the lumbar spine. He does have known, consistent and documented lower extremity paresthesias and weakness. Denies any new neurologic symptoms. He denies fever, chest pain, shortness of breath, urinary retention and fecal retention, trauma. The patient has no other acute complaints at this time. REVIEW OF SYSTEMS   Review of Systems   Constitutional: Negative for activity change, chills and fever. HENT: Negative for sinus pressure, sinus pain and sore throat. Eyes: Negative for visual disturbance. Respiratory: Negative for cough and shortness of breath. Cardiovascular: Negative for chest pain and palpitations. Gastrointestinal: Negative for abdominal pain, constipation, diarrhea, nausea and vomiting. Genitourinary: Negative for difficulty urinating and dysuria. Musculoskeletal: Positive for arthralgias, back pain, gait problem and myalgias.  Negative for neck pain and neck stiffness. Skin: Negative for color change and rash. Neurological: Positive for weakness and numbness. Negative for dizziness, light-headedness and headaches.          PAST MEDICAL AND SURGICAL HISTORY     Past Medical History:   Diagnosis Date    Aortic stenosis, mild to moderate     BPH (benign prostatic hyperplasia)     Carpal tunnel syndrome on right     rt hand    Chronic gout     COVID-19 09/2020    DM2 (diabetes mellitus, type 2) (HCC)     Dyslipidemia     GERD (gastroesophageal reflux disease)     Heart failure with preserved ejection fraction (HCC)     History of alcohol abuse     History of atrial fibrillation     History of osteomyelitis     Hx of R foot wound, osteomyelitis July 2018 requiring surgery and IV Vanco    Hyperlipidemia     Hypertension, essential     Hypogonadism, male     Major depression     Mood disorder ([address]     Morbid obesity ([address]     DURAN (nonalcoholic steatohepatitis)     KIARA treated with BiPAP     Vitamin D deficiency      Past Surgical History:   Procedure Laterality Date    COLONOSCOPY N/A 11/15/2020    COLONOSCOPY DIAGNOSTIC performed by Roverto Tom MD at [facility]    ENDOSCOPY, COLON, DIAGNOSTIC      FOOT SURGERY Right     2018, R foot osteomyelitis    HIP SURGERY Left 6/29/2020    bilateral hip steroid injection, Left HIP FIRST performed by Samm Patel MD at [facility] N/A 10/26/2020    SIGMOIDOSCOPY DIAGNOSTIC FLEXIBLE performed by Jonnathan Velasquez MD at [address] [facility]      as a baby    UPPER GASTROINTESTINAL ENDOSCOPY N/A 11/14/2020    EGD DIAGNOSTIC ONLY performed by Roverto Tom MD at [address] [place] [address] N/A 12/27/2021    EGD performed by Roverto Tom MD at [facility]         MEDICATIONS     Current Facility-Administered Medications:     lidocaine 4 % external patch 1 patch, 1 patch, TransDERmal, Daily, Enid Gerardo DO, 1 patch at 02/25/22 0937    Current Outpatient Medications:     HYDROcodone-acetaminophen (NORCO) 5-325 MG per tablet, Take 1 tablet by mouth every 6 hours as needed for Pain for up to 7 days. Intended supply: 3 days. Take lowest dose possible to manage pain, Disp: 28 tablet, Rfl: 0    pantoprazole (PROTONIX) 40 MG tablet, Take 1 tablet by mouth 2 times daily (before meals), Disp: 60 tablet, Rfl: 5    pregabalin (LYRICA) 50 MG capsule, Take 50 mg by mouth 3 times daily. , Disp: , Rfl:     clotrimazole-betamethasone (LOTRISONE) 1-0.05 % cream, Apply topically 2 times daily. , Disp: 1 each, Rfl: 0    potassium chloride (KLOR-CON M) 20 MEQ extended release tablet, Take 1 tablet by mouth daily, Disp: 30 tablet, Rfl: 2    apixaban (ELIQUIS) 5 MG TABS tablet, Take 10mg by mouth two times daily for 6 days followed by 5mg by mouth two times daily, Disp: 60 tablet, Rfl: 1    glycopyrrolate-formoterol (BEVESPI) 9-4.8 MCG/ACT AERO, Inhale 2 puffs into the lungs 2 times daily, Disp: 10.7 g, Rfl: 1    ferrous sulfate (IRON 325) 325 (65 Fe) MG tablet, Take 1 tablet by mouth 2 times daily (with meals), Disp: 30 tablet, Rfl: 3    albuterol sulfate HFA (PROVENTIL HFA) 108 (90 Base) MCG/ACT inhaler, Inhale 2 puffs into the lungs every 6 hours as needed for Wheezing, Disp: 1 Inhaler, Rfl: 3    vitamin C (ASCORBIC ACID) 500 MG tablet, Take 2 tablets by mouth daily, Disp: 30 tablet, Rfl: 0    CHOLECALCIFEROL PO, Take 2,000 Units by mouth daily, Disp: , Rfl:     atorvastatin (LIPITOR) 40 MG tablet, Take 40 mg by mouth nightly, Disp: , Rfl:     acetaminophen (TYLENOL) 325 MG tablet, Take 650 mg by mouth every 4 hours as needed for Pain, Disp: , Rfl:     busPIRone (BUSPAR) 10 MG tablet, Take 10 mg by mouth 2 times daily , Disp: , Rfl:     allopurinol (ZYLOPRIM) 300 MG tablet, Take 1 tablet by mouth daily, Disp: 90 tablet, Rfl: 0    Multiple Vitamins-Minerals (THERAPEUTIC MULTIVITAMIN-MINERALS) tablet, Take 1 tablet by mouth daily, Disp: , Rfl:     Probiotic Product (SOBIA-BID PROBIOTIC PO), Take 1 tablet by mouth daily , Disp: , Rfl:     ondansetron (ZOFRAN) 4 MG tablet, Take 4 mg by mouth every 8 hours as needed for Nausea or Vomiting, Disp: , Rfl:     tamsulosin (FLOMAX) 0.4 MG capsule, Take 0.4 mg by mouth nightly , Disp: , Rfl:     folic acid (FOLVITE) 1 MG tablet, Take 1 mg by mouth daily, Disp: , Rfl:     furosemide (LASIX) 40 MG tablet, Take 40 mg by mouth daily , Disp: , Rfl:     sitaGLIPtan-metformin (JANUMET)  MG per tablet, Take 1 tablet by mouth 2 times daily (with meals) , Disp: , Rfl:     senna (SENOKOT) 8.6 MG tablet, Take 1 tablet by mouth 2 times daily, Disp: , Rfl:     ARIPiprazole (ABILIFY PO), Take 15 mg by mouth daily , Disp: , Rfl:     Citalopram Hydrobromide (CELEXA PO), Take 30 mg by mouth daily From [employer] , Disp: , Rfl:     Blood Glucose Monitoring Suppl (FREESTYLE LITE) ARTIE, Patient needs all supplies for qd testing. DX: 250.00, Disp: 1 Device, Rfl: 11      SOCIAL HISTORY     Social History     Social History Narrative    Not on file     Social History     Tobacco Use    Smoking status: Never Smoker    Smokeless tobacco: Never Used   Vaping Use    Vaping Use: Never used   Substance Use Topics    Alcohol use: Not Currently     Comment: hx of abuse    Drug use: No         ALLERGIES     Allergies   Allergen Reactions    Penicillins      Tolerated Zosyn 9/24/2020         FAMILY HISTORY     Family History   Problem Relation Age of Onset    Heart Disease Mother         MI    Cancer Paternal Aunt         lung         PREVIOUS RECORDS   Previous records reviewed: Patient was last seen in this department at the end of December for DVT. Clement Fly         PHYSICAL EXAM     ED Triage Vitals   BP Temp Temp src Pulse Resp SpO2 Height Weight   -- -- -- -- -- -- -- --     Initial vital signs and nursing assessment reviewed and normal. Body mass index is 44.09 kg/m². Pulsoximetry is normal per my interpretation. Additional Vital Signs:  Vitals:    02/25/22 1322   BP: (!) 161/81   Pulse: 81   Resp: 18   Temp: 98.6 °F (37 °C)   SpO2: 100%       Physical Exam  Constitutional:       General: He is not in acute distress. Appearance: Normal appearance. He is not ill-appearing or diaphoretic. HENT:      Head: Normocephalic and atraumatic. Mouth/Throat:      Mouth: Mucous membranes are moist.      Pharynx: Oropharynx is clear. No oropharyngeal exudate or posterior oropharyngeal erythema. Eyes:      Extraocular Movements: Extraocular movements intact. Conjunctiva/sclera: Conjunctivae normal.   Cardiovascular:      Rate and Rhythm: Normal rate and regular rhythm. Pulses: Normal pulses. Heart sounds: Normal heart sounds. No murmur heard. No friction rub. No gallop. Pulmonary:      Effort: Pulmonary effort is normal.      Breath sounds: Normal breath sounds. No wheezing, rhonchi or rales. Abdominal:      Palpations: Abdomen is soft. Tenderness: There is no abdominal tenderness. There is no guarding or rebound. Musculoskeletal:         General: Tenderness (bilateral lumbar paraspinals) present. Cervical back: Normal range of motion. No rigidity. No muscular tenderness. Right lower leg: No edema. Left lower leg: No edema. Comments: Limited spinal range of motion secondary to pain and stiffness   Skin:     General: Skin is warm and dry. Capillary Refill: Capillary refill takes less than 2 seconds. Findings: No bruising, erythema, lesion or rash. Neurological:      General: No focal deficit present. Mental Status: He is alert and oriented to person, place, and time. Motor: Weakness (Minimal weakness of the bilateral lower extremities it is previously been documented.) present. Deep Tendon Reflexes: Reflexes abnormal (Bilateral decreased patellar reflexes. ).              MEDICAL DECISION MAKING Initial Assessment:   1. Pleasant 48year-old with known significant lumbar osteoporosis and low back pain with weakness and numbness. Patient had an outpatient CT scan and came here directly afterwards secondary to difficulty tolerating the pain. He has received occasional Norco prescription but has run out. Not able to see pain management till the third of next month. PDMP reviewed and patient has been stretching his prescriptions of pain medicine to the best of his ability. There does not appear to be any evidence for overuse. Patient states his symptoms are at baseline and he does not have any new neurologic deficit. Mildly decreased sensation bilaterally and weakness. Diminished patellar reflexes bilaterally. Patient able to stand independently with his cane. No new red flag symptoms. I do not suspect trauma or acute cauda equina syndrome/CNS impingement. Plan:    Follow-up patient's outpatient scans   Lidocaine patch   Tylenol   Norco refill   Encouragement for follow-up with pain management   Discharge home        ED RESULTS   Laboratory results:  Labs Reviewed - No data to display    Radiologic studies results:  No orders to display       ED Medications administered this visit:   Medications   lidocaine 4 % external patch 1 patch (1 patch TransDERmal Patch Applied 2/25/22 1338)   acetaminophen (TYLENOL) tablet 1,000 mg (1,000 mg Oral Given 2/25/22 1338)         ED COURSE     ED Course as of 02/25/22 1504   Fri Feb 25, 2022   1452 Patient with appropriate use of his pain medications documented in history PDMP. No acute changes on his outpatient lumbar CAT scan was performed prior to coming to the emergency department. Known severe stenosis of the lumbar spine. Plan to refill his Norco and encourage attendance of his pain manage appointment on the third of next month. [EM]   9380 No newly documented neurologic deficit. Patient without urinary or fecal retention/incontinence.   Able to stiffly ambulate and stand. [EM]      ED Course User Index  [EM] Adriel Mcallister DO         Strict return precautions and follow up instructions were discussed with the patient prior to discharge, with which the patient agrees. MEDICATION CHANGES     New Prescriptions    HYDROCODONE-ACETAMINOPHEN (NORCO) 5-325 MG PER TABLET    Take 1 tablet by mouth every 6 hours as needed for Pain for up to 7 days. Intended supply: 3 days. Take lowest dose possible to manage pain         FINAL DISPOSITION     Final diagnoses:   Acute exacerbation of chronic low back pain     Condition: condition: stable  Dispo: Discharge to home      This transcription was electronically signed. Parts of this transcriptions may have been dictated by use of voice recognition software and electronically transcribed, and parts may have been transcribed with the assistance of an ED scribe. The transcription may contain errors not detected in proofreading. Please refer to my supervising physician's documentation if my documentation differs.     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

## 2022-04-21 ENCOUNTER — APPOINTMENT (OUTPATIENT)
Dept: NEUROLOGY | Facility: CLINIC | Age: 47
End: 2022-04-21

## 2022-06-21 NOTE — OCCUPATIONAL THERAPY INITIAL EVALUATION ADULT - LEVEL OF CONSCIOUSNESS, OT EVAL
Detail Level: Zone
General Sunscreen Counseling: I recommended a broad spectrum sunscreen with a SPF of 30 or higher. If swimming or exercising sunscreen should be reapplied every 45 minutes to an hour after getting wet or sweating.  One ounce, or the equivalent of a shot glass full of sunscreen, is adequate to protect the skin not covered by a bathing suit. Sun protective clothing can be used in lieu of sunscreen but must be worn the entire time you are exposed to the sun's rays.
alert

## 2023-03-24 ENCOUNTER — HOSPITAL ENCOUNTER (INPATIENT)
Dept: HOSPITAL 74 - JER | Age: 48
LOS: 8 days | Discharge: ANOTHER HOSPITAL | DRG: 682 | End: 2023-04-01
Attending: INTERNAL MEDICINE | Admitting: INTERNAL MEDICINE
Payer: COMMERCIAL

## 2023-03-24 VITALS — BODY MASS INDEX: 30.2 KG/M2

## 2023-03-24 DIAGNOSIS — R74.8: ICD-10-CM

## 2023-03-24 DIAGNOSIS — L03.115: ICD-10-CM

## 2023-03-24 DIAGNOSIS — I11.0: ICD-10-CM

## 2023-03-24 DIAGNOSIS — K85.90: ICD-10-CM

## 2023-03-24 DIAGNOSIS — N17.9: Primary | ICD-10-CM

## 2023-03-24 DIAGNOSIS — I34.0: ICD-10-CM

## 2023-03-24 DIAGNOSIS — N28.1: ICD-10-CM

## 2023-03-24 DIAGNOSIS — K59.00: ICD-10-CM

## 2023-03-24 DIAGNOSIS — R73.03: ICD-10-CM

## 2023-03-24 DIAGNOSIS — E78.5: ICD-10-CM

## 2023-03-24 DIAGNOSIS — E66.9: ICD-10-CM

## 2023-03-24 DIAGNOSIS — R74.01: ICD-10-CM

## 2023-03-24 DIAGNOSIS — L03.116: ICD-10-CM

## 2023-03-24 DIAGNOSIS — D64.9: ICD-10-CM

## 2023-03-24 DIAGNOSIS — I48.0: ICD-10-CM

## 2023-03-24 DIAGNOSIS — M79.81: ICD-10-CM

## 2023-03-24 DIAGNOSIS — I50.31: ICD-10-CM

## 2023-03-24 LAB
ALBUMIN SERPL-MCNC: 3.1 G/DL (ref 3.4–5)
ALP SERPL-CCNC: 109 U/L (ref 45–117)
ALT SERPL-CCNC: 887 U/L (ref 13–61)
ANION GAP SERPL CALC-SCNC: 7 MMOL/L (ref 8–16)
AST SERPL-CCNC: 174 U/L (ref 15–37)
BASE EXCESS BLDV CALC-SCNC: 0.9 MMOL/L (ref -2–2)
BASOPHILS # BLD: 1.6 % (ref 0–2)
BILIRUB SERPL-MCNC: 2 MG/DL (ref 0.2–1)
BUN SERPL-MCNC: 51.9 MG/DL (ref 7–18)
CALCIUM SERPL-MCNC: 8.4 MG/DL (ref 8.5–10.1)
CHLORIDE SERPL-SCNC: 106 MMOL/L (ref 98–107)
CO2 SERPL-SCNC: 27 MMOL/L (ref 21–32)
CREAT SERPL-MCNC: 2 MG/DL (ref 0.55–1.3)
DEPRECATED RDW RBC AUTO: 17.9 % (ref 11.9–15.9)
EOSINOPHIL # BLD: 1.3 % (ref 0–4.5)
GLUCOSE SERPL-MCNC: 94 MG/DL (ref 74–106)
HCT VFR BLD CALC: 40.2 % (ref 35.4–49)
HCT VFR BLDV CALC: 41 % (ref 35.4–49)
HGB BLD-MCNC: 12.9 GM/DL (ref 11.7–16.9)
LIPASE SERPL-CCNC: 533 U/L (ref 73–393)
LYMPHOCYTES # BLD: 21 % (ref 8–40)
MCH RBC QN AUTO: 23.7 PG (ref 25.7–33.7)
MCHC RBC AUTO-ENTMCNC: 32.1 G/DL (ref 32–35.9)
MCV RBC: 73.7 FL (ref 80–96)
MONOCYTES # BLD AUTO: 10.9 % (ref 3.8–10.2)
NEUTROPHILS # BLD: 65.2 % (ref 42.8–82.8)
PCO2 BLDV: 42.6 MMHG (ref 38–52)
PH BLDV: 7.4 [PH] (ref 7.31–7.41)
PLATELET # BLD AUTO: 215 10^3/UL (ref 134–434)
PMV BLD: 9.4 FL (ref 7.5–11.1)
PROT SERPL-MCNC: 6.2 G/DL (ref 6.4–8.2)
RBC # BLD AUTO: 5.45 M/MM3 (ref 4–5.6)
SAO2 % BLDV: 70.1 % (ref 70–80)
SODIUM SERPL-SCNC: 140 MMOL/L (ref 136–145)
WBC # BLD AUTO: 7.9 K/MM3 (ref 4–10)

## 2023-03-24 PROCEDURE — U0005 INFEC AGEN DETEC AMPLI PROBE: HCPCS

## 2023-03-24 PROCEDURE — U0003 INFECTIOUS AGENT DETECTION BY NUCLEIC ACID (DNA OR RNA); SEVERE ACUTE RESPIRATORY SYNDROME CORONAVIRUS 2 (SARS-COV-2) (CORONAVIRUS DISEASE [COVID-19]), AMPLIFIED PROBE TECHNIQUE, MAKING USE OF HIGH THROUGHPUT TECHNOLOGIES AS DESCRIBED BY CMS-2020-01-R: HCPCS

## 2023-03-24 RX ADMIN — APIXABAN SCH MG: 5 TABLET, FILM COATED ORAL at 22:47

## 2023-03-24 RX ADMIN — ATORVASTATIN CALCIUM SCH MG: 80 TABLET, FILM COATED ORAL at 19:17

## 2023-03-25 LAB
ALBUMIN SERPL-MCNC: 2.7 G/DL (ref 3.4–5)
ALP SERPL-CCNC: 99 U/L (ref 45–117)
ALT SERPL-CCNC: 635 U/L (ref 13–61)
AMYLASE SERPL-CCNC: 47 U/L (ref 25–115)
ANION GAP SERPL CALC-SCNC: 7 MMOL/L (ref 8–16)
AST SERPL-CCNC: 110 U/L (ref 15–37)
BASOPHILS # BLD: 0.7 % (ref 0–2)
BILIRUB SERPL-MCNC: 2.6 MG/DL (ref 0.2–1)
BUN SERPL-MCNC: 43.3 MG/DL (ref 7–18)
CALCIUM SERPL-MCNC: 7.7 MG/DL (ref 8.5–10.1)
CHLORIDE SERPL-SCNC: 104 MMOL/L (ref 98–107)
CO2 SERPL-SCNC: 31 MMOL/L (ref 21–32)
CREAT SERPL-MCNC: 1.6 MG/DL (ref 0.55–1.3)
DEPRECATED RDW RBC AUTO: 18.1 % (ref 11.9–15.9)
EOSINOPHIL # BLD: 0.7 % (ref 0–4.5)
GLUCOSE SERPL-MCNC: 89 MG/DL (ref 74–106)
HCT VFR BLD CALC: 39.6 % (ref 35.4–49)
HGB BLD-MCNC: 12.7 GM/DL (ref 11.7–16.9)
LIPASE SERPL-CCNC: 307 U/L (ref 73–393)
LYMPHOCYTES # BLD: 13.8 % (ref 8–40)
MCH RBC QN AUTO: 23.6 PG (ref 25.7–33.7)
MCHC RBC AUTO-ENTMCNC: 32.1 G/DL (ref 32–35.9)
MCV RBC: 73.5 FL (ref 80–96)
MONOCYTES # BLD AUTO: 10.7 % (ref 3.8–10.2)
NEUTROPHILS # BLD: 74.1 % (ref 42.8–82.8)
PLATELET # BLD AUTO: 215 10^3/UL (ref 134–434)
PMV BLD: 9.3 FL (ref 7.5–11.1)
PROT SERPL-MCNC: 5.4 G/DL (ref 6.4–8.2)
RBC # BLD AUTO: 5.38 M/MM3 (ref 4–5.6)
SODIUM SERPL-SCNC: 142 MMOL/L (ref 136–145)
WBC # BLD AUTO: 7.8 K/MM3 (ref 4–10)

## 2023-03-25 RX ADMIN — POTASSIUM CHLORIDE SCH MLS/HR: 7.46 INJECTION, SOLUTION INTRAVENOUS at 16:19

## 2023-03-25 RX ADMIN — FUROSEMIDE SCH MG: 10 INJECTION, SOLUTION INTRAVENOUS at 09:57

## 2023-03-25 RX ADMIN — APIXABAN SCH MG: 5 TABLET, FILM COATED ORAL at 21:52

## 2023-03-25 RX ADMIN — POTASSIUM CHLORIDE SCH MLS/HR: 7.46 INJECTION, SOLUTION INTRAVENOUS at 17:30

## 2023-03-25 RX ADMIN — ATORVASTATIN CALCIUM SCH MG: 80 TABLET, FILM COATED ORAL at 21:52

## 2023-03-25 RX ADMIN — POTASSIUM CHLORIDE SCH MLS/HR: 7.46 INJECTION, SOLUTION INTRAVENOUS at 15:05

## 2023-03-25 RX ADMIN — PANTOPRAZOLE SODIUM SCH MG: 40 TABLET, DELAYED RELEASE ORAL at 09:57

## 2023-03-25 RX ADMIN — APIXABAN SCH MG: 5 TABLET, FILM COATED ORAL at 09:57

## 2023-03-26 LAB
ALBUMIN SERPL-MCNC: 2.8 G/DL (ref 3.4–5)
ALP SERPL-CCNC: 98 U/L (ref 45–117)
ALT SERPL-CCNC: 526 U/L (ref 13–61)
AMYLASE SERPL-CCNC: 57 U/L (ref 25–115)
ANION GAP SERPL CALC-SCNC: 6 MMOL/L (ref 8–16)
AST SERPL-CCNC: 71 U/L (ref 15–37)
BASOPHILS # BLD: 0.7 % (ref 0–2)
BILIRUB SERPL-MCNC: 2.7 MG/DL (ref 0.2–1)
BUN SERPL-MCNC: 38.4 MG/DL (ref 7–18)
CALCIUM SERPL-MCNC: 8 MG/DL (ref 8.5–10.1)
CHLORIDE SERPL-SCNC: 98 MMOL/L (ref 98–107)
CO2 SERPL-SCNC: 34 MMOL/L (ref 21–32)
CREAT SERPL-MCNC: 1.5 MG/DL (ref 0.55–1.3)
DEPRECATED RDW RBC AUTO: 18.1 % (ref 11.9–15.9)
EOSINOPHIL # BLD: 3.3 % (ref 0–4.5)
GLUCOSE SERPL-MCNC: 88 MG/DL (ref 74–106)
HCT VFR BLD CALC: 40.9 % (ref 35.4–49)
HGB BLD-MCNC: 13 GM/DL (ref 11.7–16.9)
LIPASE SERPL-CCNC: 394 U/L (ref 73–393)
LYMPHOCYTES # BLD: 21.7 % (ref 8–40)
MCH RBC QN AUTO: 23.5 PG (ref 25.7–33.7)
MCHC RBC AUTO-ENTMCNC: 31.7 G/DL (ref 32–35.9)
MCV RBC: 74 FL (ref 80–96)
MONOCYTES # BLD AUTO: 11.6 % (ref 3.8–10.2)
NEUTROPHILS # BLD: 62.7 % (ref 42.8–82.8)
PLATELET # BLD AUTO: 244 10^3/UL (ref 134–434)
PMV BLD: 9 FL (ref 7.5–11.1)
PROT SERPL-MCNC: 5.7 G/DL (ref 6.4–8.2)
RBC # BLD AUTO: 5.53 M/MM3 (ref 4–5.6)
SODIUM SERPL-SCNC: 138 MMOL/L (ref 136–145)
WBC # BLD AUTO: 7.2 K/MM3 (ref 4–10)

## 2023-03-26 RX ADMIN — APIXABAN SCH MG: 5 TABLET, FILM COATED ORAL at 22:00

## 2023-03-26 RX ADMIN — FUROSEMIDE SCH MG: 10 INJECTION, SOLUTION INTRAVENOUS at 09:52

## 2023-03-26 RX ADMIN — CEFAZOLIN SCH MLS/HR: 1 INJECTION, POWDER, FOR SOLUTION INTRAVENOUS at 16:50

## 2023-03-26 RX ADMIN — APIXABAN SCH MG: 5 TABLET, FILM COATED ORAL at 09:52

## 2023-03-26 RX ADMIN — PANTOPRAZOLE SODIUM SCH MG: 40 TABLET, DELAYED RELEASE ORAL at 09:52

## 2023-03-27 LAB
ALBUMIN SERPL-MCNC: 2.8 G/DL (ref 3.4–5)
ALP SERPL-CCNC: 100 U/L (ref 45–117)
ALT SERPL-CCNC: 411 U/L (ref 13–61)
ANION GAP SERPL CALC-SCNC: 7 MMOL/L (ref 8–16)
AST SERPL-CCNC: 57 U/L (ref 15–37)
BASOPHILS # BLD: 1.2 % (ref 0–2)
BILIRUB SERPL-MCNC: 2.7 MG/DL (ref 0.2–1)
BUN SERPL-MCNC: 31.4 MG/DL (ref 7–18)
CALCIUM SERPL-MCNC: 7.9 MG/DL (ref 8.5–10.1)
CHLORIDE SERPL-SCNC: 96 MMOL/L (ref 98–107)
CO2 SERPL-SCNC: 32 MMOL/L (ref 21–32)
CREAT SERPL-MCNC: 1.3 MG/DL (ref 0.55–1.3)
DEPRECATED RDW RBC AUTO: 17.8 % (ref 11.9–15.9)
EOSINOPHIL # BLD: 3.1 % (ref 0–4.5)
GLUCOSE SERPL-MCNC: 70 MG/DL (ref 74–106)
HCT VFR BLD CALC: 42.1 % (ref 35.4–49)
HGB BLD-MCNC: 13.6 GM/DL (ref 11.7–16.9)
LYMPHOCYTES # BLD: 27.1 % (ref 8–40)
MCH RBC QN AUTO: 23.6 PG (ref 25.7–33.7)
MCHC RBC AUTO-ENTMCNC: 32.4 G/DL (ref 32–35.9)
MCV RBC: 72.8 FL (ref 80–96)
MONOCYTES # BLD AUTO: 11 % (ref 3.8–10.2)
NEUTROPHILS # BLD: 57.6 % (ref 42.8–82.8)
PLATELET # BLD AUTO: 276 10^3/UL (ref 134–434)
PMV BLD: 8.7 FL (ref 7.5–11.1)
PROT SERPL-MCNC: 5.7 G/DL (ref 6.4–8.2)
RBC # BLD AUTO: 5.78 M/MM3 (ref 4–5.6)
SODIUM SERPL-SCNC: 136 MMOL/L (ref 136–145)
WBC # BLD AUTO: 6.7 K/MM3 (ref 4–10)

## 2023-03-27 RX ADMIN — CEFAZOLIN SCH MLS/HR: 1 INJECTION, POWDER, FOR SOLUTION INTRAVENOUS at 11:00

## 2023-03-27 RX ADMIN — FUROSEMIDE SCH MG: 10 INJECTION, SOLUTION INTRAVENOUS at 11:01

## 2023-03-27 RX ADMIN — APIXABAN SCH MG: 5 TABLET, FILM COATED ORAL at 22:16

## 2023-03-27 RX ADMIN — APIXABAN SCH MG: 5 TABLET, FILM COATED ORAL at 11:00

## 2023-03-27 RX ADMIN — CEFAZOLIN SCH MLS/HR: 1 INJECTION, POWDER, FOR SOLUTION INTRAVENOUS at 18:00

## 2023-03-27 RX ADMIN — CEFAZOLIN SCH MLS/HR: 1 INJECTION, POWDER, FOR SOLUTION INTRAVENOUS at 03:30

## 2023-03-27 RX ADMIN — PANTOPRAZOLE SODIUM SCH MG: 40 TABLET, DELAYED RELEASE ORAL at 11:00

## 2023-03-28 LAB
ALBUMIN SERPL-MCNC: 2.6 G/DL (ref 3.4–5)
ALP SERPL-CCNC: 96 U/L (ref 45–117)
ALT SERPL-CCNC: 253 U/L (ref 13–61)
ANION GAP SERPL CALC-SCNC: 7 MMOL/L (ref 8–16)
AST SERPL-CCNC: 39 U/L (ref 15–37)
BASOPHILS # BLD: 0.5 % (ref 0–2)
BILIRUB SERPL-MCNC: 2.4 MG/DL (ref 0.2–1)
BUN SERPL-MCNC: 23 MG/DL (ref 7–18)
CALCIUM SERPL-MCNC: 7.8 MG/DL (ref 8.5–10.1)
CHLORIDE SERPL-SCNC: 101 MMOL/L (ref 98–107)
CHOLEST SERPL-MCNC: 95 MG/DL (ref 50–200)
CO2 SERPL-SCNC: 31 MMOL/L (ref 21–32)
CREAT SERPL-MCNC: 1.3 MG/DL (ref 0.55–1.3)
DEPRECATED RDW RBC AUTO: 18.2 % (ref 11.9–15.9)
EOSINOPHIL # BLD: 2.8 % (ref 0–4.5)
GLUCOSE SERPL-MCNC: 80 MG/DL (ref 74–106)
HCT VFR BLD CALC: 41.3 % (ref 35.4–49)
HDLC SERPL-MCNC: 24 MG/DL (ref 40–60)
HGB BLD-MCNC: 13.2 GM/DL (ref 11.7–16.9)
LDLC SERPL CALC-MCNC: 65 MG/DL (ref 5–100)
LYMPHOCYTES # BLD: 21.7 % (ref 8–40)
MAGNESIUM SERPL-MCNC: 1.5 MG/DL (ref 1.8–2.4)
MCH RBC QN AUTO: 23.3 PG (ref 25.7–33.7)
MCHC RBC AUTO-ENTMCNC: 32.1 G/DL (ref 32–35.9)
MCV RBC: 72.8 FL (ref 80–96)
MONOCYTES # BLD AUTO: 10.5 % (ref 3.8–10.2)
NEUTROPHILS # BLD: 64.5 % (ref 42.8–82.8)
PLATELET # BLD AUTO: 271 10^3/UL (ref 134–434)
PMV BLD: 8.1 FL (ref 7.5–11.1)
PROT SERPL-MCNC: 5.4 G/DL (ref 6.4–8.2)
RBC # BLD AUTO: 5.66 M/MM3 (ref 4–5.6)
SODIUM SERPL-SCNC: 140 MMOL/L (ref 136–145)
TRIGL SERPL-MCNC: 81 MG/DL (ref 0–150)
WBC # BLD AUTO: 6.7 K/MM3 (ref 4–10)

## 2023-03-28 RX ADMIN — APIXABAN SCH MG: 5 TABLET, FILM COATED ORAL at 22:11

## 2023-03-28 RX ADMIN — MAGNESIUM SULFATE HEPTAHYDRATE SCH MLS/HR: 40 INJECTION, SOLUTION INTRAVENOUS at 12:10

## 2023-03-28 RX ADMIN — APIXABAN SCH MG: 5 TABLET, FILM COATED ORAL at 10:05

## 2023-03-28 RX ADMIN — MAGNESIUM SULFATE HEPTAHYDRATE SCH MLS/HR: 40 INJECTION, SOLUTION INTRAVENOUS at 14:21

## 2023-03-28 RX ADMIN — CEFAZOLIN SCH MLS/HR: 1 INJECTION, POWDER, FOR SOLUTION INTRAVENOUS at 10:05

## 2023-03-28 RX ADMIN — PANTOPRAZOLE SODIUM SCH MG: 40 TABLET, DELAYED RELEASE ORAL at 10:05

## 2023-03-28 RX ADMIN — CEFAZOLIN SCH MLS/HR: 1 INJECTION, POWDER, FOR SOLUTION INTRAVENOUS at 02:34

## 2023-03-28 RX ADMIN — FUROSEMIDE SCH MG: 10 INJECTION, SOLUTION INTRAVENOUS at 10:05

## 2023-03-28 RX ADMIN — CEFAZOLIN SCH MLS/HR: 1 INJECTION, POWDER, FOR SOLUTION INTRAVENOUS at 17:54

## 2023-03-29 LAB
ALBUMIN SERPL-MCNC: 2.7 G/DL (ref 3.4–5)
ALP SERPL-CCNC: 100 U/L (ref 45–117)
ALT SERPL-CCNC: 167 U/L (ref 13–61)
ANION GAP SERPL CALC-SCNC: 7 MMOL/L (ref 8–16)
AST SERPL-CCNC: 37 U/L (ref 15–37)
BILIRUB SERPL-MCNC: 2.3 MG/DL (ref 0.2–1)
BUN SERPL-MCNC: 23.5 MG/DL (ref 7–18)
CALCIUM SERPL-MCNC: 8 MG/DL (ref 8.5–10.1)
CHLORIDE SERPL-SCNC: 101 MMOL/L (ref 98–107)
CO2 SERPL-SCNC: 31 MMOL/L (ref 21–32)
CREAT SERPL-MCNC: 1.2 MG/DL (ref 0.55–1.3)
GLUCOSE SERPL-MCNC: 96 MG/DL (ref 74–106)
PROT SERPL-MCNC: 5.7 G/DL (ref 6.4–8.2)
SODIUM SERPL-SCNC: 139 MMOL/L (ref 136–145)

## 2023-03-29 RX ADMIN — APIXABAN SCH: 5 TABLET, FILM COATED ORAL at 22:32

## 2023-03-29 RX ADMIN — APIXABAN SCH MG: 5 TABLET, FILM COATED ORAL at 09:28

## 2023-03-29 RX ADMIN — CEFAZOLIN SCH MLS/HR: 1 INJECTION, POWDER, FOR SOLUTION INTRAVENOUS at 02:35

## 2023-03-29 RX ADMIN — POLYETHYLENE GLYCOL 3350 SCH GM: 17 POWDER, FOR SOLUTION ORAL at 21:46

## 2023-03-29 RX ADMIN — PANTOPRAZOLE SODIUM SCH MG: 40 TABLET, DELAYED RELEASE ORAL at 09:28

## 2023-03-29 RX ADMIN — CEFAZOLIN SCH MLS/HR: 1 INJECTION, POWDER, FOR SOLUTION INTRAVENOUS at 09:28

## 2023-03-29 RX ADMIN — POLYETHYLENE GLYCOL 3350 SCH GM: 17 POWDER, FOR SOLUTION ORAL at 22:34

## 2023-03-29 RX ADMIN — APIXABAN SCH MG: 5 TABLET, FILM COATED ORAL at 21:46

## 2023-03-29 RX ADMIN — CEFAZOLIN SCH MLS/HR: 1 INJECTION, POWDER, FOR SOLUTION INTRAVENOUS at 18:10

## 2023-03-29 RX ADMIN — POTASSIUM CHLORIDE SCH MEQ: 1500 TABLET, EXTENDED RELEASE ORAL at 09:27

## 2023-03-29 RX ADMIN — FUROSEMIDE SCH MG: 10 INJECTION, SOLUTION INTRAVENOUS at 09:28

## 2023-03-30 LAB
ALBUMIN SERPL-MCNC: 2.6 G/DL (ref 3.4–5)
ALP SERPL-CCNC: 90 U/L (ref 45–117)
ALT SERPL-CCNC: 109 U/L (ref 13–61)
ANION GAP SERPL CALC-SCNC: 7 MMOL/L (ref 8–16)
APPEARANCE UR: CLEAR
APTT BLD: 33.2 SECONDS (ref 25.2–36.5)
AST SERPL-CCNC: 33 U/L (ref 15–37)
BACTERIA # UR AUTO: 3 /UL (ref 0–1359)
BASOPHILS # BLD: 1.2 % (ref 0–2)
BILIRUB SERPL-MCNC: 2.7 MG/DL (ref 0.2–1)
BILIRUB UR STRIP.AUTO-MCNC: NEGATIVE MG/DL
BUN SERPL-MCNC: 16.5 MG/DL (ref 7–18)
CALCIUM SERPL-MCNC: 8 MG/DL (ref 8.5–10.1)
CASTS URNS QL MICRO: 1 /UL (ref 0–3.1)
CHLORIDE SERPL-SCNC: 102 MMOL/L (ref 98–107)
CO2 SERPL-SCNC: 30 MMOL/L (ref 21–32)
COLOR UR: (no result)
CREAT SERPL-MCNC: 1 MG/DL (ref 0.55–1.3)
DEPRECATED RDW RBC AUTO: 17.8 % (ref 11.9–15.9)
EOSINOPHIL # BLD: 2 % (ref 0–4.5)
EPITH CASTS URNS QL MICRO: 2 /UL (ref 0–25.1)
GLUCOSE SERPL-MCNC: 81 MG/DL (ref 74–106)
HCT VFR BLD CALC: 37.6 % (ref 35.4–49)
HGB BLD-MCNC: 12.2 GM/DL (ref 11.7–16.9)
INR BLD: 1.73 (ref 0.83–1.09)
KETONES UR QL STRIP: NEGATIVE
LEUKOCYTE ESTERASE UR QL STRIP.AUTO: NEGATIVE
LYMPHOCYTES # BLD: 19.3 % (ref 8–40)
MAGNESIUM SERPL-MCNC: 1.8 MG/DL (ref 1.8–2.4)
MCH RBC QN AUTO: 23.5 PG (ref 25.7–33.7)
MCHC RBC AUTO-ENTMCNC: 32.4 G/DL (ref 32–35.9)
MCV RBC: 72.4 FL (ref 80–96)
MONOCYTES # BLD AUTO: 12.1 % (ref 3.8–10.2)
NEUTROPHILS # BLD: 65.4 % (ref 42.8–82.8)
NITRITE UR QL STRIP: NEGATIVE
PH UR: 7 [PH] (ref 5–8)
PHOSPHATE SERPL-MCNC: 2.4 MG/DL (ref 2.5–4.9)
PLATELET # BLD AUTO: 253 10^3/UL (ref 134–434)
PMV BLD: 8.3 FL (ref 7.5–11.1)
PROT SERPL-MCNC: 5.5 G/DL (ref 6.4–8.2)
PROT UR QL STRIP: (no result)
PROT UR QL STRIP: NEGATIVE
PT PNL PPP: 20 SEC (ref 9.7–13)
RBC # BLD AUTO: 5.19 M/MM3 (ref 4–5.6)
RBC # BLD AUTO: 7 /UL (ref 0–23.9)
SODIUM SERPL-SCNC: 138 MMOL/L (ref 136–145)
SP GR UR: 1.02 (ref 1.01–1.03)
UROBILINOGEN UR STRIP-MCNC: (no result) MG/DL (ref 0.2–1)
WBC # BLD AUTO: 7.8 K/MM3 (ref 4–10)
WBC # UR AUTO: 4 /UL (ref 0–25.8)

## 2023-03-30 RX ADMIN — CEFAZOLIN SCH MLS/HR: 1 INJECTION, POWDER, FOR SOLUTION INTRAVENOUS at 03:35

## 2023-03-30 RX ADMIN — CEFAZOLIN SCH MLS/HR: 1 INJECTION, POWDER, FOR SOLUTION INTRAVENOUS at 17:35

## 2023-03-30 RX ADMIN — CEFAZOLIN SCH MLS/HR: 1 INJECTION, POWDER, FOR SOLUTION INTRAVENOUS at 09:48

## 2023-03-30 RX ADMIN — PANTOPRAZOLE SODIUM SCH MG: 40 TABLET, DELAYED RELEASE ORAL at 09:49

## 2023-03-30 RX ADMIN — POTASSIUM CHLORIDE SCH MEQ: 1500 TABLET, EXTENDED RELEASE ORAL at 09:48

## 2023-03-30 RX ADMIN — POLYETHYLENE GLYCOL 3350 SCH GM: 17 POWDER, FOR SOLUTION ORAL at 21:34

## 2023-03-30 RX ADMIN — FUROSEMIDE SCH MG: 10 INJECTION, SOLUTION INTRAVENOUS at 09:48

## 2023-03-30 RX ADMIN — POLYETHYLENE GLYCOL 3350 SCH GM: 17 POWDER, FOR SOLUTION ORAL at 09:48

## 2023-03-30 RX ADMIN — LISINOPRIL SCH MG: 10 TABLET ORAL at 14:01

## 2023-03-30 RX ADMIN — POTASSIUM & SODIUM PHOSPHATES POWDER PACK 280-160-250 MG SCH PACKET: 280-160-250 PACK at 21:39

## 2023-03-30 RX ADMIN — POTASSIUM & SODIUM PHOSPHATES POWDER PACK 280-160-250 MG SCH PACKET: 280-160-250 PACK at 14:02

## 2023-03-31 LAB
ALBUMIN SERPL-MCNC: 2.6 G/DL (ref 3.4–5)
ALP SERPL-CCNC: 90 U/L (ref 45–117)
ALT SERPL-CCNC: 86 U/L (ref 13–61)
ANION GAP SERPL CALC-SCNC: 5 MMOL/L (ref 8–16)
AST SERPL-CCNC: 40 U/L (ref 15–37)
BILIRUB SERPL-MCNC: 2.2 MG/DL (ref 0.2–1)
BUN SERPL-MCNC: 22.3 MG/DL (ref 7–18)
CALCIUM SERPL-MCNC: 8.2 MG/DL (ref 8.5–10.1)
CHLORIDE SERPL-SCNC: 101 MMOL/L (ref 98–107)
CO2 SERPL-SCNC: 31 MMOL/L (ref 21–32)
CREAT SERPL-MCNC: 1.1 MG/DL (ref 0.55–1.3)
GLUCOSE SERPL-MCNC: 82 MG/DL (ref 74–106)
PROT SERPL-MCNC: 5.8 G/DL (ref 6.4–8.2)
SODIUM SERPL-SCNC: 137 MMOL/L (ref 136–145)

## 2023-03-31 RX ADMIN — POTASSIUM & SODIUM PHOSPHATES POWDER PACK 280-160-250 MG SCH PACKET: 280-160-250 PACK at 05:51

## 2023-03-31 RX ADMIN — POTASSIUM & SODIUM PHOSPHATES POWDER PACK 280-160-250 MG SCH PACKET: 280-160-250 PACK at 14:25

## 2023-03-31 RX ADMIN — FUROSEMIDE SCH MG: 10 INJECTION, SOLUTION INTRAVENOUS at 10:30

## 2023-03-31 RX ADMIN — POTASSIUM & SODIUM PHOSPHATES POWDER PACK 280-160-250 MG SCH PACKET: 280-160-250 PACK at 21:09

## 2023-03-31 RX ADMIN — CEFAZOLIN SCH MLS/HR: 1 INJECTION, POWDER, FOR SOLUTION INTRAVENOUS at 17:14

## 2023-03-31 RX ADMIN — POTASSIUM CHLORIDE SCH MEQ: 1500 TABLET, EXTENDED RELEASE ORAL at 10:30

## 2023-03-31 RX ADMIN — POLYETHYLENE GLYCOL 3350 SCH GM: 17 POWDER, FOR SOLUTION ORAL at 10:30

## 2023-03-31 RX ADMIN — LISINOPRIL SCH MG: 10 TABLET ORAL at 10:30

## 2023-03-31 RX ADMIN — POLYETHYLENE GLYCOL 3350 SCH: 17 POWDER, FOR SOLUTION ORAL at 21:09

## 2023-03-31 RX ADMIN — PANTOPRAZOLE SODIUM SCH MG: 40 TABLET, DELAYED RELEASE ORAL at 10:30

## 2023-03-31 RX ADMIN — CEFAZOLIN SCH MLS/HR: 1 INJECTION, POWDER, FOR SOLUTION INTRAVENOUS at 03:30

## 2023-03-31 RX ADMIN — CEFAZOLIN SCH MLS/HR: 1 INJECTION, POWDER, FOR SOLUTION INTRAVENOUS at 10:31

## 2023-04-01 ENCOUNTER — INPATIENT (INPATIENT)
Facility: HOSPITAL | Age: 48
LOS: 11 days | Discharge: HOME | DRG: 216 | End: 2023-04-13
Attending: THORACIC SURGERY (CARDIOTHORACIC VASCULAR SURGERY) | Admitting: INTERNAL MEDICINE
Payer: COMMERCIAL

## 2023-04-01 VITALS — TEMPERATURE: 97.8 F | SYSTOLIC BLOOD PRESSURE: 148 MMHG | DIASTOLIC BLOOD PRESSURE: 109 MMHG | HEART RATE: 117 BPM

## 2023-04-01 VITALS — RESPIRATION RATE: 20 BRPM

## 2023-04-01 VITALS
OXYGEN SATURATION: 97 % | HEART RATE: 91 BPM | RESPIRATION RATE: 18 BRPM | DIASTOLIC BLOOD PRESSURE: 114 MMHG | SYSTOLIC BLOOD PRESSURE: 143 MMHG

## 2023-04-01 DIAGNOSIS — R74.01 ELEVATION OF LEVELS OF LIVER TRANSAMINASE LEVELS: ICD-10-CM

## 2023-04-01 DIAGNOSIS — Z98.890 OTHER SPECIFIED POSTPROCEDURAL STATES: Chronic | ICD-10-CM

## 2023-04-01 DIAGNOSIS — I48.91 UNSPECIFIED ATRIAL FIBRILLATION: ICD-10-CM

## 2023-04-01 DIAGNOSIS — I50.33 ACUTE ON CHRONIC DIASTOLIC (CONGESTIVE) HEART FAILURE: ICD-10-CM

## 2023-04-01 DIAGNOSIS — S30.1XXA CONTUSION OF ABDOMINAL WALL, INITIAL ENCOUNTER: ICD-10-CM

## 2023-04-01 DIAGNOSIS — L03.90 CELLULITIS, UNSPECIFIED: ICD-10-CM

## 2023-04-01 DIAGNOSIS — E78.5 HYPERLIPIDEMIA, UNSPECIFIED: ICD-10-CM

## 2023-04-01 DIAGNOSIS — I34.0 NONRHEUMATIC MITRAL (VALVE) INSUFFICIENCY: ICD-10-CM

## 2023-04-01 DIAGNOSIS — N17.9 ACUTE KIDNEY FAILURE, UNSPECIFIED: ICD-10-CM

## 2023-04-01 LAB
ALBUMIN SERPL-MCNC: 2.6 G/DL (ref 3.4–5)
ALP SERPL-CCNC: 84 U/L (ref 45–117)
ALT SERPL-CCNC: 63 U/L (ref 13–61)
ANION GAP SERPL CALC-SCNC: 12 MMOL/L — SIGNIFICANT CHANGE UP (ref 5–17)
ANION GAP SERPL CALC-SCNC: 12 MMOL/L — SIGNIFICANT CHANGE UP (ref 5–17)
ANION GAP SERPL CALC-SCNC: 6 MMOL/L (ref 8–16)
APTT BLD: 32.8 SEC — SIGNIFICANT CHANGE UP (ref 27.5–35.5)
APTT BLD: 33.5 SEC — SIGNIFICANT CHANGE UP (ref 27.5–35.5)
AST SERPL-CCNC: 35 U/L (ref 15–37)
BILIRUB SERPL-MCNC: 2.2 MG/DL (ref 0.2–1)
BUN SERPL-MCNC: 22.3 MG/DL (ref 7–18)
BUN SERPL-MCNC: 23 MG/DL — SIGNIFICANT CHANGE UP (ref 7–23)
BUN SERPL-MCNC: 25 MG/DL — HIGH (ref 7–23)
CALCIUM SERPL-MCNC: 8.2 MG/DL — LOW (ref 8.4–10.5)
CALCIUM SERPL-MCNC: 8.5 MG/DL (ref 8.5–10.1)
CALCIUM SERPL-MCNC: 8.5 MG/DL — SIGNIFICANT CHANGE UP (ref 8.4–10.5)
CHLORIDE SERPL-SCNC: 101 MMOL/L — SIGNIFICANT CHANGE UP (ref 96–108)
CHLORIDE SERPL-SCNC: 103 MMOL/L (ref 98–107)
CHLORIDE SERPL-SCNC: 99 MMOL/L — SIGNIFICANT CHANGE UP (ref 96–108)
CK MB CFR SERPL CALC: 1.9 NG/ML — SIGNIFICANT CHANGE UP (ref 0–6.7)
CK MB CFR SERPL CALC: 2 NG/ML — SIGNIFICANT CHANGE UP (ref 0–6.7)
CK SERPL-CCNC: 110 U/L — SIGNIFICANT CHANGE UP (ref 30–200)
CK SERPL-CCNC: 112 U/L — SIGNIFICANT CHANGE UP (ref 30–200)
CO2 SERPL-SCNC: 25 MMOL/L — SIGNIFICANT CHANGE UP (ref 22–31)
CO2 SERPL-SCNC: 25 MMOL/L — SIGNIFICANT CHANGE UP (ref 22–31)
CO2 SERPL-SCNC: 28 MMOL/L (ref 21–32)
CREAT SERPL-MCNC: 1.2 MG/DL (ref 0.55–1.3)
CREAT SERPL-MCNC: 1.27 MG/DL — SIGNIFICANT CHANGE UP (ref 0.5–1.3)
CREAT SERPL-MCNC: 1.3 MG/DL — SIGNIFICANT CHANGE UP (ref 0.5–1.3)
DEPRECATED RDW RBC AUTO: 18.3 % (ref 11.9–15.9)
EGFR: 68 ML/MIN/1.73M2 — SIGNIFICANT CHANGE UP
EGFR: 70 ML/MIN/1.73M2 — SIGNIFICANT CHANGE UP
GLUCOSE SERPL-MCNC: 110 MG/DL — HIGH (ref 70–99)
GLUCOSE SERPL-MCNC: 118 MG/DL — HIGH (ref 70–99)
GLUCOSE SERPL-MCNC: 91 MG/DL (ref 74–106)
HCT VFR BLD CALC: 37.4 % — LOW (ref 39–50)
HCT VFR BLD CALC: 37.6 % (ref 35.4–49)
HCT VFR BLD CALC: 38.1 % — LOW (ref 39–50)
HGB BLD-MCNC: 11.7 G/DL — LOW (ref 13–17)
HGB BLD-MCNC: 12 G/DL — LOW (ref 13–17)
HGB BLD-MCNC: 12.1 GM/DL (ref 11.7–16.9)
INR BLD: 1.23 — HIGH (ref 0.88–1.16)
INR BLD: 1.28 — HIGH (ref 0.88–1.16)
MAGNESIUM SERPL-MCNC: 1.8 MG/DL — SIGNIFICANT CHANGE UP (ref 1.6–2.6)
MAGNESIUM SERPL-MCNC: 1.9 MG/DL — SIGNIFICANT CHANGE UP (ref 1.6–2.6)
MCH RBC QN AUTO: 23.6 PG (ref 25.7–33.7)
MCHC RBC AUTO-ENTMCNC: 32.3 G/DL (ref 32–35.9)
MCHC RBC-ENTMCNC: 23.5 PG — LOW (ref 27–34)
MCHC RBC-ENTMCNC: 23.7 PG — LOW (ref 27–34)
MCHC RBC-ENTMCNC: 31.3 GM/DL — LOW (ref 32–36)
MCHC RBC-ENTMCNC: 31.5 GM/DL — LOW (ref 32–36)
MCV RBC AUTO: 74.7 FL — LOW (ref 80–100)
MCV RBC AUTO: 75.7 FL — LOW (ref 80–100)
MCV RBC: 73 FL (ref 80–96)
NRBC # BLD: 0 /100 WBCS — SIGNIFICANT CHANGE UP (ref 0–0)
NRBC # BLD: 0 /100 WBCS — SIGNIFICANT CHANGE UP (ref 0–0)
PLATELET # BLD AUTO: 262 10^3/UL (ref 134–434)
PLATELET # BLD AUTO: 285 K/UL — SIGNIFICANT CHANGE UP (ref 150–400)
PLATELET # BLD AUTO: 297 K/UL — SIGNIFICANT CHANGE UP (ref 150–400)
PMV BLD: 8.4 FL (ref 7.5–11.1)
POTASSIUM SERPL-MCNC: 4.3 MMOL/L — SIGNIFICANT CHANGE UP (ref 3.5–5.3)
POTASSIUM SERPL-MCNC: 4.4 MMOL/L — SIGNIFICANT CHANGE UP (ref 3.5–5.3)
POTASSIUM SERPL-SCNC: 4.3 MMOL/L — SIGNIFICANT CHANGE UP (ref 3.5–5.3)
POTASSIUM SERPL-SCNC: 4.4 MMOL/L — SIGNIFICANT CHANGE UP (ref 3.5–5.3)
PROT SERPL-MCNC: 5.6 G/DL (ref 6.4–8.2)
PROTHROM AB SERPL-ACNC: 14.7 SEC — HIGH (ref 10.5–13.4)
PROTHROM AB SERPL-ACNC: 15.3 SEC — HIGH (ref 10.5–13.4)
RBC # BLD AUTO: 5.14 M/MM3 (ref 4–5.6)
RBC # BLD: 4.94 M/UL — SIGNIFICANT CHANGE UP (ref 4.2–5.8)
RBC # BLD: 5.1 M/UL — SIGNIFICANT CHANGE UP (ref 4.2–5.8)
RBC # FLD: 18.5 % — HIGH (ref 10.3–14.5)
RBC # FLD: 19 % — HIGH (ref 10.3–14.5)
SARS-COV-2 RNA SPEC QL NAA+PROBE: SIGNIFICANT CHANGE UP
SODIUM SERPL-SCNC: 136 MMOL/L (ref 136–145)
SODIUM SERPL-SCNC: 136 MMOL/L — SIGNIFICANT CHANGE UP (ref 135–145)
SODIUM SERPL-SCNC: 138 MMOL/L — SIGNIFICANT CHANGE UP (ref 135–145)
TROPONIN T SERPL-MCNC: 0.01 NG/ML — SIGNIFICANT CHANGE UP (ref 0–0.01)
TROPONIN T SERPL-MCNC: 0.01 NG/ML — SIGNIFICANT CHANGE UP (ref 0–0.01)
WBC # BLD AUTO: 7.8 K/MM3 (ref 4–10)
WBC # BLD: 8.09 K/UL — SIGNIFICANT CHANGE UP (ref 3.8–10.5)
WBC # BLD: 8.91 K/UL — SIGNIFICANT CHANGE UP (ref 3.8–10.5)
WBC # FLD AUTO: 8.09 K/UL — SIGNIFICANT CHANGE UP (ref 3.8–10.5)
WBC # FLD AUTO: 8.91 K/UL — SIGNIFICANT CHANGE UP (ref 3.8–10.5)

## 2023-04-01 RX ORDER — METOPROLOL TARTRATE 50 MG
100 TABLET ORAL
Refills: 0 | Status: DISCONTINUED | OUTPATIENT
Start: 2023-04-01 | End: 2023-04-02

## 2023-04-01 RX ORDER — FUROSEMIDE 40 MG
40 TABLET ORAL DAILY
Refills: 0 | Status: DISCONTINUED | OUTPATIENT
Start: 2023-04-02 | End: 2023-04-02

## 2023-04-01 RX ORDER — LISINOPRIL 2.5 MG/1
20 TABLET ORAL DAILY
Refills: 0 | Status: DISCONTINUED | OUTPATIENT
Start: 2023-04-02 | End: 2023-04-02

## 2023-04-01 RX ORDER — POLYETHYLENE GLYCOL 3350 17 G/17G
17 POWDER, FOR SOLUTION ORAL
Refills: 0 | Status: DISCONTINUED | OUTPATIENT
Start: 2023-04-01 | End: 2023-04-07

## 2023-04-01 RX ORDER — CEFAZOLIN SODIUM 1 G
1000 VIAL (EA) INJECTION EVERY 8 HOURS
Refills: 0 | Status: DISCONTINUED | OUTPATIENT
Start: 2023-04-01 | End: 2023-04-02

## 2023-04-01 RX ORDER — PANTOPRAZOLE SODIUM 20 MG/1
40 TABLET, DELAYED RELEASE ORAL
Refills: 0 | Status: DISCONTINUED | OUTPATIENT
Start: 2023-04-01 | End: 2023-04-07

## 2023-04-01 RX ORDER — DILTIAZEM HCL 120 MG
120 CAPSULE, EXT RELEASE 24 HR ORAL DAILY
Refills: 0 | Status: DISCONTINUED | OUTPATIENT
Start: 2023-04-01 | End: 2023-04-03

## 2023-04-01 RX ADMIN — PANTOPRAZOLE SODIUM SCH MG: 40 TABLET, DELAYED RELEASE ORAL at 09:07

## 2023-04-01 RX ADMIN — CEFAZOLIN SCH MLS/HR: 1 INJECTION, POWDER, FOR SOLUTION INTRAVENOUS at 01:59

## 2023-04-01 RX ADMIN — POLYETHYLENE GLYCOL 3350 SCH: 17 POWDER, FOR SOLUTION ORAL at 09:09

## 2023-04-01 RX ADMIN — LISINOPRIL SCH MG: 10 TABLET ORAL at 09:07

## 2023-04-01 RX ADMIN — Medication 100 MILLIGRAM(S): at 23:12

## 2023-04-01 RX ADMIN — CEFAZOLIN SCH MLS/HR: 1 INJECTION, POWDER, FOR SOLUTION INTRAVENOUS at 09:08

## 2023-04-01 RX ADMIN — POTASSIUM CHLORIDE SCH MEQ: 1500 TABLET, EXTENDED RELEASE ORAL at 09:07

## 2023-04-01 RX ADMIN — FUROSEMIDE SCH MG: 10 INJECTION, SOLUTION INTRAVENOUS at 09:07

## 2023-04-01 RX ADMIN — POTASSIUM & SODIUM PHOSPHATES POWDER PACK 280-160-250 MG SCH PACKET: 280-160-250 PACK at 13:54

## 2023-04-01 RX ADMIN — POTASSIUM & SODIUM PHOSPHATES POWDER PACK 280-160-250 MG SCH PACKET: 280-160-250 PACK at 05:13

## 2023-04-01 NOTE — H&P ADULT - REASON FOR ADMISSION
HFpEF exacerbation, new onset Afib, LE cellulitis, severe MR HFpEF exacerbation, new onset Afib, LE cellulitis, mod-severe MR

## 2023-04-01 NOTE — H&P ADULT - PROBLEM SELECTOR PLAN 5
Cr peaked at 2.0 during admission@ [facility].  --currently stable at 1.2, will continue to monitor closely.  --avoid nephrotoxic agents.

## 2023-04-01 NOTE — H&P ADULT - ASSESSMENT
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 7/29/21 initially admitted to [facility] 3/24/23 with HFpEF exacerbation, Afib w/ RVR and LE cellulitis c/b TATIANA, transaminitis and rectal sheath hematoma. Patient now transferred to [facility] for cardiac telemetry for continued management of HFpEF exacerbation and structural heart evaluation for MR.

## 2023-04-01 NOTE — PATIENT PROFILE ADULT - FALL HARM RISK - HARM RISK INTERVENTIONS

## 2023-04-01 NOTE — H&P ADULT - NSHPSOCIALHISTORY_GEN_ALL_CORE
Cefazolin IV 1G Q8H  Miralax BID  KCl 20mEq PO daily  Lasix 40mg IV daily  Lisinopril 20mg PO daily  Pantoprazole 40mg PO daily  Metoprolol Succinate 150mg PO BID  Zofran 4mg IV q 6hr  Diltiazem HCL 120mg PO daily

## 2023-04-01 NOTE — H&P ADULT - PROBLEM SELECTOR PLAN 3
supervision rate controlled, transfer hospital meds: Metoprolol Succinate 150mg PO BID and Cardizem 120mg PO daily.   --will switch back to home dose Metoprolol Succinate 100mg PO BID and continue Cardizem 120mg PO daily. Patient also was previously on Amiodarone 200mg PO daily at home, however discontinued after he ran out of refill ~1 year ago.  --Eliquis on HOLD 2/2 rectal sheath hematoma, may resume if H/H remains stable as per Surgery team at [facility].

## 2023-04-01 NOTE — H&P ADULT - NS ATTEND AMEND GEN_ALL_CORE FT
Initial attending contact date 4.2.23 on morning bedside rounds. See attending addendum to HPI here for initial attending contact documentation.  See PA note written above, for details. I reviewed the PA documentation.  I have personally seen and examined this patient today. I reviewed vitals, labs, medications, cardiac studies and additional imaging.  I agree with the PA's findings and plans as written above with the following additions/amendments:  Physical Exam notable for: middle aged patient appears older than stated age laying in bed in NAD, flat neck veins, RRR, III/VI systolic murmur, clear lungs, overly nourished abdomen, no fluid wave detected, b/l erythema with excoriations and unroofed blistering, A&Ox3  Plan:  TTE ordered for structural assessment  Obtain CXR for pleural and parenchymal assessment  Transition from IV lasix to Lasix 40mg po daily to maintain euvolemia  Transition from metoprolol to Coreg 12.5 BID for additional BP control  Augment lisinopril to 30mg po daily for additional BP control  CTSx evaluation given reported MVP with 3+MR  ID c/s for continued cellulitis mgmt and IV abx recommendations  Vascular surgery c/s for abdominal rectus sheath hematoma and PVD assessment  Advanced CHF evaluation for pre op optimization per d/w CTSx  PT eval ordered  Ananth Villarreal M.D.  Cardiology Attending  80minutes spent on total encounter; more than 50% of the visit was spent counseling and/or coordinating care by the attending physician, with plan of care discussed with the patient, CTSx team and cardiac team.

## 2023-04-01 NOTE — H&P ADULT - NSICDXPASTMEDICALHX_GEN_ALL_CORE_FT
PAST MEDICAL HISTORY:  Atrial fibrillation     HOCM (hypertrophic obstructive cardiomyopathy)     HTN (hypertension)     Mitral regurgitation     SAH (subarachnoid hemorrhage)

## 2023-04-01 NOTE — H&P ADULT - PROBLEM SELECTOR PLAN 6
@[facility]: Total bili 2.6, , , Alk phos 99, Amylase 47, Lipase 533.  --Statin held, initially recommended for MRCP however cannot be performed 2/2 hx of cerebral coil.  --LFTs likely elevated 2/2 fluid overload, will continue to monitor closely.

## 2023-04-01 NOTE — H&P ADULT - NSICDXPASTSURGICALHX_GEN_ALL_CORE_FT
[facility] TRANSPORT HERE TO  PATIENT. PAST SURGICAL HISTORY:  S/P coil embolization of cerebral aneurysm

## 2023-04-01 NOTE — H&P ADULT - HISTORY OF PRESENT ILLNESS
47 yr old M, POOR HISTORIAN, with PMHx of HTN, hyperlipidemia,  HFpEF (EF:60%), HOCM,  moderate-severe MR, Afib diagnosed in 2021 (not on AC@home) who presented to [facility] 3/24/23 c/o progressively worsening SOB and LE edema x few weeks. Patient states he could barely walk 1 block prior to getting winded. Patient admitted to 2 pillow orthopnea, B/L edema and erythema, states he scratched and accidentally opening fluid filled blisters on his LE. Patient admits he ran out of his medications this past month. Patient denies any N/V, diaphoresis, palpitations, dizziness, chest pain, recent travel or sick contacts.     In [facility] ED, BP: 146/122, HR: 80s, RR:16, Temp: 97.5F, O2 sat: 96%RA. EKG revealed Afib@105BPM without ischemic changes. CXR with pulmonary vascular congestion and moderate right pleural effusion. Labs notable for: BUN/Cr 51.9/2.0, Total bili 2.6, , , Alk phos 99, BNP 1169, Amylase 47, Lipase 533. CT abd/pelvis revealed trace ascites, no peripancreatic abscess/necrosis. Abdominal US without acute findings. Echocardiogram c/w normal LV function, EF:60%, LAE with severe MR, mitral valve degeneration of posterior leaflet with significant prolapse and mod-severe anterior directed MR. Patient admitted to telemetry for management HFpEF exacerbation, Afib w/ RVR and LE cellulitis. Patient started on IV diuretics and IV antibiotics with improvement. Patient rate controlled with BB/Cardizem and started on Eliquis for anticoagulation. Hospital course c/b TATIANA, transaminitis and 11cm rectal sheath hematoma. Surgery consulted: recommended no acute intervention, Eliquis was held and recommended to resume if H/H remains stable.  GI consulted: patients Statin medication was held. Initially recommended for MRCP, however unable to perform due to hx of cerebral coil.       Patient now transferred to [facility]UR[facility] for cardiac telemetry for continued management of HFpEF exacerbation and structural heart evaluation for MR.    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 Patient states he could barely walk 1 block prior to getting winded. Patient admitted to 2 pillow orthopnea, B/L edema and erythema, states he scratched and accidentally opening fluid filled blisters on his LE. Patient admits he ran out of his medications this past month. Patient denies any N/V, diaphoresis, palpitations, dizziness, chest pain, recent travel or sick contacts.     In [facility] ED, BP: 146/122, HR: 80s, RR:16, Temp: 97.5F, O2 sat: 96%RA. EKG revealed Afib@105BPM without ischemic changes. CXR with pulmonary vascular congestion and moderate right pleural effusion. Labs notable for: BUN/Cr 51.9/2.0, Total bili 2.6, , , Alk phos 99, BNP 1169, Amylase 47, Lipase 533. CT abd/pelvis revealed trace ascites, no peripancreatic abscess/necrosis. Abdominal US without acute findings. Echocardiogram c/w normal LV function, EF:60%, LAE with severe MR, mitral valve degeneration of posterior leaflet with significant prolapse and mod-severe anterior directed MR. Patient admitted to telemetry for management HFpEF exacerbation, Afib w/ RVR and LE cellulitis. Patient started on IV diuretics and IV antibiotics with improvement. Patient rate controlled with BB/Cardizem and started on Eliquis for anticoagulation. Hospital course c/b TATIANA, transaminitis and 11cm rectal sheath hematoma. Surgery consulted: recommended no acute intervention, Eliquis was held and recommended to resume if H/H remains stable.  GI consulted: patients Statin medication was held. Initially recommended for MRCP, however unable to perform due to hx of cerebral coil.       Patient now transferred to [facility] for cardiac telemetry for continued management of HFpEF exacerbation and structural heart evaluation for MR.    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 Patient states he could barely walk 20 feet prior to getting winded. Patient also admitted to 2 pillow orthopnea, B/L edema and erythema, states he scratched and accidentally opening fluid filled blisters on his LE. Patient denies any N/V, diaphoresis, palpitations, dizziness, chest pain, recent travel or sick contacts. Patient reports he ran out of medications a few months ago and stopped taking some of his cardiac medications 2/2 cost.    In [facility] ED, BP: 146/122, HR: 80s, RR:16, Temp: 97.5F, O2 sat: 96%RA. EKG revealed Afib@105BPM without ischemic changes. CXR with pulmonary vascular congestion and moderate right pleural effusion. Labs notable for: BUN/Cr 51.9/2.0, Total bili 2.6, , , Alk phos 99, BNP 1169, Amylase 47, Lipase 533. CT abd/pelvis revealed trace ascites, no peripancreatic abscess/necrosis. Abdominal US without acute findings. Echocardiogram c/w normal LV function, EF:60%, LAE with severe MR, mitral valve degeneration of posterior leaflet with significant prolapse and mod-severe anterior directed MR. Patient admitted to telemetry for management HFpEF exacerbation, Afib w/ RVR and LE cellulitis. Patient started on IV diuretics and IV antibiotics with improvement. Patient rate controlled with BB/Cardizem and started on Eliquis for anticoagulation. Hospital course c/b TATIANA, transaminitis and 11cm rectal sheath hematoma. Surgery consulted: recommended abdominal binder/no acute intervention, Eliquis was held and recommended to resume if H/H remains stable.  GI consulted: patients Statin medication was held. Initially recommended for MRCP, however unable to perform due to hx of cerebral coil.       Patient now transferred to [facility] for cardiac telemetry for continued management of HFpEF exacerbation and structural heart evaluation for MR.       TRANSFER MEDS:  Cefazolin IV 1G Q8H  Miralax BID  KCl 20mEq PO daily  Lasix 40mg IV daily  Lisinopril 20mg PO daily  Pantoprazole 40mg PO daily  Metoprolol Succinate 150mg PO BID  Zofran 4mg IV q 6hr  Diltiazem HCL 120mg PO daily

## 2023-04-01 NOTE — H&P ADULT - PROBLEM SELECTOR PLAN 8
afebrile, without leukocytosis.  --continue Cefazolin 1q IV q 8 hours x 2 weeks(3/24-4/7) for bilateral LE cellulitis.       N: DASH with 1 liter fluid restriction  E: Maintain K>4.0 and Mg >2.0  VTE PPx: on hold 2/2 rectal sheath hematoma  Code: Full

## 2023-04-01 NOTE — H&P ADULT - PROBLEM SELECTOR PLAN 7
11cm rectal sheath hematoma: Surgery consulted@[facility].  --recommended abdominal binder for 2 weeks, no acute intervention. Eliquis was held and recommended to resume if H/H remains stable.

## 2023-04-01 NOTE — H&P ADULT - PROBLEM SELECTOR PLAN 1
+JVD, bibasilar crackles, 2-3+ pitting edema bilaterally extending into thighs.  --will CONTINUE: Lasix 40mg IV daily, uptitrate PRN.  --strict I/Os and daily weights.  --will obtain repeat TTE.     **TTE@[facility] 3/26/23 (actual report not sent over): reportedly revealed normal LV function, EF:60%, LAE with severe MR, mitral valve degeneration of posterior leaflet with significant prolapse and mod-severe anterior directed MR.    **Prior TTE@[facility] 7/29/21: normal LVSF, dilated LA, hypertropic cardiomyopathy with asymmetric septal hypertrophy and systolic anterior motion of the mitral valve. There is severe LVOT obstruction with a resting gradient of 90 mm Hg. There is at least moderate eccentric mitral regurgitation associated with the MACRINA.

## 2023-04-02 LAB
A1C WITH ESTIMATED AVERAGE GLUCOSE RESULT: 6.1 % — HIGH (ref 4–5.6)
ALBUMIN SERPL ELPH-MCNC: 3.1 G/DL — LOW (ref 3.3–5)
ALP SERPL-CCNC: 93 U/L — SIGNIFICANT CHANGE UP (ref 40–120)
ALT FLD-CCNC: 25 U/L — SIGNIFICANT CHANGE UP (ref 10–45)
ANION GAP SERPL CALC-SCNC: 10 MMOL/L — SIGNIFICANT CHANGE UP (ref 5–17)
APTT BLD: 32.4 SEC — SIGNIFICANT CHANGE UP (ref 27.5–35.5)
AST SERPL-CCNC: 34 U/L — SIGNIFICANT CHANGE UP (ref 10–40)
BASOPHILS # BLD AUTO: 0.09 K/UL — SIGNIFICANT CHANGE UP (ref 0–0.2)
BASOPHILS NFR BLD AUTO: 1.2 % — SIGNIFICANT CHANGE UP (ref 0–2)
BILIRUB SERPL-MCNC: 2.3 MG/DL — HIGH (ref 0.2–1.2)
BUN SERPL-MCNC: 24 MG/DL — HIGH (ref 7–23)
CALCIUM SERPL-MCNC: 8.5 MG/DL — SIGNIFICANT CHANGE UP (ref 8.4–10.5)
CHLORIDE SERPL-SCNC: 102 MMOL/L — SIGNIFICANT CHANGE UP (ref 96–108)
CHOLEST SERPL-MCNC: 128 MG/DL — SIGNIFICANT CHANGE UP
CO2 SERPL-SCNC: 26 MMOL/L — SIGNIFICANT CHANGE UP (ref 22–31)
CREAT SERPL-MCNC: 1.25 MG/DL — SIGNIFICANT CHANGE UP (ref 0.5–1.3)
EGFR: 71 ML/MIN/1.73M2 — SIGNIFICANT CHANGE UP
EOSINOPHIL # BLD AUTO: 0.34 K/UL — SIGNIFICANT CHANGE UP (ref 0–0.5)
EOSINOPHIL NFR BLD AUTO: 4.6 % — SIGNIFICANT CHANGE UP (ref 0–6)
ESTIMATED AVERAGE GLUCOSE: 128 MG/DL — HIGH (ref 68–114)
GLUCOSE SERPL-MCNC: 84 MG/DL — SIGNIFICANT CHANGE UP (ref 70–99)
HCT VFR BLD CALC: 40.3 % — SIGNIFICANT CHANGE UP (ref 39–50)
HDLC SERPL-MCNC: 28 MG/DL — LOW
HGB BLD-MCNC: 11.9 G/DL — LOW (ref 13–17)
IMM GRANULOCYTES NFR BLD AUTO: 0.4 % — SIGNIFICANT CHANGE UP (ref 0–0.9)
LIPID PNL WITH DIRECT LDL SERPL: 80 MG/DL — SIGNIFICANT CHANGE UP
LYMPHOCYTES # BLD AUTO: 1.84 K/UL — SIGNIFICANT CHANGE UP (ref 1–3.3)
LYMPHOCYTES # BLD AUTO: 24.7 % — SIGNIFICANT CHANGE UP (ref 13–44)
MAGNESIUM SERPL-MCNC: 1.8 MG/DL — SIGNIFICANT CHANGE UP (ref 1.6–2.6)
MCHC RBC-ENTMCNC: 22.8 PG — LOW (ref 27–34)
MCHC RBC-ENTMCNC: 29.5 GM/DL — LOW (ref 32–36)
MCV RBC AUTO: 77.4 FL — LOW (ref 80–100)
MONOCYTES # BLD AUTO: 0.89 K/UL — SIGNIFICANT CHANGE UP (ref 0–0.9)
MONOCYTES NFR BLD AUTO: 11.9 % — SIGNIFICANT CHANGE UP (ref 2–14)
NEUTROPHILS # BLD AUTO: 4.27 K/UL — SIGNIFICANT CHANGE UP (ref 1.8–7.4)
NEUTROPHILS NFR BLD AUTO: 57.2 % — SIGNIFICANT CHANGE UP (ref 43–77)
NON HDL CHOLESTEROL: 100 MG/DL — SIGNIFICANT CHANGE UP
NRBC # BLD: 0 /100 WBCS — SIGNIFICANT CHANGE UP (ref 0–0)
NT-PROBNP SERPL-SCNC: 4323 PG/ML — HIGH (ref 0–300)
PLATELET # BLD AUTO: 292 K/UL — SIGNIFICANT CHANGE UP (ref 150–400)
POTASSIUM SERPL-MCNC: 3.8 MMOL/L — SIGNIFICANT CHANGE UP (ref 3.5–5.3)
POTASSIUM SERPL-SCNC: 3.8 MMOL/L — SIGNIFICANT CHANGE UP (ref 3.5–5.3)
PROT SERPL-MCNC: 6.1 G/DL — SIGNIFICANT CHANGE UP (ref 6–8.3)
RBC # BLD: 5.21 M/UL — SIGNIFICANT CHANGE UP (ref 4.2–5.8)
RBC # FLD: 18.9 % — HIGH (ref 10.3–14.5)
SODIUM SERPL-SCNC: 138 MMOL/L — SIGNIFICANT CHANGE UP (ref 135–145)
TRIGL SERPL-MCNC: 99 MG/DL — SIGNIFICANT CHANGE UP
TSH SERPL-MCNC: 9.15 UIU/ML — HIGH (ref 0.27–4.2)
WBC # BLD: 7.46 K/UL — SIGNIFICANT CHANGE UP (ref 3.8–10.5)
WBC # FLD AUTO: 7.46 K/UL — SIGNIFICANT CHANGE UP (ref 3.8–10.5)

## 2023-04-02 PROCEDURE — 99222 1ST HOSP IP/OBS MODERATE 55: CPT

## 2023-04-02 PROCEDURE — 99223 1ST HOSP IP/OBS HIGH 75: CPT

## 2023-04-02 PROCEDURE — 71045 X-RAY EXAM CHEST 1 VIEW: CPT | Mod: 26

## 2023-04-02 RX ORDER — ACETAMINOPHEN 500 MG
650 TABLET ORAL ONCE
Refills: 0 | Status: COMPLETED | OUTPATIENT
Start: 2023-04-02 | End: 2023-04-02

## 2023-04-02 RX ORDER — CARVEDILOL PHOSPHATE 80 MG/1
12.5 CAPSULE, EXTENDED RELEASE ORAL EVERY 12 HOURS
Refills: 0 | Status: DISCONTINUED | OUTPATIENT
Start: 2023-04-02 | End: 2023-04-03

## 2023-04-02 RX ORDER — FUROSEMIDE 40 MG
40 TABLET ORAL ONCE
Refills: 0 | Status: DISCONTINUED | OUTPATIENT
Start: 2023-04-02 | End: 2023-04-02

## 2023-04-02 RX ORDER — FUROSEMIDE 40 MG
40 TABLET ORAL ONCE
Refills: 0 | Status: COMPLETED | OUTPATIENT
Start: 2023-04-02 | End: 2023-04-02

## 2023-04-02 RX ORDER — LISINOPRIL 2.5 MG/1
10 TABLET ORAL ONCE
Refills: 0 | Status: COMPLETED | OUTPATIENT
Start: 2023-04-02 | End: 2023-04-02

## 2023-04-02 RX ORDER — CEFAZOLIN SODIUM 1 G
2000 VIAL (EA) INJECTION EVERY 8 HOURS
Refills: 0 | Status: DISCONTINUED | OUTPATIENT
Start: 2023-04-02 | End: 2023-04-07

## 2023-04-02 RX ORDER — FUROSEMIDE 40 MG
40 TABLET ORAL DAILY
Refills: 0 | Status: DISCONTINUED | OUTPATIENT
Start: 2023-04-03 | End: 2023-04-04

## 2023-04-02 RX ORDER — LISINOPRIL 2.5 MG/1
30 TABLET ORAL DAILY
Refills: 0 | Status: DISCONTINUED | OUTPATIENT
Start: 2023-04-03 | End: 2023-04-07

## 2023-04-02 RX ADMIN — LISINOPRIL 20 MILLIGRAM(S): 2.5 TABLET ORAL at 06:37

## 2023-04-02 RX ADMIN — Medication 100 MILLIGRAM(S): at 21:34

## 2023-04-02 RX ADMIN — Medication 120 MILLIGRAM(S): at 06:36

## 2023-04-02 RX ADMIN — Medication 40 MILLIGRAM(S): at 02:00

## 2023-04-02 RX ADMIN — Medication 100 MILLIGRAM(S): at 09:13

## 2023-04-02 RX ADMIN — PANTOPRAZOLE SODIUM 40 MILLIGRAM(S): 20 TABLET, DELAYED RELEASE ORAL at 06:36

## 2023-04-02 RX ADMIN — Medication 100 MILLIGRAM(S): at 00:06

## 2023-04-02 RX ADMIN — LISINOPRIL 10 MILLIGRAM(S): 2.5 TABLET ORAL at 13:47

## 2023-04-02 RX ADMIN — Medication 650 MILLIGRAM(S): at 03:00

## 2023-04-02 RX ADMIN — Medication 100 MILLIGRAM(S): at 13:48

## 2023-04-02 RX ADMIN — Medication 650 MILLIGRAM(S): at 02:00

## 2023-04-02 RX ADMIN — Medication 100 MILLIGRAM(S): at 06:37

## 2023-04-02 RX ADMIN — CARVEDILOL PHOSPHATE 12.5 MILLIGRAM(S): 80 CAPSULE, EXTENDED RELEASE ORAL at 18:25

## 2023-04-02 NOTE — PROGRESS NOTE ADULT - PROBLEM SELECTOR PLAN 5
Cr peaked at 2.0 during admission@ [facility].  --currently stable at 1.2, will continue to monitor closely.  --avoid nephrotoxic agents. - Cr peaked at 2.0 during admission@ [facility].  - currently stable at 1.2, will continue to monitor closely.  - avoid nephrotoxic agents.

## 2023-04-02 NOTE — PROGRESS NOTE ADULT - PROBLEM SELECTOR PLAN 3
rate controlled, transfer hospital meds: Metoprolol Succinate 150mg PO BID and Cardizem 120mg PO daily.   --will switch back to home dose Metoprolol Succinate 100mg PO BID and continue Cardizem 120mg PO daily. Patient also was previously on Amiodarone 200mg PO daily at home, however discontinued after he ran out of refill ~1 year ago.  --Eliquis on HOLD 2/2 rectal sheath hematoma, may resume if H/H remains stable as per Surgery team at [facility]. - Rate controlled; was   - CONT: Cardizem 120mg PO QD, Carvedilol 6.25mg PO QD  - HOLD Eliquis at this time given rectus sheath hematoma; consider restarting if H/H remains stable, but will get surgical team on board for further eval. - Rate controlled; was   - CONT: Cardizem 120mg PO QD, Carvedilol 12.5mg PO QD  - HOLD Eliquis at this time given rectus sheath hematoma; consider restarting if H/H remains stable, but will get surgical team on board for further eval.

## 2023-04-02 NOTE — PROGRESS NOTE ADULT - PROBLEM SELECTOR PLAN 6
@[facility]: Total bili 2.6, , , Alk phos 99, Amylase 47, Lipase 533.  --Statin held, initially recommended for MRCP however cannot be performed 2/2 hx of cerebral coil.  --LFTs likely elevated 2/2 fluid overload, will continue to monitor closely. - @ [facility]: total bili 2.6, , , Alk Phos 99, Amylase 47, Lipase 533.   - Liver enzymes normalized here.   - Initially was recommended for MRCP; however, cannot be performed 2/2 Hx of cerebral coil.   - Continue to monitor.

## 2023-04-02 NOTE — PROGRESS NOTE ADULT - PROBLEM SELECTOR PLAN 4
HOME MED: Atorvastatin 80mg PO qhs, currently on hold 2/2 transaminitis.  --F/U lipid panel. - F/U Lipid profile.   - CONT: Atorvastatin 80mg PO QHS.

## 2023-04-02 NOTE — PROGRESS NOTE ADULT - PROBLEM SELECTOR PLAN 8
afebrile, without leukocytosis.  --continue Cefazolin 1q IV q 8 hours x 2 weeks(3/24-4/7) for bilateral LE cellulitis.       N: DASH with 1 liter fluid restriction  E: Maintain K>4.0 and Mg >2.0  VTE PPx: on hold 2/2 rectal sheath hematoma  Code: Full - Afebrile, no leukocytosis.   - ID consulted, rec increasing Cefazolin dosing.   - CONT: Cefazolin 2g IV q8hrs x7 days (ends 4/9/23).   - If discharge before end of course, can change to Cefadroxil 500mg PO q12hrs to complete course.       N: DASH with 1 liter fluid restriction  E: Maintain K>4.0 and Mg >2.0  VTE PPx: on hold 2/2 rectal sheath hematoma  Code: Full

## 2023-04-02 NOTE — PROGRESS NOTE ADULT - PROBLEM SELECTOR PLAN 1
+JVD, bibasilar crackles, 2-3+ pitting edema bilaterally extending into thighs.  --will CONTINUE: Lasix 40mg IV daily, uptitrate PRN.  --strict I/Os and daily weights.  --will obtain repeat TTE.     **TTE@[facility] 3/26/23 (actual report not sent over): reportedly revealed normal LV function, EF:60%, LAE with severe MR, mitral valve degeneration of posterior leaflet with significant prolapse and mod-severe anterior directed MR.    **Prior TTE@[facility] 7/29/21: normal LVSF, dilated LA, hypertropic cardiomyopathy with asymmetric septal hypertrophy and systolic anterior motion of the mitral valve. There is severe LVOT obstruction with a resting gradient of 90 mm Hg. There is at least moderate eccentric mitral regurgitation associated with the MACRINA. - faint rales, LE pitting edema B/L; CXR w/ vascular congestion and B/L pleural effusions.   - given Lasix 40mg IV x1    +JVD, bibasilar crackles, 2-3+ pitting edema bilaterally extending into thighs.  --will CONTINUE: Lasix 40mg IV daily, uptitrate PRN.  --strict I/Os and daily weights.  --will obtain repeat TTE.     **TTE@[facility] 3/26/23 (actual report not sent over): reportedly revealed normal LV function, EF:60%, LAE with severe MR, mitral valve degeneration of posterior leaflet with significant prolapse and mod-severe anterior directed MR.    **Prior TTE@[facility] 7/29/21: normal LVSF, dilated LA, hypertropic cardiomyopathy with asymmetric septal hypertrophy and systolic anterior motion of the mitral valve. There is severe LVOT obstruction with a resting gradient of 90 mm Hg. There is at least moderate eccentric mitral regurgitation associated with the MACRINA. - Lungs CTA and pt not orthopnea. satting well on RA and no respiratory symptoms.   - Was receiving Lasix 40mg IV QD at outside hospital.   - TTE @ [facility] 3/26/23 (actual report not available; per clinical documentation): LVEF 60%, LAE w/ severe MR, MV degeneration of posterior leaflet w/ significatn prolapse and mod-severe  anterior directed MR.   - On prior TTE 7/2021, pt w/ HOCM w/ severe LVOT obstruction w/ resting gradient of 90mmHg.   - Caution w/ diuresis given LVOT obstruction.   - CONT: Lasix 40mg PO QD.   - PLAN: PO diuresis, monitor volume status.

## 2023-04-02 NOTE — CONSULT NOTE ADULT - SUBJECTIVE AND OBJECTIVE BOX
Surgeon: Dr. Sousa    Requesting Physician: Dr. 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 Patient now transferred to [facility] for cardiac telemetry for continued management of HFpEF exacerbation and  MR. Structural heart consulted for evaluation of mod/severe MR.      PAST MEDICAL & SURGICAL HISTORY:  HTN (hypertension)      SAH (subarachnoid hemorrhage)      Atrial fibrillation      HOCM (hypertrophic obstructive cardiomyopathy)      Mitral regurgitation      S/P coil embolization of cerebral aneurysm          MEDICATIONS  (STANDING):  ceFAZolin   IVPB 1000 milliGRAM(s) IV Intermittent every 8 hours  diltiazem    milliGRAM(s) Oral daily  furosemide   Injectable 40 milliGRAM(s) IV Push daily  lisinopril 20 milliGRAM(s) Oral daily  metoprolol succinate  milliGRAM(s) Oral two times a day  pantoprazole    Tablet 40 milliGRAM(s) Oral before breakfast  polyethylene glycol 3350 17 Gram(s) Oral two times a day    MEDICATIONS  (PRN):      Allergies    No Known Allergies    Intolerances        SOCIAL HISTORY:  Smoker:  denies  ETOH use:  denies  Ilicit Drug use:  denies    FAMILY HISTORY:  No pertinent family history in first degree relatives        Review of Systems (Need 10):  CONSTITUTIONAL: Denies fevers / chills, sweats, fatigue, weight loss, weight gain                                       NEURO:  Denies parathesias, seizures, syncope, confusion                                                                                  EYES:  Denies blurry vision, discharge, pain, loss of vision                                                                                    ENMT:  Denies difficulty hearing, vertigo, dysphagia, epistaxis, recent dental work                                       CV:  Denies chest pain, palpitations, OAKES, orthopnea                                                                                           RESPIRATORY:  Denies wWheezing, SOB, cough / sputum, hemoptysis                                                               GI:  Denies nausea, vomiting, diarrhea, constipation, melena                                                                          : Denies hematuria, dysuria, urgency, incontinence                                                                                          MUSKULOSKELETAL:  Denies arthritis, joint swelling, muscle weakness                                                             SKIN/BREAST:  Denies rash, itching, hair loss, masses                                                                                              PSYCH:  Denies depression, anxiety, suicidal ideation                                                                                                HEME/LYMPH:  Denies bruises easily, enlarged lymph nodes, tender lymph nodes                                          ENDOCRINE:  Denies cold intolerance, heat intolerance, polydipsia                                                                      Vital Signs Last 24 Hrs  T(C): 36.8 (02 Apr 2023 09:38), Max: 37.1 (01 Apr 2023 20:59)  T(F): 98.3 (02 Apr 2023 09:38), Max: 98.7 (01 Apr 2023 20:59)  HR: 100 (02 Apr 2023 08:56) (81 - 100)  BP: 164/100 (02 Apr 2023 08:56) (134/102 - 164/100)  BP(mean): 115 (02 Apr 2023 06:15) (105 - 115)  RR: 18 (02 Apr 2023 08:56) (16 - 18)  SpO2: 99% (02 Apr 2023 08:56) (97% - 99%)    Parameters below as of 02 Apr 2023 08:56  Patient On (Oxygen Delivery Method): room air        Physical Exam (Need 8)  CONSTITUTIONAL:                                                                          WNL  NEURO:                                                                                             WNL                      EYES:                                                                                                  WNL  ENMT:                                                                                                WNL  CV:                                                                                                      WNL  RESPIRATORY:                                                                                  WNL  GI:                                                                                                       WNL  : CANTU + / -                                                                                 WNL  MUSKULOSKELETAL:                                                                       WNL  SKIN / BREAST:                                                                                 WNL                                                          LABS:                        11.9   7.46  )-----------( 292      ( 02 Apr 2023 05:30 )             40.3     04-02    138  |  102  |  24<H>  ----------------------------<  84  3.8   |  26  |  1.25    Ca    8.5      02 Apr 2023 05:30  Mg     1.8     04-02    TPro  6.1  /  Alb  3.1<L>  /  TBili  2.3<H>  /  DBili  x   /  AST  34  /  ALT  25  /  AlkPhos  93  04-02    PT/INR - ( 01 Apr 2023 22:35 )   PT: 14.7 sec;   INR: 1.23          PTT - ( 02 Apr 2023 05:30 )  PTT:32.4 sec    CARDIAC MARKERS ( 01 Apr 2023 22:35 )  x     / 0.01 ng/mL / 112 U/L / x     / 2.0 ng/mL  CARDIAC MARKERS ( 01 Apr 2023 21:43 )  x     / 0.01 ng/mL / 110 U/L / x     / 1.9 ng/mL          RADIOLOGY & ADDITIONAL STUDIES:    CXR:  pending    EKG:  in chart    TTE / VERONIQUE:  pending Surgeon: Dr. Sousa    Requesting Physician: Dr. 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 Patient now transferred to [facility] for cardiac telemetry for continued management of HFpEF exacerbation and MR. Structural heart consulted for evaluation of mod/severe MR.  At visit, patient states his symptoms came onset rapidly in last 3 weeks with associates shortness of breath, LE edema bilaterally and weeping, and upper abdominal hematoma.  Since admission, he underwent vigorously diuresis and tolerates well.  He denies fatigues, syncopes, vision changes, chest pain, shortness of breath, palpitation or nausea/vomiting.           PAST MEDICAL & SURGICAL HISTORY:  HTN (hypertension)      SAH (subarachnoid hemorrhage)      Atrial fibrillation      HOCM (hypertrophic obstructive cardiomyopathy)      Mitral regurgitation      S/P coil embolization of cerebral aneurysm          MEDICATIONS  (STANDING):  ceFAZolin   IVPB 1000 milliGRAM(s) IV Intermittent every 8 hours  diltiazem    milliGRAM(s) Oral daily  furosemide   Injectable 40 milliGRAM(s) IV Push daily  lisinopril 20 milliGRAM(s) Oral daily  metoprolol succinate  milliGRAM(s) Oral two times a day  pantoprazole    Tablet 40 milliGRAM(s) Oral before breakfast  polyethylene glycol 3350 17 Gram(s) Oral two times a day    MEDICATIONS  (PRN):      Allergies    No Known Allergies    Intolerances        SOCIAL HISTORY:  Smoker:  denies  ETOH use:  denies  Ilicit Drug use:  denies    FAMILY HISTORY:  No pertinent family history in first degree relatives        Review of Systems (Need 10):  CONSTITUTIONAL: Denies fevers / chills, sweats, fatigue, weight loss, weight gain                                       NEURO:  Denies parathesias, seizures, syncope, confusion                                                                                  EYES:  Denies blurry vision, discharge, pain, loss of vision                                                                                    ENMT:  Denies difficulty hearing, vertigo, dysphagia, epistaxis, recent dental work                                       CV:  Denies chest pain, palpitations, OAKES, orthopnea                                                                                           RESPIRATORY:  Denies wWheezing, SOB, cough / sputum, hemoptysis                                                               GI:  Denies nausea, vomiting, diarrhea, constipation, melena                                                                          : Denies hematuria, dysuria, urgency, incontinence                                                                                          MUSKULOSKELETAL:  Denies arthritis, joint swelling, muscle weakness                                                             SKIN/BREAST:  Denies rash, itching, hair loss, masses                                                                                              PSYCH:  Denies depression, anxiety, suicidal ideation                                                                                                HEME/LYMPH:  Denies bruises easily, enlarged lymph nodes, tender lymph nodes                                          ENDOCRINE:  Denies cold intolerance, heat intolerance, polydipsia                                                                      Vital Signs Last 24 Hrs  T(C): 36.8 (02 Apr 2023 09:38), Max: 37.1 (01 Apr 2023 20:59)  T(F): 98.3 (02 Apr 2023 09:38), Max: 98.7 (01 Apr 2023 20:59)  HR: 100 (02 Apr 2023 08:56) (81 - 100)  BP: 164/100 (02 Apr 2023 08:56) (134/102 - 164/100)  BP(mean): 115 (02 Apr 2023 06:15) (105 - 115)  RR: 18 (02 Apr 2023 08:56) (16 - 18)  SpO2: 99% (02 Apr 2023 08:56) (97% - 99%)    Parameters below as of 02 Apr 2023 08:56  Patient On (Oxygen Delivery Method): room air        Physical Exam (Need 8)  CONSTITUTIONAL:    NAD   NEURO:     grossly intact                                                                                                             EYES:   wear glasses.                                                                                                 ENMT:      supple and non lymphadenopathy                                                                                             CV:      positive for murmur                                                                                                  RESPIRATORY:    room air.  non wheezing and non rhonchi                                                                               GI:     tender  to palpate                                                                                               :  deferred                                                                               MUSKULOSKELETAL:     no deformity                                                                    SKIN / BREAST:   warm                                                                                                                                      LABS:                        11.9   7.46  )-----------( 292      ( 02 Apr 2023 05:30 )             40.3     04-02    138  |  102  |  24<H>  ----------------------------<  84  3.8   |  26  |  1.25    Ca    8.5      02 Apr 2023 05:30  Mg     1.8     04-02    TPro  6.1  /  Alb  3.1<L>  /  TBili  2.3<H>  /  DBili  x   /  AST  34  /  ALT  25  /  AlkPhos  93  04-02    PT/INR - ( 01 Apr 2023 22:35 )   PT: 14.7 sec;   INR: 1.23          PTT - ( 02 Apr 2023 05:30 )  PTT:32.4 sec    CARDIAC MARKERS ( 01 Apr 2023 22:35 )  x     / 0.01 ng/mL / 112 U/L / x     / 2.0 ng/mL  CARDIAC MARKERS ( 01 Apr 2023 21:43 )  x     / 0.01 ng/mL / 110 U/L / x     / 1.9 ng/mL      RADIOLOGY & ADDITIONAL STUDIES:    CXR:  pending    EKG:  in chart    TTE / VERONIQUE:  pending

## 2023-04-02 NOTE — PROGRESS NOTE ADULT - PROBLEM SELECTOR PLAN 2
moderate to severe MR as above.  --Structural heart Dr. Sousa to be consulted. - Mod-severe MR on outside TTE.   - Suspecting this could be in setting of LVOT obstruction.   - [facility] structural heart team consulted - pt to get TTE tomorrow; start carvedilol and work on BP control. Further plan pending TTE    moderate to severe MR as above.  --Structural heart Dr. Sousa to be consulted. - Mod-severe MR on outside TTE.   - Suspecting this could be in setting of LVOT obstruction.   - [facility] structural heart team consulted - pt to get TTE tomorrow; start carvedilol and work on BP control. Further plan pending TTE.      ## HOCM  - severe LVOT obstruction w/ resting gradient 90mmHg by TTE in 2021.   - CONT: Cardizem and Carvedilol.  - F/U repeat TTE.

## 2023-04-02 NOTE — PROGRESS NOTE ADULT - PROBLEM SELECTOR PLAN 7
11cm rectal sheath hematoma: Surgery consulted@[facility].  --recommended abdominal binder for 2 weeks, no acute intervention. Eliquis was held and recommended to resume if H/H remains stable. - 11cm rectal sheath hematoma: Surgery consulted @ [facility].  - Recommend abdominal binder for 2wks, no acute intervention; Eliquis held.   - Will attempt to get collateral from [facility] if CTA Abd/Pel was done; if not, may require CTA to determine which service to consult for further recs (gen surg vs vasc surge); continue abdominal binder for now and monitor.

## 2023-04-02 NOTE — PROGRESS NOTE ADULT - SUBJECTIVE AND OBJECTIVE BOX
**INCOMPLETE / IN PROGRESS NOTE**    Cardiology PA Adult Progress Note    Subjective Assessment:  	  MEDICATIONS:  diltiazem    milliGRAM(s) Oral daily  furosemide   Injectable 40 milliGRAM(s) IV Push daily  lisinopril 20 milliGRAM(s) Oral daily  metoprolol succinate  milliGRAM(s) Oral two times a day  ceFAZolin   IVPB 1000 milliGRAM(s) IV Intermittent every 8 hours  pantoprazole    Tablet 40 milliGRAM(s) Oral before breakfast  polyethylene glycol 3350 17 Gram(s) Oral two times a day  	    PHYSICAL EXAM:  TELEMETRY:  T(C): 36.4 (04-02-23 @ 05:05), Max: 37.1 (04-01-23 @ 20:59)  HR: 96 (04-02-23 @ 06:15) (81 - 96)  BP: 134/102 (04-02-23 @ 06:15) (134/102 - 151/106)  RR: 17 (04-02-23 @ 06:15) (16 - 18)  SpO2: 98% (04-02-23 @ 06:15) (97% - 98%)  Wt(kg): --  I&O's Summary    01 Apr 2023 07:01  -  02 Apr 2023 07:00  --------------------------------------------------------  IN: 350 mL / OUT: 2200 mL / NET: -1850 mL    02 Apr 2023 07:01  -  02 Apr 2023 09:23  --------------------------------------------------------  IN: 0 mL / OUT: 325 mL / NET: -325 mL      **INCOMPLETE EXAM**                                     Appearance: Normal	  HEENT:   Normal oral mucosa, PERRL, EOMI	  Neck: Supple, + JVD/ - JVD; Carotid Bruit   Cardiovascular: Normal S1 S2, No JVD, No murmurs,   Respiratory: Lungs clear to auscultation/Decreased Breath Sounds/No Rales, Rhonchi, Wheezing	  Gastrointestinal:  Soft, Non-tender, + BS	  Skin: No rashes, No ecchymoses, No cyanosis  Extremities: Normal range of motion, No clubbing, cyanosis or edema  Vascular: Peripheral pulses palpable 2+ bilaterally  Neurologic: Non-focal  Psychiatry: A & O x 3, Mood & affect appropriate  	    LABS:	 	  CARDIAC MARKERS:                        11.9   7.46  )-----------( 292      ( 02 Apr 2023 05:30 )             40.3    138  |  102  |  24<H>  ----------------------------<  84  3.8   |  26  |  1.25    Ca    8.5      02 Apr 2023 05:30    Mg     1.8     04-02    TPro  6.1  /  Alb  3.1<L>  /  TBili  2.3<H>  /  DBili  x   /  AST  34  /  ALT  25  /  AlkPhos  93  04-02    PT/INR - ( 01 Apr 2023 22:35 )   PT: 14.7 sec;   INR: 1.23       PTT - ( 02 Apr 2023 05:30 )  PTT:32.4 sec       Cardiology PA Adult Progress Note    Subjective Assessment: Pt denies SOB, CP, palpitations, dizziness. No events overnight.   	  MEDICATIONS:  diltiazem    milliGRAM(s) Oral daily  furosemide   Injectable 40 milliGRAM(s) IV Push daily  lisinopril 20 milliGRAM(s) Oral daily  metoprolol succinate  milliGRAM(s) Oral two times a day  ceFAZolin   IVPB 1000 milliGRAM(s) IV Intermittent every 8 hours  pantoprazole    Tablet 40 milliGRAM(s) Oral before breakfast  polyethylene glycol 3350 17 Gram(s) Oral two times a day  	    PHYSICAL EXAM:  TELEMETRY: no events overnight  T(C): 36.4 (04-02-23 @ 05:05), Max: 37.1 (04-01-23 @ 20:59)  HR: 96 (04-02-23 @ 06:15) (81 - 96)  BP: 134/102 (04-02-23 @ 06:15) (134/102 - 151/106)  RR: 17 (04-02-23 @ 06:15) (16 - 18)  SpO2: 98% (04-02-23 @ 06:15) (97% - 98%)  Wt(kg): --  I&O's Summary    01 Apr 2023 07:01  -  02 Apr 2023 07:00  --------------------------------------------------------  IN: 350 mL / OUT: 2200 mL / NET: -1850 mL    02 Apr 2023 07:01  -  02 Apr 2023 09:23  --------------------------------------------------------  IN: 0 mL / OUT: 325 mL / NET: -325 mL      Appearance: Normal	  HEENT:   Normal oral mucosa, PERRL, EOMI	  Neck: Supple, - JVD;   Cardiovascular: Normal S1 S2, No JVD; holosystolic murmur.   Respiratory: Lungs clear to auscultation, No Rales, Rhonchi, Wheezing	  Gastrointestinal:  Soft, Non-tender, + BS	  Skin: extensive cellulitis skin changes B/L LE  Extremities: Normal range of motion, No clubbing, cyanosis or edema  Neurologic: Non-focal  Psychiatry: A & O x 3, Mood & affect appropriate  	    LABS:	 	  CARDIAC MARKERS:                        11.9   7.46  )-----------( 292      ( 02 Apr 2023 05:30 )             40.3    138  |  102  |  24<H>  ----------------------------<  84  3.8   |  26  |  1.25    Ca    8.5      02 Apr 2023 05:30    Mg     1.8     04-02    TPro  6.1  /  Alb  3.1<L>  /  TBili  2.3<H>  /  DBili  x   /  AST  34  /  ALT  25  /  AlkPhos  93  04-02    PT/INR - ( 01 Apr 2023 22:35 )   PT: 14.7 sec;   INR: 1.23       PTT - ( 02 Apr 2023 05:30 )  PTT:32.4 sec

## 2023-04-02 NOTE — PROGRESS NOTE ADULT - ASSESSMENT
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 Patient now transferred to [facility] for cardiac telemetry for continued management of HFpEF exacerbation and structural heart evaluation for MR.

## 2023-04-02 NOTE — CONSULT NOTE ADULT - SUBJECTIVE AND OBJECTIVE BOX
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 Patient states he could barely walk 20 feet prior to getting winded. Patient also admitted to 2 pillow orthopnea, B/L edema and erythema, states he scratched and accidentally opening fluid filled blisters on his LE. Patient denies any N/V, diaphoresis, palpitations, dizziness, chest pain, recent travel or sick contacts. Patient reports he ran out of medications a few months ago and stopped taking some of his cardiac medications 2/2 cost.    In [facility] ED, BP: 146/122, HR: 80s, RR:16, Temp: 97.5F, O2 sat: 96%RA. EKG revealed Afib@105BPM without ischemic changes. CXR with pulmonary vascular congestion and moderate right pleural effusion. Labs notable for: BUN/Cr 51.9/2.0, Total bili 2.6, , , Alk phos 99, BNP 1169, Amylase 47, Lipase 533. CT abd/pelvis revealed trace ascites, no peripancreatic abscess/necrosis. Abdominal US without acute findings. Echocardiogram c/w normal LV function, EF:60%, LAE with severe MR, mitral valve degeneration of posterior leaflet with significant prolapse and mod-severe anterior directed MR. Patient admitted to telemetry for management HFpEF exacerbation, Afib w/ RVR and LE cellulitis. Patient started on IV diuretics and IV antibiotics with improvement. Patient rate controlled with BB/Cardizem and started on Eliquis for anticoagulation. Hospital course c/b TATIANA, transaminitis and 11cm rectal sheath hematoma. Surgery consulted: recommended abdominal binder/no acute intervention, Eliquis was held and recommended to resume if H/H remains stable.  GI consulted: patients Statin medication was held. Initially recommended for MRCP, however unable to perform due to hx of cerebral coil.       Patient now transferred to [facility] for cardiac telemetry for continued management of HFpEF exacerbation and structural heart evaluation for MR.       TRANSFER MEDS:  Cefazolin IV 1G Q8H  Miralax BID  KCl 20mEq PO daily  Lasix 40mg IV daily  Lisinopril 20mg PO daily  Pantoprazole 40mg PO daily  Metoprolol Succinate 150mg PO BID  Zofran 4mg IV q 6hr  Diltiazem HCL 120mg PO daily   (01 Apr 2023 20:59)      PAST MEDICAL & SURGICAL HISTORY:  HTN (hypertension)      SAH (subarachnoid hemorrhage)      Atrial fibrillation      HOCM (hypertrophic obstructive cardiomyopathy)      Mitral regurgitation      S/P coil embolization of cerebral aneurysm            REVIEW OF SYSTEMS:    General:	 no weakness; no fevers, no chills  Skin/Breast: no rash  Respiratory and Thorax: no SOB, no cough  Cardiovascular:	No chest pain  Gastrointestinal:	 no nausea, vomiting , diarrhea  Genitourinary:	no dysuria, no difficulty urinating, no hematuria  Musculoskeletal:	no weakness, no joint swelling/pain  Neurological:	no focal weakness/numbness  Endocrine:	no polyuria, no polydipsia      ANTIBIOTICS:  MEDICATIONS  (STANDING):  carvedilol 12.5 milliGRAM(s) Oral every 12 hours  ceFAZolin   IVPB 1000 milliGRAM(s) IV Intermittent every 8 hours  diltiazem    milliGRAM(s) Oral daily  pantoprazole    Tablet 40 milliGRAM(s) Oral before breakfast  polyethylene glycol 3350 17 Gram(s) Oral two times a day    MEDICATIONS  (PRN):      Allergies    No Known Allergies    Intolerances        SOCIAL HISTORY:    FAMILY HISTORY:  No pertinent family history in first degree relatives        Vital Signs Last 24 Hrs  T(C): 36.1 (02 Apr 2023 13:36), Max: 37.1 (01 Apr 2023 20:59)  T(F): 97 (02 Apr 2023 13:36), Max: 98.7 (01 Apr 2023 20:59)  HR: 100 (02 Apr 2023 08:56) (81 - 100)  BP: 164/100 (02 Apr 2023 08:56) (134/102 - 164/100)  BP(mean): 115 (02 Apr 2023 06:15) (105 - 115)  RR: 18 (02 Apr 2023 08:56) (16 - 18)  SpO2: 99% (02 Apr 2023 08:56) (97% - 99%)    Parameters below as of 02 Apr 2023 08:56  Patient On (Oxygen Delivery Method): room air        PHYSICAL EXAM:  Constitutional:Well-developed, well nourished  Eyes:DEEDEE, EOMI  Ear/Nose/Throat: no oral lesion, no sinus tenderness on percussion	  Neck:  supple  Respiratory: CTA stacia  Cardiovascular: S1S2 RRR, no murmurs  Gastrointestinal:soft, (+) BS, no HSM  Extremities  bilateral lower extremity edema, + multiple wound without drainage, mild surrounding erythema, no warmth, non-tender  Vascular: DP Pulse:	right normal; left normal            LABS:                        11.9   7.46  )-----------( 292      ( 02 Apr 2023 05:30 )             40.3     04-02    138  |  102  |  24<H>  ----------------------------<  84  3.8   |  26  |  1.25    Ca    8.5      02 Apr 2023 05:30  Mg     1.8     04-02    TPro  6.1  /  Alb  3.1<L>  /  TBili  2.3<H>  /  DBili  x   /  AST  34  /  ALT  25  /  AlkPhos  93  04-02    PT/INR - ( 01 Apr 2023 22:35 )   PT: 14.7 sec;   INR: 1.23          PTT - ( 02 Apr 2023 05:30 )  PTT:32.4 sec      MICROBIOLOGY:  RADIOLOGY & ADDITIONAL STUDIES:

## 2023-04-03 LAB
ALBUMIN SERPL ELPH-MCNC: 3.2 G/DL — LOW (ref 3.3–5)
ALP SERPL-CCNC: 86 U/L — SIGNIFICANT CHANGE UP (ref 40–120)
ALT FLD-CCNC: 20 U/L — SIGNIFICANT CHANGE UP (ref 10–45)
ANION GAP SERPL CALC-SCNC: 11 MMOL/L — SIGNIFICANT CHANGE UP (ref 5–17)
AST SERPL-CCNC: 30 U/L — SIGNIFICANT CHANGE UP (ref 10–40)
BILIRUB SERPL-MCNC: 1.6 MG/DL — HIGH (ref 0.2–1.2)
BUN SERPL-MCNC: 24 MG/DL — HIGH (ref 7–23)
CALCIUM SERPL-MCNC: 8.7 MG/DL — SIGNIFICANT CHANGE UP (ref 8.4–10.5)
CHLORIDE SERPL-SCNC: 104 MMOL/L — SIGNIFICANT CHANGE UP (ref 96–108)
CO2 SERPL-SCNC: 26 MMOL/L — SIGNIFICANT CHANGE UP (ref 22–31)
CREAT SERPL-MCNC: 1.22 MG/DL — SIGNIFICANT CHANGE UP (ref 0.5–1.3)
CRP SERPL-MCNC: 31.3 MG/L — HIGH (ref 0–4)
EGFR: 74 ML/MIN/1.73M2 — SIGNIFICANT CHANGE UP
ERYTHROCYTE [SEDIMENTATION RATE] IN BLOOD: 13 MM/HR — SIGNIFICANT CHANGE UP
GLUCOSE SERPL-MCNC: 105 MG/DL — HIGH (ref 70–99)
HCT VFR BLD CALC: 37.7 % — LOW (ref 39–50)
HGB BLD-MCNC: 11.6 G/DL — LOW (ref 13–17)
MAGNESIUM SERPL-MCNC: 1.9 MG/DL — SIGNIFICANT CHANGE UP (ref 1.6–2.6)
MCHC RBC-ENTMCNC: 23.6 PG — LOW (ref 27–34)
MCHC RBC-ENTMCNC: 30.8 GM/DL — LOW (ref 32–36)
MCV RBC AUTO: 76.6 FL — LOW (ref 80–100)
NRBC # BLD: 0 /100 WBCS — SIGNIFICANT CHANGE UP (ref 0–0)
PLATELET # BLD AUTO: 287 K/UL — SIGNIFICANT CHANGE UP (ref 150–400)
POTASSIUM SERPL-MCNC: 4.9 MMOL/L — SIGNIFICANT CHANGE UP (ref 3.5–5.3)
POTASSIUM SERPL-SCNC: 4.9 MMOL/L — SIGNIFICANT CHANGE UP (ref 3.5–5.3)
PROT SERPL-MCNC: 6 G/DL — SIGNIFICANT CHANGE UP (ref 6–8.3)
RBC # BLD: 4.92 M/UL — SIGNIFICANT CHANGE UP (ref 4.2–5.8)
RBC # FLD: 19.3 % — HIGH (ref 10.3–14.5)
SODIUM SERPL-SCNC: 141 MMOL/L — SIGNIFICANT CHANGE UP (ref 135–145)
T3 SERPL-MCNC: 71 NG/DL — LOW (ref 80–200)
T4 FREE SERPL-MCNC: 1.09 NG/DL — SIGNIFICANT CHANGE UP (ref 0.93–1.7)
WBC # BLD: 8.42 K/UL — SIGNIFICANT CHANGE UP (ref 3.8–10.5)
WBC # FLD AUTO: 8.42 K/UL — SIGNIFICANT CHANGE UP (ref 3.8–10.5)

## 2023-04-03 PROCEDURE — 99232 SBSQ HOSP IP/OBS MODERATE 35: CPT

## 2023-04-03 PROCEDURE — 99233 SBSQ HOSP IP/OBS HIGH 50: CPT

## 2023-04-03 PROCEDURE — 93306 TTE W/DOPPLER COMPLETE: CPT | Mod: 26

## 2023-04-03 PROCEDURE — 71045 X-RAY EXAM CHEST 1 VIEW: CPT | Mod: 26

## 2023-04-03 RX ORDER — DILTIAZEM HCL 120 MG
180 CAPSULE, EXT RELEASE 24 HR ORAL DAILY
Refills: 0 | Status: DISCONTINUED | OUTPATIENT
Start: 2023-04-04 | End: 2023-04-07

## 2023-04-03 RX ORDER — ACETAMINOPHEN 500 MG
650 TABLET ORAL ONCE
Refills: 0 | Status: COMPLETED | OUTPATIENT
Start: 2023-04-03 | End: 2023-04-03

## 2023-04-03 RX ORDER — CARVEDILOL PHOSPHATE 80 MG/1
6.25 CAPSULE, EXTENDED RELEASE ORAL ONCE
Refills: 0 | Status: COMPLETED | OUTPATIENT
Start: 2023-04-03 | End: 2023-04-03

## 2023-04-03 RX ORDER — DILTIAZEM HCL 120 MG
60 CAPSULE, EXT RELEASE 24 HR ORAL ONCE
Refills: 0 | Status: COMPLETED | OUTPATIENT
Start: 2023-04-03 | End: 2023-04-03

## 2023-04-03 RX ORDER — MAGNESIUM OXIDE 400 MG ORAL TABLET 241.3 MG
400 TABLET ORAL ONCE
Refills: 0 | Status: COMPLETED | OUTPATIENT
Start: 2023-04-03 | End: 2023-04-03

## 2023-04-03 RX ORDER — CARVEDILOL PHOSPHATE 80 MG/1
25 CAPSULE, EXTENDED RELEASE ORAL EVERY 12 HOURS
Refills: 0 | Status: DISCONTINUED | OUTPATIENT
Start: 2023-04-03 | End: 2023-04-07

## 2023-04-03 RX ADMIN — MAGNESIUM OXIDE 400 MG ORAL TABLET 400 MILLIGRAM(S): 241.3 TABLET ORAL at 14:51

## 2023-04-03 RX ADMIN — POLYETHYLENE GLYCOL 3350 17 GRAM(S): 17 POWDER, FOR SOLUTION ORAL at 05:52

## 2023-04-03 RX ADMIN — POLYETHYLENE GLYCOL 3350 17 GRAM(S): 17 POWDER, FOR SOLUTION ORAL at 17:20

## 2023-04-03 RX ADMIN — LISINOPRIL 30 MILLIGRAM(S): 2.5 TABLET ORAL at 05:53

## 2023-04-03 RX ADMIN — Medication 100 MILLIGRAM(S): at 05:52

## 2023-04-03 RX ADMIN — Medication 650 MILLIGRAM(S): at 06:12

## 2023-04-03 RX ADMIN — Medication 650 MILLIGRAM(S): at 05:51

## 2023-04-03 RX ADMIN — CARVEDILOL PHOSPHATE 6.25 MILLIGRAM(S): 80 CAPSULE, EXTENDED RELEASE ORAL at 10:46

## 2023-04-03 RX ADMIN — Medication 100 MILLIGRAM(S): at 21:55

## 2023-04-03 RX ADMIN — Medication 100 MILLIGRAM(S): at 14:41

## 2023-04-03 RX ADMIN — CARVEDILOL PHOSPHATE 12.5 MILLIGRAM(S): 80 CAPSULE, EXTENDED RELEASE ORAL at 05:53

## 2023-04-03 RX ADMIN — PANTOPRAZOLE SODIUM 40 MILLIGRAM(S): 20 TABLET, DELAYED RELEASE ORAL at 07:02

## 2023-04-03 RX ADMIN — CARVEDILOL PHOSPHATE 25 MILLIGRAM(S): 80 CAPSULE, EXTENDED RELEASE ORAL at 17:22

## 2023-04-03 RX ADMIN — Medication 120 MILLIGRAM(S): at 07:02

## 2023-04-03 RX ADMIN — Medication 60 MILLIGRAM(S): at 19:13

## 2023-04-03 NOTE — PRE-OP CHECKLIST - SELECT TESTS ORDERED
HISTORY OF PRESENT ILLNESS:  Chief Complaint   Patient presents with   • Establish Care   • Diabetes   • Ear Problem     L ear ache on and off x 1-2 weeks   • Imm/Inj     Flu vaccine      ASHLEY Hankins is here to establish as a new patient, his previous PCP was Dr. Gonzales who recently moved away.    He has Type 2 DM. He takes Metformin 1000 mg BID, Glyburide 5 mg BID and Lantus 28 units QHS. Recent Hgb 8.1, up from 6.8 three months prior. He admits that in the last three months he's not been sticking to a diabetic diet or exercising regularly like he had been previously. He has gained 7 pounds in the last nine months.    He has hypertension. He takes Lisinopril 20 mg daily.    He has hyperlipidemia. He takes Atorvastatin 10 mg daily.    He's been feeling pressure and a sense of fullness in both ears for the last few weeks. He uses q-tips to clean out his ears. He also sometimes uses Debrox ear wax softening drops, although has not used these recently.    PAST MEDICAL, FAMILY AND SOCIAL HISTORIES:   The following histories were personally reviewed with the patient and updated.  Current medications, Allergies, Problem list, Past Medical History and Social History    REVIEW OF SYSTEMS:  Review of Systems   Constitution: Positive for weight gain. Negative for chills and fever.   HENT: Positive for ear pain. Negative for ear discharge.    Cardiovascular: Negative for chest pain and leg swelling.   Endocrine: Negative for polydipsia and polyuria.     PHYSICAL EXAM:  Visit Vitals  /88   Pulse 65   Temp 97.7 °F (36.5 °C) (Tympanic)   Ht 6' 2\" (1.88 m)   Wt 122.5 kg   BMI 34.67 kg/m²      Physical Exam   Constitutional: He is oriented to person, place, and time and well-developed, well-nourished, and in no distress.   HENT:   Head: Normocephalic and atraumatic.   Left Ear: Tympanic membrane and ear canal normal.   Cerumen impaction of right ear, unable to visualize tympanic membrane   Pulmonary/Chest: Effort normal.  No respiratory distress.   Neurological: He is alert and oriented to person, place, and time.   Skin: Skin is warm and dry.   Psychiatric: Affect and judgment normal.   Nursing note and vitals reviewed.    ASSESSMENT AND PLAN:  1. Type 2 diabetes mellitus with hyperglycemia, with long-term current use of insulin (CMS/[facility])  -currently not well controlled, recent Hgb A1c 8.1  -medications: continue on Metformin 1000 mg BID, Glyburide 5 mg BID and Lantus 28 units nightly  -patient is on both statin and Ace/ARB  -foot/eye exams: both are up to date  -He is going to seriously work on lifestyle changes over the next three months, stick to a diabetic diet and exercise more frequently. Will recheck Hgb A1c in 3 months, if still not at goal can consider increasing Lantus dose.  - GLYCOHEMOGLOBIN; Future  - MICROALBUMIN URINE RANDOM; Future    2. Hypercholesteremia  -continue Atorvastatin 10 mg daily    3. Essential hypertension  -continue Lisinopril 20 mg daily    4. Impacted cerumen of right ear  -MA attempted to flush out ear without success  -he is going to use Debrox ear wax softening drops nightly for the next week and return next week to have his ear flushed out again    5. Need for vaccination  - INFLUENZA QUADRIVALENT SPLIT PRES FREE 0.5 ML VACC, IM (FLULAVAL,FLUARIX,FLUZONE)       BMP/CBC/CMP

## 2023-04-03 NOTE — PROGRESS NOTE ADULT - PROBLEM SELECTOR PLAN 3
- Rate controlled;   - CONT: Cardizem 120mg PO QD, Carvedilol increased from 12.5 mg BID to 25 mg BID for adequate BP control   - HOLD Eliquis at this time given rectus sheath hematoma; consider restarting if H/H remains stable  - Gen surg recs appreciated; if hemoglobin remains stable plan to start heparin gtt  04/04/23 with close monitoring

## 2023-04-03 NOTE — PROGRESS NOTE ADULT - SUBJECTIVE AND OBJECTIVE BOX
**INCOMPLETE / IN PROGRESS NOTE**    Interventional Cardiology PA Adult Progress Note    Subjective Assessment:  	  MEDICATIONS:  carvedilol 25 milliGRAM(s) Oral every 12 hours  diltiazem    milliGRAM(s) Oral daily  furosemide    Tablet 40 milliGRAM(s) Oral daily  lisinopril 30 milliGRAM(s) Oral daily  ceFAZolin   IVPB 2000 milliGRAM(s) IV Intermittent every 8 hours  pantoprazole    Tablet 40 milliGRAM(s) Oral before breakfast  polyethylene glycol 3350 17 Gram(s) Oral two times a day    PHYSICAL EXAM:  TELEMETRY:  T(C): 36.4 (04-03-23 @ 09:06), Max: 36.6 (04-03-23 @ 04:21)  HR: 91 (04-03-23 @ 10:44) (88 - 105)  BP: 130/88 (04-03-23 @ 10:44) (130/88 - 146/100)  RR: 18 (04-03-23 @ 10:44) (16 - 18)  SpO2: 97% (04-03-23 @ 10:44) (97% - 100%)  Wt(kg): --  I&O's Summary    02 Apr 2023 07:01  -  03 Apr 2023 07:00  --------------------------------------------------------  IN: 750 mL / OUT: 525 mL / NET: 225 mL    03 Apr 2023 07:01  -  03 Apr 2023 11:59  --------------------------------------------------------  IN: 0 mL / OUT: 0 mL / NET: 0 mL      Height (cm): 183.4 (04-03 @ 06:47)  Weight (kg): 110.2 (04-03 @ 06:47)  BMI (kg/m2): 32.8 (04-03 @ 06:47)  BSA (m2): 2.32 (04-03 @ 06:47)  Farias:  Central/PICC/Mid Line:                                         Appearance: Normal	  HEENT:   Normal oral mucosa, PERRL, EOMI	  Neck: Supple, + JVD/ - JVD; Carotid Bruit   Cardiovascular: Normal S1 S2, No JVD, No murmurs,   Respiratory: Lungs clear to auscultation/Decreased Breath Sounds/No Rales, Rhonchi, Wheezing	  Gastrointestinal:  Soft, Non-tender, + BS	  Skin: No rashes, No ecchymoses, No cyanosis  Extremities: Normal range of motion, No clubbing, cyanosis or edema  Vascular: Peripheral pulses palpable 2+ bilaterally  Neurologic: Non-focal  Psychiatry: A & O x 3, Mood & affect appropriate      LABS:	 	  CARDIAC MARKERS:                        11.6   8.42  )-----------( 287      ( 03 Apr 2023 07:01 )             37.7    141  |  104  |  24<H>  ----------------------------<  105<H>  4.9   |  26  |  1.22    Ca    8.7      03 Apr 2023 07:00    Mg     1.9     04-03    TPro  6.0  /  Alb  3.2<L>  /  TBili  1.6<H>  /  DBili  x   /  AST  30  /  ALT  20  /  AlkPhos  86  04-03    PT/INR - ( 01 Apr 2023 22:35 )   PT: 14.7 sec;   INR: 1.23        PTT - ( 02 Apr 2023 05:30 )  PTT:32.4 sec         **INCOMPLETE / IN PROGRESS NOTE**    Interventional Cardiology PA Adult Progress Note    Subjective Assessment:    Patient seen and examined at bedside denies CP, SOB, palpitations, dizziness, nausea, vomiting, hematuria, or BRBPR   Endorsing LE edema is improving but having dull LLQ pain that is 2/2 to rectal hematoma.     	  MEDICATIONS:  carvedilol 25 milliGRAM(s) Oral every 12 hours  diltiazem    milliGRAM(s) Oral daily  furosemide    Tablet 40 milliGRAM(s) Oral daily  lisinopril 30 milliGRAM(s) Oral daily  ceFAZolin   IVPB 2000 milliGRAM(s) IV Intermittent every 8 hours  pantoprazole    Tablet 40 milliGRAM(s) Oral before breakfast  polyethylene glycol 3350 17 Gram(s) Oral two times a day    PHYSICAL EXAM:  TELEMETRY:  T(C): 36.4 (04-03-23 @ 09:06), Max: 36.6 (04-03-23 @ 04:21)  HR: 91 (04-03-23 @ 10:44) (88 - 105)  BP: 130/88 (04-03-23 @ 10:44) (130/88 - 146/100)  RR: 18 (04-03-23 @ 10:44) (16 - 18)  SpO2: 97% (04-03-23 @ 10:44) (97% - 100%)  Wt(kg): --  I&O's Summary    02 Apr 2023 07:01  -  03 Apr 2023 07:00  --------------------------------------------------------  IN: 750 mL / OUT: 525 mL / NET: 225 mL    03 Apr 2023 07:01  -  03 Apr 2023 11:59  --------------------------------------------------------  IN: 0 mL / OUT: 0 mL / NET: 0 mL      Height (cm): 183.4 (04-03 @ 06:47)  Weight (kg): 110.2 (04-03 @ 06:47)  BMI (kg/m2): 32.8 (04-03 @ 06:47)  BSA (m2): 2.32 (04-03 @ 06:47)                                        Appearance: sleeping comfortably, no apparent distress   Neck: Supple,  - JVD   Cardiovascular: Normal S1 S2  Respiratory: Lungs clear to auscultation  Gastrointestinal:  Soft, Non-tender, + BS	  Skin: No rashes, No ecchymoses, No cyanosis  Extremities: B/L erythema and multiple venous ulcers , 2+ pitting edema   Vascular: PT and DP pulses 2+   Neurologic: Non-focal  Psychiatry: A&O x 3, Mood & affect appropriate      LABS:	 	  CARDIAC MARKERS:                        11.6   8.42  )-----------( 287      ( 03 Apr 2023 07:01 )             37.7    141  |  104  |  24<H>  ----------------------------<  105<H>  4.9   |  26  |  1.22    Ca    8.7      03 Apr 2023 07:00    Mg     1.9     04-03    TPro  6.0  /  Alb  3.2<L>  /  TBili  1.6<H>  /  DBili  x   /  AST  30  /  ALT  20  /  AlkPhos  86  04-03    PT/INR - ( 01 Apr 2023 22:35 )   PT: 14.7 sec;   INR: 1.23          PTT - ( 02 Apr 2023 05:30 )  PTT:32.4 sec         **INCOMPLETE / IN PROGRESS NOTE**    Interventional Cardiology PA Adult Progress Note    Subjective Assessment:    Patient seen and examined at bedside denies CP, SOB, palpitations, dizziness, nausea, vomiting, hematuria, or BRBPR   Endorsing LE edema is improving but having dull LLQ pain that is 2/2 to rectal hematoma.     	  MEDICATIONS:  carvedilol 25 milliGRAM(s) Oral every 12 hours  diltiazem    milliGRAM(s) Oral daily  furosemide    Tablet 40 milliGRAM(s) Oral daily  lisinopril 30 milliGRAM(s) Oral daily  ceFAZolin   IVPB 2000 milliGRAM(s) IV Intermittent every 8 hours  pantoprazole    Tablet 40 milliGRAM(s) Oral before breakfast  polyethylene glycol 3350 17 Gram(s) Oral two times a day    PHYSICAL EXAM:  TELEMETRY:  T(C): 36.4 (04-03-23 @ 09:06), Max: 36.6 (04-03-23 @ 04:21)  HR: 91 (04-03-23 @ 10:44) (88 - 105)  BP: 130/88 (04-03-23 @ 10:44) (130/88 - 146/100)  RR: 18 (04-03-23 @ 10:44) (16 - 18)  SpO2: 97% (04-03-23 @ 10:44) (97% - 100%)  Wt(kg): --  I&O's Summary    02 Apr 2023 07:01  -  03 Apr 2023 07:00  --------------------------------------------------------  IN: 750 mL / OUT: 525 mL / NET: 225 mL    03 Apr 2023 07:01  -  03 Apr 2023 11:59  --------------------------------------------------------  IN: 0 mL / OUT: 0 mL / NET: 0 mL      Height (cm): 183.4 (04-03 @ 06:47)  Weight (kg): 110.2 (04-03 @ 06:47)  BMI (kg/m2): 32.8 (04-03 @ 06:47)  BSA (m2): 2.32 (04-03 @ 06:47)                                        Appearance: sleeping comfortably, no apparent distress   Neck: Supple,  - JVD   Cardiovascular: + systolic murmur, normal S1 S2  Respiratory: Lungs clear to auscultation  Gastrointestinal:  Soft, Non-tender, + BS	  Skin: No rashes, No ecchymoses, No cyanosis  Extremities: B/L erythema and multiple venous ulcers , 2+ pitting edema   Vascular: B/L + PT and +DP on dopplers    Neurologic: Non-focal  Psychiatry: A&O x 3, Mood & affect appropriate      LABS:	 	  CARDIAC MARKERS:                        11.6   8.42  )-----------( 287      ( 03 Apr 2023 07:01 )             37.7    141  |  104  |  24<H>  ----------------------------<  105<H>  4.9   |  26  |  1.22    Ca    8.7      03 Apr 2023 07:00    Mg     1.9     04-03    TPro  6.0  /  Alb  3.2<L>  /  TBili  1.6<H>  /  DBili  x   /  AST  30  /  ALT  20  /  AlkPhos  86  04-03    PT/INR - ( 01 Apr 2023 22:35 )   PT: 14.7 sec;   INR: 1.23          PTT - ( 02 Apr 2023 05:30 )  PTT:32.4 sec         Interventional Cardiology PA Adult Progress Note    Subjective Assessment:    Patient seen and examined at bedside denies CP, SOB, palpitations, dizziness, nausea, vomiting, hematuria, or BRBPR   Endorsing LE edema is improving but having dull LLQ pain that is 2/2 to rectal hematoma.     	  MEDICATIONS:  carvedilol 25 milliGRAM(s) Oral every 12 hours  diltiazem    milliGRAM(s) Oral daily  furosemide    Tablet 40 milliGRAM(s) Oral daily  lisinopril 30 milliGRAM(s) Oral daily  ceFAZolin   IVPB 2000 milliGRAM(s) IV Intermittent every 8 hours  pantoprazole    Tablet 40 milliGRAM(s) Oral before breakfast  polyethylene glycol 3350 17 Gram(s) Oral two times a day    PHYSICAL EXAM:  TELEMETRY:  T(C): 36.4 (04-03-23 @ 09:06), Max: 36.6 (04-03-23 @ 04:21)  HR: 91 (04-03-23 @ 10:44) (88 - 105)  BP: 130/88 (04-03-23 @ 10:44) (130/88 - 146/100)  RR: 18 (04-03-23 @ 10:44) (16 - 18)  SpO2: 97% (04-03-23 @ 10:44) (97% - 100%)  Wt(kg): --  I&O's Summary    02 Apr 2023 07:01  -  03 Apr 2023 07:00  --------------------------------------------------------  IN: 750 mL / OUT: 525 mL / NET: 225 mL    03 Apr 2023 07:01  -  03 Apr 2023 11:59  --------------------------------------------------------  IN: 0 mL / OUT: 0 mL / NET: 0 mL      Height (cm): 183.4 (04-03 @ 06:47)  Weight (kg): 110.2 (04-03 @ 06:47)  BMI (kg/m2): 32.8 (04-03 @ 06:47)  BSA (m2): 2.32 (04-03 @ 06:47)                                        Appearance: sleeping comfortably, no apparent distress   Neck: Supple,  - JVD   Cardiovascular: + systolic murmur, normal S1 S2  Respiratory: Lungs clear to auscultation  Gastrointestinal:  Soft, Non-tender, + BS	  Skin: No rashes, No ecchymoses, No cyanosis  Extremities: B/L erythema and multiple venous ulcers , 2+ pitting edema   Vascular: B/L + PT and +DP on dopplers    Neurologic: Non-focal  Psychiatry: A&O x 3, Mood & affect appropriate      LABS:	 	  CARDIAC MARKERS:                        11.6   8.42  )-----------( 287      ( 03 Apr 2023 07:01 )             37.7    141  |  104  |  24<H>  ----------------------------<  105<H>  4.9   |  26  |  1.22    Ca    8.7      03 Apr 2023 07:00    Mg     1.9     04-03    TPro  6.0  /  Alb  3.2<L>  /  TBili  1.6<H>  /  DBili  x   /  AST  30  /  ALT  20  /  AlkPhos  86  04-03    PT/INR - ( 01 Apr 2023 22:35 )   PT: 14.7 sec;   INR: 1.23          PTT - ( 02 Apr 2023 05:30 )  PTT:32.4 sec         Interventional Cardiology PA Adult Progress Note    Subjective Assessment:    Patient seen and examined at bedside denies CP, SOB, palpitations, dizziness, nausea, vomiting, hematuria, or BRBPR   Endorsing LE edema is improving but having dull LLQ pain that is 2/2 to rectal hematoma.     	  MEDICATIONS:  carvedilol 25 milliGRAM(s) Oral every 12 hours  diltiazem    milliGRAM(s) Oral daily  furosemide    Tablet 40 milliGRAM(s) Oral daily  lisinopril 30 milliGRAM(s) Oral daily  ceFAZolin   IVPB 2000 milliGRAM(s) IV Intermittent every 8 hours  pantoprazole    Tablet 40 milliGRAM(s) Oral before breakfast  polyethylene glycol 3350 17 Gram(s) Oral two times a day    PHYSICAL EXAM:  TELEMETRY:  T(C): 36.4 (04-03-23 @ 09:06), Max: 36.6 (04-03-23 @ 04:21)  HR: 91 (04-03-23 @ 10:44) (88 - 105)  BP: 130/88 (04-03-23 @ 10:44) (130/88 - 146/100)  RR: 18 (04-03-23 @ 10:44) (16 - 18)  SpO2: 97% (04-03-23 @ 10:44) (97% - 100%)  Wt(kg): --  I&O's Summary    02 Apr 2023 07:01  -  03 Apr 2023 07:00  --------------------------------------------------------  IN: 750 mL / OUT: 525 mL / NET: 225 mL    03 Apr 2023 07:01  -  03 Apr 2023 11:59  --------------------------------------------------------  IN: 0 mL / OUT: 0 mL / NET: 0 mL      Height (cm): 183.4 (04-03 @ 06:47)  Weight (kg): 110.2 (04-03 @ 06:47)  BMI (kg/m2): 32.8 (04-03 @ 06:47)  BSA (m2): 2.32 (04-03 @ 06:47)                                        Appearance: sleeping comfortably, no apparent distress   Neck: Supple,  - JVD   Cardiovascular: + systolic murmur, normal S1 S2  Respiratory: Lungs clear to auscultation  Gastrointestinal:  Soft, Non-tender, + BS	  Skin: No rashes, No ecchymoses, No cyanosis  Extremities: B/L erythema and multiple venous ulcers , 2+ pitting edema   Vascular: B/L + PT and +DP on dopplers    Neurologic: Non-focal  Psychiatry: A&O x 3, Mood & affect appropriate      LABS:	 	  CARDIAC MARKERS:                        11.6   8.42  )-----------( 287      ( 03 Apr 2023 07:01 )             37.7    141  |  104  |  24<H>  ----------------------------<  105<H>  4.9   |  26  |  1.22    Ca    8.7      03 Apr 2023 07:00    Mg     1.9     04-03    TPro  6.0  /  Alb  3.2<L>  /  TBili  1.6<H>  /  DBili  x   /  AST  30  /  ALT  20  /  AlkPhos  86  04-03    PT/INR - ( 01 Apr 2023 22:35 )   PT: 14.7 sec;   INR: 1.23          PTT - ( 02 Apr 2023 05:30 )  PTT:32.4 sec    [x] TTE 4/3/23:   1. Patient was in atrial fibrillation during the study.  2. Mild to moderate symmetric left ventricular hypertrophy.  3. Normal left ventricular size and systolic function.  4. Normal right ventricular size and systolic function.  5. Severely dilated left atrium.  6. The posterior leaflet of the mitral valve appears flail. There is severe, eccentric, anteriorly   directed mitral regurgitation.  7. Aortic sclerosis without significant stenosis.  8. Pulmonary hypertension present, pulmonary artery systolic pressure is 44 mmHg.  9. No pericardial effusion.

## 2023-04-03 NOTE — PROGRESS NOTE ADULT - PROBLEM SELECTOR PLAN 5
- Cr peaked at 2.0 during admission@ [facility].  - currently stable at 1.2, will continue to monitor closely.  - avoid nephrotoxic agents.

## 2023-04-03 NOTE — PROGRESS NOTE ADULT - SUBJECTIVE AND OBJECTIVE BOX
Patient discussed on morning rounds with Dr. Hobbs    SUBJECTIVE ASSESSMENT: Patient seen and examined at bedside. Patient admits to discomfort in his LEs restricting his ambulation, admits to occasional SOB at rest. Denies chills, chest pain, palpitations.     Vital Signs Last 24 Hrs  T(C): 36.2 (03 Apr 2023 14:00), Max: 36.6 (03 Apr 2023 04:21)  T(F): 97.2 (03 Apr 2023 14:00), Max: 97.8 (03 Apr 2023 04:21)  HR: 91 (03 Apr 2023 10:44) (88 - 105)  BP: 130/88 (03 Apr 2023 10:44) (130/88 - 146/100)  BP(mean): 119 (03 Apr 2023 06:50) (119 - 119)  RR: 18 (03 Apr 2023 10:44) (16 - 18)  SpO2: 97% (03 Apr 2023 10:44) (97% - 98%)    Parameters below as of 03 Apr 2023 10:44  Patient On (Oxygen Delivery Method): room air    I&O's Detail    02 Apr 2023 07:01  -  03 Apr 2023 07:00  --------------------------------------------------------  IN:    IV PiggyBack: 150 mL    Oral Fluid: 600 mL  Total IN: 750 mL    OUT:    Voided (mL): 525 mL  Total OUT: 525 mL    Total NET: 225 mL    03 Apr 2023 07:01  -  03 Apr 2023 15:50  --------------------------------------------------------  IN:    Oral Fluid: 210 mL  Total IN: 210 mL    OUT:    Voided (mL): 275 mL  Total OUT: 275 mL    Total NET: -65 mL    CHEST TUBE:  none  JADON DRAIN:  none  EPICARDIAL WIRES: none  TIE DOWNS: none  CANTU: none    PHYSICAL EXAM:  GENERAL: NAD, lying in bed comfortably  HEAD:  Atraumatic, Normocephalic  EYES: EOMI, PERRLA, conjunctiva and sclera clear  ENT: Moist mucous membranes  NECK: Supple, No JVD  CHEST/LUNG: CTAB  HEART: +Murmur. Regular rate and rhythm  ABDOMEN: Bowel sounds present; Soft, Nontender, Nondistended. No hepatomegally  EXTREMITIES:  2+ Peripheral Pulses, brisk capillary refill. No clubbing, cyanosis, or edema  NERVOUS SYSTEM:  Alert & Oriented X3, speech clear. No deficits     LABS:                        11.6   8.42  )-----------( 287      ( 03 Apr 2023 07:01 )             37.7     PT/INR - ( 01 Apr 2023 22:35 )   PT: 14.7 sec;   INR: 1.23       PTT - ( 02 Apr 2023 05:30 )  PTT:32.4 sec    04-03    141  |  104  |  24<H>  ----------------------------<  105<H>  4.9   |  26  |  1.22    Ca    8.7      03 Apr 2023 07:00  Mg     1.9     04-03    TPro  6.0  /  Alb  3.2<L>  /  TBili  1.6<H>  /  DBili  x   /  AST  30  /  ALT  20  /  AlkPhos  86  04-03    MEDICATIONS  (STANDING):  carvedilol 25 milliGRAM(s) Oral every 12 hours  ceFAZolin   IVPB 2000 milliGRAM(s) IV Intermittent every 8 hours  diltiazem    milliGRAM(s) Oral daily  furosemide    Tablet 40 milliGRAM(s) Oral daily  lisinopril 30 milliGRAM(s) Oral daily  pantoprazole    Tablet 40 milliGRAM(s) Oral before breakfast  polyethylene glycol 3350 17 Gram(s) Oral two times a day    MEDICATIONS  (PRN):    RADIOLOGY & ADDITIONAL TESTS:  < from: Xray Chest 1 View- PORTABLE-Routine (Xray Chest 1 View- PORTABLE-Routine .) (04.02.23 @ 13:19) >  Frontal examination of the chest demonstrates congestion. Bilateral   effusions right greater than left. Within normal limits in transverse   diameter.    IMPRESSION: Congestion. Bilateral effusions right greater than left    < end of copied text >     Patient discussed on morning rounds with Dr. Hobbs    SUBJECTIVE ASSESSMENT: Patient seen and examined at bedside. Patient admits to discomfort in his LEs restricting his ambulation, admits to occasional SOB at rest. Denies chills, chest pain, palpitations.     Vital Signs Last 24 Hrs  T(C): 36.2 (03 Apr 2023 14:00), Max: 36.6 (03 Apr 2023 04:21)  T(F): 97.2 (03 Apr 2023 14:00), Max: 97.8 (03 Apr 2023 04:21)  HR: 91 (03 Apr 2023 10:44) (88 - 105)  BP: 130/88 (03 Apr 2023 10:44) (130/88 - 146/100)  BP(mean): 119 (03 Apr 2023 06:50) (119 - 119)  RR: 18 (03 Apr 2023 10:44) (16 - 18)  SpO2: 97% (03 Apr 2023 10:44) (97% - 98%)    Parameters below as of 03 Apr 2023 10:44  Patient On (Oxygen Delivery Method): room air    I&O's Detail    02 Apr 2023 07:01  -  03 Apr 2023 07:00  --------------------------------------------------------  IN:    IV PiggyBack: 150 mL    Oral Fluid: 600 mL  Total IN: 750 mL    OUT:    Voided (mL): 525 mL  Total OUT: 525 mL    Total NET: 225 mL    03 Apr 2023 07:01  -  03 Apr 2023 15:50  --------------------------------------------------------  IN:    Oral Fluid: 210 mL  Total IN: 210 mL    OUT:    Voided (mL): 275 mL  Total OUT: 275 mL    Total NET: -65 mL    CHEST TUBE:  none  JADON DRAIN:  none  EPICARDIAL WIRES: none  TIE DOWNS: none  CANTU: none    PHYSICAL EXAM:  GENERAL: NAD, lying in bed  HEAD:  Atraumatic, Normocephalic  EYES: EOMI, PERRLA, conjunctiva and sclera clear  ENT: Moist mucous membranes  NECK: Supple, No JVD  CHEST/LUNG: CTAB  HEART: +Murmur. Regular rate and rhythm  ABDOMEN: Bowel sounds present; Soft, Nontender, Nondistended. No hepatomegally  EXTREMITIES: +bilateral LE edema and cellulitis   NERVOUS SYSTEM:  Alert & Oriented X3, speech clear. No deficits     LABS:                        11.6   8.42  )-----------( 287      ( 03 Apr 2023 07:01 )             37.7     PT/INR - ( 01 Apr 2023 22:35 )   PT: 14.7 sec;   INR: 1.23       PTT - ( 02 Apr 2023 05:30 )  PTT:32.4 sec    04-03    141  |  104  |  24<H>  ----------------------------<  105<H>  4.9   |  26  |  1.22    Ca    8.7      03 Apr 2023 07:00  Mg     1.9     04-03    TPro  6.0  /  Alb  3.2<L>  /  TBili  1.6<H>  /  DBili  x   /  AST  30  /  ALT  20  /  AlkPhos  86  04-03    MEDICATIONS  (STANDING):  carvedilol 25 milliGRAM(s) Oral every 12 hours  ceFAZolin   IVPB 2000 milliGRAM(s) IV Intermittent every 8 hours  diltiazem    milliGRAM(s) Oral daily  furosemide    Tablet 40 milliGRAM(s) Oral daily  lisinopril 30 milliGRAM(s) Oral daily  pantoprazole    Tablet 40 milliGRAM(s) Oral before breakfast  polyethylene glycol 3350 17 Gram(s) Oral two times a day    MEDICATIONS  (PRN):    RADIOLOGY & ADDITIONAL TESTS:  < from: Xray Chest 1 View- PORTABLE-Routine (Xray Chest 1 View- PORTABLE-Routine .) (04.02.23 @ 13:19) >  Frontal examination of the chest demonstrates congestion. Bilateral   effusions right greater than left. Within normal limits in transverse   diameter.    IMPRESSION: Congestion. Bilateral effusions right greater than left    < end of copied text >

## 2023-04-03 NOTE — PROGRESS NOTE ADULT - PROBLEM SELECTOR PLAN 2
- Mod-severe MR on outside TTE.   - Suspecting this could be in setting of LVOT obstruction.   - TTE (04/03/23)   - CTSx structural heart team consulted - Increased Carvedilol from 12.5mg to 25 mg for BP control       ## HOCM  - severe LVOT obstruction w/ resting gradient 90mmHg by TTE in 2021.   - CONT: Cardizem and Carvedilol.  - TTE (04/03/23) - Mod-severe MR on outside TTE.   - Suspecting this could be in setting of LVOT obstruction.   - Follow up TTE from 4/3/23  - CTSx structural heart team consulted - Increased Carvedilol from 12.5mg to 25 mg for BP control       ## HOCM  - severe LVOT obstruction w/ resting gradient 90mmHg by TTE in 2021.   - CONT: Cardizem and Carvedilol.  - Follow up TTE from 4/3/23 - Mod-severe MR on outside TTE.   - Suspecting this could be in setting of LVOT obstruction.   - TTE from 4/3/23: Mild to moderate symmetric LVH. LVEF 60%. Posterior leaflet of the mitral valve appears flail. There is severe, eccentric, anteriorly- concern for endocarditis. Follow up blood cultures x2 and ESR/CRP.   directed mitral regurgitation. PHTN present, PAP 44 mmHg   - CTSx structural heart team consulted - Increased Carvedilol from 12.5mg to 25 mg for BP control. Follow up necessity of VERONIQUE for above findings.       ## HOCM  - severe LVOT obstruction w/ resting gradient 90mmHg by TTE in 2021.   - TTE 4/3/23: Mild to moderate symmetric left ventricular hypertrophy.  - CONT: Cardizem and Carvedilol.  - Follow up TTE from 4/3/23: See above; LV diastolic function and filling pressure is made challenging by presence of severe MR.

## 2023-04-03 NOTE — PROGRESS NOTE ADULT - PROBLEM SELECTOR PLAN 7
- 11cm rectal sheath hematoma: Surgery consulted @ [facility].  - Recommend abdominal binder for 2wks, no acute intervention; Eliquis held.   - Gen surg recs appreciated; if hemoglobin remains stable plan to start heparin gtt  04/04/23 with close monitoring. Will defer imaging at this time given patient is HDS and H/H has been stable since admission

## 2023-04-03 NOTE — PROGRESS NOTE ADULT - PROBLEM SELECTOR PLAN 6
- @ [facility]: total bili 2.6, , , Alk Phos 99, Amylase 47, Lipase 533.   - Liver enzymes normalized here.   - Initially was recommended for MRCP; however, cannot be performed 2/2 Hx of cerebral coil.   - Continue to monitor.

## 2023-04-03 NOTE — PROGRESS NOTE ADULT - PROBLEM SELECTOR PLAN 1
- Euvolemic (Lungs CTA and pt not orthopnea. satting well on RA and no respiratory symptoms)   - Was receiving Lasix 40mg IV QD at outside hospital.   - TTE @ [facility] 3/26/23 (actual report not available; per clinical documentation): LVEF 60%, LAE w/ severe MR, MV degeneration of posterior leaflet w/ significatn prolapse and mod-severe  anterior directed MR.   - On prior TTE 7/2021, pt w/ HOCM w/ severe LVOT obstruction w/ resting gradient of 90mmHg.   - Caution w/ diuresis given LVOT obstruction.   - CONT: Lasix 40mg PO QD.   - PLAN: PO diuresis, monitor volume status.

## 2023-04-03 NOTE — PROGRESS NOTE ADULT - PROBLEM SELECTOR PLAN 4
- CONT: Atorvastatin 80mg PO QHS  - Lipid Panel (04/02/23): Cholesterol 128, Triglycerides 99, HDL 28, LDL 80

## 2023-04-03 NOTE — PROGRESS NOTE ADULT - ASSESSMENT
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 Patient now transferred to [facility]URIS for cardiac telemetry for continued management of HFpEF exacerbation and structural heart evaluation for MR.    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 7/29/21 initially admitted to [facility] 3/24/23 with HFpEF exacerbation, Afib w/ RVR and LE cellulitis c/b TATIANA, transaminitis and rectal sheath hematoma. Patient now transferred to [facility]UR[facility] for cardiac telemetry for continued management of HFpEF exacerbation and structural heart evaluation for MR. TTE remarkable for severe, eccentric, anteriorly directed MR concern for endocarditis. Work up in process (blood cultures and ESR/CRP sent out). Follow up with CTS regarding need for VERONIQUE.

## 2023-04-03 NOTE — PROGRESS NOTE ADULT - PROBLEM SELECTOR PLAN 8
- Afebrile, no leukocytosis.   - ID consulted, rec increasing Cefazolin dosing.   - CONT: Cefazolin 2g IV q8hrs x7 days (ends 4/9/23).   - If discharge before end of course, can change to Cefadroxil 500mg PO q12hrs to complete course.       N: DASH with 1 liter fluid restriction  E: Maintain K>4.0 and Mg >2.0  VTE PPx: on hold 2/2 rectal sheath hematoma  Code: Full - Afebrile, no leukocytosis.   - ID consulted, rec increasing Cefazolin dosing.   - CONT: Cefazolin 2g IV q8hrs x7 days (ends 4/9/23).   - If discharge before end of course, can change to Cefadroxil 500mg PO q12hrs to complete course.     ##Rule out PAD  -- pulses palpable with duplex, B/L arterial duplex pending, follow up results       N: DASH with 1 liter fluid restriction  E: Maintain K>4.0 and Mg >2.0  VTE PPx: on hold 2/2 rectal sheath hematoma  Code: Full - Afebrile, no leukocytosis.   - ID consulted, rec increasing Cefazolin dosing.   - CONT: Cefazolin 2g IV q8hrs x7 days (ends 4/9/23).   - If discharge before end of course, can change to Cefadroxil 500mg PO q12hrs to complete course.     ##Rule out PAD  -- pulses palpable with duplex, B/L arterial duplex pending, follow up results       N: NPO after midnight for possible VERONIQUE  - DASH with 1 liter fluid restriction  E: Maintain K>4.0 and Mg >2.0  VTE PPx: on hold 2/2 rectal sheath hematoma  Code: Full

## 2023-04-03 NOTE — PROGRESS NOTE ADULT - ASSESSMENT
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 (7/29/21) who was initially admitted to [facility] 3/24/23 with HFpEF exacerbation, Afib w/ RVR and LE cellulitis c/b TATIANA, transaminitis and rectal sheath hematoma. Patient transferred to [facility] on 4/1 for cardiac telemetry for continued management of HFpEF exacerbation and MR. Structural heart consulted for evaluation of mod/severe MR.     Plan:  Problem 1: Mod/Severe MR  -Discussed with Dr. Sousa  -given HOCM, recommend afterload reduction to better evaluate valvular function with optimal medical management  -CXR: bilateral effusions, congestion  -F/u results of TTE  -Further intervention pending results of TTE  -Structural Heart team will continue to follow.    Problem 2: acute on chronic HF,EF 60%  -continue diuretics  -pending TTE  -continue daily weights  -Care per primary team    Problem 3: Afib  -holding eliquis 2/2 rectal sheath hematoma  -continue metoprolol and cardizem  -Care per primary team    Problem 4: rectal sheath hematoma  -continue abdominal binder  -general surgery following   -Care per primary team    I have reviewed clinical labs tests and reports, radiology tests and reports, as well as old patient medical records, and discussed with the referring physician.

## 2023-04-03 NOTE — PROGRESS NOTE ADULT - NS ATTEND AMEND GEN_ALL_CORE FT
Agree with above with the following additional comments    ID consulted. Requested Abx until 4/9. They will comment on whether necessary interventions (surgical or percutaneous) on the MV would be safe prior to completion of Abx. Blood cultures negative so far.   Vascular surgery saw pt and agree that rectus abdominus bleeding has healed and pt should be safe to resume anticoagulation  TTE showing severe MR  --VERONIQUE to determine etiology of MR

## 2023-04-03 NOTE — PROGRESS NOTE ADULT - NS ATTEND AMEND GEN_ALL_CORE FT
See PA note written above, for details. I reviewed the PA documentation.  I have personally seen and examined this patient today. I reviewed vitals, labs, medications, cardiac studies and additional imaging.  I agree with the PA's findings and plans as written above with the following additions/amendments:  TTE 4/3/23   1. Patient was in atrial fibrillation during the study.   2. Mild to moderate symmetric left ventricular hypertrophy.   3. Normal left ventricular size and systolic function.   4. Normal right ventricular size and systolic function.   5. Severely dilated left atrium.   6. The posterior leaflet of the mitral valve appears flail. There is severe, eccentric, anteriorly directed mitral regurgitation.   7. Aortic sclerosis without significant stenosis.   8. Pulmonary hypertension present, pulmonary artery systolic pressure is 44 mmHg.   9. No pericardial effusion.    Plan:  Ep consult for rhythm control, pre-op may not be feasible given structural disease with MV flail and 4+MR  Cont Lasix 40mg po daily to maintain euvolemia  Up titrate diltiazem to 180CD daily   Coreg dose augmentation to 25 BID for additional BP control  Lisinopril 30mg po daily for additional BP control  CTSx evaluation given flail MV with 4+MR  ID recs for cellulitis mgmt: 2gm IV cefazolin through 4/9  General surgery consult for rectal sheath hematoma evaluation and determination of when safe to resume use of A/C  Vascular surgery c/s for PVD assessment  Advanced CHF evaluation for pre op optimization per d/w CTSx  PT eval ordered  Ananth Villarreal M.D.  Cardiology Attending

## 2023-04-03 NOTE — CONSULT NOTE ADULT - SUBJECTIVE AND OBJECTIVE BOX
Surgery Consult    Consulting Surgical Team:   [ ] Team 1 - Colorectal/Surgical Oncology ([phone])    [ ] Team 2 - MIS/Bariatric Surgery ([phone])     [ x ] Team 4 - Acute Care Surgery ([phone])     [ ] Team 5 - General Surgery/Breast/Pediatric Surgery ([phone])    Consulting Attending: Dr. 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 7/29/21 who presented to [facility] 3/24/23 c/o progressively worsening SOB and LE edema x few weeks. Patient states he could barely walk 20 feet prior to getting winded. Patient also admitted to 2 pillow orthopnea, B/L edema and erythema, states he scratched and accidentally opening fluid filled blisters on his LE. Patient denies any N/V, diaphoresis, palpitations, dizziness, chest pain, recent travel or sick contacts. Patient reports he ran out of medications a few months ago and stopped taking some of his cardiac medications 2/2 cost.    In [facility] ED, BP: 146/122, HR: 80s, RR:16, Temp: 97.5F, O2 sat: 96%RA. EKG revealed Afib@105BPM without ischemic changes. CXR with pulmonary vascular congestion and moderate right pleural effusion. Labs notable for: BUN/Cr 51.9/2.0, Total bili 2.6, , , Alk phos 99, BNP 1169, Amylase 47, Lipase 533. CT abd/pelvis revealed trace ascites, no peripancreatic abscess/necrosis. Abdominal US without acute findings. Echocardiogram c/w normal LV function, EF:60%, LAE with severe MR, mitral valve degeneration of posterior leaflet with significant prolapse and mod-severe anterior directed MR. Patient admitted to telemetry for management HFpEF exacerbation, Afib w/ RVR and LE cellulitis. Patient started on IV diuretics and IV antibiotics with improvement. Patient rate controlled with BB/Cardizem and started on Eliquis for anticoagulation. Hospital course c/b TATIANA, transaminitis and 11cm rectal sheath hematoma. Surgery consulted: recommended abdominal binder/no acute intervention, Eliquis was held and recommended to resume if H/H remains stable.  GI consulted: patients Statin medication was held. Initially recommended for MRCP, however unable to perform due to hx of cerebral coil.       Patient now transferred to [facility] for cardiac telemetry for continued management of HFpEF exacerbation and structural heart evaluation for MR.       TRANSFER MEDS:  Cefazolin IV 1G Q8H  Miralax BID  KCl 20mEq PO daily  Lasix 40mg IV daily  Lisinopril 20mg PO daily  Pantoprazole 40mg PO daily  Metoprolol Succinate 150mg PO BID  Zofran 4mg IV q 6hr  Diltiazem HCL 120mg PO daily   (01 Apr 2023 20:59)      General surgery consulted for evaluation of RSH. Patient with above history notable for Afib on eliquis admitted to OSH for [facility], workup consistent with HF exacerbation. During admission complained of "off the scale" intensity sudden onset abdominal pain while moving in bed. Workup revealed 11cm rectus sheath hematoma (limited records, no images available- Scan 3/25 without hematoma, 3/29 repeat 11x7cm RSH) Per records reviewed no drop in hemodynamics or hemoglobins. OSH Surgery evaluated patient and recommended holding AC and continued abdominal binder. Patient transferred to [facility] for further evaluation and management of HF. States pain significantly improved. Tolerating diet, passing flatus, denies nausea, vomiting, chest pain, dyspnea, fevers or chills.       PAST MEDICAL HISTORY:  HTN (hypertension)    SAH (subarachnoid hemorrhage)    Atrial fibrillation    HOCM (hypertrophic obstructive cardiomyopathy)    Mitral regurgitation        PAST SURGICAL HISTORY:  No significant past surgical history    S/P coil embolization of cerebral aneurysm        MEDICATIONS:      ALLERGIES:  No Known Allergies      SOCHX:   Social History:      FAMHX:   FAMILY HISTORY:  No pertinent family history in first degree relatives          Vitals:  Vital Signs Last 24 Hrs  T(C): 36.4 (03 Apr 2023 09:06), Max: 36.6 (03 Apr 2023 04:21)  T(F): 97.5 (03 Apr 2023 09:06), Max: 97.8 (03 Apr 2023 04:21)  HR: 88 (03 Apr 2023 08:42) (88 - 105)  BP: 133/79 (03 Apr 2023 08:42) (133/79 - 146/100)  BP(mean): 119 (03 Apr 2023 06:50) (119 - 119)  RR: 18 (03 Apr 2023 08:42) (16 - 18)  SpO2: 97% (03 Apr 2023 08:42) (97% - 100%)    Parameters below as of 03 Apr 2023 08:42  Patient On (Oxygen Delivery Method): room air          PHYSICAL EXAM:  Physical Exam  General: NAD, resting comfortably in bed  Pulm: Nonlabored breathing, no respiratory distress  Abd: soft, left of midline focal tenderness with firm texture, no rebound or guarding, palpable firmness does not cross midline, does not change in charachter with contraction of abdominal musculature  Extrem: B/L erythema and anterior shin wounds, 2+ edema  Neuro: A/O x 3, CNs II-XII grossly intact, no focal deficits, normal sensation  Pulses: palpable distal pulses     I&o's:  I&O's Summary    02 Apr 2023 07:01  -  03 Apr 2023 07:00  --------------------------------------------------------  IN: 750 mL / OUT: 525 mL / NET: 225 mL    03 Apr 2023 07:01  -  03 Apr 2023 11:50  --------------------------------------------------------  IN: 0 mL / OUT: 0 mL / NET: 0 mL        LABS:                        11.6   8.42  )-----------( 287      ( 03 Apr 2023 07:01 )             37.7     04-03    141  |  104  |  24<H>  ----------------------------<  105<H>  4.9   |  26  |  1.22    Ca    8.7      03 Apr 2023 07:00  Mg     1.9     04-03    TPro  6.0  /  Alb  3.2<L>  /  TBili  1.6<H>  /  DBili  x   /  AST  30  /  ALT  20  /  AlkPhos  86  04-03    Lactate:    PT/INR - ( 01 Apr 2023 22:35 )   PT: 14.7 sec;   INR: 1.23          PTT - ( 02 Apr 2023 05:30 )  PTT:32.4 sec    CARDIAC MARKERS ( 01 Apr 2023 22:35 )  x     / 0.01 ng/mL / 112 U/L / x     / 2.0 ng/mL  CARDIAC MARKERS ( 01 Apr 2023 21:43 )  x     / 0.01 ng/mL / 110 U/L / x     / 1.9 ng/mL            MICRO:     IMAGING:

## 2023-04-04 LAB
ANION GAP SERPL CALC-SCNC: 12 MMOL/L — SIGNIFICANT CHANGE UP (ref 5–17)
APTT BLD: 32.6 SEC — SIGNIFICANT CHANGE UP (ref 27.5–35.5)
BUN SERPL-MCNC: 30 MG/DL — HIGH (ref 7–23)
CALCIUM SERPL-MCNC: 8.3 MG/DL — LOW (ref 8.4–10.5)
CHLORIDE SERPL-SCNC: 102 MMOL/L — SIGNIFICANT CHANGE UP (ref 96–108)
CO2 SERPL-SCNC: 25 MMOL/L — SIGNIFICANT CHANGE UP (ref 22–31)
CREAT SERPL-MCNC: 1.19 MG/DL — SIGNIFICANT CHANGE UP (ref 0.5–1.3)
EGFR: 76 ML/MIN/1.73M2 — SIGNIFICANT CHANGE UP
GLUCOSE SERPL-MCNC: 105 MG/DL — HIGH (ref 70–99)
HCT VFR BLD CALC: 37.3 % — LOW (ref 39–50)
HGB BLD-MCNC: 11.4 G/DL — LOW (ref 13–17)
INR BLD: 1.26 — HIGH (ref 0.88–1.16)
MAGNESIUM SERPL-MCNC: 1.9 MG/DL — SIGNIFICANT CHANGE UP (ref 1.6–2.6)
MCHC RBC-ENTMCNC: 23.6 PG — LOW (ref 27–34)
MCHC RBC-ENTMCNC: 30.6 GM/DL — LOW (ref 32–36)
MCV RBC AUTO: 77.1 FL — LOW (ref 80–100)
NRBC # BLD: 0 /100 WBCS — SIGNIFICANT CHANGE UP (ref 0–0)
PLATELET # BLD AUTO: 270 K/UL — SIGNIFICANT CHANGE UP (ref 150–400)
POTASSIUM SERPL-MCNC: 4.7 MMOL/L — SIGNIFICANT CHANGE UP (ref 3.5–5.3)
POTASSIUM SERPL-SCNC: 4.7 MMOL/L — SIGNIFICANT CHANGE UP (ref 3.5–5.3)
PROTHROM AB SERPL-ACNC: 15 SEC — HIGH (ref 10.5–13.4)
RBC # BLD: 4.84 M/UL — SIGNIFICANT CHANGE UP (ref 4.2–5.8)
RBC # FLD: 19.6 % — HIGH (ref 10.3–14.5)
SODIUM SERPL-SCNC: 139 MMOL/L — SIGNIFICANT CHANGE UP (ref 135–145)
UFH PPP CHRO-ACNC: <0.04 IU/ML — LOW (ref 0.3–0.7)
WBC # BLD: 7.4 K/UL — SIGNIFICANT CHANGE UP (ref 3.8–10.5)
WBC # FLD AUTO: 7.4 K/UL — SIGNIFICANT CHANGE UP (ref 3.8–10.5)

## 2023-04-04 PROCEDURE — 93320 DOPPLER ECHO COMPLETE: CPT | Mod: 26

## 2023-04-04 PROCEDURE — 93925 LOWER EXTREMITY STUDY: CPT | Mod: 26

## 2023-04-04 PROCEDURE — 93312 ECHO TRANSESOPHAGEAL: CPT | Mod: 26

## 2023-04-04 PROCEDURE — 99223 1ST HOSP IP/OBS HIGH 75: CPT | Mod: GC

## 2023-04-04 PROCEDURE — 99233 SBSQ HOSP IP/OBS HIGH 50: CPT

## 2023-04-04 PROCEDURE — 76377 3D RENDER W/INTRP POSTPROCES: CPT | Mod: 26

## 2023-04-04 RX ORDER — BACITRACIN ZINC 500 UNIT/G
1 OINTMENT IN PACKET (EA) TOPICAL
Refills: 0 | Status: DISCONTINUED | OUTPATIENT
Start: 2023-04-04 | End: 2023-04-07

## 2023-04-04 RX ORDER — FUROSEMIDE 40 MG
40 TABLET ORAL
Refills: 0 | Status: DISCONTINUED | OUTPATIENT
Start: 2023-04-04 | End: 2023-04-07

## 2023-04-04 RX ORDER — HYDRALAZINE HCL 50 MG
25 TABLET ORAL THREE TIMES A DAY
Refills: 0 | Status: DISCONTINUED | OUTPATIENT
Start: 2023-04-04 | End: 2023-04-04

## 2023-04-04 RX ORDER — HEPARIN SODIUM 5000 [USP'U]/ML
900 INJECTION INTRAVENOUS; SUBCUTANEOUS
Qty: 25000 | Refills: 0 | Status: DISCONTINUED | OUTPATIENT
Start: 2023-04-04 | End: 2023-04-05

## 2023-04-04 RX ORDER — HYDRALAZINE HCL 50 MG
25 TABLET ORAL THREE TIMES A DAY
Refills: 0 | Status: DISCONTINUED | OUTPATIENT
Start: 2023-04-04 | End: 2023-04-07

## 2023-04-04 RX ADMIN — Medication 1 APPLICATION(S): at 18:08

## 2023-04-04 RX ADMIN — Medication 100 MILLIGRAM(S): at 22:41

## 2023-04-04 RX ADMIN — Medication 25 MILLIGRAM(S): at 22:40

## 2023-04-04 RX ADMIN — PANTOPRAZOLE SODIUM 40 MILLIGRAM(S): 20 TABLET, DELAYED RELEASE ORAL at 06:07

## 2023-04-04 RX ADMIN — Medication 40 MILLIGRAM(S): at 16:59

## 2023-04-04 RX ADMIN — Medication 100 MILLIGRAM(S): at 16:58

## 2023-04-04 RX ADMIN — Medication 100 MILLIGRAM(S): at 06:07

## 2023-04-04 RX ADMIN — Medication 40 MILLIGRAM(S): at 06:08

## 2023-04-04 RX ADMIN — CARVEDILOL PHOSPHATE 25 MILLIGRAM(S): 80 CAPSULE, EXTENDED RELEASE ORAL at 06:07

## 2023-04-04 RX ADMIN — Medication 180 MILLIGRAM(S): at 06:28

## 2023-04-04 RX ADMIN — LISINOPRIL 30 MILLIGRAM(S): 2.5 TABLET ORAL at 06:08

## 2023-04-04 RX ADMIN — HEPARIN SODIUM 9 UNIT(S)/HR: 5000 INJECTION INTRAVENOUS; SUBCUTANEOUS at 22:42

## 2023-04-04 RX ADMIN — CARVEDILOL PHOSPHATE 25 MILLIGRAM(S): 80 CAPSULE, EXTENDED RELEASE ORAL at 22:40

## 2023-04-04 NOTE — CONSULT NOTE ADULT - ATTENDING COMMENTS
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 He was diuresed and course complicated by spontaneous rectus sheath hematoma. He was found to have severe mitral regurgitation with flail leaflet prompting transfer to [facility].     He is volume overloaded on exam, would increase lasix from PO to 40 mg IV BID. Importantly he has no signs of LVOT obstruction on TTE. Favor adding hydralizine for further afterload reduction with severe primary MR. Valvular intervention planning per CTS/structural.

## 2023-04-04 NOTE — PROGRESS NOTE ADULT - PROBLEM SELECTOR PLAN 1
- Euvolemic (Lungs CTA and pt not orthopnea. satting well on RA and no respiratory symptoms)   - Was receiving Lasix 40mg IV QD at outside hospital.   - TTE @ [facility] 3/26/23 (actual report not available; per clinical documentation): LVEF 60%, LAE w/ severe MR, MV degeneration of posterior leaflet w/ significatn prolapse and mod-severe  anterior directed MR.   - On prior TTE 7/2021, pt w/ HOCM w/ severe LVOT obstruction w/ resting gradient of 90mmHg.   - Caution w/ diuresis given LVOT obstruction.   - CONT: Lasix 40mg PO QD.   - PLAN: PO diuresis, monitor volume status. - On exam, patient on room air but still with persistent B/L LE edema and distended abdomen   - Was receiving Lasix 40mg IV QD at outside hospital.   - TTE @ [facility] 3/26/23 (actual report not available; per clinical documentation): LVEF 60%, LAE w/ severe MR, MV degeneration of posterior leaflet w/ significatn prolapse and mod-severe  anterior directed MR.   - On prior TTE 7/2021, pt w/ HOCM w/ severe LVOT obstruction w/ resting gradient of 90mmHg.   - Caution w/ diuresis given LVOT obstruction.   - Changed Lasix 40mg PO QD to Lasix 40mg IV as patient remains volume overloaded    - PLAN: IV diuresis, monitor volume status. - On exam, patient on room air but still with persistent B/L LE edema and distended abdomen   - Was receiving Lasix 40mg IV QD at outside hospital.   - TTE @ [facility] 3/26/23 (actual report not available; per clinical documentation): LVEF 60%, LAE w/ severe MR, MV degeneration of posterior leaflet w/ significatn prolapse and mod-severe  anterior directed MR.   - On prior TTE 7/2021, pt w/ HOCM w/ severe LVOT obstruction w/ resting gradient of 90mmHg.   - No LVOT obstruction on most recent echo   - Changed Lasix 40mg PO QD to Lasix 40mg IV as patient remains volume overloaded    - PLAN: IV diuresis, monitor volume status. - On exam, patient on room air but still with persistent B/L LE edema and distended abdomen   - Was receiving Lasix 40mg IV QD at outside hospital.   - TTE @ [facility] 3/26/23 (actual report not available; per clinical documentation): LVEF 60%, LAE w/ severe MR, MV degeneration of posterior leaflet w/ significatn prolapse and mod-severe  anterior directed MR.   - On prior TTE 7/2021, pt w/ HOCM w/ severe LVOT obstruction w/ resting gradient of 90mmHg.   - No LVOT obstruction on most recent echo   - Changed Lasix 40mg PO QD to Lasix 40mg IV as patient remains volume overloaded . Will start Hydralazine 25 mg TID for afterload reduction   - PLAN: IV diuresis, monitor volume status.  - Core measures, strict I/Os,

## 2023-04-04 NOTE — PROGRESS NOTE ADULT - PROBLEM SELECTOR PLAN 5
- Cr peaked at 2.0 during admission@ [facility].  - currently stable at 1.2, will continue to monitor closely.  - avoid nephrotoxic agents.

## 2023-04-04 NOTE — PROGRESS NOTE ADULT - SUBJECTIVE AND OBJECTIVE BOX
Subjective:    Medications:  carvedilol 25 milliGRAM(s) Oral every 12 hours  ceFAZolin   IVPB 2000 milliGRAM(s) IV Intermittent every 8 hours  diltiazem    milliGRAM(s) Oral daily  furosemide    Tablet 40 milliGRAM(s) Oral daily  lisinopril 30 milliGRAM(s) Oral daily  pantoprazole    Tablet 40 milliGRAM(s) Oral before breakfast  polyethylene glycol 3350 17 Gram(s) Oral two times a day    Vitals:  T(C): 36.3 (23 @ 09:32), Max: 36.7 (23 @ 22:43)  HR: 92 (23 @ 08:29) (92 - 112)  BP: 136/93 (23 @ 08:29) (136/93 - 159/102)  BP(mean): 121 (23 @ 06:02) (111 - 121)  RR: 18 (23 @ 08:29) (14 - 18)  SpO2: 97% (23 @ 08:29) (96% - 98%)    Daily     Daily Weight in k.5 (2023 05:58)  Weight (kg): 110.2 (23 @ 06:47)    I&O's Summary    2023 07:  -  2023 07:00  --------------------------------------------------------  IN: 750 mL / OUT: 675 mL / NET: 75 mL    2023 07:01  -  2023 11:24  --------------------------------------------------------  IN: 0 mL / OUT: 0 mL / NET: 0 mL        Physical Exam:  Appearance: No Acute Distress  HEENT: JVP ___ cm H2O, no HJR  Cardiovascular: RRR, Normal S1 S2, No murmurs/rubs/gallops  Respiratory: Clear to auscultation bilaterally  Gastrointestinal: soft, non-tender, non-distended	  Skin: no skin lesions  Neurologic: Non-focal  Extremities: No LE edema, warm and well perfused  Psychiatry: A & O x 3, Mood & affect appropriate      Labs:                        11.4   7.40  )-----------( 270      ( 2023 07:37 )             37.3     04-04    139  |  102  |  30<H>  ----------------------------<  105<H>  4.7   |  25  |  1.19    Ca    8.3<L>      2023 07:37  Mg     1.9     -04    TPro  6.0  /  Alb  3.2<L>  /  TBili  1.6<H>  /  DBili  x   /  AST  30  /  ALT  20  /  AlkPhos  86  04-03                        TELEMETRY:

## 2023-04-04 NOTE — CONSULT NOTE ADULT - SUBJECTIVE AND OBJECTIVE BOX
CHIEF COMPLAINT: SOB    HISTORY OF PRESENT ILLNESS:  47M w/ HFpEF, HOCM, mod-severe MR, HTN, dyslipidemia, chronic Afib, SAH who presented with SOB and LE edema to [place]. He was admitted w/ atrial fibrillation w/ RVR, volume overload, and LE cellulitis. He was started on IV antibiotics, diuretics and rate control agents. His hospital course was c/b spontaneous rectus sheath hematoma, TATIANA , and transaminitis and eventually transferred to [facility] for further management.  A TTE was obtained that did not reveal an obstructive component of his HCM and a flail posterior leaflet of MV resulting in severe primary MR. He is being evaluated for valve repair by CTS, ID for abx regarding his cellulitis, and surgery for his rectus sheath hematoma. the hematoma is felt to be stable and surgery gave clearance for AC. Advanced HF has been consulted for volume optimization.    Allergies: No Known Allergies    Intolerances    MEDICATIONS:  carvedilol 25 milliGRAM(s) Oral every 12 hours  diltiazem    milliGRAM(s) Oral daily  furosemide    Tablet 40 milliGRAM(s) Oral daily  lisinopril 30 milliGRAM(s) Oral daily    ceFAZolin   IVPB 2000 milliGRAM(s) IV Intermittent every 8 hours  pantoprazole    Tablet 40 milliGRAM(s) Oral before breakfast  polyethylene glycol 3350 17 Gram(s) Oral two times a day  bacitracin   Ointment 1 Application(s) Topical two times a day    PAST MEDICAL & SURGICAL HISTORY:  HTN (hypertension)  SAH (subarachnoid hemorrhage)  Atrial fibrillation  HOCM (hypertrophic obstructive cardiomyopathy)  Mitral regurgitation  S/P coil embolization of cerebral aneurysm    FAMILY HISTORY:  No pertinent family history in first degree relatives    SOCIAL HISTORY:    [x ] Non-smoker. Denies EtOH use.    The remaining 14-point ROS is otherwise negative or noncontributory except as noted in the HPI.    PHYSICAL EXAM:  T(C): 36.3 (04-04-23 @ 09:32), Max: 36.7 (04-03-23 @ 22:43)  HR: 92 (04-04-23 @ 08:29) (92 - 112)  BP: 136/93 (04-04-23 @ 08:29) (136/93 - 159/102)  RR: 18 (04-04-23 @ 08:29) (14 - 18)  SpO2: 97% (04-04-23 @ 08:29) (96% - 98%)  Wt(kg): --    I&O's Summary    03 Apr 2023 07:01  -  04 Apr 2023 07:00  --------------------------------------------------------  IN: 750 mL / OUT: 675 mL / NET: 75 mL    04 Apr 2023 07:01  -  04 Apr 2023 12:50  --------------------------------------------------------  IN: 0 mL / OUT: 0 mL / NET: 0 mL  	    Appearance: No Acute Distress  HEENT: JVP 16-18 cm H2O  Cardiovascular: irregular, Normal S1 S2, high pitched systolic murmur loudest at apex  Respiratory: bibasilar rales at bases  Gastrointestinal: Soft, Non-tender, non-distended	  Skin: no skin lesions, no cyanosis  Neurologic: Non-focal  Extremities: significant LE edema with multiple unroofed blisters, warm and well perfused  Psychiatry: A & O x 3, Mood & affect appropriate    LABS:	 	    CBC Full  -  ( 04 Apr 2023 07:37 )  WBC Count : 7.40 K/uL  Hemoglobin : 11.4 g/dL  Hematocrit : 37.3 %  Platelet Count - Automated : 270 K/uL  Mean Cell Volume : 77.1 fl  Mean Cell Hemoglobin : 23.6 pg  Mean Cell Hemoglobin Concentration : 30.6 gm/dL  Auto Neutrophil # : x  Auto Lymphocyte # : x  Auto Monocyte # : x  Auto Eosinophil # : x  Auto Basophil # : x  Auto Neutrophil % : x  Auto Lymphocyte % : x  Auto Monocyte % : x  Auto Eosinophil % : x  Auto Basophil % : x    04-04    139  |  102  |  30<H>  ----------------------------<  105<H>  4.7   |  25  |  1.19  04-03    141  |  104  |  24<H>  ----------------------------<  105<H>  4.9   |  26  |  1.22    Ca    8.3<L>      04 Apr 2023 07:37  Ca    8.7      03 Apr 2023 07:00  Mg     1.9     04-04  Mg     1.9     04-03    TPro  6.0  /  Alb  3.2<L>  /  TBili  1.6<H>  /  DBili  x   /  AST  30  /  ALT  20  /  AlkPhos  86  04-03

## 2023-04-04 NOTE — PROGRESS NOTE ADULT - ASSESSMENT
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 7/29/21 initially admitted to [facility] 3/24/23 with HFpEF exacerbation, Afib w/ RVR and LE cellulitis c/b TATIANA, transaminitis and rectal sheath hematoma. Patient now transferred to [facility] for cardiac telemetry for continued management of HFpEF exacerbation and structural heart evaluation for MR. TTE remarkable for severe, eccentric, anteriorly directed MR concern for endocarditis. Work up in process (blood cultures and ESR/CRP sent out). Follow up with CTS regarding need for VERONIQUE.        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 7/29/21 initially admitted to [facility] 3/24/23 with HFpEF exacerbation, Afib w/ RVR and LE cellulitis c/b TATIANA, transaminitis and rectal sheath hematoma. Patient now transferred to [facility] for cardiac telemetry for continued management of HFpEF exacerbation and structural heart evaluation for MR. TTE remarkable for severe, eccentric, anteriorly directed MR concern for endocarditis CTSx and HF consulted, follow up VERONIQUE.

## 2023-04-04 NOTE — PROGRESS NOTE ADULT - PROBLEM SELECTOR PLAN 2
- Mod-severe MR on outside TTE.   - Suspecting this could be in setting of LVOT obstruction.   - TTE from 4/3/23: Mild to moderate symmetric LVH. LVEF 60%. Posterior leaflet of the mitral valve appears flail. There is severe, eccentric, anteriorly- concern for endocarditis. Follow up blood cultures x2 and ESR/CRP.   directed mitral regurgitation. PHTN present, PAP 44 mmHg   - CTSx structural heart team consulted - Increased Carvedilol from 12.5mg to 25 mg for BP control. Follow up necessity of VERONIQUE for above findings.       ## HOCM  - severe LVOT obstruction w/ resting gradient 90mmHg by TTE in 2021.   - TTE 4/3/23: Mild to moderate symmetric left ventricular hypertrophy.  - CONT: Cardizem and Carvedilol.  - Follow up TTE from 4/3/23: See above; LV diastolic function and filling pressure is made challenging by presence of severe MR. - Mod-severe MR on outside TTE.   - Suspecting this could be in setting of LVOT obstruction.   - TTE from 4/3/23: Mild to moderate symmetric LVH. LVEF 60%. Posterior leaflet of the mitral valve appears flail. There is severe, eccentric, anteriorly- concern for endocarditis. Follow up blood cultures x2 and ESR/CRP.   directed mitral regurgitation. PHTN present, PAP 44 mmHg   - CTSx structural heart team consulted - Increased Carvedilol from 12.5mg to 25 mg for BP control. Follow up necessity of VERONIQUE for above findings.       ## HOCM  - severe LVOT obstruction w/ resting gradient 90mmHg by TTE in 2021.  - TTE 4/3/23: Mild to moderate symmetric left ventricular hypertrophy   - CONT: Cardizem and Carvedilol.  - Follow up TTE from 4/3/23: See above; LV diastolic function and filling pressure is made challenging by presence of severe MR.

## 2023-04-04 NOTE — PROGRESS NOTE ADULT - SUBJECTIVE AND OBJECTIVE BOX
SUBJECTIVE ASSESSMENT: pt feeling well this morning, reports improvement of leg swelling and pain throughout hospital stay. Denies SOB or CP.     VITAL SIGNS:  Vital Signs Last 24 Hrs  T(C): 36.3 (04 Apr 2023 09:32), Max: 36.7 (03 Apr 2023 22:43)  T(F): 97.4 (04 Apr 2023 09:32), Max: 98.1 (03 Apr 2023 22:43)  HR: 92 (04 Apr 2023 08:29) (92 - 112)  BP: 136/93 (04 Apr 2023 08:29) (136/93 - 159/102)  BP(mean): 121 (04 Apr 2023 06:02) (111 - 121)  RR: 18 (04 Apr 2023 08:29) (14 - 18)  SpO2: 97% (04 Apr 2023 08:29) (96% - 98%)    Parameters below as of 04 Apr 2023 08:29  Patient On (Oxygen Delivery Method): room air      I&O's Detail    03 Apr 2023 07:01  -  04 Apr 2023 07:00  --------------------------------------------------------  IN:    IV PiggyBack: 100 mL    Oral Fluid: 650 mL  Total IN: 750 mL    OUT:    Voided (mL): 675 mL  Total OUT: 675 mL    Total NET: 75 mL      04 Apr 2023 07:01  -  04 Apr 2023 11:57  --------------------------------------------------------  IN:  Total IN: 0 mL    OUT:    Oral Fluid: 0 mL  Total OUT: 0 mL    Total NET: 0 mL        PHYSICAL EXAM:  General: resting comfortably in bed in NAD  Neurological: AOx3. Motor skills grossly intact  Cardiovascular: Normal S1/S2. Regular rate/rhythm. No murmurs  Respiratory: Lungs CTA bilaterally. No wheezing or rales  Gastrointestinal: +BS in all 4 quadrants. Non-distended. Soft. Non-tender  Extremities: Strength 5/5 b/l upper/lower extremities. Bilat LE pitting edema with scatted ulcerating lesions  Vascular: Radial 2+bilaterally, DP 2+ b/l  Incision Sites: NA    LABS:                        11.4   7.40  )-----------( 270      ( 04 Apr 2023 07:37 )             37.3       04-04    139  |  102  |  30<H>  ----------------------------<  105<H>  4.7   |  25  |  1.19    Ca    8.3<L>      04 Apr 2023 07:37  Mg     1.9     04-04    TPro  6.0  /  Alb  3.2<L>  /  TBili  1.6<H>  /  DBili  x   /  AST  30  /  ALT  20  /  AlkPhos  86  04-03      MEDICATIONS  (STANDING):  bacitracin   Ointment 1 Application(s) Topical two times a day  carvedilol 25 milliGRAM(s) Oral every 12 hours  ceFAZolin   IVPB 2000 milliGRAM(s) IV Intermittent every 8 hours  diltiazem    milliGRAM(s) Oral daily  furosemide    Tablet 40 milliGRAM(s) Oral daily  lisinopril 30 milliGRAM(s) Oral daily  pantoprazole    Tablet 40 milliGRAM(s) Oral before breakfast  polyethylene glycol 3350 17 Gram(s) Oral two times a day    MEDICATIONS  (PRN):    RADIOLOGY & ADDITIONAL TESTS:

## 2023-04-04 NOTE — PROGRESS NOTE ADULT - PROBLEM SELECTOR PLAN 3
- Rate controlled;   - CONT: Cardizem 120mg PO QD, Carvedilol increased from 12.5 mg BID to 25 mg BID for adequate BP control   - HOLD Eliquis at this time given rectus sheath hematoma; consider restarting if H/H remains stable  - Gen surg recs appreciated; if hemoglobin remains stable plan to start heparin gtt  04/04/23 with close monitoring - Rate controlled;   - CONT: Cardizem 180mg PO QD, Carvedilol 25 mg BID   - Gen surg recs appreciated; hemoglobin remains stable will start heparin gtt with close monitoring - Rate controlled;   - CONT: Cardizem 180mg PO QD, Carvedilol 25 mg BID   - will resume AC- heparin gtt initated  with close monitoring

## 2023-04-04 NOTE — PROGRESS NOTE ADULT - ASSESSMENT
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 on 7/29/21 initially admitted to [facility] 3/24/23 with HFpEF exacerbation, Afib w/ RVR and LE cellulitis c/b TATIANA, transaminitis and rectal sheath hematoma. Patient now transferred to [facility] for cardiac telemetry for continued management of HFpEF exacerbation and  MR. Structural heart consulted for evaluation of mod/severe MR.     Plan:  Problem 1: mod/severe MR  -Discussed with Dr. Sousa  -given HOCM, recommend afterload reduction to better evaluate valvular function with optimal medical management  -CXR without any pleural effusion or gross fluid overloa  -F/U results VERONIQUE for eval for mitraclip  -Please get wound care to evaluate LE cellulitis, possible ID consult  -Will continue to evaluate for possible intervention.  -Structural Heart team will continue to follow.    Problem 2: acute on chronic HF,EF 60%  -continue diuretics  -continue daily weights  -Care per primary team    Problem 3: Afib  -holding eliquis 2/2 rectal sheath hematoma  -continue metoprolol and cardizem  -Care per primary team    Problem 4: rectal sheath hematoma  -continue abdominal binder  -recommend vascular surgery or gen surgery consult for evaluation  -Care per primary team

## 2023-04-04 NOTE — PROGRESS NOTE ADULT - NS ATTEND AMEND GEN_ALL_CORE FT
Agree with above with the following comments    Pt had VERONIQUE showing severe MR with posterior leaflet prolapse and flair. Normal biventricular function.   -Will discuss case with Dr Gonsalves ([facility]) for possible intervention on the MV. Pt is now euvolemic.

## 2023-04-04 NOTE — PROGRESS NOTE ADULT - PROBLEM SELECTOR PLAN 7
- 11cm rectal sheath hematoma: Surgery consulted @ [facility].  - Recommend abdominal binder for 2wks, no acute intervention; Eliquis held.   - Gen surg recs appreciated; if hemoglobin remains stable plan to start heparin gtt  04/04/23 with close monitoring. Will defer imaging at this time given patient is HDS and H/H has been stable since admission - 11cm rectal sheath hematoma: Surgery consulted @ [facility].  - Recommend abdominal binder for 2wks, no acute intervention; Eliquis held.   - Gen surg recs appreciated; H/H stable will initiate heparin gtt with close monitoring.   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  - Recommend abdominal binder for 2wks, no acute intervention; Eliquis held.   - Gen surg recs appreciated; H/H stable will initiate heparin gtt with close monitoring.

## 2023-04-04 NOTE — PROGRESS NOTE ADULT - SUBJECTIVE AND OBJECTIVE BOX
Interventional Cardiology PA Adult Progress Note    Subjective Assessment:    Patient seen and examined at bedside denies CP, SOB, palpitations, dizziness, nausea, vomiting, hematuria, or BRBPR   Endorsing LE edema is improving but having dull LLQ pain that is 2/2 to rectal hematoma.     	  MEDICATIONS:  carvedilol 25 milliGRAM(s) Oral every 12 hours  diltiazem    milliGRAM(s) Oral daily  furosemide    Tablet 40 milliGRAM(s) Oral daily  lisinopril 30 milliGRAM(s) Oral daily  ceFAZolin   IVPB 2000 milliGRAM(s) IV Intermittent every 8 hours  pantoprazole    Tablet 40 milliGRAM(s) Oral before breakfast  polyethylene glycol 3350 17 Gram(s) Oral two times a day    PHYSICAL EXAM:  TELEMETRY:  T(C): 36.4 (04-03-23 @ 09:06), Max: 36.6 (04-03-23 @ 04:21)  HR: 91 (04-03-23 @ 10:44) (88 - 105)  BP: 130/88 (04-03-23 @ 10:44) (130/88 - 146/100)  RR: 18 (04-03-23 @ 10:44) (16 - 18)  SpO2: 97% (04-03-23 @ 10:44) (97% - 100%)  Wt(kg): --  I&O's Summary    02 Apr 2023 07:01  -  03 Apr 2023 07:00  --------------------------------------------------------  IN: 750 mL / OUT: 525 mL / NET: 225 mL    03 Apr 2023 07:01  -  03 Apr 2023 11:59  --------------------------------------------------------  IN: 0 mL / OUT: 0 mL / NET: 0 mL      Height (cm): 183.4 (04-03 @ 06:47)  Weight (kg): 110.2 (04-03 @ 06:47)  BMI (kg/m2): 32.8 (04-03 @ 06:47)  BSA (m2): 2.32 (04-03 @ 06:47)                                        Appearance: sleeping comfortably, no apparent distress   Neck: Supple,  - JVD   Cardiovascular: + systolic murmur, normal S1 S2  Respiratory: Lungs clear to auscultation  Gastrointestinal:  Soft, Non-tender, + BS	  Skin: No rashes, No ecchymoses, No cyanosis  Extremities: B/L erythema and multiple venous ulcers , 2+ pitting edema   Vascular: B/L + PT and +DP on dopplers    Neurologic: Non-focal  Psychiatry: A&O x 3, Mood & affect appropriate      LABS:	 	  CARDIAC MARKERS:                        11.6   8.42  )-----------( 287      ( 03 Apr 2023 07:01 )             37.7    141  |  104  |  24<H>  ----------------------------<  105<H>  4.9   |  26  |  1.22    Ca    8.7      03 Apr 2023 07:00    Mg     1.9     04-03    TPro  6.0  /  Alb  3.2<L>  /  TBili  1.6<H>  /  DBili  x   /  AST  30  /  ALT  20  /  AlkPhos  86  04-03    PT/INR - ( 01 Apr 2023 22:35 )   PT: 14.7 sec;   INR: 1.23          PTT - ( 02 Apr 2023 05:30 )  PTT:32.4 sec    [x] TTE 4/3/23:   1. Patient was in atrial fibrillation during the study.  2. Mild to moderate symmetric left ventricular hypertrophy.  3. Normal left ventricular size and systolic function.  4. Normal right ventricular size and systolic function.  5. Severely dilated left atrium.  6. The posterior leaflet of the mitral valve appears flail. There is severe, eccentric, anteriorly   directed mitral regurgitation.  7. Aortic sclerosis without significant stenosis.  8. Pulmonary hypertension present, pulmonary artery systolic pressure is 44 mmHg.  9. No pericardial effusion.         Interventional Cardiology PA Adult Progress Note    Subjective Assessment:    Patient seen and examined at bedside denies CP, SOB, palpitations, dizziness, nausea, vomiting, hematuria, or BRBPR   Endorsing LE edema is improving but having dull LLQ pain that is 2/2 to rectal hematoma.     	  MEDICATIONS:  carvedilol 25 milliGRAM(s) Oral every 12 hours  diltiazem    milliGRAM(s) Oral daily  furosemide    Tablet 40 milliGRAM(s) Oral daily  lisinopril 30 milliGRAM(s) Oral daily  ceFAZolin   IVPB 2000 milliGRAM(s) IV Intermittent every 8 hours  pantoprazole    Tablet 40 milliGRAM(s) Oral before breakfast  polyethylene glycol 3350 17 Gram(s) Oral two times a day    PHYSICAL EXAM:  TELEMETRY:  T(C): 36.4 (04-03-23 @ 09:06), Max: 36.6 (04-03-23 @ 04:21)  HR: 91 (04-03-23 @ 10:44) (88 - 105)  BP: 130/88 (04-03-23 @ 10:44) (130/88 - 146/100)  RR: 18 (04-03-23 @ 10:44) (16 - 18)  SpO2: 97% (04-03-23 @ 10:44) (97% - 100%)  Wt(kg): --  I&O's Summary    02 Apr 2023 07:01  -  03 Apr 2023 07:00  --------------------------------------------------------  IN: 750 mL / OUT: 525 mL / NET: 225 mL    03 Apr 2023 07:01  -  03 Apr 2023 11:59  --------------------------------------------------------  IN: 0 mL / OUT: 0 mL / NET: 0 mL      Height (cm): 183.4 (04-03 @ 06:47)  Weight (kg): 110.2 (04-03 @ 06:47)  BMI (kg/m2): 32.8 (04-03 @ 06:47)  BSA (m2): 2.32 (04-03 @ 06:47)                                        Appearance: no apparent distress, sitting upright in bed   Cardiovascular: + systolic murmur, normal S1 S2  Respiratory: Lungs clear to auscultation  Gastrointestinal:  Soft, Non-tender, + BS	  Skin: No rashes, No ecchymoses, No cyanosis  Extremities: B/L erythema and multiple venous ulcers , 2+ pitting edema   Vascular: B/L + PT and +DP on dopplers    Neurologic: Non-focal  Psychiatry: A&O x 3, Mood & affect appropriate      LABS:	 	  CARDIAC MARKERS:                        11.6   8.42  )-----------( 287      ( 03 Apr 2023 07:01 )             37.7    141  |  104  |  24<H>  ----------------------------<  105<H>  4.9   |  26  |  1.22    Ca    8.7      03 Apr 2023 07:00    Mg     1.9     04-03    TPro  6.0  /  Alb  3.2<L>  /  TBili  1.6<H>  /  DBili  x   /  AST  30  /  ALT  20  /  AlkPhos  86  04-03    PT/INR - ( 01 Apr 2023 22:35 )   PT: 14.7 sec;   INR: 1.23          PTT - ( 02 Apr 2023 05:30 )  PTT:32.4 sec         Interventional Cardiology PA Adult Progress Note    Subjective Assessment:    Patient seen and examined at bedside denies CP, SOB, palpitations, dizziness, nausea, vomiting, hematuria, or BRBPR   Endorsing LE edema is improving but having dull LLQ pain that is 2/2 to rectal hematoma.     	  MEDICATIONS:  carvedilol 25 milliGRAM(s) Oral every 12 hours  diltiazem    milliGRAM(s) Oral daily  furosemide    Tablet 40 milliGRAM(s) Oral daily  lisinopril 30 milliGRAM(s) Oral daily  ceFAZolin   IVPB 2000 milliGRAM(s) IV Intermittent every 8 hours  pantoprazole    Tablet 40 milliGRAM(s) Oral before breakfast  polyethylene glycol 3350 17 Gram(s) Oral two times a day    PHYSICAL EXAM:  TELEMETRY:  T(C): 36.4 (04-03-23 @ 09:06), Max: 36.6 (04-03-23 @ 04:21)  HR: 91 (04-03-23 @ 10:44) (88 - 105)  BP: 130/88 (04-03-23 @ 10:44) (130/88 - 146/100)  RR: 18 (04-03-23 @ 10:44) (16 - 18)  SpO2: 97% (04-03-23 @ 10:44) (97% - 100%)  Wt(kg): --  I&O's Summary    02 Apr 2023 07:01  -  03 Apr 2023 07:00  --------------------------------------------------------  IN: 750 mL / OUT: 525 mL / NET: 225 mL    03 Apr 2023 07:01  -  03 Apr 2023 11:59  --------------------------------------------------------  IN: 0 mL / OUT: 0 mL / NET: 0 mL      Height (cm): 183.4 (04-03 @ 06:47)  Weight (kg): 110.2 (04-03 @ 06:47)  BMI (kg/m2): 32.8 (04-03 @ 06:47)  BSA (m2): 2.32 (04-03 @ 06:47)                                        Appearance: no apparent distress, sitting upright in bed   Cardiovascular: + systolic murmur, normal S1 S2, +JVD  Respiratory: normal work of breathing CTAB  Gastrointestinal:  distended, soft, non-tender, + BS	  Skin: No rashes, No ecchymoses, No cyanosis  Extremities: B/L erythema and multiple open ulcers , 2+ pitting edema   Neurologic: Non-focal  Psychiatry: A&O x 3, Mood & affect appropriate      LABS:	 	  CARDIAC MARKERS:                        11.6   8.42  )-----------( 287      ( 03 Apr 2023 07:01 )             37.7    141  |  104  |  24<H>  ----------------------------<  105<H>  4.9   |  26  |  1.22    Ca    8.7      03 Apr 2023 07:00    Mg     1.9     04-03    TPro  6.0  /  Alb  3.2<L>  /  TBili  1.6<H>  /  DBili  x   /  AST  30  /  ALT  20  /  AlkPhos  86  04-03    PT/INR - ( 01 Apr 2023 22:35 )   PT: 14.7 sec;   INR: 1.23          PTT - ( 02 Apr 2023 05:30 )  PTT:32.4 sec         Interventional Cardiology PA Adult Progress Note    Subjective Assessment:    Patient seen and examined at bedside denies CP, SOB, palpitations, dizziness, nausea, vomiting, hematuria, or BRBPR   Endorsing LE edema is improving but having dull LLQ pain that is 2/2 to rectal hematoma.       	  MEDICATIONS:  carvedilol 25 milliGRAM(s) Oral every 12 hours  diltiazem    milliGRAM(s) Oral daily  furosemide    Tablet 40 milliGRAM(s) Oral daily  lisinopril 30 milliGRAM(s) Oral daily  ceFAZolin   IVPB 2000 milliGRAM(s) IV Intermittent every 8 hours  pantoprazole    Tablet 40 milliGRAM(s) Oral before breakfast  polyethylene glycol 3350 17 Gram(s) Oral two times a day    PHYSICAL EXAM:  TELEMETRY:  T(C): 36.4 (04-03-23 @ 09:06), Max: 36.6 (04-03-23 @ 04:21)  HR: 91 (04-03-23 @ 10:44) (88 - 105)  BP: 130/88 (04-03-23 @ 10:44) (130/88 - 146/100)  RR: 18 (04-03-23 @ 10:44) (16 - 18)  SpO2: 97% (04-03-23 @ 10:44) (97% - 100%)  Wt(kg): --  I&O's Summary    02 Apr 2023 07:01  -  03 Apr 2023 07:00  --------------------------------------------------------  IN: 750 mL / OUT: 525 mL / NET: 225 mL    03 Apr 2023 07:01  -  03 Apr 2023 11:59  --------------------------------------------------------  IN: 0 mL / OUT: 0 mL / NET: 0 mL      Height (cm): 183.4 (04-03 @ 06:47)  Weight (kg): 110.2 (04-03 @ 06:47)  BMI (kg/m2): 32.8 (04-03 @ 06:47)  BSA (m2): 2.32 (04-03 @ 06:47)                                        Appearance: no apparent distress, sitting upright in bed   Cardiovascular: + systolic murmur, normal S1 S2, +JVD  Respiratory: normal work of breathing CTAB  Gastrointestinal:  distended, soft, non-tender, + BS	  Skin: No rashes, No ecchymoses, No cyanosis  Extremities: B/L erythema and multiple open ulcers , 2+ pitting edema   Neurologic: Non-focal  Psychiatry: A&O x 3, Mood & affect appropriate      LABS:	 	  CARDIAC MARKERS:                        11.6   8.42  )-----------( 287      ( 03 Apr 2023 07:01 )             37.7    141  |  104  |  24<H>  ----------------------------<  105<H>  4.9   |  26  |  1.22    Ca    8.7      03 Apr 2023 07:00    Mg     1.9     04-03    TPro  6.0  /  Alb  3.2<L>  /  TBili  1.6<H>  /  DBili  x   /  AST  30  /  ALT  20  /  AlkPhos  86  04-03    PT/INR - ( 01 Apr 2023 22:35 )   PT: 14.7 sec;   INR: 1.23          PTT - ( 02 Apr 2023 05:30 )  PTT:32.4 sec

## 2023-04-04 NOTE — PROGRESS NOTE ADULT - SUBJECTIVE AND OBJECTIVE BOX
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  - Denies N/V  - Passing flatus  - Last BM     MEDICATIONS  (STANDING):  bacitracin   Ointment 1 Application(s) Topical two times a day  carvedilol 25 milliGRAM(s) Oral every 12 hours  ceFAZolin   IVPB 2000 milliGRAM(s) IV Intermittent every 8 hours  diltiazem    milliGRAM(s) Oral daily  furosemide    Tablet 40 milliGRAM(s) Oral daily  lisinopril 30 milliGRAM(s) Oral daily  pantoprazole    Tablet 40 milliGRAM(s) Oral before breakfast  polyethylene glycol 3350 17 Gram(s) Oral two times a day    MEDICATIONS  (PRN):      Vital Signs Last 24 Hrs  T(C): 36.3 (04 Apr 2023 09:32), Max: 36.7 (03 Apr 2023 22:43)  T(F): 97.4 (04 Apr 2023 09:32), Max: 98.1 (03 Apr 2023 22:43)  HR: 92 (04 Apr 2023 08:29) (92 - 112)  BP: 136/93 (04 Apr 2023 08:29) (136/93 - 159/102)  BP(mean): 121 (04 Apr 2023 06:02) (111 - 121)  RR: 18 (04 Apr 2023 08:29) (14 - 18)  SpO2: 97% (04 Apr 2023 08:29) (96% - 98%)    Parameters below as of 04 Apr 2023 08:29  Patient On (Oxygen Delivery Method): room air        Neurologic: AAOx3  CV: Normal rate, regular rhythm  Pulm: Breathing comfortably  Abd: Soft, non-distended;  Mild TTP in left mid abdomen withotu R/G. No appreciable mass.   : No Farias  Skin: No rashes  Extremities: No edema.  Psychiatric: Interacting normally.     I&O's Detail    03 Apr 2023 07:01  -  04 Apr 2023 07:00  --------------------------------------------------------  IN:    IV PiggyBack: 100 mL    Oral Fluid: 650 mL  Total IN: 750 mL    OUT:    Voided (mL): 675 mL  Total OUT: 675 mL    Total NET: 75 mL      04 Apr 2023 07:01  -  04 Apr 2023 12:51  --------------------------------------------------------  IN:  Total IN: 0 mL    OUT:    Oral Fluid: 0 mL  Total OUT: 0 mL    Total NET: 0 mL          LABS:                        11.4   7.40  )-----------( 270      ( 04 Apr 2023 07:37 )             37.3     04-04    139  |  102  |  30<H>  ----------------------------<  105<H>  4.7   |  25  |  1.19    Ca    8.3<L>      04 Apr 2023 07:37  Mg     1.9     04-04    TPro  6.0  /  Alb  3.2<L>  /  TBili  1.6<H>  /  DBili  x   /  AST  30  /  ALT  20  /  AlkPhos  86  04-03          RADIOLOGY & ADDITIONAL STUDIES:

## 2023-04-04 NOTE — PROGRESS NOTE ADULT - PROBLEM SELECTOR PLAN 8
- Afebrile, no leukocytosis.   - ID consulted, rec increasing Cefazolin dosing.   - CONT: Cefazolin 2g IV q8hrs x7 days (ends 4/9/23).   - If discharge before end of course, can change to Cefadroxil 500mg PO q12hrs to complete course.     ##Rule out PAD  -- pulses palpable with duplex, B/L arterial duplex pending, follow up results       N: NPO after midnight for possible VERONIQUE  - DASH with 1 liter fluid restriction  E: Maintain K>4.0 and Mg >2.0  VTE PPx: on hold 2/2 rectal sheath hematoma  Code: Full - Afebrile, no leukocytosis.   - ID consulted, rec increasing Cefazolin dosing.   - CONT: Cefazolin 2g IV q8hrs x7 days (ends 4/9/23).   - If discharge before end of course, can change to Cefadroxil 500mg PO q12hrs to complete course.     ##Rule out PAD  -- pulses palpable with duplex, B/L arterial duplex pending, follow up results       N: DASH with 1 liter fluid restriction  E: Maintain K>4.0 and Mg >2.0  VTE PPx: on hold 2/2 rectal sheath hematoma  Code: Full - Afebrile, no leukocytosis.   - ID consulted, rec increasing Cefazolin dosing.   - CONT: Cefazolin 2g IV q8hrs x7 days (ends 4/9/23).   - If discharge before end of course, can change to Cefadroxil 500mg PO q12hrs to complete course.     ##Rule out PAD  -- pulses palpable with duplex, B/L arterial duplex:  limited evaluation due to edema, within that limitation there is no hemodynamically significant stenosis identified.        N: DASH with 1 liter fluid restriction  E: Maintain K>4.0 and Mg >2.0  VTE PPx: on hold 2/2 rectal sheath hematoma  Code: Full - Afebrile, no leukocytosis.   - ID consulted, rec increasing Cefazolin dosing.   - CONT: Cefazolin 2g IV q8hrs x7 days (ends 4/9/23)  - Betadine and gauze, patient picking at skin. Consider wound care consult   - If discharge before end of course, can change to Cefadroxil 500mg PO q12hrs to complete course.     ##Rule out PAD  -- pulses palpable with duplex, B/L arterial duplex:  limited evaluation due to edema, within that limitation there is no hemodynamically significant stenosis identified.        N: DASH with 1 liter fluid restriction  E: Maintain K>4.0 and Mg >2.0  VTE PPx: on hold 2/2 rectal sheath hematoma  Code: Full

## 2023-04-04 NOTE — PROGRESS NOTE ADULT - ASSESSMENT
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   - Surgery 4C to sign off at this time. Please reconsult with new questions. Thank you for inviting us to participate in Mr. Basurto's care. 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   - Surgery 4C to sign off at this time. Please reconsult with new questions. Thank you for inviting us to participate in Mr. Basurto's care.      Attending Attestation:  Patient, seen, agree with above please call as needed.

## 2023-04-04 NOTE — PROGRESS NOTE ADULT - NS ATTEND AMEND GEN_ALL_CORE FT
See PA note written above, for details. I reviewed the PA documentation.  I have personally seen and examined this patient today. I reviewed vitals, labs, medications, cardiac studies and additional imaging.  I agree with the PA's findings and plans as written above with the following additions/amendments:  TTE 4/3/23   1. Patient was in atrial fibrillation during the study.   2. Mild to moderate symmetric left ventricular hypertrophy.   3. Normal left ventricular size and systolic function.   4. Normal right ventricular size and systolic function.   5. Severely dilated left atrium.   6. The posterior leaflet of the mitral valve appears flail. There is severe, eccentric, anteriorly directed mitral regurgitation.   7. Aortic sclerosis without significant stenosis.   8. Pulmonary hypertension present, pulmonary artery systolic pressure is 44 mmHg.   9. No pericardial effusion.    Plan:  Ep consult for rhythm control, pre-op may not be feasible given structural disease with MV flail and 4+MR  Cont Lasix 40mg po daily to maintain euvolemia  Up titrate diltiazem to 180CD daily   Coreg dose augmentation to 25 BID for additional BP control  Lisinopril 30mg po daily for additional BP control  CTSx evaluation given flail MV with 4+MR  ID recs for cellulitis mgmt: 2gm IV cefazolin through 4/9  General surgery consult for rectal sheath hematoma evaluation and determination of when safe to resume use of A/C  Vascular surgery c/s for PVD assessment  Advanced CHF evaluation for pre op optimization per d/w CTSx  PT eval ordered  Ananth Villarreal M.D.  Cardiology Attending See PA note written above, for details. I reviewed the PA documentation.  I have personally seen and examined this patient today. I reviewed vitals, labs, medications, cardiac studies and additional imaging.  I agree with the PA's findings and plans as written above with the following additions/amendments:  TTE 4/3/23   1. Patient was in atrial fibrillation during the study.   2. Mild to moderate symmetric left ventricular hypertrophy.   3. Normal left ventricular size and systolic function.   4. Normal right ventricular size and systolic function.   5. Severely dilated left atrium.   6. The posterior leaflet of the mitral valve appears flail. There is severe, eccentric, anteriorly directed mitral regurgitation.   7. Aortic sclerosis without significant stenosis.   8. Pulmonary hypertension present, pulmonary artery systolic pressure is 44 mmHg.   9. No pericardial effusion.    Plan:  NPO for VERONIQUE eval of flail MV and MR   Initiate hep gtt post VERONIQUE for Afib cva risk reduction  Transition to Lasix 40mg IV BID to achieve euvolemia  Initiate Hydralazine 25mg po TID for further afterload reduction  Diltiazem 180CD daily, Coreg 25 BID, Lisinopril 30mg po daily for additional BP control  CTSx following for flail MV with 4+MR  ID recs for cellulitis mgmt: 2gm IV cefazolin through 4/9  General surgery consult for rectal sheath hematoma evaluation, approved to resume A/C use  Vascular surgery c/s for PVD assessment, recommend bacitracin and gauze covering for wounds (patient picking and rubbing at open wounds)  Advanced CHF following for pre op optimization per d/w CTSx  PT eval ordered  Ananth Villarreal M.D.  Cardiology Attending  45minutes spent on total encounter; more than 50% of the visit was spent counseling and/or coordinating care by the attending physician, with plan of care discussed with the patient, CHF team, CTsx Dr Sousa and cardiac team.

## 2023-04-05 LAB
ANION GAP SERPL CALC-SCNC: 7 MMOL/L — SIGNIFICANT CHANGE UP (ref 5–17)
APTT BLD: 39.5 SEC — HIGH (ref 27.5–35.5)
APTT BLD: 44.8 SEC — HIGH (ref 27.5–35.5)
APTT BLD: 86 SEC — HIGH (ref 27.5–35.5)
BUN SERPL-MCNC: 23 MG/DL — SIGNIFICANT CHANGE UP (ref 7–23)
CALCIUM SERPL-MCNC: 8.8 MG/DL — SIGNIFICANT CHANGE UP (ref 8.4–10.5)
CHLORIDE SERPL-SCNC: 100 MMOL/L — SIGNIFICANT CHANGE UP (ref 96–108)
CO2 SERPL-SCNC: 29 MMOL/L — SIGNIFICANT CHANGE UP (ref 22–31)
CREAT SERPL-MCNC: 1.27 MG/DL — SIGNIFICANT CHANGE UP (ref 0.5–1.3)
EGFR: 70 ML/MIN/1.73M2 — SIGNIFICANT CHANGE UP
GLUCOSE SERPL-MCNC: 135 MG/DL — HIGH (ref 70–99)
HCT VFR BLD CALC: 35.9 % — LOW (ref 39–50)
HCT VFR BLD CALC: 36.4 % — LOW (ref 39–50)
HGB BLD-MCNC: 11 G/DL — LOW (ref 13–17)
HGB BLD-MCNC: 11.2 G/DL — LOW (ref 13–17)
MAGNESIUM SERPL-MCNC: 1.9 MG/DL — SIGNIFICANT CHANGE UP (ref 1.6–2.6)
MCHC RBC-ENTMCNC: 23.4 PG — LOW (ref 27–34)
MCHC RBC-ENTMCNC: 23.9 PG — LOW (ref 27–34)
MCHC RBC-ENTMCNC: 30.2 GM/DL — LOW (ref 32–36)
MCHC RBC-ENTMCNC: 31.2 GM/DL — LOW (ref 32–36)
MCV RBC AUTO: 76.5 FL — LOW (ref 80–100)
MCV RBC AUTO: 77.4 FL — LOW (ref 80–100)
NRBC # BLD: 0 /100 WBCS — SIGNIFICANT CHANGE UP (ref 0–0)
NRBC # BLD: 0 /100 WBCS — SIGNIFICANT CHANGE UP (ref 0–0)
PLATELET # BLD AUTO: 251 K/UL — SIGNIFICANT CHANGE UP (ref 150–400)
PLATELET # BLD AUTO: 264 K/UL — SIGNIFICANT CHANGE UP (ref 150–400)
POTASSIUM SERPL-MCNC: 4.2 MMOL/L — SIGNIFICANT CHANGE UP (ref 3.5–5.3)
POTASSIUM SERPL-SCNC: 4.2 MMOL/L — SIGNIFICANT CHANGE UP (ref 3.5–5.3)
RBC # BLD: 4.69 M/UL — SIGNIFICANT CHANGE UP (ref 4.2–5.8)
RBC # BLD: 4.7 M/UL — SIGNIFICANT CHANGE UP (ref 4.2–5.8)
RBC # FLD: 19.8 % — HIGH (ref 10.3–14.5)
RBC # FLD: 20.3 % — HIGH (ref 10.3–14.5)
SODIUM SERPL-SCNC: 136 MMOL/L — SIGNIFICANT CHANGE UP (ref 135–145)
WBC # BLD: 7.12 K/UL — SIGNIFICANT CHANGE UP (ref 3.8–10.5)
WBC # BLD: 8.65 K/UL — SIGNIFICANT CHANGE UP (ref 3.8–10.5)
WBC # FLD AUTO: 7.12 K/UL — SIGNIFICANT CHANGE UP (ref 3.8–10.5)
WBC # FLD AUTO: 8.65 K/UL — SIGNIFICANT CHANGE UP (ref 3.8–10.5)

## 2023-04-05 PROCEDURE — 99232 SBSQ HOSP IP/OBS MODERATE 35: CPT

## 2023-04-05 PROCEDURE — 99222 1ST HOSP IP/OBS MODERATE 55: CPT

## 2023-04-05 PROCEDURE — 99233 SBSQ HOSP IP/OBS HIGH 50: CPT

## 2023-04-05 RX ORDER — HEPARIN SODIUM 5000 [USP'U]/ML
1200 INJECTION INTRAVENOUS; SUBCUTANEOUS
Qty: 25000 | Refills: 0 | Status: DISCONTINUED | OUTPATIENT
Start: 2023-04-05 | End: 2023-04-05

## 2023-04-05 RX ORDER — HEPARIN SODIUM 5000 [USP'U]/ML
4000 INJECTION INTRAVENOUS; SUBCUTANEOUS EVERY 6 HOURS
Refills: 0 | Status: ACTIVE | OUTPATIENT
Start: 2023-04-05 | End: 2024-03-03

## 2023-04-05 RX ORDER — HEPARIN SODIUM 5000 [USP'U]/ML
9000 INJECTION INTRAVENOUS; SUBCUTANEOUS EVERY 6 HOURS
Refills: 0 | Status: ACTIVE | OUTPATIENT
Start: 2023-04-05 | End: 2024-03-03

## 2023-04-05 RX ORDER — HEPARIN SODIUM 5000 [USP'U]/ML
1400 INJECTION INTRAVENOUS; SUBCUTANEOUS
Qty: 25000 | Refills: 0 | Status: ACTIVE | OUTPATIENT
Start: 2023-04-05 | End: 2024-03-03

## 2023-04-05 RX ADMIN — HEPARIN SODIUM 12 UNIT(S)/HR: 5000 INJECTION INTRAVENOUS; SUBCUTANEOUS at 09:11

## 2023-04-05 RX ADMIN — Medication 40 MILLIGRAM(S): at 06:25

## 2023-04-05 RX ADMIN — CARVEDILOL PHOSPHATE 25 MILLIGRAM(S): 80 CAPSULE, EXTENDED RELEASE ORAL at 17:43

## 2023-04-05 RX ADMIN — Medication 25 MILLIGRAM(S): at 22:26

## 2023-04-05 RX ADMIN — POLYETHYLENE GLYCOL 3350 17 GRAM(S): 17 POWDER, FOR SOLUTION ORAL at 17:42

## 2023-04-05 RX ADMIN — HEPARIN SODIUM 1400 UNIT(S)/HR: 5000 INJECTION INTRAVENOUS; SUBCUTANEOUS at 14:57

## 2023-04-05 RX ADMIN — HEPARIN SODIUM 12 UNIT(S)/HR: 5000 INJECTION INTRAVENOUS; SUBCUTANEOUS at 05:48

## 2023-04-05 RX ADMIN — Medication 100 MILLIGRAM(S): at 06:27

## 2023-04-05 RX ADMIN — Medication 100 MILLIGRAM(S): at 22:25

## 2023-04-05 RX ADMIN — HEPARIN SODIUM 1400 UNIT(S)/HR: 5000 INJECTION INTRAVENOUS; SUBCUTANEOUS at 22:25

## 2023-04-05 RX ADMIN — Medication 25 MILLIGRAM(S): at 13:12

## 2023-04-05 RX ADMIN — Medication 40 MILLIGRAM(S): at 13:11

## 2023-04-05 RX ADMIN — HEPARIN SODIUM 4000 UNIT(S): 5000 INJECTION INTRAVENOUS; SUBCUTANEOUS at 15:12

## 2023-04-05 RX ADMIN — POLYETHYLENE GLYCOL 3350 17 GRAM(S): 17 POWDER, FOR SOLUTION ORAL at 06:28

## 2023-04-05 RX ADMIN — Medication 100 MILLIGRAM(S): at 14:01

## 2023-04-05 RX ADMIN — LISINOPRIL 30 MILLIGRAM(S): 2.5 TABLET ORAL at 06:34

## 2023-04-05 RX ADMIN — HEPARIN SODIUM 1400 UNIT(S)/HR: 5000 INJECTION INTRAVENOUS; SUBCUTANEOUS at 20:49

## 2023-04-05 RX ADMIN — Medication 180 MILLIGRAM(S): at 06:24

## 2023-04-05 RX ADMIN — PANTOPRAZOLE SODIUM 40 MILLIGRAM(S): 20 TABLET, DELAYED RELEASE ORAL at 06:35

## 2023-04-05 RX ADMIN — Medication 25 MILLIGRAM(S): at 06:35

## 2023-04-05 RX ADMIN — Medication 1 APPLICATION(S): at 13:11

## 2023-04-05 RX ADMIN — CARVEDILOL PHOSPHATE 25 MILLIGRAM(S): 80 CAPSULE, EXTENDED RELEASE ORAL at 06:26

## 2023-04-05 NOTE — PROGRESS NOTE ADULT - PROBLEM SELECTOR PLAN 7
- 11cm rectal sheath hematoma: Surgery consulted @ [facility].  - Recommend abdominal binder for 2wks, no acute intervention; Eliquis held.   - Gen surg recs appreciated; H/H stable will initiate heparin gtt with close monitoring. - 11cm rectal sheath hematoma: Surgery consulted @ [facility].  - Recommend abdominal binder for 2wks, no acute intervention and okay to resume AC as pt low risk for re-expansion.   - Gen surg signed off, re-consult as needed

## 2023-04-05 NOTE — PROGRESS NOTE ADULT - PROBLEM SELECTOR PLAN 1
- On exam, patient on room air but still with persistent B/L LE edema and distended abdomen   - Was receiving Lasix 40mg IV QD at outside hospital.   - TTE @ [facility] 3/26/23 (actual report not available; per clinical documentation): LVEF 60%, LAE w/ severe MR, MV degeneration of posterior leaflet w/ significatn prolapse and mod-severe  anterior directed MR.   - On prior TTE 7/2021, pt w/ HOCM w/ severe LVOT obstruction w/ resting gradient of 90mmHg.   - No LVOT obstruction on most recent echo   - Changed Lasix 40mg PO QD to Lasix 40mg IV as patient remains volume overloaded . Will start Hydralazine 25 mg TID for afterload reduction   - PLAN: IV diuresis, monitor volume status.  - Core measures, strict I/Os, - On exam, patient on room air but still with persistent B/L LE edema and distended abdomen   - Was receiving Lasix 40mg IV QD at outside hospital.   - TTE @ [facility] 3/26/23 (actual report not available; per clinical documentation): LVEF 60%, LAE w/ severe MR, MV degeneration of posterior leaflet w/ significatn prolapse and mod-severe  anterior directed MR.   - On prior TTE 7/2021, pt w/ HOCM w/ severe LVOT obstruction w/ resting gradient of 90mmHg.   - No LVOT obstruction on most recent echos   - Advanced HF following  - Diuresis with lasix 40 IV BID, Hydralazine 25 mg TID, coreg 25 mg BID - On exam, patient on room air but still with persistent B/L LE edema and distended abdomen   - Was receiving Lasix 40mg IV QD at outside hospital.   - TTE @ [facility] 3/26/23 (actual report not available; per clinical documentation): LVEF 60%, LAE w/ severe MR, MV degeneration of posterior leaflet w/ significant prolapse and mod-severe  anterior directed MR.   - On prior TTE 7/2021, pt w/ HOCM w/ severe LVOT obstruction w/ resting gradient of 90mmHg.   - No LVOT obstruction on most recent echos   - Advanced HF following  - Diuresis with lasix 40 IV BID, Hydralazine 25 mg TID, coreg 25 mg BID  - NPO after MN for pre-op cath with Dr Sousa on 4/6/23. No load or fluids.

## 2023-04-05 NOTE — PROGRESS NOTE ADULT - PROBLEM SELECTOR PLAN 3
- Rate controlled;   - CONT: Cardizem 180mg PO QD, Carvedilol 25 mg BID   - will resume AC- heparin gtt initated  with close monitoring - Rate controlled;   - CONT: Cardizem 180mg PO QD, Carvedilol 25 mg BID   -  heparin gtt for AC, OR on Friday 4/7/23

## 2023-04-05 NOTE — PROGRESS NOTE ADULT - SUBJECTIVE AND OBJECTIVE BOX
Interventional Cardiology PA Adult Progress Note    Subjective Assessment: Pt seen and evaluated at bedside this am. Pt endorsing feeling well, denies CP, SOB, N/V/D.     ROS negative except for subjective and HPI  	  MEDICATIONS:  carvedilol 25 milliGRAM(s) Oral every 12 hours  diltiazem    milliGRAM(s) Oral daily  furosemide   Injectable 40 milliGRAM(s) IV Push two times a day  hydrALAZINE 25 milliGRAM(s) Oral three times a day  lisinopril 30 milliGRAM(s) Oral daily    ceFAZolin   IVPB 2000 milliGRAM(s) IV Intermittent every 8 hours        pantoprazole    Tablet 40 milliGRAM(s) Oral before breakfast  polyethylene glycol 3350 17 Gram(s) Oral two times a day      bacitracin   Ointment 1 Application(s) Topical two times a day  heparin  Infusion 1200 Unit(s)/Hr IV Continuous <Continuous>      	    [PHYSICAL EXAM:  TELEMETRY:  T(C): 36.3 (04-05-23 @ 09:08), Max: 36.4 (04-04-23 @ 22:27)  HR: 97 (04-05-23 @ 09:03) (88 - 110)  BP: 120/90 (04-05-23 @ 09:03) (90/50 - 136/104)  RR: 18 (04-05-23 @ 09:03) (18 - 18)  SpO2: 99% (04-05-23 @ 09:03) (96% - 99%)  Wt(kg): --  I&O's Summary    04 Apr 2023 07:01  -  05 Apr 2023 07:00  --------------------------------------------------------  IN: 120 mL / OUT: 4350 mL / NET: -4230 mL    05 Apr 2023 07:01  -  05 Apr 2023 12:23  --------------------------------------------------------  IN: 180 mL / OUT: 200 mL / NET: -20 mL        Farias:  Central/PICC/Mid Line:                                         Appearance: Normal	  HEENT:   Normal oral mucosa, PERRL, EOMI	  Neck: Supple, - JVD  Cardiovascular: Normal S1 S2, No JVD, + Murmur   Respiratory: Decreased Breath Sounds B/L, No Rales, Rhonchi, Wheezing	  Gastrointestinal:  Soft, Non-tender, + BS, Abdominal binder in place  Skin: No rashes, No ecchymoses, No cyanosis  Extremities: Normal range of motion, No clubbing, cyanosis. +2 edema B/L w/ erythema and dressings C/D/I  Vascular: Peripheral pulses palpable 2+ bilaterally  Neurologic: Non-focal  Psychiatry: A & O x 3, Mood & affect appropriate      	    ECG:  	  RADIOLOGY:   DIAGNOSTIC TESTING:  [ ] Echocardiogram:  [ ]  Catheterization:  [ ] Stress Test:    [ ] VERONIQUE  OTHER: 	    LABS:	 	  CARDIAC MARKERS:                                  11.0   7.12  )-----------( 251      ( 05 Apr 2023 11:02 )             36.4     04-05    136  |  100  |  23  ----------------------------<  135<H>  4.2   |  29  |  1.27    Ca    8.8      05 Apr 2023 11:02  Mg     1.9     04-05      proBNP:   Lipid Profile:   HgA1c:   TSH:   PT/INR - ( 04 Apr 2023 19:15 )   PT: 15.0 sec;   INR: 1.26          PTT - ( 05 Apr 2023 05:01 )  PTT:39.5 sec    ASSESSMENT/PLAN: 	        DVT ppx:  Dispo:

## 2023-04-05 NOTE — PROGRESS NOTE ADULT - ASSESSMENT
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 Patient now transferred to [facility] for cardiac telemetry for continued management of HFpEF exacerbation and structural heart evaluation for MR. TTE remarkable for severe, eccentric, anteriorly directed MR with flail leaflet,  VERONIQUE negative for endocarditis. 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 Patient now transferred to [facility] for cardiac telemetry for continued management of HFpEF exacerbation and structural heart evaluation for MR. TTE with severe, eccentric, anteriorly directed MR with flail,  VERONIQUE negative for endocarditis. 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 Patient now transferred to [facility] for cardiac telemetry for continued management of HFpEF exacerbation and structural heart evaluation for MR. TTE with severe, eccentric, anteriorly directed MR with flail,  VERONIQUE negative for endocarditis. Plan for MVR w/ Dr Gonsalves on Friday 4/7/23. Pre-op cath with Dr Sousa 4/6/23.         Risks & benefits of procedure and alternative therapy have been explained to the patient including but not limited to: allergic reaction, bleeding w/possible need for blood transfusion, infection, renal and vascular compromise, limb damage, arrhythmia, stroke, vessel dissection/perforation, Myocardial infarction, emergent CABG. Informed consent obtained and in chart.       Suitable for moderate sedation.

## 2023-04-05 NOTE — PROGRESS NOTE ADULT - SUBJECTIVE AND OBJECTIVE BOX
INTERVAL HPI/OVERNIGHT EVENTS:    ID asked to comment if patient ok to proceed with mitral valve surgery either this Friday 4/7 or Monday 4/10.      Patient feels well.  Remains on Cefazolin.  Feels like his legs have improved and wounds are scabbing over.     CONSTITUTIONAL:  Negative fever or chills, feels well, good appetite  EYES:  Negative  blurry vision or double vision  CARDIOVASCULAR:  Negative for chest pain or palpitations  RESPIRATORY:  Negative for cough, wheezing, or SOB   GASTROINTESTINAL:  Negative for nausea, vomiting, diarrhea, constipation, or abdominal pain  GENITOURINARY:  Negative frequency, urgency or dysuria  NEUROLOGIC:  No headache, confusion, dizziness, lightheadedness      ANTIBIOTICS/RELEVANT:    MEDICATIONS  (STANDING):  bacitracin   Ointment 1 Application(s) Topical two times a day  carvedilol 25 milliGRAM(s) Oral every 12 hours  ceFAZolin   IVPB 2000 milliGRAM(s) IV Intermittent every 8 hours  diltiazem    milliGRAM(s) Oral daily  furosemide   Injectable 40 milliGRAM(s) IV Push two times a day  heparin  Infusion. 1400 Unit(s)/Hr (14 mL/Hr) IV Continuous <Continuous>  hydrALAZINE 25 milliGRAM(s) Oral three times a day  lisinopril 30 milliGRAM(s) Oral daily  pantoprazole    Tablet 40 milliGRAM(s) Oral before breakfast  polyethylene glycol 3350 17 Gram(s) Oral two times a day    MEDICATIONS  (PRN):  heparin   Injectable 9000 Unit(s) IV Push every 6 hours PRN For aPTT less than 40  heparin   Injectable 4000 Unit(s) IV Push every 6 hours PRN For aPTT between 40 - 57        Vital Signs Last 24 Hrs  T(C): 36.4 (05 Apr 2023 13:16), Max: 36.4 (04 Apr 2023 22:27)  T(F): 97.6 (05 Apr 2023 13:16), Max: 97.6 (05 Apr 2023 13:16)  HR: 77 (05 Apr 2023 13:03) (77 - 110)  BP: 112/80 (05 Apr 2023 13:03) (90/50 - 136/104)  BP(mean): --  RR: 18 (05 Apr 2023 13:03) (18 - 18)  SpO2: 95% (05 Apr 2023 13:03) (95% - 99%)    Parameters below as of 05 Apr 2023 09:03  Patient On (Oxygen Delivery Method): room air        PHYSICAL EXAM:  Constitutional:  non-toxic, no distress  Eyes:DEEDEE, EOMI  Ear/Nose/Throat: no oral lesion, no sinus tenderness on percussion	  Neck:  supple  Respiratory: CTA stacia  Cardiovascular: S1S2 RRR, no murmurs  Gastrointestinal:soft, (+) BS, no HSM  Extremities:  bilateral lower extremity edema, no evidence of purulent drainage, no cellulitis, multiple wounds that have scabs on them, no warmth, non-tender   Vascular: DP Pulse:	right normal; left normal      LABS:                        11.0   7.12  )-----------( 251      ( 05 Apr 2023 11:02 )             36.4     04-05    136  |  100  |  23  ----------------------------<  135<H>  4.2   |  29  |  1.27    Ca    8.8      05 Apr 2023 11:02  Mg     1.9     04-05      PT/INR - ( 04 Apr 2023 19:15 )   PT: 15.0 sec;   INR: 1.26          PTT - ( 05 Apr 2023 13:58 )  PTT:44.8 sec      MICROBIOLOGY:    Culture - Blood (04.03.23 @ 17:53)    Specimen Source: .Blood Blood-Peripheral   Culture Results:   No growth at 1 day.        RADIOLOGY & ADDITIONAL STUDIES:

## 2023-04-05 NOTE — PROGRESS NOTE ADULT - PROBLEM SELECTOR PLAN 5
- Cr peaked at 2.0 during admission@ [facility].  - currently stable at 1.2, will continue to monitor closely.  - avoid nephrotoxic agents.

## 2023-04-05 NOTE — PROGRESS NOTE ADULT - SUBJECTIVE AND OBJECTIVE BOX
Patient discussed on morning rounds with Dr. Sousa     REason for Consult: severe MR     SUBJECTIVE ASSESSMENT:  47y Male. NAEO. Patient underwent VERONIQUE, explained results. Patient has no complaints or questiosn at this time.    Vital Signs Last 24 Hrs  T(C): 36.6 (05 Apr 2023 18:03), Max: 36.6 (05 Apr 2023 18:03)  T(F): 97.8 (05 Apr 2023 18:03), Max: 97.8 (05 Apr 2023 18:03)  HR: 83 (05 Apr 2023 17:39) (77 - 110)  BP: 129/93 (05 Apr 2023 17:39) (90/50 - 129/93)  BP(mean): --  RR: 18 (05 Apr 2023 17:39) (18 - 18)  SpO2: 97% (05 Apr 2023 17:39) (95% - 99%)    Parameters below as of 05 Apr 2023 17:39  Patient On (Oxygen Delivery Method): room air      I&O's Detail    04 Apr 2023 07:01  -  05 Apr 2023 07:00  --------------------------------------------------------  IN:    Oral Fluid: 120 mL  Total IN: 120 mL    OUT:    Voided (mL): 4350 mL  Total OUT: 4350 mL    Total NET: -4230 mL      05 Apr 2023 07:01  -  05 Apr 2023 21:27  --------------------------------------------------------  IN:    Oral Fluid: 180 mL  Total IN: 180 mL    OUT:    Voided (mL): 200 mL  Total OUT: 200 mL    Total NET: -20 mL       PHYSICAL EXAM:  GEN: NAD, looks comfortable  Psych: Mood appropriate  Neuro: A&Ox3.  No focal deficits.  Moving all extremities.   HEENT: No obvious abnormalities  CV: S1S2, regular, +murmurs appreciated.  No carotid bruits.  No JVD  Lungs: Clear B/L.  No wheezing, rales or rhonchi  ABD: Soft, non-tender, non-distended.  +Bowel sounds  EXT: Warm and well perfused.  1+ b/l LE peripheral edema noted  Musculoskeletal: Moving all extremities with normal ROM, no joint swelling  PV: Pedal pulses palpable  LABS:                        11.2   8.65  )-----------( 264      ( 05 Apr 2023 21:11 )             35.9     COUMADIN: No.     PT/INR - ( 04 Apr 2023 19:15 )   PT: 15.0 sec;   INR: 1.26          PTT - ( 05 Apr 2023 13:58 )  PTT:44.8 sec    04-05    136  |  100  |  23  ----------------------------<  135<H>  4.2   |  29  |  1.27    Ca    8.8      05 Apr 2023 11:02  Mg     1.9     04-05    MEDICATIONS  (STANDING):  bacitracin   Ointment 1 Application(s) Topical two times a day  carvedilol 25 milliGRAM(s) Oral every 12 hours  ceFAZolin   IVPB 2000 milliGRAM(s) IV Intermittent every 8 hours  diltiazem    milliGRAM(s) Oral daily  furosemide   Injectable 40 milliGRAM(s) IV Push two times a day  heparin  Infusion. 1400 Unit(s)/Hr (14 mL/Hr) IV Continuous <Continuous>  hydrALAZINE 25 milliGRAM(s) Oral three times a day  lisinopril 30 milliGRAM(s) Oral daily  pantoprazole    Tablet 40 milliGRAM(s) Oral before breakfast  polyethylene glycol 3350 17 Gram(s) Oral two times a day    MEDICATIONS  (PRN):  heparin   Injectable 9000 Unit(s) IV Push every 6 hours PRN For aPTT less than 40  heparin   Injectable 4000 Unit(s) IV Push every 6 hours PRN For aPTT between 40 - 57    RADIOLOGY & ADDITIONAL TESTS:  < from: VERONIQUE Echo Doppler w/ Cont (04.04.23 @ 13:32) >  CONCLUSIONS:     1. Normal left ventricular systolic function.   2. Normal right ventricular size and systolic function.   3. Dilated left atrium.   4. No LA/RA/MARILYN/RAA thrombus seen.   5. Mitral valve prolapse of the posterior leaflet at A2-P2 and A3-P3.   There is flail of P3 scallop. Severe mitral regurgitation seen at a blood   pressure of 126/86 mmHg.   6. No significant valvular disease.   7. Pulmonary hypertension present, pulmonary artery systolic pressure is   52 mmHg.  8. No pericardial effusion.

## 2023-04-05 NOTE — PROGRESS NOTE ADULT - PROBLEM SELECTOR PLAN 2
- Mod-severe MR on outside TTE.   - Suspecting this could be in setting of LVOT obstruction.   - TTE from 4/3/23: Mild to moderate symmetric LVH. LVEF 60%. Posterior leaflet of the mitral valve appears flail. There is severe, eccentric, anteriorly- concern for endocarditis. Follow up blood cultures x2 and ESR/CRP.   directed mitral regurgitation. PHTN present, PAP 44 mmHg   - CTSx structural heart team consulted - Increased Carvedilol from 12.5mg to 25 mg for BP control. Follow up necessity of VERONIQUE for above findings.       ## HOCM  - severe LVOT obstruction w/ resting gradient 90mmHg by TTE in 2021.  - TTE 4/3/23: Mild to moderate symmetric left ventricular hypertrophy   - CONT: Cardizem and Carvedilol.  - Follow up TTE from 4/3/23: See above; LV diastolic function and filling pressure is made challenging by presence of severe MR. - TTE 4/3/23: Mild to moderate symmetric LVH. LVEF 60%. Posterior leaflet of the mitral valve appears flail. There is severe, eccentric, anteriorly- concern for endocarditis. Follow up blood cultures x2 and ESR/CRP.   directed mitral regurgitation. PHTN present, PAP 44 mmHg   - VERONIQUE 4/4/23: Normal LVSF, normal RVSF, dilated LA, No LA/RA/MARILYN/RAA thrombus seen. Mitral valve prolapse of the posterior leaflet at A2-P2 and A3-P3. flail of P3 scallop. Severe MR,  No significant valvular disease. pHTN with PASP 52 mmHg.       ## HOCM  - severe LVOT obstruction w/ resting gradient 90mmHg by TTE in 2021.  - TTE 4/3/23: Mild to moderate symmetric left ventricular hypertrophy; LV diastolic function and filling pressure is made challenging by presence of severe MR.  - CONT: Cardizem and Carvedilol.

## 2023-04-05 NOTE — PHYSICAL THERAPY INITIAL EVALUATION ADULT - ADDITIONAL COMMENTS
Pt resides in 1 flight walk up states no device use or DME. Takes stairs 2 at a time prior to "getting sick"  Pt for cath 4/6 and possible CT sx 4/7. Pt states currently able to ambulate <2 blocks for last month.

## 2023-04-05 NOTE — CONSULT NOTE ADULT - NS ATTEND AMEND GEN_ALL_CORE FT
severe MR, acute CHF, afib, needs MVrepair , ablation , MARILYN occlusion, needs cath , for surgery end of the week. risks and benefits explained to pt
Agree with above with the following additional comments.     Pt seen and examined at beside. 3-4 month hx of worsening LE edema and shortness of breath. Pt reports that he had difficulty putting on his socks, scratched his legs and caused skin tear and then had cellulitis of both legs. ID consulted.   Denies chest pain, palpitations, weakness, diaphoresis or loc.  TTE to evaluate MR. Outside bedside echo suggestive of at least moderate MR.   -IV diuresis  -Blood cultures, Abx and ID consult for LE cellulities  -Vascular consult regarding spontaneous rectus abdominus bleeding while on Eliquis

## 2023-04-05 NOTE — PROGRESS NOTE ADULT - ASSESSMENT
IMPRESSION:  Cellulitis on IV antibiotics, clinically improved without any signs of active infection on exam.  No evidence of systemic infection (negative blood cultures, normal WBC, afebrile)    Recommend:  1.  Continue Cefazolin 2 grams IV q8hrs until 4/9/23  2.   Ok to proceed with MV surgery provided he remains afebrile and there is no evidence of active infection    ID team 1 will sign off.  Reconsult as needed

## 2023-04-05 NOTE — PROGRESS NOTE ADULT - PROBLEM SELECTOR PLAN 8
- Afebrile, no leukocytosis.   - ID consulted, rec increasing Cefazolin dosing.   - CONT: Cefazolin 2g IV q8hrs x7 days (ends 4/9/23)  - Betadine and gauze, patient picking at skin. Consider wound care consult   - If discharge before end of course, can change to Cefadroxil 500mg PO q12hrs to complete course.     ##Rule out PAD  -- pulses palpable with duplex, B/L arterial duplex:  limited evaluation due to edema, within that limitation there is no hemodynamically significant stenosis identified.        N: DASH with 1 liter fluid restriction  E: Maintain K>4.0 and Mg >2.0  VTE PPx: on hold 2/2 rectal sheath hematoma  Code: Full - Afebrile, no leukocytosis.   - ID consulted and okay to proceed w/ OR  - CONT: Cefazolin 2g IV q8hrs x7 days (last day 4/9/23)  - Betadine and gauze, patient picking at skin.  - If discharge before end of course, can change to Cefadroxil 500mg PO q12hrs to complete course.     ##Rule out PAD  - pulses dopplerable, B/L arterial duplex:  limited evaluation due to edema, within that limitation there is no hemodynamically significant stenosis identified.        N: DASH with 1 liter fluid restriction  E: Maintain K>4.0 and Mg >2.0  VTE PPx: hep gtt   Code: Full

## 2023-04-05 NOTE — PROGRESS NOTE ADULT - ASSESSMENT
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 Patient now transferred to [facility] for cardiac telemetry for continued management of HFpEF exacerbation and MR. Initially structural heart consulted for evaluation of mod/severe MR. A VERONIQUE was performed revealing MV prolapse of posterior leaflet at A2-P2 and A3-P3, flail P3 scallop, and severe MR. CTSurgery was consulted for surgical intervention and workup.    Plan:  Problem 1: severe MR  - plan for OR Friday for MV repair or replacement  - cardiac catheterization tomorrow  - NPO after midnight  - active type and screen  - active covid  - PST for OR Friday, including carotid u/s, pfts, preop labs  - obtain consent  - CTSurgery will continue to follow    Problem 2: acute on chronic HF, EF 60%  - c/w diuresis  - c/w daily weights  - care per primary team    Problem 3: b/l LE cellulitis  - c/w IV Abx per ID recs  - c/w wound care recs  - mgmt per primary team    Problem 4: afib   - holding eliquis 2/2 rectal sheath hematoma and prior to or  cleared for full AC per gen surg  - c/w dilt, metop  - care per primary team    Problem 5: rectal sheath hematoma  - continue abdominal binder  - gen surg consulted - ok for hep gtt    I have reviewed clinical labs tests and reports, radiology tests and reports, as well as old patient medical records, and discussed with the refering physician.

## 2023-04-05 NOTE — CONSULT NOTE ADULT - SUBJECTIVE AND OBJECTIVE BOX
Surgeon: Dr. Gonsalves    Requesting Physician: Dr. 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 Patient now transferred to [facility] for cardiac telemetry for continued management of HFpEF exacerbation and MR. Initially structural heart consulted for evaluation of mod/severe MR. A VERONIQUE was performed revealing MV prolapse of posterior leaflet at A2-P2 and A3-P3, flail P3 scallop, and severe MR. CTSurgery was consulted for surgical intervention and workup.      PAST MEDICAL & SURGICAL HISTORY:  HTN (hypertension)      SAH (subarachnoid hemorrhage)      Atrial fibrillation      HOCM (hypertrophic obstructive cardiomyopathy)      Mitral regurgitation      S/P coil embolization of cerebral aneurysm    MEDICATIONS  (STANDING):  bacitracin   Ointment 1 Application(s) Topical two times a day  carvedilol 25 milliGRAM(s) Oral every 12 hours  ceFAZolin   IVPB 2000 milliGRAM(s) IV Intermittent every 8 hours  diltiazem    milliGRAM(s) Oral daily  furosemide   Injectable 40 milliGRAM(s) IV Push two times a day  heparin  Infusion. 1400 Unit(s)/Hr (14 mL/Hr) IV Continuous <Continuous>  hydrALAZINE 25 milliGRAM(s) Oral three times a day  lisinopril 30 milliGRAM(s) Oral daily  pantoprazole    Tablet 40 milliGRAM(s) Oral before breakfast  polyethylene glycol 3350 17 Gram(s) Oral two times a day    MEDICATIONS  (PRN):  heparin   Injectable 9000 Unit(s) IV Push every 6 hours PRN For aPTT less than 40  heparin   Injectable 4000 Unit(s) IV Push every 6 hours PRN For aPTT between 40 - 57      Allergies    No Known Allergies    Intolerances    SOCIAL HISTORY:  Smoker: NO       ETOH use:    NO                Ilicit Drug use:   NO  Live with: sister, 1 flight of stairs up    FAMILY HISTORY:  No pertinent family history in first degree relatives      Review of Systems:  CONSTITUTIONAL: Denies fevers / chills, sweats, fatigue, weight loss, weight gain                                       NEURO:  Denies parathesias, seizures, syncope, confusion                                                                                  EYES:  Denies blurry vision, discharge, pain, loss of vision                                                                                    ENMT:  Denies difficulty hearing, vertigo, dysphagia, epistaxis, recent dental work                                       CV:  +erwin, orthopnea; denies chest pain, palpitations  RESPIRATORY:  +SOB Denies wheezing, cough / sputum, hemoptysis                                                               GI:  Denies nausea, vomiting, diarrhea, constipation, melena                                                                          : Denies hematuria, dysuria, urgency, incontinence                                                                                          MUSKULOSKELETAL:  Denies arthritis, joint swelling, muscle weakness                                                             SKIN/BREAST: +b/l LE rash, swelling; Denies rash, itching, hair loss, masses                                                                                              PSYCH:  Denies depression, anxiety, suicidal ideation                                                                                                HEME/LYMPH:  Denies bruises easily, enlarged lymph nodes, tender lymph nodes                                          ENDOCRINE:  Denies cold intolerance, heat intolerance, polydipsia                                                                      Vital Signs Last 24 Hrs  T(C): 36.6 (05 Apr 2023 18:03), Max: 36.6 (05 Apr 2023 18:03)  T(F): 97.8 (05 Apr 2023 18:03), Max: 97.8 (05 Apr 2023 18:03)  HR: 83 (05 Apr 2023 17:39) (77 - 110)  BP: 129/93 (05 Apr 2023 17:39) (90/50 - 129/93)  BP(mean): --  RR: 18 (05 Apr 2023 17:39) (18 - 18)  SpO2: 97% (05 Apr 2023 17:39) (95% - 99%)    Parameters below as of 05 Apr 2023 17:39  Patient On (Oxygen Delivery Method): room air        Physical Exam:  GEN: NAD, looks comfortable  Psych: Mood appropriate  Neuro: A&Ox3.  No focal deficits.  Moving all extremities.   HEENT: No obvious abnormalities  CV: S1S2, regular, +murmurs appreciated.  No carotid bruits.  No JVD  Lungs: Clear B/L.  No wheezing, rales or rhonchi  ABD: Soft, non-tender, non-distended.  +Bowel sounds  EXT: Warm and well perfused.  1+ b/l LE peripheral edema noted  Musculoskeletal: Moving all extremities with normal ROM, no joint swelling  PV: Pedal pulses palpable                                                            LABS:                        11.0   7.12  )-----------( 251      ( 05 Apr 2023 11:02 )             36.4     04-05    136  |  100  |  23  ----------------------------<  135<H>  4.2   |  29  |  1.27    Ca    8.8      05 Apr 2023 11:02  Mg     1.9     04-05      PT/INR - ( 04 Apr 2023 19:15 )   PT: 15.0 sec;   INR: 1.26          PTT - ( 05 Apr 2023 13:58 )  PTT:44.8 sec      RADIOLOGY & ADDITIONAL STUDIES:  CAROTID U/S: pending    CXR:  < from: Xray Chest 1 View- PORTABLE-Routine (Xray Chest 1 View- PORTABLE-Routine .) (04.03.23 @ 18:17) >  FINDINGS:    Single frontal view of the chest demonstrates improved right-sided   effusion. Resolved CHF. The cardiomediastinal silhouette is enlarged. No   acute osseous abnormalities. Overlying EKG leads and wires are noted    IMPRESSION: Improved right-sided effusion. Resolved CHF. Cardiomegaly.    EKG: in chart    TTE / VERONIQUE:   < from: VERONIQUE Echo Doppler w/ Cont (04.04.23 @ 13:32) >  CONCLUSIONS:     1. Normal left ventricular systolic function.   2. Normal right ventricular size and systolic function.   3. Dilated left atrium.   4. No LA/RA/MARILYN/RAA thrombus seen.   5. Mitral valve prolapse of the posterior leaflet at A2-P2 and A3-P3.   There is flail of P3 scallop. Severe mitral regurgitation seen at a blood   pressure of 126/86 mmHg.   6. No significant valvular disease.   7. Pulmonary hypertension present, pulmonary artery systolic pressure is   52 mmHg.  8. No pericardial effusion.      Cardiac Cath: tomorrow

## 2023-04-05 NOTE — PROGRESS NOTE ADULT - NS ATTEND AMEND GEN_ALL_CORE FT
Agree with above with the following additional comments.    Pt seen and examined by CTS. Pt is a candidate for surgical repair of the MV. Will get Afib ablation, MARILYN occlusion same time.  --Cardiac cath in AM to determine coronary anatomy and state of coronary disease.

## 2023-04-05 NOTE — PHYSICAL THERAPY INITIAL EVALUATION ADULT - PERTINENT HX OF CURRENT PROBLEM, REHAB EVAL
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 7/29/21 initially admitted to [facility] 3/24/23 with HFpEF exacerbation, Afib w/ RVR and LE cellulitis c/b TATIANA, transaminitis and rectal sheath hematoma. Patient now transferred to [facility] for cardiac telemetry for continued management of HFpEF exacerbation and structural heart evaluation for MR.

## 2023-04-05 NOTE — PROGRESS NOTE ADULT - NS ATTEND AMEND GEN_ALL_CORE FT
See PA note written above, for details. I reviewed the PA documentation.  I have personally seen and examined this patient today. I reviewed vitals, labs, medications, cardiac studies and additional imaging.  I agree with the PA's findings and plans as written above with the following additions/amendments:  VERONIQUE 4.4.23: flail MV with 4+MR, no e/o IE    Plan:  Continue IV hep gtt tobin-op   Continue Lasix 40mg IV BID to achieve euvolemia  Hydralazine 25 TID, Diltiazem 180CD daily, Coreg 25 BID, Lisinopril 30mg po daily for BP control  CTSx following for flail MV with 4+MR, plan for OR on Friday 4.7.23  ID recs for cellulitis mgmt: 2gm IV cefazolin through 4/9.  Recalled to evaluate if patient may proceed to OR prior to completion of IV abx or if necessary to complete course of IV abx prior to OR date  General surgery consulted for rectal sheath hematoma evaluation, approved to resume A/C use  Vascular surgery c/s for PVD assessment, recommend bacitracin and gauze covering for wounds (patient picking and rubbing at open wounds), LE arterial duplex negative  Advanced CHF following for pre op optimization per d/w CTSx  PT eval pending   R-M.Christofer Villarreal M.D.  Cardiology Attending  45minutes spent on total encounter; more than 50% of the visit was spent counseling and/or coordinating care by the attending physician, with plan of care discussed with the patient, CTsx Dr Merry Gonsalves and cardiac team.

## 2023-04-05 NOTE — CONSULT NOTE ADULT - ASSESSMENT
46yo male with HTN, HLD, HFpEF, HOCM, SAH s/p coil MR, Afib on Eliquis presents as transfer from OSH for further management of CHF exacerbation. General Surgery consulted for co-management of RSH which developed during OSH course. Pain at site significantly improved from onset on 3/25 with abdominal compression and holding of AC. Afebrile, HDS, exam with palpable firm abdominal wall mass LUQ, no rebound or guarding remainder of abdominal exam benign. Labs demonstrate Hgb 11.6 (11.9, 11.7, 12). Cross sectional images not available for review however clinically does not appear to have active bleed given HD stability, labs and exam.     - No acute surgical intervention  - Continue abdominal binder  - If stable hemoglobin, may restart AC per primary team tomorrow with close monitoring  - Surgery Team 4C will continue to follow. Please page Team 4 with questions/clinical changes. [phone]  
47M w/ HFpEF, HOCM, mod-severe MR, HTN, dyslipidemia, chronic Afib, SAH who presented with SOB and LE edema in the setting of primary MR 2/2 flail posterior leaflet. He remains volume overloaded, but is warm and normal perfusion markers.     Review of studies:  TTE: normal biventricular function. Mild-mod LVH, septal thickness 1.3 cm and pw thickness of 1.17 cm. No obstructive/intracavitary gradient. LVOT VTI 12 cm. Severely dilated LA. Severe primary MR 2/2 flail posterior leaflet. PASP 44. Estimated RA ~15 mmHg    # Hypertropic cardiomyopathy:  - most recent echo without obstructive component  - remains volume overloaded, would resume IV diuretics, 40 mg IV lasix BID  - coreg and diltiazem per primary team    #Severe primary MR 2/2 flail posterior leaflet  - being evaluated by structural for intervention  - IV diuresis as above  - in addition to carvedilol and lisinopril, please start hydralazine 25 mg TID for additional afterload reduction    #LE cellulitis:   - antibiotics per ID    #Dyslipidemia:   - lipitor 80    #Spontaneous rectus sheath hematoma:  - general surgery following, abdominal binder in place     
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 on 7/29/21 initially admitted to [facility] 3/24/23 with HFpEF exacerbation, Afib w/ RVR and LE cellulitis c/b TATIANA, transaminitis and rectal sheath hematoma. Patient now transferred to [facility] for cardiac telemetry for continued management of HFpEF exacerbation and  MR. Structural heart consulted for evaluation of mod/severe MR.     Plan:  Problem 1: mod/severe MR  -Discussed with Dr. Sousa  -given HOCM, recommend afterload reduction to better evaluate valvular function with optimal medical management  -Please get CXR to evaluate fluid status  -Please get TTE tomorrow  -Please get wound care to evaluate LE cellulitis, possible ID consult  -Will continue to evaluate for possible intervention.  -Structural Heart team will continue to follow.    Problem 2: acute on chronic HF,EF 60%  -continue diuretics  -pending TTE  -continue daily weights  -Care per primary team    Problem 3: Afib  -holding eliquis 2/2 rectal sheath hematoma  -continue metoprolol and cardizem  -Care per primary team    Problem 4: rectal sheath hematoma  -continue abdominal binder  -recommend vascular surgery or gen surgery consult for evaluation  -Care per primary team    I have reviewed clinical labs tests and reports, radiology tests and reports, as well as old patient medical records, and discussed with the referring physician.    
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 on 7/29/21 initially admitted to [facility] 3/24/23 with HFpEF exacerbation, Afib w/ RVR and LE cellulitis c/b TATIANA, transaminitis and rectal sheath hematoma. Patient now transferred to [facility] for cardiac telemetry for continued management of HFpEF exacerbation and MR. Initially structural heart consulted for evaluation of mod/severe MR. A VERONIQUE was performed revealing MV prolapse of posterior leaflet at A2-P2 and A3-P3, flail P3 scallop, and severe MR. CTSurgery was consulted for surgical intervention and workup.    Plan:  Problem 1: severe MR  - plan for OR Friday for MV repair or replacement  - cardiac catheterization tomorrow  - NPO after midnight  - active type and screen  - active covid  - PST for OR Friday, including carotid u/s, pfts, preop labs  - obtain consent  - CTSurgery will continue to follow    Problem 2: acute on chronic HF, EF 60%  - c/w diuresis  - c/w daily weights  - care per primary team    Problem 3: b/l LE cellulitis  - c/w IV Abx per ID recs  - c/w wound care recs  - mgmt per primary team    Problem 4: afib   - holding eliquis 2/2 rectal sheath hematoma and prior to or  cleared for full AC per gen surg  - c/w dilt, metop  - care per primary team    Problem 5: rectal sheath hematoma  - continue abdominal binder  - gen surg consulted - ok for hep gtt    I have reviewed clinical labs tests and reports, radiology tests and reports, as well as old patient medical records, and discussed with the refering physician.    
IMPRESSION:  Cellulitis of the bilateral lower extremities.  He has completed about a week of antibiotics.  He endorses clinical improvement.   His legs have some erythema and edema which can be attributed to CHF.  They do not appear infected and there are no clinical signs of systemic infection    Recommend:  1. Change Cefazolin dose to 2 grams IV q8hrs  2. Recommend 7 more days of antibiotics (ends 4/9/23).   3. If he is discharged prior to the end of this course, he can be discharged with cefadroxil 500 mg PO q12 to complete the course    ID team 1 will sign off.  Reconsult as needed

## 2023-04-06 ENCOUNTER — TRANSCRIPTION ENCOUNTER (OUTPATIENT)
Age: 48
End: 2023-04-06

## 2023-04-06 DIAGNOSIS — I34.0 NONRHEUMATIC MITRAL (VALVE) INSUFFICIENCY: ICD-10-CM

## 2023-04-06 DIAGNOSIS — I25.10 ATHEROSCLEROTIC HEART DISEASE OF NATIVE CORONARY ARTERY W/OUT ANGINA PECTORIS: ICD-10-CM

## 2023-04-06 PROBLEM — I48.91 UNSPECIFIED ATRIAL FIBRILLATION: Chronic | Status: ACTIVE | Noted: 2023-04-01

## 2023-04-06 PROBLEM — I42.1 OBSTRUCTIVE HYPERTROPHIC CARDIOMYOPATHY: Chronic | Status: ACTIVE | Noted: 2023-04-01

## 2023-04-06 PROBLEM — I60.9 NONTRAUMATIC SUBARACHNOID HEMORRHAGE, UNSPECIFIED: Chronic | Status: ACTIVE | Noted: 2023-04-01

## 2023-04-06 LAB
ANION GAP SERPL CALC-SCNC: 8 MMOL/L — SIGNIFICANT CHANGE UP (ref 5–17)
APPEARANCE UR: CLEAR — SIGNIFICANT CHANGE UP
APTT BLD: 34 SEC — SIGNIFICANT CHANGE UP (ref 27.5–35.5)
APTT BLD: 88.2 SEC — HIGH (ref 27.5–35.5)
APTT BLD: 96.7 SEC — HIGH (ref 27.5–35.5)
BILIRUB UR-MCNC: NEGATIVE — SIGNIFICANT CHANGE UP
BLD GP AB SCN SERPL QL: NEGATIVE — SIGNIFICANT CHANGE UP
BUN SERPL-MCNC: 21 MG/DL — SIGNIFICANT CHANGE UP (ref 7–23)
CALCIUM SERPL-MCNC: 9 MG/DL — SIGNIFICANT CHANGE UP (ref 8.4–10.5)
CHLORIDE SERPL-SCNC: 99 MMOL/L — SIGNIFICANT CHANGE UP (ref 96–108)
CO2 SERPL-SCNC: 27 MMOL/L — SIGNIFICANT CHANGE UP (ref 22–31)
COLOR SPEC: YELLOW — SIGNIFICANT CHANGE UP
CREAT SERPL-MCNC: 1.15 MG/DL — SIGNIFICANT CHANGE UP (ref 0.5–1.3)
DIFF PNL FLD: NEGATIVE — SIGNIFICANT CHANGE UP
EGFR: 78 ML/MIN/1.73M2 — SIGNIFICANT CHANGE UP
GLUCOSE SERPL-MCNC: 102 MG/DL — HIGH (ref 70–99)
GLUCOSE UR QL: NEGATIVE — SIGNIFICANT CHANGE UP
HCT VFR BLD CALC: 37.7 % — LOW (ref 39–50)
HGB BLD-MCNC: 11.4 G/DL — LOW (ref 13–17)
INR BLD: 1.2 — HIGH (ref 0.88–1.16)
KETONES UR-MCNC: NEGATIVE — SIGNIFICANT CHANGE UP
LEUKOCYTE ESTERASE UR-ACNC: NEGATIVE — SIGNIFICANT CHANGE UP
MAGNESIUM SERPL-MCNC: 1.9 MG/DL — SIGNIFICANT CHANGE UP (ref 1.6–2.6)
MCHC RBC-ENTMCNC: 23.2 PG — LOW (ref 27–34)
MCHC RBC-ENTMCNC: 30.2 GM/DL — LOW (ref 32–36)
MCV RBC AUTO: 76.8 FL — LOW (ref 80–100)
NITRITE UR-MCNC: NEGATIVE — SIGNIFICANT CHANGE UP
NRBC # BLD: 0 /100 WBCS — SIGNIFICANT CHANGE UP (ref 0–0)
PH UR: 8 — SIGNIFICANT CHANGE UP (ref 5–8)
PLATELET # BLD AUTO: 264 K/UL — SIGNIFICANT CHANGE UP (ref 150–400)
POTASSIUM SERPL-MCNC: 4.6 MMOL/L — SIGNIFICANT CHANGE UP (ref 3.5–5.3)
POTASSIUM SERPL-SCNC: 4.6 MMOL/L — SIGNIFICANT CHANGE UP (ref 3.5–5.3)
PROT UR-MCNC: NEGATIVE MG/DL — SIGNIFICANT CHANGE UP
PROTHROM AB SERPL-ACNC: 14.3 SEC — HIGH (ref 10.5–13.4)
RBC # BLD: 4.91 M/UL — SIGNIFICANT CHANGE UP (ref 4.2–5.8)
RBC # FLD: 20.1 % — HIGH (ref 10.3–14.5)
RH IG SCN BLD-IMP: POSITIVE — SIGNIFICANT CHANGE UP
SARS-COV-2 RNA SPEC QL NAA+PROBE: SIGNIFICANT CHANGE UP
SODIUM SERPL-SCNC: 134 MMOL/L — LOW (ref 135–145)
SP GR SPEC: 1.01 — SIGNIFICANT CHANGE UP (ref 1–1.03)
UROBILINOGEN FLD QL: 0.2 E.U./DL — SIGNIFICANT CHANGE UP
WBC # BLD: 7.54 K/UL — SIGNIFICANT CHANGE UP (ref 3.8–10.5)
WBC # FLD AUTO: 7.54 K/UL — SIGNIFICANT CHANGE UP (ref 3.8–10.5)

## 2023-04-06 PROCEDURE — 71046 X-RAY EXAM CHEST 2 VIEWS: CPT | Mod: 26

## 2023-04-06 PROCEDURE — 99152 MOD SED SAME PHYS/QHP 5/>YRS: CPT

## 2023-04-06 PROCEDURE — 94010 BREATHING CAPACITY TEST: CPT | Mod: 26

## 2023-04-06 PROCEDURE — 99232 SBSQ HOSP IP/OBS MODERATE 35: CPT

## 2023-04-06 PROCEDURE — 99233 SBSQ HOSP IP/OBS HIGH 50: CPT

## 2023-04-06 PROCEDURE — 76700 US EXAM ABDOM COMPLETE: CPT | Mod: 26

## 2023-04-06 PROCEDURE — 93454 CORONARY ARTERY ANGIO S&I: CPT | Mod: 26

## 2023-04-06 PROCEDURE — 93880 EXTRACRANIAL BILAT STUDY: CPT | Mod: 26

## 2023-04-06 PROCEDURE — 93010 ELECTROCARDIOGRAM REPORT: CPT

## 2023-04-06 RX ORDER — ATORVASTATIN CALCIUM 80 MG/1
80 TABLET, FILM COATED ORAL AT BEDTIME
Refills: 0 | Status: DISCONTINUED | OUTPATIENT
Start: 2023-04-06 | End: 2023-04-07

## 2023-04-06 RX ORDER — CHLORHEXIDINE GLUCONATE 213 G/1000ML
1 SOLUTION TOPICAL ONCE
Refills: 0 | Status: COMPLETED | OUTPATIENT
Start: 2023-04-06 | End: 2023-04-07

## 2023-04-06 RX ORDER — CHLORHEXIDINE GLUCONATE 213 G/1000ML
15 SOLUTION TOPICAL ONCE
Refills: 0 | Status: COMPLETED | OUTPATIENT
Start: 2023-04-06 | End: 2023-04-07

## 2023-04-06 RX ORDER — HEPARIN SODIUM 5000 [USP'U]/ML
1400 INJECTION INTRAVENOUS; SUBCUTANEOUS
Qty: 25000 | Refills: 0 | Status: DISCONTINUED | OUTPATIENT
Start: 2023-04-06 | End: 2023-04-07

## 2023-04-06 RX ORDER — CHLORHEXIDINE GLUCONATE 213 G/1000ML
1 SOLUTION TOPICAL ONCE
Refills: 0 | Status: COMPLETED | OUTPATIENT
Start: 2023-04-07 | End: 2023-04-07

## 2023-04-06 RX ORDER — HEPARIN SODIUM 5000 [USP'U]/ML
8500 INJECTION INTRAVENOUS; SUBCUTANEOUS EVERY 6 HOURS
Refills: 0 | Status: DISCONTINUED | OUTPATIENT
Start: 2023-04-06 | End: 2023-04-07

## 2023-04-06 RX ORDER — CHLORHEXIDINE GLUCONATE 213 G/1000ML
1 SOLUTION TOPICAL ONCE
Refills: 0 | Status: COMPLETED | OUTPATIENT
Start: 2023-04-06 | End: 2023-04-06

## 2023-04-06 RX ORDER — HEPARIN SODIUM 5000 [USP'U]/ML
4000 INJECTION INTRAVENOUS; SUBCUTANEOUS EVERY 6 HOURS
Refills: 0 | Status: DISCONTINUED | OUTPATIENT
Start: 2023-04-06 | End: 2023-04-07

## 2023-04-06 RX ADMIN — ATORVASTATIN CALCIUM 80 MILLIGRAM(S): 80 TABLET, FILM COATED ORAL at 22:40

## 2023-04-06 RX ADMIN — Medication 40 MILLIGRAM(S): at 06:19

## 2023-04-06 RX ADMIN — HEPARIN SODIUM 1400 UNIT(S)/HR: 5000 INJECTION INTRAVENOUS; SUBCUTANEOUS at 05:54

## 2023-04-06 RX ADMIN — Medication 100 MILLIGRAM(S): at 06:32

## 2023-04-06 RX ADMIN — PANTOPRAZOLE SODIUM 40 MILLIGRAM(S): 20 TABLET, DELAYED RELEASE ORAL at 06:06

## 2023-04-06 RX ADMIN — Medication 40 MILLIGRAM(S): at 13:59

## 2023-04-06 RX ADMIN — Medication 25 MILLIGRAM(S): at 06:06

## 2023-04-06 RX ADMIN — Medication 1 APPLICATION(S): at 13:59

## 2023-04-06 RX ADMIN — HEPARIN SODIUM 1400 UNIT(S)/HR: 5000 INJECTION INTRAVENOUS; SUBCUTANEOUS at 22:07

## 2023-04-06 RX ADMIN — Medication 100 MILLIGRAM(S): at 13:59

## 2023-04-06 RX ADMIN — Medication 25 MILLIGRAM(S): at 13:59

## 2023-04-06 RX ADMIN — Medication 180 MILLIGRAM(S): at 06:32

## 2023-04-06 RX ADMIN — POLYETHYLENE GLYCOL 3350 17 GRAM(S): 17 POWDER, FOR SOLUTION ORAL at 06:05

## 2023-04-06 RX ADMIN — CHLORHEXIDINE GLUCONATE 1 APPLICATION(S): 213 SOLUTION TOPICAL at 22:48

## 2023-04-06 RX ADMIN — Medication 100 MILLIGRAM(S): at 22:40

## 2023-04-06 RX ADMIN — CARVEDILOL PHOSPHATE 25 MILLIGRAM(S): 80 CAPSULE, EXTENDED RELEASE ORAL at 06:06

## 2023-04-06 NOTE — PROGRESS NOTE ADULT - SUBJECTIVE AND OBJECTIVE BOX
Cardiology PA Adult Progress Note    SUBJECTIVE ASSESSMENT: Overnight no acute events; Patient laying in bed, very somnolent, however A/O x3, no neurologic deficits, states "I am not having a stroke". Currently denies CP, dizziness, palpitations, SOB, dyspnea, coughing, headaches, N/V/D/abdominal pain. Patient understands plan for the day.    	  MEDICATIONS:  carvedilol 25 milliGRAM(s) Oral every 12 hours  diltiazem    milliGRAM(s) Oral daily  furosemide   Injectable 40 milliGRAM(s) IV Push two times a day  hydrALAZINE 25 milliGRAM(s) Oral three times a day  lisinopril 30 milliGRAM(s) Oral daily    ceFAZolin   IVPB 2000 milliGRAM(s) IV Intermittent every 8 hours        pantoprazole    Tablet 40 milliGRAM(s) Oral before breakfast  polyethylene glycol 3350 17 Gram(s) Oral two times a day      bacitracin   Ointment 1 Application(s) Topical two times a day  chlorhexidine 0.12% Liquid 15 milliLiter(s) Swish and Spit once  chlorhexidine 4% Liquid 1 Application(s) Topical once  chlorhexidine 4% Liquid 1 Application(s) Topical once  heparin   Injectable 9000 Unit(s) IV Push every 6 hours PRN  heparin   Injectable 4000 Unit(s) IV Push every 6 hours PRN  heparin  Infusion. 1400 Unit(s)/Hr IV Continuous <Continuous>    	  VITAL SIGNS:  T(C): 36.2 (04-06-23 @ 13:06), Max: 36.7 (04-06-23 @ 04:53)  HR: 90 (04-06-23 @ 11:03) (78 - 92)  BP: 108/87 (04-06-23 @ 11:03) (103/68 - 138/103)  RR: 18 (04-06-23 @ 11:03) (18 - 20)  SpO2: 97% (04-06-23 @ 11:03) (94% - 97%)  Wt(kg): --    I&O's Summary    05 Apr 2023 07:01  -  06 Apr 2023 07:00  --------------------------------------------------------  IN: 868 mL / OUT: 2200 mL / NET: -1332 mL    06 Apr 2023 07:01  -  06 Apr 2023 14:04  --------------------------------------------------------  IN: 0 mL / OUT: 475 mL / NET: -475 mL        Weight (kg): 104.1 (04-06 @ 06:02)                                     PHYSICAL EXAM:  Appearance: Normal	  HEENT: Normal oral mucosa, PERRL, EOMI	  Neck: Supple, + JVD; no Carotid Bruit   Cardiovascular: Normal S1 S2, No murmurs  Respiratory: poor respiratory drive, but no crackles auscultated	  Gastrointestinal:  Soft, Non-tender, + BS	  Skin: No rashes, No ecchymoses, No cyanosis  Extremities: Normal range of motion, No clubbing, cyanosis, 2+ pitting edema in b/l lower extremities  Vascular: Peripheral pulses palpable 1+ bilateral lower extremities  Neurologic: Non-focal  Psychiatry: A & O x 3, Mood & affect appropriate    LABS:	 	                                  11.4   7.54  )-----------( 264      ( 06 Apr 2023 05:30 )             37.7     04-06    134<L>  |  99  |  21  ----------------------------<  102<H>  4.6   |  27  |  1.15    Ca    9.0      06 Apr 2023 05:30  Mg     1.9     04-06    PT/INR - ( 06 Apr 2023 05:30 )   PT: 14.3 sec;   INR: 1.20          PTT - ( 06 Apr 2023 05:30 )  PTT:96.7 sec

## 2023-04-06 NOTE — PROGRESS NOTE ADULT - PROBLEM SELECTOR PLAN 8
- Afebrile, no leukocytosis.   - ID consulted and okay to proceed w/ OR  - CONT: Cefazolin 2g IV q8hrs x7 days (last day 4/9/23)  - Betadine and gauze, patient picking at skin.  - If discharge before end of course, can change to Cefadroxil 500mg PO q12hrs to complete course.     ##Rule out PAD  - pulses dopplerable, B/L arterial duplex:  limited evaluation due to edema, within that limitation there is no hemodynamically significant stenosis identified.      N: NPO after MN for CTS; otherwise DASH with 1 liter fluid restriction  E: Maintain K>4.0 and Mg >2.0  VTE PPx: hep gtt   Code: Full

## 2023-04-06 NOTE — PROGRESS NOTE ADULT - PROBLEM SELECTOR PLAN 1
- On exam, patient on room air but still with persistent B/L LE edema and distended abdomen   - Was receiving Lasix 40mg IV QD at outside hospital.   - TTE @ [facility] 3/26/23 (actual report not available; per clinical documentation): LVEF 60%, LAE w/ severe MR, MV degeneration of posterior leaflet w/ significant prolapse and mod-severe  anterior directed MR.   - On prior TTE 7/2021, pt w/ HOCM w/ severe LVOT obstruction w/ resting gradient of 90mmHg.   - No LVOT obstruction on most recent echos   - Advanced HF following  - Diuresis with lasix 40 IV BID, Hydralazine 25 mg TID, coreg 25 mg BID  - NPO after MN for pre-op cath with Dr Sousa on 4/6/23. No load or fluids. On exam, patient on room air but still with persistent B/L LE edema and distended abdomen   - Was receiving Lasix 40mg IV QD at outside hospital.   - TTE @ [facility] 3/26/23 (actual report not available; per clinical documentation): LVEF 60%, LAE w/ severe MR, MV degeneration of posterior leaflet w/ significant prolapse and mod-severe  anterior directed MR.   - On prior TTE 7/2021, pt w/ HOCM w/ severe LVOT obstruction w/ resting gradient of 90mmHg.   - No LVOT obstruction on most recent echos   - Advanced HF following  - Diuresis with lasix 40 IV BID, Hydralazine 25 mg TID, coreg 25 mg BID  - s/p cardiac cath 4/6/23: LM: normal, LAD: ectactic, large, mLAD: 60%, m-d LAD: 80%, D1: 50%, pLCX: large, OM1/OM2: small, OM3: large, d OM3: 80%, dRCA: 80% w/ large RPDA/RPL; no EF/EDP.  - NPO after MN for MVR/CABG w/ Dr Gonsalves.

## 2023-04-06 NOTE — PROGRESS NOTE ADULT - PROBLEM SELECTOR PLAN 3
Rate controlled  - CONT: Cardizem 180mg PO QD, Carvedilol 25 mg BID   - heparin gtt for AC, OR on Friday 4/7/23

## 2023-04-06 NOTE — DIETITIAN INITIAL EVALUATION ADULT - PERTINENT LABORATORY DATA
[de-identified] : Left breast lumpectomy incision healing well, no evidence of infection
04-06    134<L>  |  99  |  21  ----------------------------<  102<H>  4.6   |  27  |  1.15    Ca    9.0      06 Apr 2023 05:30  Mg     1.9     04-06    A1C with Estimated Average Glucose Result: 6.1 % (04-02-23 @ 05:30)

## 2023-04-06 NOTE — PROGRESS NOTE ADULT - SUBJECTIVE AND OBJECTIVE BOX
Planned Date of Surgery:       4/7/23                                                                                                           Surgeon: Dr. 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 Patient now transferred to [facility] for cardiac telemetry for continued management of HFpEF exacerbation and MR. Initially structural heart consulted for evaluation of mod/severe MR. A VERONIQUE was performed revealing MV prolapse of posterior leaflet at A2-P2 and A3-P3, flail P3 scallop, and severe MR. CT Surgery was consulted for surgical intervention and workup. Currently denies lightheadedness, headache, Cp, palpitations, SOB, abdominal pain, nausea, vomiting, fever, chills.    PAST MEDICAL & SURGICAL HISTORY:  HTN (hypertension)      SAH (subarachnoid hemorrhage)      Atrial fibrillation      HOCM (hypertrophic obstructive cardiomyopathy)      Mitral regurgitation      S/P coil embolization of cerebral aneurysm          No Known Allergies      Physical Exam  T(C): 36.7 (04-06-23 @ 04:53), Max: 36.7 (04-06-23 @ 04:53)  HR: 90 (04-06-23 @ 11:03) (77 - 92)  BP: 108/87 (04-06-23 @ 11:03) (103/68 - 138/103)  RR: 18 (04-06-23 @ 11:03) (18 - 20)  SpO2: 97% (04-06-23 @ 11:03) (94% - 97%)    General: NAD, sitting comfortably in chair, conversing appropriately  Neurological: alert and oriented, UE and LE strength equal b/l, facial symmetry present  Cardiovascular: RRR, Clear S1 and S2, +systolic murmur  Respiratory: chest expansion symmetrical, CTA b/l, no wheezing noted  Gastrointestinal: +BS, soft, NT, ND  Extremities: moving spontaneously, no calf tenderness, b/l LE nonpitting edema with superficial blisters  Vascular: warm, well perfused.  Incisions: R radial band in place    MEDICATIONS  (STANDING):  bacitracin   Ointment 1 Application(s) Topical two times a day  carvedilol 25 milliGRAM(s) Oral every 12 hours  ceFAZolin   IVPB 2000 milliGRAM(s) IV Intermittent every 8 hours  diltiazem    milliGRAM(s) Oral daily  furosemide   Injectable 40 milliGRAM(s) IV Push two times a day  heparin  Infusion. 1400 Unit(s)/Hr (14 mL/Hr) IV Continuous <Continuous>  hydrALAZINE 25 milliGRAM(s) Oral three times a day  lisinopril 30 milliGRAM(s) Oral daily  pantoprazole    Tablet 40 milliGRAM(s) Oral before breakfast  polyethylene glycol 3350 17 Gram(s) Oral two times a day    MEDICATIONS  (PRN):  heparin   Injectable 9000 Unit(s) IV Push every 6 hours PRN For aPTT less than 40  heparin   Injectable 4000 Unit(s) IV Push every 6 hours PRN For aPTT between 40 - 57      On Beta Blocker? YES     Labs:                        11.4   7.54  )-----------( 264      ( 06 Apr 2023 05:30 )             37.7     04-06    134<L>  |  99  |  21  ----------------------------<  102<H>  4.6   |  27  |  1.15    Ca    9.0      06 Apr 2023 05:30  Mg     1.9     04-06      PT/INR - ( 06 Apr 2023 05:30 )   PT: 14.3 sec;   INR: 1.20          PTT - ( 06 Apr 2023 05:30 )  PTT:96.7 sec    ABO Interpretation: B (04-06-23 @ 06:32)      Hgb A1C:  A1C with Estimated Average Glucose Result: 6.1: Reference Range 4.0-5.6%     EKG:  in chart: afib    CXR:  pending pa/lat    < from: Xray Chest 1 View- PORTABLE-Routine (Xray Chest 1 View- PORTABLE-Routine .) (04.03.23 @ 18:17) >  FINDINGS:    Single frontal view of the chest demonstrates improved right-sided   effusion. Resolved CHF. The cardiomediastinal silhouette is enlarged. No   acute osseous abnormalities. Overlying EKG leads and wires are noted    IMPRESSION: Improved right-sided effusion. Resolved CHF. Cardiomegaly.    < end of copied text >    CT Scans:  not currently indicated    Cath Report:  s/p cardiac cath 4/6/23: LM: normal, LAD: ectactic, large, mLAD: 60%, m-d LAD: 80%, D1: 50%, pLCX: large, OM1/OM2: small, OM3: large, d OM3: 80%, dRCA: 80% w/ large RPDA/RPL; no EF/EDP. R TR at 12 PM    Echo:  < from: VERONIQUE Echo Doppler w/ Cont (04.04.23 @ 13:32) >    CONCLUSIONS:     1. Normal left ventricular systolic function.   2. Normal right ventricular size and systolic function.   3. Dilated left atrium.   4. No LA/RA/MARILYN/RAA thrombus seen.   5. Mitral valve prolapse of the posterior leaflet at A2-P2 and A3-P3.   There is flail of P3 scallop. Severe mitral regurgitation seen at a blood   pressure of 126/86 mmHg.   6. No significant valvular disease.   7. Pulmonary hypertension present, pulmonary artery systolic pressure is   52 mmHg.  8. No pericardial effusion.    < end of copied text >  < from: TTE Echo Complete w/o Contrast w/ Doppler (04.03.23 @ 15:59) >  -----  CONCLUSIONS:     1. Patient was in atrial fibrillation during the study.   2. Mild to moderate symmetric left ventricular hypertrophy.   3. Normal left ventricular size and systolic function.   4. Normal right ventricular size and systolic function.   5. Severely dilated left atrium.   6. The posterior leaflet of the mitral valve appears flail. There is   severe, eccentric, anteriorly directed mitral regurgitation.   7. Aortic sclerosis without significant stenosis.   8. Pulmonary hypertension present, pulmonary artery systolic pressure is   44 mmHg.   9. No pericardial effusion.    < end of copied text >    PFT's:  pending    Carotid Duplex:  pending    Consult in Chart?  YES / NO  Consent in Chart? YES / NO  Pre-op Orders Placed? YES / NO  Blood Prodeucts Ordered? YES / NO  NPO ordered? YES / NO Planned Date of Surgery:       4/7/23                                                                                                           Surgeon: Dr. 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 on 7/29/21 initially admitted to [facility] 3/24/23 with HFpEF exacerbation, Afib w/ RVR and LE cellulitis c/b TATIANA, transaminitis and rectus sheath hematoma. Patient now transferred to [facility] for cardiac telemetry for continued management of HFpEF exacerbation and MR. Initially structural heart consulted for evaluation of mod/severe MR. A VERONIQUE was performed revealing MV prolapse of posterior leaflet at A2-P2 and A3-P3, flail P3 scallop, and severe MR. CT Surgery was consulted for surgical intervention and workup. Currently denies lightheadedness, headache, Cp, palpitations, SOB, abdominal pain, nausea, vomiting, fever, chills.    PAST MEDICAL & SURGICAL HISTORY:  HTN (hypertension)      SAH (subarachnoid hemorrhage)      Atrial fibrillation      HOCM (hypertrophic obstructive cardiomyopathy)      Mitral regurgitation      S/P coil embolization of cerebral aneurysm          No Known Allergies      Physical Exam  T(C): 36.7 (04-06-23 @ 04:53), Max: 36.7 (04-06-23 @ 04:53)  HR: 90 (04-06-23 @ 11:03) (77 - 92)  BP: 108/87 (04-06-23 @ 11:03) (103/68 - 138/103)  RR: 18 (04-06-23 @ 11:03) (18 - 20)  SpO2: 97% (04-06-23 @ 11:03) (94% - 97%)    General: NAD, sitting comfortably in chair, conversing appropriately  Neurological: alert and oriented, UE and LE strength equal b/l, facial symmetry present  Cardiovascular: RRR, Clear S1 and S2, +systolic murmur  Respiratory: chest expansion symmetrical, CTA b/l, no wheezing noted  Gastrointestinal: +BS, soft, NT, ND  Extremities: moving spontaneously, no calf tenderness, b/l LE nonpitting edema with superficial blisters  Vascular: warm, well perfused.  Incisions: R radial band in place    MEDICATIONS  (STANDING):  bacitracin   Ointment 1 Application(s) Topical two times a day  carvedilol 25 milliGRAM(s) Oral every 12 hours  ceFAZolin   IVPB 2000 milliGRAM(s) IV Intermittent every 8 hours  diltiazem    milliGRAM(s) Oral daily  furosemide   Injectable 40 milliGRAM(s) IV Push two times a day  heparin  Infusion. 1400 Unit(s)/Hr (14 mL/Hr) IV Continuous <Continuous>  hydrALAZINE 25 milliGRAM(s) Oral three times a day  lisinopril 30 milliGRAM(s) Oral daily  pantoprazole    Tablet 40 milliGRAM(s) Oral before breakfast  polyethylene glycol 3350 17 Gram(s) Oral two times a day    MEDICATIONS  (PRN):  heparin   Injectable 9000 Unit(s) IV Push every 6 hours PRN For aPTT less than 40  heparin   Injectable 4000 Unit(s) IV Push every 6 hours PRN For aPTT between 40 - 57      On Beta Blocker? YES     Labs:                        11.4   7.54  )-----------( 264      ( 06 Apr 2023 05:30 )             37.7     04-06    134<L>  |  99  |  21  ----------------------------<  102<H>  4.6   |  27  |  1.15    Ca    9.0      06 Apr 2023 05:30  Mg     1.9     04-06      PT/INR - ( 06 Apr 2023 05:30 )   PT: 14.3 sec;   INR: 1.20          PTT - ( 06 Apr 2023 05:30 )  PTT:96.7 sec    ABO Interpretation: B (04-06-23 @ 06:32)      Hgb A1C:  A1C with Estimated Average Glucose Result: 6.1: Reference Range 4.0-5.6%     EKG:  in chart: afib    CXR:  pending pa/lat    < from: Xray Chest 1 View- PORTABLE-Routine (Xray Chest 1 View- PORTABLE-Routine .) (04.03.23 @ 18:17) >  FINDINGS:    Single frontal view of the chest demonstrates improved right-sided   effusion. Resolved CHF. The cardiomediastinal silhouette is enlarged. No   acute osseous abnormalities. Overlying EKG leads and wires are noted    IMPRESSION: Improved right-sided effusion. Resolved CHF. Cardiomegaly.    < end of copied text >    CT Scans:  not currently indicated    Cath Report:  s/p cardiac cath 4/6/23: LM: normal, LAD: ectactic, large, mLAD: 60%, m-d LAD: 80%, D1: 50%, pLCX: large, OM1/OM2: small, OM3: large, d OM3: 80%, dRCA: 80% w/ large RPDA/RPL; no EF/EDP. R TR at 12 PM    Echo:  < from: VERONIQUE Echo Doppler w/ Cont (04.04.23 @ 13:32) >    CONCLUSIONS:     1. Normal left ventricular systolic function.   2. Normal right ventricular size and systolic function.   3. Dilated left atrium.   4. No LA/RA/MARILYN/RAA thrombus seen.   5. Mitral valve prolapse of the posterior leaflet at A2-P2 and A3-P3.   There is flail of P3 scallop. Severe mitral regurgitation seen at a blood   pressure of 126/86 mmHg.   6. No significant valvular disease.   7. Pulmonary hypertension present, pulmonary artery systolic pressure is   52 mmHg.  8. No pericardial effusion.    < end of copied text >  < from: TTE Echo Complete w/o Contrast w/ Doppler (04.03.23 @ 15:59) >  -----  CONCLUSIONS:     1. Patient was in atrial fibrillation during the study.   2. Mild to moderate symmetric left ventricular hypertrophy.   3. Normal left ventricular size and systolic function.   4. Normal right ventricular size and systolic function.   5. Severely dilated left atrium.   6. The posterior leaflet of the mitral valve appears flail. There is   severe, eccentric, anteriorly directed mitral regurgitation.   7. Aortic sclerosis without significant stenosis.   8. Pulmonary hypertension present, pulmonary artery systolic pressure is   44 mmHg.   9. No pericardial effusion.    < end of copied text >    PFT's:  pending    Carotid Duplex:  pending    Consult in Chart?  YES / NO  Consent in Chart? YES / NO  Pre-op Orders Placed? YES / NO  Blood Prodeucts Ordered? YES / NO  NPO ordered? YES / NO Planned Date of Surgery:       4/7/23                                                                                                           Surgeon: Dr. 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 Patient now transferred to [facility] for cardiac telemetry for continued management of HFpEF exacerbation and MR. Initially structural heart consulted for evaluation of mod/severe MR. A VERONIQUE was performed revealing MV prolapse of posterior leaflet at A2-P2 and A3-P3, flail P3 scallop, and severe MR. CT Surgery was consulted for surgical intervention and workup. Currently denies lightheadedness, headache, Cp, palpitations, SOB, abdominal pain, nausea, vomiting, fever, chills.    PAST MEDICAL & SURGICAL HISTORY:  HTN (hypertension)      SAH (subarachnoid hemorrhage)      Atrial fibrillation      HOCM (hypertrophic obstructive cardiomyopathy)      Mitral regurgitation      S/P coil embolization of cerebral aneurysm          No Known Allergies      Physical Exam  T(C): 36.7 (04-06-23 @ 04:53), Max: 36.7 (04-06-23 @ 04:53)  HR: 90 (04-06-23 @ 11:03) (77 - 92)  BP: 108/87 (04-06-23 @ 11:03) (103/68 - 138/103)  RR: 18 (04-06-23 @ 11:03) (18 - 20)  SpO2: 97% (04-06-23 @ 11:03) (94% - 97%)    General: NAD, sitting comfortably in chair, conversing appropriately  Neurological: alert and oriented, UE and LE strength equal b/l, facial symmetry present  Cardiovascular: RRR, Clear S1 and S2, +systolic murmur  Respiratory: chest expansion symmetrical, CTA b/l, no wheezing noted  Gastrointestinal: +BS, soft, NT, ND  Extremities: moving spontaneously, no calf tenderness, b/l LE nonpitting edema with superficial blisters  Vascular: warm, well perfused.  Incisions: R radial band in place    MEDICATIONS  (STANDING):  bacitracin   Ointment 1 Application(s) Topical two times a day  carvedilol 25 milliGRAM(s) Oral every 12 hours  ceFAZolin   IVPB 2000 milliGRAM(s) IV Intermittent every 8 hours  diltiazem    milliGRAM(s) Oral daily  furosemide   Injectable 40 milliGRAM(s) IV Push two times a day  heparin  Infusion. 1400 Unit(s)/Hr (14 mL/Hr) IV Continuous <Continuous>  hydrALAZINE 25 milliGRAM(s) Oral three times a day  lisinopril 30 milliGRAM(s) Oral daily  pantoprazole    Tablet 40 milliGRAM(s) Oral before breakfast  polyethylene glycol 3350 17 Gram(s) Oral two times a day    MEDICATIONS  (PRN):  heparin   Injectable 9000 Unit(s) IV Push every 6 hours PRN For aPTT less than 40  heparin   Injectable 4000 Unit(s) IV Push every 6 hours PRN For aPTT between 40 - 57      On Beta Blocker? YES     Labs:                        11.4   7.54  )-----------( 264      ( 06 Apr 2023 05:30 )             37.7     04-06    134<L>  |  99  |  21  ----------------------------<  102<H>  4.6   |  27  |  1.15    Ca    9.0      06 Apr 2023 05:30  Mg     1.9     04-06      PT/INR - ( 06 Apr 2023 05:30 )   PT: 14.3 sec;   INR: 1.20          PTT - ( 06 Apr 2023 05:30 )  PTT:96.7 sec    ABO Interpretation: B (04-06-23 @ 06:32)      Hgb A1C:  A1C with Estimated Average Glucose Result: 6.1: Reference Range 4.0-5.6%     EKG:  in chart: afib    CXR:  pending pa/lat    < from: Xray Chest 1 View- PORTABLE-Routine (Xray Chest 1 View- PORTABLE-Routine .) (04.03.23 @ 18:17) >  FINDINGS:    Single frontal view of the chest demonstrates improved right-sided   effusion. Resolved CHF. The cardiomediastinal silhouette is enlarged. No   acute osseous abnormalities. Overlying EKG leads and wires are noted    IMPRESSION: Improved right-sided effusion. Resolved CHF. Cardiomegaly.    < end of copied text >    CT Scans:  not currently indicated    Cath Report:  s/p cardiac cath 4/6/23: LM: normal, LAD: ectactic, large, mLAD: 60%, m-d LAD: 80%, D1: 50%, pLCX: large, OM1/OM2: small, OM3: large, d OM3: 80%, dRCA: 80% w/ large RPDA/RPL; no EF/EDP. R TR at 12 PM    Echo:  < from: VERONIQUE Echo Doppler w/ Cont (04.04.23 @ 13:32) >    CONCLUSIONS:     1. Normal left ventricular systolic function.   2. Normal right ventricular size and systolic function.   3. Dilated left atrium.   4. No LA/RA/MARILYN/RAA thrombus seen.   5. Mitral valve prolapse of the posterior leaflet at A2-P2 and A3-P3.   There is flail of P3 scallop. Severe mitral regurgitation seen at a blood   pressure of 126/86 mmHg.   6. No significant valvular disease.   7. Pulmonary hypertension present, pulmonary artery systolic pressure is   52 mmHg.  8. No pericardial effusion.    < end of copied text >  < from: TTE Echo Complete w/o Contrast w/ Doppler (04.03.23 @ 15:59) >  -----  CONCLUSIONS:     1. Patient was in atrial fibrillation during the study.   2. Mild to moderate symmetric left ventricular hypertrophy.   3. Normal left ventricular size and systolic function.   4. Normal right ventricular size and systolic function.   5. Severely dilated left atrium.   6. The posterior leaflet of the mitral valve appears flail. There is   severe, eccentric, anteriorly directed mitral regurgitation.   7. Aortic sclerosis without significant stenosis.   8. Pulmonary hypertension present, pulmonary artery systolic pressure is   44 mmHg.   9. No pericardial effusion.    < end of copied text >    PFT's:  pending    Carotid Duplex:  pending    Consent in Chart? YES   Pre-op Orders Placed? YES   Blood Prodeucts Ordered? YES   NPO ordered? YES

## 2023-04-06 NOTE — PROGRESS NOTE ADULT - PROBLEM SELECTOR PLAN 2
- TTE 4/3/23: Mild to moderate symmetric LVH. LVEF 60%. Posterior leaflet of the mitral valve appears flail. There is severe, eccentric, anteriorly- concern for endocarditis. Follow up blood cultures x2 and ESR/CRP.   directed mitral regurgitation. PHTN present, PAP 44 mmHg   - VERONIQUE 4/4/23: Normal LVSF, normal RVSF, dilated LA, No LA/RA/MARILYN/RAA thrombus seen. Mitral valve prolapse of the posterior leaflet at A2-P2 and A3-P3. flail of P3 scallop. Severe MR,  No significant valvular disease. pHTN with PASP 52 mmHg.       ## HOCM  - severe LVOT obstruction w/ resting gradient 90mmHg by TTE in 2021.  - TTE 4/3/23: Mild to moderate symmetric left ventricular hypertrophy; LV diastolic function and filling pressure is made challenging by presence of severe MR.  - CONT: Cardizem and Carvedilol.

## 2023-04-06 NOTE — PROGRESS NOTE ADULT - ASSESSMENT
47M w/ HFpEF, HOCM, mod-severe MR, HTN, dyslipidemia, chronic Afib, SAH who presented with SOB and LE edema in the setting of primary MR 2/2 flail posterior leaflet. He remains volume overloaded, but is warm and normal perfusion markers and edema is improving. His LHC has demonstrated 3v CAD and will now go for CABG/MVR.     Review of studies:  TTE: normal biventricular function. Mild-mod LVH, septal thickness 1.3 cm and pw thickness of 1.17 cm. No obstructive/intracavitary gradient. LVOT VTI 12 cm. Severely dilated LA. Severe primary MR 2/2 flail posterior leaflet. PASP 44. Estimated RA ~15 mmHg     #Severe primary MR 2/2 flail posterior leaflet  - found to have 3v CAD will go for simultaneous MVR and CABG  - IV diuresis as above  - continue carvedilol, lisinopril, and hydralazine for afterload reduction    #CAD:  - 3V CAD, surgery as above  - ASA and statin    # Hypertropic cardiomyopathy:  - most recent echo without obstructive component  - remains volume overloaded, would resume IV diuretics, 40 mg IV lasix BID  - coreg and diltiazem per primary team  - septal thickness relatively small, 1.3-1.5 cm, unlikely to benefit from myomectomy during CABG/MVR    #LE cellulitis:   - antibiotics per ID    #Dyslipidemia:   - lipitor 80    #Spontaneous rectus sheath hematoma:  - general surgery following, abdominal binder in place  - ok for heparin gtt

## 2023-04-06 NOTE — DIETITIAN INITIAL EVALUATION ADULT - PROBLEM SELECTOR PLAN 6
@[facility]: Total bili 2.6, , , Alk phos 99, Amylase 47, Lipase 533.  --Statin held, initially recommended for MRCP however cannot be performed 2/2 hx of cerebral coil.  --LFTs likely elevated 2/2 fluid overload, will continue to monitor closely.

## 2023-04-06 NOTE — PROGRESS NOTE ADULT - ASSESSMENT
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 Patient now transferred to [facility] for cardiac telemetry for continued management of HFpEF exacerbation and MR. Initially structural heart consulted for evaluation of mod/severe MR. A VERONIQUE was performed revealing MV prolapse of posterior leaflet at A2-P2 and A3-P3, flail P3 scallop, and severe MR. CTSurgery was consulted for surgical intervention and workup.    Plan:  Problem 1: severe MR  - Discussed with Dr. Gonsalves  - plan for OR tomorrow for MV repair, CABG, cryomaze ablation, MARILYN atriclip  - cardiac catheterization today, 4/6, showed 3vCAD, plan for CABG as well.  - NPO after midnight  - active type and screen  - active covid  - PST for OR Friday, including carotid u/s, pfts, preop labs, CXR pa/lat, UA  - Preop orders placed.  - obtain consent  - CT Surgery will continue to follow    Problem 2: acute on chronic HF, EF 60%  - c/w diuresis  - c/w daily weights  - care per primary team    Problem 3: b/l LE cellulitis  - c/w IV Abx per ID recs  - Per ID, okay for open heart surgery if remains afebrile  - c/w wound care recs  - care per primary team    Problem 4: afib   - continue heparin gtt  - c/w dilt, metop  - care per primary team    Problem 5: rectal sheath hematoma  - continue abdominal binder  - gen surg consulted - ok for hep gtt  - follow up with gen surg if cleared for full heparinization in OR  - care per primary team    I have reviewed clinical labs tests and reports, radiology tests and reports, as well as old patient medical records, and discussed with the referring physician.     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 Patient now transferred to [facility] for cardiac telemetry for continued management of HFpEF exacerbation and MR. Initially structural heart consulted for evaluation of mod/severe MR. A VERONIQUE was performed revealing MV prolapse of posterior leaflet at A2-P2 and A3-P3, flail P3 scallop, and severe MR. CT Surgery was consulted for surgical intervention and workup.    Plan:  Problem 1: severe MR  - Discussed with Dr. Gonsalves  - plan for OR tomorrow for MV repair, CABG, cryomaze ablation, MARILYN atriclip  - cardiac catheterization today, 4/6, showed 3vCAD, plan for CABG as well.  - NPO after midnight  - active type and screen  - active covid  - PST for OR Friday, including carotid u/s, pfts, preop labs, CXR pa/lat, UA  - Preop orders placed.  - obtain consent  - CT Surgery will continue to follow    Problem 2: acute on chronic HF, EF 60%  - c/w diuresis  - c/w daily weights  - care per primary team    Problem 3: b/l LE cellulitis  - c/w IV Abx per ID recs  - Per ID, okay for open heart surgery if remains afebrile  - c/w wound care recs  - care per primary team    Problem 4: afib   - continue heparin gtt  - c/w dilt, metop  - care per primary team    Problem 5: rectal sheath hematoma  - continue abdominal binder  - gen surg consulted - ok for hep gtt  - follow up with gen surg if cleared for full heparinization in OR  - care per primary team    I have reviewed clinical labs tests and reports, radiology tests and reports, as well as old patient medical records, and discussed with the referring physician.     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 Patient now transferred to [facility] for cardiac telemetry for continued management of HFpEF exacerbation and MR. Initially structural heart consulted for evaluation of mod/severe MR. A VERONIQUE was performed revealing MV prolapse of posterior leaflet at A2-P2 and A3-P3, flail P3 scallop, and severe MR. CT Surgery was consulted for surgical intervention and workup.    Plan:  Problem 1: severe MR  - Discussed with Dr. Gonsalves  - plan for OR tomorrow for MV repair, CABG, cryomaze ablation, MARILYN atriclip  - cardiac catheterization today, 4/6, showed 3vCAD, plan for CABG as well.  - NPO after midnight  - active type and screen  - active covid  - PST for OR Friday, including carotid u/s, pfts, preop labs, CXR pa/lat, UA  - Preop orders placed.  - obtain consent  - CT Surgery will continue to follow    Problem 2: acute on chronic HF, EF 60%  - c/w diuresis  - c/w daily weights  - care per primary team    Problem 3: b/l LE cellulitis  - c/w IV Abx per ID recs  - Per ID, okay for open heart surgery if remains afebrile  - c/w wound care recs  - care per primary team    Problem 4: afib   - continue heparin gtt  - c/w dilt, metop  - care per primary team    Problem 5: rectus sheath hematoma  - continue abdominal binder  - gen surg consulted - ok for hep gtt  - follow up with gen surg if cleared for full heparinization in OR  - care per primary team    I have reviewed clinical labs tests and reports, radiology tests and reports, as well as old patient medical records, and discussed with the referring physician.     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 Patient now transferred to [facility] for cardiac telemetry for continued management of HFpEF exacerbation and MR. Initially structural heart consulted for evaluation of mod/severe MR. A VERONIQUE was performed revealing MV prolapse of posterior leaflet at A2-P2 and A3-P3, flail P3 scallop, and severe MR. CT Surgery was consulted for surgical intervention and workup.    Plan:  Problem 1: severe MR  - Discussed with Dr. Gonsalves  - plan for OR tomorrow for MV repair, CABG, cryomaze ablation, MARILYN atriclip  - cardiac catheterization today, 4/6, showed 3vCAD, plan for CABG as well.  - NPO after midnight  - active type and screen  - active covid  - PST for OR Friday, including carotid u/s, pfts, preop labs, CXR pa/lat, UA  - Preop orders placed.  - obtain consent  - CT Surgery will continue to follow    Problem 2: acute on chronic HF, EF 60%  - c/w diuresis  - c/w daily weights  - care per primary team    Problem 3: b/l LE cellulitis  - c/w IV Abx per ID recs  - Per ID, cleared for MVR/CABG if remains afebrile  - c/w wound care recs  - care per primary team    Problem 4: afib   - continue heparin gtt  - c/w dilt, metop  - care per primary team    Problem 5: rectus sheath hematoma  - continue abdominal binder  - gen surg consulted - ok for hep gtt  - Per Gen Surg (Dr. Valle) on 4/6 he is cleared for full heparinization in OR.  - care per primary team    I have reviewed clinical labs tests and reports, radiology tests and reports, as well as old patient medical records, and discussed with the referring physician.     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 on 7/29/21 initially admitted to [facility] 3/24/23 with HFpEF exacerbation, Afib w/ RVR and LE cellulitis c/b TATIANA, transaminitis and rectus sheath hematoma. Patient now transferred to [facility] for cardiac telemetry for continued management of HFpEF exacerbation and MR. Initially structural heart consulted for evaluation of mod/severe MR. A VERONIQUE was performed revealing MV prolapse of posterior leaflet at A2-P2 and A3-P3, flail P3 scallop, and severe MR. CT Surgery was consulted for surgical intervention and workup.    Plan:  Problem 1: severe MR  - Discussed with Dr. Gonsalves  - plan for OR tomorrow for MV repair, CABG, cryomaze ablation, MARILYN atriclip  - STS Risk calculated at risk of mortality - 1.265%  - cardiac catheterization today, 4/6, showed 3vCAD, plan for CABG as well.  - NPO after midnight  - active type and screen  - active covid  - PST for OR Friday, including carotid u/s, pfts, preop labs, CXR pa/lat, UA  - Preop orders placed.  - obtain consent  - CT Surgery will continue to follow    Problem 2: acute on chronic HF, EF 60%  - c/w diuresis  - c/w daily weights  - care per primary team    Problem 3: b/l LE cellulitis  - c/w IV Abx per ID recs  - Per ID, cleared for MVR/CABG if remains afebrile  - c/w wound care recs  - care per primary team    Problem 4: afib   - continue heparin gtt  - c/w dilt, metop  - care per primary team    Problem 5: rectus sheath hematoma  - continue abdominal binder  - gen surg consulted - ok for hep gtt  - Per Gen Surg (Dr. Valle) on 4/6 he is cleared for full heparinization in OR.  - care per primary team    I have reviewed clinical labs tests and reports, radiology tests and reports, as well as old patient medical records, and discussed with the referring physician.

## 2023-04-06 NOTE — PROGRESS NOTE ADULT - ASSESSMENT
47 yr old M, POOR HISTORIAN/Noncompliant w/ meds and MD follow-up, with PMHx of HTN, hyperlipidemia,  HFpEF (EF:60%), HOCM,  severe MR, Afib, SAH 2/2 cerebral artery dissection s/p [facility] 7/29/21 initially admitted to [facility] 3/24/23 with HFpEF exacerbation, Afib w/ RVR and LE cellulitis c/b TATIANA, transaminitis and rectal sheath hematoma. Patient now transferred to [facility] for cardiac telemetry for continued management of HFpEF exacerbation and structural heart evaluation for MR. TTE with severe, eccentric, anteriorly directed MR with flail,  VERONIQUE negative for endocarditis. Patient underwent cardiac cath with Dr Sousa 4/6/2023, revealed 3vCAD. Plan for CABG/MVR w/ Dr Gonsalves on Friday 4/7/23.

## 2023-04-06 NOTE — PROGRESS NOTE ADULT - PROBLEM SELECTOR PLAN 1
- On exam, patient on room air but still with persistent B/L LE edema and distended abdomen   - Was receiving Lasix 40mg IV QD at outside hospital.   - TTE @ [facility] 3/26/23 (actual report not available; per clinical documentation): LVEF 60%, LAE w/ severe MR, MV degeneration of posterior leaflet w/ significant prolapse and mod-severe  anterior directed MR.   - On prior TTE 7/2021, pt w/ HOCM w/ severe LVOT obstruction w/ resting gradient of 90mmHg.   - No LVOT obstruction on most recent echos   - Advanced HF following  - Diuresis with lasix 40 IV BID, Hydralazine 25 mg TID, coreg 25 mg BID  - s/p cardiac cath 4/6/23: LM: normal, LAD: ectactic, large, mLAD: 60%, m-d LAD: 80%, D1: 50%, pLCX: large, OM1/OM2: small, OM3: large, d OM3: 80%, dRCA: 80% w/ large RPDA/RPL; no EF/EDP.  - NPO after MN for MVR/CABG w/ Dr Gonsalves

## 2023-04-06 NOTE — PROGRESS NOTE ADULT - SUBJECTIVE AND OBJECTIVE BOX
Subjective:    Medications:  bacitracin   Ointment 1 Application(s) Topical two times a day  carvedilol 25 milliGRAM(s) Oral every 12 hours  ceFAZolin   IVPB 2000 milliGRAM(s) IV Intermittent every 8 hours  diltiazem    milliGRAM(s) Oral daily  furosemide   Injectable 40 milliGRAM(s) IV Push two times a day  heparin   Injectable 9000 Unit(s) IV Push every 6 hours PRN  heparin   Injectable 4000 Unit(s) IV Push every 6 hours PRN  heparin  Infusion. 1400 Unit(s)/Hr IV Continuous <Continuous>  hydrALAZINE 25 milliGRAM(s) Oral three times a day  lisinopril 30 milliGRAM(s) Oral daily  pantoprazole    Tablet 40 milliGRAM(s) Oral before breakfast  polyethylene glycol 3350 17 Gram(s) Oral two times a day    Vitals:  T(C): 36.7 (04-06-23 @ 04:53), Max: 36.7 (04-06-23 @ 04:53)  HR: 90 (04-06-23 @ 11:03) (77 - 92)  BP: 108/87 (04-06-23 @ 11:03) (103/68 - 138/103)  BP(mean): 111 (04-06-23 @ 06:28) (82 - 116)  RR: 18 (04-06-23 @ 11:03) (18 - 20)  SpO2: 97% (04-06-23 @ 11:03) (94% - 97%)    Daily     Daily   Weight (kg): 104.1 (04-06-23 @ 06:02)    I&O's Summary    05 Apr 2023 07:01  -  06 Apr 2023 07:00  --------------------------------------------------------  IN: 868 mL / OUT: 2200 mL / NET: -1332 mL        Physical Exam:  Appearance: No Acute Distress  HEENT: JVP ___ cm H2O, no HJR  Cardiovascular: RRR, Normal S1 S2, No murmurs/rubs/gallops  Respiratory: Clear to auscultation bilaterally  Gastrointestinal: soft, non-tender, non-distended	  Skin: no skin lesions  Neurologic: Non-focal  Extremities: No LE edema, warm and well perfused  Psychiatry: A & O x 3, Mood & affect appropriate      Labs:                        11.4   7.54  )-----------( 264      ( 06 Apr 2023 05:30 )             37.7     04-06    134<L>  |  99  |  21  ----------------------------<  102<H>  4.6   |  27  |  1.15    Ca    9.0      06 Apr 2023 05:30  Mg     1.9     04-06        PT/INR - ( 06 Apr 2023 05:30 )   PT: 14.3 sec;   INR: 1.20          PTT - ( 06 Apr 2023 05:30 )  PTT:96.7 sec                  TELEMETRY:     Cardiology PA Adult Progress Note    SUBJECTIVE ASSESSMENT: Overnight no acute events; Patient laying in bed, very somnolent, however A/O x3, no neurologic deficits, states "I am not having a stroke". Currently denies CP, dizziness, palpitations, SOB, dyspnea, coughing, headaches, N/V/D/abdominal pain. Patient understands plan for the day.    	  MEDICATIONS:  carvedilol 25 milliGRAM(s) Oral every 12 hours  diltiazem    milliGRAM(s) Oral daily  furosemide   Injectable 40 milliGRAM(s) IV Push two times a day  hydrALAZINE 25 milliGRAM(s) Oral three times a day  lisinopril 30 milliGRAM(s) Oral daily    ceFAZolin   IVPB 2000 milliGRAM(s) IV Intermittent every 8 hours        pantoprazole    Tablet 40 milliGRAM(s) Oral before breakfast  polyethylene glycol 3350 17 Gram(s) Oral two times a day      bacitracin   Ointment 1 Application(s) Topical two times a day  chlorhexidine 0.12% Liquid 15 milliLiter(s) Swish and Spit once  chlorhexidine 4% Liquid 1 Application(s) Topical once  chlorhexidine 4% Liquid 1 Application(s) Topical once  heparin   Injectable 9000 Unit(s) IV Push every 6 hours PRN  heparin   Injectable 4000 Unit(s) IV Push every 6 hours PRN  heparin  Infusion. 1400 Unit(s)/Hr IV Continuous <Continuous>    	  VITAL SIGNS:  T(C): 36.2 (04-06-23 @ 13:06), Max: 36.7 (04-06-23 @ 04:53)  HR: 90 (04-06-23 @ 11:03) (78 - 92)  BP: 108/87 (04-06-23 @ 11:03) (103/68 - 138/103)  RR: 18 (04-06-23 @ 11:03) (18 - 20)  SpO2: 97% (04-06-23 @ 11:03) (94% - 97%)  Wt(kg): --    I&O's Summary    05 Apr 2023 07:01  -  06 Apr 2023 07:00  --------------------------------------------------------  IN: 868 mL / OUT: 2200 mL / NET: -1332 mL    06 Apr 2023 07:01  -  06 Apr 2023 14:04  --------------------------------------------------------  IN: 0 mL / OUT: 475 mL / NET: -475 mL        Weight (kg): 104.1 (04-06 @ 06:02)                                     PHYSICAL EXAM:  Appearance: Normal	  HEENT: Normal oral mucosa, PERRL, EOMI	  Neck: Supple, + JVD; no Carotid Bruit   Cardiovascular: Normal S1 S2, No murmurs  Respiratory: poor respiratory drive, but no crackles auscultated	  Gastrointestinal:  Soft, Non-tender, + BS	  Skin: No rashes, No ecchymoses, No cyanosis  Extremities: Normal range of motion, No clubbing, cyanosis, 2+ pitting edema in b/l lower extremities  Vascular: Peripheral pulses palpable 1+ bilateral lower extremities  Neurologic: Non-focal  Psychiatry: A & O x 3, Mood & affect appropriate    LABS:	 	                                  11.4   7.54  )-----------( 264      ( 06 Apr 2023 05:30 )             37.7     04-06    134<L>  |  99  |  21  ----------------------------<  102<H>  4.6   |  27  |  1.15    Ca    9.0      06 Apr 2023 05:30  Mg     1.9     04-06    PT/INR - ( 06 Apr 2023 05:30 )   PT: 14.3 sec;   INR: 1.20          PTT - ( 06 Apr 2023 05:30 )  PTT:96.7 sec

## 2023-04-06 NOTE — DIETITIAN INITIAL EVALUATION ADULT - PROBLEM SELECTOR PLAN 3
rate controlled, transfer hospital meds: Metoprolol Succinate 150mg PO BID and Cardizem 120mg PO daily.   --will switch back to home dose Metoprolol Succinate 100mg PO BID and continue Cardizem 120mg PO daily. Patient also was previously on Amiodarone 200mg PO daily at home, however discontinued after he ran out of refill ~1 year ago.  --Eliquis on HOLD 2/2 rectal sheath hematoma, may resume if H/H remains stable as per Surgery team at [facility].

## 2023-04-06 NOTE — PROGRESS NOTE ADULT - PROBLEM SELECTOR PLAN 5
- Cr peaked at 2.0 during admission@ [facility].  - currently stable at 1.2, will continue to monitor closely.  - avoid nephrotoxic agents.

## 2023-04-06 NOTE — PROGRESS NOTE ADULT - NS ATTEND AMEND GEN_ALL_CORE FT
See PA note written above, for details. I reviewed the PA documentation.  I have personally seen and examined this patient today. I reviewed vitals, labs, medications, cardiac studies and additional imaging.  I agree with the PA's findings and plans as written above with the following additions/amendments:  VERONIQUE 4.4.23: flail MV with 4+MR, no e/o IE  C 4/6/23: severe 3vCAD, LVEDP 15    Plan:  NPO pMN for CTSx OR:  MV repair, CABG, cryomaze ablation, MARILYN atriclip  -->Pre op testing underway: C U/S, PFTs, CXR PA/Lat, UA  -->CTsx requesting general surgery to comment on use of full heperanization for OR  Continue IV hep gtt tobin-op   Continue Lasix 40mg IV BID to achieve euvolemia  Hydralazine 25 TID, Diltiazem 180CD daily, Coreg 25 BID, Lisinopril 30mg po daily for BP control  ID recs for cellulitis mgmt: 2gm IV cefazolin through 4/9. Patient able to proceed with open heart surgery if remains afebrile, continue IV abx through stop date  General surgery consulted for rectal sheath hematoma evaluation, approved to resume A/C use  Vascular surgery c/s for PVD assessment,  rec BID bacitracin and gauze covering for wounds (patient picking and rubbing at open wounds), LE arterial duplex negative  Advanced CHF following for pre op optimization per d/w CTSx  PT rec outpatient PT pending stair evaluation and post op course  Annath Villarreal M.D.  Cardiology Attending  45minutes spent on total encounter; more than 50% of the visit was spent counseling and/or coordinating care by the attending physician, with plan of care discussed with the patient, CTsx Dr Sousa, and cardiac team.

## 2023-04-06 NOTE — PROGRESS NOTE ADULT - PROBLEM SELECTOR PLAN 2
- TTE 4/3/23: Mild to moderate symmetric LVH. LVEF 60%. Posterior leaflet of the mitral valve appears flail. There is severe, eccentric, anteriorly- concern for endocarditis. Follow up blood cultures x2 and ESR/CRP.   directed mitral regurgitation. PHTN present, PAP 44 mmHg   - VERONIQUE 4/4/23: Normal LVSF, normal RVSF, dilated LA, No LA/RA/MARILYN/RAA thrombus seen. Mitral valve prolapse of the posterior leaflet at A2-P2 and A3-P3. flail of P3 scallop. Severe MR,  No significant valvular disease. pHTN with PASP 52 mmHg.       ## HOCM  - severe LVOT obstruction w/ resting gradient 90mmHg by TTE in 2021.  - TTE 4/3/23: Mild to moderate symmetric left ventricular hypertrophy; LV diastolic function and filling pressure is made challenging by presence of severe MR.  - CONT: Cardizem and Carvedilol. - TTE 4/3/23: Mild to moderate symmetric LVH. LVEF 60%. Posterior leaflet of the mitral valve appears flail. There is severe, eccentric, anteriorly- concern for endocarditis. Follow up blood cultures x2 and ESR/CRP.   directed mitral regurgitation. PHTN present, PAP 44 mmHg   - VERONIQUE 4/4/23: Normal LVSF, normal RVSF, dilated LA, No LA/RA/MARILYN/RAA thrombus seen. Mitral valve prolapse of the posterior leaflet at A2-P2 and A3-P3. flail of P3 scallop. Severe MR,  No significant valvular disease. pHTN with PASP 52 mmHg.   - Plan for CABG-MVR 4/7/2023 w/ Dr Gonsalves; ordered carotid US, T&S, UA, covid, bedside PFTs, CXR      ## HOCM  - severe LVOT obstruction w/ resting gradient 90mmHg by TTE in 2021.  - TTE 4/3/23: Mild to moderate symmetric left ventricular hypertrophy; LV diastolic function and filling pressure is made challenging by presence of severe MR.  - CONT: Cardizem and Carvedilol.

## 2023-04-06 NOTE — PROGRESS NOTE ADULT - PROBLEM SELECTOR PLAN 7
- 11cm rectal sheath hematoma: Surgery consulted @ [facility].  - Recommend abdominal binder for 2wks, no acute intervention and okay to resume AC as pt low risk for re-expansion.   - Gen surg signed off, re-consult as needed

## 2023-04-06 NOTE — DIETITIAN INITIAL EVALUATION ADULT - PROBLEM SELECTOR PLAN 5
Cr peaked at 2.0 during admission@ [facility].  --currently stable at 1.2, will continue to monitor closely.  --avoid nephrotoxic agents.

## 2023-04-06 NOTE — PROGRESS NOTE ADULT - PROBLEM SELECTOR PLAN 5
- Cr peaked at 2.0 during admission@ [facility].  - currently stable at 1.15, will continue to monitor closely.  - avoid nephrotoxic agents.

## 2023-04-06 NOTE — PROGRESS NOTE ADULT - ATTENDING COMMENTS
Volume overloaded on exam, would increase lasix from PO to 40 mg IV BID. He has no signs of LVOT obstruction on TTE. Favor adding hydralizine for further afterload reduction with severe primary MR. Valvular intervention planning per CTS/structural.
48 YO M with a history of ? 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 He was diuresed and course complicated by spontaneous rectus sheath hematoma. He was found to have severe mitral regurgitation with flail leaflet prompting transfer to [facility].     He has since been found to have severe 3v CAD and VERONIQUE confirms flail P3 leaflet. He is pending CABG and mitral valve surgery.    His TTE does not show signs of hypertrophic cardiomyopathy (IVS 1.3 cm, no MACRINA or LVOT obstruction).     He is mildly overloaded on exam, notably LVEDP 15 mmHg at time of [facility] today.    HF cardiology will sign off. Cardiology consult team can help with post-op co-management as needed.

## 2023-04-06 NOTE — DIETITIAN INITIAL EVALUATION ADULT - NS FNS DIET ORDER
Diet, NPO after Midnight:      NPO Start Date: 06-Apr-2023,   NPO Start Time: 23:59  Except Medications     Special Instructions for Nursing:  Except Medications (04-06-23 @ 12:50)

## 2023-04-06 NOTE — PROGRESS NOTE ADULT - PROBLEM SELECTOR PLAN 8
- Afebrile, no leukocytosis.   - ID consulted and okay to proceed w/ OR  - CONT: Cefazolin 2g IV q8hrs x7 days (last day 4/9/23)  - Betadine and gauze, patient picking at skin.  - If discharge before end of course, can change to Cefadroxil 500mg PO q12hrs to complete course.     ##Rule out PAD  - pulses dopplerable, B/L arterial duplex:  limited evaluation due to edema, within that limitation there is no hemodynamically significant stenosis identified.        N: DASH with 1 liter fluid restriction  E: Maintain K>4.0 and Mg >2.0  VTE PPx: hep gtt   Code: Full - Afebrile, no leukocytosis.   - ID consulted and okay to proceed w/ OR  - CONT: Cefazolin 2g IV q8hrs x7 days (last day 4/9/23)  - Betadine and gauze, patient picking at skin.  - If discharge before end of course, can change to Cefadroxil 500mg PO q12hrs to complete course.     ##Rule out PAD  - pulses dopplerable, B/L arterial duplex:  limited evaluation due to edema, within that limitation there is no hemodynamically significant stenosis identified.        N: NPOI after midnight, otherwise DASH with 1 liter fluid restriction  E: Maintain K>4.0 and Mg >2.0  VTE PPx: hep gtt   Code: Full

## 2023-04-06 NOTE — PROGRESS NOTE ADULT - PROBLEM SELECTOR PLAN 3
- Rate controlled;   - CONT: Cardizem 180mg PO QD, Carvedilol 25 mg BID   -  heparin gtt for AC, OR on Friday 4/7/23

## 2023-04-06 NOTE — DIETITIAN INITIAL EVALUATION ADULT - PROBLEM SELECTOR PLAN 7
11cm rectal sheath hematoma: Surgery consulted@[facility].  --recommended abdominal binder for 2 weeks, no acute intervention. Eliquis was held and recommended to resume if H/H remains stable.

## 2023-04-06 NOTE — DIETITIAN INITIAL EVALUATION ADULT - PERSON TAUGHT/METHOD
patient instructed Attempted to provide diet education on various aspects of diet, pt however not receptive/patient instructed

## 2023-04-06 NOTE — PROGRESS NOTE ADULT - PROBLEM SELECTOR PLAN 7
- 11cm rectal sheath hematoma: Surgery consulted @ [facility].  - Recommend abdominal binder for 2wks, no acute intervention and okay to resume AC as pt low risk for re-expansion.   - Gen surg signed off, re-consult as needed

## 2023-04-06 NOTE — PROGRESS NOTE ADULT - SUBJECTIVE AND OBJECTIVE BOX
Subjective:    Medications:  bacitracin   Ointment 1 Application(s) Topical two times a day  carvedilol 25 milliGRAM(s) Oral every 12 hours  ceFAZolin   IVPB 2000 milliGRAM(s) IV Intermittent every 8 hours  chlorhexidine 0.12% Liquid 15 milliLiter(s) Swish and Spit once  chlorhexidine 4% Liquid 1 Application(s) Topical once  chlorhexidine 4% Liquid 1 Application(s) Topical once  diltiazem    milliGRAM(s) Oral daily  furosemide   Injectable 40 milliGRAM(s) IV Push two times a day  heparin   Injectable 9000 Unit(s) IV Push every 6 hours PRN  heparin   Injectable 4000 Unit(s) IV Push every 6 hours PRN  heparin  Infusion. 1400 Unit(s)/Hr IV Continuous <Continuous>  hydrALAZINE 25 milliGRAM(s) Oral three times a day  lisinopril 30 milliGRAM(s) Oral daily  pantoprazole    Tablet 40 milliGRAM(s) Oral before breakfast  polyethylene glycol 3350 17 Gram(s) Oral two times a day    Vitals:  T(C): 36.2 (04-06-23 @ 13:06), Max: 36.7 (04-06-23 @ 04:53)  HR: 90 (04-06-23 @ 11:03) (78 - 92)  BP: 108/87 (04-06-23 @ 11:03) (103/68 - 138/103)  BP(mean): 111 (04-06-23 @ 06:28) (82 - 116)  RR: 18 (04-06-23 @ 11:03) (18 - 20)  SpO2: 97% (04-06-23 @ 11:03) (94% - 97%)    Daily     Daily   Weight (kg): 104.1 (04-06-23 @ 06:02)    I&O's Summary    05 Apr 2023 07:01  -  06 Apr 2023 07:00  --------------------------------------------------------  IN: 868 mL / OUT: 2200 mL / NET: -1332 mL    06 Apr 2023 07:01  -  06 Apr 2023 15:34  --------------------------------------------------------  IN: 0 mL / OUT: 475 mL / NET: -475 mL        Physical Exam:  Appearance: No Acute Distress  HEENT: JVP ___ cm H2O, no HJR  Cardiovascular: RRR, Normal S1 S2, No murmurs/rubs/gallops  Respiratory: Clear to auscultation bilaterally  Gastrointestinal: soft, non-tender, non-distended	  Skin: no skin lesions  Neurologic: Non-focal  Extremities: No LE edema, warm and well perfused  Psychiatry: A & O x 3, Mood & affect appropriate      Labs:                        11.4   7.54  )-----------( 264      ( 06 Apr 2023 05:30 )             37.7     04-06    134<L>  |  99  |  21  ----------------------------<  102<H>  4.6   |  27  |  1.15    Ca    9.0      06 Apr 2023 05:30  Mg     1.9     04-06        PT/INR - ( 06 Apr 2023 05:30 )   PT: 14.3 sec;   INR: 1.20          PTT - ( 06 Apr 2023 05:30 )  PTT:96.7 sec                  TELEMETRY:     Subjective:  LHC revealed 3v CAD. Plan for MVR + CABG tomorrow. No complaints today. LE edema improving, but still persists.    Medications:  bacitracin   Ointment 1 Application(s) Topical two times a day  carvedilol 25 milliGRAM(s) Oral every 12 hours  ceFAZolin   IVPB 2000 milliGRAM(s) IV Intermittent every 8 hours  chlorhexidine 0.12% Liquid 15 milliLiter(s) Swish and Spit once  chlorhexidine 4% Liquid 1 Application(s) Topical once  chlorhexidine 4% Liquid 1 Application(s) Topical once  diltiazem    milliGRAM(s) Oral daily  furosemide   Injectable 40 milliGRAM(s) IV Push two times a day  heparin   Injectable 9000 Unit(s) IV Push every 6 hours PRN  heparin   Injectable 4000 Unit(s) IV Push every 6 hours PRN  heparin  Infusion. 1400 Unit(s)/Hr IV Continuous <Continuous>  hydrALAZINE 25 milliGRAM(s) Oral three times a day  lisinopril 30 milliGRAM(s) Oral daily  pantoprazole    Tablet 40 milliGRAM(s) Oral before breakfast  polyethylene glycol 3350 17 Gram(s) Oral two times a day    Vitals:  T(C): 36.2 (04-06-23 @ 13:06), Max: 36.7 (04-06-23 @ 04:53)  HR: 90 (04-06-23 @ 11:03) (78 - 92)  BP: 108/87 (04-06-23 @ 11:03) (103/68 - 138/103)  BP(mean): 111 (04-06-23 @ 06:28) (82 - 116)  RR: 18 (04-06-23 @ 11:03) (18 - 20)  SpO2: 97% (04-06-23 @ 11:03) (94% - 97%)    Daily     Daily   Weight (kg): 104.1 (04-06-23 @ 06:02)    I&O's Summary    05 Apr 2023 07:01  -  06 Apr 2023 07:00  --------------------------------------------------------  IN: 868 mL / OUT: 2200 mL / NET: -1332 mL    06 Apr 2023 07:01  -  06 Apr 2023 15:34  --------------------------------------------------------  IN: 0 mL / OUT: 475 mL / NET: -475 mL        Physical Exam:  Appearance: No Acute Distress  HEENT: JVP 14-16 cm H2O  Cardiovascular: RRR, Normal S1 S2, 3/6 holosytolic murmur loudest at apex  Respiratory: Clear to auscultation bilaterally  Gastrointestinal: soft, non-tender, non-distended	  Skin: no skin lesions  Neurologic: Non-focal  Extremities: 2+ pitting edema of LE extending up to thighs w/ excorations and unroofed blisters, warm and well perfused  Psychiatry: A & O x 3, Mood & affect appropriate      Labs:                        11.4   7.54  )-----------( 264      ( 06 Apr 2023 05:30 )             37.7     04-06    134<L>  |  99  |  21  ----------------------------<  102<H>  4.6   |  27  |  1.15    Ca    9.0      06 Apr 2023 05:30  Mg     1.9     04-06    PT/INR - ( 06 Apr 2023 05:30 )   PT: 14.3 sec;   INR: 1.20     PTT - ( 06 Apr 2023 05:30 )  PTT:96.7 sec    TELEMETRY: sinus rhythm

## 2023-04-06 NOTE — PROVIDER CONTACT NOTE (MEDICATION) - SITUATION
Pt was not on heparin post cath. Notified night RN Miriam Elizondo, and ORTIZ Cornelius of miscommunication. A new appt was drawn and sent by night RN. Waiting on results to begin new heparin order if needed.

## 2023-04-06 NOTE — DIETITIAN INITIAL EVALUATION ADULT - OTHER INFO
Pt seen on 5UR this afternoon. Reports he has been eating but feels the food is different then what he would eat PTA. Pt does not seem to have had diet education in the past and presenting with disordered eating. Reports he can only have "a drop of water daily" or else he will get edema. Pt also reports he needs to eat 4 potatoes daily to feel full + eat a bag of apples before bed to aide in his thrist d/t drinking so little. Pt reports overeating at night d/t his stomach "sucking everything down" which results in hunger. Attempted diet education today however pt overall not receptive. No pain. Adi 19. BM+4/5; Miralax and senna ordered. 3+BL leg edema. No pressure ulcers.   Please see below for nutritions recommendations.  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 Pt now transferred to [facility] 5URIS for cardiac telemetry for continued management of HFpEF exacerbation and structural heart evaluation for MR. TTE with severe, eccentric, anteriorly directed MR with flail, VERONIQUE negative for endocarditis. Pt underwent cardiac cath with Dr Sousa 4/6/2023, revealed 3vCAD. Plan for CABG, cryomaze ablation, MARILYN atriclip, MVR: Dr Gonsalves on Friday 4/7/23.      Pt seen on 5UR this afternoon. Reports he has been eating but feels the food is different then what he would eat PTA. Pt does not seem to have had diet education in the past and presenting with disordered eating/ ?Beliefs in rearguards to food. Reports he can only have "a drop of water daily" or else he will get edema. Pt also reports he needs to eat 4 potatoes daily to feel full + eat a bag of apples before bed to aide in his thirst d/t drinking so little. Pt reports overeating at night d/t his stomach "sucking everything down" which results in hunger. Attempted diet education today however pt overall not receptive. No pain. Adi 19. BM+4/5; Miralax and senna ordered. HDL 28, A1c 6.1%. 3+BL leg edema. No pressure ulcers.   Please see below for nutritions recommendations.

## 2023-04-06 NOTE — DIETITIAN INITIAL EVALUATION ADULT - PROBLEM SELECTOR PLAN 1
+JVD, bibasilar crackles, 2-3+ pitting edema bilaterally extending into thighs.  --will CONTINUE: Lasix 40mg IV daily, uptitrate PRN.  --strict I/Os and daily weights.  --will obtain repeat TTE.     **TTE@[facility] 3/26/23 (actual report not sent over): reportedly revealed normal LV function, EF:60%, LAE with severe MR, mitral valve degeneration of posterior leaflet with significant prolapse and mod-severe anterior directed MR.    **Prior TTE@[facility] 7/29/21: normal LVSF, dilated LA, hypertropic cardiomyopathy with asymmetric septal hypertrophy and systolic anterior motion of the mitral valve. There is severe LVOT obstruction with a resting gradient of 90 mm Hg. There is at least moderate eccentric mitral regurgitation associated with the MACIRNA.

## 2023-04-06 NOTE — PROGRESS NOTE ADULT - ASSESSMENT
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 Patient now transferred to [facility] 5URIS for cardiac telemetry for continued management of HFpEF exacerbation and structural heart evaluation for MR. TTE with severe, eccentric, anteriorly directed MR with flail,  VERONIQUE negative for endocarditis. Plan for MVR w/ Dr Gonsalves on Friday 4/7/23. Pre-op cath with Dr Sousa 4/6/23.         Risks & benefits of procedure and alternative therapy have been explained to the patient including but not limited to: allergic reaction, bleeding w/possible need for blood transfusion, infection, renal and vascular compromise, limb damage, arrhythmia, stroke, vessel dissection/perforation, Myocardial infarction, emergent CABG. Informed consent obtained and in chart.       Suitable for moderate sedation.    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 7/29/21 initially admitted to [facility] 3/24/23 with HFpEF exacerbation, Afib w/ RVR and LE cellulitis c/b TATIANA, transaminitis and rectal sheath hematoma. Patient now transferred to [facility] 5URIS for cardiac telemetry for continued management of HFpEF exacerbation and structural heart evaluation for MR. TTE with severe, eccentric, anteriorly directed MR with flail,  VERONIQUE negative for endocarditis. Patient underwent cardiac cath with Dr Sousa 4/6/2023, revealed 3vCAD. Plan for CABG/MVR w/ Dr Gonsalves on Friday 4/7/23.

## 2023-04-07 ENCOUNTER — APPOINTMENT (OUTPATIENT)
Dept: CARDIOTHORACIC SURGERY | Facility: HOSPITAL | Age: 48
End: 2023-04-07

## 2023-04-07 ENCOUNTER — RESULT REVIEW (OUTPATIENT)
Age: 48
End: 2023-04-07

## 2023-04-07 ENCOUNTER — TRANSCRIPTION ENCOUNTER (OUTPATIENT)
Age: 48
End: 2023-04-07

## 2023-04-07 DIAGNOSIS — L03.90 CELLULITIS, UNSPECIFIED: ICD-10-CM

## 2023-04-07 DIAGNOSIS — I34.0 NONRHEUMATIC MITRAL (VALVE) INSUFFICIENCY: ICD-10-CM

## 2023-04-07 DIAGNOSIS — E78.5 HYPERLIPIDEMIA, UNSPECIFIED: ICD-10-CM

## 2023-04-07 DIAGNOSIS — N17.9 ACUTE KIDNEY FAILURE, UNSPECIFIED: ICD-10-CM

## 2023-04-07 DIAGNOSIS — I10 ESSENTIAL (PRIMARY) HYPERTENSION: ICD-10-CM

## 2023-04-07 DIAGNOSIS — S30.1XXA CONTUSION OF ABDOMINAL WALL, INITIAL ENCOUNTER: ICD-10-CM

## 2023-04-07 DIAGNOSIS — I48.91 UNSPECIFIED ATRIAL FIBRILLATION: ICD-10-CM

## 2023-04-07 DIAGNOSIS — R74.01 ELEVATION OF LEVELS OF LIVER TRANSAMINASE LEVELS: ICD-10-CM

## 2023-04-07 DIAGNOSIS — I50.33 ACUTE ON CHRONIC DIASTOLIC (CONGESTIVE) HEART FAILURE: ICD-10-CM

## 2023-04-07 LAB
ALBUMIN SERPL ELPH-MCNC: 2.7 G/DL — LOW (ref 3.3–5)
ALBUMIN SERPL ELPH-MCNC: 3.3 G/DL — SIGNIFICANT CHANGE UP (ref 3.3–5)
ALP SERPL-CCNC: 69 U/L — SIGNIFICANT CHANGE UP (ref 40–120)
ALP SERPL-CCNC: 98 U/L — SIGNIFICANT CHANGE UP (ref 40–120)
ALT FLD-CCNC: 11 U/L — SIGNIFICANT CHANGE UP (ref 10–45)
ALT FLD-CCNC: 18 U/L — SIGNIFICANT CHANGE UP (ref 10–45)
ANION GAP SERPL CALC-SCNC: 12 MMOL/L — SIGNIFICANT CHANGE UP (ref 5–17)
ANION GAP SERPL CALC-SCNC: 9 MMOL/L — SIGNIFICANT CHANGE UP (ref 5–17)
APTT BLD: 34.3 SEC — SIGNIFICANT CHANGE UP (ref 27.5–35.5)
APTT BLD: 48.9 SEC — HIGH (ref 27.5–35.5)
APTT BLD: 72.8 SEC — HIGH (ref 27.5–35.5)
APTT BLD: 76 SEC — HIGH (ref 27.5–35.5)
AST SERPL-CCNC: 29 U/L — SIGNIFICANT CHANGE UP (ref 10–40)
AST SERPL-CCNC: 343 U/L — HIGH (ref 10–40)
BASE EXCESS BLDA CALC-SCNC: -0.5 MMOL/L — SIGNIFICANT CHANGE UP (ref -2–3)
BASE EXCESS BLDA CALC-SCNC: -1 MMOL/L — SIGNIFICANT CHANGE UP (ref -2–3)
BASE EXCESS BLDA CALC-SCNC: -1.5 MMOL/L — SIGNIFICANT CHANGE UP (ref -2–3)
BASE EXCESS BLDA CALC-SCNC: -2.2 MMOL/L — LOW (ref -2–3)
BASE EXCESS BLDA CALC-SCNC: 0.7 MMOL/L — SIGNIFICANT CHANGE UP (ref -2–3)
BASE EXCESS BLDA CALC-SCNC: 1.7 MMOL/L — SIGNIFICANT CHANGE UP (ref -2–3)
BASE EXCESS BLDA CALC-SCNC: 2.4 MMOL/L — SIGNIFICANT CHANGE UP (ref -2–3)
BASE EXCESS BLDA CALC-SCNC: 2.9 MMOL/L — SIGNIFICANT CHANGE UP (ref -2–3)
BASE EXCESS BLDA CALC-SCNC: 3.1 MMOL/L — HIGH (ref -2–3)
BASE EXCESS BLDA CALC-SCNC: 3.9 MMOL/L — HIGH (ref -2–3)
BASOPHILS # BLD AUTO: 0.04 K/UL — SIGNIFICANT CHANGE UP (ref 0–0.2)
BASOPHILS # BLD AUTO: 0.11 K/UL — SIGNIFICANT CHANGE UP (ref 0–0.2)
BASOPHILS NFR BLD AUTO: 0.3 % — SIGNIFICANT CHANGE UP (ref 0–2)
BASOPHILS NFR BLD AUTO: 1.5 % — SIGNIFICANT CHANGE UP (ref 0–2)
BILIRUB SERPL-MCNC: 1.9 MG/DL — HIGH (ref 0.2–1.2)
BILIRUB SERPL-MCNC: 2.4 MG/DL — HIGH (ref 0.2–1.2)
BLD GP AB SCN SERPL QL: NEGATIVE — SIGNIFICANT CHANGE UP
BUN SERPL-MCNC: 15 MG/DL — SIGNIFICANT CHANGE UP (ref 7–23)
BUN SERPL-MCNC: 22 MG/DL — SIGNIFICANT CHANGE UP (ref 7–23)
CA-I BLDA-SCNC: 0.94 MMOL/L — LOW (ref 1.15–1.33)
CA-I BLDA-SCNC: 1.01 MMOL/L — LOW (ref 1.15–1.33)
CA-I BLDA-SCNC: 1.02 MMOL/L — LOW (ref 1.15–1.33)
CA-I BLDA-SCNC: 1.07 MMOL/L — LOW (ref 1.15–1.33)
CA-I BLDA-SCNC: 1.09 MMOL/L — LOW (ref 1.15–1.33)
CA-I BLDA-SCNC: 1.09 MMOL/L — LOW (ref 1.15–1.33)
CA-I BLDA-SCNC: 1.11 MMOL/L — LOW (ref 1.15–1.33)
CA-I BLDA-SCNC: 1.16 MMOL/L — SIGNIFICANT CHANGE UP (ref 1.15–1.33)
CA-I BLDA-SCNC: 1.16 MMOL/L — SIGNIFICANT CHANGE UP (ref 1.15–1.33)
CA-I BLDA-SCNC: 1.25 MMOL/L — SIGNIFICANT CHANGE UP (ref 1.15–1.33)
CALCIUM SERPL-MCNC: 7.9 MG/DL — LOW (ref 8.4–10.5)
CALCIUM SERPL-MCNC: 9 MG/DL — SIGNIFICANT CHANGE UP (ref 8.4–10.5)
CHLORIDE SERPL-SCNC: 107 MMOL/L — SIGNIFICANT CHANGE UP (ref 96–108)
CHLORIDE SERPL-SCNC: 99 MMOL/L — SIGNIFICANT CHANGE UP (ref 96–108)
CO2 BLDA-SCNC: 23 MMOL/L — SIGNIFICANT CHANGE UP (ref 19–24)
CO2 BLDA-SCNC: 25 MMOL/L — HIGH (ref 19–24)
CO2 BLDA-SCNC: 25 MMOL/L — HIGH (ref 19–24)
CO2 BLDA-SCNC: 26 MMOL/L — HIGH (ref 19–24)
CO2 BLDA-SCNC: 28 MMOL/L — HIGH (ref 19–24)
CO2 BLDA-SCNC: 28 MMOL/L — HIGH (ref 19–24)
CO2 BLDA-SCNC: 29 MMOL/L — HIGH (ref 19–24)
CO2 BLDA-SCNC: 30 MMOL/L — HIGH (ref 19–24)
CO2 SERPL-SCNC: 24 MMOL/L — SIGNIFICANT CHANGE UP (ref 22–31)
CO2 SERPL-SCNC: 27 MMOL/L — SIGNIFICANT CHANGE UP (ref 22–31)
COHGB MFR BLDA: 0.7 % — SIGNIFICANT CHANGE UP
COHGB MFR BLDA: 0.9 % — SIGNIFICANT CHANGE UP
COHGB MFR BLDA: 1.1 % — SIGNIFICANT CHANGE UP
COHGB MFR BLDA: 1.1 % — SIGNIFICANT CHANGE UP
COHGB MFR BLDA: 1.3 % — SIGNIFICANT CHANGE UP
COHGB MFR BLDA: 1.5 % — SIGNIFICANT CHANGE UP
CREAT SERPL-MCNC: 0.94 MG/DL — SIGNIFICANT CHANGE UP (ref 0.5–1.3)
CREAT SERPL-MCNC: 1.23 MG/DL — SIGNIFICANT CHANGE UP (ref 0.5–1.3)
EGFR: 100 ML/MIN/1.73M2 — SIGNIFICANT CHANGE UP
EGFR: 72 ML/MIN/1.73M2 — SIGNIFICANT CHANGE UP
EOSINOPHIL # BLD AUTO: 0.07 K/UL — SIGNIFICANT CHANGE UP (ref 0–0.5)
EOSINOPHIL # BLD AUTO: 0.4 K/UL — SIGNIFICANT CHANGE UP (ref 0–0.5)
EOSINOPHIL NFR BLD AUTO: 0.5 % — SIGNIFICANT CHANGE UP (ref 0–6)
EOSINOPHIL NFR BLD AUTO: 5.5 % — SIGNIFICANT CHANGE UP (ref 0–6)
GAS PNL BLDA: SIGNIFICANT CHANGE UP
GAS PNL BLDA: SIGNIFICANT CHANGE UP
GLUCOSE BLDA-MCNC: 101 MG/DL — HIGH (ref 70–99)
GLUCOSE BLDA-MCNC: 103 MG/DL — HIGH (ref 70–99)
GLUCOSE BLDA-MCNC: 104 MG/DL — HIGH (ref 70–99)
GLUCOSE BLDA-MCNC: 106 MG/DL — HIGH (ref 70–99)
GLUCOSE BLDA-MCNC: 110 MG/DL — HIGH (ref 70–99)
GLUCOSE BLDA-MCNC: 114 MG/DL — HIGH (ref 70–99)
GLUCOSE BLDA-MCNC: 122 MG/DL — HIGH (ref 70–99)
GLUCOSE BLDA-MCNC: 86 MG/DL — SIGNIFICANT CHANGE UP (ref 70–99)
GLUCOSE BLDA-MCNC: 92 MG/DL — SIGNIFICANT CHANGE UP (ref 70–99)
GLUCOSE BLDA-MCNC: 97 MG/DL — SIGNIFICANT CHANGE UP (ref 70–99)
GLUCOSE BLDC GLUCOMTR-MCNC: 147 MG/DL — HIGH (ref 70–99)
GLUCOSE SERPL-MCNC: 114 MG/DL — HIGH (ref 70–99)
GLUCOSE SERPL-MCNC: 146 MG/DL — HIGH (ref 70–99)
HCO3 BLDA-SCNC: 22 MMOL/L — SIGNIFICANT CHANGE UP (ref 21–28)
HCO3 BLDA-SCNC: 24 MMOL/L — SIGNIFICANT CHANGE UP (ref 21–28)
HCO3 BLDA-SCNC: 24 MMOL/L — SIGNIFICANT CHANGE UP (ref 21–28)
HCO3 BLDA-SCNC: 25 MMOL/L — SIGNIFICANT CHANGE UP (ref 21–28)
HCO3 BLDA-SCNC: 27 MMOL/L — SIGNIFICANT CHANGE UP (ref 21–28)
HCO3 BLDA-SCNC: 27 MMOL/L — SIGNIFICANT CHANGE UP (ref 21–28)
HCO3 BLDA-SCNC: 28 MMOL/L — SIGNIFICANT CHANGE UP (ref 21–28)
HCO3 BLDA-SCNC: 29 MMOL/L — HIGH (ref 21–28)
HCT VFR BLD CALC: 31.5 % — LOW (ref 39–50)
HCT VFR BLD CALC: 32.6 % — LOW (ref 39–50)
HCT VFR BLD CALC: 36.3 % — LOW (ref 39–50)
HCT VFR BLD CALC: 39.1 % — SIGNIFICANT CHANGE UP (ref 39–50)
HGB BLD-MCNC: 10.2 G/DL — LOW (ref 13–17)
HGB BLD-MCNC: 11.4 G/DL — LOW (ref 13–17)
HGB BLD-MCNC: 11.7 G/DL — LOW (ref 13–17)
HGB BLD-MCNC: 9.8 G/DL — LOW (ref 13–17)
HGB BLDA-MCNC: 10.1 G/DL — LOW (ref 12.6–17.4)
HGB BLDA-MCNC: 10.2 G/DL — LOW (ref 12.6–17.4)
HGB BLDA-MCNC: 10.2 G/DL — LOW (ref 12.6–17.4)
HGB BLDA-MCNC: 10.3 G/DL — LOW (ref 12.6–17.4)
HGB BLDA-MCNC: 10.3 G/DL — LOW (ref 12.6–17.4)
HGB BLDA-MCNC: 10.6 G/DL — LOW (ref 12.6–17.4)
HGB BLDA-MCNC: 11.4 G/DL — LOW (ref 12.6–17.4)
HGB BLDA-MCNC: 9.1 G/DL — LOW (ref 12.6–17.4)
HGB BLDA-MCNC: 9.1 G/DL — LOW (ref 12.6–17.4)
HGB BLDA-MCNC: 9.4 G/DL — LOW (ref 12.6–17.4)
IMM GRANULOCYTES NFR BLD AUTO: 0.3 % — SIGNIFICANT CHANGE UP (ref 0–0.9)
IMM GRANULOCYTES NFR BLD AUTO: 1.1 % — HIGH (ref 0–0.9)
INR BLD: 1.32 — HIGH (ref 0.88–1.16)
INR BLD: 1.46 — HIGH (ref 0.88–1.16)
ISTAT ARTERIAL BE: 0 MMOL/L — SIGNIFICANT CHANGE UP (ref -2–3)
ISTAT ARTERIAL GLUCOSE: 133 MG/DL — HIGH (ref 70–99)
ISTAT ARTERIAL HCO3: 26 MMOL/L — SIGNIFICANT CHANGE UP (ref 22–26)
ISTAT ARTERIAL HEMATOCRIT: 30 % — LOW (ref 39–50)
ISTAT ARTERIAL HEMOGLOBIN: 10.2 G/DL — LOW (ref 13–17)
ISTAT ARTERIAL IONIZED CALCIUM: 1.11 MMOL/L — LOW (ref 1.12–1.3)
ISTAT ARTERIAL PCO2: 46 MMHG — HIGH (ref 35–45)
ISTAT ARTERIAL PH: 7.35 — SIGNIFICANT CHANGE UP (ref 7.35–7.45)
ISTAT ARTERIAL PO2: 66 MMHG — LOW (ref 80–105)
ISTAT ARTERIAL POTASSIUM: 4.6 MMOL/L — SIGNIFICANT CHANGE UP (ref 3.5–5.3)
ISTAT ARTERIAL SO2: 91 % — LOW (ref 95–98)
ISTAT ARTERIAL SODIUM: 140 MMOL/L — SIGNIFICANT CHANGE UP (ref 135–145)
ISTAT ARTERIAL TCO2: 27 MMOL/L — SIGNIFICANT CHANGE UP (ref 22–31)
LACTATE SERPL-SCNC: 1.2 MMOL/L — SIGNIFICANT CHANGE UP (ref 0.5–2)
LACTATE SERPL-SCNC: 1.3 MMOL/L — SIGNIFICANT CHANGE UP (ref 0.5–2)
LYMPHOCYTES # BLD AUTO: 0.62 K/UL — LOW (ref 1–3.3)
LYMPHOCYTES # BLD AUTO: 1.93 K/UL — SIGNIFICANT CHANGE UP (ref 1–3.3)
LYMPHOCYTES # BLD AUTO: 26.4 % — SIGNIFICANT CHANGE UP (ref 13–44)
LYMPHOCYTES # BLD AUTO: 4.8 % — LOW (ref 13–44)
MAGNESIUM SERPL-MCNC: 2 MG/DL — SIGNIFICANT CHANGE UP (ref 1.6–2.6)
MAGNESIUM SERPL-MCNC: 2.3 MG/DL — SIGNIFICANT CHANGE UP (ref 1.6–2.6)
MAGNESIUM SERPL-MCNC: 2.5 MG/DL — SIGNIFICANT CHANGE UP (ref 1.6–2.6)
MCHC RBC-ENTMCNC: 23.4 PG — LOW (ref 27–34)
MCHC RBC-ENTMCNC: 23.8 PG — LOW (ref 27–34)
MCHC RBC-ENTMCNC: 24.3 PG — LOW (ref 27–34)
MCHC RBC-ENTMCNC: 24.4 PG — LOW (ref 27–34)
MCHC RBC-ENTMCNC: 29.9 GM/DL — LOW (ref 32–36)
MCHC RBC-ENTMCNC: 31.1 GM/DL — LOW (ref 32–36)
MCHC RBC-ENTMCNC: 31.3 GM/DL — LOW (ref 32–36)
MCHC RBC-ENTMCNC: 31.4 GM/DL — LOW (ref 32–36)
MCV RBC AUTO: 75.6 FL — LOW (ref 80–100)
MCV RBC AUTO: 77.6 FL — LOW (ref 80–100)
MCV RBC AUTO: 78 FL — LOW (ref 80–100)
MCV RBC AUTO: 78.4 FL — LOW (ref 80–100)
METHGB MFR BLDA: 0.6 % — SIGNIFICANT CHANGE UP
METHGB MFR BLDA: 1 % — SIGNIFICANT CHANGE UP
METHGB MFR BLDA: 1.1 % — SIGNIFICANT CHANGE UP
METHGB MFR BLDA: 1.1 % — SIGNIFICANT CHANGE UP
METHGB MFR BLDA: 1.2 % — SIGNIFICANT CHANGE UP
METHGB MFR BLDA: 1.4 % — SIGNIFICANT CHANGE UP
METHGB MFR BLDA: 1.5 % — SIGNIFICANT CHANGE UP
METHGB MFR BLDA: 1.6 % — HIGH
METHGB MFR BLDA: 1.6 % — HIGH
METHGB MFR BLDA: 3 % — HIGH
MONOCYTES # BLD AUTO: 0.87 K/UL — SIGNIFICANT CHANGE UP (ref 0–0.9)
MONOCYTES # BLD AUTO: 1.11 K/UL — HIGH (ref 0–0.9)
MONOCYTES NFR BLD AUTO: 15.2 % — HIGH (ref 2–14)
MONOCYTES NFR BLD AUTO: 6.8 % — SIGNIFICANT CHANGE UP (ref 2–14)
NEUTROPHILS # BLD AUTO: 11.14 K/UL — HIGH (ref 1.8–7.4)
NEUTROPHILS # BLD AUTO: 3.75 K/UL — SIGNIFICANT CHANGE UP (ref 1.8–7.4)
NEUTROPHILS NFR BLD AUTO: 51.1 % — SIGNIFICANT CHANGE UP (ref 43–77)
NEUTROPHILS NFR BLD AUTO: 86.5 % — HIGH (ref 43–77)
NRBC # BLD: 0 /100 WBCS — SIGNIFICANT CHANGE UP (ref 0–0)
OXYHGB MFR BLDA: 93.4 % — SIGNIFICANT CHANGE UP (ref 90–95)
OXYHGB MFR BLDA: 96 % — HIGH (ref 90–95)
OXYHGB MFR BLDA: 96.2 % — HIGH (ref 90–95)
OXYHGB MFR BLDA: 96.5 % — HIGH (ref 90–95)
OXYHGB MFR BLDA: 96.7 % — HIGH (ref 90–95)
OXYHGB MFR BLDA: 97 % — HIGH (ref 90–95)
OXYHGB MFR BLDA: 97.1 % — HIGH (ref 90–95)
OXYHGB MFR BLDA: 97.1 % — HIGH (ref 90–95)
OXYHGB MFR BLDA: 97.2 % — HIGH (ref 90–95)
OXYHGB MFR BLDA: 97.8 % — HIGH (ref 90–95)
PCO2 BLDA: 36 MMHG — SIGNIFICANT CHANGE UP (ref 35–48)
PCO2 BLDA: 39 MMHG — SIGNIFICANT CHANGE UP (ref 35–48)
PCO2 BLDA: 41 MMHG — SIGNIFICANT CHANGE UP (ref 35–48)
PCO2 BLDA: 42 MMHG — SIGNIFICANT CHANGE UP (ref 35–48)
PCO2 BLDA: 42 MMHG — SIGNIFICANT CHANGE UP (ref 35–48)
PCO2 BLDA: 45 MMHG — SIGNIFICANT CHANGE UP (ref 35–48)
PCO2 BLDA: 46 MMHG — SIGNIFICANT CHANGE UP (ref 35–48)
PCO2 BLDA: 49 MMHG — HIGH (ref 35–48)
PCO2 BLDA: 50 MMHG — HIGH (ref 35–48)
PCO2 BLDA: 50 MMHG — HIGH (ref 35–48)
PH BLDA: 7.34 — LOW (ref 7.35–7.45)
PH BLDA: 7.35 — SIGNIFICANT CHANGE UP (ref 7.35–7.45)
PH BLDA: 7.36 — SIGNIFICANT CHANGE UP (ref 7.35–7.45)
PH BLDA: 7.37 — SIGNIFICANT CHANGE UP (ref 7.35–7.45)
PH BLDA: 7.37 — SIGNIFICANT CHANGE UP (ref 7.35–7.45)
PH BLDA: 7.4 — SIGNIFICANT CHANGE UP (ref 7.35–7.45)
PH BLDA: 7.42 — SIGNIFICANT CHANGE UP (ref 7.35–7.45)
PH BLDA: 7.44 — SIGNIFICANT CHANGE UP (ref 7.35–7.45)
PHOSPHATE SERPL-MCNC: 3.2 MG/DL — SIGNIFICANT CHANGE UP (ref 2.5–4.5)
PHOSPHATE SERPL-MCNC: 4.1 MG/DL — SIGNIFICANT CHANGE UP (ref 2.5–4.5)
PLATELET # BLD AUTO: 183 K/UL — SIGNIFICANT CHANGE UP (ref 150–400)
PLATELET # BLD AUTO: 205 K/UL — SIGNIFICANT CHANGE UP (ref 150–400)
PLATELET # BLD AUTO: 273 K/UL — SIGNIFICANT CHANGE UP (ref 150–400)
PLATELET # BLD AUTO: 276 K/UL — SIGNIFICANT CHANGE UP (ref 150–400)
PO2 BLDA: 104 MMHG — SIGNIFICANT CHANGE UP (ref 83–108)
PO2 BLDA: 105 MMHG — SIGNIFICANT CHANGE UP (ref 83–108)
PO2 BLDA: 153 MMHG — HIGH (ref 83–108)
PO2 BLDA: 214 MMHG — HIGH (ref 83–108)
PO2 BLDA: 262 MMHG — HIGH (ref 83–108)
PO2 BLDA: 386 MMHG — HIGH (ref 83–108)
PO2 BLDA: 427 MMHG — HIGH (ref 83–108)
PO2 BLDA: 431 MMHG — HIGH (ref 83–108)
PO2 BLDA: 460 MMHG — HIGH (ref 83–108)
PO2 BLDA: 472 MMHG — HIGH (ref 83–108)
POTASSIUM BLDA-SCNC: 4.3 MMOL/L — SIGNIFICANT CHANGE UP (ref 3.5–5.1)
POTASSIUM BLDA-SCNC: 4.3 MMOL/L — SIGNIFICANT CHANGE UP (ref 3.5–5.1)
POTASSIUM BLDA-SCNC: 4.6 MMOL/L — SIGNIFICANT CHANGE UP (ref 3.5–5.1)
POTASSIUM BLDA-SCNC: 4.7 MMOL/L — SIGNIFICANT CHANGE UP (ref 3.5–5.1)
POTASSIUM BLDA-SCNC: 4.8 MMOL/L — SIGNIFICANT CHANGE UP (ref 3.5–5.1)
POTASSIUM BLDA-SCNC: 5.3 MMOL/L — HIGH (ref 3.5–5.1)
POTASSIUM BLDA-SCNC: 5.5 MMOL/L — HIGH (ref 3.5–5.1)
POTASSIUM BLDA-SCNC: 5.5 MMOL/L — HIGH (ref 3.5–5.1)
POTASSIUM BLDA-SCNC: 6 MMOL/L — HIGH (ref 3.5–5.1)
POTASSIUM BLDA-SCNC: 6.4 MMOL/L — CRITICAL HIGH (ref 3.5–5.1)
POTASSIUM SERPL-MCNC: 4.6 MMOL/L — SIGNIFICANT CHANGE UP (ref 3.5–5.3)
POTASSIUM SERPL-MCNC: 5 MMOL/L — SIGNIFICANT CHANGE UP (ref 3.5–5.3)
POTASSIUM SERPL-SCNC: 4.6 MMOL/L — SIGNIFICANT CHANGE UP (ref 3.5–5.3)
POTASSIUM SERPL-SCNC: 5 MMOL/L — SIGNIFICANT CHANGE UP (ref 3.5–5.3)
PROT SERPL-MCNC: 4.6 G/DL — LOW (ref 6–8.3)
PROT SERPL-MCNC: 6.2 G/DL — SIGNIFICANT CHANGE UP (ref 6–8.3)
PROTHROM AB SERPL-ACNC: 15.8 SEC — HIGH (ref 10.5–13.4)
PROTHROM AB SERPL-ACNC: 17.5 SEC — HIGH (ref 10.5–13.4)
RBC # BLD: 4.02 M/UL — LOW (ref 4.2–5.8)
RBC # BLD: 4.2 M/UL — SIGNIFICANT CHANGE UP (ref 4.2–5.8)
RBC # BLD: 4.8 M/UL — SIGNIFICANT CHANGE UP (ref 4.2–5.8)
RBC # BLD: 5.01 M/UL — SIGNIFICANT CHANGE UP (ref 4.2–5.8)
RBC # FLD: 20.1 % — HIGH (ref 10.3–14.5)
RBC # FLD: 20.5 % — HIGH (ref 10.3–14.5)
RBC # FLD: 20.7 % — HIGH (ref 10.3–14.5)
RBC # FLD: 21 % — HIGH (ref 10.3–14.5)
RH IG SCN BLD-IMP: POSITIVE — SIGNIFICANT CHANGE UP
SAO2 % BLDA: 97 % — SIGNIFICANT CHANGE UP (ref 94–98)
SAO2 % BLDA: 98.6 % — HIGH (ref 94–98)
SAO2 % BLDA: 98.9 % — HIGH (ref 94–98)
SAO2 % BLDA: 99.1 % — HIGH (ref 94–98)
SAO2 % BLDA: 99.4 % — HIGH (ref 94–98)
SAO2 % BLDA: 99.5 % — HIGH (ref 94–98)
SAO2 % BLDA: 99.6 % — HIGH (ref 94–98)
SAO2 % BLDA: 99.6 % — HIGH (ref 94–98)
SAO2 % BLDA: 99.7 % — HIGH (ref 94–98)
SAO2 % BLDA: 99.8 % — HIGH (ref 94–98)
SODIUM BLDA-SCNC: 134 MMOL/L — LOW (ref 136–145)
SODIUM BLDA-SCNC: 134 MMOL/L — LOW (ref 136–145)
SODIUM BLDA-SCNC: 135 MMOL/L — LOW (ref 136–145)
SODIUM BLDA-SCNC: 137 MMOL/L — SIGNIFICANT CHANGE UP (ref 136–145)
SODIUM BLDA-SCNC: 137 MMOL/L — SIGNIFICANT CHANGE UP (ref 136–145)
SODIUM BLDA-SCNC: 138 MMOL/L — SIGNIFICANT CHANGE UP (ref 136–145)
SODIUM BLDA-SCNC: 138 MMOL/L — SIGNIFICANT CHANGE UP (ref 136–145)
SODIUM BLDA-SCNC: 139 MMOL/L — SIGNIFICANT CHANGE UP (ref 136–145)
SODIUM SERPL-SCNC: 135 MMOL/L — SIGNIFICANT CHANGE UP (ref 135–145)
SODIUM SERPL-SCNC: 143 MMOL/L — SIGNIFICANT CHANGE UP (ref 135–145)
WBC # BLD: 12.88 K/UL — HIGH (ref 3.8–10.5)
WBC # BLD: 14.87 K/UL — HIGH (ref 3.8–10.5)
WBC # BLD: 7.32 K/UL — SIGNIFICANT CHANGE UP (ref 3.8–10.5)
WBC # BLD: 7.41 K/UL — SIGNIFICANT CHANGE UP (ref 3.8–10.5)
WBC # FLD AUTO: 12.88 K/UL — HIGH (ref 3.8–10.5)
WBC # FLD AUTO: 14.87 K/UL — HIGH (ref 3.8–10.5)
WBC # FLD AUTO: 7.32 K/UL — SIGNIFICANT CHANGE UP (ref 3.8–10.5)
WBC # FLD AUTO: 7.41 K/UL — SIGNIFICANT CHANGE UP (ref 3.8–10.5)

## 2023-04-07 PROCEDURE — 33426 REPAIR OF MITRAL VALVE: CPT

## 2023-04-07 PROCEDURE — 33259 ABLATE ATRIA W/BYPASS ADD-ON: CPT

## 2023-04-07 PROCEDURE — 99233 SBSQ HOSP IP/OBS HIGH 50: CPT

## 2023-04-07 PROCEDURE — 71045 X-RAY EXAM CHEST 1 VIEW: CPT | Mod: 26

## 2023-04-07 PROCEDURE — 88305 TISSUE EXAM BY PATHOLOGIST: CPT | Mod: 26

## 2023-04-07 PROCEDURE — 33510 CABG VEIN SINGLE: CPT

## 2023-04-07 PROCEDURE — 99291 CRITICAL CARE FIRST HOUR: CPT

## 2023-04-07 DEVICE — KIT CVC 3LUM SPECTRUM 9FR: Type: IMPLANTABLE DEVICE | Status: FUNCTIONAL

## 2023-04-07 DEVICE — COR-KNOT QUICK LOAD SINGLES: Type: IMPLANTABLE DEVICE | Status: FUNCTIONAL

## 2023-04-07 DEVICE — SHUNT FLO-THRU INTRALUMINAL1.75MM X 18MM: Type: IMPLANTABLE DEVICE | Status: FUNCTIONAL

## 2023-04-07 DEVICE — CANNULA AORTIC ROOT WITH VENT LINE 14G X 14CM FLANGED: Type: IMPLANTABLE DEVICE | Status: FUNCTIONAL

## 2023-04-07 DEVICE — CATH IAB SENSATION PLUS FIBER OPTIC 8 FR. 50CC: Type: IMPLANTABLE DEVICE | Status: FUNCTIONAL

## 2023-04-07 DEVICE — TACHOSIL 9.5 X 4.8CM: Type: IMPLANTABLE DEVICE | Status: FUNCTIONAL

## 2023-04-07 DEVICE — PACING WIRE ORANGE M-25 WINGED WIRE 37MM X 89MM: Type: IMPLANTABLE DEVICE | Status: FUNCTIONAL

## 2023-04-07 DEVICE — CANNULA VENOUS 1 STAGE STRAIGHT 32FR X 3/8": Type: IMPLANTABLE DEVICE | Status: FUNCTIONAL

## 2023-04-07 DEVICE — HEARTSTRING III PROXIMAL SEAL SYSTEM: Type: IMPLANTABLE DEVICE | Status: FUNCTIONAL

## 2023-04-07 DEVICE — ANNULOPLASTY BAND MITRAL TRICUSPID SIMUPLUS FULL FLEX 34MM: Type: IMPLANTABLE DEVICE | Status: FUNCTIONAL

## 2023-04-07 DEVICE — PROBE MALLEABLE CRYOABLATION 10CM: Type: IMPLANTABLE DEVICE | Status: FUNCTIONAL

## 2023-04-07 DEVICE — CHEST DRAIN THORACIC PVC 28FR RIGHT ANGLE: Type: IMPLANTABLE DEVICE | Status: FUNCTIONAL

## 2023-04-07 DEVICE — CANNULA EASY FLOW AORTIC 23FR: Type: IMPLANTABLE DEVICE | Status: FUNCTIONAL

## 2023-04-07 DEVICE — COR-KNOT MINI DEVICE COMBO KIT: Type: IMPLANTABLE DEVICE | Status: FUNCTIONAL

## 2023-04-07 DEVICE — ATRICLIP LAA EXCLUSION DEVICE 45MM FLEX: Type: IMPLANTABLE DEVICE | Status: FUNCTIONAL

## 2023-04-07 DEVICE — CATH VENT LEFT HEART PVC15 18FR NON-VENTED: Type: IMPLANTABLE DEVICE | Status: FUNCTIONAL

## 2023-04-07 DEVICE — SHEATH INTRODUCER TERUMO PINNACLE PERIPHERAL 5FR X 10CM X 0.035" MINI WIRE: Type: IMPLANTABLE DEVICE | Status: FUNCTIONAL

## 2023-04-07 DEVICE — INTRO MICROPUNC 4FRX10CM SS: Type: IMPLANTABLE DEVICE | Status: FUNCTIONAL

## 2023-04-07 DEVICE — PACING WIRE WHITE M-20 BAREWIRE 89MM: Type: IMPLANTABLE DEVICE | Status: FUNCTIONAL

## 2023-04-07 DEVICE — SHEATH INTRODUCER TERUMO PINNACLE CORONARY 5FR X 10CM X 0.038" MINI WIRE: Type: IMPLANTABLE DEVICE | Status: FUNCTIONAL

## 2023-04-07 DEVICE — SURGICEL FIBRILLAR 4 X 4": Type: IMPLANTABLE DEVICE | Status: FUNCTIONAL

## 2023-04-07 RX ORDER — CHLORHEXIDINE GLUCONATE 213 G/1000ML
5 SOLUTION TOPICAL
Refills: 0 | Status: DISCONTINUED | OUTPATIENT
Start: 2023-04-07 | End: 2023-04-07

## 2023-04-07 RX ORDER — CEFAZOLIN SODIUM 1 G
2000 VIAL (EA) INJECTION EVERY 8 HOURS
Refills: 0 | Status: COMPLETED | OUTPATIENT
Start: 2023-04-07 | End: 2023-04-09

## 2023-04-07 RX ORDER — CHLORHEXIDINE GLUCONATE 213 G/1000ML
1 SOLUTION TOPICAL DAILY
Refills: 0 | Status: DISCONTINUED | OUTPATIENT
Start: 2023-04-07 | End: 2023-04-13

## 2023-04-07 RX ORDER — NOREPINEPHRINE BITARTRATE/D5W 8 MG/250ML
0.03 PLASTIC BAG, INJECTION (ML) INTRAVENOUS
Qty: 8 | Refills: 0 | Status: DISCONTINUED | OUTPATIENT
Start: 2023-04-07 | End: 2023-04-08

## 2023-04-07 RX ORDER — POLYETHYLENE GLYCOL 3350 17 G/17G
17 POWDER, FOR SOLUTION ORAL DAILY
Refills: 0 | Status: DISCONTINUED | OUTPATIENT
Start: 2023-04-07 | End: 2023-04-13

## 2023-04-07 RX ORDER — CHLORHEXIDINE GLUCONATE 213 G/1000ML
5 SOLUTION TOPICAL
Refills: 0 | Status: DISCONTINUED | OUTPATIENT
Start: 2023-04-07 | End: 2023-04-08

## 2023-04-07 RX ORDER — HEPARIN SODIUM 5000 [USP'U]/ML
5000 INJECTION INTRAVENOUS; SUBCUTANEOUS EVERY 8 HOURS
Refills: 0 | Status: DISCONTINUED | OUTPATIENT
Start: 2023-04-07 | End: 2023-04-13

## 2023-04-07 RX ORDER — VANCOMYCIN HCL 500 MG
5 VIAL (EA) INTRAVENOUS ONCE
Refills: 0 | Status: DISCONTINUED | OUTPATIENT
Start: 2023-04-07 | End: 2023-04-07

## 2023-04-07 RX ORDER — CALCIUM GLUCONATE 100 MG/ML
2 VIAL (ML) INTRAVENOUS ONCE
Refills: 0 | Status: COMPLETED | OUTPATIENT
Start: 2023-04-07 | End: 2023-04-07

## 2023-04-07 RX ORDER — PANTOPRAZOLE SODIUM 20 MG/1
40 TABLET, DELAYED RELEASE ORAL DAILY
Refills: 0 | Status: DISCONTINUED | OUTPATIENT
Start: 2023-04-07 | End: 2023-04-08

## 2023-04-07 RX ORDER — ACETAMINOPHEN 500 MG
650 TABLET ORAL ONCE
Refills: 0 | Status: COMPLETED | OUTPATIENT
Start: 2023-04-07 | End: 2023-04-07

## 2023-04-07 RX ORDER — CEFAZOLIN SODIUM 1 G
2000 VIAL (EA) INJECTION EVERY 8 HOURS
Refills: 0 | Status: DISCONTINUED | OUTPATIENT
Start: 2023-04-07 | End: 2023-04-07

## 2023-04-07 RX ORDER — ASPIRIN/CALCIUM CARB/MAGNESIUM 324 MG
81 TABLET ORAL DAILY
Refills: 0 | Status: DISCONTINUED | OUTPATIENT
Start: 2023-04-07 | End: 2023-04-07

## 2023-04-07 RX ORDER — ATORVASTATIN CALCIUM 80 MG/1
80 TABLET, FILM COATED ORAL AT BEDTIME
Refills: 0 | Status: DISCONTINUED | OUTPATIENT
Start: 2023-04-07 | End: 2023-04-13

## 2023-04-07 RX ORDER — INSULIN HUMAN 100 [IU]/ML
1 INJECTION, SOLUTION SUBCUTANEOUS
Qty: 50 | Refills: 0 | Status: DISCONTINUED | OUTPATIENT
Start: 2023-04-07 | End: 2023-04-07

## 2023-04-07 RX ORDER — CLOPIDOGREL BISULFATE 75 MG/1
75 TABLET, FILM COATED ORAL DAILY
Refills: 0 | Status: DISCONTINUED | OUTPATIENT
Start: 2023-04-07 | End: 2023-04-07

## 2023-04-07 RX ORDER — EPINEPHRINE 0.3 MG/.3ML
0.04 INJECTION INTRAMUSCULAR; SUBCUTANEOUS
Qty: 4 | Refills: 0 | Status: DISCONTINUED | OUTPATIENT
Start: 2023-04-07 | End: 2023-04-08

## 2023-04-07 RX ORDER — DEXTROSE 50 % IN WATER 50 %
50 SYRINGE (ML) INTRAVENOUS
Refills: 0 | Status: DISCONTINUED | OUTPATIENT
Start: 2023-04-07 | End: 2023-04-13

## 2023-04-07 RX ORDER — MEPERIDINE HYDROCHLORIDE 50 MG/ML
25 INJECTION INTRAMUSCULAR; INTRAVENOUS; SUBCUTANEOUS ONCE
Refills: 0 | Status: DISCONTINUED | OUTPATIENT
Start: 2023-04-07 | End: 2023-04-07

## 2023-04-07 RX ORDER — DEXTROSE 50 % IN WATER 50 %
50 SYRINGE (ML) INTRAVENOUS
Refills: 0 | Status: DISCONTINUED | OUTPATIENT
Start: 2023-04-07 | End: 2023-04-07

## 2023-04-07 RX ORDER — PANTOPRAZOLE SODIUM 20 MG/1
40 TABLET, DELAYED RELEASE ORAL DAILY
Refills: 0 | Status: DISCONTINUED | OUTPATIENT
Start: 2023-04-07 | End: 2023-04-07

## 2023-04-07 RX ORDER — PROPOFOL 10 MG/ML
10 INJECTION, EMULSION INTRAVENOUS
Qty: 1000 | Refills: 0 | Status: DISCONTINUED | OUTPATIENT
Start: 2023-04-07 | End: 2023-04-08

## 2023-04-07 RX ORDER — DEXMEDETOMIDINE HYDROCHLORIDE IN 0.9% SODIUM CHLORIDE 4 UG/ML
0.2 INJECTION INTRAVENOUS
Qty: 400 | Refills: 0 | Status: DISCONTINUED | OUTPATIENT
Start: 2023-04-07 | End: 2023-04-08

## 2023-04-07 RX ORDER — CHLORHEXIDINE GLUCONATE 213 G/1000ML
1 SOLUTION TOPICAL DAILY
Refills: 0 | Status: DISCONTINUED | OUTPATIENT
Start: 2023-04-07 | End: 2023-04-07

## 2023-04-07 RX ORDER — SODIUM CHLORIDE 9 MG/ML
1000 INJECTION INTRAMUSCULAR; INTRAVENOUS; SUBCUTANEOUS
Refills: 0 | Status: DISCONTINUED | OUTPATIENT
Start: 2023-04-07 | End: 2023-04-07

## 2023-04-07 RX ORDER — ASPIRIN/CALCIUM CARB/MAGNESIUM 324 MG
81 TABLET ORAL DAILY
Refills: 0 | Status: DISCONTINUED | OUTPATIENT
Start: 2023-04-07 | End: 2023-04-13

## 2023-04-07 RX ORDER — FENTANYL CITRATE 50 UG/ML
25 INJECTION INTRAVENOUS
Refills: 0 | Status: DISCONTINUED | OUTPATIENT
Start: 2023-04-07 | End: 2023-04-08

## 2023-04-07 RX ORDER — CHLORHEXIDINE GLUCONATE 213 G/1000ML
15 SOLUTION TOPICAL EVERY 12 HOURS
Refills: 0 | Status: DISCONTINUED | OUTPATIENT
Start: 2023-04-07 | End: 2023-04-07

## 2023-04-07 RX ORDER — SODIUM CHLORIDE 9 MG/ML
1000 INJECTION INTRAMUSCULAR; INTRAVENOUS; SUBCUTANEOUS
Refills: 0 | Status: DISCONTINUED | OUTPATIENT
Start: 2023-04-07 | End: 2023-04-13

## 2023-04-07 RX ORDER — INSULIN HUMAN 100 [IU]/ML
1 INJECTION, SOLUTION SUBCUTANEOUS
Qty: 50 | Refills: 0 | Status: DISCONTINUED | OUTPATIENT
Start: 2023-04-07 | End: 2023-04-08

## 2023-04-07 RX ORDER — CHLORHEXIDINE GLUCONATE 213 G/1000ML
1 SOLUTION TOPICAL
Refills: 0 | Status: DISCONTINUED | OUTPATIENT
Start: 2023-04-07 | End: 2023-04-07

## 2023-04-07 RX ORDER — DEXTROSE 50 % IN WATER 50 %
25 SYRINGE (ML) INTRAVENOUS
Refills: 0 | Status: DISCONTINUED | OUTPATIENT
Start: 2023-04-07 | End: 2023-04-07

## 2023-04-07 RX ORDER — VASOPRESSIN 20 [USP'U]/ML
0.02 INJECTION INTRAVENOUS
Qty: 40 | Refills: 0 | Status: DISCONTINUED | OUTPATIENT
Start: 2023-04-07 | End: 2023-04-09

## 2023-04-07 RX ORDER — DEXTROSE 50 % IN WATER 50 %
25 SYRINGE (ML) INTRAVENOUS
Refills: 0 | Status: DISCONTINUED | OUTPATIENT
Start: 2023-04-07 | End: 2023-04-13

## 2023-04-07 RX ADMIN — CHLORHEXIDINE GLUCONATE 1 APPLICATION(S): 213 SOLUTION TOPICAL at 00:28

## 2023-04-07 RX ADMIN — Medication 100 MILLIGRAM(S): at 06:24

## 2023-04-07 RX ADMIN — HEPARIN SODIUM 1400 UNIT(S)/HR: 5000 INJECTION INTRAVENOUS; SUBCUTANEOUS at 05:51

## 2023-04-07 RX ADMIN — Medication 200 GRAM(S): at 21:46

## 2023-04-07 RX ADMIN — Medication 180 MILLIGRAM(S): at 09:52

## 2023-04-07 RX ADMIN — CHLORHEXIDINE GLUCONATE 1 APPLICATION(S): 213 SOLUTION TOPICAL at 07:38

## 2023-04-07 RX ADMIN — HEPARIN SODIUM 1400 UNIT(S)/HR: 5000 INJECTION INTRAVENOUS; SUBCUTANEOUS at 08:35

## 2023-04-07 RX ADMIN — CHLORHEXIDINE GLUCONATE 5 MILLILITER(S): 213 SOLUTION TOPICAL at 23:34

## 2023-04-07 RX ADMIN — Medication 650 MILLIGRAM(S): at 10:51

## 2023-04-07 RX ADMIN — Medication 100 MILLIGRAM(S): at 21:10

## 2023-04-07 RX ADMIN — PANTOPRAZOLE SODIUM 40 MILLIGRAM(S): 20 TABLET, DELAYED RELEASE ORAL at 23:34

## 2023-04-07 RX ADMIN — CHLORHEXIDINE GLUCONATE 15 MILLILITER(S): 213 SOLUTION TOPICAL at 06:24

## 2023-04-07 RX ADMIN — CARVEDILOL PHOSPHATE 25 MILLIGRAM(S): 80 CAPSULE, EXTENDED RELEASE ORAL at 09:52

## 2023-04-07 RX ADMIN — Medication 1 APPLICATION(S): at 09:33

## 2023-04-07 RX ADMIN — HEPARIN SODIUM 1400 UNIT(S)/HR: 5000 INJECTION INTRAVENOUS; SUBCUTANEOUS at 07:38

## 2023-04-07 RX ADMIN — Medication 1 APPLICATION(S): at 00:28

## 2023-04-07 NOTE — PROGRESS NOTE ADULT - SUBJECTIVE AND OBJECTIVE BOX
CTICU  CRITICAL  CARE  attending     Hand off received 					   Pertinent clinical, laboratory, radiographic, hemodynamic, echocardiographic, respiratory data, microbiologic data and chart were reviewed and analyzed frequently throughout the course of the day and night      47 year old Male with HTN, hyperlipidemia,  HFpEF (EF:60%), HOCM, mod-severe MR, Atrial fibrillation diagnosed in 2021 (noncompliant w/ AC).  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   He presented to [facility] with acute exacerbation of CHF.  He had progressively worsening SOB and LE edema over few weeks. The patient states he could barely walk 20 feet prior to getting winded.   The patient had 2 pillow orthopnea, bilateral lower extremity edema and erythema, states he scratched and accidentally opening fluid filled blisters on his LE.   In [facility] ED, BP: 146/122, HR: 80s, RR:16, Temp: 97.5F, O2 sat: 96%RA.   EKG revealed Afib@105BPM without ischemic changes.   CXR with pulmonary vascular congestion and moderate right pleural effusion.   Labs notable for: BUN/Cr 51.9/2.0, Total bili 2.6, , , Alk phos 99, BNP 1169, Amylase 47, Lipase 533.   CT abd/pelvis revealed trace ascites, no peripancreatic abscess/necrosis. Abdominal US without acute findings.   The patient was admitted to telemetry for management HFpEF exacerbation, Atrial fibrillation with Rapid Ventricular Rate and Lower Extremity cellulitis.   The patient was started on IV diuretics and IV antibiotics with improvement. The patient achieved rate controlled with Beta Blocker and Cardizem. He was started on Eliquis for anticoagulation. Hospital course complicated by TATIANA, transaminitis and 11cm rectal sheath hematoma.  General Surgery recommended abdominal binder and no acute intervention, Eliquis was held and recommended to resume if H/H remains stable.    GI recommended to hold Statin. MRCP could not be performed due to history of cerebral coil embolization.    Echocardiogram : Normal LV function, EF:60%, LAE with severe MR, mitral valve degeneration of posterior leaflet with significant prolapse and mod-severe anterior directed MR.     S/P MV repair  S/P Ligation of LA appendage  S/P cryo maze          HEALTH ISSUES - PROBLEM Dx:  Acute on chronic heart failure with preserved ejection fraction (HFpEF)  Mitral regurgitation  Atrial fibrillation  Hyperlipidemia  TATIANA (acute kidney injury)  Transaminitis  Hematoma of rectus sheath  Cellulitis  HTN (hypertension)        FAMILY HISTORY:  No pertinent family history in first degree relatives      PAST MEDICAL & SURGICAL HISTORY:  HTN (hypertension)  SAH (subarachnoid hemorrhage)  Atrial fibrillation  HOCM (hypertrophic obstructive cardiomyopathy)  Mitral regurgitation  S/P coil embolization of cerebral aneurysm        14 system review was unremarkable    Vital signs, hemodynamic and respiratory parameters were reviewed from the bedside nursing flow sheet.  ICU Vital Signs Last 24 Hrs  T(C): 35.8 (2023 18:20), Max: 36.9 (2023 22:33)  T(F): 96.5 (2023 18:20), Max: 98.4 (2023 22:33)  HR: 79 (2023 21:00) (79 - 102)  BP: 136/100 (2023 09:52) (110/71 - 136/100)  BP(mean): 115 (2023 09:52) (82 - 115)  ABP: 108/73 (2023 21:00) (95/62 - 123/84)  ABP(mean): 85 (2023 21:00) (73 - 99)  RR: 12 (2023 21:00) (12 - 17)  SpO2: 100% (2023 21:00) (95% - 100%)    O2 Parameters below as of 2023 22:00  Patient On (Oxygen Delivery Method): ventilator          Adult Advanced Hemodynamics Last 24 Hrs  CVP(mm Hg): 16 (2023 21:00) (11 - 18)  CVP(cm H2O): --  CO: --  CI: --  PA: --  PA(mean): --  PCWP: --  SVR: --  SVRI: --  PVR: --  PVRI: --, ABG - ( 2023 18:40 )  pH, Arterial: 7.33  pH, Blood: x     /  pCO2: 45    /  pO2: 76    / HCO3: 24    / Base Excess: -2.4  /  SaO2: 94.8              Mode: AC/ CMV (Assist Control/ Continuous Mandatory Ventilation)  RR (machine): 12  TV (machine): 530  FiO2: 80  PEEP: 5  ITime: 1  MAP: 8  PIP: 20    Intake and output was reviewed and the fluid balance was calculated  Daily Height in cm: 187.96 (2023 09:17)    Daily Weight in k.9 (2023 06:22)  I&O's Summary    2023 07:01  -  2023 07:00  --------------------------------------------------------  IN: 272 mL / OUT: 975 mL / NET: -703 mL    2023 07:01  -  2023 21:19  --------------------------------------------------------  IN: 180.1 mL / OUT: 705 mL / NET: -524.9 mL        All lines and drain sites were assessed      Neuro: No change in the mental status from the baseline. Follows commands. Moves all 4 extremities.  Neck: No JVD.  CVS: S1, S2, No S3.  Lungs: Good air entry bilaterally.  Abd: Soft. No tenderness. + Bowel sounds.  Vascular: + DP/PT.  Extremities: No edema.  Lymphatic: Normal.  Skin: No abnormalities.      labs  CBC Full  -  ( 2023 18:40 )  WBC Count : 12.88 K/uL  RBC Count : 4.02 M/uL  Hemoglobin : 9.8 g/dL  Hematocrit : 31.5 %  Platelet Count - Automated : 183 K/uL  Mean Cell Volume : 78.4 fl  Mean Cell Hemoglobin : 24.4 pg  Mean Cell Hemoglobin Concentration : 31.1 gm/dL  Auto Neutrophil # : 11.14 K/uL  Auto Lymphocyte # : 0.62 K/uL  Auto Monocyte # : 0.87 K/uL  Auto Eosinophil # : 0.07 K/uL  Auto Basophil # : 0.04 K/uL  Auto Neutrophil % : 86.5 %  Auto Lymphocyte % : 4.8 %  Auto Monocyte % : 6.8 %  Auto Eosinophil % : 0.5 %  Auto Basophil % : 0.3 %        143  |  107  |  15  ----------------------------<  146<H>  5.0   |  24  |  0.94    Ca    7.9<L>      2023 18:40  Phos  3.2     -  Mg     2.5         TPro  4.6<L>  /  Alb  2.7<L>  /  TBili  2.4<H>  /  DBili  x   /  AST  343<H>  /  ALT  18  /  AlkPhos  69  -07    PT/INR - ( 2023 18:40 )   PT: 17.5 sec;   INR: 1.46          PTT - ( 2023 18:40 )  PTT:34.3 sec  The current medications were reviewed   MEDICATIONS  (STANDING):  aspirin  chewable 81 milliGRAM(s) Oral daily  atorvastatin 80 milliGRAM(s) Oral at bedtime  ceFAZolin   IVPB 2000 milliGRAM(s) IV Intermittent every 8 hours  chlorhexidine 0.12% Liquid 5 milliLiter(s) Oral Mucosa two times a day  chlorhexidine 4% Liquid 1 Application(s) Topical <User Schedule>  dexMEDEtomidine Infusion 0.2 MICROgram(s)/kG/Hr (5 mL/Hr) IV Continuous <Continuous>  dextrose 50% Injectable 50 milliLiter(s) IV Push every 15 minutes  dextrose 50% Injectable 25 milliLiter(s) IV Push every 15 minutes  EPINEPHrine    Infusion 0.04 MICROgram(s)/kG/Min (15 mL/Hr) IV Continuous <Continuous>  heparin   Injectable 5000 Unit(s) SubCutaneous every 8 hours  insulin regular Infusion 1 Unit(s)/Hr (1 mL/Hr) IV Continuous <Continuous>  norepinephrine Infusion 0.03 MICROgram(s)/kG/Min (5.62 mL/Hr) IV Continuous <Continuous>  pantoprazole  Injectable 40 milliGRAM(s) IV Push daily  polyethylene glycol 3350 17 Gram(s) Oral daily  propofol Infusion 10 MICROgram(s)/kG/Min (5.99 mL/Hr) IV Continuous <Continuous>  sodium chloride 0.9%. 1000 milliLiter(s) (10 mL/Hr) IV Continuous <Continuous>  vasopressin Infusion 0.02 Unit(s)/Min (3 mL/Hr) IV Continuous <Continuous>    MEDICATIONS  (PRN):  fentaNYL    Injectable 25 MICROGram(s) IV Push every 3 hours PRN Severe Pain (7 - 10)        PROBLEM LIST/ ASSESSMENT:  HEALTH ISSUES - PROBLEM Dx:  Acute on chronic heart failure with preserved ejection fraction (HFpEF)  Mitral regurgitation  Atrial fibrillation  Hyperlipidemia  TATIANA (acute kidney injury)  Transaminitis  Hematoma of rectus sheath  Cellulitis  HTN (hypertension)        48 year old  Male with exacerbation of CHF, Atrial fibrillation, Lower Extremity cellulitis.  S/P Mitral valve repair  S/P Exclusion of the LA appendage.  S/P cryoablation.  Hemodynamically stable.  Good oxygenation.  Fair urine out put.        My plan includes :  WEAN to Extubate.  D/C epinephrine and norepinephrine.  Statin Rx.   Close hemodynamic, ventilatory and drain monitoring and management  Monitor for arrhythmias and monitor parameters for organ perfusion  Monitor neurologic status  Monitor renal function.  Head of the bed should remain elevated to 45 deg .   Chest PT and IS will be encouraged  Monitor adequacy of oxygenation and ventilation and attempt to wean oxygen  Nutritional goals will be met using po eventually , ensure adequate caloric intake and monitor the same  Stress ulcer and VTE prophylaxis will be achieved    Glycemic control is satisfactory  Electrolytes have been repleted as necessary and wound care has been carried out. Pain control has been achieved.   Aggressive physical therapy and early mobility and ambulation goals will be met   The family was updated about the course and plan  CRITICAL CARE TIME SPENT in evaluation and management, reassessments, review and interpretation of labs and x-rays, ventilator and hemodynamic management, formulating a plan and coordinating care: ___90____ MIN.  Time does not include procedural time.  CTICU ATTENDING     					    Bruno Hahn MD                        	 CTICU  CRITICAL  CARE  attending     Hand off received 					   Pertinent clinical, laboratory, radiographic, hemodynamic, echocardiographic, respiratory data, microbiologic data and chart were reviewed and analyzed frequently throughout the course of the day and night      47 year old Male with HTN, hyperlipidemia,  HFpEF (EF:60%), HOCM, mod-severe MR, Atrial fibrillation diagnosed in 2021 (noncompliant w/ AC).  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   He presented to [facility] with acute exacerbation of CHF.  He had progressively worsening SOB and LE edema over few weeks. The patient states he could barely walk 20 feet prior to getting winded.   The patient had 2 pillow orthopnea, bilateral lower extremity edema and erythema, states he scratched and accidentally opening fluid filled blisters on his LE.   In [facility] ED, BP: 146/122, HR: 80s, RR:16, Temp: 97.5F, O2 sat: 96%RA.   EKG revealed Afib@105BPM without ischemic changes.   CXR with pulmonary vascular congestion and moderate right pleural effusion.   Labs notable for: BUN/Cr 51.9/2.0, Total bili 2.6, , , Alk phos 99, BNP 1169, Amylase 47, Lipase 533.   CT abd/pelvis revealed trace ascites, no peripancreatic abscess/necrosis. Abdominal US without acute findings.   The patient was admitted to telemetry for management HFpEF exacerbation, Atrial fibrillation with Rapid Ventricular Rate and Lower Extremity cellulitis.   The patient was started on IV diuretics and IV antibiotics with improvement. The patient achieved rate controlled with Beta Blocker and Cardizem. He was started on Eliquis for anticoagulation. Hospital course complicated by TATIANA, transaminitis and 11cm rectal sheath hematoma.  General Surgery recommended abdominal binder and no acute intervention, Eliquis was held and recommended to resume if H/H remains stable.    GI recommended to hold Statin. MRCP could not be performed due to history of cerebral coil embolization.    Echocardiogram : Normal LV function, EF:60%, LAE with severe MR, mitral valve degeneration of posterior leaflet with significant prolapse and mod-severe anterior directed MR.     S/P MV repair  S/P Ligation of LA appendage  S/P cryo maze          HEALTH ISSUES - PROBLEM Dx:  Acute on chronic heart failure with preserved ejection fraction (HFpEF)  Mitral regurgitation  Atrial fibrillation  Hyperlipidemia  TATIANA (acute kidney injury)  Transaminitis  Hematoma of rectus sheath  Cellulitis  HTN (hypertension)        FAMILY HISTORY:  No pertinent family history in first degree relatives      PAST MEDICAL & SURGICAL HISTORY:  HTN (hypertension)  SAH (subarachnoid hemorrhage)  Atrial fibrillation  HOCM (hypertrophic obstructive cardiomyopathy)  Mitral regurgitation  S/P coil embolization of cerebral aneurysm        14 system review was unremarkable    Vital signs, hemodynamic and respiratory parameters were reviewed from the bedside nursing flow sheet.  ICU Vital Signs Last 24 Hrs  T(C): 35.8 (2023 18:20), Max: 36.9 (2023 22:33)  T(F): 96.5 (2023 18:20), Max: 98.4 (2023 22:33)  HR: 79 (2023 21:00) (79 - 102)  BP: 136/100 (2023 09:52) (110/71 - 136/100)  BP(mean): 115 (2023 09:52) (82 - 115)  ABP: 108/73 (2023 21:00) (95/62 - 123/84)  ABP(mean): 85 (2023 21:00) (73 - 99)  RR: 12 (2023 21:00) (12 - 17)  SpO2: 100% (2023 21:00) (95% - 100%)    O2 Parameters below as of 2023 22:00  Patient On (Oxygen Delivery Method): ventilator          Adult Advanced Hemodynamics Last 24 Hrs  CVP(mm Hg): 16 (2023 21:00) (11 - 18)  CVP(cm H2O): --  CO: --  CI: --  PA: --  PA(mean): --  PCWP: --  SVR: --  SVRI: --  PVR: --  PVRI: --, ABG - ( 2023 18:40 )  pH, Arterial: 7.33  pH, Blood: x     /  pCO2: 45    /  pO2: 76    / HCO3: 24    / Base Excess: -2.4  /  SaO2: 94.8              Mode: AC/ CMV (Assist Control/ Continuous Mandatory Ventilation)  RR (machine): 12  TV (machine): 530  FiO2: 80  PEEP: 5  ITime: 1  MAP: 8  PIP: 20    Intake and output was reviewed and the fluid balance was calculated  Daily Height in cm: 187.96 (2023 09:17)    Daily Weight in k.9 (2023 06:22)  I&O's Summary    2023 07:01  -  2023 07:00  --------------------------------------------------------  IN: 272 mL / OUT: 975 mL / NET: -703 mL    2023 07:01  -  2023 21:19  --------------------------------------------------------  IN: 180.1 mL / OUT: 705 mL / NET: -524.9 mL        All lines and drain sites were assessed      Neuro: No change in the mental status from the baseline. Follows commands. Moves all 4 extremities.  Neck: No JVD.  CVS: S1, S2, No S3.  Lungs: Good air entry bilaterally.  Abd: Soft. No tenderness. + Bowel sounds.  Vascular: + DP/PT.  Extremities: Lower extremity edema. (Scattered blisters)  Lymphatic: Normal.  Skin: No abnormalities.      labs  CBC Full  -  ( 2023 18:40 )  WBC Count : 12.88 K/uL  RBC Count : 4.02 M/uL  Hemoglobin : 9.8 g/dL  Hematocrit : 31.5 %  Platelet Count - Automated : 183 K/uL  Mean Cell Volume : 78.4 fl  Mean Cell Hemoglobin : 24.4 pg  Mean Cell Hemoglobin Concentration : 31.1 gm/dL  Auto Neutrophil # : 11.14 K/uL  Auto Lymphocyte # : 0.62 K/uL  Auto Monocyte # : 0.87 K/uL  Auto Eosinophil # : 0.07 K/uL  Auto Basophil # : 0.04 K/uL  Auto Neutrophil % : 86.5 %  Auto Lymphocyte % : 4.8 %  Auto Monocyte % : 6.8 %  Auto Eosinophil % : 0.5 %  Auto Basophil % : 0.3 %        143  |  107  |  15  ----------------------------<  146<H>  5.0   |  24  |  0.94    Ca    7.9<L>      2023 18:40  Phos  3.2       Mg     2.5         TPro  4.6<L>  /  Alb  2.7<L>  /  TBili  2.4<H>  /  DBili  x   /  AST  343<H>  /  ALT  18  /  AlkPhos  69  07    PT/INR - ( 2023 18:40 )   PT: 17.5 sec;   INR: 1.46          PTT - ( 2023 18:40 )  PTT:34.3 sec  The current medications were reviewed   MEDICATIONS  (STANDING):  aspirin  chewable 81 milliGRAM(s) Oral daily  atorvastatin 80 milliGRAM(s) Oral at bedtime  ceFAZolin   IVPB 2000 milliGRAM(s) IV Intermittent every 8 hours  chlorhexidine 0.12% Liquid 5 milliLiter(s) Oral Mucosa two times a day  chlorhexidine 4% Liquid 1 Application(s) Topical <User Schedule>  dexMEDEtomidine Infusion 0.2 MICROgram(s)/kG/Hr (5 mL/Hr) IV Continuous <Continuous>  dextrose 50% Injectable 50 milliLiter(s) IV Push every 15 minutes  dextrose 50% Injectable 25 milliLiter(s) IV Push every 15 minutes  EPINEPHrine    Infusion 0.04 MICROgram(s)/kG/Min (15 mL/Hr) IV Continuous <Continuous>  heparin   Injectable 5000 Unit(s) SubCutaneous every 8 hours  insulin regular Infusion 1 Unit(s)/Hr (1 mL/Hr) IV Continuous <Continuous>  norepinephrine Infusion 0.03 MICROgram(s)/kG/Min (5.62 mL/Hr) IV Continuous <Continuous>  pantoprazole  Injectable 40 milliGRAM(s) IV Push daily  polyethylene glycol 3350 17 Gram(s) Oral daily  propofol Infusion 10 MICROgram(s)/kG/Min (5.99 mL/Hr) IV Continuous <Continuous>  sodium chloride 0.9%. 1000 milliLiter(s) (10 mL/Hr) IV Continuous <Continuous>  vasopressin Infusion 0.02 Unit(s)/Min (3 mL/Hr) IV Continuous <Continuous>    MEDICATIONS  (PRN):  fentaNYL    Injectable 25 MICROGram(s) IV Push every 3 hours PRN Severe Pain (7 - 10)        PROBLEM LIST/ ASSESSMENT:  HEALTH ISSUES - PROBLEM Dx:  Acute on chronic heart failure with preserved ejection fraction (HFpEF)  Mitral regurgitation  Atrial fibrillation  Hyperlipidemia  TATIANA (acute kidney injury)  Transaminitis  Hematoma of rectus sheath  Cellulitis  HTN (hypertension)        48 year old  Male with exacerbation of CHF, Atrial fibrillation, Lower Extremity cellulitis.  S/P Mitral valve repair  S/P Exclusion of the LA appendage.  S/P cryoablation.  Hemodynamically stable.  Good oxygenation.  Fair urine out put.        My plan includes :  WEAN to Extubate.  D/C epinephrine and norepinephrine.  Statin Rx.   Close hemodynamic, ventilatory and drain monitoring and management  Monitor for arrhythmias and monitor parameters for organ perfusion  Monitor neurologic status  Monitor renal function.  Head of the bed should remain elevated to 45 deg .   Chest PT and IS will be encouraged  Monitor adequacy of oxygenation and ventilation and attempt to wean oxygen  Nutritional goals will be met using po eventually , ensure adequate caloric intake and monitor the same  Stress ulcer and VTE prophylaxis will be achieved    Glycemic control is satisfactory  Electrolytes have been repleted as necessary and wound care has been carried out. Pain control has been achieved.   Aggressive physical therapy and early mobility and ambulation goals will be met   The family was updated about the course and plan  CRITICAL CARE TIME SPENT in evaluation and management, reassessments, review and interpretation of labs and x-rays, ventilator and hemodynamic management, formulating a plan and coordinating care: ___90____ MIN.  Time does not include procedural time.  CTICU ATTENDING     					    Bruno Hahn MD                        	 CTICU  CRITICAL  CARE  attending     Hand off received 					   Pertinent clinical, laboratory, radiographic, hemodynamic, echocardiographic, respiratory data, microbiologic data and chart were reviewed and analyzed frequently throughout the course of the day and night      47 year old Male with HTN, hyperlipidemia,  HFpEF (EF:60%), HOCM, mod-severe MR, Atrial fibrillation diagnosed in 2021 (noncompliant w/ AC).  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   He presented to [facility] with acute exacerbation of CHF.  He had progressively worsening SOB and LE edema over few weeks. The patient states he could barely walk 20 feet prior to getting winded.   The patient had 2 pillow orthopnea, bilateral lower extremity edema and erythema, states he scratched and accidentally opening fluid filled blisters on his LE.   In [facility] ED, BP: 146/122, HR: 80s, RR:16, Temp: 97.5F, O2 sat: 96%RA.   EKG revealed Afib@105BPM without ischemic changes.   CXR with pulmonary vascular congestion and moderate right pleural effusion.   Labs notable for: BUN/Cr 51.9/2.0, Total bili 2.6, , , Alk phos 99, BNP 1169, Amylase 47, Lipase 533.   CT abd/pelvis revealed trace ascites, no peripancreatic abscess/necrosis. Abdominal US without acute findings.   The patient was admitted to telemetry for management HFpEF exacerbation, Atrial fibrillation with Rapid Ventricular Rate and Lower Extremity cellulitis.   The patient was started on IV diuretics and IV antibiotics with improvement. The patient achieved rate controlled with Beta Blocker and Cardizem. He was started on Eliquis for anticoagulation. Hospital course complicated by TATIANA, transaminitis and 11cm rectal sheath hematoma.  General Surgery recommended abdominal binder and no acute intervention, Eliquis was held and recommended to resume if H/H remains stable.    GI recommended to hold Statin. MRCP could not be performed due to history of cerebral coil embolization.    Echocardiogram : Normal LV function, EF:60%, LAE with severe MR, mitral valve degeneration of posterior leaflet with significant prolapse and mod-severe anterior directed MR.     S/P MV repair  S/P Ligation of LA appendage  S/P cryo maze          HEALTH ISSUES - PROBLEM Dx:  Acute on chronic heart failure with preserved ejection fraction (HFpEF)  Mitral regurgitation  Atrial fibrillation  Hyperlipidemia  TATIANA (acute kidney injury)  Transaminitis  Hematoma of rectus sheath  Cellulitis  HTN (hypertension)        FAMILY HISTORY:  No pertinent family history in first degree relatives      PAST MEDICAL & SURGICAL HISTORY:  HTN (hypertension)  SAH (subarachnoid hemorrhage)  Atrial fibrillation  HOCM (hypertrophic obstructive cardiomyopathy)  Mitral regurgitation  S/P coil embolization of cerebral aneurysm        14 system review was unremarkable    Vital signs, hemodynamic and respiratory parameters were reviewed from the bedside nursing flow sheet.  ICU Vital Signs Last 24 Hrs  T(C): 35.8 (2023 18:20), Max: 36.9 (2023 22:33)  T(F): 96.5 (2023 18:20), Max: 98.4 (2023 22:33)  HR: 79 (2023 21:00) (79 - 102)  BP: 136/100 (2023 09:52) (110/71 - 136/100)  BP(mean): 115 (2023 09:52) (82 - 115)  ABP: 108/73 (2023 21:00) (95/62 - 123/84)  ABP(mean): 85 (2023 21:00) (73 - 99)  RR: 12 (2023 21:00) (12 - 17)  SpO2: 100% (2023 21:00) (95% - 100%)    O2 Parameters below as of 2023 22:00  Patient On (Oxygen Delivery Method): ventilator          Adult Advanced Hemodynamics Last 24 Hrs  CVP(mm Hg): 16 (2023 21:00) (11 - 18)  CVP(cm H2O): --  CO: --  CI: --  PA: --  PA(mean): --  PCWP: --  SVR: --  SVRI: --  PVR: --  PVRI: --, ABG - ( 2023 18:40 )  pH, Arterial: 7.33  pH, Blood: x     /  pCO2: 45    /  pO2: 76    / HCO3: 24    / Base Excess: -2.4  /  SaO2: 94.8              Mode: AC/ CMV (Assist Control/ Continuous Mandatory Ventilation)  RR (machine): 12  TV (machine): 530  FiO2: 80  PEEP: 5  ITime: 1  MAP: 8  PIP: 20    Intake and output was reviewed and the fluid balance was calculated  Daily Height in cm: 187.96 (2023 09:17)    Daily Weight in k.9 (2023 06:22)  I&O's Summary    2023 07:01  -  2023 07:00  --------------------------------------------------------  IN: 272 mL / OUT: 975 mL / NET: -703 mL    2023 07:01  -  2023 21:19  --------------------------------------------------------  IN: 180.1 mL / OUT: 705 mL / NET: -524.9 mL        All lines and drain sites were assessed      Neuro: No change in the mental status from the baseline. Follows commands. Moves all 4 extremities.  Neck: No JVD.  CVS: S1, S2, No S3.  Lungs: Good air entry bilaterally.  Abd: Soft. No tenderness. + Bowel sounds.  Vascular: + DP/PT.  Extremities: Lower extremity edema. (Scattered blisters)  Lymphatic: Normal.  Skin: No abnormalities.      labs  CBC Full  -  ( 2023 18:40 )  WBC Count : 12.88 K/uL  RBC Count : 4.02 M/uL  Hemoglobin : 9.8 g/dL  Hematocrit : 31.5 %  Platelet Count - Automated : 183 K/uL  Mean Cell Volume : 78.4 fl  Mean Cell Hemoglobin : 24.4 pg  Mean Cell Hemoglobin Concentration : 31.1 gm/dL  Auto Neutrophil # : 11.14 K/uL  Auto Lymphocyte # : 0.62 K/uL  Auto Monocyte # : 0.87 K/uL  Auto Eosinophil # : 0.07 K/uL  Auto Basophil # : 0.04 K/uL  Auto Neutrophil % : 86.5 %  Auto Lymphocyte % : 4.8 %  Auto Monocyte % : 6.8 %  Auto Eosinophil % : 0.5 %  Auto Basophil % : 0.3 %        143  |  107  |  15  ----------------------------<  146<H>  5.0   |  24  |  0.94    Ca    7.9<L>      2023 18:40  Phos  3.2       Mg     2.5         TPro  4.6<L>  /  Alb  2.7<L>  /  TBili  2.4<H>  /  DBili  x   /  AST  343<H>  /  ALT  18  /  AlkPhos  69  07    PT/INR - ( 2023 18:40 )   PT: 17.5 sec;   INR: 1.46          PTT - ( 2023 18:40 )  PTT:34.3 sec  The current medications were reviewed   MEDICATIONS  (STANDING):  aspirin  chewable 81 milliGRAM(s) Oral daily  atorvastatin 80 milliGRAM(s) Oral at bedtime  ceFAZolin   IVPB 2000 milliGRAM(s) IV Intermittent every 8 hours  chlorhexidine 0.12% Liquid 5 milliLiter(s) Oral Mucosa two times a day  chlorhexidine 4% Liquid 1 Application(s) Topical <User Schedule>  dexMEDEtomidine Infusion 0.2 MICROgram(s)/kG/Hr (5 mL/Hr) IV Continuous <Continuous>  dextrose 50% Injectable 50 milliLiter(s) IV Push every 15 minutes  dextrose 50% Injectable 25 milliLiter(s) IV Push every 15 minutes  EPINEPHrine    Infusion 0.04 MICROgram(s)/kG/Min (15 mL/Hr) IV Continuous <Continuous>  heparin   Injectable 5000 Unit(s) SubCutaneous every 8 hours  insulin regular Infusion 1 Unit(s)/Hr (1 mL/Hr) IV Continuous <Continuous>  norepinephrine Infusion 0.03 MICROgram(s)/kG/Min (5.62 mL/Hr) IV Continuous <Continuous>  pantoprazole  Injectable 40 milliGRAM(s) IV Push daily  polyethylene glycol 3350 17 Gram(s) Oral daily  propofol Infusion 10 MICROgram(s)/kG/Min (5.99 mL/Hr) IV Continuous <Continuous>  sodium chloride 0.9%. 1000 milliLiter(s) (10 mL/Hr) IV Continuous <Continuous>  vasopressin Infusion 0.02 Unit(s)/Min (3 mL/Hr) IV Continuous <Continuous>    MEDICATIONS  (PRN):  fentaNYL    Injectable 25 MICROGram(s) IV Push every 3 hours PRN Severe Pain (7 - 10)        PROBLEM LIST/ ASSESSMENT:  HEALTH ISSUES - PROBLEM Dx:  Acute on chronic heart failure with preserved ejection fraction (HFpEF)  Mitral regurgitation  Atrial fibrillation  Hyperlipidemia  TATIANA (acute kidney injury)  Transaminitis  Hematoma of rectus sheath  Cellulitis  HTN (hypertension)        48 year old  Male with exacerbation of CHF, Atrial fibrillation, Lower Extremity cellulitis.  S/P Mitral valve repair  S/P Exclusion of the LA appendage.  S/P cryoablation.  AV paced (Underlying rhythm is complete heart block).  Hemodynamically stable.  Good oxygenation.  Fair urine out put.        My plan includes :  WEAN to Extubate.  D/C epinephrine and norepinephrine.  Statin Rx.   Close hemodynamic, ventilatory and drain monitoring and management  Monitor for arrhythmias and monitor parameters for organ perfusion  Monitor neurologic status  Monitor renal function.  Head of the bed should remain elevated to 45 deg .   Chest PT and IS will be encouraged  Monitor adequacy of oxygenation and ventilation and attempt to wean oxygen  Nutritional goals will be met using po eventually , ensure adequate caloric intake and monitor the same  Stress ulcer and VTE prophylaxis will be achieved    Glycemic control is satisfactory  Electrolytes have been repleted as necessary and wound care has been carried out. Pain control has been achieved.   Aggressive physical therapy and early mobility and ambulation goals will be met   The family was updated about the course and plan  CRITICAL CARE TIME SPENT in evaluation and management, reassessments, review and interpretation of labs and x-rays, ventilator and hemodynamic management, formulating a plan and coordinating care: ___90____ MIN.  Time does not include procedural time.  CTICU ATTENDING     					    Bruno Hahn MD

## 2023-04-07 NOTE — PROGRESS NOTE ADULT - PROBLEM SELECTOR PLAN 1
On exam, patient on room air but still with persistent B/L LE edema and JVD  - Was receiving Lasix 40mg IV QD at outside hospital.   - TTE @ [facility] 3/26/23 (actual report not available; per clinical documentation): LVEF 60%, LAE w/ severe MR, MV degeneration of posterior leaflet w/ significant prolapse and mod-severe  anterior directed MR.   - On prior TTE 7/2021, pt w/ HOCM w/ severe LVOT obstruction w/ resting gradient of 90mmHg, no LVOT obstruction on most recent echos   - Advanced HF following - diuresing with IV lasix 40 IV BID; other GDMT include Hydralazine 25 mg TID, coreg 25 mg BID, and lisinopril 30 mg QD.  - Cardiac cath 4/6/23 revealing 3vCAD - m/dLAD 80%, OM3 80%, dRCA 80%; other findings include LM: normal, LAD: ectactic, large, mLAD: 60%, m-d LAD: 80%, D1: 50%, pLCX: large, OM1/OM2: small, OM3: large, d OM3: 80%, dRCA: 80% w/ large RPDA/RPL; no EF/EDP.  - Undergoing MVR/CABG w/ Dr Gonsalves 4/7/2023

## 2023-04-07 NOTE — PROGRESS NOTE ADULT - PROBLEM/PLAN-4
DISPLAY PLAN FREE TEXT
adult consistency food
Peptum jr/adult consistency food/tube feeding

## 2023-04-07 NOTE — PRE-OP CHECKLIST - SELECT TESTS ORDERED
CBC/PT/PTT/Type and Screen BMP/CBC/PT/PTT/Hepatic Function/Type and Screen/Urinalysis/EKG/CXR/COVID-19

## 2023-04-07 NOTE — PROGRESS NOTE ADULT - PROBLEM SELECTOR PLAN 7
RESOLVED  - Cr peaked at 2.0 during admission@ [facility].  - currently stable at 1.2, will continue to monitor closely.  - avoid nephrotoxic agents.

## 2023-04-07 NOTE — BRIEF OPERATIVE NOTE - OPERATION/FINDINGS
CPB: 166 min  XC: 131 min Aortic XClamp:     |    Total CPB:     Procedure:  SVG-PDA  MVr - repair with neocords and 34mm annuloplasty band  Biatrial Cryo maze ablation  Left atrial appendage ligation with 45mm AtriClip     Left greater saphenous vein harvested endoscopically by SONIA ALCANTAR CPB: 166 min  XC: 131 min Aortic XClamp: 131     |    Total CPB: 166    Procedure:  SVG-PDA  MVr - repair with neocords and 34mm annuloplasty band  Biatrial Cryo maze ablation  Left atrial appendage ligation with 45mm AtriClip     Left greater saphenous vein harvested endoscopically by SONIA ALCANTAR

## 2023-04-07 NOTE — PROGRESS NOTE ADULT - PROBLEM SELECTOR PLAN 2
- TTE 4/3/23: Mild to moderate symmetric LVH. LVEF 60%. Posterior leaflet of the mitral valve appears flail. There is severe, eccentric, anteriorly- concern for endocarditis. Follow up blood cultures x2 and ESR/CRP.   directed mitral regurgitation. PHTN present, PAP 44 mmHg   - VERONIQUE 4/4/23: Normal LVSF, normal RVSF, dilated LA, No LA/RA/MARILYN/RAA thrombus seen. Mitral valve prolapse of the posterior leaflet at A2-P2 and A3-P3. flail of P3 scallop. Severe MR,  No significant valvular disease. pHTN with PASP 52 mmHg.   - Plan for CABG-MVR 4/7/2023 w/ Dr Gonsalves; ordered carotid US, T&S, UA, covid, bedside PFTs, CXR      ## HOCM  - severe LVOT obstruction w/ resting gradient 90mmHg by TTE in 2021.  - TTE 4/3/23: Mild to moderate symmetric left ventricular hypertrophy; LV diastolic function and filling pressure is made challenging by presence of severe MR.  - CONT: Cardizem and Carvedilol.

## 2023-04-07 NOTE — PROGRESS NOTE ADULT - PROBLEM SELECTOR PROBLEM 4
Hyperlipidemia
Cellulitis
Hyperlipidemia

## 2023-04-07 NOTE — PROGRESS NOTE ADULT - PROBLEM SELECTOR PLAN 9
RESOLVED  - 11cm rectal sheath hematoma: Surgery consulted @ [facility].  - Abd US consistent with hematoma.  - Recommend abdominal binder for 2wks, no acute intervention and okay to resume AC as pt low risk for re-expansion.   - Gen surg signed off, re-consult as needed. RESOLVED  - 11cm rectal sheath hematoma: Surgery consulted @ [facility].  - Abd US consistent with hematoma.  - Recommend abdominal binder for 2wks, no acute intervention and okay to resume AC as pt low risk for re-expansion.   - Gen surg signed off, re-consult as needed.      Fluids: none  Electrolytes: replete as necessary, K>4, Mg>2  Nutrition: DASH TLC  DVT ppx: heparin gtt  Code: Full  Disposition: Admit to cardiac/telemetry

## 2023-04-07 NOTE — PROGRESS NOTE ADULT - NS ATTEND AMEND GEN_ALL_CORE FT
See PA note written above, for details. I reviewed the PA documentation.  I have personally seen and examined this patient today. I reviewed vitals, labs, medications, cardiac studies and additional imaging.  I agree with the PA's findings and plans as written above with the following additions/amendments:  VERONIQUE 4.4.23: flail MV with 4+MR, no e/o IE  C 4/6/23: severe 3vCAD, LVEDP 15    Plan:  NPO for CTSx OR today:  MV repair, CABG, cryomaze ablation, MARILYN atriclip  Continue IV hep gtt tobin-op   Continue Lasix 40mg IV BID tobin-op  Hydralazine 25 TID, Diltiazem 180CD daily, Coreg 25 BID, Lisinopril 30mg po daily for BP control -->NB: Hydralazine and Lisinopril held on AM of OR  ID recs for cellulitis mgmt: 2gm IV cefazolin through 4/9  General surgery consulted for rectal sheath hematoma evaluation, approved to resume systemic A/C use  Vascular surgery c/s for PVD assessment,  rec BID bacitracin and gauze covering for wounds, LE arterial duplex negative  Advanced CHF following for pre op optimization per d/w CTSx, to continue to follow post op  PT rec outpatient PT pending stair evaluation and post op course  Ananth Villarreal M.D.  Cardiology Attending  35minutes spent on total encounter; more than 50% of the visit was spent counseling and/or coordinating care by the attending physician, with plan of care discussed with the patient and cardiac team.

## 2023-04-07 NOTE — PROGRESS NOTE ADULT - PROBLEM SELECTOR PROBLEM 1
Acute on chronic heart failure with preserved ejection fraction (HFpEF)

## 2023-04-07 NOTE — PRE-ANESTHESIA EVALUATION ADULT - NSANTHPMHFT_GEN_ALL_CORE
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 7/29/21 who presented to [facility] 3/24/23 c/o progressively worsening SOB and LE edema x few weeks. Patient states he could barely walk 20 feet prior to getting winded. Patient also admitted to 2 pillow orthopnea, B/L edema and erythema, states he scratched and accidentally opening fluid filled blisters on his LE. Patient denies any N/V, diaphoresis, palpitations, dizziness, chest pain, recent travel or sick contacts. Patient reports he ran out of medications a few months ago and stopped taking some of his cardiac medications 2/2 cost.    In [facility] ED:   Echocardiogram c/w normal LV function, EF:60%, LAE with severe MR, mitral valve degeneration of posterior leaflet with significant prolapse and mod-severe anterior directed MR. Patient admitted to telemetry for management HFpEF exacerbation, Afib w/ RVR and LE cellulitis.     Patient started on IV diuretics and IV antibiotics with improvement. Patient rate controlled with BB/Cardizem and started on Eliquis for anticoagulation. Hospital course c/b TATIANA, transaminitis and 11cm rectal sheath hematoma. Surgery consulted: recommended abdominal binder/no acute intervention, Eliquis was held and recommended to resume if H/H remains stable.  GI consulted: patients Statin medication was held. Initially recommended for MRCP, however unable to perform due to hx of cerebral coil.     Patient transferred 4/1/23 to [facility] for cardiac telemetry for continued management of HFpEF exacerbation and structural heart evaluation for MR.     Since transfer to [facility]:    Initially structural heart consulted for evaluation of mod/severe MR. A VERONIQUE was performed revealing MV prolapse of posterior leaflet at A2-P2 and A3-P3, flail P3 scallop, and severe MR. CTSurgery was consulted for surgical intervention and workup.      TRANSFER MEDS:  Cefazolin IV 1G Q8H  Miralax BID  KCl 20mEq PO daily  Lasix 40mg IV daily  Lisinopril 20mg PO daily  Pantoprazole 40mg PO daily  Metoprolol Succinate 150mg PO BID  Zofran 4mg IV q 6hr  Diltiazem HCL 120mg PO daily    No Known Allergies:       PAST MEDICAL HISTORY:  Atrial fibrillation   HOCM (hypertrophic obstructive cardiomyopathy) - - most recent echo without obstructive component  HTN (hypertension)   Mitral regurgiation   SAH (subarachnoid hemorrhage).   PAST SURGICAL HISTORY:  S/P coil embolization of cerebral aneurysm.    TTE / VERONIQUE:   < from: VERONIQUE Echo Doppler w/ Cont (04.04.23 @ 13:32) >  CONCLUSIONS:     1. Normal left ventricular systolic function.   2. Normal right ventricular size and systolic function.   3. Dilated left atrium.   4. No LA/RA/MARILYN/RAA thrombus seen.   5. Mitral valve prolapse of the posterior leaflet at A2-P2 and A3-P3.   There is flail of P3 scallop. Severe mitral regurgitation seen at a blood   pressure of 126/86 mmHg.   6. No significant valvular disease.   7. Pulmonary hypertension present, pulmonary artery systolic pressure is   52 mmHg.  8. No pericardial effusion.    TTE: normal biventricular function. Mild-mod LVH, septal thickness 1.3 cm and pw thickness of 1.17 cm. No obstructive/intracavitary gradient. LVOT VTI 12 cm. Severely dilated LA. Severe primary MR 2/2 flail posterior leaflet. PASP 44. Estimated RA ~15 mmHg    Cardiac Cath  The coronary circulation is right dominant.    LM   Left main artery: Normal.    LAD   Left anterior descending artery: Ectatic, large, mid 60% stenosis,  mid-distal 80% stenosis.    CX   Circumflex: Large, Proximal vessel luminal irregularities. OM3, verylarge, 80% distal stenosis, trifurcation with large distal    vessels. .    RCA   Right coronary artery: Large, ectatic, mild diffuse disease. Distal  80% stenosis, with large PDA and RPL.

## 2023-04-07 NOTE — PROGRESS NOTE ADULT - ASSESSMENT
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 Patient transferred to [facility] for cardiac telemetry for continued management of HFpEF exacerbation and structural heart evaluation for MR. On arrival, TATIANA, transaminitis resolved; cellulitis treatment continued per ID consult. TTE with severe, eccentric, anteriorly directed MR with flail, VERONIQUE negative for endocarditis. Patient underwent cardiac cath with Dr Sousa 4/6/2023, revealed 3vCAD. Plan for CABG/MVR w/ Dr Gonsalves on Friday 4/7/23.

## 2023-04-07 NOTE — PROGRESS NOTE ADULT - PROBLEM SELECTOR PLAN 8
RESOLVED  - At [facility]: total bili 2.6, , , Alk Phos 99, Amylase 47, Lipase 533.   - Liver enzymes normalized here.   - Initially was recommended for MRCP; however, cannot be performed 2/2 Hx of cerebral coil.   - Continue to monitor.

## 2023-04-07 NOTE — PRE-ANESTHESIA EVALUATION ADULT - NSANTHPEFT_GEN_ALL_CORE
General: Appearance is consistent with chronological age. No abnormal facies.  Airway:  See Mallampati score  EENT: Anicteric sclera; oropharynx clear, moist mucus membranes  Cardiovascular:  Regular rate and rhythm  Respiratory: Unlabored breathing  Neurological: Awake and alert, moves all extremities  Constitutional: MET>4   LE cellulitis

## 2023-04-07 NOTE — PROGRESS NOTE ADULT - PROBLEM SELECTOR PROBLEM 7
Hematoma of rectus sheath
Hematoma of rectus sheath
TAITANA (acute kidney injury)
Hematoma of rectus sheath

## 2023-04-07 NOTE — PROGRESS NOTE ADULT - PROBLEM SELECTOR PROBLEM 5
TATIANA (acute kidney injury)
HTN (hypertension)

## 2023-04-07 NOTE — BRIEF OPERATIVE NOTE - NSICDXBRIEFPROCEDURE_GEN_ALL_CORE_FT
PROCEDURES:  CABG, with VERONIQUE 07-Apr-2023 18:37:13  Kenny Virk  Repair, mitral valve, with VERONIQUE 07-Apr-2023 18:37:41  Kenny Virk  Arroyo maze IV procedure 07-Apr-2023 18:37:54  Kenny Virk  Ligation, left atrial appendage 07-Apr-2023 18:38:01  Kenny Virk

## 2023-04-07 NOTE — PROGRESS NOTE ADULT - PROBLEM SELECTOR PROBLEM 2
Mitral regurgitation

## 2023-04-07 NOTE — PROGRESS NOTE ADULT - SUBJECTIVE AND OBJECTIVE BOX
Cardiology PA Adult Progress Note    SUBJECTIVE ASSESSMENT:  	  MEDICATIONS:                	  VITAL SIGNS:  T(C): 36.2 (04-07-23 @ 10:38), Max: 36.9 (04-06-23 @ 22:33)  HR: 96 (04-07-23 @ 09:52) (91 - 102)  BP: 136/100 (04-07-23 @ 09:52) (110/71 - 136/100)  RR: 16 (04-07-23 @ 09:52) (16 - 18)  SpO2: 99% (04-07-23 @ 09:52) (95% - 99%)  Wt(kg): --    I&O's Summary    06 Apr 2023 07:01  -  07 Apr 2023 07:00  --------------------------------------------------------  IN: 272 mL / OUT: 975 mL / NET: -703 mL    07 Apr 2023 07:01  -  07 Apr 2023 12:40  --------------------------------------------------------  IN: 0 mL / OUT: 0 mL / NET: 0 mL      Height (cm): 188 (04-07 @ 09:17)  Weight (kg): 99.9 (04-07 @ 09:17)  BMI (kg/m2): 28.3 (04-07 @ 09:17)  BSA (m2): 2.26 (04-07 @ 09:17)                                     PHYSICAL EXAM:  Appearance: Normal	  HEENT: Normal oral mucosa, PERRL, EOMI	  Neck: Supple, + JVD/ - JVD; ___ Carotid Bruit   Cardiovascular: Normal S1 S2, No murmurs  Respiratory: Lungs clear to auscultation/Decreased Breath Sounds/No Rales, Rhonchi, Wheezing	  Gastrointestinal:  Soft, Non-tender, + BS	  Skin: No rashes, No ecchymoses, No cyanosis  Extremities: Normal range of motion, No clubbing, cyanosis or edema  Vascular: Peripheral pulses palpable 2+ bilaterally  Neurologic: Non-focal  Psychiatry: A & O x 3, Mood & affect appropriate    LABS:	 	                                  11.7   7.32  )-----------( 273      ( 07 Apr 2023 05:30 )             39.1     04-07    135  |  99  |  22  ----------------------------<  114<H>  4.6   |  27  |  1.23    Ca    9.0      07 Apr 2023 05:30  Mg     2.0     04-07    TPro  6.2  /  Alb  3.3  /  TBili  1.9<H>  /  DBili  x   /  AST  29  /  ALT  11  /  AlkPhos  98  04-07    proBNP:   Lipid Profile:   HgA1c:   TSH:   PT/INR - ( 06 Apr 2023 05:30 )   PT: 14.3 sec;   INR: 1.20          PTT - ( 07 Apr 2023 05:30 )  PTT:76.0 sec TRANSFER NOTE - CARDIOLOGY TO CT SURGERY    48 yo M, [facility] HTN, HLD, HFpEF, HOCM (per prior TTE 7/2023 w/ severe LVOT obstruction), severe MR, Afib, SAH 2/2 cerebral artery dissection s/p coil embolization @ [facility] 7/29/21, initially admitted to [facility] on 3/24/2023 for HFpEF exacerbation, AFib w/ RVR, LE cellulitis complicated by TATIANA, transaminitis, and rectal sheath hematoma; patient was transferred to [facility] for further management of HF exacerbation and structural eval for MR. Patient underwent TTE at [facility] on 3/6/23 displaying LVEF 60%, LAE w/ severe MR, MV degeneration of posterior leaflet with significant prolapse and mod-severe anterior directed MR. TTE 4/3/23 consistent with flail mitral valve, severe eccentric, anteriorly directed MR, concern for endocarditis. Blood cultures drawn x2 were negative. VERONIQUE 4/4/23 with similar MR findings. For rectal sheath hematoma, gen surg consulted, recommended abdominal binder, no acute intervention For transaminitis, patient underwent RUQ US which showed . For bilateral lower extremity cellulitis, patient with improvement on arrival after being initiated on cafazolin; ID consulted, recommended continuing cefazolin at 2G IV q8H, last day of antibiotics 4/9/2023.    Cardiology PA Adult Progress Note    SUBJECTIVE ASSESSMENT:   	  MEDICATIONS:                	  VITAL SIGNS:  T(C): 36.2 (04-07-23 @ 10:38), Max: 36.9 (04-06-23 @ 22:33)  HR: 96 (04-07-23 @ 09:52) (91 - 102)  BP: 136/100 (04-07-23 @ 09:52) (110/71 - 136/100)  RR: 16 (04-07-23 @ 09:52) (16 - 18)  SpO2: 99% (04-07-23 @ 09:52) (95% - 99%)  Wt(kg): --    I&O's Summary    06 Apr 2023 07:01  -  07 Apr 2023 07:00  --------------------------------------------------------  IN: 272 mL / OUT: 975 mL / NET: -703 mL    07 Apr 2023 07:01  -  07 Apr 2023 12:40  --------------------------------------------------------  IN: 0 mL / OUT: 0 mL / NET: 0 mL      Height (cm): 188 (04-07 @ 09:17)  Weight (kg): 99.9 (04-07 @ 09:17)  BMI (kg/m2): 28.3 (04-07 @ 09:17)  BSA (m2): 2.26 (04-07 @ 09:17)                                     PHYSICAL EXAM:  Appearance: Normal	  HEENT: Normal oral mucosa, PERRL, EOMI	  Neck: Supple, + JVD/ - JVD; ___ Carotid Bruit   Cardiovascular: Normal S1 S2, No murmurs  Respiratory: Lungs clear to auscultation/Decreased Breath Sounds/No Rales, Rhonchi, Wheezing	  Gastrointestinal:  Soft, Non-tender, + BS	  Skin: No rashes, No ecchymoses, No cyanosis  Extremities: Normal range of motion, No clubbing, cyanosis or edema  Vascular: Peripheral pulses palpable 2+ bilaterally  Neurologic: Non-focal  Psychiatry: A & O x 3, Mood & affect appropriate    LABS:	 	                                  11.7   7.32  )-----------( 273      ( 07 Apr 2023 05:30 )             39.1     04-07    135  |  99  |  22  ----------------------------<  114<H>  4.6   |  27  |  1.23    Ca    9.0      07 Apr 2023 05:30  Mg     2.0     04-07    TPro  6.2  /  Alb  3.3  /  TBili  1.9<H>  /  DBili  x   /  AST  29  /  ALT  11  /  AlkPhos  98  04-07    proBNP:   Lipid Profile:   HgA1c:   TSH:   PT/INR - ( 06 Apr 2023 05:30 )   PT: 14.3 sec;   INR: 1.20          PTT - ( 07 Apr 2023 05:30 )  PTT:76.0 sec TRANSFER NOTE - CARDIOLOGY TO CT SURGERY    48 yo M, PMH HTN, HLD, HFpEF, HOCM (per prior TTE 7/2023 w/ severe LVOT obstruction), severe MR, Afib, SAH 2/2 cerebral artery dissection s/p coil embolization @ [facility] 7/29/21, initially admitted to [facility] on 3/24/2023 for HFpEF exacerbation, AFib w/ RVR, LE cellulitis complicated by TATIANA, transaminitis, and rectal sheath hematoma; patient was transferred to [facility] for further management of HF exacerbation and structural eval for MR. Patient underwent TTE at [facility] on 3/6/23 displaying LVEF 60%, LAE w/ severe MR, MV degeneration of posterior leaflet with significant prolapse and mod-severe anterior directed MR. TTE 4/3/23 consistent with flail mitral valve, severe eccentric, anteriorly directed MR, concern for endocarditis. Blood cultures drawn x2 were negative. VERONIQUE 4/4/23 with similar MR findings. For rectal sheath hematoma, gen surg consulted, recommended abdominal binder, no acute intervention For transaminitis, on arrival, LFTs normal. Patient underwent RUQ US which showed complex fluid collection in left lower abdomen wall, most compatible with hematoma, Right pleural effusion, Mild hepatic steatosis. For bilateral lower extremity cellulitis, patient with improvement on arrival after being initiated on cafazolin; ID consulted, recommended continuing cefazolin at 2G IV q8H, last day of antibiotics 4/9/2023.    Cardiology PA Adult Progress Note    SUBJECTIVE ASSESSMENT:   	  MEDICATIONS:                	  VITAL SIGNS:  T(C): 36.2 (04-07-23 @ 10:38), Max: 36.9 (04-06-23 @ 22:33)  HR: 96 (04-07-23 @ 09:52) (91 - 102)  BP: 136/100 (04-07-23 @ 09:52) (110/71 - 136/100)  RR: 16 (04-07-23 @ 09:52) (16 - 18)  SpO2: 99% (04-07-23 @ 09:52) (95% - 99%)  Wt(kg): --    I&O's Summary    06 Apr 2023 07:01  -  07 Apr 2023 07:00  --------------------------------------------------------  IN: 272 mL / OUT: 975 mL / NET: -703 mL    07 Apr 2023 07:01  -  07 Apr 2023 12:40  --------------------------------------------------------  IN: 0 mL / OUT: 0 mL / NET: 0 mL      Height (cm): 188 (04-07 @ 09:17)  Weight (kg): 99.9 (04-07 @ 09:17)  BMI (kg/m2): 28.3 (04-07 @ 09:17)  BSA (m2): 2.26 (04-07 @ 09:17)                                     PHYSICAL EXAM:  Appearance: Normal	  HEENT: Normal oral mucosa, PERRL, EOMI	  Neck: Supple, + JVD/ - JVD; ___ Carotid Bruit   Cardiovascular: Normal S1 S2, No murmurs  Respiratory: Lungs clear to auscultation/Decreased Breath Sounds/No Rales, Rhonchi, Wheezing	  Gastrointestinal:  Soft, Non-tender, + BS	  Skin: No rashes, No ecchymoses, No cyanosis  Extremities: Normal range of motion, No clubbing, cyanosis or edema  Vascular: Peripheral pulses palpable 2+ bilaterally  Neurologic: Non-focal  Psychiatry: A & O x 3, Mood & affect appropriate    LABS:	 	                                  11.7   7.32  )-----------( 273      ( 07 Apr 2023 05:30 )             39.1     04-07    135  |  99  |  22  ----------------------------<  114<H>  4.6   |  27  |  1.23    Ca    9.0      07 Apr 2023 05:30  Mg     2.0     04-07    TPro  6.2  /  Alb  3.3  /  TBili  1.9<H>  /  DBili  x   /  AST  29  /  ALT  11  /  AlkPhos  98  04-07    proBNP:   Lipid Profile:   HgA1c:   TSH:   PT/INR - ( 06 Apr 2023 05:30 )   PT: 14.3 sec;   INR: 1.20          PTT - ( 07 Apr 2023 05:30 )  PTT:76.0 sec TRANSFER NOTE - CARDIOLOGY TO CT SURGERY    48 yo M, PMH HTN, HLD, HFpEF, HOCM (per prior TTE 7/2023 w/ severe LVOT obstruction), severe MR, Afib, SAH 2/2 cerebral artery dissection s/p coil embolization @ [facility] 7/29/21, initially admitted to [facility] on 3/24/2023 for HFpEF exacerbation, AFib w/ RVR, LE cellulitis complicated by TATIANA, transaminitis, and rectal sheath hematoma; patient was transferred to [facility] for further management of HF exacerbation and structural eval for MR. For HF exacerbation, HF consulted, and patient diuresed w/ lasix 40 mg IV BID, recommended to continue with lisinopril 30 mg, hydralazine 25 mg TID for afterload reduction, coreg 25 mg BID. Patient underwent TTE at [facility] on 3/6/23 displaying LVEF 60%, LAE w/ severe MR, MV degeneration of posterior leaflet with significant prolapse and mod-severe anterior directed MR. TTE 4/3/23 consistent with flail mitral valve, severe eccentric, anteriorly directed MR, concern for endocarditis. Blood cultures drawn x2 were negative. VERONIQUE 4/4/23 with similar MR findings. CTS consulted, recommended MV replacement; patient underwent cardiac cath as part of pre-op clearance showing 3vCAD. Patient underwent CABG-MVR 4/7/2023. For AF RVR, patient rate controlled on admission, EP not consulted. For rectus sheath hematoma, gen surg consulted, recommended abdominal binder, no acute intervention. For transaminitis, on arrival, LFTs normal. Initially recommended for MRCP, however cannot be performed because of coils in brain from embolization. Patient underwent RUQ US which showed complex fluid collection in left lower abdomen wall, most compatible with hematoma, Right pleural effusion, Mild hepatic steatosis. For bilateral lower extremity cellulitis, on arrival patient with improvement after being initiated on cefazolin at outside hospital; ID consulted, recommended continuing cefazolin at 2G IV q8H, last day of antibiotics 4/9/2023. For TATIANA, on arrival, patient Cr within normal limit, did not experience TATIANA while admitted on cardiac tele.     Cardiology PA Adult Progress Note    SUBJECTIVE ASSESSMENT: Overnight no acute events; Patient seated in bed, endorsing nervousness , apprehensiveness, and headache before CABG-MVR. Currently denies CP, dizziness, palpitations, SOB, dyspnea, coughing, headaches, N/V/D/abdominal pain. Patient understands plan for the day.   	  MEDICATIONS:                	  VITAL SIGNS:  T(C): 36.2 (04-07-23 @ 10:38), Max: 36.9 (04-06-23 @ 22:33)  HR: 96 (04-07-23 @ 09:52) (91 - 102)  BP: 136/100 (04-07-23 @ 09:52) (110/71 - 136/100)  RR: 16 (04-07-23 @ 09:52) (16 - 18)  SpO2: 99% (04-07-23 @ 09:52) (95% - 99%)  Wt(kg): --    I&O's Summary    06 Apr 2023 07:01  -  07 Apr 2023 07:00  --------------------------------------------------------  IN: 272 mL / OUT: 975 mL / NET: -703 mL    07 Apr 2023 07:01  -  07 Apr 2023 12:40  --------------------------------------------------------  IN: 0 mL / OUT: 0 mL / NET: 0 mL      Height (cm): 188 (04-07 @ 09:17)  Weight (kg): 99.9 (04-07 @ 09:17)  BMI (kg/m2): 28.3 (04-07 @ 09:17)  BSA (m2): 2.26 (04-07 @ 09:17)                                     PHYSICAL EXAM:  Appearance: Normal	  HEENT: Normal oral mucosa, PERRL, EOMI	  Neck: Supple, + JVD/ - JVD; ___ Carotid Bruit   Cardiovascular: Normal S1 S2, No murmurs  Respiratory: Lungs clear to auscultation/Decreased Breath Sounds/No Rales, Rhonchi, Wheezing	  Gastrointestinal:  Soft, Non-tender, + BS	  Skin: No rashes, No ecchymoses, No cyanosis  Extremities: Normal range of motion, No clubbing, cyanosis or edema  Vascular: Peripheral pulses palpable 2+ bilaterally  Neurologic: Non-focal  Psychiatry: A & O x 3, Mood & affect appropriate    LABS:	 	                                  11.7   7.32  )-----------( 273      ( 07 Apr 2023 05:30 )             39.1     04-07    135  |  99  |  22  ----------------------------<  114<H>  4.6   |  27  |  1.23    Ca    9.0      07 Apr 2023 05:30  Mg     2.0     04-07    TPro  6.2  /  Alb  3.3  /  TBili  1.9<H>  /  DBili  x   /  AST  29  /  ALT  11  /  AlkPhos  98  04-07    proBNP:   Lipid Profile:   HgA1c:   TSH:   PT/INR - ( 06 Apr 2023 05:30 )   PT: 14.3 sec;   INR: 1.20          PTT - ( 07 Apr 2023 05:30 )  PTT:76.0 sec TRANSFER NOTE - CARDIOLOGY TO CT SURGERY    48 yo M, PMH HTN, HLD, HFpEF, HOCM (per prior TTE 7/2023 w/ severe LVOT obstruction), severe MR, Afib, SAH 2/2 cerebral artery dissection s/p coil embolization @ [facility] 7/29/21, initially admitted to [facility] on 3/24/2023 for HFpEF exacerbation, AFib w/ RVR, LE cellulitis complicated by TATIANA, transaminitis, and rectal sheath hematoma; patient was transferred to [facility] for further management of HF exacerbation and structural eval for MR. For HF exacerbation, HF consulted, and patient diuresed w/ lasix 40 mg IV BID, recommended to continue with lisinopril 30 mg, hydralazine 25 mg TID for afterload reduction, coreg 25 mg BID. Patient underwent TTE at [facility] on 3/6/23 displaying LVEF 60%, LAE w/ severe MR, MV degeneration of posterior leaflet with significant prolapse and mod-severe anterior directed MR. TTE 4/3/23 consistent with flail mitral valve, severe eccentric, anteriorly directed MR, concern for endocarditis. Blood cultures drawn x2 were negative. VERONIQUE 4/4/23 with similar MR findings. CTS consulted, recommended MV replacement; patient underwent cardiac cath as part of pre-op clearance showing 3vCAD. Patient underwent CABG-MVR 4/7/2023. For AF RVR, patient rate controlled on admission, EP not consulted. For rectus sheath hematoma, gen surg consulted, recommended abdominal binder, no acute intervention. For transaminitis, on arrival, LFTs normal. Initially recommended for MRCP, however cannot be performed because of coils in brain from embolization. Patient underwent abdominal US which showed complex fluid collection in left lower abdomen wall, most compatible with hematoma, Right pleural effusion, Mild hepatic steatosis. For bilateral lower extremity cellulitis, on arrival patient with improvement after being initiated on cefazolin at outside hospital; ID consulted, recommended continuing cefazolin at 2G IV q8H, last day of antibiotics 4/9/2023. For TATIANA, on arrival, patient Cr within normal limit, did not experience TATIANA while admitted on cardiac tele.     Cardiology PA Adult Progress Note    SUBJECTIVE ASSESSMENT: Overnight no acute events; Patient seated in bed, endorsing nervousness , apprehensiveness, and headache before CABG-MVR. Currently denies CP, dizziness, palpitations, SOB, dyspnea, coughing, headaches, N/V/D/abdominal pain. Patient understands plan for the day.   	  MEDICATIONS:  	  VITAL SIGNS:  T(C): 36.2 (04-07-23 @ 10:38), Max: 36.9 (04-06-23 @ 22:33)  HR: 96 (04-07-23 @ 09:52) (91 - 102)  BP: 136/100 (04-07-23 @ 09:52) (110/71 - 136/100)  RR: 16 (04-07-23 @ 09:52) (16 - 18)  SpO2: 99% (04-07-23 @ 09:52) (95% - 99%)  Wt(kg): --    I&O's Summary    06 Apr 2023 07:01  -  07 Apr 2023 07:00  --------------------------------------------------------  IN: 272 mL / OUT: 975 mL / NET: -703 mL    07 Apr 2023 07:01  -  07 Apr 2023 12:40  --------------------------------------------------------  IN: 0 mL / OUT: 0 mL / NET: 0 mL      Height (cm): 188 (04-07 @ 09:17)  Weight (kg): 99.9 (04-07 @ 09:17)  BMI (kg/m2): 28.3 (04-07 @ 09:17)  BSA (m2): 2.26 (04-07 @ 09:17)                                     PHYSICAL EXAM:  Appearance: Normal	  HEENT: Normal oral mucosa, PERRL, EOMI	  Neck: Supple, + JVD, no Carotid Bruit   Cardiovascular: Normal S1 S2, 3/6 holosytolic murmur loudest at apex  Respiratory: Lungs clear to auscultation	  Gastrointestinal:  Soft, Non-tender, + BS	  Skin: No rashes, No ecchymoses, No cyanosis  Extremities: Normal range of motion, No clubbing, cyanosis; 2-3+ pitting edema in b/l LE extremities up to knees  Vascular: Peripheral pulses palpable 1+ bilateral LE  Neurologic: Non-focal  Psychiatry: A & O x 3, Mood & affect appropriate    LABS:	 	                            11.7   7.32  )-----------( 273      ( 07 Apr 2023 05:30 )             39.1     04-07    135  |  99  |  22  ----------------------------<  114<H>  4.6   |  27  |  1.23    Ca    9.0      07 Apr 2023 05:30  Mg     2.0     04-07    TPro  6.2  /  Alb  3.3  /  TBili  1.9<H>  /  DBili  x   /  AST  29  /  ALT  11  /  AlkPhos  98  04-07  PT/INR - ( 06 Apr 2023 05:30 )   PT: 14.3 sec;   INR: 1.20          PTT - ( 07 Apr 2023 05:30 )  PTT:76.0 sec

## 2023-04-07 NOTE — PROGRESS NOTE ADULT - PROBLEM SELECTOR PLAN 5
h/o HTN, BP   -c/w coreg 25 mg BID, hydralazine 25 mg TID, lisinopril 30 mg QD, diltiazem 180 mg QD, lasix 40 mg IV BID,

## 2023-04-07 NOTE — PROGRESS NOTE ADULT - PROBLEM SELECTOR PLAN 4
Afebrile, no leukocytosis.   - ID consulted and okay to proceed w/ OR  - CONT: Cefazolin 2g IV q8hrs x7 days (last day 4/9/23)  - Betadine and gauze, patient picking at skin.  - If discharge before end of course, can change to Cefadroxil 500mg PO q12hrs to complete course.

## 2023-04-08 LAB
ALBUMIN SERPL ELPH-MCNC: 2.8 G/DL — LOW (ref 3.3–5)
ALBUMIN SERPL ELPH-MCNC: 2.9 G/DL — LOW (ref 3.3–5)
ALBUMIN SERPL ELPH-MCNC: 3.1 G/DL — LOW (ref 3.3–5)
ALBUMIN SERPL ELPH-MCNC: 3.3 G/DL — SIGNIFICANT CHANGE UP (ref 3.3–5)
ALBUMIN SERPL ELPH-MCNC: 3.4 G/DL — SIGNIFICANT CHANGE UP (ref 3.3–5)
ALBUMIN SERPL ELPH-MCNC: 3.4 G/DL — SIGNIFICANT CHANGE UP (ref 3.3–5)
ALP SERPL-CCNC: 65 U/L — SIGNIFICANT CHANGE UP (ref 40–120)
ALP SERPL-CCNC: 67 U/L — SIGNIFICANT CHANGE UP (ref 40–120)
ALP SERPL-CCNC: 69 U/L — SIGNIFICANT CHANGE UP (ref 40–120)
ALP SERPL-CCNC: 70 U/L — SIGNIFICANT CHANGE UP (ref 40–120)
ALP SERPL-CCNC: 73 U/L — SIGNIFICANT CHANGE UP (ref 40–120)
ALP SERPL-CCNC: 76 U/L — SIGNIFICANT CHANGE UP (ref 40–120)
ALT FLD-CCNC: 14 U/L — SIGNIFICANT CHANGE UP (ref 10–45)
ALT FLD-CCNC: 19 U/L — SIGNIFICANT CHANGE UP (ref 10–45)
ALT FLD-CCNC: 20 U/L — SIGNIFICANT CHANGE UP (ref 10–45)
ALT FLD-CCNC: 23 U/L — SIGNIFICANT CHANGE UP (ref 10–45)
ALT FLD-CCNC: 23 U/L — SIGNIFICANT CHANGE UP (ref 10–45)
ALT FLD-CCNC: 25 U/L — SIGNIFICANT CHANGE UP (ref 10–45)
AMYLASE P1 CFR SERPL: 43 U/L — SIGNIFICANT CHANGE UP (ref 25–125)
ANION GAP SERPL CALC-SCNC: 10 MMOL/L — SIGNIFICANT CHANGE UP (ref 5–17)
ANION GAP SERPL CALC-SCNC: 11 MMOL/L — SIGNIFICANT CHANGE UP (ref 5–17)
ANION GAP SERPL CALC-SCNC: 13 MMOL/L — SIGNIFICANT CHANGE UP (ref 5–17)
ANION GAP SERPL CALC-SCNC: 16 MMOL/L — SIGNIFICANT CHANGE UP (ref 5–17)
ANION GAP SERPL CALC-SCNC: 9 MMOL/L — SIGNIFICANT CHANGE UP (ref 5–17)
ANION GAP SERPL CALC-SCNC: 9 MMOL/L — SIGNIFICANT CHANGE UP (ref 5–17)
APTT BLD: 29.7 SEC — SIGNIFICANT CHANGE UP (ref 27.5–35.5)
APTT BLD: 30 SEC — SIGNIFICANT CHANGE UP (ref 27.5–35.5)
APTT BLD: 31.7 SEC — SIGNIFICANT CHANGE UP (ref 27.5–35.5)
APTT BLD: 34.6 SEC — SIGNIFICANT CHANGE UP (ref 27.5–35.5)
AST SERPL-CCNC: 140 U/L — HIGH (ref 10–40)
AST SERPL-CCNC: 171 U/L — HIGH (ref 10–40)
AST SERPL-CCNC: 188 U/L — HIGH (ref 10–40)
AST SERPL-CCNC: 246 U/L — HIGH (ref 10–40)
AST SERPL-CCNC: 308 U/L — HIGH (ref 10–40)
AST SERPL-CCNC: 350 U/L — HIGH (ref 10–40)
BASE EXCESS BLDV CALC-SCNC: 0 MMOL/L — SIGNIFICANT CHANGE UP (ref -2–3)
BASE EXCESS BLDV CALC-SCNC: 0 MMOL/L — SIGNIFICANT CHANGE UP (ref -2–3)
BASE EXCESS BLDV CALC-SCNC: 0.8 MMOL/L — SIGNIFICANT CHANGE UP (ref -2–3)
BILIRUB SERPL-MCNC: 1.5 MG/DL — HIGH (ref 0.2–1.2)
BILIRUB SERPL-MCNC: 1.8 MG/DL — HIGH (ref 0.2–1.2)
BILIRUB SERPL-MCNC: 2 MG/DL — HIGH (ref 0.2–1.2)
BILIRUB SERPL-MCNC: 2.5 MG/DL — HIGH (ref 0.2–1.2)
BILIRUB SERPL-MCNC: 2.5 MG/DL — HIGH (ref 0.2–1.2)
BILIRUB SERPL-MCNC: 3 MG/DL — HIGH (ref 0.2–1.2)
BUN SERPL-MCNC: 18 MG/DL — SIGNIFICANT CHANGE UP (ref 7–23)
BUN SERPL-MCNC: 20 MG/DL — SIGNIFICANT CHANGE UP (ref 7–23)
BUN SERPL-MCNC: 22 MG/DL — SIGNIFICANT CHANGE UP (ref 7–23)
BUN SERPL-MCNC: 22 MG/DL — SIGNIFICANT CHANGE UP (ref 7–23)
BUN SERPL-MCNC: 24 MG/DL — HIGH (ref 7–23)
BUN SERPL-MCNC: 26 MG/DL — HIGH (ref 7–23)
CA-I SERPL-SCNC: 1.24 MMOL/L — SIGNIFICANT CHANGE UP (ref 1.15–1.33)
CA-I SERPL-SCNC: 1.25 MMOL/L — SIGNIFICANT CHANGE UP (ref 1.15–1.33)
CALCIUM SERPL-MCNC: 8.5 MG/DL — SIGNIFICANT CHANGE UP (ref 8.4–10.5)
CALCIUM SERPL-MCNC: 8.7 MG/DL — SIGNIFICANT CHANGE UP (ref 8.4–10.5)
CALCIUM SERPL-MCNC: 8.8 MG/DL — SIGNIFICANT CHANGE UP (ref 8.4–10.5)
CALCIUM SERPL-MCNC: 8.8 MG/DL — SIGNIFICANT CHANGE UP (ref 8.4–10.5)
CALCIUM SERPL-MCNC: 9.1 MG/DL — SIGNIFICANT CHANGE UP (ref 8.4–10.5)
CALCIUM SERPL-MCNC: 9.3 MG/DL — SIGNIFICANT CHANGE UP (ref 8.4–10.5)
CHLORIDE SERPL-SCNC: 101 MMOL/L — SIGNIFICANT CHANGE UP (ref 96–108)
CHLORIDE SERPL-SCNC: 102 MMOL/L — SIGNIFICANT CHANGE UP (ref 96–108)
CHLORIDE SERPL-SCNC: 104 MMOL/L — SIGNIFICANT CHANGE UP (ref 96–108)
CHLORIDE SERPL-SCNC: 105 MMOL/L — SIGNIFICANT CHANGE UP (ref 96–108)
CHLORIDE SERPL-SCNC: 105 MMOL/L — SIGNIFICANT CHANGE UP (ref 96–108)
CHLORIDE SERPL-SCNC: 106 MMOL/L — SIGNIFICANT CHANGE UP (ref 96–108)
CK SERPL-CCNC: 1529 U/L — HIGH (ref 30–200)
CK SERPL-CCNC: 671 U/L — HIGH (ref 30–200)
CK SERPL-CCNC: 921 U/L — HIGH (ref 30–200)
CO2 BLDV-SCNC: 27.9 MMOL/L — HIGH (ref 22–26)
CO2 BLDV-SCNC: 27.9 MMOL/L — HIGH (ref 22–26)
CO2 BLDV-SCNC: 28.6 MMOL/L — HIGH (ref 22–26)
CO2 SERPL-SCNC: 22 MMOL/L — SIGNIFICANT CHANGE UP (ref 22–31)
CO2 SERPL-SCNC: 22 MMOL/L — SIGNIFICANT CHANGE UP (ref 22–31)
CO2 SERPL-SCNC: 24 MMOL/L — SIGNIFICANT CHANGE UP (ref 22–31)
CO2 SERPL-SCNC: 25 MMOL/L — SIGNIFICANT CHANGE UP (ref 22–31)
CREAT ?TM UR-MCNC: 139 MG/DL — SIGNIFICANT CHANGE UP
CREAT SERPL-MCNC: 1.11 MG/DL — SIGNIFICANT CHANGE UP (ref 0.5–1.3)
CREAT SERPL-MCNC: 1.16 MG/DL — SIGNIFICANT CHANGE UP (ref 0.5–1.3)
CREAT SERPL-MCNC: 1.31 MG/DL — HIGH (ref 0.5–1.3)
CREAT SERPL-MCNC: 1.36 MG/DL — HIGH (ref 0.5–1.3)
CREAT SERPL-MCNC: 1.38 MG/DL — HIGH (ref 0.5–1.3)
CREAT SERPL-MCNC: 1.64 MG/DL — HIGH (ref 0.5–1.3)
CULTURE RESULTS: SIGNIFICANT CHANGE UP
CULTURE RESULTS: SIGNIFICANT CHANGE UP
EGFR: 51 ML/MIN/1.73M2 — LOW
EGFR: 63 ML/MIN/1.73M2 — SIGNIFICANT CHANGE UP
EGFR: 64 ML/MIN/1.73M2 — SIGNIFICANT CHANGE UP
EGFR: 67 ML/MIN/1.73M2 — SIGNIFICANT CHANGE UP
EGFR: 78 ML/MIN/1.73M2 — SIGNIFICANT CHANGE UP
EGFR: 82 ML/MIN/1.73M2 — SIGNIFICANT CHANGE UP
GAS PNL BLDA: SIGNIFICANT CHANGE UP
GAS PNL BLDV: 137 MMOL/L — SIGNIFICANT CHANGE UP (ref 136–145)
GAS PNL BLDV: 137 MMOL/L — SIGNIFICANT CHANGE UP (ref 136–145)
GAS PNL BLDV: SIGNIFICANT CHANGE UP
GLUCOSE BLDC GLUCOMTR-MCNC: 115 MG/DL — HIGH (ref 70–99)
GLUCOSE BLDC GLUCOMTR-MCNC: 117 MG/DL — HIGH (ref 70–99)
GLUCOSE BLDC GLUCOMTR-MCNC: 126 MG/DL — HIGH (ref 70–99)
GLUCOSE BLDC GLUCOMTR-MCNC: 127 MG/DL — HIGH (ref 70–99)
GLUCOSE BLDC GLUCOMTR-MCNC: 130 MG/DL — HIGH (ref 70–99)
GLUCOSE BLDC GLUCOMTR-MCNC: 130 MG/DL — HIGH (ref 70–99)
GLUCOSE BLDC GLUCOMTR-MCNC: 131 MG/DL — HIGH (ref 70–99)
GLUCOSE BLDC GLUCOMTR-MCNC: 138 MG/DL — HIGH (ref 70–99)
GLUCOSE BLDC GLUCOMTR-MCNC: 143 MG/DL — HIGH (ref 70–99)
GLUCOSE BLDC GLUCOMTR-MCNC: 144 MG/DL — HIGH (ref 70–99)
GLUCOSE BLDC GLUCOMTR-MCNC: 157 MG/DL — HIGH (ref 70–99)
GLUCOSE BLDC GLUCOMTR-MCNC: 98 MG/DL — SIGNIFICANT CHANGE UP (ref 70–99)
GLUCOSE SERPL-MCNC: 127 MG/DL — HIGH (ref 70–99)
GLUCOSE SERPL-MCNC: 144 MG/DL — HIGH (ref 70–99)
GLUCOSE SERPL-MCNC: 160 MG/DL — HIGH (ref 70–99)
GLUCOSE SERPL-MCNC: 163 MG/DL — HIGH (ref 70–99)
GLUCOSE SERPL-MCNC: 164 MG/DL — HIGH (ref 70–99)
GLUCOSE SERPL-MCNC: 188 MG/DL — HIGH (ref 70–99)
HCO3 BLDV-SCNC: 26 MMOL/L — SIGNIFICANT CHANGE UP (ref 22–29)
HCO3 BLDV-SCNC: 26 MMOL/L — SIGNIFICANT CHANGE UP (ref 22–29)
HCO3 BLDV-SCNC: 27 MMOL/L — SIGNIFICANT CHANGE UP (ref 22–29)
HCT VFR BLD CALC: 30.1 % — LOW (ref 39–50)
HCT VFR BLD CALC: 30.8 % — LOW (ref 39–50)
HCT VFR BLD CALC: 31.3 % — LOW (ref 39–50)
HCT VFR BLD CALC: 31.9 % — LOW (ref 39–50)
HCT VFR BLD CALC: 33.3 % — LOW (ref 39–50)
HGB BLD-MCNC: 10 G/DL — LOW (ref 13–17)
HGB BLD-MCNC: 9.1 G/DL — LOW (ref 13–17)
HGB BLD-MCNC: 9.3 G/DL — LOW (ref 13–17)
HGB BLD-MCNC: 9.4 G/DL — LOW (ref 13–17)
HGB BLD-MCNC: 9.4 G/DL — LOW (ref 13–17)
INR BLD: 1.17 — HIGH (ref 0.88–1.16)
INR BLD: 1.22 — HIGH (ref 0.88–1.16)
LACTATE SERPL-SCNC: 1.5 MMOL/L — SIGNIFICANT CHANGE UP (ref 0.5–2)
LACTATE SERPL-SCNC: 1.7 MMOL/L — SIGNIFICANT CHANGE UP (ref 0.5–2)
LACTATE SERPL-SCNC: 2 MMOL/L — SIGNIFICANT CHANGE UP (ref 0.5–2)
LACTATE SERPL-SCNC: 2.3 MMOL/L — HIGH (ref 0.5–2)
LACTATE SERPL-SCNC: 3.5 MMOL/L — HIGH (ref 0.5–2)
LACTATE SERPL-SCNC: 3.5 MMOL/L — HIGH (ref 0.5–2)
LIDOCAIN IGE QN: 36 U/L — SIGNIFICANT CHANGE UP (ref 7–60)
MAGNESIUM SERPL-MCNC: 2 MG/DL — SIGNIFICANT CHANGE UP (ref 1.6–2.6)
MAGNESIUM SERPL-MCNC: 2 MG/DL — SIGNIFICANT CHANGE UP (ref 1.6–2.6)
MAGNESIUM SERPL-MCNC: 2.1 MG/DL — SIGNIFICANT CHANGE UP (ref 1.6–2.6)
MAGNESIUM SERPL-MCNC: 2.1 MG/DL — SIGNIFICANT CHANGE UP (ref 1.6–2.6)
MAGNESIUM SERPL-MCNC: 2.2 MG/DL — SIGNIFICANT CHANGE UP (ref 1.6–2.6)
MCHC RBC-ENTMCNC: 23.6 PG — LOW (ref 27–34)
MCHC RBC-ENTMCNC: 23.7 PG — LOW (ref 27–34)
MCHC RBC-ENTMCNC: 23.8 PG — LOW (ref 27–34)
MCHC RBC-ENTMCNC: 24 PG — LOW (ref 27–34)
MCHC RBC-ENTMCNC: 24.1 PG — LOW (ref 27–34)
MCHC RBC-ENTMCNC: 29.5 GM/DL — LOW (ref 32–36)
MCHC RBC-ENTMCNC: 30 GM/DL — LOW (ref 32–36)
MCHC RBC-ENTMCNC: 30 GM/DL — LOW (ref 32–36)
MCHC RBC-ENTMCNC: 30.2 GM/DL — LOW (ref 32–36)
MCHC RBC-ENTMCNC: 30.2 GM/DL — LOW (ref 32–36)
MCV RBC AUTO: 79 FL — LOW (ref 80–100)
MCV RBC AUTO: 79.1 FL — LOW (ref 80–100)
MCV RBC AUTO: 79.4 FL — LOW (ref 80–100)
MCV RBC AUTO: 79.6 FL — LOW (ref 80–100)
MCV RBC AUTO: 79.9 FL — LOW (ref 80–100)
NRBC # BLD: 0 /100 WBCS — SIGNIFICANT CHANGE UP (ref 0–0)
PCO2 BLDV: 49 MMHG — SIGNIFICANT CHANGE UP (ref 42–55)
PH BLDV: 7.34 — SIGNIFICANT CHANGE UP (ref 7.32–7.43)
PH BLDV: 7.34 — SIGNIFICANT CHANGE UP (ref 7.32–7.43)
PH BLDV: 7.35 — SIGNIFICANT CHANGE UP (ref 7.32–7.43)
PHOSPHATE SERPL-MCNC: 4.7 MG/DL — HIGH (ref 2.5–4.5)
PHOSPHATE SERPL-MCNC: 5.1 MG/DL — HIGH (ref 2.5–4.5)
PLATELET # BLD AUTO: 179 K/UL — SIGNIFICANT CHANGE UP (ref 150–400)
PLATELET # BLD AUTO: 188 K/UL — SIGNIFICANT CHANGE UP (ref 150–400)
PLATELET # BLD AUTO: 193 K/UL — SIGNIFICANT CHANGE UP (ref 150–400)
PLATELET # BLD AUTO: 197 K/UL — SIGNIFICANT CHANGE UP (ref 150–400)
PLATELET # BLD AUTO: 214 K/UL — SIGNIFICANT CHANGE UP (ref 150–400)
PO2 BLDV: <33 MMHG — SIGNIFICANT CHANGE UP (ref 25–45)
POTASSIUM BLDV-SCNC: 4.4 MMOL/L — SIGNIFICANT CHANGE UP (ref 3.5–5.1)
POTASSIUM BLDV-SCNC: 4.7 MMOL/L — SIGNIFICANT CHANGE UP (ref 3.5–5.1)
POTASSIUM SERPL-MCNC: 4.6 MMOL/L — SIGNIFICANT CHANGE UP (ref 3.5–5.3)
POTASSIUM SERPL-MCNC: 4.6 MMOL/L — SIGNIFICANT CHANGE UP (ref 3.5–5.3)
POTASSIUM SERPL-MCNC: 4.7 MMOL/L — SIGNIFICANT CHANGE UP (ref 3.5–5.3)
POTASSIUM SERPL-MCNC: 4.7 MMOL/L — SIGNIFICANT CHANGE UP (ref 3.5–5.3)
POTASSIUM SERPL-MCNC: 4.8 MMOL/L — SIGNIFICANT CHANGE UP (ref 3.5–5.3)
POTASSIUM SERPL-MCNC: 5.4 MMOL/L — HIGH (ref 3.5–5.3)
POTASSIUM SERPL-SCNC: 4.6 MMOL/L — SIGNIFICANT CHANGE UP (ref 3.5–5.3)
POTASSIUM SERPL-SCNC: 4.6 MMOL/L — SIGNIFICANT CHANGE UP (ref 3.5–5.3)
POTASSIUM SERPL-SCNC: 4.7 MMOL/L — SIGNIFICANT CHANGE UP (ref 3.5–5.3)
POTASSIUM SERPL-SCNC: 4.7 MMOL/L — SIGNIFICANT CHANGE UP (ref 3.5–5.3)
POTASSIUM SERPL-SCNC: 4.8 MMOL/L — SIGNIFICANT CHANGE UP (ref 3.5–5.3)
POTASSIUM SERPL-SCNC: 5.4 MMOL/L — HIGH (ref 3.5–5.3)
PROT SERPL-MCNC: 4.9 G/DL — LOW (ref 6–8.3)
PROT SERPL-MCNC: 5.2 G/DL — LOW (ref 6–8.3)
PROT SERPL-MCNC: 5.3 G/DL — LOW (ref 6–8.3)
PROT SERPL-MCNC: 5.6 G/DL — LOW (ref 6–8.3)
PROT SERPL-MCNC: 5.6 G/DL — LOW (ref 6–8.3)
PROT SERPL-MCNC: 5.7 G/DL — LOW (ref 6–8.3)
PROTHROM AB SERPL-ACNC: 13.9 SEC — HIGH (ref 10.5–13.4)
PROTHROM AB SERPL-ACNC: 13.9 SEC — HIGH (ref 10.5–13.4)
PROTHROM AB SERPL-ACNC: 14 SEC — HIGH (ref 10.5–13.4)
PROTHROM AB SERPL-ACNC: 14.6 SEC — HIGH (ref 10.5–13.4)
RBC # BLD: 3.78 M/UL — LOW (ref 4.2–5.8)
RBC # BLD: 3.88 M/UL — LOW (ref 4.2–5.8)
RBC # BLD: 3.96 M/UL — LOW (ref 4.2–5.8)
RBC # BLD: 3.99 M/UL — LOW (ref 4.2–5.8)
RBC # BLD: 4.21 M/UL — SIGNIFICANT CHANGE UP (ref 4.2–5.8)
RBC # FLD: 20.4 % — HIGH (ref 10.3–14.5)
RBC # FLD: 20.4 % — HIGH (ref 10.3–14.5)
RBC # FLD: 20.7 % — HIGH (ref 10.3–14.5)
RBC # FLD: 20.8 % — HIGH (ref 10.3–14.5)
RBC # FLD: 21 % — HIGH (ref 10.3–14.5)
SAO2 % BLDV: 52.3 % — LOW (ref 67–88)
SAO2 % BLDV: 52.8 % — LOW (ref 67–88)
SAO2 % BLDV: 58.5 % — LOW (ref 67–88)
SODIUM SERPL-SCNC: 137 MMOL/L — SIGNIFICANT CHANGE UP (ref 135–145)
SODIUM SERPL-SCNC: 137 MMOL/L — SIGNIFICANT CHANGE UP (ref 135–145)
SODIUM SERPL-SCNC: 138 MMOL/L — SIGNIFICANT CHANGE UP (ref 135–145)
SODIUM SERPL-SCNC: 139 MMOL/L — SIGNIFICANT CHANGE UP (ref 135–145)
SODIUM SERPL-SCNC: 140 MMOL/L — SIGNIFICANT CHANGE UP (ref 135–145)
SODIUM SERPL-SCNC: 143 MMOL/L — SIGNIFICANT CHANGE UP (ref 135–145)
SODIUM UR-SCNC: <20 MMOL/L — SIGNIFICANT CHANGE UP
SPECIMEN SOURCE: SIGNIFICANT CHANGE UP
SPECIMEN SOURCE: SIGNIFICANT CHANGE UP
TROPONIN T SERPL-MCNC: 4.91 NG/ML — CRITICAL HIGH (ref 0–0.01)
TROPONIN T SERPL-MCNC: 5.7 NG/ML — CRITICAL HIGH (ref 0–0.01)
WBC # BLD: 15.78 K/UL — HIGH (ref 3.8–10.5)
WBC # BLD: 16.06 K/UL — HIGH (ref 3.8–10.5)
WBC # BLD: 16.13 K/UL — HIGH (ref 3.8–10.5)
WBC # BLD: 17.35 K/UL — HIGH (ref 3.8–10.5)
WBC # BLD: 18.03 K/UL — HIGH (ref 3.8–10.5)
WBC # FLD AUTO: 15.78 K/UL — HIGH (ref 3.8–10.5)
WBC # FLD AUTO: 16.06 K/UL — HIGH (ref 3.8–10.5)
WBC # FLD AUTO: 16.13 K/UL — HIGH (ref 3.8–10.5)
WBC # FLD AUTO: 17.35 K/UL — HIGH (ref 3.8–10.5)
WBC # FLD AUTO: 18.03 K/UL — HIGH (ref 3.8–10.5)

## 2023-04-08 PROCEDURE — 99291 CRITICAL CARE FIRST HOUR: CPT

## 2023-04-08 PROCEDURE — 71045 X-RAY EXAM CHEST 1 VIEW: CPT | Mod: 26

## 2023-04-08 RX ORDER — ACETAMINOPHEN 500 MG
650 TABLET ORAL EVERY 6 HOURS
Refills: 0 | Status: DISCONTINUED | OUTPATIENT
Start: 2023-04-08 | End: 2023-04-09

## 2023-04-08 RX ORDER — ALBUMIN HUMAN 25 %
250 VIAL (ML) INTRAVENOUS
Refills: 0 | Status: COMPLETED | OUTPATIENT
Start: 2023-04-08 | End: 2023-04-08

## 2023-04-08 RX ORDER — INSULIN LISPRO 100/ML
VIAL (ML) SUBCUTANEOUS
Refills: 0 | Status: DISCONTINUED | OUTPATIENT
Start: 2023-04-08 | End: 2023-04-13

## 2023-04-08 RX ORDER — SODIUM CHLORIDE 9 MG/ML
1000 INJECTION, SOLUTION INTRAVENOUS
Refills: 0 | Status: DISCONTINUED | OUTPATIENT
Start: 2023-04-08 | End: 2023-04-13

## 2023-04-08 RX ORDER — ALBUMIN HUMAN 25 %
250 VIAL (ML) INTRAVENOUS ONCE
Refills: 0 | Status: COMPLETED | OUTPATIENT
Start: 2023-04-08 | End: 2023-04-08

## 2023-04-08 RX ORDER — OXYCODONE HYDROCHLORIDE 5 MG/1
5 TABLET ORAL EVERY 4 HOURS
Refills: 0 | Status: DISCONTINUED | OUTPATIENT
Start: 2023-04-08 | End: 2023-04-13

## 2023-04-08 RX ORDER — DOBUTAMINE HCL 250MG/20ML
5 VIAL (ML) INTRAVENOUS
Qty: 500 | Refills: 0 | Status: DISCONTINUED | OUTPATIENT
Start: 2023-04-08 | End: 2023-04-09

## 2023-04-08 RX ORDER — BUMETANIDE 0.25 MG/ML
2 INJECTION INTRAMUSCULAR; INTRAVENOUS ONCE
Refills: 0 | Status: DISCONTINUED | OUTPATIENT
Start: 2023-04-08 | End: 2023-04-08

## 2023-04-08 RX ORDER — CALCIUM GLUCONATE 100 MG/ML
2 VIAL (ML) INTRAVENOUS ONCE
Refills: 0 | Status: COMPLETED | OUTPATIENT
Start: 2023-04-08 | End: 2023-04-08

## 2023-04-08 RX ORDER — ACETAMINOPHEN 500 MG
1000 TABLET ORAL ONCE
Refills: 0 | Status: COMPLETED | OUTPATIENT
Start: 2023-04-08 | End: 2023-04-08

## 2023-04-08 RX ORDER — SENNA PLUS 8.6 MG/1
2 TABLET ORAL AT BEDTIME
Refills: 0 | Status: DISCONTINUED | OUTPATIENT
Start: 2023-04-08 | End: 2023-04-13

## 2023-04-08 RX ORDER — OXYCODONE HYDROCHLORIDE 5 MG/1
10 TABLET ORAL EVERY 6 HOURS
Refills: 0 | Status: DISCONTINUED | OUTPATIENT
Start: 2023-04-08 | End: 2023-04-13

## 2023-04-08 RX ORDER — BUMETANIDE 0.25 MG/ML
2 INJECTION INTRAMUSCULAR; INTRAVENOUS ONCE
Refills: 0 | Status: COMPLETED | OUTPATIENT
Start: 2023-04-08 | End: 2023-04-08

## 2023-04-08 RX ORDER — GLUCAGON INJECTION, SOLUTION 0.5 MG/.1ML
1 INJECTION, SOLUTION SUBCUTANEOUS ONCE
Refills: 0 | Status: DISCONTINUED | OUTPATIENT
Start: 2023-04-08 | End: 2023-04-13

## 2023-04-08 RX ORDER — FUROSEMIDE 40 MG
20 TABLET ORAL ONCE
Refills: 0 | Status: COMPLETED | OUTPATIENT
Start: 2023-04-08 | End: 2023-04-08

## 2023-04-08 RX ORDER — PANTOPRAZOLE SODIUM 20 MG/1
40 TABLET, DELAYED RELEASE ORAL
Refills: 0 | Status: DISCONTINUED | OUTPATIENT
Start: 2023-04-08 | End: 2023-04-13

## 2023-04-08 RX ORDER — ALBUMIN HUMAN 25 %
50 VIAL (ML) INTRAVENOUS ONCE
Refills: 0 | Status: COMPLETED | OUTPATIENT
Start: 2023-04-08 | End: 2023-04-08

## 2023-04-08 RX ORDER — DEXTROSE 50 % IN WATER 50 %
15 SYRINGE (ML) INTRAVENOUS ONCE
Refills: 0 | Status: DISCONTINUED | OUTPATIENT
Start: 2023-04-08 | End: 2023-04-13

## 2023-04-08 RX ORDER — FUROSEMIDE 40 MG
60 TABLET ORAL ONCE
Refills: 0 | Status: DISCONTINUED | OUTPATIENT
Start: 2023-04-08 | End: 2023-04-08

## 2023-04-08 RX ORDER — FUROSEMIDE 40 MG
60 TABLET ORAL ONCE
Refills: 0 | Status: COMPLETED | OUTPATIENT
Start: 2023-04-08 | End: 2023-04-08

## 2023-04-08 RX ORDER — KETOROLAC TROMETHAMINE 30 MG/ML
15 SYRINGE (ML) INJECTION ONCE
Refills: 0 | Status: DISCONTINUED | OUTPATIENT
Start: 2023-04-08 | End: 2023-04-08

## 2023-04-08 RX ORDER — DOBUTAMINE HCL 250MG/20ML
2.5 VIAL (ML) INTRAVENOUS
Qty: 500 | Refills: 0 | Status: DISCONTINUED | OUTPATIENT
Start: 2023-04-08 | End: 2023-04-08

## 2023-04-08 RX ADMIN — Medication 20 MILLIGRAM(S): at 02:51

## 2023-04-08 RX ADMIN — Medication 500 MILLILITER(S): at 08:13

## 2023-04-08 RX ADMIN — CHLORHEXIDINE GLUCONATE 1 APPLICATION(S): 213 SOLUTION TOPICAL at 13:13

## 2023-04-08 RX ADMIN — Medication 60 MILLIGRAM(S): at 17:30

## 2023-04-08 RX ADMIN — HEPARIN SODIUM 5000 UNIT(S): 5000 INJECTION INTRAVENOUS; SUBCUTANEOUS at 05:23

## 2023-04-08 RX ADMIN — Medication 2: at 15:54

## 2023-04-08 RX ADMIN — Medication 100 MILLIGRAM(S): at 21:34

## 2023-04-08 RX ADMIN — Medication 500 MILLILITER(S): at 09:00

## 2023-04-08 RX ADMIN — Medication 15 MICROGRAM(S)/KG/MIN: at 18:32

## 2023-04-08 RX ADMIN — POLYETHYLENE GLYCOL 3350 17 GRAM(S): 17 POWDER, FOR SOLUTION ORAL at 12:15

## 2023-04-08 RX ADMIN — OXYCODONE HYDROCHLORIDE 5 MILLIGRAM(S): 5 TABLET ORAL at 22:18

## 2023-04-08 RX ADMIN — HEPARIN SODIUM 5000 UNIT(S): 5000 INJECTION INTRAVENOUS; SUBCUTANEOUS at 21:35

## 2023-04-08 RX ADMIN — Medication 400 MILLIGRAM(S): at 18:11

## 2023-04-08 RX ADMIN — FENTANYL CITRATE 25 MICROGRAM(S): 50 INJECTION INTRAVENOUS at 04:20

## 2023-04-08 RX ADMIN — Medication 1000 MILLIGRAM(S): at 05:50

## 2023-04-08 RX ADMIN — Medication 81 MILLIGRAM(S): at 12:15

## 2023-04-08 RX ADMIN — Medication 7.49 MICROGRAM(S)/KG/MIN: at 15:54

## 2023-04-08 RX ADMIN — OXYCODONE HYDROCHLORIDE 10 MILLIGRAM(S): 5 TABLET ORAL at 16:08

## 2023-04-08 RX ADMIN — SENNA PLUS 2 TABLET(S): 8.6 TABLET ORAL at 21:57

## 2023-04-08 RX ADMIN — Medication 125 MILLILITER(S): at 11:08

## 2023-04-08 RX ADMIN — Medication 1000 MILLIGRAM(S): at 18:26

## 2023-04-08 RX ADMIN — OXYCODONE HYDROCHLORIDE 10 MILLIGRAM(S): 5 TABLET ORAL at 15:08

## 2023-04-08 RX ADMIN — BUMETANIDE 2 MILLIGRAM(S): 0.25 INJECTION INTRAMUSCULAR; INTRAVENOUS at 18:00

## 2023-04-08 RX ADMIN — Medication 50 MILLILITER(S): at 18:35

## 2023-04-08 RX ADMIN — Medication 100 MILLIGRAM(S): at 05:23

## 2023-04-08 RX ADMIN — Medication 400 MILLIGRAM(S): at 05:21

## 2023-04-08 RX ADMIN — OXYCODONE HYDROCHLORIDE 5 MILLIGRAM(S): 5 TABLET ORAL at 23:18

## 2023-04-08 RX ADMIN — HEPARIN SODIUM 5000 UNIT(S): 5000 INJECTION INTRAVENOUS; SUBCUTANEOUS at 14:00

## 2023-04-08 RX ADMIN — FENTANYL CITRATE 25 MICROGRAM(S): 50 INJECTION INTRAVENOUS at 04:35

## 2023-04-08 RX ADMIN — Medication 100 MILLIGRAM(S): at 14:00

## 2023-04-08 RX ADMIN — PROPOFOL 5.99 MICROGRAM(S)/KG/MIN: 10 INJECTION, EMULSION INTRAVENOUS at 02:55

## 2023-04-08 RX ADMIN — ATORVASTATIN CALCIUM 80 MILLIGRAM(S): 80 TABLET, FILM COATED ORAL at 21:34

## 2023-04-08 RX ADMIN — CHLORHEXIDINE GLUCONATE 5 MILLILITER(S): 213 SOLUTION TOPICAL at 05:23

## 2023-04-08 RX ADMIN — Medication 200 GRAM(S): at 09:34

## 2023-04-08 NOTE — PROGRESS NOTE ADULT - ASSESSMENT
47 M w/PMHx of HTN, HFpEF (EF:60%), HOCM, Afib, SAH 2/2 cerebral artery dissection s/p coil embolization @[facility] 7/29/21 initially admitted to [facility] 3/24 w/ Afib w/RVR, CHF exacerbation and LE cellulitis c/b TATIANA, transaminitis and rectus sheath hematoma when on Eliquis Patient, echo with severe MR structural heart consulted for surgical eval and mgmt of MR.   S/p  MV repair, Biatrial Cryo maze ablation, MARILYN clip   CABGx 1 (SVG-PDA)   EF Normal   Refractory vasoplegia and hemodynamic instability coming off bypass s/p IV hydroxocobalamin   AV-paced for underlying asystole   A/p  Still no underlying rhythm continue AV pacing will reassess later today for sinus rhythm recovery   Pressors weaned off currently on low dose Epi--> Continue low dose today for cardiac unloading and recovery in acute postop phase, unless hypertensive  Normal Lactate , AST/ Bili and CK are downtrending  Recent nonoliguric TATIANA in OSH, presented with Cr 1.3 now down to 1.1 with Good UOP/ will volume replete for Neg FB 1.5 L post op, lytes in good range, iCa repleted  Urine color red-purple after cyanokit  H&H and PLT in good range--> CTs and drains with low output   A1c 6.1% Glycemic control satisfactory--. Insulin infusion transitioned to SSC as needed- start diet   Continue ASA/Statin- routine ICU ppx measures  Continue cefazolin for periop ppx also covers LE cellulitis per ID recs last dose 4/9   OOB to chair later today     ATTENDING: I have personally and independently provided 90 min of critical care services. This excludes time spent on teaching or procedures.

## 2023-04-08 NOTE — PROGRESS NOTE ADULT - SUBJECTIVE AND OBJECTIVE BOX
INTERVAL COURSE   POD# 1  S/p CABG x 1SVG-PDA, MV repair, Johnathan Cryo Maze and LAAO  Normal EF  Extubated in am     VITALS  Vital Signs Last 24 Hrs  T(C): 36.4 (2023 08:59), Max: 36.6 (2023 01:01)  T(F): 97.6 (2023 08:59), Max: 97.8 (2023 01:01)  HR: 80 (2023 09:00) (79 - 96)  BP: 136/100 (2023 09:52) (136/100 - 136/100)  BP(mean): 115 (2023 09:52) (115 - 115)  RR: 14 (2023 09:00) (12 - 16)  SpO2: 100% (2023 09:00) (97% - 100%)  Parameters below as of 2023 09:00  Patient On (Oxygen Delivery Method): nasal cannula w/ humidification  O2 Flow (L/min): 4    I&O's Summary  2023 07:01  -  2023 07:00  --------------------------------------------------------  IN: 915.3 mL / OUT: 1945 mL / NET: -1029.7 mL    2023 07:01  -  2023 09:46  --------------------------------------------------------  IN: 548.4 mL / OUT: 140 mL / NET: 408.4 mL      PHYSICAL EXAM  General: A&Ox 3; NAD on 4 L NC  Respiratory: CTA B/L  Cardiovascular: AV paced   Gastrointestinal: Soft; NTND   Extremities: Warm, anasarca with cellulitic skin changes   Neurological:  CNII-XII grossly intact; no obvious focal deficits    MEDICATIONS  (STANDING):  aspirin  chewable 81 milliGRAM(s) Oral daily  atorvastatin 80 milliGRAM(s) Oral at bedtime  ceFAZolin   IVPB 2000 milliGRAM(s) IV Intermittent every 8 hours  chlorhexidine 0.12% Liquid 5 milliLiter(s) Oral Mucosa two times a day  chlorhexidine 2% Cloths 1 Application(s) Topical daily  dextrose 50% Injectable 50 milliLiter(s) IV Push every 15 minutes  dextrose 50% Injectable 25 milliLiter(s) IV Push every 15 minutes  EPINEPHrine    Infusion 0.04 MICROgram(s)/kG/Min (15 mL/Hr) IV Continuous <Continuous>  heparin   Injectable 5000 Unit(s) SubCutaneous every 8 hours  pantoprazole  Injectable 40 milliGRAM(s) IV Push daily  polyethylene glycol 3350 17 Gram(s) Oral daily  sodium chloride 0.9%. 1000 milliLiter(s) (10 mL/Hr) IV Continuous <Continuous>  vasopressin Infusion 0.02 Unit(s)/Min (3 mL/Hr) IV Continuous <Continuous>    MEDICATIONS  (PRN):  fentaNYL    Injectable 25 MICROGram(s) IV Push every 3 hours PRN Severe Pain (7 - 10)      LABS                        9.4    15.78 )-----------( 188      ( 2023 08:54 )             31.3     04-08    138  |  105  |  20  ----------------------------<  160<H>  4.6   |  24  |  1.16    Ca    8.8      2023 03:19  Phos  5.1     04-08  Mg     2.2     04-08    TPro  5.2<L>  /  Alb  2.8<L>  /  TBili  2.5<H>  /  DBili  x   /  AST  308<H>  /  ALT  25  /  AlkPhos  76  04-08    LIVER FUNCTIONS - ( 2023 03:19 )  Alb: 2.8 g/dL / Pro: 5.2 g/dL / ALK PHOS: 76 U/L / ALT: 25 U/L / AST: 308 U/L / GGT: x           PT/INR - ( 2023 08:54 )   PT: 13.9 sec;   INR: 1.17          PTT - ( 2023 08:54 )  PTT:29.7 sec  Urinalysis Basic - ( 2023 16:38 )    Color: Yellow / Appearance: Clear / S.015 / pH: x  Gluc: x / Ketone: NEGATIVE  / Bili: Negative / Urobili: 0.2 E.U./dL   Blood: x / Protein: NEGATIVE mg/dL / Nitrite: NEGATIVE   Leuk Esterase: NEGATIVE / RBC: x / WBC x   Sq Epi: x / Non Sq Epi: x / Bacteria: x      CARDIAC MARKERS ( 2023 03:19 )  x     / 5.70 ng/mL / 1529 U/L / x     / x          IMAGING/EKG/ETC  Reviewed.

## 2023-04-08 NOTE — PROGRESS NOTE ADULT - SUBJECTIVE AND OBJECTIVE BOX
CTICU  CRITICAL  CARE  attending     Hand off received 					   Pertinent clinical, laboratory, radiographic, hemodynamic, echocardiographic, respiratory data, microbiologic data and chart were reviewed and analyzed frequently throughout the course of the day and night          47 year old Male with HTN, hyperlipidemia,  HFpEF (EF:60%), HOCM, mod-severe MR, Atrial fibrillation diagnosed in 7/2021 (noncompliant w/ AC).  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   He presented to [facility] with acute exacerbation of CHF.  He had progressively worsening SOB and LE edema over few weeks. The patient states he could barely walk 20 feet prior to getting winded.   The patient had 2 pillow orthopnea, bilateral lower extremity edema and erythema, states he scratched and accidentally opening fluid filled blisters on his LE.   In [facility] ED, BP: 146/122, HR: 80s, RR:16, Temp: 97.5F, O2 sat: 96%RA.   EKG revealed Afib@105BPM without ischemic changes.   CXR with pulmonary vascular congestion and moderate right pleural effusion.   Labs notable for: BUN/Cr 51.9/2.0, Total bili 2.6, , , Alk phos 99, BNP 1169, Amylase 47, Lipase 533.   CT abd/pelvis revealed trace ascites, no peripancreatic abscess/necrosis. Abdominal US without acute findings.   The patient was admitted to telemetry for management HFpEF exacerbation, Atrial fibrillation with Rapid Ventricular Rate and Lower Extremity cellulitis.   The patient was started on IV diuretics and IV antibiotics with improvement. The patient achieved rate controlled with Beta Blocker and Cardizem. He was started on Eliquis for anticoagulation. Hospital course complicated by TATIANA, transaminitis and 11cm rectal sheath hematoma.  General Surgery recommended abdominal binder and no acute intervention, Eliquis was held and recommended to resume if H/H remains stable.    GI recommended to hold Statin. MRCP could not be performed due to history of cerebral coil embolization.    Echocardiogram : Normal LV function, EF:60%, LAE with severe MR, mitral valve degeneration of posterior leaflet with significant prolapse and mod-severe anterior directed MR.     S/P MV repair  S/P Ligation of LA appendage  S/P cryo maze        HEALTH ISSUES - PROBLEM Dx:  Acute on chronic heart failure with preserved ejection fraction (HFpEF)  Mitral regurgitation  Atrial fibrillation  Hyperlipidemia  TATIANA (acute kidney injury)  Transaminitis  Hematoma of rectus sheath  Cellulitis  HTN (hypertension)        FAMILY HISTORY:  No pertinent family history in first degree relatives    PAST MEDICAL & SURGICAL HISTORY:  HTN (hypertension)  SAH (subarachnoid hemorrhage)  Atrial fibrillation  HOCM (hypertrophic obstructive cardiomyopathy)  Mitral regurgitation  S/P coil embolization of cerebral aneurysm            14 system review was unremarkable    Vital signs, hemodynamic and respiratory parameters were reviewed from the bedside nursing flow sheet.  ICU Vital Signs Last 24 Hrs  T(C): 36.5 (08 Apr 2023 17:38), Max: 37.2 (08 Apr 2023 13:00)  T(F): 97.7 (08 Apr 2023 17:38), Max: 99 (08 Apr 2023 13:00)  HR: 69 (08 Apr 2023 20:00) (69 - 80)  BP: --  BP(mean): --  ABP: 113/58 (08 Apr 2023 20:00) (92/61 - 141/75)  ABP(mean): 73 (08 Apr 2023 20:00) (69 - 95)  RR: 13 (08 Apr 2023 20:00) (12 - 25)  SpO2: 97% (08 Apr 2023 20:00) (94% - 100%)    O2 Parameters below as of 08 Apr 2023 20:00  Patient On (Oxygen Delivery Method): nasal cannula w/ humidification  O2 Flow (L/min): 2        Adult Advanced Hemodynamics Last 24 Hrs  CVP(mm Hg): 16 (08 Apr 2023 20:00) (9 - 18)  CVP(cm H2O): --  CO: 5.8 (08 Apr 2023 20:00) (5.6 - 6)  CI: 2.5 (08 Apr 2023 20:00) (2.4 - 2.6)  PA: --  PA(mean): --  PCWP: --  SVR: 785 (08 Apr 2023 20:00) (770 - 878)  SVRI: 1821 (08 Apr 2023 20:00) (1797 - 2028)  PVR: --  PVRI: --, ABG - ( 08 Apr 2023 17:14 )  pH, Arterial: 7.41  pH, Blood: x     /  pCO2: 36    /  pO2: 93    / HCO3: 23    / Base Excess: -1.4  /  SaO2: 98.9              Mode: SIMV (Synchronized Intermittent Mandatory Ventilation)  RR (machine): 12  TV (machine): 600  FiO2: 40  PEEP: 5  PS: 0.1  ITime: 1  MAP: 8  PIP: 19    Intake and output was reviewed and the fluid balance was calculated  Daily     Daily   I&O's Summary    07 Apr 2023 07:01  -  08 Apr 2023 07:00  --------------------------------------------------------  IN: 915.3 mL / OUT: 1945 mL / NET: -1029.7 mL    08 Apr 2023 07:01  -  08 Apr 2023 20:58  --------------------------------------------------------  IN: 1641.1 mL / OUT: 925 mL / NET: 716.1 mL        All lines and drain sites were assessed    Neuro: No change in the mental status from the baseline. Follows commands. Moves all 4 extremities.  Neck: No JVD.  CVS: S1, S2, No S3.  Lungs: Good air entry bilaterally.  Abd: Soft. No tenderness. + Bowel sounds.  Vascular: + DP/PT.  Extremities: No edema.  Lymphatic: Normal.  Skin: No abnormalities.      labs  CBC Full  -  ( 08 Apr 2023 17:14 )  WBC Count : 17.35 K/uL  RBC Count : 3.99 M/uL  Hemoglobin : 9.4 g/dL  Hematocrit : 31.9 %  Platelet Count - Automated : 197 K/uL  Mean Cell Volume : 79.9 fl  Mean Cell Hemoglobin : 23.6 pg  Mean Cell Hemoglobin Concentration : 29.5 gm/dL  Auto Neutrophil # : x  Auto Lymphocyte # : x  Auto Monocyte # : x  Auto Eosinophil # : x  Auto Basophil # : x  Auto Neutrophil % : x  Auto Lymphocyte % : x  Auto Monocyte % : x  Auto Eosinophil % : x  Auto Basophil % : x    04-08    139  |  104  |  24<H>  ----------------------------<  163<H>  4.6   |  22  |  1.38<H>    Ca    8.7      08 Apr 2023 17:14  Phos  5.1     04-08  Mg     2.0     04-08    TPro  5.6<L>  /  Alb  3.3  /  TBili  2.5<H>  /  DBili  x   /  AST  171<H>  /  ALT  19  /  AlkPhos  67  04-08    PT/INR - ( 08 Apr 2023 14:00 )   PT: 14.0 sec;   INR: 1.17          PTT - ( 08 Apr 2023 14:00 )  PTT:30.0 sec  The current medications were reviewed   MEDICATIONS  (STANDING):  aspirin  chewable 81 milliGRAM(s) Oral daily  atorvastatin 80 milliGRAM(s) Oral at bedtime  buMETAnide IVPB 2 milliGRAM(s) IV Intermittent once  ceFAZolin   IVPB 2000 milliGRAM(s) IV Intermittent every 8 hours  chlorhexidine 2% Cloths 1 Application(s) Topical daily  dextrose 5%. 1000 milliLiter(s) (50 mL/Hr) IV Continuous <Continuous>  dextrose 5%. 1000 milliLiter(s) (100 mL/Hr) IV Continuous <Continuous>  dextrose 50% Injectable 50 milliLiter(s) IV Push every 15 minutes  dextrose 50% Injectable 25 milliLiter(s) IV Push every 15 minutes  DOBUTamine Infusion 5 MICROgram(s)/kG/Min (15 mL/Hr) IV Continuous <Continuous>  glucagon  Injectable 1 milliGRAM(s) IntraMuscular once  heparin   Injectable 5000 Unit(s) SubCutaneous every 8 hours  insulin lispro (ADMELOG) corrective regimen sliding scale   SubCutaneous Before meals and at bedtime  pantoprazole    Tablet 40 milliGRAM(s) Oral before breakfast  polyethylene glycol 3350 17 Gram(s) Oral daily  senna 2 Tablet(s) Oral at bedtime  sodium chloride 0.9%. 1000 milliLiter(s) (10 mL/Hr) IV Continuous <Continuous>  vasopressin Infusion 0.02 Unit(s)/Min (3 mL/Hr) IV Continuous <Continuous>    MEDICATIONS  (PRN):  acetaminophen     Tablet .. 650 milliGRAM(s) Oral every 6 hours PRN Temp greater or equal to 38C (100.4F), Mild Pain (1 - 3)  dextrose Oral Gel 15 Gram(s) Oral once PRN Blood Glucose LESS THAN 70 milliGRAM(s)/deciliter  oxyCODONE    IR 10 milliGRAM(s) Oral every 6 hours PRN Severe Pain (7 - 10)  oxyCODONE    IR 5 milliGRAM(s) Oral every 4 hours PRN Moderate Pain (4 - 6)        PROBLEM LIST/ ASSESSMENT:  HEALTH ISSUES - PROBLEM Dx:  Acute on chronic heart failure with preserved ejection fraction (HFpEF)  Mitral regurgitation  Atrial fibrillation  Hyperlipidemia  TATIANA (acute kidney injury)  Transaminitis  Hematoma of rectus sheath  Cellulitis  HTN (hypertension)        48 year old  Male with exacerbation of CHF, Atrial fibrillation, Lower Extremity cellulitis.  S/P Mitral valve repair  S/P Exclusion of the LA appendage.  S/P cryoablation.  Postoperative course complicated by AV block, transaminitis, TATIANA, NSTEMI.   AV paced (Underlying rhythm is sinus bradycardia).  Hemodynamically stable.  Good oxygenation.  Fair urine out put.        My plan includes :  WEAN off dobutamine.  D/C vasopressin.  Gentle diuresis.  OPTIMIZE Glycemic control.   Statin and Betablocker.  Close hemodynamic monitoring   Monitor for arrhythmias and monitor parameters for organ perfusion  Monitor neurologic status  Monitor renal function.  Head of the bed should remain elevated to 45 deg .   Chest PT and IS will be encouraged  Monitor adequacy of oxygenation   Nutritional goals will be met using po eventually , ensure adequate caloric intake and monitor the same  Stress ulcer and VTE prophylaxis will be achieved    Electrolytes have been repleted as necessary and wound care has been carried out. Pain control has been achieved.   Aggressive physical therapy and early mobility and ambulation goals will be met   The family was updated about the course and plan  CRITICAL CARE TIME SPENT in evaluation and management, reassessments, interpretation of labs and x-rays, hemodynamic management, formulating a plan and coordinating care: ___30____ MIN.  Time does not include procedural time.  CTICU ATTENDING     					    Bruno Hahn MD

## 2023-04-09 LAB
ALBUMIN SERPL ELPH-MCNC: 3.3 G/DL — SIGNIFICANT CHANGE UP (ref 3.3–5)
ALBUMIN SERPL ELPH-MCNC: 3.4 G/DL — SIGNIFICANT CHANGE UP (ref 3.3–5)
ALP SERPL-CCNC: 63 U/L — SIGNIFICANT CHANGE UP (ref 40–120)
ALP SERPL-CCNC: 68 U/L — SIGNIFICANT CHANGE UP (ref 40–120)
ALP SERPL-CCNC: 68 U/L — SIGNIFICANT CHANGE UP (ref 40–120)
ALP SERPL-CCNC: 77 U/L — SIGNIFICANT CHANGE UP (ref 40–120)
ALT FLD-CCNC: 12 U/L — SIGNIFICANT CHANGE UP (ref 10–45)
ALT FLD-CCNC: 5 U/L — LOW (ref 10–45)
ALT FLD-CCNC: 7 U/L — LOW (ref 10–45)
ALT FLD-CCNC: 9 U/L — LOW (ref 10–45)
ANION GAP SERPL CALC-SCNC: 11 MMOL/L — SIGNIFICANT CHANGE UP (ref 5–17)
ANION GAP SERPL CALC-SCNC: 11 MMOL/L — SIGNIFICANT CHANGE UP (ref 5–17)
ANION GAP SERPL CALC-SCNC: 12 MMOL/L — SIGNIFICANT CHANGE UP (ref 5–17)
ANION GAP SERPL CALC-SCNC: 12 MMOL/L — SIGNIFICANT CHANGE UP (ref 5–17)
APTT BLD: 29.6 SEC — SIGNIFICANT CHANGE UP (ref 27.5–35.5)
APTT BLD: 29.9 SEC — SIGNIFICANT CHANGE UP (ref 27.5–35.5)
APTT BLD: 29.9 SEC — SIGNIFICANT CHANGE UP (ref 27.5–35.5)
APTT BLD: 30.5 SEC — SIGNIFICANT CHANGE UP (ref 27.5–35.5)
AST SERPL-CCNC: 119 U/L — HIGH (ref 10–40)
AST SERPL-CCNC: 64 U/L — HIGH (ref 10–40)
AST SERPL-CCNC: 74 U/L — HIGH (ref 10–40)
AST SERPL-CCNC: 92 U/L — HIGH (ref 10–40)
BASE EXCESS BLDV CALC-SCNC: -0.8 MMOL/L — SIGNIFICANT CHANGE UP (ref -2–3)
BASE EXCESS BLDV CALC-SCNC: 1.4 MMOL/L — SIGNIFICANT CHANGE UP (ref -2–3)
BASE EXCESS BLDV CALC-SCNC: 1.5 MMOL/L — SIGNIFICANT CHANGE UP (ref -2–3)
BASE EXCESS BLDV CALC-SCNC: 2.8 MMOL/L — SIGNIFICANT CHANGE UP (ref -2–3)
BASE EXCESS BLDV CALC-SCNC: 2.8 MMOL/L — SIGNIFICANT CHANGE UP (ref -2–3)
BASE EXCESS BLDV CALC-SCNC: 4.4 MMOL/L — HIGH (ref -2–3)
BILIRUB SERPL-MCNC: 1.3 MG/DL — HIGH (ref 0.2–1.2)
BILIRUB SERPL-MCNC: 1.5 MG/DL — HIGH (ref 0.2–1.2)
BUN SERPL-MCNC: 27 MG/DL — HIGH (ref 7–23)
BUN SERPL-MCNC: 27 MG/DL — HIGH (ref 7–23)
BUN SERPL-MCNC: 29 MG/DL — HIGH (ref 7–23)
BUN SERPL-MCNC: 30 MG/DL — HIGH (ref 7–23)
CA-I SERPL-SCNC: 1.16 MMOL/L — SIGNIFICANT CHANGE UP (ref 1.15–1.33)
CA-I SERPL-SCNC: 1.16 MMOL/L — SIGNIFICANT CHANGE UP (ref 1.15–1.33)
CA-I SERPL-SCNC: 1.21 MMOL/L — SIGNIFICANT CHANGE UP (ref 1.15–1.33)
CA-I SERPL-SCNC: 1.22 MMOL/L — SIGNIFICANT CHANGE UP (ref 1.15–1.33)
CA-I SERPL-SCNC: 1.22 MMOL/L — SIGNIFICANT CHANGE UP (ref 1.15–1.33)
CALCIUM SERPL-MCNC: 8.5 MG/DL — SIGNIFICANT CHANGE UP (ref 8.4–10.5)
CALCIUM SERPL-MCNC: 8.8 MG/DL — SIGNIFICANT CHANGE UP (ref 8.4–10.5)
CHLORIDE SERPL-SCNC: 96 MMOL/L — SIGNIFICANT CHANGE UP (ref 96–108)
CHLORIDE SERPL-SCNC: 96 MMOL/L — SIGNIFICANT CHANGE UP (ref 96–108)
CHLORIDE SERPL-SCNC: 98 MMOL/L — SIGNIFICANT CHANGE UP (ref 96–108)
CHLORIDE SERPL-SCNC: 99 MMOL/L — SIGNIFICANT CHANGE UP (ref 96–108)
CO2 BLDV-SCNC: 24.6 MMOL/L — SIGNIFICANT CHANGE UP (ref 22–26)
CO2 BLDV-SCNC: 29.2 MMOL/L — HIGH (ref 22–26)
CO2 BLDV-SCNC: 29.7 MMOL/L — HIGH (ref 22–26)
CO2 BLDV-SCNC: 31 MMOL/L — HIGH (ref 22–26)
CO2 BLDV-SCNC: 31 MMOL/L — HIGH (ref 22–26)
CO2 BLDV-SCNC: 32.4 MMOL/L — HIGH (ref 22–26)
CO2 SERPL-SCNC: 25 MMOL/L — SIGNIFICANT CHANGE UP (ref 22–31)
CO2 SERPL-SCNC: 26 MMOL/L — SIGNIFICANT CHANGE UP (ref 22–31)
CREAT ?TM UR-MCNC: 162 MG/DL — SIGNIFICANT CHANGE UP
CREAT SERPL-MCNC: 1.65 MG/DL — HIGH (ref 0.5–1.3)
CREAT SERPL-MCNC: 1.65 MG/DL — HIGH (ref 0.5–1.3)
CREAT SERPL-MCNC: 1.72 MG/DL — HIGH (ref 0.5–1.3)
CREAT SERPL-MCNC: 1.74 MG/DL — HIGH (ref 0.5–1.3)
EGFR: 48 ML/MIN/1.73M2 — LOW
EGFR: 48 ML/MIN/1.73M2 — LOW
EGFR: 51 ML/MIN/1.73M2 — LOW
EGFR: 51 ML/MIN/1.73M2 — LOW
GAS PNL BLDA: SIGNIFICANT CHANGE UP
GAS PNL BLDV: 130 MMOL/L — LOW (ref 136–145)
GAS PNL BLDV: 131 MMOL/L — LOW (ref 136–145)
GAS PNL BLDV: SIGNIFICANT CHANGE UP
GLUCOSE BLDC GLUCOMTR-MCNC: 142 MG/DL — HIGH (ref 70–99)
GLUCOSE BLDC GLUCOMTR-MCNC: 142 MG/DL — HIGH (ref 70–99)
GLUCOSE BLDC GLUCOMTR-MCNC: 143 MG/DL — HIGH (ref 70–99)
GLUCOSE BLDC GLUCOMTR-MCNC: 148 MG/DL — HIGH (ref 70–99)
GLUCOSE SERPL-MCNC: 125 MG/DL — HIGH (ref 70–99)
GLUCOSE SERPL-MCNC: 144 MG/DL — HIGH (ref 70–99)
GLUCOSE SERPL-MCNC: 156 MG/DL — HIGH (ref 70–99)
GLUCOSE SERPL-MCNC: 165 MG/DL — HIGH (ref 70–99)
HCO3 BLDV-SCNC: 24 MMOL/L — SIGNIFICANT CHANGE UP (ref 22–29)
HCO3 BLDV-SCNC: 28 MMOL/L — SIGNIFICANT CHANGE UP (ref 22–29)
HCO3 BLDV-SCNC: 28 MMOL/L — SIGNIFICANT CHANGE UP (ref 22–29)
HCO3 BLDV-SCNC: 29 MMOL/L — SIGNIFICANT CHANGE UP (ref 22–29)
HCO3 BLDV-SCNC: 29 MMOL/L — SIGNIFICANT CHANGE UP (ref 22–29)
HCO3 BLDV-SCNC: 31 MMOL/L — HIGH (ref 22–29)
HCT VFR BLD CALC: 29 % — LOW (ref 39–50)
HCT VFR BLD CALC: 29.5 % — LOW (ref 39–50)
HCT VFR BLD CALC: 30.1 % — LOW (ref 39–50)
HCT VFR BLD CALC: 30.8 % — LOW (ref 39–50)
HGB BLD-MCNC: 8.8 G/DL — LOW (ref 13–17)
HGB BLD-MCNC: 9 G/DL — LOW (ref 13–17)
HGB BLD-MCNC: 9.1 G/DL — LOW (ref 13–17)
HGB BLD-MCNC: 9.2 G/DL — LOW (ref 13–17)
INR BLD: 1.11 — SIGNIFICANT CHANGE UP (ref 0.88–1.16)
INR BLD: 1.13 — SIGNIFICANT CHANGE UP (ref 0.88–1.16)
INR BLD: 1.13 — SIGNIFICANT CHANGE UP (ref 0.88–1.16)
INR BLD: 1.2 — HIGH (ref 0.88–1.16)
LACTATE SERPL-SCNC: 1.4 MMOL/L — SIGNIFICANT CHANGE UP (ref 0.5–2)
LACTATE SERPL-SCNC: 1.6 MMOL/L — SIGNIFICANT CHANGE UP (ref 0.5–2)
LACTATE SERPL-SCNC: 1.7 MMOL/L — SIGNIFICANT CHANGE UP (ref 0.5–2)
LACTATE SERPL-SCNC: 1.8 MMOL/L — SIGNIFICANT CHANGE UP (ref 0.5–2)
LACTATE SERPL-SCNC: 2.5 MMOL/L — HIGH (ref 0.5–2)
LACTATE SERPL-SCNC: 2.8 MMOL/L — HIGH (ref 0.5–2)
MAGNESIUM SERPL-MCNC: 1.8 MG/DL — SIGNIFICANT CHANGE UP (ref 1.6–2.6)
MAGNESIUM SERPL-MCNC: 1.9 MG/DL — SIGNIFICANT CHANGE UP (ref 1.6–2.6)
MAGNESIUM SERPL-MCNC: 1.9 MG/DL — SIGNIFICANT CHANGE UP (ref 1.6–2.6)
MAGNESIUM SERPL-MCNC: 2.3 MG/DL — SIGNIFICANT CHANGE UP (ref 1.6–2.6)
MCHC RBC-ENTMCNC: 23.8 PG — LOW (ref 27–34)
MCHC RBC-ENTMCNC: 24 PG — LOW (ref 27–34)
MCHC RBC-ENTMCNC: 24 PG — LOW (ref 27–34)
MCHC RBC-ENTMCNC: 24.1 PG — LOW (ref 27–34)
MCHC RBC-ENTMCNC: 29.9 GM/DL — LOW (ref 32–36)
MCHC RBC-ENTMCNC: 30.2 GM/DL — LOW (ref 32–36)
MCHC RBC-ENTMCNC: 30.3 GM/DL — LOW (ref 32–36)
MCHC RBC-ENTMCNC: 30.5 GM/DL — LOW (ref 32–36)
MCV RBC AUTO: 78.7 FL — LOW (ref 80–100)
MCV RBC AUTO: 79 FL — LOW (ref 80–100)
MCV RBC AUTO: 79.8 FL — LOW (ref 80–100)
MCV RBC AUTO: 79.8 FL — LOW (ref 80–100)
NRBC # BLD: 0 /100 WBCS — SIGNIFICANT CHANGE UP (ref 0–0)
PCO2 BLDV: 37 MMHG — LOW (ref 42–55)
PCO2 BLDV: 49 MMHG — SIGNIFICANT CHANGE UP (ref 42–55)
PCO2 BLDV: 52 MMHG — SIGNIFICANT CHANGE UP (ref 42–55)
PH BLDV: 7.34 — SIGNIFICANT CHANGE UP (ref 7.32–7.43)
PH BLDV: 7.36 — SIGNIFICANT CHANGE UP (ref 7.32–7.43)
PH BLDV: 7.38 — SIGNIFICANT CHANGE UP (ref 7.32–7.43)
PH BLDV: 7.41 — SIGNIFICANT CHANGE UP (ref 7.32–7.43)
PHOSPHATE SERPL-MCNC: 3.4 MG/DL — SIGNIFICANT CHANGE UP (ref 2.5–4.5)
PHOSPHATE SERPL-MCNC: 3.6 MG/DL — SIGNIFICANT CHANGE UP (ref 2.5–4.5)
PHOSPHATE SERPL-MCNC: 3.6 MG/DL — SIGNIFICANT CHANGE UP (ref 2.5–4.5)
PHOSPHATE SERPL-MCNC: 4.4 MG/DL — SIGNIFICANT CHANGE UP (ref 2.5–4.5)
PLATELET # BLD AUTO: 153 K/UL — SIGNIFICANT CHANGE UP (ref 150–400)
PLATELET # BLD AUTO: 158 K/UL — SIGNIFICANT CHANGE UP (ref 150–400)
PLATELET # BLD AUTO: 171 K/UL — SIGNIFICANT CHANGE UP (ref 150–400)
PLATELET # BLD AUTO: 183 K/UL — SIGNIFICANT CHANGE UP (ref 150–400)
PO2 BLDV: 122 MMHG — HIGH (ref 25–45)
PO2 BLDV: 25 MMHG — SIGNIFICANT CHANGE UP (ref 25–45)
PO2 BLDV: 34 MMHG — SIGNIFICANT CHANGE UP (ref 25–45)
PO2 BLDV: 37 MMHG — SIGNIFICANT CHANGE UP (ref 25–45)
PO2 BLDV: <33 MMHG — SIGNIFICANT CHANGE UP (ref 25–45)
PO2 BLDV: <33 MMHG — SIGNIFICANT CHANGE UP (ref 25–45)
POTASSIUM BLDV-SCNC: 4.1 MMOL/L — SIGNIFICANT CHANGE UP (ref 3.5–5.1)
POTASSIUM BLDV-SCNC: 4.3 MMOL/L — SIGNIFICANT CHANGE UP (ref 3.5–5.1)
POTASSIUM BLDV-SCNC: 4.5 MMOL/L — SIGNIFICANT CHANGE UP (ref 3.5–5.1)
POTASSIUM BLDV-SCNC: 4.5 MMOL/L — SIGNIFICANT CHANGE UP (ref 3.5–5.1)
POTASSIUM BLDV-SCNC: 4.8 MMOL/L — SIGNIFICANT CHANGE UP (ref 3.5–5.1)
POTASSIUM SERPL-MCNC: 4.1 MMOL/L — SIGNIFICANT CHANGE UP (ref 3.5–5.3)
POTASSIUM SERPL-MCNC: 4.3 MMOL/L — SIGNIFICANT CHANGE UP (ref 3.5–5.3)
POTASSIUM SERPL-MCNC: 4.4 MMOL/L — SIGNIFICANT CHANGE UP (ref 3.5–5.3)
POTASSIUM SERPL-MCNC: 4.4 MMOL/L — SIGNIFICANT CHANGE UP (ref 3.5–5.3)
POTASSIUM SERPL-SCNC: 4.1 MMOL/L — SIGNIFICANT CHANGE UP (ref 3.5–5.3)
POTASSIUM SERPL-SCNC: 4.3 MMOL/L — SIGNIFICANT CHANGE UP (ref 3.5–5.3)
POTASSIUM SERPL-SCNC: 4.4 MMOL/L — SIGNIFICANT CHANGE UP (ref 3.5–5.3)
POTASSIUM SERPL-SCNC: 4.4 MMOL/L — SIGNIFICANT CHANGE UP (ref 3.5–5.3)
PROT SERPL-MCNC: 5.5 G/DL — LOW (ref 6–8.3)
PROT SERPL-MCNC: 5.6 G/DL — LOW (ref 6–8.3)
PROT SERPL-MCNC: 5.8 G/DL — LOW (ref 6–8.3)
PROT SERPL-MCNC: 5.9 G/DL — LOW (ref 6–8.3)
PROTHROM AB SERPL-ACNC: 13.2 SEC — SIGNIFICANT CHANGE UP (ref 10.5–13.4)
PROTHROM AB SERPL-ACNC: 13.5 SEC — HIGH (ref 10.5–13.4)
PROTHROM AB SERPL-ACNC: 13.5 SEC — HIGH (ref 10.5–13.4)
PROTHROM AB SERPL-ACNC: 14.3 SEC — HIGH (ref 10.5–13.4)
RBC # BLD: 3.67 M/UL — LOW (ref 4.2–5.8)
RBC # BLD: 3.75 M/UL — LOW (ref 4.2–5.8)
RBC # BLD: 3.77 M/UL — LOW (ref 4.2–5.8)
RBC # BLD: 3.86 M/UL — LOW (ref 4.2–5.8)
RBC # FLD: 20.8 % — HIGH (ref 10.3–14.5)
RBC # FLD: 21.2 % — HIGH (ref 10.3–14.5)
RBC # FLD: 21.4 % — HIGH (ref 10.3–14.5)
RBC # FLD: 21.6 % — HIGH (ref 10.3–14.5)
SAO2 % BLDV: 42.9 % — LOW (ref 67–88)
SAO2 % BLDV: 48 % — LOW (ref 67–88)
SAO2 % BLDV: 53.5 % — LOW (ref 67–88)
SAO2 % BLDV: 57.2 % — LOW (ref 67–88)
SAO2 % BLDV: 65.5 % — LOW (ref 67–88)
SAO2 % BLDV: 99.3 % — HIGH (ref 67–88)
SODIUM SERPL-SCNC: 133 MMOL/L — LOW (ref 135–145)
SODIUM SERPL-SCNC: 133 MMOL/L — LOW (ref 135–145)
SODIUM SERPL-SCNC: 135 MMOL/L — SIGNIFICANT CHANGE UP (ref 135–145)
SODIUM SERPL-SCNC: 135 MMOL/L — SIGNIFICANT CHANGE UP (ref 135–145)
SODIUM UR-SCNC: <20 MMOL/L — SIGNIFICANT CHANGE UP
UUN UR-MCNC: 449 MG/DL — SIGNIFICANT CHANGE UP
WBC # BLD: 14.73 K/UL — HIGH (ref 3.8–10.5)
WBC # BLD: 15.08 K/UL — HIGH (ref 3.8–10.5)
WBC # BLD: 16.14 K/UL — HIGH (ref 3.8–10.5)
WBC # BLD: 16.17 K/UL — HIGH (ref 3.8–10.5)
WBC # FLD AUTO: 14.73 K/UL — HIGH (ref 3.8–10.5)
WBC # FLD AUTO: 15.08 K/UL — HIGH (ref 3.8–10.5)
WBC # FLD AUTO: 16.14 K/UL — HIGH (ref 3.8–10.5)
WBC # FLD AUTO: 16.17 K/UL — HIGH (ref 3.8–10.5)

## 2023-04-09 PROCEDURE — 71045 X-RAY EXAM CHEST 1 VIEW: CPT | Mod: 26,77

## 2023-04-09 PROCEDURE — 71045 X-RAY EXAM CHEST 1 VIEW: CPT | Mod: 26

## 2023-04-09 PROCEDURE — 99291 CRITICAL CARE FIRST HOUR: CPT

## 2023-04-09 RX ORDER — BENZOCAINE AND MENTHOL 5; 1 G/100ML; G/100ML
1 LIQUID ORAL EVERY 4 HOURS
Refills: 0 | Status: DISCONTINUED | OUTPATIENT
Start: 2023-04-09 | End: 2023-04-13

## 2023-04-09 RX ORDER — OXYBUTYNIN CHLORIDE 5 MG
5 TABLET ORAL EVERY 8 HOURS
Refills: 0 | Status: DISCONTINUED | OUTPATIENT
Start: 2023-04-09 | End: 2023-04-13

## 2023-04-09 RX ORDER — SODIUM CHLORIDE 9 MG/ML
500 INJECTION, SOLUTION INTRAVENOUS ONCE
Refills: 0 | Status: COMPLETED | OUTPATIENT
Start: 2023-04-09 | End: 2023-04-09

## 2023-04-09 RX ORDER — MAGNESIUM OXIDE 400 MG ORAL TABLET 241.3 MG
400 TABLET ORAL EVERY 12 HOURS
Refills: 0 | Status: DISCONTINUED | OUTPATIENT
Start: 2023-04-09 | End: 2023-04-09

## 2023-04-09 RX ORDER — SODIUM CHLORIDE 9 MG/ML
500 INJECTION, SOLUTION INTRAVENOUS
Refills: 0 | Status: DISCONTINUED | OUTPATIENT
Start: 2023-04-09 | End: 2023-04-09

## 2023-04-09 RX ORDER — DOBUTAMINE HCL 250MG/20ML
2 VIAL (ML) INTRAVENOUS
Qty: 500 | Refills: 0 | Status: DISCONTINUED | OUTPATIENT
Start: 2023-04-09 | End: 2023-04-10

## 2023-04-09 RX ORDER — DOBUTAMINE HCL 250MG/20ML
4 VIAL (ML) INTRAVENOUS
Qty: 500 | Refills: 0 | Status: DISCONTINUED | OUTPATIENT
Start: 2023-04-09 | End: 2023-04-09

## 2023-04-09 RX ORDER — BUMETANIDE 0.25 MG/ML
2 INJECTION INTRAMUSCULAR; INTRAVENOUS ONCE
Refills: 0 | Status: COMPLETED | OUTPATIENT
Start: 2023-04-09 | End: 2023-04-09

## 2023-04-09 RX ORDER — MAGNESIUM SULFATE 500 MG/ML
2 VIAL (ML) INJECTION ONCE
Refills: 0 | Status: COMPLETED | OUTPATIENT
Start: 2023-04-09 | End: 2023-04-09

## 2023-04-09 RX ADMIN — MAGNESIUM OXIDE 400 MG ORAL TABLET 400 MILLIGRAM(S): 241.3 TABLET ORAL at 11:17

## 2023-04-09 RX ADMIN — HEPARIN SODIUM 5000 UNIT(S): 5000 INJECTION INTRAVENOUS; SUBCUTANEOUS at 21:10

## 2023-04-09 RX ADMIN — HEPARIN SODIUM 5000 UNIT(S): 5000 INJECTION INTRAVENOUS; SUBCUTANEOUS at 14:09

## 2023-04-09 RX ADMIN — BUMETANIDE 2 MILLIGRAM(S): 0.25 INJECTION INTRAMUSCULAR; INTRAVENOUS at 14:09

## 2023-04-09 RX ADMIN — Medication 5 MILLIGRAM(S): at 21:10

## 2023-04-09 RX ADMIN — Medication 25 GRAM(S): at 17:17

## 2023-04-09 RX ADMIN — SENNA PLUS 2 TABLET(S): 8.6 TABLET ORAL at 21:10

## 2023-04-09 RX ADMIN — OXYCODONE HYDROCHLORIDE 5 MILLIGRAM(S): 5 TABLET ORAL at 22:00

## 2023-04-09 RX ADMIN — BENZOCAINE AND MENTHOL 1 LOZENGE: 5; 1 LIQUID ORAL at 13:07

## 2023-04-09 RX ADMIN — OXYCODONE HYDROCHLORIDE 5 MILLIGRAM(S): 5 TABLET ORAL at 21:09

## 2023-04-09 RX ADMIN — SODIUM CHLORIDE 1000 MILLILITER(S): 9 INJECTION, SOLUTION INTRAVENOUS at 11:43

## 2023-04-09 RX ADMIN — OXYCODONE HYDROCHLORIDE 5 MILLIGRAM(S): 5 TABLET ORAL at 03:20

## 2023-04-09 RX ADMIN — Medication 81 MILLIGRAM(S): at 11:16

## 2023-04-09 RX ADMIN — OXYCODONE HYDROCHLORIDE 5 MILLIGRAM(S): 5 TABLET ORAL at 12:00

## 2023-04-09 RX ADMIN — SODIUM CHLORIDE 1000 MILLILITER(S): 9 INJECTION, SOLUTION INTRAVENOUS at 14:00

## 2023-04-09 RX ADMIN — Medication 1 APPLICATION(S): at 13:07

## 2023-04-09 RX ADMIN — Medication 12 MICROGRAM(S)/KG/MIN: at 14:09

## 2023-04-09 RX ADMIN — Medication 100 MILLIGRAM(S): at 05:34

## 2023-04-09 RX ADMIN — CHLORHEXIDINE GLUCONATE 1 APPLICATION(S): 213 SOLUTION TOPICAL at 12:25

## 2023-04-09 RX ADMIN — OXYCODONE HYDROCHLORIDE 5 MILLIGRAM(S): 5 TABLET ORAL at 11:15

## 2023-04-09 RX ADMIN — PANTOPRAZOLE SODIUM 40 MILLIGRAM(S): 20 TABLET, DELAYED RELEASE ORAL at 07:24

## 2023-04-09 RX ADMIN — ATORVASTATIN CALCIUM 80 MILLIGRAM(S): 80 TABLET, FILM COATED ORAL at 21:10

## 2023-04-09 RX ADMIN — POLYETHYLENE GLYCOL 3350 17 GRAM(S): 17 POWDER, FOR SOLUTION ORAL at 11:16

## 2023-04-09 RX ADMIN — HEPARIN SODIUM 5000 UNIT(S): 5000 INJECTION INTRAVENOUS; SUBCUTANEOUS at 07:24

## 2023-04-09 RX ADMIN — OXYCODONE HYDROCHLORIDE 5 MILLIGRAM(S): 5 TABLET ORAL at 02:20

## 2023-04-09 NOTE — PROGRESS NOTE ADULT - ASSESSMENT
47 M w/PMHx of HTN, HFpEF (EF:60%), HOCM, Afib, SAH 2/2 cerebral artery dissection s/p coil embolization @[facility] 21 initially admitted to [facility] 3/24 w/ Afib w/RVR, CHF exacerbation and LE cellulitis c/b TATIANA, transaminitis and rectus sheath hematoma when on Eliquis Patient, echo with severe MR structural heart consulted for surgical eval and mgmt of MR.   S/p  MV repair, Biatrial Cryo maze ablation, MARILYN clip   CABGx 1 (SVG-PDA)   EF Normal     Refractory vasoplegia and hemodynamic instability coming off bypass s/p IV hydroxocobalamin   A/p  Sinus rhythm recovery--> off pacing, in sinus 60-70 holding pressures in good range remained on  at 5/ FloTrack with good cardiac indices  Slowly weaning , down to 4--> monitoring perfusion parameters and closely   Postop Nonoliguric TATIANA, Shock related vs hemodynamic-renal fx plateauing will diurese PRN for Neutral FB- volume status close to euvolemia   Lungs clear with no supplemental O2 req/ pulling good volume with IS > 2L   ADAT/ glycemic control satisfactory--> SSC as needed   H&H and PLT in good range--> CTs and drains with low output   Continue ASA/Statin- routine ICU ppx measures  Continue cefazolin for periop ppx also covers LE cellulitis per ID recs last dose today  OOB and mobilizing as tolerated   Urine color red-purple after cyanokit--> will monitor,  braun switched to external catheter for leaking   To dc rPleural CT after walk     ATTENDING: I have personally and independently provided 90 Min of critical care services.     47 M w/PMHx of HTN, HFpEF (EF:60%), HOCM, Afib, SAH 2/2 cerebral artery dissection s/p coil embolization @[facility] 21 initially admitted to [facility] 3/24 w/ Afib w/RVR, CHF exacerbation and LE cellulitis c/b TATIANA, transaminitis and rectus sheath hematoma when on Eliquis Patient, echo with severe MR structural heart consulted for surgical eval and mgmt of MR.   S/p  MV repair, Biatrial Cryo maze ablation, MARILYN clip   CABGx 1 (SVG-PDA)   EF Normal     Refractory vasoplegia and hemodynamic instability coming off bypass s/p IV hydroxocobalamin   A/p  Sinus rhythm recovery--> off pacing, in sinus 60-70 holding pressures in good range remained on  at 5/ FloTrack with good cardiac indices  Slowly weaning , down to 4--> monitoring perfusion parameters closely, weaning inotrop support accordingly   Postop Nonoliguric TATIANA, Shock related vs hemodynamic-renal fx plateauing will diurese PRN for Neutral FB- volume status close to euvolemia   Lungs clear with no supplemental O2 req/ pulling good volume with IS > 2L   ADAT/ glycemic control satisfactory--> SSC as needed   H&H and PLT in good range--> CTs and drains with low output--> to dc rpleural CT after walk   Continue ASA/Statin- routine ICU ppx measures  Continue cefazolin for periop ppx and LE cellulitis--last dose today  OOB and mobilizing as tolerated   Urine color red-purple after cyanokit--> will monitor, braun switched to external catheter for leaking     ATTENDING: I have personally and independently provided 90 Min of critical care services.

## 2023-04-09 NOTE — PROVIDER CONTACT NOTE (OTHER) - ASSESSMENT
Gauze and tegaderm removed.  Wounds assessed with PA.  Skin care RN consulted via phone.    See A&I flowsheet for measurements.

## 2023-04-09 NOTE — PROVIDER CONTACT NOTE (OTHER) - SITUATION
F/C removed prior to my shift; endorsed by prev. RN leaking braun.  Poor u/o with condom cath.  16Fr F/C reinserted, tolerated well, approx. 12pm today.  Started leaking now.
Cellulitic wounds x2 on each shin.

## 2023-04-09 NOTE — PROGRESS NOTE ADULT - SUBJECTIVE AND OBJECTIVE BOX
RYANNE left for Zohaib about today's EDU session from 230 -430.    CTICU  CRITICAL  CARE  attending     Hand off received 					   Pertinent clinical, laboratory, radiographic, hemodynamic, echocardiographic, respiratory data, microbiologic data and chart were reviewed and analyzed frequently throughout the course of the day and night          47 year old Male with HTN, hyperlipidemia,  HFpEF (EF:60%), HOCM, mod-severe MR, Atrial fibrillation diagnosed in 7/2021 (noncompliant w/ AC).  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   He presented to [facility] with acute exacerbation of CHF.  He had progressively worsening SOB and LE edema over few weeks. The patient states he could barely walk 20 feet prior to getting winded.   The patient had 2 pillow orthopnea, bilateral lower extremity edema and erythema, states he scratched and accidentally opening fluid filled blisters on his LE.   In [facility] ED, BP: 146/122, HR: 80s, RR:16, Temp: 97.5F, O2 sat: 96%RA.   EKG revealed Afib@105BPM without ischemic changes.   CXR with pulmonary vascular congestion and moderate right pleural effusion.   Labs notable for: BUN/Cr 51.9/2.0, Total bili 2.6, , , Alk phos 99, BNP 1169, Amylase 47, Lipase 533.   CT abd/pelvis revealed trace ascites, no peripancreatic abscess/necrosis. Abdominal US without acute findings.   The patient was admitted to telemetry for management HFpEF exacerbation, Atrial fibrillation with Rapid Ventricular Rate and Lower Extremity cellulitis.   The patient was started on IV diuretics and IV antibiotics with improvement. The patient achieved rate controlled with Beta Blocker and Cardizem. He was started on Eliquis for anticoagulation. Hospital course complicated by TATIANA, transaminitis and 11cm rectal sheath hematoma.  General Surgery recommended abdominal binder and no acute intervention, Eliquis was held and recommended to resume if H/H remains stable.    GI recommended to hold Statin. MRCP could not be performed due to history of cerebral coil embolization.    Echocardiogram : Normal LV function, EF:60%, LAE with severe MR, mitral valve degeneration of posterior leaflet with significant prolapse and mod-severe anterior directed MR.     S/P MV repair  S/P Ligation of LA appendage  S/P cryo maze      HEALTH ISSUES - PROBLEM Dx:  Acute on chronic heart failure with preserved ejection fraction (HFpEF)  Mitral regurgitation  Atrial fibrillation  Hyperlipidemia  TATIANA (acute kidney injury)  Transaminitis  Hematoma of rectus sheath  Cellulitis  HTN (hypertension)        FAMILY HISTORY:  No pertinent family history in first degree relatives    PAST MEDICAL & SURGICAL HISTORY:  HTN (hypertension)  SAH (subarachnoid hemorrhage)  Atrial fibrillation  HOCM (hypertrophic obstructive cardiomyopathy)  Mitral regurgitation  S/P coil embolization of cerebral aneurysm        14 system review was unremarkable    Vital signs, hemodynamic and respiratory parameters were reviewed from the bedside nursing flowsheet.  ICU Vital Signs Last 24 Hrs  T(C): 36.3 (09 Apr 2023 21:10), Max: 36.7 (09 Apr 2023 09:06)  T(F): 97.3 (09 Apr 2023 21:10), Max: 98.1 (09 Apr 2023 09:06)  HR: 63 (09 Apr 2023 21:00) (62 - 83)  BP: 122/70 (09 Apr 2023 21:00) (122/70 - 151/99)  BP(mean): 90 (09 Apr 2023 21:00) (90 - 120)  ABP: 155/81 (09 Apr 2023 21:00) (114/64 - 183/93)  ABP(mean): 101 (09 Apr 2023 21:00) (79 - 117)  RR: 16 (09 Apr 2023 21:00) (11 - 26)  SpO2: 97% (09 Apr 2023 21:00) (80% - 98%)    O2 Parameters below as of 09 Apr 2023 21:00  Patient On (Oxygen Delivery Method): nasal cannula w/ humidification  O2 Flow (L/min): 2        Adult Advanced Hemodynamics Last 24 Hrs  CVP(mm Hg): 19 (09 Apr 2023 21:00) (10 - 38)  CVP(cm H2O): --  CO: 5.4 (09 Apr 2023 21:00) (5 - 6.1)  CI: 2.3 (09 Apr 2023 21:00) (2.2 - 2.6)  PA: --  PA(mean): --  PCWP: --  SVR: 1213 (09 Apr 2023 21:00) (651 - 1253)  SVRI: 2848 (09 Apr 2023 21:00) (1528 - 2905)  PVR: --  PVRI: --, ABG - ( 09 Apr 2023 20:52 )  pH, Arterial: 7.44  pH, Blood: x     /  pCO2: 40    /  pO2: 123   / HCO3: 27    / Base Excess: 2.8   /  SaO2: 99.4                Intake and output was reviewed and the fluid balance was calculated  Daily     Daily   I&O's Summary    08 Apr 2023 07:01  -  09 Apr 2023 07:00  --------------------------------------------------------  IN: 2295.1 mL / OUT: 2180 mL / NET: 115.1 mL    09 Apr 2023 07:01  -  09 Apr 2023 22:22  --------------------------------------------------------  IN: 1748 mL / OUT: 1470 mL / NET: 278 mL        All lines and drain sites were assessed      Neuro: No Focal Motor Deficit.   Neck: No JVD.  CVS: S1, S2, No S3.  Lungs: Good air entry bilaterally.  Abd: Soft. No tenderness. + Bowel sounds.  Vascular: + DP/PT.  Extremities: No edema.  Lymphatic: Normal.  Skin: No abnormalities.      labs  CBC Full  -  ( 09 Apr 2023 20:27 )  WBC Count : 15.08 K/uL  RBC Count : 3.67 M/uL  Hemoglobin : 8.8 g/dL  Hematocrit : 29.0 %  Platelet Count - Automated : 153 K/uL  Mean Cell Volume : 79.0 fl  Mean Cell Hemoglobin : 24.0 pg  Mean Cell Hemoglobin Concentration : 30.3 gm/dL  Auto Neutrophil # : x  Auto Lymphocyte # : x  Auto Monocyte # : x  Auto Eosinophil # : x  Auto Basophil # : x  Auto Neutrophil % : x  Auto Lymphocyte % : x  Auto Monocyte % : x  Auto Eosinophil % : x  Auto Basophil % : x    04-09    133<L>  |  96  |  29<H>  ----------------------------<  144<H>  4.4   |  25  |  1.74<H>    Ca    8.8      09 Apr 2023 20:27  Phos  3.4     04-09  Mg     2.3     04-09    TPro  5.5<L>  /  Alb  3.4  /  TBili  1.3<H>  /  DBili  x   /  AST  64<H>  /  ALT  5<L>  /  AlkPhos  68  04-09    PT/INR - ( 09 Apr 2023 20:27 )   PT: 13.2 sec;   INR: 1.11          PTT - ( 09 Apr 2023 20:27 )  PTT:30.5 sec  The current medications were reviewed   MEDICATIONS  (STANDING):  aspirin  chewable 81 milliGRAM(s) Oral daily  atorvastatin 80 milliGRAM(s) Oral at bedtime  chlorhexidine 2% Cloths 1 Application(s) Topical daily  dextrose 5%. 1000 milliLiter(s) (50 mL/Hr) IV Continuous <Continuous>  dextrose 5%. 1000 milliLiter(s) (100 mL/Hr) IV Continuous <Continuous>  dextrose 50% Injectable 50 milliLiter(s) IV Push every 15 minutes  dextrose 50% Injectable 25 milliLiter(s) IV Push every 15 minutes  DOBUTamine Infusion 2 MICROgram(s)/kG/Min (5.99 mL/Hr) IV Continuous <Continuous>  glucagon  Injectable 1 milliGRAM(s) IntraMuscular once  heparin   Injectable 5000 Unit(s) SubCutaneous every 8 hours  insulin lispro (ADMELOG) corrective regimen sliding scale   SubCutaneous Before meals and at bedtime  oxybutynin 5 milliGRAM(s) Oral every 8 hours  pantoprazole    Tablet 40 milliGRAM(s) Oral before breakfast  polyethylene glycol 3350 17 Gram(s) Oral daily  senna 2 Tablet(s) Oral at bedtime  silver sulfADIAZINE 1% Cream 1 Application(s) Topical two times a day  sodium chloride 0.9%. 1000 milliLiter(s) (10 mL/Hr) IV Continuous <Continuous>    MEDICATIONS  (PRN):  benzocaine/menthol Lozenge 1 Lozenge Oral every 4 hours PRN Sore Throat  dextrose Oral Gel 15 Gram(s) Oral once PRN Blood Glucose LESS THAN 70 milliGRAM(s)/deciliter  oxyCODONE    IR 5 milliGRAM(s) Oral every 4 hours PRN Moderate Pain (4 - 6)  oxyCODONE    IR 10 milliGRAM(s) Oral every 6 hours PRN Severe Pain (7 - 10)        PROBLEM LIST/ ASSESSMENT:  HEALTH ISSUES - PROBLEM Dx:  Acute on chronic heart failure with preserved ejection fraction (HFpEF)  Mitral regurgitation  Atrial fibrillation  Hyperlipidemia  TATIANA (acute kidney injury)  Transaminitis  Hematoma of rectus sheath  Cellulitis  HTN (hypertension)      48 year old  Male with exacerbation of CHF, Atrial fibrillation, Lower Extremity cellulitis.  S/P Mitral valve repair  S/P Exclusion of the LA appendage.  S/P cryoablation.  Postoperative course complicated by AV block, transaminitis, TATIANA, NSTEMI.   Stable with intrinsic sinus rhythm.  Good oxygenation.  Fair urine out put.        My plan includes :  D/C Dobutamine.   Gentle diuresis.  Statin Rx.  Hold Betablocker. (H/O AV block).  Close hemodynamic monitoring   Monitor for arrhythmias and monitor parameters for organ perfusion  Monitor neurologic status  Monitor renal function.  D/C all lines.  Head of the bed should remain elevated to 45 deg .   Chest PT and IS will be encouraged  Monitor adequacy of oxygenation   Nutritional goals will be met using po eventually , ensure adequate caloric intake and monitor the same  Stress ulcer and VTE prophylaxis will be achieved    Glycemic control is satisfactory  Electrolytes have been repleted as necessary and wound care has been carried out. Pain control has been achieved.   Aggressive physical therapy and early mobility and ambulation goals will be met   The family was updated about the course and plan  CRITICAL CARE TIME SPENT in evaluation and management, review and interpretation of labs and x-rays, hemodynamic management, formulating a plan and coordinating care: ___30____ MIN.  Time does not include procedural time.  CTICU ATTENDING     					    Bruno Hahn MD

## 2023-04-09 NOTE — CHART NOTE - NSCHARTNOTEFT_GEN_A_CORE
Patient seen and examined at bedside.  Case discussed with Dr. Tanner. Minimal output from CT.  No air leak appreciated.  Per Dr. Tanner's request, CT removed and tie down secured without incident.  Occlusive DSD placed.  Patient tolerated procedure well.  Chest Xray pending.

## 2023-04-09 NOTE — PROGRESS NOTE ADULT - SUBJECTIVE AND OBJECTIVE BOX
INTERVAL COURSE  Started on Vaso for higher MAPs and renal perfusion with good UOP response  Neutral FB/vaso titrated off in am,  unchanged at 5   Good cardiac indices on FloTrack--> Lactate normalized, LFTs downtrending   Sinus rhythm recovering/ now off pacing 60-70 Sinus-holding pressures     VITALS  Vital Signs Last 24 Hrs  T(C): 36.7 (2023 09:06), Max: 37.2 (2023 13:00)  T(F): 98.1 (2023 09:06), Max: 99 (2023 13:00)  HR: 69 (2023 08:00) (66 - 80)  BP: 142/73  BP(mean): 93  RR: 20 (2023 08:00) (13 - 25)  SpO2: 98% (2023 08:00) (94% - 100%)  Parameters below as of 2023 08:00  Patient On (Oxygen Delivery Method): nasal cannula w/ humidification  O2 Flow (L/min): 2    I&O's Summary    2023 07:01  -  2023 07:00  --------------------------------------------------------  IN: 2295.1 mL / OUT: 2180 mL / NET: 115.1 mL    2023 07:01  -  2023 09:11  --------------------------------------------------------  IN: 22 mL / OUT: 80 mL / NET: -58 mL    PHYSICAL EXAM  General: A&Ox 3; NAD  Respiratory: CTA B/L; no wheezes/crackles/rales   Cardiovascular: Regular rhythm/rate  Gastrointestinal: Soft; NTND   Extremities: Warm, pitting edema and cellulitic skin changes present   Neurological:  CNII-XII grossly intact; no obvious focal deficits    MEDICATIONS  (STANDING):  aspirin  chewable 81 milliGRAM(s) Oral daily  atorvastatin 80 milliGRAM(s) Oral at bedtime  buMETAnide Injectable 2 milliGRAM(s) IV Push once  chlorhexidine 2% Cloths 1 Application(s) Topical daily  dextrose 5%. 1000 milliLiter(s) (100 mL/Hr) IV Continuous <Continuous>  dextrose 5%. 1000 milliLiter(s) (50 mL/Hr) IV Continuous <Continuous>  dextrose 50% Injectable 50 milliLiter(s) IV Push every 15 minutes  dextrose 50% Injectable 25 milliLiter(s) IV Push every 15 minutes  DOBUTamine Infusion 5 MICROgram(s)/kG/Min (15 mL/Hr) IV Continuous <Continuous>  glucagon  Injectable 1 milliGRAM(s) IntraMuscular once  heparin   Injectable 5000 Unit(s) SubCutaneous every 8 hours  insulin lispro (ADMELOG) corrective regimen sliding scale   SubCutaneous Before meals and at bedtime  magnesium oxide 400 milliGRAM(s) Oral every 12 hours  pantoprazole    Tablet 40 milliGRAM(s) Oral before breakfast  polyethylene glycol 3350 17 Gram(s) Oral daily  senna 2 Tablet(s) Oral at bedtime  sodium chloride 0.9%. 1000 milliLiter(s) (10 mL/Hr) IV Continuous <Continuous>  vasopressin Infusion 0.02 Unit(s)/Min (3 mL/Hr) IV Continuous <Continuous>    MEDICATIONS  (PRN):  acetaminophen     Tablet .. 650 milliGRAM(s) Oral every 6 hours PRN Temp greater or equal to 38C (100.4F), Mild Pain (1 - 3)  dextrose Oral Gel 15 Gram(s) Oral once PRN Blood Glucose LESS THAN 70 milliGRAM(s)/deciliter  oxyCODONE    IR 5 milliGRAM(s) Oral every 4 hours PRN Moderate Pain (4 - 6)  oxyCODONE    IR 10 milliGRAM(s) Oral every 6 hours PRN Severe Pain (7 - 10)      LABS                        9.2    14.73 )-----------( 183      ( 2023 02:03 )             30.8     04-09    135  |  99  |  27<H>  ----------------------------<  156<H>  4.4   |  25  |  1.65<H>    Ca    8.8      2023 02:03  Phos  4.4     04-09  Mg     1.9     04-09    TPro  5.8<L>  /  Alb  3.4  /  TBili  1.5<H>  /  DBili  x   /  AST  119<H>  /  ALT  12  /  AlkPhos  63      LIVER FUNCTIONS - ( 2023 02:03 )  Alb: 3.4 g/dL / Pro: 5.8 g/dL / ALK PHOS: 63 U/L / ALT: 12 U/L / AST: 119 U/L / GGT: x           PT/INR - ( 2023 02:03 )   PT: 14.3 sec;   INR: 1.20          PTT - ( 2023 02:03 )  PTT:29.9 sec    CARDIAC MARKERS ( 2023 13:58 )  x     / 4.91 ng/mL / 671 U/L / x     / x      CARDIAC MARKERS ( 2023 08:53 )  x     / x     / 921 U/L / x     / x      CARDIAC MARKERS ( 2023 03:19 )  x     / 5.70 ng/mL / 1529 U/L / x     / x        IMAGING/EKG/ETC  Reviewed.

## 2023-04-10 LAB
ALBUMIN SERPL ELPH-MCNC: 3.1 G/DL — LOW (ref 3.3–5)
ALBUMIN SERPL ELPH-MCNC: 3.2 G/DL — LOW (ref 3.3–5)
ALBUMIN SERPL ELPH-MCNC: 3.3 G/DL — SIGNIFICANT CHANGE UP (ref 3.3–5)
ALP SERPL-CCNC: 107 U/L — SIGNIFICANT CHANGE UP (ref 40–120)
ALP SERPL-CCNC: 66 U/L — SIGNIFICANT CHANGE UP (ref 40–120)
ALP SERPL-CCNC: 83 U/L — SIGNIFICANT CHANGE UP (ref 40–120)
ALT FLD-CCNC: 5 U/L — LOW (ref 10–45)
ALT FLD-CCNC: 6 U/L — LOW (ref 10–45)
ALT FLD-CCNC: 7 U/L — LOW (ref 10–45)
ANION GAP SERPL CALC-SCNC: 13 MMOL/L — SIGNIFICANT CHANGE UP (ref 5–17)
ANION GAP SERPL CALC-SCNC: 8 MMOL/L — SIGNIFICANT CHANGE UP (ref 5–17)
ANION GAP SERPL CALC-SCNC: 8 MMOL/L — SIGNIFICANT CHANGE UP (ref 5–17)
APTT BLD: 29.1 SEC — SIGNIFICANT CHANGE UP (ref 27.5–35.5)
APTT BLD: 29.3 SEC — SIGNIFICANT CHANGE UP (ref 27.5–35.5)
APTT BLD: 30.8 SEC — SIGNIFICANT CHANGE UP (ref 27.5–35.5)
AST SERPL-CCNC: 46 U/L — HIGH (ref 10–40)
AST SERPL-CCNC: 51 U/L — HIGH (ref 10–40)
AST SERPL-CCNC: 54 U/L — HIGH (ref 10–40)
BASE EXCESS BLDV CALC-SCNC: 3.9 MMOL/L — HIGH (ref -2–3)
BASE EXCESS BLDV CALC-SCNC: 4 MMOL/L — HIGH (ref -2–3)
BILIRUB SERPL-MCNC: 1.4 MG/DL — HIGH (ref 0.2–1.2)
BILIRUB SERPL-MCNC: 1.5 MG/DL — HIGH (ref 0.2–1.2)
BILIRUB SERPL-MCNC: 1.6 MG/DL — HIGH (ref 0.2–1.2)
BUN SERPL-MCNC: 30 MG/DL — HIGH (ref 7–23)
BUN SERPL-MCNC: 32 MG/DL — HIGH (ref 7–23)
BUN SERPL-MCNC: 32 MG/DL — HIGH (ref 7–23)
CA-I SERPL-SCNC: 1.14 MMOL/L — LOW (ref 1.15–1.33)
CA-I SERPL-SCNC: 1.18 MMOL/L — SIGNIFICANT CHANGE UP (ref 1.15–1.33)
CALCIUM SERPL-MCNC: 8.2 MG/DL — LOW (ref 8.4–10.5)
CALCIUM SERPL-MCNC: 8.4 MG/DL — SIGNIFICANT CHANGE UP (ref 8.4–10.5)
CALCIUM SERPL-MCNC: 8.4 MG/DL — SIGNIFICANT CHANGE UP (ref 8.4–10.5)
CHLORIDE SERPL-SCNC: 93 MMOL/L — LOW (ref 96–108)
CHLORIDE SERPL-SCNC: 96 MMOL/L — SIGNIFICANT CHANGE UP (ref 96–108)
CHLORIDE SERPL-SCNC: 97 MMOL/L — SIGNIFICANT CHANGE UP (ref 96–108)
CK SERPL-CCNC: 149 U/L — SIGNIFICANT CHANGE UP (ref 30–200)
CO2 BLDV-SCNC: 31.8 MMOL/L — HIGH (ref 22–26)
CO2 BLDV-SCNC: 32.2 MMOL/L — HIGH (ref 22–26)
CO2 SERPL-SCNC: 24 MMOL/L — SIGNIFICANT CHANGE UP (ref 22–31)
CO2 SERPL-SCNC: 28 MMOL/L — SIGNIFICANT CHANGE UP (ref 22–31)
CO2 SERPL-SCNC: 29 MMOL/L — SIGNIFICANT CHANGE UP (ref 22–31)
CREAT ?TM UR-MCNC: 107 MG/DL — SIGNIFICANT CHANGE UP
CREAT SERPL-MCNC: 1.57 MG/DL — HIGH (ref 0.5–1.3)
CREAT SERPL-MCNC: 1.64 MG/DL — HIGH (ref 0.5–1.3)
CREAT SERPL-MCNC: 1.65 MG/DL — HIGH (ref 0.5–1.3)
EGFR: 51 ML/MIN/1.73M2 — LOW
EGFR: 51 ML/MIN/1.73M2 — LOW
EGFR: 54 ML/MIN/1.73M2 — LOW
GAS PNL BLDA: SIGNIFICANT CHANGE UP
GAS PNL BLDA: SIGNIFICANT CHANGE UP
GAS PNL BLDV: 130 MMOL/L — LOW (ref 136–145)
GAS PNL BLDV: 131 MMOL/L — LOW (ref 136–145)
GAS PNL BLDV: SIGNIFICANT CHANGE UP
GLUCOSE BLDC GLUCOMTR-MCNC: 104 MG/DL — HIGH (ref 70–99)
GLUCOSE BLDC GLUCOMTR-MCNC: 107 MG/DL — HIGH (ref 70–99)
GLUCOSE BLDC GLUCOMTR-MCNC: 112 MG/DL — HIGH (ref 70–99)
GLUCOSE BLDC GLUCOMTR-MCNC: 118 MG/DL — HIGH (ref 70–99)
GLUCOSE SERPL-MCNC: 114 MG/DL — HIGH (ref 70–99)
GLUCOSE SERPL-MCNC: 141 MG/DL — HIGH (ref 70–99)
GLUCOSE SERPL-MCNC: 147 MG/DL — HIGH (ref 70–99)
HCO3 BLDV-SCNC: 30 MMOL/L — HIGH (ref 22–29)
HCO3 BLDV-SCNC: 31 MMOL/L — HIGH (ref 22–29)
HCT VFR BLD CALC: 27.6 % — LOW (ref 39–50)
HCT VFR BLD CALC: 28 % — LOW (ref 39–50)
HCT VFR BLD CALC: 28.9 % — LOW (ref 39–50)
HGB BLD-MCNC: 8.3 G/DL — LOW (ref 13–17)
HGB BLD-MCNC: 8.4 G/DL — LOW (ref 13–17)
HGB BLD-MCNC: 8.8 G/DL — LOW (ref 13–17)
INR BLD: 1.08 — SIGNIFICANT CHANGE UP (ref 0.88–1.16)
INR BLD: 1.09 — SIGNIFICANT CHANGE UP (ref 0.88–1.16)
INR BLD: 1.1 — SIGNIFICANT CHANGE UP (ref 0.88–1.16)
LACTATE SERPL-SCNC: 1.3 MMOL/L — SIGNIFICANT CHANGE UP (ref 0.5–2)
LACTATE SERPL-SCNC: 1.4 MMOL/L — SIGNIFICANT CHANGE UP (ref 0.5–2)
LACTATE SERPL-SCNC: 1.5 MMOL/L — SIGNIFICANT CHANGE UP (ref 0.5–2)
MAGNESIUM SERPL-MCNC: 2 MG/DL — SIGNIFICANT CHANGE UP (ref 1.6–2.6)
MAGNESIUM SERPL-MCNC: 2.1 MG/DL — SIGNIFICANT CHANGE UP (ref 1.6–2.6)
MAGNESIUM SERPL-MCNC: 2.5 MG/DL — SIGNIFICANT CHANGE UP (ref 1.6–2.6)
MCHC RBC-ENTMCNC: 23.6 PG — LOW (ref 27–34)
MCHC RBC-ENTMCNC: 23.7 PG — LOW (ref 27–34)
MCHC RBC-ENTMCNC: 23.8 PG — LOW (ref 27–34)
MCHC RBC-ENTMCNC: 30 GM/DL — LOW (ref 32–36)
MCHC RBC-ENTMCNC: 30.1 GM/DL — LOW (ref 32–36)
MCHC RBC-ENTMCNC: 30.4 GM/DL — LOW (ref 32–36)
MCV RBC AUTO: 78.1 FL — LOW (ref 80–100)
MCV RBC AUTO: 78.6 FL — LOW (ref 80–100)
MCV RBC AUTO: 78.9 FL — LOW (ref 80–100)
NRBC # BLD: 0 /100 WBCS — SIGNIFICANT CHANGE UP (ref 0–0)
OSMOLALITY UR: 413 MOSM/KG — SIGNIFICANT CHANGE UP (ref 300–900)
PCO2 BLDV: 51 MMHG — SIGNIFICANT CHANGE UP (ref 42–55)
PCO2 BLDV: 53 MMHG — SIGNIFICANT CHANGE UP (ref 42–55)
PH BLDV: 7.37 — SIGNIFICANT CHANGE UP (ref 7.32–7.43)
PH BLDV: 7.38 — SIGNIFICANT CHANGE UP (ref 7.32–7.43)
PHOSPHATE SERPL-MCNC: 2.9 MG/DL — SIGNIFICANT CHANGE UP (ref 2.5–4.5)
PHOSPHATE SERPL-MCNC: 3 MG/DL — SIGNIFICANT CHANGE UP (ref 2.5–4.5)
PHOSPHATE SERPL-MCNC: 3.2 MG/DL — SIGNIFICANT CHANGE UP (ref 2.5–4.5)
PLATELET # BLD AUTO: 160 K/UL — SIGNIFICANT CHANGE UP (ref 150–400)
PLATELET # BLD AUTO: 173 K/UL — SIGNIFICANT CHANGE UP (ref 150–400)
PLATELET # BLD AUTO: 179 K/UL — SIGNIFICANT CHANGE UP (ref 150–400)
PO2 BLDV: <33 MMHG — SIGNIFICANT CHANGE UP (ref 25–45)
PO2 BLDV: <33 MMHG — SIGNIFICANT CHANGE UP (ref 25–45)
POTASSIUM BLDV-SCNC: 4.2 MMOL/L — SIGNIFICANT CHANGE UP (ref 3.5–5.1)
POTASSIUM BLDV-SCNC: 4.8 MMOL/L — SIGNIFICANT CHANGE UP (ref 3.5–5.1)
POTASSIUM SERPL-MCNC: 4.1 MMOL/L — SIGNIFICANT CHANGE UP (ref 3.5–5.3)
POTASSIUM SERPL-MCNC: 4.2 MMOL/L — SIGNIFICANT CHANGE UP (ref 3.5–5.3)
POTASSIUM SERPL-MCNC: 4.2 MMOL/L — SIGNIFICANT CHANGE UP (ref 3.5–5.3)
POTASSIUM SERPL-SCNC: 4.1 MMOL/L — SIGNIFICANT CHANGE UP (ref 3.5–5.3)
POTASSIUM SERPL-SCNC: 4.2 MMOL/L — SIGNIFICANT CHANGE UP (ref 3.5–5.3)
POTASSIUM SERPL-SCNC: 4.2 MMOL/L — SIGNIFICANT CHANGE UP (ref 3.5–5.3)
PROT SERPL-MCNC: 5.3 G/DL — LOW (ref 6–8.3)
PROT SERPL-MCNC: 5.6 G/DL — LOW (ref 6–8.3)
PROT SERPL-MCNC: 5.7 G/DL — LOW (ref 6–8.3)
PROTHROM AB SERPL-ACNC: 12.9 SEC — SIGNIFICANT CHANGE UP (ref 10.5–13.4)
PROTHROM AB SERPL-ACNC: 13 SEC — SIGNIFICANT CHANGE UP (ref 10.5–13.4)
PROTHROM AB SERPL-ACNC: 13.1 SEC — SIGNIFICANT CHANGE UP (ref 10.5–13.4)
RBC # BLD: 3.51 M/UL — LOW (ref 4.2–5.8)
RBC # BLD: 3.55 M/UL — LOW (ref 4.2–5.8)
RBC # BLD: 3.7 M/UL — LOW (ref 4.2–5.8)
RBC # FLD: 20.9 % — HIGH (ref 10.3–14.5)
RBC # FLD: 20.9 % — HIGH (ref 10.3–14.5)
RBC # FLD: 21 % — HIGH (ref 10.3–14.5)
SAO2 % BLDV: 37.7 % — LOW (ref 67–88)
SAO2 % BLDV: 51.9 % — LOW (ref 67–88)
SODIUM SERPL-SCNC: 129 MMOL/L — LOW (ref 135–145)
SODIUM SERPL-SCNC: 133 MMOL/L — LOW (ref 135–145)
SODIUM SERPL-SCNC: 134 MMOL/L — LOW (ref 135–145)
SODIUM UR-SCNC: 25 MMOL/L — SIGNIFICANT CHANGE UP
TROPONIN T SERPL-MCNC: 3.82 NG/ML — CRITICAL HIGH (ref 0–0.01)
WBC # BLD: 11.28 K/UL — HIGH (ref 3.8–10.5)
WBC # BLD: 12.65 K/UL — HIGH (ref 3.8–10.5)
WBC # BLD: 13.24 K/UL — HIGH (ref 3.8–10.5)
WBC # FLD AUTO: 11.28 K/UL — HIGH (ref 3.8–10.5)
WBC # FLD AUTO: 12.65 K/UL — HIGH (ref 3.8–10.5)
WBC # FLD AUTO: 13.24 K/UL — HIGH (ref 3.8–10.5)

## 2023-04-10 PROCEDURE — 99233 SBSQ HOSP IP/OBS HIGH 50: CPT

## 2023-04-10 PROCEDURE — 99291 CRITICAL CARE FIRST HOUR: CPT

## 2023-04-10 PROCEDURE — 71045 X-RAY EXAM CHEST 1 VIEW: CPT | Mod: 26

## 2023-04-10 RX ORDER — ALBUMIN HUMAN 25 %
250 VIAL (ML) INTRAVENOUS ONCE
Refills: 0 | Status: COMPLETED | OUTPATIENT
Start: 2023-04-10 | End: 2023-04-10

## 2023-04-10 RX ORDER — MAGNESIUM SULFATE 500 MG/ML
2 VIAL (ML) INJECTION ONCE
Refills: 0 | Status: COMPLETED | OUTPATIENT
Start: 2023-04-10 | End: 2023-04-10

## 2023-04-10 RX ORDER — SODIUM CHLORIDE 9 MG/ML
250 INJECTION INTRAMUSCULAR; INTRAVENOUS; SUBCUTANEOUS ONCE
Refills: 0 | Status: COMPLETED | OUTPATIENT
Start: 2023-04-10 | End: 2023-04-10

## 2023-04-10 RX ORDER — BUMETANIDE 0.25 MG/ML
2 INJECTION INTRAMUSCULAR; INTRAVENOUS ONCE
Refills: 0 | Status: COMPLETED | OUTPATIENT
Start: 2023-04-10 | End: 2023-04-10

## 2023-04-10 RX ORDER — ACETAMINOPHEN 500 MG
650 TABLET ORAL EVERY 6 HOURS
Refills: 0 | Status: DISCONTINUED | OUTPATIENT
Start: 2023-04-10 | End: 2023-04-13

## 2023-04-10 RX ADMIN — Medication 1 APPLICATION(S): at 18:23

## 2023-04-10 RX ADMIN — Medication 650 MILLIGRAM(S): at 18:52

## 2023-04-10 RX ADMIN — OXYCODONE HYDROCHLORIDE 5 MILLIGRAM(S): 5 TABLET ORAL at 02:43

## 2023-04-10 RX ADMIN — Medication 5 MILLIGRAM(S): at 16:38

## 2023-04-10 RX ADMIN — Medication 125 MILLILITER(S): at 14:39

## 2023-04-10 RX ADMIN — CHLORHEXIDINE GLUCONATE 1 APPLICATION(S): 213 SOLUTION TOPICAL at 11:58

## 2023-04-10 RX ADMIN — Medication 650 MILLIGRAM(S): at 18:22

## 2023-04-10 RX ADMIN — HEPARIN SODIUM 5000 UNIT(S): 5000 INJECTION INTRAVENOUS; SUBCUTANEOUS at 21:38

## 2023-04-10 RX ADMIN — HEPARIN SODIUM 5000 UNIT(S): 5000 INJECTION INTRAVENOUS; SUBCUTANEOUS at 14:07

## 2023-04-10 RX ADMIN — OXYCODONE HYDROCHLORIDE 10 MILLIGRAM(S): 5 TABLET ORAL at 15:09

## 2023-04-10 RX ADMIN — Medication 25 GRAM(S): at 06:19

## 2023-04-10 RX ADMIN — Medication 81 MILLIGRAM(S): at 11:58

## 2023-04-10 RX ADMIN — BUMETANIDE 2 MILLIGRAM(S): 0.25 INJECTION INTRAMUSCULAR; INTRAVENOUS at 03:11

## 2023-04-10 RX ADMIN — Medication 1 APPLICATION(S): at 06:18

## 2023-04-10 RX ADMIN — Medication 5 MILLIGRAM(S): at 09:54

## 2023-04-10 RX ADMIN — PANTOPRAZOLE SODIUM 40 MILLIGRAM(S): 20 TABLET, DELAYED RELEASE ORAL at 06:30

## 2023-04-10 RX ADMIN — SENNA PLUS 2 TABLET(S): 8.6 TABLET ORAL at 21:38

## 2023-04-10 RX ADMIN — Medication 125 MILLILITER(S): at 12:46

## 2023-04-10 RX ADMIN — SODIUM CHLORIDE 333.33 MILLILITER(S): 9 INJECTION INTRAMUSCULAR; INTRAVENOUS; SUBCUTANEOUS at 18:24

## 2023-04-10 RX ADMIN — POLYETHYLENE GLYCOL 3350 17 GRAM(S): 17 POWDER, FOR SOLUTION ORAL at 11:58

## 2023-04-10 RX ADMIN — OXYCODONE HYDROCHLORIDE 5 MILLIGRAM(S): 5 TABLET ORAL at 03:40

## 2023-04-10 RX ADMIN — HEPARIN SODIUM 5000 UNIT(S): 5000 INJECTION INTRAVENOUS; SUBCUTANEOUS at 06:18

## 2023-04-10 RX ADMIN — ATORVASTATIN CALCIUM 80 MILLIGRAM(S): 80 TABLET, FILM COATED ORAL at 21:38

## 2023-04-10 RX ADMIN — OXYCODONE HYDROCHLORIDE 10 MILLIGRAM(S): 5 TABLET ORAL at 15:39

## 2023-04-10 NOTE — PROGRESS NOTE ADULT - SUBJECTIVE AND OBJECTIVE BOX
CTICU  CRITICAL  CARE  attending     Hand off received 					   Pertinent clinical, laboratory, radiographic, hemodynamic, echocardiographic, respiratory data, microbiologic data and chart were reviewed and analyzed frequently throughout the course of the day and night        47 year old Male with HTN, hyperlipidemia,  HFpEF (EF:60%), HOCM, mod-severe MR, Atrial fibrillation diagnosed in 7/2021 (noncompliant w/ AC).  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   He presented to [facility] with acute exacerbation of CHF.  He had progressively worsening SOB and LE edema over few weeks. The patient states he could barely walk 20 feet prior to getting winded.   The patient had 2 pillow orthopnea, bilateral lower extremity edema and erythema, states he scratched and accidentally opening fluid filled blisters on his LE.   In [facility] ED, BP: 146/122, HR: 80s, RR:16, Temp: 97.5F, O2 sat: 96%RA.   EKG revealed Afib@105BPM without ischemic changes.   CXR with pulmonary vascular congestion and moderate right pleural effusion.   Labs notable for: BUN/Cr 51.9/2.0, Total bili 2.6, , , Alk phos 99, BNP 1169, Amylase 47, Lipase 533.   CT abd/pelvis revealed trace ascites, no peripancreatic abscess/necrosis. Abdominal US without acute findings.   The patient was admitted to telemetry for management HFpEF exacerbation, Atrial fibrillation with Rapid Ventricular Rate and Lower Extremity cellulitis.   The patient was started on IV diuretics and IV antibiotics with improvement. The patient achieved rate controlled with Beta Blocker and Cardizem. He was started on Eliquis for anticoagulation. Hospital course complicated by TATIANA, transaminitis and 11cm rectal sheath hematoma.  General Surgery recommended abdominal binder and no acute intervention, Eliquis was held and recommended to resume if H/H remains stable.    GI recommended to hold Statin. MRCP could not be performed due to history of cerebral coil embolization.    Echocardiogram : Normal LV function, EF:60%, LAE with severe MR, mitral valve degeneration of posterior leaflet with significant prolapse and mod-severe anterior directed MR.     S/P MV repair  S/P Ligation of LA appendage  S/P cryo maze        HEALTH ISSUES - PROBLEM Dx:  Acute on chronic heart failure with preserved ejection fraction (HFpEF)  Mitral regurgitation  Atrial fibrillation  Hyperlipidemia  TATIANA (acute kidney injury)  Transaminitis  Hematoma of rectus sheath  Cellulitis  HTN (hypertension)        FAMILY HISTORY:  No pertinent family history in first degree relatives    PAST MEDICAL & SURGICAL HISTORY:  HTN (hypertension)  SAH (subarachnoid hemorrhage)  Atrial fibrillation  HOCM (hypertrophic obstructive cardiomyopathy)  Mitral regurgitation  S/P coil embolization of cerebral aneurysm        14 system review was unremarkable    Vital signs, hemodynamic and respiratory parameters were reviewed from the bedside nursing flowsheet.  ICU Vital Signs Last 24 Hrs   T(C): 36.6 (10 Apr 2023 21:32), Max: 37.4 (10 Apr 2023 14:03)  T(F): 97.8 (10 Apr 2023 21:32), Max: 99.4 (10 Apr 2023 14:03)  HR: 54 (10 Apr 2023 23:00) (54 - 87)  BP: 142/63 (10 Apr 2023 23:00) (76/46 - 167/90)  BP(mean): 91 (10 Apr 2023 23:00) (56 - 123)  ABP: 157/80 (10 Apr 2023 16:00) (112/56 - 174/86)  ABP(mean): 98 (10 Apr 2023 16:00) (78 - 112)  RR: 23 (10 Apr 2023 23:00) (13 - 30)  SpO2: 97% (10 Apr 2023 23:00) (92% - 99%)    O2 Parameters below as of 10 Apr 2023 23:00  Patient On (Oxygen Delivery Method): nasal cannula w/ humidification  O2 Flow (L/min): 2        Adult Advanced Hemodynamics Last 24 Hrs  CVP(mm Hg): 24 (10 Apr 2023 20:00) (10 - 295)  CVP(cm H2O): --  CO: 4.7 (10 Apr 2023 16:00) (4.2 - 8.4)  CI: 2 (10 Apr 2023 16:00) (1.8 - 3.7)  PA: --  PA(mean): --  PCWP: --  SVR: 1445 (10 Apr 2023 16:00) (913 - 1445)  SVRI: 3395 (10 Apr 2023 16:00) (6263 - 3395)  PVR: --  PVRI: --, ABG - ( 10 Apr 2023 10:25 )  pH, Arterial: 7.47  pH, Blood: x     /  pCO2: 38    /  pO2: 103   / HCO3: 28    / Base Excess: 3.9   /  SaO2: 98.2                Intake and output was reviewed and the fluid balance was calculated  Daily     Daily   I&O's Summary    09 Apr 2023 07:01  -  10 Apr 2023 07:00  --------------------------------------------------------  IN: 2286 mL / OUT: 3235 mL / NET: -949 mL    10 Apr 2023 07:01  -  10 Apr 2023 23:26  --------------------------------------------------------  IN: 890 mL / OUT: 915 mL / NET: -25 mL        All lines and drain sites were assessed    Neuro: No focal motor deficit.  Neck: No JVD.  CVS: S1, S2, No S3.  Lungs: Good air entry bilaterally.  Abd: Soft. No tenderness. + Bowel sounds.  Vascular: + DP/PT.  Extremities: Lower extremity edema with blister formation.  Lymphatic: Normal.  Skin: No abnormalities.      labs  CBC Full  -  ( 10 Apr 2023 18:46 )  WBC Count : 11.28 K/uL  RBC Count : 3.51 M/uL  Hemoglobin : 8.3 g/dL  Hematocrit : 27.6 %  Platelet Count - Automated : 173 K/uL  Mean Cell Volume : 78.6 fl  Mean Cell Hemoglobin : 23.6 pg  Mean Cell Hemoglobin Concentration : 30.1 gm/dL  Auto Neutrophil # : x  Auto Lymphocyte # : x  Auto Monocyte # : x  Auto Eosinophil # : x  Auto Basophil # : x  Auto Neutrophil % : x  Auto Lymphocyte % : x  Auto Monocyte % : x  Auto Eosinophil % : x  Auto Basophil % : x    04-10    133<L>  |  96  |  32<H>  ----------------------------<  141<H>  4.2   |  29  |  1.57<H>    Ca    8.2<L>      10 Apr 2023 18:46  Phos  2.9     04-10  Mg     2.1     04-10    TPro  5.6<L>  /  Alb  3.2<L>  /  TBili  1.6<H>  /  DBili  x   /  AST  46<H>  /  ALT  7<L>  /  AlkPhos  107  04-10    PT/INR - ( 10 Apr 2023 18:46 )   PT: 13.1 sec;   INR: 1.10          PTT - ( 10 Apr 2023 18:46 )  PTT:30.8 sec  The current medications were reviewed   MEDICATIONS  (STANDING):  aspirin  chewable 81 milliGRAM(s) Oral daily  atorvastatin 80 milliGRAM(s) Oral at bedtime  chlorhexidine 2% Cloths 1 Application(s) Topical daily  dextrose 5%. 1000 milliLiter(s) (50 mL/Hr) IV Continuous <Continuous>  dextrose 5%. 1000 milliLiter(s) (100 mL/Hr) IV Continuous <Continuous>  dextrose 50% Injectable 50 milliLiter(s) IV Push every 15 minutes  dextrose 50% Injectable 25 milliLiter(s) IV Push every 15 minutes  glucagon  Injectable 1 milliGRAM(s) IntraMuscular once  heparin   Injectable 5000 Unit(s) SubCutaneous every 8 hours  insulin lispro (ADMELOG) corrective regimen sliding scale   SubCutaneous Before meals and at bedtime  oxybutynin 5 milliGRAM(s) Oral every 8 hours  pantoprazole    Tablet 40 milliGRAM(s) Oral before breakfast  polyethylene glycol 3350 17 Gram(s) Oral daily  senna 2 Tablet(s) Oral at bedtime  silver sulfADIAZINE 1% Cream 1 Application(s) Topical two times a day  sodium chloride 0.9%. 1000 milliLiter(s) (10 mL/Hr) IV Continuous <Continuous>    MEDICATIONS  (PRN):  acetaminophen     Tablet .. 650 milliGRAM(s) Oral every 6 hours PRN Mild Pain (1 - 3)  benzocaine/menthol Lozenge 1 Lozenge Oral every 4 hours PRN Sore Throat  dextrose Oral Gel 15 Gram(s) Oral once PRN Blood Glucose LESS THAN 70 milliGRAM(s)/deciliter  oxyCODONE    IR 5 milliGRAM(s) Oral every 4 hours PRN Moderate Pain (4 - 6)  oxyCODONE    IR 10 milliGRAM(s) Oral every 6 hours PRN Severe Pain (7 - 10)        PROBLEM LIST/ ASSESSMENT:  HEALTH ISSUES - PROBLEM Dx:  Acute on chronic heart failure with preserved ejection fraction (HFpEF)  Mitral regurgitation  Atrial fibrillation  Hyperlipidemia  TATIANA (acute kidney injury)  Transaminitis  Hematoma of rectus sheath  Cellulitis  HTN (hypertension)      48 year old  Male with exacerbation of CHF, Atrial fibrillation, Lower Extremity cellulitis.  S/P CAB x 1.   S/P Mitral valve repair  S/P Exclusion of the LA appendage.  S/P cryoablation.  Postoperative course complicated by AV block, transaminitis, TATIANA, NSTEMI.   Intermittent pacing required after MV repair.  Now in sinus rhythm.  Hemodynamically stable.  Good oxygenation.  Fair urine out put.        My plan includes :  Statin and Betablocker.  Close hemodynamic monitoring.  Monitor for arrhythmias and monitor parameters for organ perfusion  Monitor neurologic status  Monitor renal function.  Head of the bed should remain elevated to 45 deg .   Chest PT and IS will be encouraged  Monitor adequacy of oxygenation   Nutritional goals will be met using po eventually , ensure adequate caloric intake and monitor the same  Stress ulcer and VTE prophylaxis will be achieved    Glycemic control is satisfactory  Electrolytes have been repleted as necessary and wound care has been carried out. Pain control has been achieved.   Aggressive physical therapy and early mobility and ambulation goals will be met   The family was updated about the course and plan  CRITICAL CARE TIME SPENT in evaluation and management, review and interpretation of labs and x-rays, hemodynamic management, formulating a plan and coordinating care: ___30____ MIN.  Time does not include procedural time.  CTICU ATTENDING     					    Bruno Hahn MD

## 2023-04-10 NOTE — PROGRESS NOTE ADULT - SUBJECTIVE AND OBJECTIVE BOX
INTERVAL COURSE  Improving hemodynamics, off  though developed sinus sita  back to AV Pacing, good perfusion parameters   Lactate 1.3/ Cr plateauing- good UOP and improving anasarca    VITALS  Vital Signs Last 24 Hrs  T(C): 36.2 (10 Apr 2023 05:01), Max: 36.7 (2023 09:06)  T(F): 97.1 (10 Apr 2023 05:01), Max: 98.1 (2023 09:06)  HR: 69 (10 Apr 2023 08:00) (59 - 87)  BP: 131/81 (10 Apr 2023 08:00) (122/70 - 167/90)  BP(mean): 101 (10 Apr 2023 08:00) (90 - 123)  RR: 23 (10 Apr 2023 08:) (11 - 30)  SpO2: 98% (10 Apr 2023 08:00) (80% - 99%)  Parameters below as of 10 Apr 2023 08:00  Patient On (Oxygen Delivery Method): nasal cannula w/ humidification  O2 Flow (L/min): 2  I&O's Summary    2023 07:01  -  10 Apr 2023 07:00  --------------------------------------------------------  IN: 2286 mL / OUT: 3235 mL / NET: -949 mL    10 Apr 2023 07:01  -  10 Apr 2023 08:27  --------------------------------------------------------  IN: 10 mL / OUT: 125 mL / NET: -115 mL      PHYSICAL EXAM  General: A&Ox 3; NAD  Respiratory: CTA B/L; no wheezes/crackles/rales   Cardiovascular: A-V paced @ 70  Gastrointestinal: Soft; NTND   Extremities: Warm, improving pitting edema-cellulitic skin changes present  Neurological:  CNII-XII grossly intact; no obvious focal deficits    MEDICATIONS  (STANDING):  aspirin  chewable 81 milliGRAM(s) Oral daily  atorvastatin 80 milliGRAM(s) Oral at bedtime  chlorhexidine 2% Cloths 1 Application(s) Topical daily  dextrose 5%. 1000 milliLiter(s) (50 mL/Hr) IV Continuous <Continuous>  dextrose 5%. 1000 milliLiter(s) (100 mL/Hr) IV Continuous <Continuous>  dextrose 50% Injectable 50 milliLiter(s) IV Push every 15 minutes  dextrose 50% Injectable 25 milliLiter(s) IV Push every 15 minutes  glucagon  Injectable 1 milliGRAM(s) IntraMuscular once  heparin   Injectable 5000 Unit(s) SubCutaneous every 8 hours  insulin lispro (ADMELOG) corrective regimen sliding scale   SubCutaneous Before meals and at bedtime  oxybutynin 5 milliGRAM(s) Oral every 8 hours  pantoprazole    Tablet 40 milliGRAM(s) Oral before breakfast  polyethylene glycol 3350 17 Gram(s) Oral daily  senna 2 Tablet(s) Oral at bedtime  silver sulfADIAZINE 1% Cream 1 Application(s) Topical two times a day  sodium chloride 0.9%. 1000 milliLiter(s) (10 mL/Hr) IV Continuous <Continuous>    MEDICATIONS  (PRN):  benzocaine/menthol Lozenge 1 Lozenge Oral every 4 hours PRN Sore Throat  dextrose Oral Gel 15 Gram(s) Oral once PRN Blood Glucose LESS THAN 70 milliGRAM(s)/deciliter  oxyCODONE    IR 5 milliGRAM(s) Oral every 4 hours PRN Moderate Pain (4 - 6)  oxyCODONE    IR 10 milliGRAM(s) Oral every 6 hours PRN Severe Pain (7 - 10)      LABS                        8.4    13.24 )-----------( 160      ( 10 Apr 2023 02:42 )             28.0     04-10    134<L>  |  97  |  30<H>  ----------------------------<  114<H>  4.2   |  24  |  1.65<H>    Ca    8.4      10 Apr 2023 02:42  Phos  3.2     04-10  Mg     2.0     04-10    TPro  5.3<L>  /  Alb  3.1<L>  /  TBili  1.4<H>  /  DBili  x   /  AST  54<H>  /  ALT  5<L>  /  AlkPhos  66  04-10    LIVER FUNCTIONS - ( 10 Apr 2023 02:42 )  Alb: 3.1 g/dL / Pro: 5.3 g/dL / ALK PHOS: 66 U/L / ALT: 5 U/L / AST: 54 U/L / GGT: x           PT/INR - ( 10 Apr 2023 02:42 )   PT: 13.0 sec;   INR: 1.09          PTT - ( 10 Apr 2023 02:42 )  PTT:29.3 sec    CARDIAC MARKERS ( 10 Apr 2023 02:42 )  x     / 3.82 ng/mL / 149 U/L / x     / x      CARDIAC MARKERS ( 2023 13:58 )  x     / 4.91 ng/mL / 671 U/L / x     / x      CARDIAC MARKERS ( 2023 08:53 )  x     / x     / 921 U/L / x     / x        IMAGING/EKG/ETC  Reviewed.

## 2023-04-10 NOTE — PROGRESS NOTE ADULT - ASSESSMENT
47 M w/PMHx of HTN, HFpEF (EF:60%), HOCM, Afib, SAH 2/2 cerebral artery dissection s/p coil embolization @[facility] 21 initially admitted to [facility] 3/24 w/ Afib w/RVR, CHF exacerbation and LE cellulitis c/b TATIANA, transaminitis and rectus sheath hematoma when on Eliquis Patient, echo with severe MR structural heart consulted for surgical eval and mgmt of MR.   S/p  MV repair, Biatrial Cryo maze ablation, MARILYN clip   CABGx 1 (SVG-PDA)   EF Normal     Refractory vasoplegia and hemodynamic instability coming off bypass s/p IV hydroxocobalamin   A/p  back in unstable bradycardia since  weaned off- AV paced at 70s--> Ep eval  Perfusion parameters improving, lactate cleared, UOP picking up--> Dc FloTrack   Orthostatic vitals--Continue to monitor perfusion parameters   Postop Nonoliguric TATIANA, Shock related vs hemodynamic-renal fx plateauing, UOP with good response to diuretics   Will continue standing Diuretics for anasarca and offloading   Lungs clear with no supplemental O2 req/ pulling good volume with IS > 2L--> continue   ADAT/ glycemic control satisfactory--> SSC as needed   H&H and PLT in good range--> CTs and drains with low output to monitor for deescalation   Continue ASA/Statin- routine ICU ppx measures  Last dose of cefazolin for cellulitis   OOB and mobilizing as tolerated   Urine color red-purple after cyanokit- thining and improving    ATTENDING: I have personally and independently provided 90 Min of critical care services. 47 M w/PMHx of HTN, HFpEF (EF:60%), HOCM, Afib, SAH 2/2 cerebral artery dissection s/p coil embolization @[facility] 21 initially admitted to [facility] 3/24 w/ Afib w/RVR, CHF exacerbation and LE cellulitis c/b TATIANA, transaminitis and rectus sheath hematoma when on Eliquis Patient, echo with severe MR structural heart consulted for surgical eval and mgmt of MR.   S/p  MV repair, Biatrial Cryo maze ablation, MARILYN clip   CABGx 1 (SVG-PDA)   EF Normal     Refractory vasoplegia and hemodynamic instability coming off bypass s/p IV hydroxocobalamin   A/p  Back in unstable bradycardia since  weaned off- AV paced at 70s--> Ep eval  Perfusion parameters improving, lactate cleared, UOP picking up--> Dc FloTrack   Orthostatic vitals--Continue to monitor perfusion parameters   Postop Nonoliguric TATIANA, Shock related vs hemodynamic-renal fx plateauing, UOP with good response to diuretics   Will continue standing Diuretics for anasarca and offloading   Lungs clear with no supplemental O2 req/ pulling good volume with IS > 2L--> continue   ADAT/ glycemic control satisfactory--> SSC as needed   H&H and PLT in good range--> CTs and drains with low output to monitor for deescalation   Continue ASA/Statin- routine ICU ppx measures  Last dose of cefazolin for cellulitis   OOB and mobilizing as tolerated   Urine color red-purple after cyanokit- thining and improving    ATTENDING: I have personally and independently provided 90 Min of critical care services.

## 2023-04-11 LAB
ALBUMIN SERPL ELPH-MCNC: 3.3 G/DL — SIGNIFICANT CHANGE UP (ref 3.3–5)
ALBUMIN SERPL ELPH-MCNC: 3.7 G/DL — SIGNIFICANT CHANGE UP (ref 3.3–5)
ALP SERPL-CCNC: 105 U/L — SIGNIFICANT CHANGE UP (ref 40–120)
ALP SERPL-CCNC: 114 U/L — SIGNIFICANT CHANGE UP (ref 40–120)
ALT FLD-CCNC: 6 U/L — LOW (ref 10–45)
ALT FLD-CCNC: 8 U/L — LOW (ref 10–45)
ANION GAP SERPL CALC-SCNC: 10 MMOL/L — SIGNIFICANT CHANGE UP (ref 5–17)
ANION GAP SERPL CALC-SCNC: 9 MMOL/L — SIGNIFICANT CHANGE UP (ref 5–17)
APTT BLD: 29.7 SEC — SIGNIFICANT CHANGE UP (ref 27.5–35.5)
APTT BLD: 30.4 SEC — SIGNIFICANT CHANGE UP (ref 27.5–35.5)
AST SERPL-CCNC: 42 U/L — HIGH (ref 10–40)
AST SERPL-CCNC: 43 U/L — HIGH (ref 10–40)
BILIRUB SERPL-MCNC: 1.7 MG/DL — HIGH (ref 0.2–1.2)
BILIRUB SERPL-MCNC: 2.1 MG/DL — HIGH (ref 0.2–1.2)
BUN SERPL-MCNC: 29 MG/DL — HIGH (ref 7–23)
BUN SERPL-MCNC: 32 MG/DL — HIGH (ref 7–23)
CALCIUM SERPL-MCNC: 8.5 MG/DL — SIGNIFICANT CHANGE UP (ref 8.4–10.5)
CALCIUM SERPL-MCNC: 8.9 MG/DL — SIGNIFICANT CHANGE UP (ref 8.4–10.5)
CHLORIDE SERPL-SCNC: 96 MMOL/L — SIGNIFICANT CHANGE UP (ref 96–108)
CHLORIDE SERPL-SCNC: 96 MMOL/L — SIGNIFICANT CHANGE UP (ref 96–108)
CO2 SERPL-SCNC: 27 MMOL/L — SIGNIFICANT CHANGE UP (ref 22–31)
CO2 SERPL-SCNC: 28 MMOL/L — SIGNIFICANT CHANGE UP (ref 22–31)
CREAT SERPL-MCNC: 1.61 MG/DL — HIGH (ref 0.5–1.3)
CREAT SERPL-MCNC: 1.63 MG/DL — HIGH (ref 0.5–1.3)
EGFR: 52 ML/MIN/1.73M2 — LOW
EGFR: 52 ML/MIN/1.73M2 — LOW
GLUCOSE BLDC GLUCOMTR-MCNC: 121 MG/DL — HIGH (ref 70–99)
GLUCOSE BLDC GLUCOMTR-MCNC: 91 MG/DL — SIGNIFICANT CHANGE UP (ref 70–99)
GLUCOSE BLDC GLUCOMTR-MCNC: 91 MG/DL — SIGNIFICANT CHANGE UP (ref 70–99)
GLUCOSE BLDC GLUCOMTR-MCNC: 98 MG/DL — SIGNIFICANT CHANGE UP (ref 70–99)
GLUCOSE SERPL-MCNC: 112 MG/DL — HIGH (ref 70–99)
GLUCOSE SERPL-MCNC: 115 MG/DL — HIGH (ref 70–99)
HCT VFR BLD CALC: 28.5 % — LOW (ref 39–50)
HCT VFR BLD CALC: 29.8 % — LOW (ref 39–50)
HGB BLD-MCNC: 8.6 G/DL — LOW (ref 13–17)
HGB BLD-MCNC: 9 G/DL — LOW (ref 13–17)
INR BLD: 1.1 — SIGNIFICANT CHANGE UP (ref 0.88–1.16)
INR BLD: 1.12 — SIGNIFICANT CHANGE UP (ref 0.88–1.16)
LACTATE SERPL-SCNC: 1.4 MMOL/L — SIGNIFICANT CHANGE UP (ref 0.5–2)
MAGNESIUM SERPL-MCNC: 2 MG/DL — SIGNIFICANT CHANGE UP (ref 1.6–2.6)
MAGNESIUM SERPL-MCNC: 2.7 MG/DL — HIGH (ref 1.6–2.6)
MCHC RBC-ENTMCNC: 23.6 PG — LOW (ref 27–34)
MCHC RBC-ENTMCNC: 23.8 PG — LOW (ref 27–34)
MCHC RBC-ENTMCNC: 30.2 GM/DL — LOW (ref 32–36)
MCHC RBC-ENTMCNC: 30.2 GM/DL — LOW (ref 32–36)
MCV RBC AUTO: 78.2 FL — LOW (ref 80–100)
MCV RBC AUTO: 78.7 FL — LOW (ref 80–100)
NRBC # BLD: 0 /100 WBCS — SIGNIFICANT CHANGE UP (ref 0–0)
NRBC # BLD: 0 /100 WBCS — SIGNIFICANT CHANGE UP (ref 0–0)
PHOSPHATE SERPL-MCNC: 3.1 MG/DL — SIGNIFICANT CHANGE UP (ref 2.5–4.5)
PHOSPHATE SERPL-MCNC: 3.4 MG/DL — SIGNIFICANT CHANGE UP (ref 2.5–4.5)
PLATELET # BLD AUTO: 192 K/UL — SIGNIFICANT CHANGE UP (ref 150–400)
PLATELET # BLD AUTO: 224 K/UL — SIGNIFICANT CHANGE UP (ref 150–400)
POTASSIUM SERPL-MCNC: 4.4 MMOL/L — SIGNIFICANT CHANGE UP (ref 3.5–5.3)
POTASSIUM SERPL-MCNC: 4.8 MMOL/L — SIGNIFICANT CHANGE UP (ref 3.5–5.3)
POTASSIUM SERPL-SCNC: 4.4 MMOL/L — SIGNIFICANT CHANGE UP (ref 3.5–5.3)
POTASSIUM SERPL-SCNC: 4.8 MMOL/L — SIGNIFICANT CHANGE UP (ref 3.5–5.3)
PROT SERPL-MCNC: 5.8 G/DL — LOW (ref 6–8.3)
PROT SERPL-MCNC: 6.4 G/DL — SIGNIFICANT CHANGE UP (ref 6–8.3)
PROTHROM AB SERPL-ACNC: 13.1 SEC — SIGNIFICANT CHANGE UP (ref 10.5–13.4)
PROTHROM AB SERPL-ACNC: 13.3 SEC — SIGNIFICANT CHANGE UP (ref 10.5–13.4)
RBC # BLD: 3.62 M/UL — LOW (ref 4.2–5.8)
RBC # BLD: 3.81 M/UL — LOW (ref 4.2–5.8)
RBC # FLD: 21 % — HIGH (ref 10.3–14.5)
RBC # FLD: 21.2 % — HIGH (ref 10.3–14.5)
SODIUM SERPL-SCNC: 133 MMOL/L — LOW (ref 135–145)
SODIUM SERPL-SCNC: 133 MMOL/L — LOW (ref 135–145)
WBC # BLD: 10.71 K/UL — HIGH (ref 3.8–10.5)
WBC # BLD: 11.67 K/UL — HIGH (ref 3.8–10.5)
WBC # FLD AUTO: 10.71 K/UL — HIGH (ref 3.8–10.5)
WBC # FLD AUTO: 11.67 K/UL — HIGH (ref 3.8–10.5)

## 2023-04-11 PROCEDURE — 71045 X-RAY EXAM CHEST 1 VIEW: CPT | Mod: 26

## 2023-04-11 PROCEDURE — 71045 X-RAY EXAM CHEST 1 VIEW: CPT | Mod: 26,77

## 2023-04-11 PROCEDURE — 99232 SBSQ HOSP IP/OBS MODERATE 35: CPT

## 2023-04-11 RX ORDER — NIFEDIPINE 30 MG
30 TABLET, EXTENDED RELEASE 24 HR ORAL EVERY 24 HOURS
Refills: 0 | Status: DISCONTINUED | OUTPATIENT
Start: 2023-04-11 | End: 2023-04-13

## 2023-04-11 RX ORDER — METOPROLOL TARTRATE 50 MG
25 TABLET ORAL EVERY 8 HOURS
Refills: 0 | Status: DISCONTINUED | OUTPATIENT
Start: 2023-04-11 | End: 2023-04-11

## 2023-04-11 RX ORDER — LABETALOL HCL 100 MG
10 TABLET ORAL ONCE
Refills: 0 | Status: COMPLETED | OUTPATIENT
Start: 2023-04-11 | End: 2023-04-11

## 2023-04-11 RX ORDER — MAGNESIUM SULFATE 500 MG/ML
2 VIAL (ML) INJECTION ONCE
Refills: 0 | Status: COMPLETED | OUTPATIENT
Start: 2023-04-11 | End: 2023-04-11

## 2023-04-11 RX ORDER — FUROSEMIDE 40 MG
40 TABLET ORAL ONCE
Refills: 0 | Status: COMPLETED | OUTPATIENT
Start: 2023-04-11 | End: 2023-04-11

## 2023-04-11 RX ORDER — ALBUMIN HUMAN 25 %
250 VIAL (ML) INTRAVENOUS
Refills: 0 | Status: COMPLETED | OUTPATIENT
Start: 2023-04-11 | End: 2023-04-11

## 2023-04-11 RX ORDER — FUROSEMIDE 40 MG
20 TABLET ORAL DAILY
Refills: 0 | Status: DISCONTINUED | OUTPATIENT
Start: 2023-04-11 | End: 2023-04-12

## 2023-04-11 RX ADMIN — Medication 20 MILLIGRAM(S): at 12:36

## 2023-04-11 RX ADMIN — OXYCODONE HYDROCHLORIDE 10 MILLIGRAM(S): 5 TABLET ORAL at 03:07

## 2023-04-11 RX ADMIN — Medication 25 GRAM(S): at 10:48

## 2023-04-11 RX ADMIN — Medication 5 MILLIGRAM(S): at 00:41

## 2023-04-11 RX ADMIN — OXYCODONE HYDROCHLORIDE 10 MILLIGRAM(S): 5 TABLET ORAL at 18:26

## 2023-04-11 RX ADMIN — HEPARIN SODIUM 5000 UNIT(S): 5000 INJECTION INTRAVENOUS; SUBCUTANEOUS at 13:25

## 2023-04-11 RX ADMIN — PANTOPRAZOLE SODIUM 40 MILLIGRAM(S): 20 TABLET, DELAYED RELEASE ORAL at 06:53

## 2023-04-11 RX ADMIN — Medication 10 MILLIGRAM(S): at 20:05

## 2023-04-11 RX ADMIN — Medication 81 MILLIGRAM(S): at 12:36

## 2023-04-11 RX ADMIN — Medication 650 MILLIGRAM(S): at 08:22

## 2023-04-11 RX ADMIN — Medication 40 MILLIGRAM(S): at 05:14

## 2023-04-11 RX ADMIN — Medication 1 APPLICATION(S): at 06:53

## 2023-04-11 RX ADMIN — Medication 250 MILLILITER(S): at 05:21

## 2023-04-11 RX ADMIN — Medication 650 MILLIGRAM(S): at 07:29

## 2023-04-11 RX ADMIN — HEPARIN SODIUM 5000 UNIT(S): 5000 INJECTION INTRAVENOUS; SUBCUTANEOUS at 06:18

## 2023-04-11 RX ADMIN — Medication 1 APPLICATION(S): at 17:06

## 2023-04-11 RX ADMIN — Medication 30 MILLIGRAM(S): at 21:31

## 2023-04-11 RX ADMIN — Medication 5 MILLIGRAM(S): at 09:07

## 2023-04-11 RX ADMIN — HEPARIN SODIUM 5000 UNIT(S): 5000 INJECTION INTRAVENOUS; SUBCUTANEOUS at 21:31

## 2023-04-11 RX ADMIN — CHLORHEXIDINE GLUCONATE 1 APPLICATION(S): 213 SOLUTION TOPICAL at 12:33

## 2023-04-11 RX ADMIN — OXYCODONE HYDROCHLORIDE 10 MILLIGRAM(S): 5 TABLET ORAL at 19:39

## 2023-04-11 RX ADMIN — SENNA PLUS 2 TABLET(S): 8.6 TABLET ORAL at 21:32

## 2023-04-11 RX ADMIN — POLYETHYLENE GLYCOL 3350 17 GRAM(S): 17 POWDER, FOR SOLUTION ORAL at 12:37

## 2023-04-11 RX ADMIN — ATORVASTATIN CALCIUM 80 MILLIGRAM(S): 80 TABLET, FILM COATED ORAL at 21:32

## 2023-04-11 RX ADMIN — Medication 250 MILLILITER(S): at 04:33

## 2023-04-11 RX ADMIN — Medication 5 MILLIGRAM(S): at 17:16

## 2023-04-11 RX ADMIN — OXYCODONE HYDROCHLORIDE 10 MILLIGRAM(S): 5 TABLET ORAL at 04:00

## 2023-04-11 NOTE — CHART NOTE - NSCHARTNOTEFT_GEN_A_CORE
Jj Removal:    Pt seen and examined at bedside.  Case discussed with Dr. Gonsalves.  Minimal output from blakes X2.  No air leak appreciated.  Blakes X2 removed without incident per Dr. Gonsalves request.  Occlusive DSD placed.  CXR no obvious PTX noted.  Pt tolerated procedure well.

## 2023-04-11 NOTE — PROGRESS NOTE ADULT - SUBJECTIVE AND OBJECTIVE BOX
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 7/29/21 who presented to [facility] 3/24/23 c/o progressively worsening SOB and LE edema x few weeks. Patient states he could barely walk 20 feet prior to getting winded. Patient also admitted to 2 pillow orthopnea, B/L edema and erythema, states he scratched and accidentally opening fluid filled blisters on his LE. Patient denies any N/V, diaphoresis, palpitations, dizziness, chest pain, recent travel or sick contacts. Patient reports he ran out of medications a few months ago and stopped taking some of his cardiac medications 2/2 cost.    In [facility] ED, BP: 146/122, HR: 80s, RR:16, Temp: 97.5F, O2 sat: 96%RA. EKG revealed Afib@105BPM without ischemic changes. CXR with pulmonary vascular congestion and moderate right pleural effusion. Labs notable for: BUN/Cr 51.9/2.0, Total bili 2.6, , , Alk phos 99, BNP 1169, Amylase 47, Lipase 533. CT abd/pelvis revealed trace ascites, no peripancreatic abscess/necrosis. Abdominal US without acute findings. Echocardiogram c/w normal LV function, EF:60%, LAE with severe MR, mitral valve degeneration of posterior leaflet with significant prolapse and mod-severe anterior directed MR. Patient admitted to telemetry for management HFpEF exacerbation, Afib w/ RVR and LE cellulitis. Patient started on IV diuretics and IV antibiotics with improvement. Patient rate controlled with BB/Cardizem and started on Eliquis for anticoagulation. Hospital course c/b TATIANA, transaminitis and 11cm rectal sheath hematoma. Surgery consulted: recommended abdominal binder/no acute intervention, Eliquis was held and recommended to resume if H/H remains stable.  GI consulted: patients Statin medication was held. Initially recommended for MRCP, however unable to perform due to hx of cerebral coil.           Acute on chronic heart failure with preserved ejection fraction (HFpEF)    Mitral regurgitation    Atrial fibrillation    Hyperlipidemia    TATIANA (acute kidney injury)    Transaminitis    Hematoma of rectus sheath    Cellulitis    HTN (hypertension)        , FAMILY HISTORY:  No pertinent family history in first degree relatives    PAST MEDICAL & SURGICAL HISTORY:  HTN (hypertension)      SAH (subarachnoid hemorrhage)      Atrial fibrillation      HOCM (hypertrophic obstructive cardiomyopathy)      Mitral regurgitation      S/P coil embolization of cerebral aneurysm        Patient is a 48y old  Male who presents with a chief complaint of HFpEF exacerbation, new onset Afib, LE cellulitis, mod-severe MR (10 Apr 2023 23:25)      14 system review limited 2/2 post op morbidity    Vital signs, hemodynamic and respiratory parameters were reviewed from the bedside nursing flowsheet.  ICU Vital Signs Last 24 Hrs  T(C): 37.1 (11 Apr 2023 13:27), Max: 37.1 (11 Apr 2023 13:27)  T(F): 98.8 (11 Apr 2023 13:27), Max: 98.8 (11 Apr 2023 13:27)  HR: 77 (11 Apr 2023 14:00) (52 - 84)  BP: 138/94 (11 Apr 2023 14:00) (112/70 - 156/75)  BP(mean): 112 (11 Apr 2023 14:00) (86 - 120)  ABP: 157/80 (10 Apr 2023 16:00) (157/80 - 157/80)  ABP(mean): 98 (10 Apr 2023 16:00) (98 - 98)  RR: 25 (11 Apr 2023 14:00) (13 - 29)  SpO2: 89% (11 Apr 2023 14:00) (89% - 100%)    O2 Parameters below as of 11 Apr 2023 14:00  Patient On (Oxygen Delivery Method): room air          Adult Advanced Hemodynamics Last 24 Hrs  CVP(mm Hg): 24 (10 Apr 2023 20:00) (16 - 24)  CVP(cm H2O): --  CO: 4.7 (10 Apr 2023 16:00) (4.7 - 4.7)  CI: 2 (10 Apr 2023 16:00) (2 - 2)  PA: --  PA(mean): --  PCWP: --  SVR: 1445 (10 Apr 2023 16:00) (1445 - 1445)  SVRI: 3395 (10 Apr 2023 16:00) (3395 - 3395)  PVR: --  PVRI: --, ABG - ( 10 Apr 2023 10:25 )  pH, Arterial: 7.47  pH, Blood: x     /  pCO2: 38    /  pO2: 103   / HCO3: 28    / Base Excess: 3.9   /  SaO2: 98.2                Intake and output was reviewed and the fluid balance was calculated  Daily     Daily   I&O's Summary    10 Apr 2023 07:01  -  11 Apr 2023 07:00  --------------------------------------------------------  IN: 1590 mL / OUT: 2027 mL / NET: -437 mL    11 Apr 2023 07:01  -  11 Apr 2023 15:06  --------------------------------------------------------  IN: 670 mL / OUT: 475 mL / NET: 195 mL        All lines and drain sites were assessed  [facility]      POCT Blood Glucose.: 121 mg/dL (11 Apr 2023 11:41)    No acute change in mental status  Auscultation of the chest reveals equal bs  Abdomen is soft  Extremities are warm and well perfused  Wounds appear clean and unremarkable  Antibiotics are periop    labs  CBC Full  -  ( 11 Apr 2023 12:17 )  WBC Count : 11.67 K/uL  RBC Count : 3.81 M/uL  Hemoglobin : 9.0 g/dL  Hematocrit : 29.8 %  Platelet Count - Automated : 224 K/uL  Mean Cell Volume : 78.2 fl  Mean Cell Hemoglobin : 23.6 pg  Mean Cell Hemoglobin Concentration : 30.2 gm/dL  Auto Neutrophil # : x  Auto Lymphocyte # : x  Auto Monocyte # : x  Auto Eosinophil # : x  Auto Basophil # : x  Auto Neutrophil % : x  Auto Lymphocyte % : x  Auto Monocyte % : x  Auto Eosinophil % : x  Auto Basophil % : x    04-11    133<L>  |  96  |  29<H>  ----------------------------<  112<H>  4.8   |  28  |  1.61<H>    Ca    8.9      11 Apr 2023 12:17  Phos  3.1     04-11  Mg     2.7     04-11    TPro  6.4  /  Alb  3.7  /  TBili  2.1<H>  /  DBili  x   /  AST  43<H>  /  ALT  8<L>  /  AlkPhos  114  04-11    PT/INR - ( 11 Apr 2023 12:17 )   PT: 13.1 sec;   INR: 1.10          PTT - ( 11 Apr 2023 12:17 )  PTT:29.7 sec  The current medications were reviewed   MEDICATIONS  (STANDING):  aspirin  chewable 81 milliGRAM(s) Oral daily  atorvastatin 80 milliGRAM(s) Oral at bedtime  chlorhexidine 2% Cloths 1 Application(s) Topical daily  dextrose 5%. 1000 milliLiter(s) (50 mL/Hr) IV Continuous <Continuous>  dextrose 5%. 1000 milliLiter(s) (100 mL/Hr) IV Continuous <Continuous>  dextrose 50% Injectable 50 milliLiter(s) IV Push every 15 minutes  dextrose 50% Injectable 25 milliLiter(s) IV Push every 15 minutes  furosemide    Tablet 20 milliGRAM(s) Oral daily  glucagon  Injectable 1 milliGRAM(s) IntraMuscular once  heparin   Injectable 5000 Unit(s) SubCutaneous every 8 hours  insulin lispro (ADMELOG) corrective regimen sliding scale   SubCutaneous Before meals and at bedtime  oxybutynin 5 milliGRAM(s) Oral every 8 hours  pantoprazole    Tablet 40 milliGRAM(s) Oral before breakfast  polyethylene glycol 3350 17 Gram(s) Oral daily  senna 2 Tablet(s) Oral at bedtime  silver sulfADIAZINE 1% Cream 1 Application(s) Topical two times a day  sodium chloride 0.9%. 1000 milliLiter(s) (10 mL/Hr) IV Continuous <Continuous>    MEDICATIONS  (PRN):  acetaminophen     Tablet .. 650 milliGRAM(s) Oral every 6 hours PRN Mild Pain (1 - 3)  benzocaine/menthol Lozenge 1 Lozenge Oral every 4 hours PRN Sore Throat  dextrose Oral Gel 15 Gram(s) Oral once PRN Blood Glucose LESS THAN 70 milliGRAM(s)/deciliter  oxyCODONE    IR 5 milliGRAM(s) Oral every 4 hours PRN Moderate Pain (4 - 6)  oxyCODONE    IR 10 milliGRAM(s) Oral every 6 hours PRN Severe Pain (7 - 10)       PROBLEM LIST/ ASSESSMENT:  HEALTH ISSUES - PROBLEM Dx:  Acute on chronic heart failure with preserved ejection fraction (HFpEF)    Mitral regurgitation    Atrial fibrillation    Hyperlipidemia    TATIANA (acute kidney injury)    Transaminitis    Hematoma of rectus sheath    Cellulitis    HTN (hypertension)        ,   Patient is a 48y old  Male who presents with a chief complaint of HFpEF exacerbation, new onset Afib, LE cellulitis, mod-severe MR (10 Apr 2023 23:25)     s/p MV repair; cryo maze ablation      My plan includes :    Maintain MAP >70 ( for renal perfusion)  Avoid nephrotoxic agents  Avoid geovanna blockers  strict blood sugar control    close hemodynamic, ventilatory and drain monitoring and management per post op routine    Monitor for arrhythmias and monitor parameters for organ perfusion  monitor neurologic status  Head of the bed should remain elevated to 45 deg .   chest PT and IS will be encouraged  monitor adequacy of oxygenation and ventilation and attempt to wean oxygen  Nutritional goals will be met using po eventually , ensure adequate caloric intake and montior the same  Stress ulcer and VTE prophylaxis will be achieved    Glycemic control is satisfactory  Electrolytes have been repleted as necessary and wound care has been carried out. Pain control has been achieved.   agressive physical therapy and early mobility and ambulation goals will be met   The family was updated about the course and plan  CRITICAL CARE TIME SPENT in evaluation and management, reassessments, review and interpretation of labs and x-rays, ventilator and hemodynamic management, formulating a plan and coordinating care: __30__ MIN.  Time does not include procedural time.  CTICU ATTENDING     					    Shane Anthony MD

## 2023-04-12 LAB
ALBUMIN SERPL ELPH-MCNC: 3.3 G/DL — SIGNIFICANT CHANGE UP (ref 3.3–5)
ALP SERPL-CCNC: 111 U/L — SIGNIFICANT CHANGE UP (ref 40–120)
ALT FLD-CCNC: 8 U/L — LOW (ref 10–45)
ANION GAP SERPL CALC-SCNC: 11 MMOL/L — SIGNIFICANT CHANGE UP (ref 5–17)
APTT BLD: 29.4 SEC — SIGNIFICANT CHANGE UP (ref 27.5–35.5)
AST SERPL-CCNC: 34 U/L — SIGNIFICANT CHANGE UP (ref 10–40)
BILIRUB SERPL-MCNC: 1.8 MG/DL — HIGH (ref 0.2–1.2)
BUN SERPL-MCNC: 28 MG/DL — HIGH (ref 7–23)
CALCIUM SERPL-MCNC: 8.3 MG/DL — LOW (ref 8.4–10.5)
CHLORIDE SERPL-SCNC: 96 MMOL/L — SIGNIFICANT CHANGE UP (ref 96–108)
CO2 SERPL-SCNC: 27 MMOL/L — SIGNIFICANT CHANGE UP (ref 22–31)
CREAT SERPL-MCNC: 1.36 MG/DL — HIGH (ref 0.5–1.3)
EGFR: 64 ML/MIN/1.73M2 — SIGNIFICANT CHANGE UP
GLUCOSE BLDC GLUCOMTR-MCNC: 107 MG/DL — HIGH (ref 70–99)
GLUCOSE BLDC GLUCOMTR-MCNC: 112 MG/DL — HIGH (ref 70–99)
GLUCOSE BLDC GLUCOMTR-MCNC: 120 MG/DL — HIGH (ref 70–99)
GLUCOSE BLDC GLUCOMTR-MCNC: 87 MG/DL — SIGNIFICANT CHANGE UP (ref 70–99)
GLUCOSE SERPL-MCNC: 97 MG/DL — SIGNIFICANT CHANGE UP (ref 70–99)
HCT VFR BLD CALC: 28.6 % — LOW (ref 39–50)
HGB BLD-MCNC: 8.6 G/DL — LOW (ref 13–17)
INR BLD: 1.13 — SIGNIFICANT CHANGE UP (ref 0.88–1.16)
MAGNESIUM SERPL-MCNC: 2.1 MG/DL — SIGNIFICANT CHANGE UP (ref 1.6–2.6)
MCHC RBC-ENTMCNC: 23.5 PG — LOW (ref 27–34)
MCHC RBC-ENTMCNC: 30.1 GM/DL — LOW (ref 32–36)
MCV RBC AUTO: 78.1 FL — LOW (ref 80–100)
NRBC # BLD: 0 /100 WBCS — SIGNIFICANT CHANGE UP (ref 0–0)
PHOSPHATE SERPL-MCNC: 2.9 MG/DL — SIGNIFICANT CHANGE UP (ref 2.5–4.5)
PLATELET # BLD AUTO: 236 K/UL — SIGNIFICANT CHANGE UP (ref 150–400)
POTASSIUM SERPL-MCNC: 4.3 MMOL/L — SIGNIFICANT CHANGE UP (ref 3.5–5.3)
POTASSIUM SERPL-SCNC: 4.3 MMOL/L — SIGNIFICANT CHANGE UP (ref 3.5–5.3)
PROT SERPL-MCNC: 5.9 G/DL — LOW (ref 6–8.3)
PROTHROM AB SERPL-ACNC: 13.5 SEC — HIGH (ref 10.5–13.4)
RBC # BLD: 3.66 M/UL — LOW (ref 4.2–5.8)
RBC # FLD: 21.2 % — HIGH (ref 10.3–14.5)
SODIUM SERPL-SCNC: 134 MMOL/L — LOW (ref 135–145)
T4 FREE SERPL-MCNC: 1.14 NG/DL — SIGNIFICANT CHANGE UP (ref 0.93–1.7)
TSH SERPL-MCNC: 10.04 UIU/ML — HIGH (ref 0.27–4.2)
WBC # BLD: 10.09 K/UL — SIGNIFICANT CHANGE UP (ref 3.8–10.5)
WBC # FLD AUTO: 10.09 K/UL — SIGNIFICANT CHANGE UP (ref 3.8–10.5)

## 2023-04-12 PROCEDURE — 71045 X-RAY EXAM CHEST 1 VIEW: CPT | Mod: 26

## 2023-04-12 PROCEDURE — 99221 1ST HOSP IP/OBS SF/LOW 40: CPT | Mod: GC

## 2023-04-12 PROCEDURE — 99291 CRITICAL CARE FIRST HOUR: CPT

## 2023-04-12 RX ORDER — SODIUM CHLORIDE 9 MG/ML
3 INJECTION INTRAMUSCULAR; INTRAVENOUS; SUBCUTANEOUS EVERY 8 HOURS
Refills: 0 | Status: DISCONTINUED | OUTPATIENT
Start: 2023-04-12 | End: 2023-04-13

## 2023-04-12 RX ORDER — FUROSEMIDE 40 MG
20 TABLET ORAL EVERY 12 HOURS
Refills: 0 | Status: DISCONTINUED | OUTPATIENT
Start: 2023-04-12 | End: 2023-04-13

## 2023-04-12 RX ORDER — METOPROLOL TARTRATE 50 MG
12.5 TABLET ORAL EVERY 8 HOURS
Refills: 0 | Status: DISCONTINUED | OUTPATIENT
Start: 2023-04-12 | End: 2023-04-13

## 2023-04-12 RX ORDER — FUROSEMIDE 40 MG
20 TABLET ORAL ONCE
Refills: 0 | Status: COMPLETED | OUTPATIENT
Start: 2023-04-12 | End: 2023-04-12

## 2023-04-12 RX ORDER — SPIRONOLACTONE 25 MG/1
25 TABLET, FILM COATED ORAL DAILY
Refills: 0 | Status: DISCONTINUED | OUTPATIENT
Start: 2023-04-12 | End: 2023-04-13

## 2023-04-12 RX ADMIN — HEPARIN SODIUM 5000 UNIT(S): 5000 INJECTION INTRAVENOUS; SUBCUTANEOUS at 21:53

## 2023-04-12 RX ADMIN — SODIUM CHLORIDE 3 MILLILITER(S): 9 INJECTION INTRAMUSCULAR; INTRAVENOUS; SUBCUTANEOUS at 21:40

## 2023-04-12 RX ADMIN — Medication 12.5 MILLIGRAM(S): at 21:52

## 2023-04-12 RX ADMIN — Medication 20 MILLIGRAM(S): at 17:11

## 2023-04-12 RX ADMIN — Medication 20 MILLIGRAM(S): at 06:38

## 2023-04-12 RX ADMIN — PANTOPRAZOLE SODIUM 40 MILLIGRAM(S): 20 TABLET, DELAYED RELEASE ORAL at 06:38

## 2023-04-12 RX ADMIN — HEPARIN SODIUM 5000 UNIT(S): 5000 INJECTION INTRAVENOUS; SUBCUTANEOUS at 06:38

## 2023-04-12 RX ADMIN — Medication 5 MILLIGRAM(S): at 10:55

## 2023-04-12 RX ADMIN — Medication 650 MILLIGRAM(S): at 13:48

## 2023-04-12 RX ADMIN — SPIRONOLACTONE 25 MILLIGRAM(S): 25 TABLET, FILM COATED ORAL at 07:08

## 2023-04-12 RX ADMIN — SENNA PLUS 2 TABLET(S): 8.6 TABLET ORAL at 21:52

## 2023-04-12 RX ADMIN — Medication 650 MILLIGRAM(S): at 15:56

## 2023-04-12 RX ADMIN — HEPARIN SODIUM 5000 UNIT(S): 5000 INJECTION INTRAVENOUS; SUBCUTANEOUS at 13:49

## 2023-04-12 RX ADMIN — Medication 5 MILLIGRAM(S): at 00:14

## 2023-04-12 RX ADMIN — POLYETHYLENE GLYCOL 3350 17 GRAM(S): 17 POWDER, FOR SOLUTION ORAL at 10:56

## 2023-04-12 RX ADMIN — Medication 5 MILLIGRAM(S): at 17:11

## 2023-04-12 RX ADMIN — Medication 12.5 MILLIGRAM(S): at 13:49

## 2023-04-12 RX ADMIN — Medication 20 MILLIGRAM(S): at 03:37

## 2023-04-12 RX ADMIN — Medication 1 APPLICATION(S): at 17:12

## 2023-04-12 RX ADMIN — Medication 1 APPLICATION(S): at 07:08

## 2023-04-12 RX ADMIN — Medication 81 MILLIGRAM(S): at 10:56

## 2023-04-12 RX ADMIN — Medication 20 MILLIGRAM(S): at 21:53

## 2023-04-12 RX ADMIN — Medication 30 MILLIGRAM(S): at 21:52

## 2023-04-12 RX ADMIN — ATORVASTATIN CALCIUM 80 MILLIGRAM(S): 80 TABLET, FILM COATED ORAL at 21:52

## 2023-04-12 NOTE — CONSULT NOTE ADULT - REASON FOR ADMISSION
HFpEF exacerbation, new onset Afib, LE cellulitis, mod-severe MR

## 2023-04-12 NOTE — CONSULT NOTE ADULT - SUBJECTIVE AND OBJECTIVE BOX
HISTORY OF PRESENT ILLNESS  Hang Basurto is a 48-year-old male with a past medical history of hypertension, hyperlipidemia, HFpEF, HOCM, moderate-severe MRI, atrial fibrillation and SAH (secondary to cerebral artery dissection s/p coil embolization on 7/2021) who presented to [facility] (3/24/23) complaining of worsening shortness of breath and lower extremity edema for a few weeks. He was admitted to telemetry for management of HFpEF exacerbation, atrial fibrillation with RVR and lower extremity cellulitis. He received IV diuretics and antibiotics with improvement of symptoms. Hospitalization was complicated by acute kidney injury, transaminitis (GI consulted, statin was held; initially recommended MRCP but unable to perform due to history of cerebral coil) and a 11 cm rectus sheath hematoma (s/p abdominal binder, no acute surgical intervention). TATIANA and transaminitis resolved during hospital course. The patient was then transferred to [facility] cardiac telemetry for continued management of heart failure exacerbation and structural heart evaluation for MR. Echocardiogram was consistent with severe, eccentric, anteriorly directed mitral regurgitation and flail mitral valve. CT surgery was consulted with recommendations for mitral valve replacement. He underwent cardiac cath as part of pre-operative clearance showing 3-vessel coronary artery disease. Therefore, he underwent CABG-MVR (4/7/23). Thyroid function tests on 4/12/23 were remarkable for an elevated TSH (10.0) and a normal FT4 (1.14). Endocrinology was consulted for recommendations regarding abnormal thyroid function tests.     CAPILLARY BLOOD GLUCOSE & INSULIN RECEIVED  91 mg/dL (04-11 @ 16:45)  98 mg/dL (04-11 @ 21:26)  97 mg/dL (04-12 @ 03:20)  87 mg/dL (04-12 @ 07:03)  107 mg/dL (04-12 @ 11:23)    PAST MEDICAL & SURGICAL HISTORY  As per history of present illness.     FAMILY HISTORY  - Diabetes:  - Thyroid:  - Autoimmune:  - Other:    SOCIAL HISTORY  - Work:  - Alcohol:  - Smoking:  - Recreational Drugs:    ALLERGIES  No Known Allergies    CURRENT MEDICATIONS  acetaminophen     Tablet .. 650 milliGRAM(s) Oral every 6 hours PRN  aspirin  chewable 81 milliGRAM(s) Oral daily  atorvastatin 80 milliGRAM(s) Oral at bedtime  benzocaine/menthol Lozenge 1 Lozenge Oral every 4 hours PRN  chlorhexidine 2% Cloths 1 Application(s) Topical daily  dextrose 5%. 1000 milliLiter(s) IV Continuous <Continuous>  dextrose 5%. 1000 milliLiter(s) IV Continuous <Continuous>  dextrose 50% Injectable 50 milliLiter(s) IV Push every 15 minutes  dextrose 50% Injectable 25 milliLiter(s) IV Push every 15 minutes  dextrose Oral Gel 15 Gram(s) Oral once PRN  furosemide    Tablet 20 milliGRAM(s) Oral every 12 hours  glucagon  Injectable 1 milliGRAM(s) IntraMuscular once  heparin   Injectable 5000 Unit(s) SubCutaneous every 8 hours  insulin lispro (ADMELOG) corrective regimen sliding scale   SubCutaneous Before meals and at bedtime  metoprolol tartrate 12.5 milliGRAM(s) Oral every 8 hours  NIFEdipine XL 30 milliGRAM(s) Oral every 24 hours  oxybutynin 5 milliGRAM(s) Oral every 8 hours  oxyCODONE    IR 5 milliGRAM(s) Oral every 4 hours PRN  oxyCODONE    IR 10 milliGRAM(s) Oral every 6 hours PRN  pantoprazole    Tablet 40 milliGRAM(s) Oral before breakfast  polyethylene glycol 3350 17 Gram(s) Oral daily  senna 2 Tablet(s) Oral at bedtime  silver sulfADIAZINE 1% Cream 1 Application(s) Topical two times a day  sodium chloride 0.9% lock flush 3 milliLiter(s) IV Push every 8 hours  sodium chloride 0.9%. 1000 milliLiter(s) IV Continuous <Continuous>  spironolactone 25 milliGRAM(s) Oral daily    REVIEW OF SYSTEMS  Constitutional:  Negative fever, chills or loss of appetite.  Eyes:  Negative blurry vision or double vision.  Cardiovascular:  Negative for chest pain or palpitations.  Respiratory:  Negative for cough, wheezing, or shortness of breath.   Gastrointestinal:  Negative for nausea, vomiting, diarrhea, constipation, or abdominal pain.  Genitourinary:  Negative frequency, urgency or dysuria.  Neurologic:  No headache, confusion, dizziness, lightheadedness.    PHYSICAL EXAM  Vital Signs Last 24 Hrs  T(C): 36.7 (12 Apr 2023 09:28), Max: 37.1 (12 Apr 2023 01:01)  T(F): 98 (12 Apr 2023 09:28), Max: 98.7 (12 Apr 2023 01:01)  HR: 90 (12 Apr 2023 13:00) (60 - 92)  BP: 129/70 (12 Apr 2023 13:00) (113/80 - 210/109)  BP(mean): 94 (12 Apr 2023 13:00) (91 - 148)  RR: 18 (12 Apr 2023 13:00) (13 - 25)  SpO2: 94% (12 Apr 2023 13:00) (89% - 99%)    Parameters below as of 12 Apr 2023 13:00  Patient On (Oxygen Delivery Method): room air    Constitutional: Awake, alert, in no acute distress.   HEENT: Normocephalic, atraumatic, DEEDEE, no proptosis or lid retraction.   Neck: supple, no acanthosis, no thyromegaly or palpable thyroid nodules.  Respiratory: Lungs clear to ausculation bilaterally.   Cardiovascular: regular rhythm, normal S1 and S2, no audible murmurs.   GI: soft, non-tender, non-distended, bowel sounds present, no masses appreciated.  Extremities: No lower extremity edema, peripheral pulses present.   Skin: no rashes.   Psychiatric: AAO x 3. Normal affect/mood.     LABS  CBC - WBC/HGB/HTC/PLT: 10.09/8.6/28.6/236 (04-12-23)  BMP: Na/K/Cl/Bicarb/BUN/Cr/Gluc: 134/4.3/96/27/28/1.36/97 (04-12-23)  Anion Gap: 11 (04-12-23)  eGFR: 64 (04-12-23)  Calcium: 8.3 (04-12-23)  Phosphorus: 2.9 (04-12-23)  Magnesium: 2.1 (04-12-23)  LFT - Alb/Tprot/Tbili/Dbili/AlkPhos/ALT/AST: 3.3/--/1.8/--/111/8/34 (04-12-23)  PT/aPTT/INR: 13.5/29.4/1.13 (04-12-23)  Thyroid Stimulating Hormone, Serum: 10.040 (04-12-23)  Total T4/Free T4: --/1.140 (04-12-23)    ASSESSMENT / RECOMMENDATIONS    A1C: 6.1 %  BUN: 28  Creatinine: 1.36  GFR: 64  Weight: 99.9  BMI: 28.3  EF:     # Type 2 diabetes mellitus  - Please continue lantus *** units at bedtime.   - Continue lispro *** units before each meal.  - Continue lispro moderate / low dose sliding scale before meals and at bedtime.  - Patient's fingerstick glucose goal is 100-180 mg/dL.    - For discharge, patient can ***.    - Patient can follow up at discharge with [facility] by calling [phone] to make an appointment.      Case discussed with Dr. Mcqueen. Primary team updated.       Wagner Licea    Endocrinology Fellow    Service Pager: [phone]  HISTORY OF PRESENT ILLNESS  Hang Basurto is a 48-year-old male with a past medical history of hypertension, hyperlipidemia, HFpEF, HOCM, moderate-severe MR, atrial fibrillation and SAH (secondary to cerebral artery dissection s/p coil embolization on 7/2021) who presented to [facility] (3/24/23) complaining of worsening shortness of breath and lower extremity edema for a few weeks. He was admitted to telemetry for management of HFpEF exacerbation, atrial fibrillation with RVR and lower extremity cellulitis. He received IV diuretics and antibiotics with improvement of symptoms. Hospitalization was complicated by acute kidney injury, transaminitis (GI consulted, statin was held; initially recommended MRCP but unable to perform due to history of cerebral coil) and a 11 cm rectus sheath hematoma (s/p abdominal binder, no acute surgical intervention). TATIANA and transaminitis resolved during hospital course. The patient was then transferred to [facility] cardiac telemetry for continued management of heart failure exacerbation and structural heart evaluation for MR. Echocardiogram was consistent with severe, eccentric, anteriorly directed mitral regurgitation and flail mitral valve. CT surgery was consulted with recommendations for mitral valve replacement. He underwent cardiac cath as part of pre-operative clearance showing 3-vessel coronary artery disease. Therefore, he underwent CABG-MVR (4/7/23). Thyroid function tests on 4/12/23 were remarkable for an elevated TSH (10.0) and a normal FT4 (1.14). Endocrinology was consulted for recommendations regarding abnormal thyroid function tests.     CAPILLARY BLOOD GLUCOSE & INSULIN RECEIVED  91 mg/dL (04-11 @ 16:45)  98 mg/dL (04-11 @ 21:26)  97 mg/dL (04-12 @ 03:20)  87 mg/dL (04-12 @ 07:03)  107 mg/dL (04-12 @ 11:23)    PAST MEDICAL & SURGICAL HISTORY  As per history of present illness.     FAMILY HISTORY  - Diabetes: Denied.  - Thyroid: Denied.  - Autoimmune: Denied.  - Other: Denied.    SOCIAL HISTORY  - Work: Security.   - Alcohol: Denied.  - Smoking: Denied.  - Recreational Drugs: Denied.    ALLERGIES  No Known Allergies    CURRENT MEDICATIONS  acetaminophen     Tablet .. 650 milliGRAM(s) Oral every 6 hours PRN  aspirin  chewable 81 milliGRAM(s) Oral daily  atorvastatin 80 milliGRAM(s) Oral at bedtime  benzocaine/menthol Lozenge 1 Lozenge Oral every 4 hours PRN  chlorhexidine 2% Cloths 1 Application(s) Topical daily  dextrose 5%. 1000 milliLiter(s) IV Continuous <Continuous>  dextrose 5%. 1000 milliLiter(s) IV Continuous <Continuous>  dextrose 50% Injectable 50 milliLiter(s) IV Push every 15 minutes  dextrose 50% Injectable 25 milliLiter(s) IV Push every 15 minutes  dextrose Oral Gel 15 Gram(s) Oral once PRN  furosemide    Tablet 20 milliGRAM(s) Oral every 12 hours  glucagon  Injectable 1 milliGRAM(s) IntraMuscular once  heparin   Injectable 5000 Unit(s) SubCutaneous every 8 hours  insulin lispro (ADMELOG) corrective regimen sliding scale   SubCutaneous Before meals and at bedtime  metoprolol tartrate 12.5 milliGRAM(s) Oral every 8 hours  NIFEdipine XL 30 milliGRAM(s) Oral every 24 hours  oxybutynin 5 milliGRAM(s) Oral every 8 hours  oxyCODONE    IR 5 milliGRAM(s) Oral every 4 hours PRN  oxyCODONE    IR 10 milliGRAM(s) Oral every 6 hours PRN  pantoprazole    Tablet 40 milliGRAM(s) Oral before breakfast  polyethylene glycol 3350 17 Gram(s) Oral daily  senna 2 Tablet(s) Oral at bedtime  silver sulfADIAZINE 1% Cream 1 Application(s) Topical two times a day  sodium chloride 0.9% lock flush 3 milliLiter(s) IV Push every 8 hours  sodium chloride 0.9%. 1000 milliLiter(s) IV Continuous <Continuous>  spironolactone 25 milliGRAM(s) Oral daily    REVIEW OF SYSTEMS  Constitutional:  Negative fever, chills or loss of appetite.  Cardiovascular:  Negative for chest pain or palpitations.  Respiratory:  Negative for cough, wheezing, or shortness of breath.   Gastrointestinal:  Negative for nausea, vomiting, diarrhea, constipation, or abdominal pain.    PHYSICAL EXAM  Vital Signs Last 24 Hrs  T(C): 36.7 (12 Apr 2023 09:28), Max: 37.1 (12 Apr 2023 01:01)  T(F): 98 (12 Apr 2023 09:28), Max: 98.7 (12 Apr 2023 01:01)  HR: 90 (12 Apr 2023 13:00) (60 - 92)  BP: 129/70 (12 Apr 2023 13:00) (113/80 - 210/109)  BP(mean): 94 (12 Apr 2023 13:00) (91 - 148)  RR: 18 (12 Apr 2023 13:00) (13 - 25)  SpO2: 94% (12 Apr 2023 13:00) (89% - 99%)    Parameters below as of 12 Apr 2023 13:00  Patient On (Oxygen Delivery Method): room air    Constitutional: Awake, alert, male, in no acute distress.   HEENT: Normocephalic, atraumatic, DEEDEE, no proptosis or lid retraction.   Neck: supple, no thyromegaly or palpable thyroid nodules.  Respiratory: Lungs clear to ausculation bilaterally.   Cardiovascular: regular rhythm, normal S1 and S2, (+) Systolic murmur.   GI: soft, non-tender, non-distended, bowel sounds present.  Extremities: Bilateral lower extremities wrapped in ACE bandage.   Psychiatric: AAO x 3.     LABS  CBC - WBC/HGB/HTC/PLT: 10.09/8.6/28.6/236 (04-12-23)  BMP: Na/K/Cl/Bicarb/BUN/Cr/Gluc: 134/4.3/96/27/28/1.36/97 (04-12-23)  Anion Gap: 11 (04-12-23)  eGFR: 64 (04-12-23)  Calcium: 8.3 (04-12-23)  Phosphorus: 2.9 (04-12-23)  Magnesium: 2.1 (04-12-23)  LFT - Alb/Tprot/Tbili/Dbili/AlkPhos/ALT/AST: 3.3/--/1.8/--/111/8/34 (04-12-23)  PT/aPTT/INR: 13.5/29.4/1.13 (04-12-23)  Thyroid Stimulating Hormone, Serum: 10.040 (04-12-23)  Total T4/Free T4: --/1.140 (04-12-23)    ASSESSMENT / RECOMMENDATIONS  Mr. Basurto is a 48-year-old male with a past medical history of hypertension, hyperlipidemia, HFpEF, HOCM, moderate-severe MR, atrial fibrillation and SAH (secondary to cerebral artery dissection s/p coil embolization on 7/2021) who presented to [facility] (3/24/23) complaining of worsening shortness of breath and lower extremity edema for a few weeks. He was admitted for management of HFpEF exacerbation, atrial fibrillation with RVR and lower extremity cellulitis. He received IV diuretics and antibiotics with improvement of symptoms. The patient was then transferred to [facility] cardiac telemetry for continued management of heart failure exacerbation and structural heart evaluation for MR. Echocardiogram was consistent with severe, eccentric, anteriorly directed mitral regurgitation and flail mitral valve. CT surgery was consulted with recommendations for mitral valve replacement. He underwent cardiac cath as part of pre-operative clearance showing 3-vessel coronary artery disease. Therefore, he underwent CABG-MVR (4/7/23). Thyroid function tests on 4/12/23 were remarkable for an elevated TSH (10.0) and a normal FT4 (1.14). Endocrinology was consulted for recommendations regarding abnormal thyroid function tests.     A1C: 6.1 %  BUN: 28  Creatinine: 1.36  GFR: 64  Weight: 99.9  BMI: 28.3    # Abnormal thyroid function tests  - TSH 10.0. FT4 1.14 (4/12/23).   - Euthyroid sick syndrome versus subclinical hypothyroidism.   - Please check thyroperoxidase antibodies. Repeat TFT in 2 days (4/14/23). If high antibody titers and elevated TSH level, will consider starting thyroid supplementation.   - Patient can follow up at discharge with [facility] by calling [phone] to make an appointment.      Case discussed with Dr. Mcqueen. Primary team updated.       Wagner Licea    Endocrinology Fellow    Service Pager: [phone]

## 2023-04-12 NOTE — CONSULT NOTE ADULT - CONSULT REQUESTED DATE/TIME
02-Apr-2023 14:47
02-Apr-2023 11:29
03-Apr-2023 11:50
04-Apr-2023 12:49
12-Apr-2023 13:38
05-Apr-2023 11:13

## 2023-04-12 NOTE — PROGRESS NOTE ADULT - ASSESSMENT
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   S/p  MV repair, Biatrial Cryo maze ablation, MARILYN clip   CABGx 1 (SVG-PDA)   EF Normal   4/7  Refractory vasoplegia and hemodynamic instability coming off bypass s/p IV hydroxocobalamin   post op Asystole and pacer dependency--> recovered   A/p  In Sinus, tolerating lopressor   Nifedipine added for better Bp control- reports orthostasis given longstanding uncontrolled HTN might need higher MAPs to perfuse organs-if continues to have orthostatic symptoms or Cr trends up would cut back on antihypertensives   Improving renal function/ anasarcic is diuresing with lasix/aldactone- lytes supplemented   Lungs clear with no supplemental O2 req/ pulling good volume with IS--continue   ADAT/ glycemic control satisfactory--> SSC as needed   Completed Ancef course for LE cellulitis   Continue ASA/Statin- routine ICU ppx measures  OOB and mobilizing as tolerated   Urine color red-purple after cyanokit- thining and improving  dlined, Up for step down     ATTENDING: I have personally and independently provided 45 Min of critical care services.

## 2023-04-12 NOTE — PROGRESS NOTE ADULT - SUBJECTIVE AND OBJECTIVE BOX
O/N Events/Subjective:  POD# 4   MV repair, cryomaze and CABG x 1     Improving hemodynamics and renal fx- Off pacing in Sinus   Hypertensive overnight, Nifedipine added   Tolerating lopressor and diuresis  No acute complaints     VITALS  Vital Signs Last 24 Hrs  T(C): 36.7 (12 Apr 2023 09:28), Max: 37.1 (12 Apr 2023 01:01)  T(F): 98 (12 Apr 2023 09:28), Max: 98.7 (12 Apr 2023 01:01)  HR: 86 (12 Apr 2023 11:00) (60 - 92)  BP: 126/81 (12 Apr 2023 11:00) (113/80 - 210/109)  BP(mean): 99 (12 Apr 2023 11:00) (91 - 148)  RR: 18 (12 Apr 2023 11:00) (13 - 25)  SpO2: 95% (12 Apr 2023 11:00) (89% - 99%)  Parameters below as of 12 Apr 2023 11:00  Patient On (Oxygen Delivery Method): room air    I&O's Summary    11 Apr 2023 07:01  -  12 Apr 2023 07:00  --------------------------------------------------------  IN: 1280 mL / OUT: 3135 mL / NET: -1855 mL    PHYSICAL EXAM  General: A&Ox 3; NAD  Respiratory: CTA B/L  Cardiovascular: RRR/ pacers back up   Gastrointestinal: Soft; NTND   Extremities: Warm, anasarcic LE, ACE wrapped   Neurological:  CNII-XII grossly intact; no obvious focal deficits    IMAGING/EKG/ETC  Reviewed.

## 2023-04-13 ENCOUNTER — TRANSCRIPTION ENCOUNTER (OUTPATIENT)
Age: 48
End: 2023-04-13

## 2023-04-13 VITALS — TEMPERATURE: 99 F

## 2023-04-13 LAB
ANION GAP SERPL CALC-SCNC: 13 MMOL/L — SIGNIFICANT CHANGE UP (ref 5–17)
APTT BLD: 30.1 SEC — SIGNIFICANT CHANGE UP (ref 27.5–35.5)
BUN SERPL-MCNC: 26 MG/DL — HIGH (ref 7–23)
CALCIUM SERPL-MCNC: 8.9 MG/DL — SIGNIFICANT CHANGE UP (ref 8.4–10.5)
CHLORIDE SERPL-SCNC: 97 MMOL/L — SIGNIFICANT CHANGE UP (ref 96–108)
CO2 SERPL-SCNC: 27 MMOL/L — SIGNIFICANT CHANGE UP (ref 22–31)
CREAT SERPL-MCNC: 1.34 MG/DL — HIGH (ref 0.5–1.3)
EGFR: 65 ML/MIN/1.73M2 — SIGNIFICANT CHANGE UP
GLUCOSE BLDC GLUCOMTR-MCNC: 112 MG/DL — HIGH (ref 70–99)
GLUCOSE BLDC GLUCOMTR-MCNC: 114 MG/DL — HIGH (ref 70–99)
GLUCOSE BLDC GLUCOMTR-MCNC: 96 MG/DL — SIGNIFICANT CHANGE UP (ref 70–99)
GLUCOSE SERPL-MCNC: 99 MG/DL — SIGNIFICANT CHANGE UP (ref 70–99)
HCT VFR BLD CALC: 30.3 % — LOW (ref 39–50)
HGB BLD-MCNC: 9.3 G/DL — LOW (ref 13–17)
INR BLD: 1.19 — HIGH (ref 0.88–1.16)
MAGNESIUM SERPL-MCNC: 1.8 MG/DL — SIGNIFICANT CHANGE UP (ref 1.6–2.6)
MCHC RBC-ENTMCNC: 23.5 PG — LOW (ref 27–34)
MCHC RBC-ENTMCNC: 30.7 GM/DL — LOW (ref 32–36)
MCV RBC AUTO: 76.7 FL — LOW (ref 80–100)
NRBC # BLD: 0 /100 WBCS — SIGNIFICANT CHANGE UP (ref 0–0)
PHOSPHATE SERPL-MCNC: 2.3 MG/DL — LOW (ref 2.5–4.5)
PLATELET # BLD AUTO: 287 K/UL — SIGNIFICANT CHANGE UP (ref 150–400)
POTASSIUM SERPL-MCNC: 3.8 MMOL/L — SIGNIFICANT CHANGE UP (ref 3.5–5.3)
POTASSIUM SERPL-SCNC: 3.8 MMOL/L — SIGNIFICANT CHANGE UP (ref 3.5–5.3)
PROTHROM AB SERPL-ACNC: 14.2 SEC — HIGH (ref 10.5–13.4)
RBC # BLD: 3.95 M/UL — LOW (ref 4.2–5.8)
RBC # FLD: 21.2 % — HIGH (ref 10.3–14.5)
SODIUM SERPL-SCNC: 137 MMOL/L — SIGNIFICANT CHANGE UP (ref 135–145)
T4 FREE SERPL-MCNC: 0.93 NG/DL — SIGNIFICANT CHANGE UP (ref 0.93–1.7)
THYROGLOB AB FLD IA-ACNC: <20 IU/ML — SIGNIFICANT CHANGE UP
THYROGLOB AB SERPL-ACNC: <20 IU/ML — SIGNIFICANT CHANGE UP
THYROGLOB SERPL-MCNC: 50.1 NG/ML — SIGNIFICANT CHANGE UP (ref 1.6–59.9)
THYROPEROXIDASE AB SERPL-ACNC: <10 IU/ML — SIGNIFICANT CHANGE UP
WBC # BLD: 9.46 K/UL — SIGNIFICANT CHANGE UP (ref 3.8–10.5)
WBC # FLD AUTO: 9.46 K/UL — SIGNIFICANT CHANGE UP (ref 3.8–10.5)

## 2023-04-13 PROCEDURE — 87635 SARS-COV-2 COVID-19 AMP PRB: CPT

## 2023-04-13 PROCEDURE — 86140 C-REACTIVE PROTEIN: CPT

## 2023-04-13 PROCEDURE — 97161 PT EVAL LOW COMPLEX 20 MIN: CPT

## 2023-04-13 PROCEDURE — 85027 COMPLETE CBC AUTOMATED: CPT

## 2023-04-13 PROCEDURE — 94002 VENT MGMT INPAT INIT DAY: CPT

## 2023-04-13 PROCEDURE — 36415 COLL VENOUS BLD VENIPUNCTURE: CPT

## 2023-04-13 PROCEDURE — 82553 CREATINE MB FRACTION: CPT

## 2023-04-13 PROCEDURE — 82330 ASSAY OF CALCIUM: CPT

## 2023-04-13 PROCEDURE — 82150 ASSAY OF AMYLASE: CPT

## 2023-04-13 PROCEDURE — 86901 BLOOD TYPING SEROLOGIC RH(D): CPT

## 2023-04-13 PROCEDURE — C8925: CPT

## 2023-04-13 PROCEDURE — 97164 PT RE-EVAL EST PLAN CARE: CPT

## 2023-04-13 PROCEDURE — 83690 ASSAY OF LIPASE: CPT

## 2023-04-13 PROCEDURE — 88305 TISSUE EXAM BY PATHOLOGIST: CPT

## 2023-04-13 PROCEDURE — U0003: CPT

## 2023-04-13 PROCEDURE — 84100 ASSAY OF PHOSPHORUS: CPT

## 2023-04-13 PROCEDURE — 83880 ASSAY OF NATRIURETIC PEPTIDE: CPT

## 2023-04-13 PROCEDURE — 83036 HEMOGLOBIN GLYCOSYLATED A1C: CPT

## 2023-04-13 PROCEDURE — 85520 HEPARIN ASSAY: CPT

## 2023-04-13 PROCEDURE — C1751: CPT

## 2023-04-13 PROCEDURE — 82550 ASSAY OF CK (CPK): CPT

## 2023-04-13 PROCEDURE — C2618: CPT

## 2023-04-13 PROCEDURE — 76700 US EXAM ABDOM COMPLETE: CPT

## 2023-04-13 PROCEDURE — 83735 ASSAY OF MAGNESIUM: CPT

## 2023-04-13 PROCEDURE — 86891 AUTOLOGOUS BLOOD OP SALVAGE: CPT

## 2023-04-13 PROCEDURE — 84484 ASSAY OF TROPONIN QUANT: CPT

## 2023-04-13 PROCEDURE — 93880 EXTRACRANIAL BILAT STUDY: CPT

## 2023-04-13 PROCEDURE — 99232 SBSQ HOSP IP/OBS MODERATE 35: CPT | Mod: 24

## 2023-04-13 PROCEDURE — 94150 VITAL CAPACITY TEST: CPT

## 2023-04-13 PROCEDURE — 84480 ASSAY TRIIODOTHYRONINE (T3): CPT

## 2023-04-13 PROCEDURE — 81003 URINALYSIS AUTO W/O SCOPE: CPT

## 2023-04-13 PROCEDURE — U0005: CPT

## 2023-04-13 PROCEDURE — 86900 BLOOD TYPING SEROLOGIC ABO: CPT

## 2023-04-13 PROCEDURE — 85014 HEMATOCRIT: CPT

## 2023-04-13 PROCEDURE — 82533 TOTAL CORTISOL: CPT

## 2023-04-13 PROCEDURE — 84132 ASSAY OF SERUM POTASSIUM: CPT

## 2023-04-13 PROCEDURE — 80053 COMPREHEN METABOLIC PANEL: CPT

## 2023-04-13 PROCEDURE — P9045: CPT

## 2023-04-13 PROCEDURE — 82947 ASSAY GLUCOSE BLOOD QUANT: CPT

## 2023-04-13 PROCEDURE — 97116 GAIT TRAINING THERAPY: CPT

## 2023-04-13 PROCEDURE — 84300 ASSAY OF URINE SODIUM: CPT

## 2023-04-13 PROCEDURE — 85610 PROTHROMBIN TIME: CPT

## 2023-04-13 PROCEDURE — C1894: CPT

## 2023-04-13 PROCEDURE — 82962 GLUCOSE BLOOD TEST: CPT

## 2023-04-13 PROCEDURE — 85652 RBC SED RATE AUTOMATED: CPT

## 2023-04-13 PROCEDURE — 86923 COMPATIBILITY TEST ELECTRIC: CPT

## 2023-04-13 PROCEDURE — 85730 THROMBOPLASTIN TIME PARTIAL: CPT

## 2023-04-13 PROCEDURE — 71046 X-RAY EXAM CHEST 2 VIEWS: CPT

## 2023-04-13 PROCEDURE — 83605 ASSAY OF LACTIC ACID: CPT

## 2023-04-13 PROCEDURE — C1887: CPT

## 2023-04-13 PROCEDURE — P9047: CPT

## 2023-04-13 PROCEDURE — 84295 ASSAY OF SERUM SODIUM: CPT

## 2023-04-13 PROCEDURE — 83935 ASSAY OF URINE OSMOLALITY: CPT

## 2023-04-13 PROCEDURE — 84540 ASSAY OF URINE/UREA-N: CPT

## 2023-04-13 PROCEDURE — 86800 THYROGLOBULIN ANTIBODY: CPT

## 2023-04-13 PROCEDURE — C1769: CPT

## 2023-04-13 PROCEDURE — 84439 ASSAY OF FREE THYROXINE: CPT

## 2023-04-13 PROCEDURE — 93306 TTE W/DOPPLER COMPLETE: CPT

## 2023-04-13 PROCEDURE — 82803 BLOOD GASES ANY COMBINATION: CPT

## 2023-04-13 PROCEDURE — 86376 MICROSOMAL ANTIBODY EACH: CPT

## 2023-04-13 PROCEDURE — 93005 ELECTROCARDIOGRAM TRACING: CPT

## 2023-04-13 PROCEDURE — 80048 BASIC METABOLIC PNL TOTAL CA: CPT

## 2023-04-13 PROCEDURE — 84432 ASSAY OF THYROGLOBULIN: CPT

## 2023-04-13 PROCEDURE — 71045 X-RAY EXAM CHEST 1 VIEW: CPT

## 2023-04-13 PROCEDURE — C1889: CPT

## 2023-04-13 PROCEDURE — 82570 ASSAY OF URINE CREATININE: CPT

## 2023-04-13 PROCEDURE — 84443 ASSAY THYROID STIM HORMONE: CPT

## 2023-04-13 PROCEDURE — 86850 RBC ANTIBODY SCREEN: CPT

## 2023-04-13 PROCEDURE — 80061 LIPID PANEL: CPT

## 2023-04-13 PROCEDURE — 93925 LOWER EXTREMITY STUDY: CPT

## 2023-04-13 PROCEDURE — 87040 BLOOD CULTURE FOR BACTERIA: CPT

## 2023-04-13 PROCEDURE — 85025 COMPLETE CBC W/AUTO DIFF WBC: CPT

## 2023-04-13 RX ORDER — ATORVASTATIN CALCIUM 80 MG/1
1 TABLET, FILM COATED ORAL
Refills: 0 | DISCHARGE

## 2023-04-13 RX ORDER — OXYCODONE HYDROCHLORIDE 5 MG/1
1 TABLET ORAL
Qty: 20 | Refills: 0
Start: 2023-04-13 | End: 2023-04-19

## 2023-04-13 RX ORDER — OXYBUTYNIN CHLORIDE 5 MG
1 TABLET ORAL
Qty: 90 | Refills: 0
Start: 2023-04-13 | End: 2023-05-12

## 2023-04-13 RX ORDER — DILTIAZEM HCL 120 MG
1 CAPSULE, EXT RELEASE 24 HR ORAL
Refills: 0 | DISCHARGE

## 2023-04-13 RX ORDER — SENNA PLUS 8.6 MG/1
2 TABLET ORAL
Qty: 14 | Refills: 0
Start: 2023-04-13 | End: 2023-04-19

## 2023-04-13 RX ORDER — METOPROLOL TARTRATE 50 MG
1 TABLET ORAL
Qty: 60 | Refills: 0
Start: 2023-04-13 | End: 2023-05-12

## 2023-04-13 RX ORDER — ASPIRIN/CALCIUM CARB/MAGNESIUM 324 MG
1 TABLET ORAL
Qty: 30 | Refills: 0
Start: 2023-04-13 | End: 2023-05-12

## 2023-04-13 RX ORDER — ACETAMINOPHEN 500 MG
2 TABLET ORAL
Qty: 20 | Refills: 0
Start: 2023-04-13 | End: 2023-04-19

## 2023-04-13 RX ORDER — ATORVASTATIN CALCIUM 80 MG/1
1 TABLET, FILM COATED ORAL
Qty: 30 | Refills: 0
Start: 2023-04-13 | End: 2023-05-12

## 2023-04-13 RX ORDER — NIFEDIPINE 30 MG
1 TABLET, EXTENDED RELEASE 24 HR ORAL
Qty: 30 | Refills: 0
Start: 2023-04-13 | End: 2023-05-12

## 2023-04-13 RX ORDER — FUROSEMIDE 40 MG
1 TABLET ORAL
Qty: 14 | Refills: 0
Start: 2023-04-13 | End: 2023-04-26

## 2023-04-13 RX ORDER — SPIRONOLACTONE 25 MG/1
1 TABLET, FILM COATED ORAL
Qty: 30 | Refills: 0
Start: 2023-04-13 | End: 2023-05-12

## 2023-04-13 RX ORDER — PANTOPRAZOLE SODIUM 20 MG/1
1 TABLET, DELAYED RELEASE ORAL
Qty: 30 | Refills: 0
Start: 2023-04-13 | End: 2023-05-12

## 2023-04-13 RX ORDER — POLYETHYLENE GLYCOL 3350 17 G/17G
17 POWDER, FOR SOLUTION ORAL
Qty: 1 | Refills: 0
Start: 2023-04-13 | End: 2023-04-19

## 2023-04-13 RX ADMIN — PANTOPRAZOLE SODIUM 40 MILLIGRAM(S): 20 TABLET, DELAYED RELEASE ORAL at 07:28

## 2023-04-13 RX ADMIN — Medication 81 MILLIGRAM(S): at 14:07

## 2023-04-13 RX ADMIN — Medication 12.5 MILLIGRAM(S): at 07:28

## 2023-04-13 RX ADMIN — Medication 1 APPLICATION(S): at 19:02

## 2023-04-13 RX ADMIN — POLYETHYLENE GLYCOL 3350 17 GRAM(S): 17 POWDER, FOR SOLUTION ORAL at 14:06

## 2023-04-13 RX ADMIN — Medication 12.5 MILLIGRAM(S): at 14:07

## 2023-04-13 RX ADMIN — Medication 650 MILLIGRAM(S): at 01:00

## 2023-04-13 RX ADMIN — Medication 5 MILLIGRAM(S): at 19:02

## 2023-04-13 RX ADMIN — Medication 20 MILLIGRAM(S): at 19:01

## 2023-04-13 RX ADMIN — HEPARIN SODIUM 5000 UNIT(S): 5000 INJECTION INTRAVENOUS; SUBCUTANEOUS at 14:07

## 2023-04-13 RX ADMIN — Medication 650 MILLIGRAM(S): at 02:00

## 2023-04-13 RX ADMIN — Medication 20 MILLIGRAM(S): at 07:47

## 2023-04-13 RX ADMIN — Medication 5 MILLIGRAM(S): at 01:00

## 2023-04-13 RX ADMIN — Medication 5 MILLIGRAM(S): at 10:07

## 2023-04-13 RX ADMIN — SODIUM CHLORIDE 3 MILLILITER(S): 9 INJECTION INTRAMUSCULAR; INTRAVENOUS; SUBCUTANEOUS at 07:14

## 2023-04-13 RX ADMIN — Medication 1 APPLICATION(S): at 07:13

## 2023-04-13 RX ADMIN — SPIRONOLACTONE 25 MILLIGRAM(S): 25 TABLET, FILM COATED ORAL at 07:47

## 2023-04-13 RX ADMIN — SODIUM CHLORIDE 3 MILLILITER(S): 9 INJECTION INTRAMUSCULAR; INTRAVENOUS; SUBCUTANEOUS at 13:51

## 2023-04-13 RX ADMIN — HEPARIN SODIUM 5000 UNIT(S): 5000 INJECTION INTRAVENOUS; SUBCUTANEOUS at 07:29

## 2023-04-13 RX ADMIN — CHLORHEXIDINE GLUCONATE 1 APPLICATION(S): 213 SOLUTION TOPICAL at 14:06

## 2023-04-13 NOTE — DISCHARGE NOTE PROVIDER - NSDCCPCAREPLAN_GEN_ALL_CORE_FT
PRINCIPAL DISCHARGE DIAGNOSIS  Diagnosis: Mitral regurgitation  Assessment and Plan of Treatment:

## 2023-04-13 NOTE — PROGRESS NOTE ADULT - ASSESSMENT
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 [place] 3/24/23 with HFpEF exacerbation, Afib w/ RVR and LE cellulitis c/b TATIANA, transaminitis and rectal sheath hematoma. Patient now transferred to [facility] 5URIS for cardiac telemetry for continued management of HFpEF exacerbation and structural heart evaluation for MR. TTE with severe, eccentric, anteriorly directed MR with flail,  VERONIQUE negative for endocarditis. Patient underwent cardiac cath with Dr Sousa 4/6/2023, revealed 3vCAD. On 4/7 patient underwent a MVr, CABGx1, MARILYN, and biatrial cryomaze. Intraoperatively he was profoundly vasoplegic requiring cyanocit. Arrived to the ICU on Multiple pressors and AV pacing for asystole. POD#1 Started on dobutamine for low urine output and rising lactate. POD#2 TATIANA with Creatinine 1.65.    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 Patient now transferred to [facility] 5URIS for cardiac telemetry for continued management of HFpEF exacerbation and structural heart evaluation for MR. TTE with severe, eccentric, anteriorly directed MR with flail,  VERONIQUE negative for endocarditis. Patient underwent cardiac cath with Dr Sousa 4/6/2023, revealed 3vCAD. On 4/7 patient underwent a MVr, CABGx1, MARILYN, and biatrial cryomaze. Intraoperatively he was profoundly vasoplegic requiring cyanocit. Arrived to the ICU on Multiple pressors and AV pacing for asystole. POD#1 Started on dobutamine for low urine output and rising lactate. POD#2 TATIANA with Creatinine 1.65. POD#3 remained  AV pacing for sinus sita but all gtt were weaned to off. On POD#4 Rhythm improved to 90s NSR. Delined. POD#5 Endocrine was consulted for Elevated TSH and T4.  Thought to be Euthyroid sick syndrome versus subclinical hypothyroidism, they recommend repeats TFTs tomorrow.     Neuro: pain management, history of SAH s/s cerebral artery dissection s/p coil embolization   - Oxy PRN     CVS: s/p MVr, CABGx1 MARILYN cryomaze, HCOM physiology EF 50%  afib s/p cryomaze originally asystolic but now has recovered his rhythm. Pre-op with MACRINA with no MACRINA seen in intra-op echo.   - Heart failure team following.   - Continue ASA, Statin for CAD   - Lopressor 12.5mg BID.   - Procardia 30 every 24 hours for HTN.   - Aldactone 25mg Daily     Respiratory: Saturating well on RA   - Wean off O2 sat > 92%  - IS and ambulation  - Chest PT.     GI: Consistent carb diet.   - GI PPX   - Bowel regimen with Senna and Miralax     : BUN/ Creatinine. 26/1.34 Post-op with TATIANA to 1.6 now improved. - Farias   - Lasix 20 every 12 hours. Continue to watch for signs of LVOT obstructions.   - Oxybutinin 5 every 8 hours     Endo: FS well controlled. TSH elevated. Euthyroid sick syndrome versus subclinical hypothyroidism  - ISS   - repeat TFTs and  thyroperoxidase antibodies tomorrow morning.     ID: Bilateral lower extremity cellulitis completed ABX therapy ended 4/9.   - completed periop ABX   - continue to monitor fever curve     Heme: DVT PPX   - [facility]     Dispo plan: home when medically ready.     Kailyn Meeks PA-C

## 2023-04-13 NOTE — DISCHARGE NOTE NURSING/CASE MANAGEMENT/SOCIAL WORK - PATIENT PORTAL LINK FT
You can access the FollowMyHealth Patient Portal offered by [facility] by registering at the following website: [email or web address]. By joining [facility]’s FollowMyHealth portal, you will also be able to view your health information using other applications (apps) compatible with our system.

## 2023-04-13 NOTE — PROGRESS NOTE ADULT - REASON FOR ADMISSION
HFpEF exacerbation, new onset Afib, LE cellulitis, mod-severe MR

## 2023-04-13 NOTE — CHART NOTE - NSCHARTNOTEFT_GEN_A_CORE
Admitting Diagnosis:   Patient is a 48y old  Male who presents with a chief complaint of HFpEF exacerbation, new onset Afib, LE cellulitis, mod-severe MR (13 Apr 2023 10:25)    PAST MEDICAL & SURGICAL HISTORY:  HTN (hypertension)  SAH (subarachnoid hemorrhage)  Atrial fibrillation  HOCM (hypertrophic obstructive cardiomyopathy)  Mitral regurgitation  S/P coil embolization of cerebral aneurysm    Current Nutrition Order:  Diet, DASH/TLC:   Sodium & Cholesterol Restricted (04-08-23 @ 10:13)    PO Intake: Good (%) [ x  ]  Fair (50-75%) [   ] Poor (<25%) [   ]    GI Issues: Denies N/V/D; expressed constipation has resolved.     Pain: Denies    Skin Integrity: 2+ edema L leg/R leg. No edema Adi 19    Labs:   04-13    137  |  97  |  26<H>  ----------------------------<  99  3.8   |  27  |  1.34<H>    Ca    8.9      13 Apr 2023 05:30  Phos  2.3     04-13  Mg     1.8     04-13    TPro  5.9<L>  /  Alb  3.3  /  TBili  1.8<H>  /  DBili  x   /  AST  34  /  ALT  8<L>  /  AlkPhos  111  04-12    CAPILLARY BLOOD GLUCOSE      POCT Blood Glucose.: 112 mg/dL (13 Apr 2023 11:22)  POCT Blood Glucose.: 96 mg/dL (13 Apr 2023 06:54)  POCT Blood Glucose.: 112 mg/dL (12 Apr 2023 21:41)  POCT Blood Glucose.: 120 mg/dL (12 Apr 2023 16:38)      Medications:  MEDICATIONS  (STANDING):  aspirin  chewable 81 milliGRAM(s) Oral daily  atorvastatin 80 milliGRAM(s) Oral at bedtime  chlorhexidine 2% Cloths 1 Application(s) Topical daily  dextrose 5%. 1000 milliLiter(s) (50 mL/Hr) IV Continuous <Continuous>  dextrose 5%. 1000 milliLiter(s) (100 mL/Hr) IV Continuous <Continuous>  dextrose 50% Injectable 50 milliLiter(s) IV Push every 15 minutes  dextrose 50% Injectable 25 milliLiter(s) IV Push every 15 minutes  furosemide    Tablet 20 milliGRAM(s) Oral every 12 hours  glucagon  Injectable 1 milliGRAM(s) IntraMuscular once  heparin   Injectable 5000 Unit(s) SubCutaneous every 8 hours  insulin lispro (ADMELOG) corrective regimen sliding scale   SubCutaneous Before meals and at bedtime  metoprolol tartrate 12.5 milliGRAM(s) Oral every 8 hours  NIFEdipine XL 30 milliGRAM(s) Oral every 24 hours  oxybutynin 5 milliGRAM(s) Oral every 8 hours  pantoprazole    Tablet 40 milliGRAM(s) Oral before breakfast  polyethylene glycol 3350 17 Gram(s) Oral daily  senna 2 Tablet(s) Oral at bedtime  silver sulfADIAZINE 1% Cream 1 Application(s) Topical two times a day  sodium chloride 0.9% lock flush 3 milliLiter(s) IV Push every 8 hours  sodium chloride 0.9%. 1000 milliLiter(s) (10 mL/Hr) IV Continuous <Continuous>  spironolactone 25 milliGRAM(s) Oral daily    MEDICATIONS  (PRN):  acetaminophen     Tablet .. 650 milliGRAM(s) Oral every 6 hours PRN Mild Pain (1 - 3)  benzocaine/menthol Lozenge 1 Lozenge Oral every 4 hours PRN Sore Throat  dextrose Oral Gel 15 Gram(s) Oral once PRN Blood Glucose LESS THAN 70 milliGRAM(s)/deciliter  oxyCODONE    IR 5 milliGRAM(s) Oral every 4 hours PRN Moderate Pain (4 - 6)  oxyCODONE    IR 10 milliGRAM(s) Oral every 6 hours PRN Severe Pain (7 - 10)      Anthropometrics:  Daily     Daily     IBW:     % IBW    Weight Change:     Nutrition Focused Physical Exam: Completed [   ]  Not Pertinent [   ]  Muscle Wasting- Temporal [   ]  Clavicle/Pectoral [   ]  Shoulder/Deltoid [   ]  Scapula [   ]  Interosseous [   ]  Quadriceps [   ]  Gastrocnemius [   ]  Fat Wasting- Orbital [   ]  Buccal [   ]  Triceps [   ]  Rib [   ]  Suspect [PCM] 2/2 to physical assessment, [poor intake], and [wt loss]; please see malnutrition chart note.    Estimated energy needs:   Calories:   Protein:   Fluid:    Subjective:     Previous Nutrition Diagnosis:    Active [   ]  Resolved [   ]    If resolved, new PES:     Goal:    Recommendations:    Education:     Risk Level: High [   ] Moderate [   ] Low [   ] Admitting Diagnosis:   Patient is a 48y old  Male who presents with a chief complaint of HFpEF exacerbation, new onset Afib, LE cellulitis, mod-severe MR (2023 10:25)    PAST MEDICAL & SURGICAL HISTORY:  HTN (hypertension)  SAH (subarachnoid hemorrhage)  Atrial fibrillation  HOCM (hypertrophic obstructive cardiomyopathy)  Mitral regurgitation  S/P coil embolization of cerebral aneurysm    Current Nutrition Order:  Diet, DASH/TLC:   Sodium & Cholesterol Restricted (23 @ 10:13)    PO Intake: Good (%) [ x  ]  Fair (50-75%) [   ] Poor (<25%) [   ]    GI Issues: Denies N/V/D; expressed constipation has resolved. On senna and Miralax, +BM 4/10.    Pain: Denies    Skin Integrity: 2+ edema L leg/R leg. No pressure injuries. Noted with surgical incisions and wounds (cellulitis). Adi 19.    Labs:       137  |  97  |  26<H>  ----------------------------<  99  3.8   |  27  |  1.34<H>    Ca    8.9      2023 05:30  Phos  2.3     -  Mg     1.8     -    TPro  5.9<L>  /  Alb  3.3  /  TBili  1.8<H>  /  DBili  x   /  AST  34  /  ALT  8<L>  /  AlkPhos  111  -12    CAPILLARY BLOOD GLUCOSE    POCT Blood Glucose.: 112 mg/dL (2023 11:22)  POCT Blood Glucose.: 96 mg/dL (2023 06:54)  POCT Blood Glucose.: 112 mg/dL (2023 21:41)  POCT Blood Glucose.: 120 mg/dL (2023 16:38)    Medications:  MEDICATIONS  (STANDING):  aspirin  chewable 81 milliGRAM(s) Oral daily  atorvastatin 80 milliGRAM(s) Oral at bedtime  chlorhexidine 2% Cloths 1 Application(s) Topical daily  dextrose 5%. 1000 milliLiter(s) (50 mL/Hr) IV Continuous <Continuous>  dextrose 5%. 1000 milliLiter(s) (100 mL/Hr) IV Continuous <Continuous>  dextrose 50% Injectable 50 milliLiter(s) IV Push every 15 minutes  dextrose 50% Injectable 25 milliLiter(s) IV Push every 15 minutes  furosemide    Tablet 20 milliGRAM(s) Oral every 12 hours  glucagon  Injectable 1 milliGRAM(s) IntraMuscular once  heparin   Injectable 5000 Unit(s) SubCutaneous every 8 hours  insulin lispro (ADMELOG) corrective regimen sliding scale   SubCutaneous Before meals and at bedtime  metoprolol tartrate 12.5 milliGRAM(s) Oral every 8 hours  NIFEdipine XL 30 milliGRAM(s) Oral every 24 hours  oxybutynin 5 milliGRAM(s) Oral every 8 hours  pantoprazole    Tablet 40 milliGRAM(s) Oral before breakfast  polyethylene glycol 3350 17 Gram(s) Oral daily  senna 2 Tablet(s) Oral at bedtime  silver sulfADIAZINE 1% Cream 1 Application(s) Topical two times a day  sodium chloride 0.9% lock flush 3 milliLiter(s) IV Push every 8 hours  sodium chloride 0.9%. 1000 milliLiter(s) (10 mL/Hr) IV Continuous <Continuous>  spironolactone 25 milliGRAM(s) Oral daily    MEDICATIONS  (PRN):  acetaminophen     Tablet .. 650 milliGRAM(s) Oral every 6 hours PRN Mild Pain (1 - 3)  benzocaine/menthol Lozenge 1 Lozenge Oral every 4 hours PRN Sore Throat  dextrose Oral Gel 15 Gram(s) Oral once PRN Blood Glucose LESS THAN 70 milliGRAM(s)/deciliter  oxyCODONE    IR 5 milliGRAM(s) Oral every 4 hours PRN Moderate Pain (4 - 6)  oxyCODONE    IR 10 milliGRAM(s) Oral every 6 hours PRN Severe Pain (7 - 10)      Anthropometrics:  Ht: 6'0" Wt: 99kg/220lb ()    IBW: 80.9kg/178lb    % IBW: 124%    Weight Change:   ; bed: 108.5kg/239.2lb   4/3; standin.2kg/242.9lb   ; standin.5kg/241.4lb  ; standin.8kg/231lb   ; standin.9kg/220.2lb    Wt changes likely d/t CHF exacerbation and subsiding edema. Pt on Lasix and Spironolactone. Recommend to continue to monitor wts daily.    Nutrition Focused Physical Exam: Completed [   ]  Not Pertinent [ x ]  >> RDN observed no overt s/sx of muscle wasting or fat loss    Estimated energy needs: IBW (80.9kg) used to calculate energy needs due to pt's current body weight exceeding 120% of IBW. Adjusted for age, CHF post-op healing, renal. 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 3/24/23 with HFpEF exacerbation, Afib/ RVR and LE cellulitis c/b TATIANA, transaminitis and rectal sheath hematoma. Pt now transferred to [facility] 5URIS for cardiac telemetry for continued management of HFpEF exacerbation and structural heart evaluation for MR. TTE with severe, eccentric, anteriorly directed MR with flail, VERONIQUE negative for endocarditis. Pt underwent cardiac cath with Dr Sousa 2023, revealed 3vCAD. : CABG x1 (SVG-PDA), MVr - repair with neocords and 34mm annuloplasty band, Biatrial Cryo maze ablation, Left atrial appendage ligation with 45mm AtriClip. EF: 60-65%.    Pt seen by RDN at bedside for follow up nutrition assessment. Labs reviewed ; BUN high (26), Cr high (1.34), P low (2.3). Pt continues a DASH/TLC diet and is tolerating it well. Pt endorses good PO intake, completing 100% of meals. Attempted to provide pt with continued education on prescribed diet; pt mostly amenable. RDN will continue to reassess, intervene, and monitor per protocol.     Previous Nutrition Diagnosis: Food & Nutrition Related Knowledge Deficit R/T ?prior diet edu AEB: Verbal Reports, need for edu today.    Active [ x ]  Resolved [   ]    Goal: Pt to name 2 teach back points    Recommendations:  1. Monitor PO intake/appetite, GI distress, diet tolerance, labs, weights  2. Honor pt food preferences as able  3. RD to remain available for additional nutrition interventions as needed  * Moderate Nutrition Risk    Education: Provided continued education on Heart Health Nutrition Therapy and left printed handout for reference.    Risk Level: High [   ] Moderate [ x ] Low [   ]

## 2023-04-13 NOTE — PROGRESS NOTE ADULT - SUBJECTIVE AND OBJECTIVE BOX
Patient discussed on morning rounds with Dr. Gonsalves     Operation / Date: CABGx1 (SVG- PDA) MVr -Biatrial cryomaze MARILYN ligation. EF 60%    SUBJECTIVE ASSESSMENT:  48y Male reports feeling well this morning. States that he does not have pain in the lower extremities. Denies chest pain, SOB or OAKES.         Vital Signs Last 24 Hrs  T(C): 36.5 (13 Apr 2023 09:08), Max: 37.2 (13 Apr 2023 01:02)  T(F): 97.7 (13 Apr 2023 09:08), Max: 99 (13 Apr 2023 01:02)  HR: 78 (13 Apr 2023 09:17) (77 - 90)  BP: 107/66 (13 Apr 2023 09:17) (107/66 - 154/95)  BP(mean): 81 (13 Apr 2023 09:17) (81 - 115)  RR: 17 (13 Apr 2023 09:17) (16 - 18)  SpO2: 98% (13 Apr 2023 09:17) (92% - 98%)    Parameters below as of 13 Apr 2023 09:17  Patient On (Oxygen Delivery Method): room air      I&O's Detail    12 Apr 2023 07:01  -  13 Apr 2023 07:00  --------------------------------------------------------  IN:    Oral Fluid: 520 mL  Total IN: 520 mL    OUT:    Voided (mL): 2675 mL  Total OUT: 2675 mL    Total NET: -2155 mL          CHEST TUBE:  No.   JADON DRAIN:  No.  EPICARDIAL WIRES: Yes  TIE DOWNS: Yes   CANTU: No.    PHYSICAL EXAM:    GEN: NAD, looks comfortable  Psych: Mood appropriate  Neuro: A&Ox3.  No focal deficits.  Moving all extremities.   HEENT: No obvious abnormalities  CV: S1S2, regular, no murmurs appreciated.  No carotid bruits.  No JVD  Lungs: decreased lung sounds at bilateral bases.   ABD: Soft, non-tender, non-distended.  +Bowel sounds  EXT: Warm and well perfused.  Bilateral legs wrapped in ace wraps.   Musculoskeletal: Moving all extremities with normal ROM, no joint swelling  PV: Pedal pulses palpable      LABS:                        9.3    9.46  )-----------( 287      ( 13 Apr 2023 05:30 )             30.3       PT/INR - ( 13 Apr 2023 05:30 )   PT: 14.2 sec;   INR: 1.19          PTT - ( 13 Apr 2023 05:30 )  PTT:30.1 sec    04-13    137  |  97  |  26<H>  ----------------------------<  99  3.8   |  27  |  1.34<H>    Ca    8.9      13 Apr 2023 05:30  Phos  2.3     04-13  Mg     1.8     04-13    TPro  5.9<L>  /  Alb  3.3  /  TBili  1.8<H>  /  DBili  x   /  AST  34  /  ALT  8<L>  /  AlkPhos  111  04-12          MEDICATIONS  (STANDING):  aspirin  chewable 81 milliGRAM(s) Oral daily  atorvastatin 80 milliGRAM(s) Oral at bedtime  chlorhexidine 2% Cloths 1 Application(s) Topical daily  dextrose 5%. 1000 milliLiter(s) (50 mL/Hr) IV Continuous <Continuous>  dextrose 5%. 1000 milliLiter(s) (100 mL/Hr) IV Continuous <Continuous>  dextrose 50% Injectable 50 milliLiter(s) IV Push every 15 minutes  dextrose 50% Injectable 25 milliLiter(s) IV Push every 15 minutes  furosemide    Tablet 20 milliGRAM(s) Oral every 12 hours  glucagon  Injectable 1 milliGRAM(s) IntraMuscular once  heparin   Injectable 5000 Unit(s) SubCutaneous every 8 hours  insulin lispro (ADMELOG) corrective regimen sliding scale   SubCutaneous Before meals and at bedtime  metoprolol tartrate 12.5 milliGRAM(s) Oral every 8 hours  NIFEdipine XL 30 milliGRAM(s) Oral every 24 hours  oxybutynin 5 milliGRAM(s) Oral every 8 hours  pantoprazole    Tablet 40 milliGRAM(s) Oral before breakfast  polyethylene glycol 3350 17 Gram(s) Oral daily  senna 2 Tablet(s) Oral at bedtime  silver sulfADIAZINE 1% Cream 1 Application(s) Topical two times a day  sodium chloride 0.9% lock flush 3 milliLiter(s) IV Push every 8 hours  sodium chloride 0.9%. 1000 milliLiter(s) (10 mL/Hr) IV Continuous <Continuous>  spironolactone 25 milliGRAM(s) Oral daily    MEDICATIONS  (PRN):  acetaminophen     Tablet .. 650 milliGRAM(s) Oral every 6 hours PRN Mild Pain (1 - 3)  benzocaine/menthol Lozenge 1 Lozenge Oral every 4 hours PRN Sore Throat  dextrose Oral Gel 15 Gram(s) Oral once PRN Blood Glucose LESS THAN 70 milliGRAM(s)/deciliter  oxyCODONE    IR 5 milliGRAM(s) Oral every 4 hours PRN Moderate Pain (4 - 6)  oxyCODONE    IR 10 milliGRAM(s) Oral every 6 hours PRN Severe Pain (7 - 10)        RADIOLOGY & ADDITIONAL TESTS:    < from: Xray Chest 1 View- PORTABLE-Routine (Xray Chest 1 View- PORTABLE-Routine in AM.) (04.12.23 @ 05:27) >  INTERPRETATION:  Clinical history/reason for exam: Follow-up.    Frontal chest.    COMPARISON: April 11, 2023 time 1:41 PM.    Findings/  impression: Stable heart size, status post median sternotomy, mitral   valve replacement, left atrial appendage clip. Left basilar   opacity/pleural effusion, unchanged. Right basilar opacity. Stable bony   structures.    --- End of Report ---    < end of copied text >   Patient discussed on morning rounds with Dr. Gonsalves     Operation / Date: CABGx1 (SVG- PDA) MVr -Biatrial cryomaze MARILYN ligation. EF 60%    SUBJECTIVE ASSESSMENT:  48y Male reports feeling well this morning. States that he does not have pain in the lower extremities. Denies chest pain, SOB or OAKES.         Vital Signs Last 24 Hrs  T(C): 36.5 (13 Apr 2023 09:08), Max: 37.2 (13 Apr 2023 01:02)  T(F): 97.7 (13 Apr 2023 09:08), Max: 99 (13 Apr 2023 01:02)  HR: 78 (13 Apr 2023 09:17) (77 - 90)  BP: 107/66 (13 Apr 2023 09:17) (107/66 - 154/95)  BP(mean): 81 (13 Apr 2023 09:17) (81 - 115)  RR: 17 (13 Apr 2023 09:17) (16 - 18)  SpO2: 98% (13 Apr 2023 09:17) (92% - 98%)    Parameters below as of 13 Apr 2023 09:17  Patient On (Oxygen Delivery Method): room air      I&O's Detail    12 Apr 2023 07:01  -  13 Apr 2023 07:00  --------------------------------------------------------  IN:    Oral Fluid: 520 mL  Total IN: 520 mL    OUT:    Voided (mL): 2675 mL  Total OUT: 2675 mL    Total NET: -2155 mL          CHEST TUBE:  No.   JADON DRAIN:  No.  EPICARDIAL WIRES: Yes  TIE DOWNS: Yes   CANTU: No.    PHYSICAL EXAM:    GEN: NAD, looks comfortable  Psych: Mood appropriate  Neuro: A&Ox3.  No focal deficits.  Moving all extremities.   HEENT: No obvious abnormalities  CV: S1S2, regular, no murmurs appreciated.  No carotid bruits.  No JVD  Lungs: decreased lung sounds at bilateral bases.   ABD: Soft, non-tender, non-distended.  +Bowel sounds  EXT: Warm and well perfused.  Bilateral legs wrapped in ace wraps.   Musculoskeletal: Moving all extremities with normal ROM, no joint swelling  PV: Pedal pulses palpable      LABS:                        9.3    9.46  )-----------( 287      ( 13 Apr 2023 05:30 )             30.3       PT/INR - ( 13 Apr 2023 05:30 )   PT: 14.2 sec;   INR: 1.19          PTT - ( 13 Apr 2023 05:30 )  PTT:30.1 sec    04-13    137  |  97  |  26<H>  ----------------------------<  99  3.8   |  27  |  1.34<H>    Ca    8.9      13 Apr 2023 05:30  Phos  2.3     04-13  Mg     1.8     04-13    TPro  5.9<L>  /  Alb  3.3  /  TBili  1.8<H>  /  DBili  x   /  AST  34  /  ALT  8<L>  /  AlkPhos  111  04-12          MEDICATIONS  (STANDING):  aspirin  chewable 81 milliGRAM(s) Oral daily  atorvastatin 80 milliGRAM(s) Oral at bedtime  chlorhexidine 2% Cloths 1 Application(s) Topical daily  dextrose 5%. 1000 milliLiter(s) (50 mL/Hr) IV Continuous <Continuous>  dextrose 5%. 1000 milliLiter(s) (100 mL/Hr) IV Continuous <Continuous>  dextrose 50% Injectable 50 milliLiter(s) IV Push every 15 minutes  dextrose 50% Injectable 25 milliLiter(s) IV Push every 15 minutes  furosemide    Tablet 20 milliGRAM(s) Oral every 12 hours  glucagon  Injectable 1 milliGRAM(s) IntraMuscular once  heparin   Injectable 5000 Unit(s) SubCutaneous every 8 hours  insulin lispro (ADMELOG) corrective regimen sliding scale   SubCutaneous Before meals and at bedtime  metoprolol tartrate 12.5 milliGRAM(s) Oral every 8 hours  NIFEdipine XL 30 milliGRAM(s) Oral every 24 hours  oxybutynin 5 milliGRAM(s) Oral every 8 hours  pantoprazole    Tablet 40 milliGRAM(s) Oral before breakfast  polyethylene glycol 3350 17 Gram(s) Oral daily  senna 2 Tablet(s) Oral at bedtime  silver sulfADIAZINE 1% Cream 1 Application(s) Topical two times a day  sodium chloride 0.9% lock flush 3 milliLiter(s) IV Push every 8 hours  sodium chloride 0.9%. 1000 milliLiter(s) (10 mL/Hr) IV Continuous <Continuous>  spironolactone 25 milliGRAM(s) Oral daily    MEDICATIONS  (PRN):  acetaminophen     Tablet .. 650 milliGRAM(s) Oral every 6 hours PRN Mild Pain (1 - 3)  benzocaine/menthol Lozenge 1 Lozenge Oral every 4 hours PRN Sore Throat  dextrose Oral Gel 15 Gram(s) Oral once PRN Blood Glucose LESS THAN 70 milliGRAM(s)/deciliter  oxyCODONE    IR 5 milliGRAM(s) Oral every 4 hours PRN Moderate Pain (4 - 6)  oxyCODONE    IR 10 milliGRAM(s) Oral every 6 hours PRN Severe Pain (7 - 10)        RADIOLOGY & ADDITIONAL TESTS:    < from: Xray Chest 1 View- PORTABLE-Routine (Xray Chest 1 View- PORTABLE-Routine in AM.) (04.12.23 @ 05:27) >  INTERPRETATION:  Clinical history/reason for exam: Follow-u  Frontal chest.    COMPARISON: April 11, 2023 time 1:41 PM.    Findings/  impression: Stable heart size, status post median sternotomy, mitral   valve replacement, left atrial appendage clip. Left basilar   opacity/pleural effusion, unchanged. Right basilar opacity. Stable bony   structures.    --- End of Report ---    < end of copied text >

## 2023-04-13 NOTE — PROGRESS NOTE ADULT - PROVIDER SPECIALTY LIST ADULT
Critical Care
Cardiology
Cardiology
Critical Care
Heart Failure
Heart Failure
Structural Heart
CT Surgery
Heart Failure
CTU
Critical Care
Structural Heart
Surgery
Infectious Disease
Cardiology
Intervent Cardiology
Cardiology
Cardiology

## 2023-04-13 NOTE — DISCHARGE NOTE PROVIDER - NSDCFUADDINST_GEN_ALL_CORE_FT
-Walk daily as tolerated and use your incentive spirometer 10 times every hour while you are awake.     -Please weigh yourself daily. If you notice over a 3 pound weight gain in 3 days, this is a sign you are likely retaining too much fluid. It is imperative you call our right away with unexplained rapid weight gain.      -Please continue to wear the compression stockings given to you in the hospital at home. This is a way to prevent fluid from building up in your legs.     -No driving or strenuous activity/exercise until cleared by your surgeon.    -Gently clean your incisions with unscented/antibacterial soap and water, pat dry.  You may leave them open to air.    -Call your doctor if you have shortness of breath, chest pain not relieved by pain medication, dizziness, fever >100.5, or increased redness or drainage from incisions.   For Lower extremity dressing: Apply silvadene to affected area, cover it with telfa and then wrap the leg with Kerlex followed by Ace wrap.     -Walk daily as tolerated and use your incentive spirometer 10 times every hour while you are awake.     -Please weigh yourself daily. If you notice over a 3 pound weight gain in 3 days, this is a sign you are likely retaining too much fluid. It is imperative you call our right away with unexplained rapid weight gain.      -Please continue to wear the compression stockings given to you in the hospital at home. This is a way to prevent fluid from building up in your legs.     -No driving or strenuous activity/exercise until cleared by your surgeon.    -Gently clean your incisions with unscented/antibacterial soap and water, pat dry.  You may leave them open to air.    -Call your doctor if you have shortness of breath, chest pain not relieved by pain medication, dizziness, fever >100.5, or increased redness or drainage from incisions.

## 2023-04-13 NOTE — DISCHARGE NOTE PROVIDER - NSDCQMSTROKE_NEU_ALL_CORE
Patient: Ramon Stark  MRN: [id number]  : 1960    Blood pressure 125/75, pulse (!) 57, temperature 98.8 °F (37.1 °C), temperature source Temporal, resp. rate 18, height 5' 9\" (1.753 m), weight 103.7 kg (228 lb 9.9 oz), SpO2 98 %.    Chief Complaint   Patient presents with   • Follow-up     1 month RT BC AVF      Referring provider:  Daljit Valdez MD    ALLERGIES:  No Known Allergies    Current Outpatient Medications   Medication Sig Dispense Refill   • Eliquis 2.5 MG Tab Take 1 tablet by mouth every 12 hours. 60 tablet 2   • AMIODarone (PACERONE) 200 MG tablet TAKE ONE TABLET BY MOUTH ONE TIME DAILY. MUST MAKE APPOINTMENT FOR FURTHER REFILLS. 30 tablet 2   • carvedilol (COREG) 12.5 MG tablet TAKE ONE TABLET BY MOUTH TWICE DAILY WITH A MEAL 60 tablet 0   • heparin, porcine, 10 units/mL in NS premix (BEATRIZ) Heparin Sodium (Porcine) 1,000 Units/mL Systemic     • Gebauers Spray and Stretch topical spray      • FreeStyle Vaishnavi 2 Sensor Misc 1 each every 14 days. 2 each 11   • atorvastatin (LIPITOR) 40 MG tablet TAKE ONE TABLET BY MOUTH EVERY DAY AS DIRECTED 90 tablet 2   • NovoLIN 70/30 FlexPen (70-30) 100 UNIT/ML pen-injector INJECT 25 UNITS SUBCUTANEOUSLY WITH BREAKFAST, 10 UNITS WITH LUNCH AND 15 UNITS WITH DINNER. PRIME 2 UNITS BEFORE EACH DOSE 51 mL 3   • Advair Diskus 500-50 MCG/DOSE inhaler INHALE 1 PUFF BY MOUTH TWICE A DAY. rinse mouth after each use. 60 each 0   • albuterol (PROAIR RESPICLICK) 108 (90 Base) MCG/ACT inhaler Inhale 2 puffs into the lungs every 4 hours as needed for Shortness of Breath or Wheezing.     • Multiple Vitamins-Minerals (RenaPlex-D) Tab Take 1 tablet by mouth daily.     • bumetanide (BUMEX) 2 MG tablet TAKE ONE TABLET BY MOUTH TWICE DAILY   180 tablet 0   • aspirin (SB Low Dose ASA EC) 81 MG EC tablet Take 1 tablet by mouth daily. Do not start before 2020. Patient instructed to call  with directions on how to take aspirin, pt verbalized understanding.     •  allopurinol (ZYLOPRIM) 100 MG tablet Take 1 tablet by mouth daily. Do not start before June 9, 2020 30 tablet 0   • calcitRIOL (ROCALTROL) 0.5 MCG capsule Take 0.5 mcg by mouth 3 days a week. On HD days - Mondays, Wednesdays, and Fridays.     • sevelamer carbonate (RENVELA) 800 MG tablet Take 800 mg by mouth 3 times daily (with meals).      • tamsulosin (FLOMAX) 0.4 MG Cap Take 0.4 mg by mouth every evening.      • acetaminophen (TYLENOL) 325 MG tablet Take 2 tablets by mouth every 4 hours as needed for Pain.  0     No current facility-administered medications for this visit.       HPI:  The patient is a 61-year-old male with history of diabetes, CHF, cardiomyopathy, CAD, AICD, and end-stage renal disease on hemodialysis.  He has been on hemodialysis since June 2020 via a right chest PermCath.  He is on hemodialysis Monday/Wednesday/Friday and is right-hand dominant.  He underwent left forearm radiocephalic AV fistula placement 8/18/2020 which failed to mature, and subsequent conversion to left brachiocephalic AV fistula 1/19/2021.  On 5/4/2021 he underwent fistulography with treatment of outflow stenosis which also identified competing outflow branches.  Since that time the fistula was not been able to be accessed for dialysis and he underwent additional fistulography 8/5/2021 which demonstrated occlusion of the outflow cephalic vein.      He therefore underwent right brachiocephalic AV fistula placement 9/14/2021.  There was difficulty with fistula maturation and he therefore underwent fistulography 12/22/2021.  At that time he underwent angioplasty of a venous stenosis as well as embolization of the completing outflow branch.  He is doing well and presents today for follow-up evaluation.  He denies fever, chills, hand pain or numbness.        Review of Systems   Constitutional: Negative for fever.   HENT: Negative for sore throat.    Eyes: Negative.    Respiratory: Negative for chest tightness.    Cardiovascular:  Negative for chest pain.   Gastrointestinal: Negative for abdominal pain.   Genitourinary: Negative for flank pain.   Musculoskeletal: Negative for back pain.   Skin: Negative for color change, rash and wound.   Neurological: Negative for headaches.   Hematological: Negative for adenopathy.   Psychiatric/Behavioral: Negative for confusion.         Past Medical History:   Diagnosis Date   • AICD (automatic cardioverter/defibrillator) present 01/09/2019    Ultimate Shopper   • Anemia    • Atypical chest pain    • Bilateral edema of lower extremity    • Chronic pain     lower back pain   • Chronic systolic congestive heart failure (CMS/[facility]) 01/09/2019   • CKD (chronic kidney disease) stage 5, GFR less than 15 ml/min (CMS/[facility]) 06/29/2020   • Combined systolic and diastolic heart failure (CMS/[facility]) 06/03/2020   • COPD (chronic obstructive pulmonary disease) (CMS/[facility])     Unknown onset date.    • Coronary atherosclerosis of native coronary artery 01/09/2019   • Dilated cardiomyopathy (CMS/[facility]) 01/09/2019   • Episodic lightheadedness    • Gout    • H/O alcoholic cardiomyopathy 01/09/2019   • Headache    • History of cardiac cath    • History of cardiac catheterization 1/9/2019    Cardiac catheterization performed 08/11/2006.  90% stenosis in the proximal PDA.  Dilated cardiomyopathy with an LVEF of 15%.   • History of cardiovascular stress test 1/9/2019    Chemical stress MPI study performed 03/08/2018.  No diagnostic ECG change from regadenoson.  Myocardial perfusion imaging was abnormal with an old infarction of the inferior/inferoapical walls without evidence of ischemia.  LVEF was 19% on gated SPECT.  Study unchanged compared to 2012.   • History of echocardiogram 1/9/2019    Transthoracic echocardiographic study performed 09/07/2017.  Transthoracic echocardiographic study performed 09/07/2017.  LVEF 20-25% with severe global hypokinesis and grade 2 diastolic dysfunction.  Mild MR/mild to moderate TR with mild  pulmonary hypertension.  Pacemaker leads noted in right side of heart.   • History of Holter monitoring 4/1/2016    48-hour Holter monitor performed 04/01/2016.  Predominant sinus mechanism with occasional supraventricular ectopy.  Occasional to moderately frequent ventricular ectopy.  Several runs of ventricular tachycardia with the longest being 7 beats in length at a rate of 198 bpm.  No significant supraventricular runs noted.   • History of palpitations    • History of percutaneous coronary intervention 1/9/2019    Stent assisted (DARIA) PCI to proximal RPDA 08/2006.   • Hx of cardiac catheterization 01/09/2019   • Hx of cardiovascular stress test 01/09/2019   • Hx of echocardiogram 01/09/2019   • Hx of myocardial infarction     2005   • Hyperlipidemia, mixed 01/09/2019   • LBBB (left bundle branch block)    • Myocardial infarction (CMS/[facility])    • Obesity 01/09/2019   • ABDOULAYE (obstructive sleep apnea) 01/09/2019    not using cpap   • Paroxysmal atrial fibrillation (CMS/[facility]) 01/09/2019   • Paroxysmal ventricular tachycardia (CMS/[facility]) 01/09/2019   • Presence of automatic cardioverter/defibrillator (AICD) 01/09/2019   • Sepsis (CMS/[facility]) 09/26/2020   • Stage 5 chronic kidney disease due to type 2 diabetes mellitus (CMS/[facility])    • Status post angioplasty with stent 01/09/2019   • Type 2 diabetes mellitus (CMS/[facility])    • Umbilical hernia        Past Surgical History:   Procedure Laterality Date   • Anal fistulotomy     • Av fistula placement, brachiocephalic Left 01/19/2021   • Av fistula placement, brachiocephalic Right 09/14/2021   • Av fistula placement, radiocephalic Left 08/18/2020    LEFT FOREARM RADIOCEPHALIC ARTERIOVENOUS FISTULA CREATION (Left Arm Upper)   • Cataract extraction w/  intraocular lens implant     • Dialysis catheter insertion Right     \"right chest\"   • Endomyocardial biopsy     • Icd single implant     • Inguinal hernia repair     • Insertion of implant pulm pressure sensor wo lead for intracardiac  or  10/09/2019   • Ir dialysis circuit angiogram  08/04/2021   • Ptca     • Shoulder arthroscopy w/ rotator cuff repair     • Umbilical hernia repair       Social History     Socioeconomic History   • Marital status: Single     Spouse name: Not on file   • Number of children: Not on file   • Years of education: Not on file   • Highest education level: Not on file   Occupational History   • Occupation: retired   Tobacco Use   • Smoking status: Current Every Day Smoker     Packs/day: 0.25     Types: Cigarettes   • Smokeless tobacco: Never Used   • Tobacco comment: smoker for 30 years   Vaping Use   • Vaping Use: never used   Substance and Sexual Activity   • Alcohol use: No     Comment: quit drinking 2015   • Drug use: No   • Sexual activity: Not Currently   Other Topics Concern   • Not on file   Social History Narrative   • Not on file     Social Determinants of Health     Financial Resource Strain: Not on file   Food Insecurity: Not on file   Transportation Needs: Not on file   Physical Activity: Not on file   Stress: Not on file   Social Connections: Not on file   Intimate Partner Violence: Not At Risk   • Social Determinants: Intimate Partner Violence Past Fear: No   • Social Determinants: Intimate Partner Violence Current Fear: No       Family History   Adopted: Yes   Problem Relation Age of Onset   • Diabetes Mother    • Diabetes Sister    • Diabetes Brother    • Patient is unaware of any medical problems Father          Physical Exam  Constitutional:       Appearance: He is well-developed.   HENT:      Head: Normocephalic and atraumatic.   Neck:      Vascular: No carotid bruit.      Trachea: No tracheal tenderness.   Cardiovascular:      Rate and Rhythm: Normal rate and regular rhythm.      Pulses:           Radial pulses are 2+ on the right side and 2+ on the left side.   Pulmonary:      Effort: Pulmonary effort is normal.      Breath sounds: Normal breath sounds.   Abdominal:      General: Bowel sounds are  normal.      Palpations: Abdomen is soft.   Musculoskeletal:         General: Normal range of motion.      Cervical back: Normal range of motion.      Comments:   Hands warm and well-perfused.  No masses or lymphadenopathy.  Palpable radial pulses.  Motor and sensory function equal and intact bilaterally.  Surgical incisions well-healed.  Palpable thrill in right brachiocephalic AV fistula.  Appears mature for dialysis access.   Skin:     General: Skin is warm.      Findings: No ecchymosis or lesion.   Neurological:      Mental Status: He is alert and oriented to person, place, and time.   Psychiatric:         Behavior: Behavior is cooperative.         Discussion/Summary:    The patient is a 61-year-old male with history of diabetes, CHF, cardiomyopathy, CAD, AICD, and end-stage renal disease on hemodialysis.  He has been on hemodialysis since June 2020 via a right chest PermCath.  He is on hemodialysis Monday/Wednesday/Friday and is right-hand dominant.  He underwent left forearm radiocephalic AV fistula placement 8/18/2020 which failed to mature, and subsequent conversion to left brachiocephalic AV fistula 1/19/2021.  On 5/4/2021 he underwent fistulography with treatment of outflow stenosis which also identified competing outflow branches.  Since that time the fistula was not been able to be accessed for dialysis and he underwent additional fistulography 8/5/2021 which demonstrated occlusion of the outflow cephalic vein.      He therefore underwent right brachiocephalic AV fistula placement 9/14/2021.  There was difficulty with fistula maturation and he therefore underwent fistulography 12/22/2021.  At that time he underwent angioplasty of a venous stenosis as well as embolization of the completing outflow branch.  He is doing well and presents today for follow-up evaluation.  He denies fever, chills, hand pain or numbness.    On examination there is a strongly palpable thrill in the right upper arm AV fistula and  at this time it appears mature for dialysis.  I marked on the skin potential access sites where the thrill is strongest.  I discussed with the patient the plan for single needle access for the first several attempts followed by dual needle access if that is successful.  Once the fistula has been successfully accessed with both needles for several sessions the nephrology team will coordinate removal of his PermCath.  I once again discussed with the patient that given the difficulty we have had with autogenous fistula placement, if there is difficulty with using this fistula we may consider additional fistulography; alternatively he may ultimately require graft placement for dialysis access.  I encouraged him to call  with any questions or concerns which may arise.      Daljit Valdez MD  2/28/2022  10:16 AM           No

## 2023-04-13 NOTE — DISCHARGE NOTE PROVIDER - NSDCMRMEDTOKEN_GEN_ALL_CORE_FT
acetaminophen 325 mg oral tablet: 2 tab(s) orally every 6 hours as needed for Mild Pain (1 - 3)  aspirin 81 mg oral tablet, chewable: 1 tab(s) orally once a day  atorvastatin 80 mg oral tablet: 1 tab(s) orally once a day (at bedtime)  Lasix 40 mg oral tablet: 1 tab(s) orally once a day  metoprolol succinate 25 mg oral tablet, extended release: 1 tab(s) orally 2 times a day  NIFEdipine 30 mg oral tablet, extended release: 1 tab(s) orally every 24 hours  oxybutynin 5 mg oral tablet: 1 tab(s) orally every 8 hours  oxyCODONE 5 mg oral tablet: 1 tab(s) orally every 6 hours as needed for Moderate Pain (4 - 6) MDD: 4  pantoprazole 40 mg oral delayed release tablet: 1 tab(s) orally once a day (before a meal)  polyethylene glycol 3350 oral powder for reconstitution: 17 gram(s) orally once a day  senna leaf extract oral tablet: 2 tab(s) orally once a day (at bedtime)  spironolactone 25 mg oral tablet: 1 tab(s) orally once a day

## 2023-04-13 NOTE — DISCHARGE NOTE PROVIDER - CARE PROVIDER_API CALL
Dillon Gonsalves (MD)  Cardiovascular Surgery  [address]  [place], [place] [address]  Phone: [phone]  Fax: [phone]  Follow Up Time:

## 2023-04-13 NOTE — DISCHARGE NOTE PROVIDER - HOSPITAL COURSE
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 Patient now transferred to [facility] 5URIS for cardiac telemetry for continued management of HFpEF exacerbation and structural heart evaluation for MR. TTE with severe, eccentric, anteriorly directed MR with flail,  VERONIQUE negative for endocarditis. Patient underwent cardiac cath with Dr Sousa 4/6/2023, revealed 3vCAD. On 4/7 patient underwent a MVr, CABGx1, MARILYN, and biatrial cryomaze. Intraoperatively he was profoundly vasoplegic requiring cyanocit. Arrived to the ICU on Multiple pressors and AV pacing for asystole. POD#1 Started on dobutamine for low urine output and rising lactate. Heart Failure consulted. POD#2 TATIANA with Creatinine 1.65. POD#3 remained  AV pacing for sinus sita but all gtt were weaned to off. On POD#4 Rhythm improved to 90s NSR. Delined. POD#5 Endocrine was consulted for Elevated TSH and T4, thought to be euthyroid sick syndrome versus subclinical hypothyroidism POD#6 Patient remained in NSR. In the afternoon, the patient became agitated and threatening with the staff with scissors and wanted to sign out AMA. The patient was able to be verbally calmed and de-escalated. His pacing wires and tie downs were removed. As per Dr. Gonsalves patient can be discharged home today. He will follow up with Dr. Gonsalves, Endocrine and Heart Failure at discharge. 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 Patient now transferred to [facility] 5URIS for cardiac telemetry for continued management of HFpEF exacerbation and structural heart evaluation for MR. TTE with severe, eccentric, anteriorly directed MR with flail,  VERONIQUE negative for endocarditis. Patient underwent cardiac cath with Dr Sousa 4/6/2023, revealed 3vCAD. On 4/7 patient underwent a MVr, CABGx1, MARILYN, and biatrial cryomaze. Intraoperatively he was profoundly vasoplegic requiring cyanocit. Arrived to the ICU on Multiple pressors and AV pacing for asystole. POD#1 Started on dobutamine for low urine output and rising lactate. Heart Failure consulted. POD#2 TATIANA with Creatinine 1.65. Completed ABX course. POD#3 remained  AV pacing for sinus sita but all gtt were weaned to off. On POD#4 Rhythm improved to 90s NSR. Delined. POD#5 Endocrine was consulted for Elevated TSH and T4, thought to be euthyroid sick syndrome versus subclinical hypothyroidism POD#6 Patient remained in NSR. In the afternoon, the patient became agitated and threatening with the staff with scissors and wanted to sign out AMA. The patient was able to be verbally calmed and de-escalated. His pacing wires and tie downs were removed. As per Dr. Gonsalves patient can be discharged home today. He will follow up with Dr. Gonsalves, Endocrine and Heart Failure at discharge.

## 2023-04-14 ENCOUNTER — APPOINTMENT (OUTPATIENT)
Dept: CARE COORDINATION | Facility: HOME HEALTH | Age: 48
End: 2023-04-14

## 2023-04-14 ENCOUNTER — NON-APPOINTMENT (OUTPATIENT)
Age: 48
End: 2023-04-14

## 2023-04-14 RX ORDER — ACETAMINOPHEN 325 MG/1
325 TABLET ORAL EVERY 6 HOURS
Refills: 0 | Status: COMPLETED | COMMUNITY
End: 2023-04-14

## 2023-04-14 RX ORDER — APIXABAN 5 MG/1
5 TABLET, FILM COATED ORAL
Qty: 180 | Refills: 3 | Status: COMPLETED | COMMUNITY
End: 2023-04-14

## 2023-04-14 RX ORDER — METOPROLOL SUCCINATE 100 MG/1
100 TABLET, EXTENDED RELEASE ORAL
Qty: 180 | Refills: 3 | Status: COMPLETED | COMMUNITY
End: 2023-04-14

## 2023-04-17 ENCOUNTER — APPOINTMENT (OUTPATIENT)
Dept: CARE COORDINATION | Facility: HOME HEALTH | Age: 48
End: 2023-04-17
Payer: COMMERCIAL

## 2023-04-17 VITALS — SYSTOLIC BLOOD PRESSURE: 133 MMHG | HEART RATE: 83 BPM | RESPIRATION RATE: 18 BRPM | DIASTOLIC BLOOD PRESSURE: 84 MMHG

## 2023-04-17 DIAGNOSIS — L03.115 CELLULITIS OF RIGHT LOWER LIMB: ICD-10-CM

## 2023-04-17 DIAGNOSIS — I11.0 HYPERTENSIVE HEART DISEASE WITH HEART FAILURE: ICD-10-CM

## 2023-04-17 DIAGNOSIS — I34.1 NONRHEUMATIC MITRAL (VALVE) PROLAPSE: ICD-10-CM

## 2023-04-17 DIAGNOSIS — E78.5 HYPERLIPIDEMIA, UNSPECIFIED: ICD-10-CM

## 2023-04-17 DIAGNOSIS — Z83.79 FAMILY HISTORY OF OTHER DISEASES OF THE DIGESTIVE SYSTEM: ICD-10-CM

## 2023-04-17 DIAGNOSIS — N17.9 ACUTE KIDNEY FAILURE, UNSPECIFIED: ICD-10-CM

## 2023-04-17 DIAGNOSIS — E07.81 SICK-EUTHYROID SYNDROME: ICD-10-CM

## 2023-04-17 DIAGNOSIS — I25.10 ATHEROSCLEROTIC HEART DISEASE OF NATIVE CORONARY ARTERY WITHOUT ANGINA PECTORIS: ICD-10-CM

## 2023-04-17 DIAGNOSIS — M79.81 NONTRAUMATIC HEMATOMA OF SOFT TISSUE: ICD-10-CM

## 2023-04-17 DIAGNOSIS — I50.33 ACUTE ON CHRONIC DIASTOLIC (CONGESTIVE) HEART FAILURE: ICD-10-CM

## 2023-04-17 DIAGNOSIS — I42.2 OTHER HYPERTROPHIC CARDIOMYOPATHY: ICD-10-CM

## 2023-04-17 DIAGNOSIS — I51.1 RUPTURE OF CHORDAE TENDINEAE, NOT ELSEWHERE CLASSIFIED: ICD-10-CM

## 2023-04-17 DIAGNOSIS — I48.20 CHRONIC ATRIAL FIBRILLATION, UNSPECIFIED: ICD-10-CM

## 2023-04-17 DIAGNOSIS — R57.9 SHOCK, UNSPECIFIED: ICD-10-CM

## 2023-04-17 DIAGNOSIS — R74.01 ELEVATION OF LEVELS OF LIVER TRANSAMINASE LEVELS: ICD-10-CM

## 2023-04-17 DIAGNOSIS — I34.0 NONRHEUMATIC MITRAL (VALVE) INSUFFICIENCY: ICD-10-CM

## 2023-04-17 DIAGNOSIS — L03.116 CELLULITIS OF LEFT LOWER LIMB: ICD-10-CM

## 2023-04-17 LAB
CORTICOSTEROID BINDING GLOBULIN RESULT: 1.3 MG/DL — LOW
CORTIS F/TOTAL MFR SERPL: 52 % — SIGNIFICANT CHANGE UP
CORTIS SERPL-MCNC: 18 UG/DL — SIGNIFICANT CHANGE UP
CORTISOL, FREE RESULT: 9.4 UG/DL — HIGH

## 2023-04-17 PROCEDURE — 99024 POSTOP FOLLOW-UP VISIT: CPT

## 2023-04-17 RX ORDER — PANTOPRAZOLE 40 MG/1
40 TABLET, DELAYED RELEASE ORAL
Qty: 30 | Refills: 0 | Status: ACTIVE | COMMUNITY
Start: 2023-04-13

## 2023-04-17 RX ORDER — OXYBUTYNIN CHLORIDE 5 MG/1
5 TABLET ORAL EVERY 8 HOURS
Refills: 0 | Status: ACTIVE | COMMUNITY
Start: 2023-04-13

## 2023-04-17 RX ORDER — SPIRONOLACTONE 25 MG/1
25 TABLET ORAL DAILY
Qty: 30 | Refills: 0 | Status: ACTIVE | COMMUNITY
Start: 2023-04-13

## 2023-04-17 RX ORDER — POLYETHYLENE GLYCOL 3350 17 G/17G
17 POWDER, FOR SOLUTION ORAL DAILY
Refills: 0 | Status: ACTIVE | COMMUNITY
Start: 2023-04-13

## 2023-04-17 RX ORDER — FUROSEMIDE 40 MG/1
40 TABLET ORAL
Qty: 30 | Refills: 0 | Status: ACTIVE | COMMUNITY
Start: 2023-04-13

## 2023-04-17 RX ORDER — OXYCODONE 5 MG/1
5 TABLET ORAL EVERY 6 HOURS
Refills: 0 | Status: ACTIVE | COMMUNITY
Start: 2023-04-13

## 2023-04-17 RX ORDER — SENNOSIDES 8.6 MG TABLETS 8.6 MG/1
8.6 TABLET ORAL
Qty: 60 | Refills: 0 | Status: ACTIVE | COMMUNITY
Start: 2023-04-13

## 2023-04-17 RX ORDER — ASPIRIN 81 MG/1
81 TABLET, CHEWABLE ORAL DAILY
Refills: 0 | Status: ACTIVE | COMMUNITY
Start: 2023-04-13

## 2023-04-17 RX ORDER — SILVER SULFADIAZINE 10 MG/G
1 CREAM TOPICAL
Qty: 20 | Refills: 0 | Status: ACTIVE | COMMUNITY
Start: 2023-04-13

## 2023-04-17 NOTE — HISTORY OF PRESENT ILLNESS
[Home] : at home, [unfilled] , at the time of the visit. [Other Location: e.g. 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 \par 7/29/21 initially admitted to [facility] 3/24/23 with HFpEF \par exacerbation, Afib w/ RVR and LE cellulitis c/b TATIANA, transaminitis and rectal \par sheath hematoma. Patient now transferred to [facility] 5URIS for cardiac telemetry for \par continued management of HFpEF exacerbation and structural heart evaluation for \par MR. TTE with severe, eccentric, anteriorly directed MR with flail,  VERONIQUE \par negative for endocarditis. Patient underwent cardiac cath with Dr Sousa \[facility] 4/6/2023, revealed 3vCAD. On 4/7 patient underwent a MVr, CABGx1, MARILYN, and \par biatrial cryomaze. Intraoperatively he was profoundly vasoplegic requiring \par cyanocit. Arrived to the ICU on Multiple pressors and AV pacing for asystole. \par POD#1 Started on dobutamine for low urine output and rising lactate. Heart \par Failure consulted. POD#2 TATIANA with Creatinine 1.65. Completed ABX course. POD#3 \par remained  AV pacing for sinus sita but all gtt were weaned to off. On POD#4 \par Rhythm improved to 90s NSR. Delined. POD#5 Endocrine was consulted for Elevated \par TSH and T4, thought to be euthyroid sick syndrome versus subclinical \par hypothyroidism POD#6 Patient remained in NSR. In the afternoon, the patient \par became agitated and threatening with the staff with scissors and wanted to sign \par out AMA. The patient was able to be verbally calmed and de-escalated. His \par pacing wires and tie downs were removed. As per Dr. Gonsalves patient can be \par discharged home today. He will follow up with Dr. Gonsalves, Endocrine and Heart \par Failure at discharge. \par \par Pt is recovering at home. Pt is ambulating independently and able to climb stairs without difficulty.\par Pt verbalized he has a home BP machine and checks his vital signs daily.\par Last BM 4/16/23\par Pt verbalized he changing his LE dressing changes without difficulty and denies need for supplies.\par [facility] initiated care this past weekend.\par

## 2023-04-17 NOTE — REVIEW OF SYSTEMS
[Hoarseness] : hoarseness [Negative] : Neurological [Earache] : no earache [Loss Of Hearing] : no hearing loss [Nosebleeds] : no nosebleeds [Nasal Discharge] : no nasal discharge [Sore Throat] : no sore throat

## 2023-04-17 NOTE — PHYSICAL EXAM
[Sclera] : the sclera and conjunctiva were normal [PERRL With Normal Accommodation] : pupils were equal in size, round, and reactive to light [Neck Appearance] : the appearance of the neck was normal [Respiration, Rhythm And Depth] : normal respiratory rhythm and effort [Exaggerated Use Of Accessory Muscles For Inspiration] : no accessory muscle use [Examination Of The Chest] : the chest was normal in appearance [Chest Visual Inspection Thoracic Asymmetry] : no chest asymmetry [Diminished Respiratory Excursion] : normal chest expansion [Involuntary Movements] : no involuntary movements were seen [Skin Turgor] : normal skin turgor [] : no rash [FreeTextEntry1] : CT sites clean, dry, and intact. LE with Ace wraps b/l, clean and intact. B/L LE edema +2, feet purple in color.  [No Focal Deficits] : no focal deficits [Oriented To Time, Place, And Person] : oriented to person, place, and time [Impaired Insight] : insight and judgment were intact [Affect] : the affect was normal [Mood] : the mood was normal [Memory Recent] : recent memory was not impaired [Memory Remote] : remote memory was not impaired

## 2023-04-19 DIAGNOSIS — Z98.890 OTHER SPECIFIED POSTPROCEDURAL STATES: ICD-10-CM

## 2023-04-19 DIAGNOSIS — Z09 ENCOUNTER FOR FOLLOW-UP EXAMINATION AFTER COMPLETED TREATMENT FOR CONDITIONS OTHER THAN MALIGNANT NEOPLASM: ICD-10-CM

## 2023-04-19 DIAGNOSIS — Z95.1 PRESENCE OF AORTOCORONARY BYPASS GRAFT: ICD-10-CM

## 2023-04-19 RX ORDER — ATORVASTATIN CALCIUM 80 MG/1
80 TABLET, FILM COATED ORAL DAILY
Qty: 90 | Refills: 3 | Status: ACTIVE | COMMUNITY
Start: 2021-11-01

## 2023-04-19 NOTE — COUNSELING
[Hygeine (Including Daily Shower)] : hygeine (including daily shower) [Importance of Regular Medical Follow-Up] : the importance of regular medical follow-up [No Heavy Lifting] : no heavy lifting (>15-20 lb. for 1 month or 25 lb. for 3 months from date of surgery) [Blood Pressure Control] : blood pressure control [S/S of infection] : signs and symptoms of infection (and to whom it should be reported) [Progressive Ambulation/Activity] : progressive ambulation/activity [SBE antibiotic prophylaxis] : SBE antibiotic prophylaxis was recommended [Low Fat/Low Cholesterol Diet] : low fat/low cholesterol diet [Blood Sugar Control] : blood sugar control

## 2023-04-25 ENCOUNTER — APPOINTMENT (OUTPATIENT)
Dept: CARDIOTHORACIC SURGERY | Facility: CLINIC | Age: 48
End: 2023-04-25
Payer: COMMERCIAL

## 2023-04-25 ENCOUNTER — OUTPATIENT (OUTPATIENT)
Dept: OUTPATIENT SERVICES | Facility: HOSPITAL | Age: 48
LOS: 1 days | End: 2023-04-25
Payer: COMMERCIAL

## 2023-04-25 ENCOUNTER — NON-APPOINTMENT (OUTPATIENT)
Age: 48
End: 2023-04-25

## 2023-04-25 VITALS
RESPIRATION RATE: 18 BRPM | WEIGHT: 200 LBS | HEART RATE: 100 BPM | BODY MASS INDEX: 25.67 KG/M2 | OXYGEN SATURATION: 96 % | DIASTOLIC BLOOD PRESSURE: 88 MMHG | TEMPERATURE: 97.7 F | SYSTOLIC BLOOD PRESSURE: 127 MMHG | HEIGHT: 74 IN

## 2023-04-25 DIAGNOSIS — Z98.890 OTHER SPECIFIED POSTPROCEDURAL STATES: Chronic | ICD-10-CM

## 2023-04-25 PROCEDURE — 71046 X-RAY EXAM CHEST 2 VIEWS: CPT | Mod: 26

## 2023-04-25 PROCEDURE — 71046 X-RAY EXAM CHEST 2 VIEWS: CPT

## 2023-04-25 PROCEDURE — 99024 POSTOP FOLLOW-UP VISIT: CPT

## 2023-04-26 ENCOUNTER — NON-APPOINTMENT (OUTPATIENT)
Age: 48
End: 2023-04-26

## 2023-04-26 RX ORDER — METOPROLOL SUCCINATE 100 MG/1
100 TABLET, EXTENDED RELEASE ORAL
Qty: 60 | Refills: 0 | Status: ACTIVE | COMMUNITY
Start: 2023-04-26 | End: 1900-01-01

## 2023-04-26 RX ORDER — METOPROLOL SUCCINATE 25 MG/1
25 TABLET, EXTENDED RELEASE ORAL TWICE DAILY
Refills: 0 | Status: COMPLETED | COMMUNITY
Start: 2023-04-13 | End: 2023-04-26

## 2023-04-26 RX ORDER — NIFEDIPINE 30 MG/1
30 TABLET, FILM COATED, EXTENDED RELEASE ORAL DAILY
Refills: 0 | Status: COMPLETED | COMMUNITY
Start: 2023-04-13 | End: 2023-04-26

## 2023-04-26 RX ORDER — DILTIAZEM HYDROCHLORIDE 120 MG/1
120 TABLET, EXTENDED RELEASE ORAL DAILY
Qty: 30 | Refills: 0 | Status: ACTIVE | COMMUNITY
Start: 2023-04-26 | End: 1900-01-01

## 2023-04-26 NOTE — PHYSICAL EXAM
[] : no respiratory distress [Auscultation Breath Sounds / Voice Sounds] : lungs were clear to auscultation bilaterally [Heart Rate And Rhythm] : heart rate was normal and rhythm regular [Heart Sounds] : normal S1 and S2 [Heart Sounds Gallop] : no gallops [Murmurs] : no murmurs [Heart Sounds Pericardial Friction Rub] : no pericardial rub [Clean] : clean [Dry] : dry [Healing Well] : healing well [No Edema] : no edema [FreeTextEntry1] : sternum stable [FreeTextEntry5] : left leg EVH incision

## 2023-04-26 NOTE — DISCUSSION/SUMMARY
[Doing Well] : is doing well [1] : 1 [FreeTextEntry1] : Plan: \par Continue current medication regimen.\par Continue to increase activity and walk daily as tolerated. Continue to use incentive spirometer. \par No driving or strenuous activity for 4 weeks after surgery. Avoid lifting >10 to 15 lbs.\par Call MD if you experience fever, fatigue, dizziness, confusion, syncope, shortness of breath, chest pain not relieved with analgesics, increased redness/drainage from incision.\par Follow up with Dr. Sousa\par schedule appt with ENT, Dr. Espinoza\par stop nifedipine, restart Metoprolol succ 100mg bid\par counseled re importance of compliance with medications and medical follow up\par counseled re need for antibiotics prior to dental and surgical procedures\par Dc from CTS service\par \par

## 2023-04-26 NOTE — END OF VISIT
[FreeTextEntry3] : I, MAYA GAO , am scribing for and in the presence of FELISA NAILS the following sections: History of present illness, past Medical/family/surgical/family/social history, review of systems, vital signs, physical exam and disposition.\par I personally performed the services described in the documentation, reviewed the documentation recorded by the scribe in my presence and it accurately and completely records my words and actions.\par

## 2023-05-03 ENCOUNTER — APPOINTMENT (OUTPATIENT)
Dept: HEART AND VASCULAR | Facility: CLINIC | Age: 48
End: 2023-05-03
Payer: COMMERCIAL

## 2023-05-03 PROCEDURE — 99215 OFFICE O/P EST HI 40 MIN: CPT

## 2023-05-03 PROCEDURE — 93000 ELECTROCARDIOGRAM COMPLETE: CPT

## 2023-05-04 ENCOUNTER — APPOINTMENT (OUTPATIENT)
Dept: HEART AND VASCULAR | Facility: CLINIC | Age: 48
End: 2023-05-04

## 2023-05-08 NOTE — HISTORY OF PRESENT ILLNESS
[FreeTextEntry1] : 48M CAD s/p CABG, severe MR s/p MV Repair with Cryomaze MARILYN ligation, HTN, HLD who presents to establish care post-discharge from [facility]. Pt reports that he has had good diuresis since discharge on April 13, 2023 and now able to walk  ~5 blocks without difficulty. After that he experiences some shortness of breath with activity. Denies chest pain, palpitations, weakness, diaphoresis or loc. His biggest concern is sore throat and having difficulty speaking without discomfort since intraprocedural VERONIQUE. Has appointment with ENT 5/19. States that symptoms improving slightly. Denies noticing bleeding, still able to eat regularly and denies fever or chills. Still has significant CIRC disease that has not been treated yet. \par \par VS: \par /80; HR 73\par ECG: NSR; LVH\par \par Meds: \par Toprol 100mg daily\par ASA 81mg daily\par Atorvastatin 80mg daily\par Lasix 40mg daily\par Spironolactone 25 mg daily\par Wears compression stockings\par

## 2023-05-08 NOTE — REVIEW OF SYSTEMS
[Feeling Fatigued] : feeling fatigued [Speech Disturbance] : speech disturbance [Negative] : Heme/Lymph

## 2023-05-08 NOTE — DISCUSSION/SUMMARY
[FreeTextEntry1] : 48M s/p MV repair with cryomaze + CABG x 1 (ZHENG to LAD) who presents to establish care\par \par CAD s/p CABG: symptoms have improved but still has some SOB with activity. Known significant dz of the Lt CIRC which was left to be staged after his surgery. \par --ENT visit first. If no procedural interventions required will schedule pt for cardiac catheterization\par --TTE to evaluate EF post-surgery\par --Cont on ASA 81mg daily\par \par Acute diastolic HF: Now stable and controlled. Euvolemic on exam; Cont on daily lasix. Stop spironolactone given Cr 1.4 and potassium level elevated. \par --Repeat BMP today\par --Stop Spironolactone\par --TTE to eval EF\par \par Severe MR s/p repair: No murmur appreciated on exam; Cont on daily lasix\par --Repeat TTE post repair\par \par HTN: BP within acceptable range. cont on toprol xl. stop nifedipine. \par \par HLD: Stable on last panel. Cont on Atorvastatin\par \par RTC after ENT visit to plan for Staged PCI

## 2023-05-12 NOTE — AIRWAY PLACEMENT NOTE ADULT - DATE /TIME:
Problem: Potential for Falls  Goal: Patient will remain free of falls  Description: INTERVENTIONS:  - Educate patient/family on patient safety including physical limitations  - Instruct patient to call for assistance with activity   - Consult OT/PT to assist with strengthening/mobility   - Keep Call bell within reach  - Keep bed low and locked with side rails adjusted as appropriate  - Keep care items and personal belongings within reach  - Initiate and maintain comfort rounds  - Make Fall Risk Sign visible to staff  - Apply yellow socks and bracelet for high fall risk patients  - Consider moving patient to room near nurses station  Outcome: Progressing 29-Jul-2021 17:45

## 2023-05-14 ENCOUNTER — NON-APPOINTMENT (OUTPATIENT)
Age: 48
End: 2023-05-14

## 2023-05-16 LAB — SURGICAL PATHOLOGY STUDY: SIGNIFICANT CHANGE UP

## 2023-05-18 VITALS
RESPIRATION RATE: 16 BRPM | HEIGHT: 74 IN | DIASTOLIC BLOOD PRESSURE: 106 MMHG | HEART RATE: 89 BPM | OXYGEN SATURATION: 98 % | TEMPERATURE: 98 F | WEIGHT: 211.86 LBS | SYSTOLIC BLOOD PRESSURE: 169 MMHG

## 2023-05-18 RX ORDER — CHLORHEXIDINE GLUCONATE 213 G/1000ML
1 SOLUTION TOPICAL ONCE
Refills: 0 | Status: DISCONTINUED | OUTPATIENT
Start: 2023-06-02 | End: 2023-06-16

## 2023-05-18 NOTE — H&P ADULT - NSHPLABSRESULTS_GEN_ALL_CORE
11.8   8.97  )-----------( 271      ( 02 Jun 2023 11:22 )             37.8 11.8   8.97  )-----------( 271      ( 02 Jun 2023 11:22 )             37.8       06-02    141  |  104  |  31<H>  ----------------------------<  104<H>  4.0   |  23  |  1.36<H>    Ca    9.9      02 Jun 2023 12:07  Mg     1.6     06-02    TPro  7.4  /  Alb  4.4  /  TBili  1.1  /  DBili  x   /  AST  33  /  ALT  30  /  AlkPhos  87  06-02      PT/INR - ( 02 Jun 2023 11:22 )   PT: 11.9 sec;   INR: 1.00          PTT - ( 02 Jun 2023 11:22 )  PTT:30.6 sec    CARDIAC MARKERS ( 02 Jun 2023 12:07 )  x     / x     / 63 U/L / x     / 4.6 ng/mL

## 2023-05-18 NOTE — H&P ADULT - ASSESSMENT
EKG: 			  ASA:  Mallampati class:   Anginal Class:     -No Known Allergies    -H/H = 11.8/37.8  . Pt denies BRBPR, hematuria, hematochezia, melena. Pt endorses compliance w/ home medications, stating last dose was 6/1/23 PM. Pt loaded w/ ASA 81 mg x1 and Plavix 600 mg x1  -BUN/Cr = . EF=60%. . Euvolemic on exam. IV NS @ 250 cc bolus over 1 hour followed by 75 cc/hr x 2 hrs started pre procedure    Sedation Plan:   Moderate  Patient Is Suitable Candidate For Sedation?     Yes    Risks & benefits of procedure and alternative therapy have been explained to the patient including but not limited to: allergic reaction, bleeding with possible need for blood transfusion, infection, renal and vascular compromise, limb damage, arrhythmia, stroke, vessel dissection/perforation, myocardial infarction, and emergent CABG. Informed consent obtained at bedside and included in chart. EKG: normal rhythm at rate of 78, ST depressions in leads I, II, V4-V6. LVH strain.   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     Patient procedure has since been cancelled due to a Cr of 1.3     -H/H = 11.8/37.8  . Pt denies BRBPR, hematuria, hematochezia, melena. Pt endorses compliance w/ home medications, stating last dose was 6/1/23 PM. Pt loaded w/ ASA 81 mg x1 and Plavix 600 mg x1  -BUN/Cr = . EF=60%. . Euvolemic on exam. IV NS @ 250 cc bolus over 1 hour followed by 75 cc/hr x 2 hrs started pre procedure    Sedation Plan:   Moderate  Patient Is Suitable Candidate For Sedation?     Yes    Risks & benefits of procedure and alternative therapy have been explained to the patient including but not limited to: allergic reaction, bleeding with possible need for blood transfusion, infection, renal and vascular compromise, limb damage, arrhythmia, stroke, vessel dissection/perforation, myocardial infarction, and emergent CABG. Informed consent obtained at bedside and included in chart. EKG: normal rhythm at rate of 78, ST depressions in leads I, II, V4-V6. LVH strain.   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     Patient procedure has since been cancelled due to a Cr of 1.3.    -H/H = 11.8/37.8  . Pt denies BRBPR, hematuria, hematochezia, melena.    Sedation Plan:   Moderate  Patient Is Suitable Candidate For Sedation?     Yes    Risks & benefits of procedure and alternative therapy have been explained to the patient including but not limited to: allergic reaction, bleeding with possible need for blood transfusion, infection, renal and vascular compromise, limb damage, arrhythmia, stroke, vessel dissection/perforation, myocardial infarction, and emergent CABG. Informed consent obtained at bedside and included in chart. EKG: normal rhythm at rate of 78, ST depressions in leads I, II, V4-V6. Probable LVH strain.   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     Patient procedure has since been cancelled due to a Cr of 1.3.    -H/H = 11.8/37.8  . Pt denies BRBPR, hematuria, hematochezia, melena.    Sedation Plan:   Moderate  Patient Is Suitable Candidate For Sedation?     Yes    Risks & benefits of procedure and alternative therapy have been explained to the patient including but not limited to: allergic reaction, bleeding with possible need for blood transfusion, infection, renal and vascular compromise, limb damage, arrhythmia, stroke, vessel dissection/perforation, myocardial infarction, and emergent CABG. Informed consent obtained at bedside and included in chart.

## 2023-05-18 NOTE — H&P ADULT - PATIENT ON (OXYGEN DELIVERY METHOD)
Subjective:      Kailey Cordero is a 9 y.o. female here with mom and twin sister. Patient brought in for Arm Problem      HPI:  Mom first noticed arm trembling last night when patient came home from cross country. Was twitching and patient felt like she could not stop it. Trembling motion has continued since then, however patient suspects it settled down at recess. Mom thinks that may be because patient did not notice it. No falls, no new routines at practice, no pain at rest.     Also complaining of stomach ache before races or soccer games- mom concerned for separation anxiety. Taking soothing mints- which is settling her stomach.    Not taking any medications recently, no vomiting or diarrhea, cough congestion fever rashes.     Review of Systems   Constitutional: Negative for fever.   HENT: Negative for congestion.    Eyes: Negative for redness.   Respiratory: Negative for cough.    Gastrointestinal: Negative for constipation and vomiting.   Musculoskeletal: Negative for gait problem.   Skin: Negative for rash.   Neurological: Negative for seizures.   Psychiatric/Behavioral: The patient is nervous/anxious.        Objective:     Physical Exam   Constitutional: She appears well-developed and well-nourished. She is active. No distress.   HENT:   Nose: No nasal discharge.   Mouth/Throat: Mucous membranes are moist.   Eyes: Conjunctivae are normal. Right eye exhibits no discharge. Left eye exhibits no discharge.   Neck: Normal range of motion.   Cardiovascular: Normal rate, regular rhythm, S1 normal and S2 normal. Pulses are palpable.   No murmur heard.  Pulmonary/Chest: Effort normal and breath sounds normal. There is normal air entry. No stridor. No respiratory distress. Air movement is not decreased. She has no wheezes. She has no rhonchi. She has no rales. She exhibits no retraction.   Musculoskeletal: She exhibits no signs of injury.   5/5 muscle strength of b/l upper extremities. Mild pain with passive elbow  flexion, otherwise WNL passive and active range of motion. Endorses tenderness to palpation of dorsal aspect of arm proximal to olecranon.   Neurological: She is alert. She exhibits normal muscle tone.   Skin: Skin is warm and dry. Capillary refill takes less than 2 seconds. She is not diaphoretic.   Vitals reviewed.      Assessment:     Kailey was seen today for arm problem.    Diagnoses and all orders for this visit:    Muscle strain of upper arm, right, initial encounter. DDx including muscle strain vs. Motor tick vs. Muscle spasm.        Plan:      -Motrin BID  -Soft massage  -Heating pad  -Avoid overexertion of R arm  -RTC if does not improve in 3-4 days   room air

## 2023-05-18 NOTE — H&P ADULT - NSHPSOCIALHISTORY_GEN_ALL_CORE
non smoker  lives alone, no family nearby non smoker  lives alone, no family nearby  no recreational drugs

## 2023-05-18 NOTE — H&P ADULT - HISTORY OF PRESENT ILLNESS
Pharmacy:  Escort:    47 y/o M, PMHx HTN, HLD, HFpEF (EF 60%, Adv HF), HOCM, h/o severe MR, CAD, s/p Cardiac Cath with Dr Sousa 4/6/2023: 3vCAD with subsequent MVr, CABGx1, MARILYN, and biatrial cryomaze with Dr Kern 4/7/23l, AFIB 94/7), CKD, SAH 2/2 cerebral artery dissection s/p coil embolization@[facility] 7/29/2.  Pt intially transferred from [facility] 3/24/23 with HFpEF exacerbation, Afib w/ RVR and LE cellulitis c/b TATIANA, transaminitis and rectal sheath hematoma while on Eliquis.  s/p TTE with severe, eccentric, anteriorly directed MR with flail, s/p VERONIQUE negative for endocarditis.  Hospital course c/b profoundly vasoplegic requiring cyanocit intraprocedure, pressors, AV pacing for asystole, dobutamine gtt for low urine output and rising lactate, elevated TSH/T4: Euthyroid sick syndrome vs. subclinical hypothyroidism.      _______Pt denies active CP, SOB, palpitations, diaphoresis, orthopnea, PND, dizziness, syncope, abdominal pain/discomfort, LE swelling or any other complaints at this time.     Given pt's risk factors, abnormal cath revealing significant 3V CAD and recent CTS, pt is recommended for hybrid procedure with Dr Sousa.            Pharmacy:  Escort:     49 y/o M, PMHx HTN, HLD, HFpEF (EF 60%, Adv HF), HOCM, h/o severe MR, CAD, s/p Cardiac Cath with Dr Sousa 4/6/2023: 3vCAD with subsequent MVr, CABGx1, MARILYN, and biatrial cryomaze with Dr Kern 4/7/23l, AFIB 94/7), CKD, SAH 2/2 cerebral artery dissection s/p coil embolization@[facility] 7/29/2.  Pt intially transferred from [facility] 3/24/23 with HFpEF exacerbation, Afib w/ RVR and LE cellulitis c/b TATIANA, transaminitis and rectal sheath hematoma while on Eliquis.  s/p TTE with severe, eccentric, anteriorly directed MR with flail, s/p VERONIQUE negative for endocarditis.  Hospital course c/b profoundly vasoplegic requiring cyanocit intraprocedure, pressors, AV pacing for asystole, dobutamine gtt for low urine output and rising lactate, elevated TSH/T4: Euthyroid sick syndrome vs. subclinical hypothyroidism.      _______Pt denies active CP, SOB, palpitations, diaphoresis, orthopnea, PND, dizziness, syncope, abdominal pain/discomfort, LE swelling or any other complaints at this time.     Given pt's risk factors, abnormal cath revealing significant 3V CAD and recent CTS, pt is recommended for hybrid procedure with Dr Sousa.            Pharmacy: [facility]  Escort: none    47 y/o M, PMHx HTN, HLD, HFpEF (EF 60%, Adv HF), HOCM, h/o severe MR, CAD, s/p Cardiac Cath with Dr Sousa 4/6/2023: 3vCAD with subsequent MVr, CABGx1, MARILYN, and biatrial cryomaze with Dr Kern 4/7/23l, AFIB 94/7), CKD, SAH 2/2 cerebral artery dissection s/p coil embolization @[facility] 7/29/21.  Pt initially transferred from [facility] 3/24/23 with HFpEF exacerbation, Afib w/ RVR and LE cellulitis c/b TATIANA, transaminitis and rectal sheath hematoma while on Eliquis.  s/p TTE with severe, eccentric, anteriorly directed MR with flail, s/p VERONIQUE negative for endocarditis.  Hospital course c/b profoundly vasoplegic requiring cyanocit intraprocedure, pressors, AV pacing for asystole, dobutamine gtt for low urine output and rising lactate, elevated TSH/T4: Euthyroid sick syndrome vs. subclinical hypothyroidism.      Pt denies active CP, SOB, diaphoresis, orthopnea, PND, dizziness, syncope, abdominal pain/discomfort, LE swelling or any other complaints at this time. Pt endorses discomfort in his chest earlier today but it has since resolved. Pt endorses chest pain after walking. Pt denies hematochezia, melena, BRBPR.     Given pt's risk factors, abnormal cath revealing significant 3V CAD and recent CTS, pt is recommended for hybrid procedure with Dr Sousa.            Pharmacy: [facility] pharmacy  Escort: none    49 y/o M, PMHx HTN, HLD, HFpEF (EF 60%, Adv HF), HOCM, h/o severe MR, CAD, s/p Cardiac Cath with Dr Sousa 4/6/2023: 3vCAD with subsequent MVr, CABGx1, MARILYN, and biatrial cryomaze with Dr Kern 4/7/23l, AFIB 94/7), CKD, SAH 2/2 cerebral artery dissection s/p coil embolization @[facility] 7/29/21.  Pt initially transferred from [facility] 3/24/23 with HFpEF exacerbation, Afib w/ RVR and LE cellulitis c/b TATIANA, transaminitis and rectal sheath hematoma while on Eliquis.  s/p TTE with severe, eccentric, anteriorly directed MR with flail, s/p VERONIQUE negative for endocarditis.  Hospital course c/b profoundly vasoplegic requiring cyanocit intraprocedure, pressors, AV pacing for asystole, dobutamine gtt for low urine output and rising lactate, elevated TSH/T4: Euthyroid sick syndrome vs. subclinical hypothyroidism.      Pt denies active CP, SOB, diaphoresis, orthopnea, PND, dizziness, syncope, abdominal pain/discomfort, LE swelling or any other complaints at this time. Pt endorses discomfort in his chest earlier today but it has since resolved. Pt endorses chest pain after walking. Pt denies hematochezia, melena, BRBPR.         Given pt's risk factors, abnormal cath revealing significant 3V CAD and recent CTS, pt is recommended for hybrid procedure with Dr Sousa.     Patient procedure has since been cancelled due to a Cr of 1.3.

## 2023-05-19 ENCOUNTER — APPOINTMENT (OUTPATIENT)
Dept: OTOLARYNGOLOGY | Facility: CLINIC | Age: 48
End: 2023-05-19

## 2023-05-25 NOTE — PRE-OP CHECKLIST - AS BP NONINV SITE
well developed, well nourished , in no acute distress , ambulating without difficulty , normal communication ability
left upper arm
right upper arm

## 2023-06-02 ENCOUNTER — OUTPATIENT (OUTPATIENT)
Dept: OUTPATIENT SERVICES | Facility: HOSPITAL | Age: 48
LOS: 1 days | End: 2023-06-02
Payer: COMMERCIAL

## 2023-06-02 DIAGNOSIS — Z98.890 OTHER SPECIFIED POSTPROCEDURAL STATES: Chronic | ICD-10-CM

## 2023-06-02 LAB
A1C WITH ESTIMATED AVERAGE GLUCOSE RESULT: 5 % — SIGNIFICANT CHANGE UP (ref 4–5.6)
ALBUMIN SERPL ELPH-MCNC: 4.4 G/DL — SIGNIFICANT CHANGE UP (ref 3.3–5)
ALP SERPL-CCNC: 87 U/L — SIGNIFICANT CHANGE UP (ref 40–120)
ALT FLD-CCNC: 30 U/L — SIGNIFICANT CHANGE UP (ref 10–45)
ANION GAP SERPL CALC-SCNC: 14 MMOL/L — SIGNIFICANT CHANGE UP (ref 5–17)
ANISOCYTOSIS BLD QL: SLIGHT — SIGNIFICANT CHANGE UP
APTT BLD: 30.6 SEC — SIGNIFICANT CHANGE UP (ref 27.5–35.5)
AST SERPL-CCNC: 33 U/L — SIGNIFICANT CHANGE UP (ref 10–40)
BASE EXCESS BLDV CALC-SCNC: 2 MMOL/L — SIGNIFICANT CHANGE UP (ref -2–3)
BASOPHILS # BLD AUTO: 0 K/UL — SIGNIFICANT CHANGE UP (ref 0–0.2)
BASOPHILS NFR BLD AUTO: 0 % — SIGNIFICANT CHANGE UP (ref 0–2)
BILIRUB SERPL-MCNC: 1.1 MG/DL — SIGNIFICANT CHANGE UP (ref 0.2–1.2)
BUN SERPL-MCNC: 31 MG/DL — HIGH (ref 7–23)
BURR CELLS BLD QL SMEAR: PRESENT — SIGNIFICANT CHANGE UP
CA-I SERPL-SCNC: 1.29 MMOL/L — SIGNIFICANT CHANGE UP (ref 1.15–1.33)
CALCIUM SERPL-MCNC: 9.9 MG/DL — SIGNIFICANT CHANGE UP (ref 8.4–10.5)
CHLORIDE SERPL-SCNC: 104 MMOL/L — SIGNIFICANT CHANGE UP (ref 96–108)
CHOLEST SERPL-MCNC: 182 MG/DL — SIGNIFICANT CHANGE UP
CK MB CFR SERPL CALC: 4.6 NG/ML — SIGNIFICANT CHANGE UP (ref 0–6.7)
CK SERPL-CCNC: 63 U/L — SIGNIFICANT CHANGE UP (ref 30–200)
CO2 BLDV-SCNC: 27.7 MMOL/L — HIGH (ref 22–26)
CO2 SERPL-SCNC: 23 MMOL/L — SIGNIFICANT CHANGE UP (ref 22–31)
COHGB MFR BLDV: 1.5 % — SIGNIFICANT CHANGE UP
CREAT SERPL-MCNC: 1.36 MG/DL — HIGH (ref 0.5–1.3)
EGFR: 64 ML/MIN/1.73M2 — SIGNIFICANT CHANGE UP
EOSINOPHIL # BLD AUTO: 0.54 K/UL — HIGH (ref 0–0.5)
EOSINOPHIL NFR BLD AUTO: 6 % — SIGNIFICANT CHANGE UP (ref 0–6)
ESTIMATED AVERAGE GLUCOSE: 97 MG/DL — SIGNIFICANT CHANGE UP (ref 68–114)
GAS PNL BLDV: 139 MMOL/L — SIGNIFICANT CHANGE UP (ref 136–145)
GIANT PLATELETS BLD QL SMEAR: PRESENT — SIGNIFICANT CHANGE UP
GLUCOSE BLDV-MCNC: 99 MG/DL — SIGNIFICANT CHANGE UP (ref 70–99)
GLUCOSE SERPL-MCNC: 104 MG/DL — HIGH (ref 70–99)
HCO3 BLDV-SCNC: 26 MMOL/L — SIGNIFICANT CHANGE UP (ref 22–29)
HCT VFR BLD CALC: 37.8 % — LOW (ref 39–50)
HCT VFR BLDA CALC: 36 % — SIGNIFICANT CHANGE UP
HDLC SERPL-MCNC: 56 MG/DL — SIGNIFICANT CHANGE UP
HGB BLD CALC-MCNC: 12.1 G/DL — LOW (ref 12.6–17.4)
HGB BLD-MCNC: 11.8 G/DL — LOW (ref 13–17)
HYPOCHROMIA BLD QL: SLIGHT — SIGNIFICANT CHANGE UP
INR BLD: 1 — SIGNIFICANT CHANGE UP (ref 0.88–1.16)
LIPID PNL WITH DIRECT LDL SERPL: 108 MG/DL — HIGH
LYMPHOCYTES # BLD AUTO: 1.91 K/UL — SIGNIFICANT CHANGE UP (ref 1–3.3)
LYMPHOCYTES # BLD AUTO: 21.3 % — SIGNIFICANT CHANGE UP (ref 13–44)
MACROCYTES BLD QL: SLIGHT — SIGNIFICANT CHANGE UP
MAGNESIUM SERPL-MCNC: 1.6 MG/DL — SIGNIFICANT CHANGE UP (ref 1.6–2.6)
MANUAL SMEAR VERIFICATION: SIGNIFICANT CHANGE UP
MCHC RBC-ENTMCNC: 25.5 PG — LOW (ref 27–34)
MCHC RBC-ENTMCNC: 31.2 GM/DL — LOW (ref 32–36)
MCV RBC AUTO: 81.8 FL — SIGNIFICANT CHANGE UP (ref 80–100)
METHGB MFR BLDV: 0.6 % — SIGNIFICANT CHANGE UP
MICROCYTES BLD QL: SLIGHT — SIGNIFICANT CHANGE UP
MONOCYTES # BLD AUTO: 0.39 K/UL — SIGNIFICANT CHANGE UP (ref 0–0.9)
MONOCYTES NFR BLD AUTO: 4.3 % — SIGNIFICANT CHANGE UP (ref 2–14)
NEUTROPHILS # BLD AUTO: 6.14 K/UL — SIGNIFICANT CHANGE UP (ref 1.8–7.4)
NEUTROPHILS NFR BLD AUTO: 68.4 % — SIGNIFICANT CHANGE UP (ref 43–77)
NON HDL CHOLESTEROL: 126 MG/DL — SIGNIFICANT CHANGE UP
OVALOCYTES BLD QL SMEAR: SLIGHT — SIGNIFICANT CHANGE UP
PCO2 BLDV: 40 MMHG — LOW (ref 42–55)
PH BLDV: 7.43 — SIGNIFICANT CHANGE UP (ref 7.32–7.43)
PLAT MORPH BLD: ABNORMAL
PLATELET # BLD AUTO: 271 K/UL — SIGNIFICANT CHANGE UP (ref 150–400)
PO2 BLDV: 55 MMHG — HIGH (ref 25–45)
POCT ISTAT CREATININE: 1.5 MG/DL — HIGH (ref 0.5–1.3)
POIKILOCYTOSIS BLD QL AUTO: SLIGHT — SIGNIFICANT CHANGE UP
POLYCHROMASIA BLD QL SMEAR: SLIGHT — SIGNIFICANT CHANGE UP
POTASSIUM BLDV-SCNC: 4 MMOL/L — SIGNIFICANT CHANGE UP (ref 3.5–5.1)
POTASSIUM SERPL-MCNC: 4 MMOL/L — SIGNIFICANT CHANGE UP (ref 3.5–5.3)
POTASSIUM SERPL-SCNC: 4 MMOL/L — SIGNIFICANT CHANGE UP (ref 3.5–5.3)
PROT SERPL-MCNC: 7.4 G/DL — SIGNIFICANT CHANGE UP (ref 6–8.3)
PROTHROM AB SERPL-ACNC: 11.9 SEC — SIGNIFICANT CHANGE UP (ref 10.5–13.4)
RBC # BLD: 4.62 M/UL — SIGNIFICANT CHANGE UP (ref 4.2–5.8)
RBC # FLD: 21.2 % — HIGH (ref 10.3–14.5)
RBC BLD AUTO: ABNORMAL
SAO2 % BLDV: 87 % — SIGNIFICANT CHANGE UP (ref 67–88)
SMUDGE CELLS # BLD: PRESENT — SIGNIFICANT CHANGE UP
SODIUM SERPL-SCNC: 141 MMOL/L — SIGNIFICANT CHANGE UP (ref 135–145)
TRIGL SERPL-MCNC: 90 MG/DL — SIGNIFICANT CHANGE UP
WBC # BLD: 8.97 K/UL — SIGNIFICANT CHANGE UP (ref 3.8–10.5)
WBC # FLD AUTO: 8.97 K/UL — SIGNIFICANT CHANGE UP (ref 3.8–10.5)

## 2023-06-02 PROCEDURE — 82550 ASSAY OF CK (CPK): CPT

## 2023-06-02 PROCEDURE — 85610 PROTHROMBIN TIME: CPT

## 2023-06-02 PROCEDURE — 82565 ASSAY OF CREATININE: CPT

## 2023-06-02 PROCEDURE — 93010 ELECTROCARDIOGRAM REPORT: CPT

## 2023-06-02 PROCEDURE — 82803 BLOOD GASES ANY COMBINATION: CPT

## 2023-06-02 PROCEDURE — 83735 ASSAY OF MAGNESIUM: CPT

## 2023-06-02 PROCEDURE — 82553 CREATINE MB FRACTION: CPT

## 2023-06-02 PROCEDURE — 83036 HEMOGLOBIN GLYCOSYLATED A1C: CPT

## 2023-06-02 PROCEDURE — 36415 COLL VENOUS BLD VENIPUNCTURE: CPT

## 2023-06-02 PROCEDURE — 93005 ELECTROCARDIOGRAM TRACING: CPT

## 2023-06-02 PROCEDURE — 85025 COMPLETE CBC W/AUTO DIFF WBC: CPT

## 2023-06-02 PROCEDURE — 80061 LIPID PANEL: CPT

## 2023-06-02 PROCEDURE — 80053 COMPREHEN METABOLIC PANEL: CPT

## 2023-06-02 PROCEDURE — 85730 THROMBOPLASTIN TIME PARTIAL: CPT

## 2023-06-02 RX ORDER — FUROSEMIDE 40 MG
20 TABLET ORAL ONCE
Refills: 0 | Status: COMPLETED | OUTPATIENT
Start: 2023-06-02 | End: 2023-06-02

## 2023-06-02 RX ORDER — SODIUM CHLORIDE 9 MG/ML
250 INJECTION INTRAMUSCULAR; INTRAVENOUS; SUBCUTANEOUS ONCE
Refills: 0 | Status: COMPLETED | OUTPATIENT
Start: 2023-06-02 | End: 2023-06-02

## 2023-06-02 RX ORDER — METOPROLOL TARTRATE 50 MG
100 TABLET ORAL ONCE
Refills: 0 | Status: COMPLETED | OUTPATIENT
Start: 2023-06-02 | End: 2023-06-02

## 2023-06-02 RX ORDER — SODIUM CHLORIDE 9 MG/ML
500 INJECTION INTRAMUSCULAR; INTRAVENOUS; SUBCUTANEOUS
Refills: 0 | Status: DISCONTINUED | OUTPATIENT
Start: 2023-06-02 | End: 2023-06-16

## 2023-06-02 RX ADMIN — SODIUM CHLORIDE 75 MILLILITER(S): 9 INJECTION INTRAMUSCULAR; INTRAVENOUS; SUBCUTANEOUS at 12:22

## 2023-06-02 RX ADMIN — Medication 100 MILLIGRAM(S): at 13:21

## 2023-06-02 RX ADMIN — SODIUM CHLORIDE 250 MILLILITER(S): 9 INJECTION INTRAMUSCULAR; INTRAVENOUS; SUBCUTANEOUS at 12:22

## 2023-06-02 RX ADMIN — Medication 20 MILLIGRAM(S): at 13:21

## 2023-06-05 LAB
ISTAT INR: 1.1 — SIGNIFICANT CHANGE UP (ref 0.88–1.16)
ISTAT PT: 13.3 SEC — HIGH (ref 10–12.9)

## 2023-06-11 DIAGNOSIS — Z11.52 ENCOUNTER FOR SCREENING FOR COVID-19: ICD-10-CM

## 2023-06-16 VITALS
HEIGHT: 74 IN | OXYGEN SATURATION: 100 % | HEART RATE: 72 BPM | TEMPERATURE: 98 F | SYSTOLIC BLOOD PRESSURE: 148 MMHG | DIASTOLIC BLOOD PRESSURE: 98 MMHG | WEIGHT: 203.93 LBS | RESPIRATION RATE: 18 BRPM

## 2023-06-16 RX ORDER — CHLORHEXIDINE GLUCONATE 213 G/1000ML
1 SOLUTION TOPICAL ONCE
Refills: 0 | Status: DISCONTINUED | OUTPATIENT
Start: 2023-06-26 | End: 2023-06-27

## 2023-06-16 NOTE — H&P ADULT - HISTORY OF PRESENT ILLNESS
Cardiologist:  Pharmacy: 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  Pt initially transferred from [facility] 3/24/23 with HFpEF exacerbation, Afib w/ RVR and LE cellulitis c/b TATIANA, transaminitis and rectal sheath hematoma while on Eliquis.  s/p TTE with severe, eccentric, anteriorly directed MR with flail, s/p VERONIQUE negative for endocarditis.  Hospital course c/b profoundly vasoplegic requiring cyanocit intraprocedure, pressors, AV pacing for asystole, dobutamine gtt for low urine output and rising lactate, elevated TSH/T4: Euthyroid sick syndrome vs. subclinical hypothyroidism.  Of note, pt was previously scheduled for C with staged PCI on 6/2/23 but the case was cancelled due to Cr of 1.36.     Pt ___denies active CP, SOB, diaphoresis, orthopnea, PND, dizziness, syncope, abdominal pain/discomfort, LE swelling or any other complaints at this time. Pt endorses chest pain after walking. Pt ___denies hematochezia, melena, BRBPR.     Given pt's risk factors, abnormal cath revealing significant 3V CAD and recent CTS, pt is recommended for LHC with staged PCI with Dr Sousa.      Cardiologist:  Pharmacy: [facility] pharmacy  Escort: none    48M w/ PMHx of HTN, HLD, HFpEF, HOCM, severe MR s/p MVR, h/o 3vCAD s/p 1vCABG (SVG- PDA) w/ MVR i/s/o severe MR (4/7/23 @ [facility]), residual LCx and LAD disease, AF s/p biatrial cryomaze (4/7/23), CKD-III (baseline Cr __), SAH 2/2 cerebral artery dissection s/p coil embolization (7/2021), presents to [facility] for recommended staged PCI of residual CAD. She denies any ___ CP, palpitations, dizziness, syncope, diaphoresis, fatigue, LE edema, SOB, OAKES, orthopnea, PND, N/V/D, abd pain, cough, congestion, fever, chills or recent sick contact.     Cardiac Cath 4/6/23: LM normal, mLAD 60% (ectatic, large), m/dLAD 80%, pLCx MLI, dOM3 80% (large), RCA mild diffuse (large, ectatic), dRCA 80%, and large PDA/RPL. TTE 4/3/23: mild-mod LVH, normal LVEF, severely dilated LA, severe MR (posterior leaflet flair), PASP 44mmHg.    In light of pts risk factors, CCS class __ anginal symptoms and abnormal ____, pt now presents to [facility] for recommended cardiac catheterization with possible intervention if clinically indicated.  Cardiologist:  Pharmacy: [facility] pharmacy  Escort: none    48M w/ PMHx of HTN, HLD, HFpEF, HOCM, severe MR s/p MVR, h/o 3vCAD s/p 1vCABG (SVG- PDA) w/ MVR i/s/o severe MR (4/7/23 @ [facility]), residual LCx and LAD disease, AF s/p biatrial cryomaze (4/7/23), CKD-III (Cr 1.36 on 6/2/23 - staged PCI post-poned), SAH 2/2 cerebral artery dissection s/p coil embolization (7/2021), returns to [facility] for recommended staged PCI of residual CAD. She endorses __ and denies any ___ CP, palpitations, dizziness, syncope, diaphoresis, fatigue, LE edema, SOB, OAKES, orthopnea, PND, N/V/D, abd pain, cough, congestion, fever, chills or recent sick contact.     Cardiac Cath 4/6/23: LM normal, mLAD 60% (ectatic, large), m/dLAD 80%, pLCx MLI, dOM3 80% (large), RCA mild diffuse (large, ectatic), dRCA 80%, and large PDA/RPL. TTE 4/3/23: mild-mod LVH, normal LVEF, severely dilated LA, severe MR (posterior leaflet flair), PASP 44mmHg.    In light of pts risk factors, CCS class __ anginal symptoms and known residual CAD, pt now presents to [facility] for recommended staged PCI. Cardiologist: Dr. Sousa  Pharmacy: [facility] pharmacy  Escort: none    48M w/ PMHx of HTN, HLD, HFpEF, HOCM, 3vCAD s/p 1vCABG (SVG- PDA, residual LCx and LAD) w/ MVRepair i/s/o severe MR and s/p cryomaze MARILYN Ligation for h/o AF (4/7/23 @ [facility] w/ Dr. 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 for recommended staged PCI of residual CAD. He reports that he has been feeling well since CABG and denies any CP, palpitations, dizziness, syncope, diaphoresis, fatigue, LE edema, OAKES, orthopnea, PND, N/V/D, abd pain, fever, chills or recent sick contact.     Cardiac Cath 4/6/23: LM normal, mLAD 60% (ectatic, large), m/dLAD 80%, pLCx MLI, dOM3 80% (large), RCA mild diffuse (large, ectatic), dRCA 80%, and large PDA/RPL. TTE 4/3/23: mild-mod LVH, normal LVEF, severely dilated LA, severe MR (posterior leaflet flair), PASP 44mmHg.    In light of pts risk factors and known residual CAD, pt now presents to [facility] for recommended staged PCI.

## 2023-06-16 NOTE — H&P ADULT - NSICDXPASTSURGICALHX_GEN_ALL_CORE_FT
PAST SURGICAL HISTORY:  S/P coil embolization of cerebral aneurysm      PAST SURGICAL HISTORY:  S/P CABG x 1     S/P coil embolization of cerebral aneurysm     S/P mitral valve repair

## 2023-06-16 NOTE — H&P ADULT - NSHPLABSRESULTS_GEN_ALL_CORE
11.5   8.13  )-----------( 257      ( 26 Jun 2023 06:59 )             37.3       06-26    140  |  106  |  29<H>  ----------------------------<  95  4.1   |  26  |  1.24    Ca    9.1      26 Jun 2023 06:59  Mg     1.8     06-26    TPro  7.0  /  Alb  4.3  /  TBili  0.7  /  DBili  x   /  AST  30  /  ALT  35  /  AlkPhos  72  06-26      PT/INR - ( 26 Jun 2023 06:59 )   PT: 11.4 sec;   INR: 0.96          PTT - ( 26 Jun 2023 06:59 )  PTT:30.0 sec    CARDIAC MARKERS ( 26 Jun 2023 06:59 )  x     / x     / 106 U/L / x     / 6.1 ng/mL        Urinalysis Basic - ( 26 Jun 2023 06:59 )    Color: x / Appearance: x / SG: x / pH: x  Gluc: 95 mg/dL / Ketone: x  / Bili: x / Urobili: x   Blood: x / Protein: x / Nitrite: x   Leuk Esterase: x / RBC: x / WBC x   Sq Epi: x / Non Sq Epi: x / Bacteria: x        EKG: NSR, non ischemic.

## 2023-06-16 NOTE — H&P ADULT - CARDIOVASCULAR
normal/regular rate and rhythm/S1 S2 present/no gallops/no rub/no murmur/no JVD/pedal edema/vascular

## 2023-06-16 NOTE — H&P ADULT - NSICDXPASTMEDICALHX_GEN_ALL_CORE_FT
PAST MEDICAL HISTORY:  Atrial fibrillation     HOCM (hypertrophic obstructive cardiomyopathy)     HTN (hypertension)     Mitral regurgitation     SAH (subarachnoid hemorrhage)      PAST MEDICAL HISTORY:  Atrial fibrillation     CAD (coronary artery disease)     HOCM (hypertrophic obstructive cardiomyopathy)     HTN (hypertension)     Mitral regurgitation     SAH (subarachnoid hemorrhage)

## 2023-06-16 NOTE — H&P ADULT - ASSESSMENT
48M w/ PMHx of HTN, HLD, HFpEF, HOCM, h/o 3vCAD s/p 1vCABG (SVG- PDA, residual LCx and LAD) w/ MVRepair i/s/o severe MR and s/p cryomaze MARILYN Ligation for h/o AF (4/7/23 @ [facility] w/ Dr. 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      -ASA 3, Mallampati 3  -Pt compliant w/ home ASA 81mg, last dose 6/25/23. ASA 81mg and load w/ Plavix 600mg prior to cath.  -Pre cath IVF Hydration: NS 250cc bolus then c/w maintenance NS @ 75cc/hr x 2hrs  -Pre cath consented    Risks & benefits of procedure and alternative therapy have been explained to the patient including but not limited to: allergic reaction, bleeding w/possible need for blood transfusion, infection, renal and vascular compromise, limb damage, arrhythmia, stroke, vessel dissection/perforation, Myocardial infarction, emergent CABG. Informed consent obtained and in chart.

## 2023-06-16 NOTE — H&P ADULT - RESPIRATORY
normal/clear to auscultation bilaterally/no wheezes/no rales/no rhonchi/no respiratory distress/no use of accessory muscles/respirations non-labored

## 2023-06-23 ENCOUNTER — APPOINTMENT (OUTPATIENT)
Dept: HEART AND VASCULAR | Facility: CLINIC | Age: 48
End: 2023-06-23

## 2023-06-26 ENCOUNTER — INPATIENT (INPATIENT)
Facility: HOSPITAL | Age: 48
LOS: 0 days | Discharge: HOME | DRG: 247 | End: 2023-06-27
Attending: INTERNAL MEDICINE | Admitting: INTERNAL MEDICINE
Payer: COMMERCIAL

## 2023-06-26 DIAGNOSIS — Z98.890 OTHER SPECIFIED POSTPROCEDURAL STATES: Chronic | ICD-10-CM

## 2023-06-26 DIAGNOSIS — Z95.1 PRESENCE OF AORTOCORONARY BYPASS GRAFT: Chronic | ICD-10-CM

## 2023-06-26 LAB
ALBUMIN SERPL ELPH-MCNC: 4.3 G/DL — SIGNIFICANT CHANGE UP (ref 3.3–5)
ALP SERPL-CCNC: 72 U/L — SIGNIFICANT CHANGE UP (ref 40–120)
ALT FLD-CCNC: 35 U/L — SIGNIFICANT CHANGE UP (ref 10–45)
ANION GAP SERPL CALC-SCNC: 8 MMOL/L — SIGNIFICANT CHANGE UP (ref 5–17)
APTT BLD: 30 SEC — SIGNIFICANT CHANGE UP (ref 27.5–35.5)
AST SERPL-CCNC: 30 U/L — SIGNIFICANT CHANGE UP (ref 10–40)
BASOPHILS # BLD AUTO: 0.09 K/UL — SIGNIFICANT CHANGE UP (ref 0–0.2)
BASOPHILS NFR BLD AUTO: 1.1 % — SIGNIFICANT CHANGE UP (ref 0–2)
BILIRUB SERPL-MCNC: 0.7 MG/DL — SIGNIFICANT CHANGE UP (ref 0.2–1.2)
BUN SERPL-MCNC: 29 MG/DL — HIGH (ref 7–23)
CALCIUM SERPL-MCNC: 9.1 MG/DL — SIGNIFICANT CHANGE UP (ref 8.4–10.5)
CHLORIDE SERPL-SCNC: 106 MMOL/L — SIGNIFICANT CHANGE UP (ref 96–108)
CHOLEST SERPL-MCNC: 156 MG/DL — SIGNIFICANT CHANGE UP
CK MB CFR SERPL CALC: 6.1 NG/ML — SIGNIFICANT CHANGE UP (ref 0–6.7)
CK SERPL-CCNC: 106 U/L — SIGNIFICANT CHANGE UP (ref 30–200)
CO2 SERPL-SCNC: 26 MMOL/L — SIGNIFICANT CHANGE UP (ref 22–31)
CREAT SERPL-MCNC: 1.24 MG/DL — SIGNIFICANT CHANGE UP (ref 0.5–1.3)
EGFR: 72 ML/MIN/1.73M2 — SIGNIFICANT CHANGE UP
EOSINOPHIL # BLD AUTO: 0.36 K/UL — SIGNIFICANT CHANGE UP (ref 0–0.5)
EOSINOPHIL NFR BLD AUTO: 4.4 % — SIGNIFICANT CHANGE UP (ref 0–6)
GLUCOSE SERPL-MCNC: 95 MG/DL — SIGNIFICANT CHANGE UP (ref 70–99)
HCT VFR BLD CALC: 37.3 % — LOW (ref 39–50)
HDLC SERPL-MCNC: 64 MG/DL — SIGNIFICANT CHANGE UP
HGB BLD-MCNC: 11.5 G/DL — LOW (ref 13–17)
IMM GRANULOCYTES NFR BLD AUTO: 0.5 % — SIGNIFICANT CHANGE UP (ref 0–0.9)
INR BLD: 0.96 — SIGNIFICANT CHANGE UP (ref 0.88–1.16)
ISTAT ACTK (ACTIVATED CLOTTING TIME KAOLIN): 395 SEC — HIGH (ref 74–137)
LIPID PNL WITH DIRECT LDL SERPL: 80 MG/DL — SIGNIFICANT CHANGE UP
LYMPHOCYTES # BLD AUTO: 2.75 K/UL — SIGNIFICANT CHANGE UP (ref 1–3.3)
LYMPHOCYTES # BLD AUTO: 33.8 % — SIGNIFICANT CHANGE UP (ref 13–44)
MAGNESIUM SERPL-MCNC: 1.8 MG/DL — SIGNIFICANT CHANGE UP (ref 1.6–2.6)
MCHC RBC-ENTMCNC: 26 PG — LOW (ref 27–34)
MCHC RBC-ENTMCNC: 30.8 GM/DL — LOW (ref 32–36)
MCV RBC AUTO: 84.4 FL — SIGNIFICANT CHANGE UP (ref 80–100)
MONOCYTES # BLD AUTO: 0.74 K/UL — SIGNIFICANT CHANGE UP (ref 0–0.9)
MONOCYTES NFR BLD AUTO: 9.1 % — SIGNIFICANT CHANGE UP (ref 2–14)
NEUTROPHILS # BLD AUTO: 4.15 K/UL — SIGNIFICANT CHANGE UP (ref 1.8–7.4)
NEUTROPHILS NFR BLD AUTO: 51.1 % — SIGNIFICANT CHANGE UP (ref 43–77)
NON HDL CHOLESTEROL: 92 MG/DL — SIGNIFICANT CHANGE UP
NRBC # BLD: 0 /100 WBCS — SIGNIFICANT CHANGE UP (ref 0–0)
PLATELET # BLD AUTO: 257 K/UL — SIGNIFICANT CHANGE UP (ref 150–400)
POTASSIUM SERPL-MCNC: 4.1 MMOL/L — SIGNIFICANT CHANGE UP (ref 3.5–5.3)
POTASSIUM SERPL-SCNC: 4.1 MMOL/L — SIGNIFICANT CHANGE UP (ref 3.5–5.3)
PROT SERPL-MCNC: 7 G/DL — SIGNIFICANT CHANGE UP (ref 6–8.3)
PROTHROM AB SERPL-ACNC: 11.4 SEC — SIGNIFICANT CHANGE UP (ref 10.5–13.4)
RBC # BLD: 4.42 M/UL — SIGNIFICANT CHANGE UP (ref 4.2–5.8)
RBC # FLD: 18.6 % — HIGH (ref 10.3–14.5)
SODIUM SERPL-SCNC: 140 MMOL/L — SIGNIFICANT CHANGE UP (ref 135–145)
TRIGL SERPL-MCNC: 58 MG/DL — SIGNIFICANT CHANGE UP
WBC # BLD: 8.13 K/UL — SIGNIFICANT CHANGE UP (ref 3.8–10.5)
WBC # FLD AUTO: 8.13 K/UL — SIGNIFICANT CHANGE UP (ref 3.8–10.5)

## 2023-06-26 PROCEDURE — 92978 ENDOLUMINL IVUS OCT C 1ST: CPT | Mod: 26,LD

## 2023-06-26 PROCEDURE — 92928 PRQ TCAT PLMT NTRAC ST 1 LES: CPT | Mod: LD

## 2023-06-26 PROCEDURE — 99152 MOD SED SAME PHYS/QHP 5/>YRS: CPT

## 2023-06-26 PROCEDURE — 93010 ELECTROCARDIOGRAM REPORT: CPT

## 2023-06-26 PROCEDURE — 93454 CORONARY ARTERY ANGIO S&I: CPT | Mod: 26,59

## 2023-06-26 RX ORDER — FUROSEMIDE 40 MG
1 TABLET ORAL
Refills: 0 | DISCHARGE

## 2023-06-26 RX ORDER — SODIUM CHLORIDE 9 MG/ML
250 INJECTION INTRAMUSCULAR; INTRAVENOUS; SUBCUTANEOUS ONCE
Refills: 0 | Status: COMPLETED | OUTPATIENT
Start: 2023-06-26 | End: 2023-06-26

## 2023-06-26 RX ORDER — CLOPIDOGREL BISULFATE 75 MG/1
75 TABLET, FILM COATED ORAL DAILY
Refills: 0 | Status: DISCONTINUED | OUTPATIENT
Start: 2023-06-27 | End: 2023-06-27

## 2023-06-26 RX ORDER — METOPROLOL TARTRATE 50 MG
1 TABLET ORAL
Refills: 0 | DISCHARGE

## 2023-06-26 RX ORDER — FUROSEMIDE 40 MG
20 TABLET ORAL DAILY
Refills: 0 | Status: DISCONTINUED | OUTPATIENT
Start: 2023-06-26 | End: 2023-06-27

## 2023-06-26 RX ORDER — METOPROLOL TARTRATE 50 MG
100 TABLET ORAL
Refills: 0 | Status: DISCONTINUED | OUTPATIENT
Start: 2023-06-26 | End: 2023-06-27

## 2023-06-26 RX ORDER — LEVOTHYROXINE SODIUM 125 MCG
1 TABLET ORAL
Refills: 0 | DISCHARGE

## 2023-06-26 RX ORDER — DILTIAZEM HCL 120 MG
1 CAPSULE, EXT RELEASE 24 HR ORAL
Refills: 0 | DISCHARGE

## 2023-06-26 RX ORDER — OXYBUTYNIN CHLORIDE 5 MG
5 TABLET ORAL EVERY 8 HOURS
Refills: 0 | Status: DISCONTINUED | OUTPATIENT
Start: 2023-06-26 | End: 2023-06-27

## 2023-06-26 RX ORDER — SODIUM CHLORIDE 9 MG/ML
500 INJECTION INTRAMUSCULAR; INTRAVENOUS; SUBCUTANEOUS
Refills: 0 | Status: DISCONTINUED | OUTPATIENT
Start: 2023-06-26 | End: 2023-06-26

## 2023-06-26 RX ORDER — ASPIRIN/CALCIUM CARB/MAGNESIUM 324 MG
81 TABLET ORAL DAILY
Refills: 0 | Status: DISCONTINUED | OUTPATIENT
Start: 2023-06-27 | End: 2023-06-27

## 2023-06-26 RX ORDER — DILTIAZEM HCL 120 MG
120 CAPSULE, EXT RELEASE 24 HR ORAL DAILY
Refills: 0 | Status: DISCONTINUED | OUTPATIENT
Start: 2023-06-26 | End: 2023-06-27

## 2023-06-26 RX ORDER — CLOPIDOGREL BISULFATE 75 MG/1
75 TABLET, FILM COATED ORAL ONCE
Refills: 0 | Status: COMPLETED | OUTPATIENT
Start: 2023-06-26 | End: 2023-06-26

## 2023-06-26 RX ORDER — ATORVASTATIN CALCIUM 80 MG/1
80 TABLET, FILM COATED ORAL AT BEDTIME
Refills: 0 | Status: DISCONTINUED | OUTPATIENT
Start: 2023-06-26 | End: 2023-06-27

## 2023-06-26 RX ORDER — SODIUM CHLORIDE 9 MG/ML
500 INJECTION INTRAMUSCULAR; INTRAVENOUS; SUBCUTANEOUS
Refills: 0 | Status: DISCONTINUED | OUTPATIENT
Start: 2023-06-26 | End: 2023-06-27

## 2023-06-26 RX ORDER — ASPIRIN/CALCIUM CARB/MAGNESIUM 324 MG
81 TABLET ORAL ONCE
Refills: 0 | Status: COMPLETED | OUTPATIENT
Start: 2023-06-26 | End: 2023-06-26

## 2023-06-26 RX ORDER — LEVOTHYROXINE SODIUM 125 MCG
50 TABLET ORAL DAILY
Refills: 0 | Status: DISCONTINUED | OUTPATIENT
Start: 2023-06-26 | End: 2023-06-27

## 2023-06-26 RX ADMIN — SODIUM CHLORIDE 50 MILLILITER(S): 9 INJECTION INTRAMUSCULAR; INTRAVENOUS; SUBCUTANEOUS at 13:21

## 2023-06-26 RX ADMIN — ATORVASTATIN CALCIUM 80 MILLIGRAM(S): 80 TABLET, FILM COATED ORAL at 22:16

## 2023-06-26 RX ADMIN — Medication 5 MILLIGRAM(S): at 17:57

## 2023-06-26 RX ADMIN — SODIUM CHLORIDE 75 MILLILITER(S): 9 INJECTION INTRAMUSCULAR; INTRAVENOUS; SUBCUTANEOUS at 08:31

## 2023-06-26 RX ADMIN — Medication 100 MILLIGRAM(S): at 14:01

## 2023-06-26 RX ADMIN — Medication 50 MICROGRAM(S): at 17:57

## 2023-06-26 RX ADMIN — SODIUM CHLORIDE 500 MILLILITER(S): 9 INJECTION INTRAMUSCULAR; INTRAVENOUS; SUBCUTANEOUS at 08:31

## 2023-06-26 RX ADMIN — Medication 81 MILLIGRAM(S): at 08:31

## 2023-06-26 RX ADMIN — Medication 5 MILLIGRAM(S): at 22:16

## 2023-06-26 RX ADMIN — CLOPIDOGREL BISULFATE 75 MILLIGRAM(S): 75 TABLET, FILM COATED ORAL at 08:31

## 2023-06-26 RX ADMIN — Medication 20 MILLIGRAM(S): at 14:01

## 2023-06-26 NOTE — PATIENT PROFILE ADULT - FALL HARM RISK - HARM RISK INTERVENTIONS

## 2023-06-27 ENCOUNTER — TRANSCRIPTION ENCOUNTER (OUTPATIENT)
Age: 48
End: 2023-06-27

## 2023-06-27 VITALS — TEMPERATURE: 98 F

## 2023-06-27 LAB
ANION GAP SERPL CALC-SCNC: 9 MMOL/L — SIGNIFICANT CHANGE UP (ref 5–17)
BUN SERPL-MCNC: 21 MG/DL — SIGNIFICANT CHANGE UP (ref 7–23)
CALCIUM SERPL-MCNC: 9.6 MG/DL — SIGNIFICANT CHANGE UP (ref 8.4–10.5)
CHLORIDE SERPL-SCNC: 102 MMOL/L — SIGNIFICANT CHANGE UP (ref 96–108)
CO2 SERPL-SCNC: 29 MMOL/L — SIGNIFICANT CHANGE UP (ref 22–31)
CREAT SERPL-MCNC: 1.14 MG/DL — SIGNIFICANT CHANGE UP (ref 0.5–1.3)
EGFR: 79 ML/MIN/1.73M2 — SIGNIFICANT CHANGE UP
GLUCOSE SERPL-MCNC: 92 MG/DL — SIGNIFICANT CHANGE UP (ref 70–99)
HCT VFR BLD CALC: 41.2 % — SIGNIFICANT CHANGE UP (ref 39–50)
HGB BLD-MCNC: 12.6 G/DL — LOW (ref 13–17)
MAGNESIUM SERPL-MCNC: 2 MG/DL — SIGNIFICANT CHANGE UP (ref 1.6–2.6)
MCHC RBC-ENTMCNC: 25.8 PG — LOW (ref 27–34)
MCHC RBC-ENTMCNC: 30.6 GM/DL — LOW (ref 32–36)
MCV RBC AUTO: 84.4 FL — SIGNIFICANT CHANGE UP (ref 80–100)
NRBC # BLD: 0 /100 WBCS — SIGNIFICANT CHANGE UP (ref 0–0)
PLATELET # BLD AUTO: 280 K/UL — SIGNIFICANT CHANGE UP (ref 150–400)
POTASSIUM SERPL-MCNC: 4.1 MMOL/L — SIGNIFICANT CHANGE UP (ref 3.5–5.3)
POTASSIUM SERPL-SCNC: 4.1 MMOL/L — SIGNIFICANT CHANGE UP (ref 3.5–5.3)
RBC # BLD: 4.88 M/UL — SIGNIFICANT CHANGE UP (ref 4.2–5.8)
RBC # FLD: 18.1 % — HIGH (ref 10.3–14.5)
SODIUM SERPL-SCNC: 140 MMOL/L — SIGNIFICANT CHANGE UP (ref 135–145)
WBC # BLD: 8.11 K/UL — SIGNIFICANT CHANGE UP (ref 3.8–10.5)
WBC # FLD AUTO: 8.11 K/UL — SIGNIFICANT CHANGE UP (ref 3.8–10.5)

## 2023-06-27 PROCEDURE — 93005 ELECTROCARDIOGRAM TRACING: CPT

## 2023-06-27 PROCEDURE — C1894: CPT

## 2023-06-27 PROCEDURE — 80061 LIPID PANEL: CPT

## 2023-06-27 PROCEDURE — C1874: CPT

## 2023-06-27 PROCEDURE — 83735 ASSAY OF MAGNESIUM: CPT

## 2023-06-27 PROCEDURE — 99239 HOSP IP/OBS DSCHRG MGMT >30: CPT

## 2023-06-27 PROCEDURE — 85025 COMPLETE CBC W/AUTO DIFF WBC: CPT

## 2023-06-27 PROCEDURE — 82553 CREATINE MB FRACTION: CPT

## 2023-06-27 PROCEDURE — C1887: CPT

## 2023-06-27 PROCEDURE — C1753: CPT

## 2023-06-27 PROCEDURE — C1760: CPT

## 2023-06-27 PROCEDURE — C1725: CPT

## 2023-06-27 PROCEDURE — 80053 COMPREHEN METABOLIC PANEL: CPT

## 2023-06-27 PROCEDURE — 85730 THROMBOPLASTIN TIME PARTIAL: CPT

## 2023-06-27 PROCEDURE — 85347 COAGULATION TIME ACTIVATED: CPT

## 2023-06-27 PROCEDURE — 85027 COMPLETE CBC AUTOMATED: CPT

## 2023-06-27 PROCEDURE — 80048 BASIC METABOLIC PNL TOTAL CA: CPT

## 2023-06-27 PROCEDURE — 82550 ASSAY OF CK (CPK): CPT

## 2023-06-27 PROCEDURE — 85610 PROTHROMBIN TIME: CPT

## 2023-06-27 PROCEDURE — 36415 COLL VENOUS BLD VENIPUNCTURE: CPT

## 2023-06-27 PROCEDURE — C1769: CPT

## 2023-06-27 RX ORDER — ATORVASTATIN CALCIUM 80 MG/1
1 TABLET, FILM COATED ORAL
Qty: 30 | Refills: 11
Start: 2023-06-27 | End: 2024-06-20

## 2023-06-27 RX ORDER — ASPIRIN/CALCIUM CARB/MAGNESIUM 324 MG
1 TABLET ORAL
Qty: 30 | Refills: 11
Start: 2023-06-27 | End: 2024-06-20

## 2023-06-27 RX ORDER — EZETIMIBE 10 MG/1
1 TABLET ORAL
Qty: 30 | Refills: 11
Start: 2023-06-27 | End: 2024-06-20

## 2023-06-27 RX ORDER — CLOPIDOGREL BISULFATE 75 MG/1
1 TABLET, FILM COATED ORAL
Qty: 30 | Refills: 11
Start: 2023-06-27 | End: 2024-06-20

## 2023-06-27 RX ORDER — LISINOPRIL 2.5 MG/1
10 TABLET ORAL DAILY
Refills: 0 | Status: DISCONTINUED | OUTPATIENT
Start: 2023-06-27 | End: 2023-06-27

## 2023-06-27 RX ORDER — MAGNESIUM OXIDE 400 MG ORAL TABLET 241.3 MG
400 TABLET ORAL ONCE
Refills: 0 | Status: COMPLETED | OUTPATIENT
Start: 2023-06-27 | End: 2023-06-27

## 2023-06-27 RX ADMIN — Medication 100 MILLIGRAM(S): at 05:45

## 2023-06-27 RX ADMIN — Medication 5 MILLIGRAM(S): at 05:45

## 2023-06-27 RX ADMIN — MAGNESIUM OXIDE 400 MG ORAL TABLET 400 MILLIGRAM(S): 241.3 TABLET ORAL at 09:38

## 2023-06-27 RX ADMIN — Medication 20 MILLIGRAM(S): at 05:45

## 2023-06-27 RX ADMIN — CLOPIDOGREL BISULFATE 75 MILLIGRAM(S): 75 TABLET, FILM COATED ORAL at 14:14

## 2023-06-27 RX ADMIN — Medication 5 MILLIGRAM(S): at 14:14

## 2023-06-27 RX ADMIN — Medication 81 MILLIGRAM(S): at 14:14

## 2023-06-27 RX ADMIN — Medication 50 MICROGRAM(S): at 05:45

## 2023-06-27 RX ADMIN — Medication 120 MILLIGRAM(S): at 05:45

## 2023-06-27 NOTE — DISCHARGE NOTE PROVIDER - CARE PROVIDER_API CALL
Trisha Sousa  Interventional Cardiology  [address], [address] [place] [address], [place] [address]  Phone: [phone]  Fax: [phone]  Follow Up Time:    Trisha Sousa  Follow up with Dr Sousa  [phone]  Phone: (   )    -  Fax: (   )    -  Follow Up Time:

## 2023-06-27 NOTE — DISCHARGE NOTE PROVIDER - NSDCMRMEDTOKEN_GEN_ALL_CORE_FT
aspirin 81 mg oral tablet, chewable: 1 tab(s) orally once a day  atorvastatin 80 mg oral tablet: 1 tab(s) orally once a day (at bedtime)  DilTIAZem (Eqv-Cardizem CD) 120 mg/24 hours oral capsule, extended release: 1 tab(s) orally once a day  Lasix 20 mg oral tablet: 1 tab(s) orally once a day  levothyroxine 50 mcg (0.05 mg) oral capsule: 1 tab(s) orally once a day  metoprolol succinate 100 mg oral capsule, extended release: 1 tab(s) orally 2 times a day  oxybutynin 5 mg oral tablet: 1 tab(s) orally every 8 hours   Aspirin Enteric Coated 81 mg oral delayed release tablet: 1 tab(s) orally once a day  atorvastatin 80 mg oral tablet: 1 tab(s) orally once a day (at bedtime)  clopidogrel 75 mg oral tablet: 1 tab(s) orally once a day  DilTIAZem (Eqv-Cardizem CD) 120 mg/24 hours oral capsule, extended release: 1 tab(s) orally once a day  Lasix 20 mg oral tablet: 1 tab(s) orally once a day  levothyroxine 50 mcg (0.05 mg) oral capsule: 1 tab(s) orally once a day  metoprolol succinate 100 mg oral capsule, extended release: 1 tab(s) orally 2 times a day  oxybutynin 5 mg oral tablet: 1 tab(s) orally every 8 hours  Zetia 10 mg oral tablet: 1 tab(s) orally once a day (at bedtime)   Aspirin Enteric Coated 81 mg oral delayed release tablet: 1 tab(s) orally once a day  atorvastatin 80 mg oral tablet: 1 tab(s) orally once a day (at bedtime)  cardiac rehabilitation: Cardiac Rehabilitation  Dx:  Unstable Angina  3 times weekly for 12 weeks  clopidogrel 75 mg oral tablet: 1 tab(s) orally once a day  DilTIAZem (Eqv-Cardizem CD) 120 mg/24 hours oral capsule, extended release: 1 tab(s) orally once a day  Lasix 20 mg oral tablet: 1 tab(s) orally once a day  levothyroxine 50 mcg (0.05 mg) oral capsule: 1 tab(s) orally once a day  metoprolol succinate 100 mg oral capsule, extended release: 1 tab(s) orally 2 times a day  oxybutynin 5 mg oral tablet: 1 tab(s) orally every 8 hours  Zetia 10 mg oral tablet: 1 tab(s) orally once a day (at bedtime)

## 2023-06-27 NOTE — DISCHARGE NOTE NURSING/CASE MANAGEMENT/SOCIAL WORK - NSDCPEPT PROEDHF_GEN_ALL_CORE
----- Message from Suzy Flores sent at 5/28/2019  8:50 AM CDT -----  Contact: Patient  Type: Patient Call Back    Who called: Patient    What is the request in detail: Pt is requesting a patch for nausea. Pt states that she is going on a cruise.    Can the clinic reply by [facility]? No    Would the patient rather a call back or a response via [facility]? Call back    Best call back number:[phone]    Additional Information: [id number] - [place], [place] - [address] [facility]. [phone] (Phone)  [phone] (Fax)          
Please advise.   
Call primary care provider for follow up after discharge/Activities as tolerated/Low salt diet/Monitor weight daily/Report signs and symptoms to primary care provider

## 2023-06-27 NOTE — DISCHARGE NOTE PROVIDER - HOSPITAL COURSE
48M w/ PMHx of HTN, HLD, HFpEF, HOCM, 3vCAD s/p 1vCABG (SVG- PDA, residual LCx and LAD) w/ MVRepair i/s/o severe MR and s/p cryomaze MARILYN Ligation for h/o AF (4/7/23 @ [facility] w/ Dr. 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 for recommended staged PCI of residual CAD. He reports that he has been feeling well since CABG and denies any CP, palpitations, dizziness, syncope, diaphoresis, fatigue, LE edema, OAKES, orthopnea, PND, N/V/D, abd pain, fever, chills or recent sick contact.     Cardiac Cath 4/6/23: LM normal, mLAD 60% (ectatic, large), m/dLAD 80%, pLCx MLI, dOM3 80% (large), RCA mild diffuse (large, ectatic), dRCA 80%, and large PDA/RPL. TTE 4/3/23: mild-mod LVH, normal LVEF, severely dilated LA, severe MR (posterior leaflet flair), PASP 44mmHg.    In light of pts risk factors and known residual CAD, pt now presents to [facility] for recommended staged PCI.    s/p Cardiac Cath: ALEXA x 2 to proxLAD. proxLAD 80%. dLAD 80%. Patent SVG graft to PDA. LM normal. proxLAD 80%, dLAD 80%. dLCX 70-80%. dRCA-rPDA 80%.    RFA Perclose. Of note, groin bleed in holding. Manual pressure held for 10 min and redressed. Soft, no hematoma.    IC/Fellow: Merry  DAPT: ASA/Plavix    Recommend starting pt on GDMT. Follow up with Dr. Sousa in [place] office [phone].    Pt seen at bedside today, examined found NAD, HD stable, denies any complaints of CP, SOB or palpitations at this time.  VSS.  EKG showed no acute ischemic events.  Tele Monitor/Labs reviewed.  Right Rad/Groin access stable without hematoma or bleed.  Right pulse stable, back to baseline.  Pt is deemed stable for discharge per . ***  with follow up in 1-2 weeks with Dr *** for post discharge check-up.    Discharge plans and instruction discussed with pt who is agreeable with plan.  Pt is advised to return to the nearest ED if worsening symptoms of CP, SOB or palpitations or severe bleeding from access   site occurs.    Discharge Medications:  Pt is to continue ASA/Plavix/Brilinta/Effient, Atorvastatin?Crestor as e-prescribed.  Rx sent to pt preferred pharmacy.  Pt is advised to call Cardiology Charge at [phone] if difficulty encountered picking up prescribed medications 48M w/ PMHx of HTN, HLD, HFpEF, HOCM, 3vCAD s/p 1vCABG (SVG- PDA, residual LCx and LAD) w/ MVRepair i/s/o severe MR and s/p cryomaze MARILYN Ligation for h/o AF (4/7/23 @ [facility] w/ Dr. 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 He reports that he has been feeling well since CABG and denies any CP, palpitations, dizziness, syncope, diaphoresis, fatigue, LE edema, OAKES, orthopnea, PND, N/V/D, abd pain, fever, chills or recent sick contact.  Cardiac Cath 4/6/23: LM normal, mLAD 60% (ectatic, large), m/dLAD 80%, pLCx MLI, dOM3 80% (large), RCA mild diffuse (large, ectatic), dRCA 80%, and large PDA/RPL. TTE 4/3/23: mild-mod LVH, normal LVEF, severely dilated LA, severe MR (posterior leaflet flair), PASP 44mmHg.  In light of pts risk factors and known residual CAD, pt now presents to [facility] for recommended staged PCI.    Pt is s/p Cardiac Cath: ALEXA x 2 to proxLAD. proxLAD 80%. dLAD 80%. Patent SVG graft to PDA. LM normal. proxLAD 80%, dLAD 80%. dLCX 70-80%. dRCA-rPDA 80%.  RFA Perclose. Of note, groin bleed in holding. Manual pressure held for 10 min and redressed. Soft, no hematoma.  Pt is recommended GDMT, which will likely be initiated at follow up with Dr. Sousa in [place] office [phone].    Pt seen at bedside today, examined found NAD, HD stable, denies any complaints of CP, SOB or palpitations at this time.  VSS.  EKG showed no acute ischemic events.  Tele Monitor/Labs reviewed.  Right Groin access stable without hematoma or bleed.  Right pulse stable, back to baseline.  Pt is deemed stable for discharge per Dr. Coreas with follow up in 1-2 weeks with Dr Sousa for post discharge check-up.    Discharge plans and instruction discussed with pt who is agreeable with plan.  Pt is advised to return to the nearest ED if worsening symptoms of CP, SOB or palpitations or severe bleeding from access  site occurs.    Discharge Medications:  Pt is to continue ASA/Plavix/Brilinta/Effient, Atorvastatin as e-prescribed.  Rx sent to pt preferred pharmacy.  Pt is advised to call Cardiology Charge at [phone] if difficulty encountered picking up prescribed medications      NO CARDIAC REHAB RX GIVEN AS PT IS RETURNING FOR STAGED PCI IN 5 WEEKS. 48M w/ PMHx of HTN, HLD, HFpEF, HOCM, 3vCAD s/p 1vCABG (SVG- PDA, residual LCx and LAD) w/ MVRepair i/s/o severe MR and s/p cryomaze MARILYN Ligation for h/o AF (4/7/23 @ [facility] w/ Dr. 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 He reports that he has been feeling well since CABG and denies any CP, palpitations, dizziness, syncope, diaphoresis, fatigue, LE edema, OAKES, orthopnea, PND, N/V/D, abd pain, fever, chills or recent sick contact.  Cardiac Cath 4/6/23: LM normal, mLAD 60% (ectatic, large), m/dLAD 80%, pLCx MLI, dOM3 80% (large), RCA mild diffuse (large, ectatic), dRCA 80%, and large PDA/RPL. TTE 4/3/23: mild-mod LVH, normal LVEF, severely dilated LA, severe MR (posterior leaflet flair), PASP 44mmHg.  In light of pts risk factors and known residual CAD, pt now presents to [facility] for recommended staged PCI.    Pt is s/p Cardiac Cath: ALEXA x 2 to proxLAD. proxLAD 80%. dLAD 80%. Patent SVG graft to PDA. LM normal. proxLAD 80%, dLAD 80%. dLCX 70-80%. dRCA-rPDA 80%.  RFA Perclose. Of note, groin bleed in holding. Manual pressure held for 10 min and redressed. Soft, no hematoma.  Pt is recommended GDMT, which will likely be initiated at follow up with Dr. Sousa in [place] office [phone].    Pt seen at bedside today, examined found NAD, HD stable, denies any complaints of CP, SOB or palpitations at this time.  VSS.  EKG showed no acute ischemic events.  Tele Monitor/Labs reviewed.  Right Groin access stable without hematoma or bleed.  Right pulse stable, back to baseline.  Pt is deemed stable for discharge per Dr. Coreas with follow up in 1-2 weeks with Dr Sousa for post discharge check-up.    Discharge plans and instruction discussed with pt who is agreeable with plan.  Pt is advised to return to the nearest ED if worsening symptoms of CP, SOB or palpitations or severe bleeding from access  site occurs.    Discharge Medications:  Pt is to continue ASA/Plavix/Brilinta/Effient, Atorvastatin as e-prescribed.  Rx sent to pt preferred pharmacy.  Pt is advised to call Cardiology Charge at [phone] if difficulty encountered picking up prescribed medications  NO CARDIAC REHAB RX GIVEN AS PT IS RETURNING FOR STAGED PCI IN 5 WEEKS. 48M w/ PMHx of HTN, HLD, HFpEF, HOCM, 3vCAD s/p 1vCABG (SVG- PDA, residual LCx and LAD) w/ MVRepair i/s/o severe MR and s/p cryomaze MARILYN Ligation for h/o AF (4/7/23 @ [facility] w/ Dr. 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 He reports that he has been feeling well since CABG and denies any CP, palpitations, dizziness, syncope, diaphoresis, fatigue, LE edema, OAKES, orthopnea, PND, N/V/D, abd pain, fever, chills or recent sick contact.  Cardiac Cath 4/6/23: LM normal, mLAD 60% (ectatic, large), m/dLAD 80%, pLCx MLI, dOM3 80% (large), RCA mild diffuse (large, ectatic), dRCA 80%, and large PDA/RPL. TTE 4/3/23: mild-mod LVH, normal LVEF, severely dilated LA, severe MR (posterior leaflet flair), PASP 44mmHg.  In light of pts risk factors and known residual CAD, pt now presents to [facility] for recommended staged PCI.    Pt is s/p Cardiac Cath: ALEXA x 2 to proxLAD. proxLAD 80%. dLAD 80%. Patent SVG graft to PDA. LM normal. proxLAD 80%, dLAD 80%. dLCX 70-80%. dRCA-rPDA 80%.  RFA Perclose. Of note, groin bleed in holding. Manual pressure held for 10 min and redressed. Soft, no hematoma.  Pt is recommended GDMT, which will likely be initiated at follow up with Dr. Sousa in [place] office [phone].    Pt seen at bedside today, examined found NAD, HD stable, denies any complaints of CP, SOB or palpitations at this time.  VSS.  EKG showed no acute ischemic events.  Tele Monitor/Labs reviewed.  Right Groin access stable without hematoma or bleed.  Right pulse stable, back to baseline.  Pt is deemed stable for discharge per Dr. Coreas with follow up in 1-2 weeks with Dr Sousa for post discharge check-up.    Discharge plans and instruction discussed with pt who is agreeable with plan.  Pt is advised to return to the nearest ED if worsening symptoms of CP, SOB or palpitations or severe bleeding from access  site occurs.    Discharge Medications:  Pt is to continue ASA/Plavix/Brilinta/Effient, Atorvastatin as e-prescribed.  Rx sent to pt preferred pharmacy.  Pt is advised to call Cardiology Charge at [phone] if difficulty encountered picking up prescribed medications    Applicable Metrics   Cardiac Rehab Indications (Post PCI):            *Education on benefits of Cardiac Rehab provided to patient: Yes         *Referral and Prescription Given for Cardiac Rehab: Yes         *Pt given list of locations & instructed to contact their insurance company to review list of participating providers. Yes          *Pt instructed to bring Cardiac Rehab prescription with them to Cardiology Follow up appointment for assistance with enrollment: Yes  Statin Prescribed (Post PCI this admission):  Yes  DAPT Post PCI: Prescriptions for Aspirin/Plavix e-prescribed to patient's pharmacy: Yes   48M w/ PMHx of HTN, HLD, HFpEF, HOCM, 3vCAD s/p 1vCABG (SVG- PDA, residual LCx and LAD) w/ MVRepair i/s/o severe MR and s/p cryomaze MARILYN Ligation for h/o AF (4/7/23 @ [facility] w/ Dr. 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 He reports that he has been feeling well since CABG and denies any CP, palpitations, dizziness, syncope, diaphoresis, fatigue, LE edema, OAKES, orthopnea, PND, N/V/D, abd pain, fever, chills or recent sick contact.  Cardiac Cath 4/6/23: LM normal, mLAD 60% (ectatic, large), m/dLAD 80%, pLCx MLI, dOM3 80% (large), RCA mild diffuse (large, ectatic), dRCA 80%, and large PDA/RPL. TTE 4/3/23: mild-mod LVH, normal LVEF, severely dilated LA, severe MR (posterior leaflet flair), PASP 44mmHg.  In light of pts risk factors and known residual CAD, pt now presents to [facility] for recommended staged PCI.    Pt is s/p Staged PCI 6/26/23: ALEXA x 2 to proxLAD. proxLAD 80%. dLAD 80%. Patent SVG graft to PDA. LM normal. proxLAD 80%, dLAD 80%. dLCX 70-80%. dRCA-rPDA 80%.  RFA Perclose. Of note, groin bleed in holding. Manual pressure held for 10 min and redressed. Soft, no hematoma.  Pt is recommended GDMT, which will likely be initiated at follow up with Dr. Sousa in [place] office [phone].    Pt seen at bedside today, examined found NAD, HD stable, denies any complaints of CP, SOB or palpitations at this time.  VSS.  EKG showed no acute ischemic events.  Tele Monitor/Labs reviewed.  Right Groin access stable without hematoma or bleed.  Right pulse stable, back to baseline.  Pt is deemed stable for discharge per Dr. Coreas with follow up in 1-2 weeks with Dr Sousa for post discharge check-up.    Discharge plans and instruction discussed with pt who is agreeable with plan.  Pt is advised to return to the nearest ED if worsening symptoms of CP, SOB or palpitations or severe bleeding from access  site occurs.    Discharge Medications:  Pt is to continue ASA/Plavix/Brilinta/Effient, Atorvastatin as e-prescribed.  Rx sent to pt preferred pharmacy.  Pt is advised to call Cardiology Charge at [phone] if difficulty encountered picking up prescribed medications    Applicable Metrics   Cardiac Rehab Indications (Post PCI):            *Education on benefits of Cardiac Rehab provided to patient: Yes         *Referral and Prescription Given for Cardiac Rehab: Yes         *Pt given list of locations & instructed to contact their insurance company to review list of participating providers. Yes          *Pt instructed to bring Cardiac Rehab prescription with them to Cardiology Follow up appointment for assistance with enrollment: Yes  Statin Prescribed (Post PCI this admission):  Yes  DAPT Post PCI: Prescriptions for Aspirin/Plavix e-prescribed to patient's pharmacy: Yes

## 2023-06-27 NOTE — DISCHARGE NOTE PROVIDER - PROVIDER TOKENS
PROVIDER:[TOKEN:[[id number]]] FREE:[LAST:[Merry],FIRST:[Trisha],PHONE:[(   )    -],FAX:[(   )    -],ADDRESS:[Follow up with Dr Sousa  [phone]]]

## 2023-06-27 NOTE — DISCHARGE NOTE PROVIDER - ATTENDING DISCHARGE PHYSICAL EXAMINATION:
No JVD  clear lungs  no cardiac murmurs    started on lisinopril 10 mg for elevated blood pressure and CKD

## 2023-06-27 NOTE — DISCHARGE NOTE PROVIDER - NSDCCAREPROVSEEN_GEN_ALL_CORE_FT
Glenn, Catherine Chen, Maryam Chen, Caro Marie, Belen Vaughan, Melina Burnham, Sil Coreas, Ivette Gonzáles, Nickolas Serra, Evelio Gold

## 2023-06-27 NOTE — DISCHARGE NOTE PROVIDER - NSDCCPCAREPLAN_GEN_ALL_CORE_FT
PRINCIPAL DISCHARGE DIAGNOSIS  Diagnosis: CAD (coronary artery disease)  Assessment and Plan of Treatment: You have a diagnosis of coronary artery disease and underwent a cardiac catheterization and received a stents to your your Left Anterior Descending  Coronary Artery.    --Please continue Aspirin 81mg daily and Plavix (Clopidogrel) 75mg daily as prescribed.    --NEVER MISS A DOSE OF ASPIRIN OR PLAVIX; IF YOU DO, YOU ARE AT RISK OF YOUR STENT CLOSING AND HAVING A HEART ATTACK. DO NOT STOP THESE TWO MEDICATIONS UNLESS INSTRUCTED TO DO SO BY YOUR CARDIOLOGIST.   The procedure was done through the wrist. Please avoid any heavy lifting  (no more than 3 to 5 lbs) or strenuous activity for five days. If you develop any swelling, bleeding, hardening of the skin (hematoma formation), acute pain, numbness/tingling  in your arm please contact your doctor immediately or call our 24/7 line: [phone].   Please make a follow up appointment with your cardiologist Dr Sousa within 1-2 weeks of your discharge. All of your prescriptions have been sent electronically to your pharmacy.  We have provided you with a prescription for cardiac rehab which is medically supervised exercise program for your heart and has been shown to improve the quantity and quality of life of people with heart disease like yours. You should attend cardiac rehab 3 times per week for 12 weeks. We have provided you with a list of nearby facilities. Please call your insurance carrier to determine which of these facilities are covered under your plan. Please bring this prescription with you to your follow up appointment with your cardiologist who can then further assist you to enroll into a cardiac rehab program.        SECONDARY DISCHARGE DIAGNOSES  Diagnosis: HTN (hypertension)  Assessment and Plan of Treatment: You have a history of elevated blood pressure and you should continue your blood pressure medications as prescribed.      Diagnosis: HLD (hyperlipidemia)  Assessment and Plan of Treatment: Please continue your daily statin to ensure your cardiac stent remains open.      Diagnosis: Chronic systolic (congestive) heart failure  Assessment and Plan of Treatment: -Heart failure is a condition that occurs when the heart cannot pump blood as well as it should; this leads to inadequate blood flow to vital organs such as the kidneys and congestion (buildup of fluid) in other vital organs such as the lungs   -You have a history of weakened heart muscle called systolic congestive heart failure. When the heart pumps, it doesn't squeeze normally.   -Please continue your Furosemide 20mg oral daily as previously prescribed.  It is extremely important you adhere to your medication regimen.   -Please make sure you follow up with your cardiologist Dr Sousa  Please weigh yourself daily: if you have gained more than 2-3 lbs in one day or 5 lbs in one week contact your doctor immediately as you may be retaining water weight   -In addition, restrict your salt intake to less than 2 grams a day   -If you develop worsening shortening of breath, leg swelling, fatigue, chest pain, difficulty sleeping at night due to shortness of breath, contact your cardiologist immediately.       PRINCIPAL DISCHARGE DIAGNOSIS  Diagnosis: CAD (coronary artery disease)  Assessment and Plan of Treatment: You have a diagnosis of coronary artery disease and underwent a cardiac catheterization and received a stents to your your Left Anterior Descending  Coronary Artery.    --Please continue Aspirin 81mg daily and Plavix (Clopidogrel) 75mg daily as prescribed.    --NEVER MISS A DOSE OF ASPIRIN OR PLAVIX; IF YOU DO, YOU ARE AT RISK OF YOUR STENT CLOSING AND HAVING A HEART ATTACK. DO NOT STOP THESE TWO MEDICATIONS UNLESS INSTRUCTED TO DO SO BY YOUR CARDIOLOGIST.   The procedure was done through the right groin. Please avoid any heavy lifting  (no more than 3 to 5 lbs) or strenuous activity for five days. If you develop any swelling, bleeding, hardening of the skin (hematoma formation), acute pain, numbness/tingling  in your groin, please contact your doctor immediately or call our 24/7 line: [phone].   Please make a follow up appointment with your cardiologist Dr Sousa within 1-2 weeks of your discharge. All of your prescriptions have been sent electronically to your pharmacy.        SECONDARY DISCHARGE DIAGNOSES  Diagnosis: HTN (hypertension)  Assessment and Plan of Treatment: You have a history of elevated blood pressure and you should continue your blood pressure medications as prescribed.      Diagnosis: HLD (hyperlipidemia)  Assessment and Plan of Treatment: Please continue your daily statin to ensure your cardiac stent remains open.  --Continue Atorvastatin 80mg daily at bedtime  --Zetia 10mg daily at bedtime added for better cholesterol control      Diagnosis: Chronic systolic (congestive) heart failure  Assessment and Plan of Treatment: -Heart failure is a condition that occurs when the heart cannot pump blood as well as it should; this leads to inadequate blood flow to vital organs such as the kidneys and congestion (buildup of fluid) in other vital organs such as the lungs   -You have a history of weakened heart muscle called systolic congestive heart failure. When the heart pumps, it doesn't squeeze normally.   -Please continue your Furosemide 20mg oral daily as previously prescribed.  It is extremely important you adhere to your medication regimen.   -Please make sure you follow up with your cardiologist Dr Sousa  Please weigh yourself daily: if you have gained more than 2-3 lbs in one day or 5 lbs in one week contact your doctor immediately as you may be retaining water weight   -In addition, restrict your salt intake to less than 2 grams a day   -If you develop worsening shortening of breath, leg swelling, fatigue, chest pain, difficulty sleeping at night due to shortness of breath, contact your cardiologist immediately.       PRINCIPAL DISCHARGE DIAGNOSIS  Diagnosis: CAD (coronary artery disease)  Assessment and Plan of Treatment: You have a diagnosis of coronary artery disease and underwent a cardiac catheterization and received a stents to your your Left Anterior Descending  Coronary Artery.    --Please continue Aspirin 81mg daily and Plavix (Clopidogrel) 75mg daily as prescribed.    --NEVER MISS A DOSE OF ASPIRIN OR PLAVIX; IF YOU DO, YOU ARE AT RISK OF YOUR STENT CLOSING AND HAVING A HEART ATTACK. DO NOT STOP THESE TWO MEDICATIONS UNLESS INSTRUCTED TO DO SO BY YOUR CARDIOLOGIST.   The procedure was done through the right groin. Please avoid any heavy lifting  (no more than 3 to 5 lbs) or strenuous activity for five days. If you develop any swelling, bleeding, hardening of the skin (hematoma formation), acute pain, numbness/tingling  in your groin, please contact your doctor immediately or call our 24/7 line: [phone].   Please make a follow up appointment with your cardiologist Dr Sousa within 1-2 weeks of your discharge. All of your prescriptions have been sent electronically to your pharmacy.        SECONDARY DISCHARGE DIAGNOSES  Diagnosis: HTN (hypertension)  Assessment and Plan of Treatment: You have a history of elevated blood pressure and you should continue your blood pressure medications as prescribed.      Diagnosis: HLD (hyperlipidemia)  Assessment and Plan of Treatment: Please continue your daily statin to ensure your cardiac stent remains open.  --Continue Atorvastatin 80mg daily at bedtime  --Zetia 10mg daily at bedtime added for better cholesterol control      Diagnosis: Chronic diastolic congestive heart failure  Assessment and Plan of Treatment:    Heart failure is a condition that occurs when the heart cannot pump blood as well as it should; this leads to inadequate blood flow to vital organs such as the kidneys and congestion (buildup of fluid) in other vital organs such as the lungs   You have a condition called diastolic congestive heart failure meaning the heart is too?stiff. When the heart pumps, it doesn't rellax and refill with blood normally  -Please continue your Furosemide 20mg oral daily as previously prescribed.  It is extremely important you adhere to your medication regimen.   Please make sure you follow up with your cardiologist within one week of discharge.    Please weigh yourself daily: if you have gained more than 2-3 lbs in one day or 5 lbs in one week contact your doctor immediately as you may be retaining water weight   In addition, restrict your salt intake to less than 2 grams a day .  If you develop worsening shortening of breath, leg swelling, fatigue, chest pain, difficulty sleeping at night due to shortness of breath, contact your cardiologist immediately       PRINCIPAL DISCHARGE DIAGNOSIS  Diagnosis: CAD (coronary artery disease)  Assessment and Plan of Treatment: You have a diagnosis of coronary artery disease and underwent a cardiac catheterization and received a stents to your your Left Anterior Descending  Coronary Artery.    --Please continue Aspirin 81mg daily and Plavix (Clopidogrel) 75mg daily as prescribed.    --NEVER MISS A DOSE OF ASPIRIN OR PLAVIX; IF YOU DO, YOU ARE AT RISK OF YOUR STENT CLOSING AND HAVING A HEART ATTACK. DO NOT STOP THESE TWO MEDICATIONS UNLESS INSTRUCTED TO DO SO BY YOUR CARDIOLOGIST.   The procedure was done through the right groin. Please avoid any heavy lifting  (no more than 3 to 5 lbs) or strenuous activity for five days. If you develop any swelling, bleeding, hardening of the skin (hematoma formation), acute pain, numbness/tingling  in your groin, please contact your doctor immediately or call our 24/7 line: [phone].   Please make a follow up appointment with your cardiologist Dr Sousa within 1-2 weeks of your discharge. All of your prescriptions have been sent electronically to your pharmacy.  We have provided you with a prescription for cardiac rehab which is medically supervised exercise program for your heart and has been shown to improve the quantity and quality of life of people with heart disease like yours. You should attend cardiac rehab 3 times per week for 12 weeks. We have provided you with a list of nearby facilities. Please call your insurance carrier to determine which of these facilities are covered under your plan. Please bring this prescription with you to your follow up appointment with your cardiologist who can then further assist you to enroll into a cardiac rehab program.        SECONDARY DISCHARGE DIAGNOSES  Diagnosis: HTN (hypertension)  Assessment and Plan of Treatment: You have a history of elevated blood pressure and you should continue your blood pressure medications as prescribed.      Diagnosis: HLD (hyperlipidemia)  Assessment and Plan of Treatment: Please continue your daily statin to ensure your cardiac stent remains open.  --Continue Atorvastatin 80mg daily at bedtime  --Zetia 10mg daily at bedtime added for better cholesterol control      Diagnosis: Chronic diastolic congestive heart failure  Assessment and Plan of Treatment:    Heart failure is a condition that occurs when the heart cannot pump blood as well as it should; this leads to inadequate blood flow to vital organs such as the kidneys and congestion (buildup of fluid) in other vital organs such as the lungs   You have a condition called diastolic congestive heart failure meaning the heart is too?stiff. When the heart pumps, it doesn't rellax and refill with blood normally  -Please continue your Furosemide 20mg oral daily as previously prescribed.  It is extremely important you adhere to your medication regimen.   Please make sure you follow up with your cardiologist within one week of discharge.    Please weigh yourself daily: if you have gained more than 2-3 lbs in one day or 5 lbs in one week contact your doctor immediately as you may be retaining water weight   In addition, restrict your salt intake to less than 2 grams a day .  If you develop worsening shortening of breath, leg swelling, fatigue, chest pain, difficulty sleeping at night due to shortness of breath, contact your cardiologist immediately

## 2023-06-27 NOTE — DISCHARGE NOTE NURSING/CASE MANAGEMENT/SOCIAL WORK - PATIENT PORTAL LINK FT
You can access the FollowMyHealth Patient Portal offered by [facility] by registering at the following website: [email or web address]. By joining [facility]’s FollowMyHealth portal, you will also be able to view your health information using other applications (apps) compatible with our system.

## 2023-06-27 NOTE — DISCHARGE NOTE PROVIDER - NSDCFUADDINST_GEN_ALL_CORE_FT
Please continue to follow a heart healthy diet, low in sodium, cholesterol and fats. We recommend a Mediterranean diet:  -Incorporate 2 or more servings per day of vegetables, fruits, and whole grains  -Increase intake of fish and legumes/beans to 2 or more servings per week  -Increase intake of healthy fats, such as olive oil and avocados, and have a handful of nuts/seeds most days  -Reduce red/processed meat consumption to 2 or fewer times per week   Please continue to follow a heart healthy diet, low in sodium, cholesterol and fats. We recommend a Mediterranean diet:  -Incorporate 2 or more servings per day of vegetables, fruits, and whole grains  -Increase intake of fish and legumes/beans to 2 or more servings per week  -Increase intake of healthy fats, such as olive oil and avocados, and have a handful of nuts/seeds most days    No heavy lifting, strenuous activity, bending, straining or unnecessary stair climbing  for 2 weeks. No sex for 1 week.  No driving for 2 days. You may shower 24 hours following procedure but avoid baths and swimming for 1 week. Check groin site for bleeding and/or swelling daily following procedure. Call your doctor/cardiologist immediately should it occur or if you have increased/persistent pain at the site. Follow up with your cardiologist in 1- 2 weeks. You may call [facility] unit at  [phone] after office hours and weekends  with any questions or concerns following your procedure. Take medications as prescribed.    -Reduce red/processed meat consumption to 2 or fewer times per week

## 2023-07-06 DIAGNOSIS — N18.2 CHRONIC KIDNEY DISEASE, STAGE 2 (MILD): ICD-10-CM

## 2023-07-06 DIAGNOSIS — Z79.82 LONG TERM (CURRENT) USE OF ASPIRIN: ICD-10-CM

## 2023-07-06 DIAGNOSIS — E78.5 HYPERLIPIDEMIA, UNSPECIFIED: ICD-10-CM

## 2023-07-06 DIAGNOSIS — Z95.1 PRESENCE OF AORTOCORONARY BYPASS GRAFT: ICD-10-CM

## 2023-07-06 DIAGNOSIS — I50.32 CHRONIC DIASTOLIC (CONGESTIVE) HEART FAILURE: ICD-10-CM

## 2023-07-06 DIAGNOSIS — I48.91 UNSPECIFIED ATRIAL FIBRILLATION: ICD-10-CM

## 2023-07-06 DIAGNOSIS — I67.1 CEREBRAL ANEURYSM, NONRUPTURED: ICD-10-CM

## 2023-07-06 DIAGNOSIS — I13.0 HYPERTENSIVE HEART AND CHRONIC KIDNEY DISEASE WITH HEART FAILURE AND STAGE 1 THROUGH STAGE 4 CHRONIC KIDNEY DISEASE, OR UNSPECIFIED CHRONIC KIDNEY DISEASE: ICD-10-CM

## 2023-07-06 DIAGNOSIS — I25.10 ATHEROSCLEROTIC HEART DISEASE OF NATIVE CORONARY ARTERY WITHOUT ANGINA PECTORIS: ICD-10-CM

## 2023-07-06 DIAGNOSIS — Z79.01 LONG TERM (CURRENT) USE OF ANTICOAGULANTS: ICD-10-CM

## 2023-07-06 DIAGNOSIS — I42.1 OBSTRUCTIVE HYPERTROPHIC CARDIOMYOPATHY: ICD-10-CM

## 2024-01-23 NOTE — ED ADULT NURSE NOTE - CHPI ED NUR DURATION
It was a pleasure taking care of you today!     Home Care:   - Do not take the sleeping medicine that you took today    Follow-Up Plan:  - Follow-up with primary care doctor within 3 - 5 days to discuss your recent ER visit and any additional concerns that you may have.  - Additional testing and/or evaluation as directed by your primary doctor    Return to the Emergency Department for symptoms including but not limited to: persistence or worsening of symptoms, shortness of breath or chest pain, inability to drink without vomiting, passing out/fainting/ loss of consciousness, or if you have other concerns.    
today

## 2024-04-04 NOTE — DIETITIAN INITIAL EVALUATION ADULT - PROBLEM SELECTOR PROBLEM 1
AQUILINO  
Patient LVM for a call back to r/s.  
Acute on chronic heart failure with preserved ejection fraction (HFpEF)

## 2024-05-07 NOTE — PATIENT PROFILE ADULT - FUNCTIONAL ASSESSMENT - BASIC MOBILITY 3.
4 = No assist / stand by assistance At the time of discharge the patient is at baseline physical, mental and behavioral state.  I instructed the patient to return to the emergency department with any alarming symptoms, any worsening symptoms, or any other concerning symptoms.  I instructed this patient to call their primary doctor today, to inform them of their visit to the emergency department, and to obtain a repeat evaluation from their primary doctor in the next 24 hours.  This patient understands and agrees with this plan for follow up and feels safe returning home.  At the time of discharge this patient remained in stable condition, remained in no acute distress, and their vital signs remained stable measured within normal limits.

## 2024-05-23 NOTE — PROVIDER CONTACT NOTE (OTHER) - BACKGROUND
"[facility]      Name: Chantal Ridley  Clinic Number: [id number]    Therapy Diagnosis:   Encounter Diagnosis   Name Primary?    Decreased ROM of trunk and back Yes     Physician: Jeronimo Rodrigez MD    Visit Date: 2024  Physician Orders: PT Eval and Treat  Medical Diagnosis from Referral:   Diagnosis   M47.816 (ICD-10-CM) - Lumbar spondylosis      Evaluation Date: 2024  Authorization Period Expiration: 24  Plan of Care Expiration: 2024  Reassessment Due: 2024 (A reassessment was performed by Paola Temple PT this visit 24  Visit # / Visits authorized:  (lumbar roll on MedX)  MedX testing visit 2     Time In: 2:00 PM  Time Out: 3:20 PM  Total Billable Time: 60 min 1:1  INSURANCE and OUTCOMES: Fee for Service with FOTO Outcomes 1/3     Precautions: standard, HTN, and diabetes     Pattern of pain determined: 1PEN    Subjective   Chantal reports chronic lower back and R thigh pain that is rated as severe. States that she had an epidural injection earlier this month but no change in pain symptoms yet.     Patient reports tolerating previous visit: Min soreness  Patient reports their pain to be 8-9/10 on a 0-10 scale with 0 being no pain and 10 being the worst pain imaginable.  Pain Location: LB B\  Social History:  lives alone in shotgun home, has hand rails and 3 steps into house  Occupation: retired  Leisure: visit with friends   Pt goals: " decrease pain"     Objective     Baseline IM Testing Results:   Date of testin24  ROM 12=30 deg   Max Peak Torque 92    Min Peak Torque 69    Flex/Ext Ratio 1.3:1   % below normative data 23     OUTCOMES SELECTION:   Intake Outcome Measure for FOTO Lumbar Survey     Therapist reviewed FOTO scores for Chantal Ridley on 2024.   FOTO documents entered into EPIC - see Media section.     Intake Score: 13% functional ability  Goal Score: 30% functional ability         Treatment    Chantal received " Per H&P: "Patient edematous and accidentally scratched fluid filled blisters on his LE." the treatments listed below:      Chantal received neuromuscular education  to isolate and engage spinal stabilization musculature correctly for motor control and coordination to aid in function and posture for 10 minutes on the Medical Medx Machine.  Patient performed MedX dynamic exercise with emphasis on spinal muscular control using pacer throughout  active range of motion. Therapist assisted patient in achieving optimal exertion for neural reeducation and endurance training by using the  Nandini Exertion Rating scale, by instructing the patient to aim for mid range of exertion, performing 15-20 repetitions, slowly, correctly,and safely.           5/23/2024     3:49 PM   [facility]   Visit Number 4   VAS Pain Rating 10   Time 5   Lumbar Stretches - Slouch Overcorrection 10   Flexion in Lying 10   Lumbar Flexion 33   Lumbar Extension 12   Lumbar Weight 38 lbs   Repetitions 15   Rating of Perceived Exertion 3   Ice - Z Lie (in min.) 5         therapeutic exercises to develop strength, endurance, ROM, flexibility, posture, and core stabilization for 40 minutes including:    LTR 10x  BKTC c/ ball  PPT 10x  TrA activation with T-ball x 10  Bridging x 10 (minimal lift off)  BKFO c/YTB 10x  +Long axis distraction R LE  + SOC x 10    Peripheral muscle strengthening which included 1 set of 15-20 repetitions at a slow, controlled 10-13 second per rep pace focused on strengthening supporting musculature for improved body mechanics and functional mobility.  Pt and therapist focused on proper form during treatment to ensure optimal strengthening of each targeted muscle group.  She was able to complete full circuit of peripheral machines today with increased time.    cold pack for 5 minutes to LB.    Home Exercises Provided and Patient Education Provided   Home exercises include:LTR  Cardio program:5/16/24  Lifting education date:TBD [facility] [place] Discharge handout (date given):  Equipment at home/gym membership:  no      Education provided:   -  cues w/exs  - MedX performance  - Precor ex performance  - HB Protocol    Written Home Exercises Provided: Patient instructed to cont prior HEP.  Exercises were reviewed and Chantal was able to demonstrate them prior to the end of the session.  Chantal demonstrated fair understanding of the education provided.     See EMR under Patient Instructions for exercises provided prior visit.    Assessment   Chantal presents to her fourth healthy back visit reporting chronic back and R thigh pain that is rated as severe.  Treatment continued with flexibility, strengthening and neuromuscular reeducation ex's.  Assist needed for right SLR due to pain today. Lumbar MedX range of motion increased to 33 degrees flexion and resistance maintained at 35 ft/lbs. She completed 15 reps with a RPE = 3/10 (lumbar Roll used on MedX for comfort) Cues required for pacing, full ROM and extension hold. She completed all of the peripheral strengthening ex's to including : torso rotation, leg press, hip abd/add, leg extension, leg curl, triceps, biceps, row and chest press with modifications as needed and increased time to complete.. Will continue per HB protocol and patient tolerance.    Patient is making fair progress towards established goals.  Pt will continue to benefit from skilled outpatient physical therapy to address the deficits stated in the impairment chart, provide pt/family education and to maximize pt's level of independence in the home and community environment.     Anticipated Barriers for therapy: transportation/pain level   Pt's spiritual, cultural and educational needs considered and pt agreeable to plan of care and goals as stated below:       GOALS: Pt is in agreement with the following goals.     Short term goals:  6 weeks or 10 visits   - Pt will demonstrate increased lumbar MedX ROM by at least 3 degrees from the initial ROM value with improvements noted in functional ROM and ability to  perform ADLs. Appropriate and Ongoing  - Pt will demonstrate increased MedX average isometric strength value by 20% from initial test resulting in improved ability to perform bending, lifting, and carrying activities safely, confidently. Appropriate and Ongoing  - Pt will report a reduction in worst pain score by 1-2 points for improved tolerance for standing. Appropriate and Ongoing  - Pt able to perform HEP correctly with minimal cueing or supervision from therapist to encourage independent management of symptoms. Appropriate and Ongoing     Long term goals: 10 weeks or 20 visits   - Pt will demonstrate increased lumbar MedX ROM by at least 9 degrees from initial ROM value, resulting in improved ability to perform functional forward bending while standing and sitting. Appropriate and Ongoing  - Pt will demonstrate increased MedX average isometric strength value by 40% from initial test resulting in improved ability to perform bending, lifting, and carrying activities safely and confidently. Appropriate and Ongoing  - Pt to demonstrate ability to independently control and reduce their pain through posture positioning and mechanical movements throughout a typical day. Appropriate and Ongoing  - Pt will demonstrate reduced pain and improved functional outcomes as reported on the FOTO by reaching an intake score of >/= 30% functional ability in order to demonstrate subjective improvement in patient's condition. . Appropriate and Ongoing  - Pt will demonstrate independence with the HEP at discharge. Appropriate and Ongoing  - Pt(patient goal)will be able to walk without AD > 5 minutes without sitting  Appropriate and Ongoing    Plan   Continue with established Plan of Care towards established PT goals.     Therapist: Zulay Bosch, PT  5/23/2024

## 2024-07-10 NOTE — PROGRESS NOTE ADULT - PROBLEM SELECTOR PROBLEM 3
Left ventricular outflow obstruction
martine

## 2024-09-23 PROBLEM — I25.10 ATHEROSCLEROTIC HEART DISEASE OF NATIVE CORONARY ARTERY WITHOUT ANGINA PECTORIS: Chronic | Status: ACTIVE | Noted: 2023-06-26

## 2024-09-24 ENCOUNTER — NON-APPOINTMENT (OUTPATIENT)
Age: 49
End: 2024-09-24

## 2024-10-03 PROBLEM — I34.0 SEVERE MITRAL VALVE REGURGITATION: Status: RESOLVED | Noted: 2023-04-06 | Resolved: 2024-10-03

## 2024-10-03 PROBLEM — Z86.39 HISTORY OF HYPERLIPIDEMIA: Status: RESOLVED | Noted: 2024-10-03 | Resolved: 2024-10-03

## 2024-10-03 PROBLEM — Z86.39 HISTORY OF HYPOTHYROIDISM: Status: RESOLVED | Noted: 2024-10-03 | Resolved: 2024-10-03

## 2024-10-03 PROBLEM — Z86.73 HISTORY OF CEREBROVASCULAR ACCIDENT: Status: RESOLVED | Noted: 2021-08-24 | Resolved: 2024-10-03

## 2024-10-03 PROBLEM — Z86.79 H/O: HTN (HYPERTENSION): Status: RESOLVED | Noted: 2024-10-03 | Resolved: 2024-10-03

## 2024-10-03 PROBLEM — R27.0 ATAXIA OF LEFT UPPER EXTREMITY: Status: RESOLVED | Noted: 2021-10-12 | Resolved: 2024-10-03

## 2024-10-03 PROBLEM — L03.119 CELLULITIS, LEG: Status: RESOLVED | Noted: 2024-10-03 | Resolved: 2024-10-03

## 2024-10-03 PROBLEM — I48.0 AF (PAROXYSMAL ATRIAL FIBRILLATION): Status: RESOLVED | Noted: 2021-08-24 | Resolved: 2024-10-03

## 2024-10-03 PROBLEM — Z09 POSTOP CHECK: Status: RESOLVED | Noted: 2023-04-19 | Resolved: 2024-10-03

## 2024-10-03 PROBLEM — N17.9 AKI (ACUTE KIDNEY INJURY): Status: RESOLVED | Noted: 2024-10-03 | Resolved: 2024-10-03

## 2024-10-03 PROBLEM — Z86.73 HISTORY OF CVA (CEREBROVASCULAR ACCIDENT): Status: RESOLVED | Noted: 2024-10-03 | Resolved: 2024-10-03

## 2024-10-03 PROBLEM — I63.9 ISCHEMIC STROKE: Status: RESOLVED | Noted: 2021-11-02 | Resolved: 2024-10-03

## 2024-10-03 PROBLEM — I77.74: Status: RESOLVED | Noted: 2021-08-24 | Resolved: 2024-10-03

## 2024-10-03 PROBLEM — I48.0 PAROXYSMAL ATRIAL FIBRILLATION: Status: RESOLVED | Noted: 2024-10-03 | Resolved: 2024-10-03

## 2024-10-03 PROBLEM — Z86.79 HISTORY OF UNCONTROLLED HYPERTENSION: Status: RESOLVED | Noted: 2021-08-24 | Resolved: 2024-10-03

## 2024-10-03 RX ORDER — LEVOTHYROXINE SODIUM 0.05 MG/1
50 TABLET ORAL DAILY
Refills: 0 | Status: ACTIVE | COMMUNITY

## 2024-10-03 RX ORDER — CLOPIDOGREL 75 MG/1
75 TABLET, FILM COATED ORAL
Qty: 30 | Refills: 0 | Status: ACTIVE | COMMUNITY

## 2024-10-03 RX ORDER — HYDRALAZINE HYDROCHLORIDE 25 MG/1
25 TABLET ORAL TWICE DAILY
Refills: 0 | Status: ACTIVE | COMMUNITY

## 2024-10-03 RX ORDER — AMLODIPINE BESYLATE 10 MG/1
10 TABLET ORAL DAILY
Qty: 30 | Refills: 0 | Status: ACTIVE | COMMUNITY

## 2024-10-03 RX ORDER — FUROSEMIDE 20 MG/1
20 TABLET ORAL DAILY
Qty: 30 | Refills: 0 | Status: ACTIVE | COMMUNITY

## 2024-10-03 RX ORDER — METOPROLOL TARTRATE 100 MG/1
100 TABLET ORAL
Refills: 0 | Status: ACTIVE | COMMUNITY

## 2024-10-03 RX ORDER — ASPIRIN 81 MG
81 TABLET, DELAYED RELEASE (ENTERIC COATED) ORAL DAILY
Qty: 30 | Refills: 6 | Status: ACTIVE | COMMUNITY

## 2024-10-15 ENCOUNTER — NON-APPOINTMENT (OUTPATIENT)
Age: 49
End: 2024-10-15

## 2024-10-15 ENCOUNTER — RESULT REVIEW (OUTPATIENT)
Age: 49
End: 2024-10-15

## 2024-10-15 ENCOUNTER — OUTPATIENT (OUTPATIENT)
Dept: OUTPATIENT SERVICES | Facility: HOSPITAL | Age: 49
LOS: 1 days | End: 2024-10-15
Payer: COMMERCIAL

## 2024-10-15 ENCOUNTER — APPOINTMENT (OUTPATIENT)
Dept: CARDIOTHORACIC SURGERY | Facility: CLINIC | Age: 49
End: 2024-10-15
Payer: COMMERCIAL

## 2024-10-15 VITALS
BODY MASS INDEX: 32.85 KG/M2 | HEIGHT: 74 IN | OXYGEN SATURATION: 97 % | TEMPERATURE: 96.3 F | HEART RATE: 65 BPM | SYSTOLIC BLOOD PRESSURE: 101 MMHG | DIASTOLIC BLOOD PRESSURE: 55 MMHG | WEIGHT: 256 LBS

## 2024-10-15 DIAGNOSIS — I48.0 PAROXYSMAL ATRIAL FIBRILLATION: ICD-10-CM

## 2024-10-15 DIAGNOSIS — I77.74 DISSECTION OF VERTEBRAL ARTERY: ICD-10-CM

## 2024-10-15 DIAGNOSIS — Z86.39 PERSONAL HISTORY OF OTHER ENDOCRINE, NUTRITIONAL AND METABOLIC DISEASE: ICD-10-CM

## 2024-10-15 DIAGNOSIS — Z86.79 PERSONAL HISTORY OF OTHER DISEASES OF THE CIRCULATORY SYSTEM: ICD-10-CM

## 2024-10-15 DIAGNOSIS — I34.0 NONRHEUMATIC MITRAL (VALVE) INSUFFICIENCY: ICD-10-CM

## 2024-10-15 DIAGNOSIS — Z86.73 PERSONAL HISTORY OF TRANSIENT ISCHEMIC ATTACK (TIA), AND CEREBRAL INFARCTION W/OUT RESIDUAL DEFICITS: ICD-10-CM

## 2024-10-15 DIAGNOSIS — R27.0 ATAXIA, UNSPECIFIED: ICD-10-CM

## 2024-10-15 DIAGNOSIS — I63.9 CEREBRAL INFARCTION, UNSPECIFIED: ICD-10-CM

## 2024-10-15 DIAGNOSIS — I34.89 OTHER NONRHEUMATIC MITRAL VALVE DISORDERS: ICD-10-CM

## 2024-10-15 DIAGNOSIS — Z09 ENCOUNTER FOR FOLLOW-UP EXAMINATION AFTER COMPLETED TREATMENT FOR CONDITIONS OTHER THAN MALIGNANT NEOPLASM: ICD-10-CM

## 2024-10-15 DIAGNOSIS — N17.9 ACUTE KIDNEY FAILURE, UNSPECIFIED: ICD-10-CM

## 2024-10-15 DIAGNOSIS — Z98.890 OTHER SPECIFIED POSTPROCEDURAL STATES: Chronic | ICD-10-CM

## 2024-10-15 DIAGNOSIS — L03.119 CELLULITIS OF UNSPECIFIED PART OF LIMB: ICD-10-CM

## 2024-10-15 DIAGNOSIS — Z98.890 OTHER SPECIFIED POSTPROCEDURAL STATES: ICD-10-CM

## 2024-10-15 DIAGNOSIS — Z95.1 PRESENCE OF AORTOCORONARY BYPASS GRAFT: Chronic | ICD-10-CM

## 2024-10-15 PROCEDURE — 93312 ECHO TRANSESOPHAGEAL: CPT | Mod: 26

## 2024-10-15 PROCEDURE — 99215 OFFICE O/P EST HI 40 MIN: CPT

## 2024-10-15 PROCEDURE — 93320 DOPPLER ECHO COMPLETE: CPT | Mod: 26

## 2024-10-15 PROCEDURE — 93312 ECHO TRANSESOPHAGEAL: CPT

## 2024-10-15 PROCEDURE — 93325 DOPPLER ECHO COLOR FLOW MAPG: CPT | Mod: 26

## 2024-10-16 PROBLEM — I34.89 SYSTOLIC ANTERIOR MOVEMENT OF MITRAL VALVE: Status: ACTIVE | Noted: 2024-10-16

## 2025-02-19 ENCOUNTER — RESULT REVIEW (OUTPATIENT)
Age: 50
End: 2025-02-19

## 2025-02-19 ENCOUNTER — NON-APPOINTMENT (OUTPATIENT)
Age: 50
End: 2025-02-19

## 2025-02-19 ENCOUNTER — APPOINTMENT (OUTPATIENT)
Dept: HEART FAILURE | Facility: CLINIC | Age: 50
End: 2025-02-19
Payer: COMMERCIAL

## 2025-02-19 ENCOUNTER — OUTPATIENT (OUTPATIENT)
Dept: OUTPATIENT SERVICES | Facility: HOSPITAL | Age: 50
LOS: 1 days | End: 2025-02-19
Payer: COMMERCIAL

## 2025-02-19 VITALS
BODY MASS INDEX: 31.57 KG/M2 | SYSTOLIC BLOOD PRESSURE: 159 MMHG | HEART RATE: 66 BPM | HEIGHT: 74 IN | OXYGEN SATURATION: 95 % | DIASTOLIC BLOOD PRESSURE: 111 MMHG | WEIGHT: 246 LBS

## 2025-02-19 DIAGNOSIS — Z98.890 OTHER SPECIFIED POSTPROCEDURAL STATES: Chronic | ICD-10-CM

## 2025-02-19 DIAGNOSIS — I21.3 ST ELEVATION (STEMI) MYOCARDIAL INFARCTION OF UNSPECIFIED SITE: ICD-10-CM

## 2025-02-19 DIAGNOSIS — I42.2 OTHER HYPERTROPHIC CARDIOMYOPATHY: ICD-10-CM

## 2025-02-19 DIAGNOSIS — Z95.1 PRESENCE OF AORTOCORONARY BYPASS GRAFT: Chronic | ICD-10-CM

## 2025-02-19 PROCEDURE — 93306 TTE W/DOPPLER COMPLETE: CPT | Mod: 26

## 2025-02-19 PROCEDURE — 93312 ECHO TRANSESOPHAGEAL: CPT | Mod: 26

## 2025-02-19 PROCEDURE — 93312 ECHO TRANSESOPHAGEAL: CPT

## 2025-02-19 PROCEDURE — C8929: CPT

## 2025-02-19 PROCEDURE — 99214 OFFICE O/P EST MOD 30 MIN: CPT

## 2025-03-03 ENCOUNTER — NON-APPOINTMENT (OUTPATIENT)
Age: 50
End: 2025-03-03

## 2025-03-25 ENCOUNTER — NON-APPOINTMENT (OUTPATIENT)
Age: 50
End: 2025-03-25

## 2025-03-25 ENCOUNTER — APPOINTMENT (OUTPATIENT)
Dept: HEART FAILURE | Facility: CLINIC | Age: 50
End: 2025-03-25
Payer: COMMERCIAL

## 2025-03-25 VITALS
HEIGHT: 74 IN | WEIGHT: 261 LBS | DIASTOLIC BLOOD PRESSURE: 100 MMHG | SYSTOLIC BLOOD PRESSURE: 142 MMHG | OXYGEN SATURATION: 95 % | HEART RATE: 60 BPM | BODY MASS INDEX: 33.5 KG/M2

## 2025-03-25 DIAGNOSIS — Z98.890 OTHER SPECIFIED POSTPROCEDURAL STATES: ICD-10-CM

## 2025-03-25 DIAGNOSIS — Z95.1 PRESENCE OF AORTOCORONARY BYPASS GRAFT: ICD-10-CM

## 2025-03-25 DIAGNOSIS — I34.89 OTHER NONRHEUMATIC MITRAL VALVE DISORDERS: ICD-10-CM

## 2025-03-25 DIAGNOSIS — I42.2 OTHER HYPERTROPHIC CARDIOMYOPATHY: ICD-10-CM

## 2025-03-25 DIAGNOSIS — I10 ESSENTIAL (PRIMARY) HYPERTENSION: ICD-10-CM

## 2025-03-25 LAB
BASOPHILS # BLD AUTO: 0.06 K/UL
BASOPHILS NFR BLD AUTO: 0.9 %
EOSINOPHIL # BLD AUTO: 0.16 K/UL
EOSINOPHIL NFR BLD AUTO: 2.4 %
HCT VFR BLD CALC: 49.3 %
HGB BLD-MCNC: 15.2 G/DL
IMM GRANULOCYTES NFR BLD AUTO: 0.3 %
LYMPHOCYTES # BLD AUTO: 1.96 K/UL
LYMPHOCYTES NFR BLD AUTO: 29.8 %
MAN DIFF?: NORMAL
MCHC RBC-ENTMCNC: 28.7 PG
MCHC RBC-ENTMCNC: 30.8 G/DL
MCV RBC AUTO: 93 FL
MONOCYTES # BLD AUTO: 0.74 K/UL
MONOCYTES NFR BLD AUTO: 11.3 %
NEUTROPHILS # BLD AUTO: 3.63 K/UL
NEUTROPHILS NFR BLD AUTO: 55.3 %
NT-PROBNP SERPL-MCNC: 613 PG/ML
PLATELET # BLD AUTO: 227 K/UL
RBC # BLD: 5.3 M/UL
RBC # FLD: 14 %
WBC # FLD AUTO: 6.57 K/UL

## 2025-03-25 PROCEDURE — 99204 OFFICE O/P NEW MOD 45 MIN: CPT

## 2025-03-25 PROCEDURE — 93000 ELECTROCARDIOGRAM COMPLETE: CPT

## 2025-03-25 RX ORDER — IBUPROFEN, PHENYLEPHRINE HYDROCHLORIDE 200; 10 MG/1; MG/1
TABLET, COATED ORAL
Refills: 0 | Status: ACTIVE | COMMUNITY

## 2025-03-25 RX ORDER — MAVACAMTEN 10 MG/1
10 CAPSULE, GELATIN COATED ORAL DAILY
Qty: 90 | Refills: 0 | Status: ACTIVE | COMMUNITY
Start: 2025-03-25

## 2025-03-26 LAB
ALBUMIN SERPL ELPH-MCNC: 4.7 G/DL
ALP BLD-CCNC: 73 U/L
ALT SERPL-CCNC: 26 U/L
ANION GAP SERPL CALC-SCNC: 14 MMOL/L
AST SERPL-CCNC: 16 U/L
BILIRUB DIRECT SERPL-MCNC: 0.3 MG/DL
BILIRUB INDIRECT SERPL-MCNC: 0.8 MG/DL
BILIRUB SERPL-MCNC: 1.1 MG/DL
BUN SERPL-MCNC: 19 MG/DL
CALCIUM SERPL-MCNC: 9.5 MG/DL
CHLORIDE SERPL-SCNC: 107 MMOL/L
CHOLEST SERPL-MCNC: 139 MG/DL
CO2 SERPL-SCNC: 23 MMOL/L
CREAT SERPL-MCNC: 1.4 MG/DL
EGFRCR SERPLBLD CKD-EPI 2021: 62 ML/MIN/1.73M2
GLUCOSE SERPL-MCNC: 96 MG/DL
HDLC SERPL-MCNC: 43 MG/DL
IRON SATN MFR SERPL: 15 %
IRON SERPL-MCNC: 59 UG/DL
LDLC SERPL-MCNC: 74 MG/DL
NONHDLC SERPL-MCNC: 96 MG/DL
POTASSIUM SERPL-SCNC: 5.3 MMOL/L
PROT SERPL-MCNC: 6.9 G/DL
SODIUM SERPL-SCNC: 144 MMOL/L
TIBC SERPL-MCNC: 384 UG/DL
TRIGL SERPL-MCNC: 127 MG/DL
TROPONIN I SERPL HS-MCNC: 27 NG/L
UIBC SERPL-MCNC: 326 UG/DL

## 2025-04-10 NOTE — PHYSICAL THERAPY INITIAL EVALUATION ADULT - LEVEL OF CONSCIOUSNESS, REHAB EVAL
[facility] ED to IP Report (EDIP)      Mental Status     Alert and Oriented x 3    Safety     Fall Risk        Weight:  68.8 kg (151 lb 10.8 oz) (04/10/25 0430)    Mobility    Independent    Telemetry  Telemetry ordered:  Yes  NSR    Oxygen  No  If yes, what needs:        Verbal Report Required:   No    Activated POA Contacted:  NA  Please reference ED to IP report for additional information.      Call Terri GODFREY at extension [phone] with any questions.     alert

## 2025-05-08 ENCOUNTER — TRANSCRIPTION ENCOUNTER (OUTPATIENT)
Age: 50
End: 2025-05-08

## 2025-05-22 ENCOUNTER — NON-APPOINTMENT (OUTPATIENT)
Age: 50
End: 2025-05-22

## 2025-05-22 ENCOUNTER — APPOINTMENT (OUTPATIENT)
Dept: HEART FAILURE | Facility: CLINIC | Age: 50
End: 2025-05-22
Payer: COMMERCIAL

## 2025-05-22 VITALS
OXYGEN SATURATION: 97 % | HEART RATE: 66 BPM | BODY MASS INDEX: 33.62 KG/M2 | HEIGHT: 74 IN | DIASTOLIC BLOOD PRESSURE: 96 MMHG | WEIGHT: 262 LBS | TEMPERATURE: 97.2 F | SYSTOLIC BLOOD PRESSURE: 142 MMHG

## 2025-05-22 DIAGNOSIS — I34.89 OTHER NONRHEUMATIC MITRAL VALVE DISORDERS: ICD-10-CM

## 2025-05-22 DIAGNOSIS — I42.2 OTHER HYPERTROPHIC CARDIOMYOPATHY: ICD-10-CM

## 2025-05-22 PROCEDURE — 99214 OFFICE O/P EST MOD 30 MIN: CPT

## 2025-05-22 PROCEDURE — 93000 ELECTROCARDIOGRAM COMPLETE: CPT

## 2025-05-23 LAB
ALBUMIN SERPL ELPH-MCNC: 4.8 G/DL
ALP BLD-CCNC: 69 U/L
ALT SERPL-CCNC: 50 U/L
ANION GAP SERPL CALC-SCNC: 16 MMOL/L
AST SERPL-CCNC: 24 U/L
BILIRUB DIRECT SERPL-MCNC: 0.38 MG/DL
BILIRUB INDIRECT SERPL-MCNC: 1 MG/DL
BILIRUB SERPL-MCNC: 1.3 MG/DL
BUN SERPL-MCNC: 25 MG/DL
CALCIUM SERPL-MCNC: 9.8 MG/DL
CHLORIDE SERPL-SCNC: 102 MMOL/L
CK SERPL-CCNC: 114 U/L
CO2 SERPL-SCNC: 25 MMOL/L
CREAT SERPL-MCNC: 1.42 MG/DL
EGFRCR SERPLBLD CKD-EPI 2021: 60 ML/MIN/1.73M2
GLUCOSE SERPL-MCNC: 83 MG/DL
HCT VFR BLD CALC: 50.6 %
HGB BLD-MCNC: 15.9 G/DL
MCHC RBC-ENTMCNC: 28.7 PG
MCHC RBC-ENTMCNC: 31.4 G/DL
MCV RBC AUTO: 91.3 FL
NT-PROBNP SERPL-MCNC: 469 PG/ML
PLATELET # BLD AUTO: 245 K/UL
POTASSIUM SERPL-SCNC: 5 MMOL/L
PROT SERPL-MCNC: 7.4 G/DL
RBC # BLD: 5.54 M/UL
RBC # FLD: 15.3 %
SODIUM SERPL-SCNC: 143 MMOL/L
WBC # FLD AUTO: 8.56 K/UL

## 2025-07-11 ENCOUNTER — APPOINTMENT (OUTPATIENT)
Dept: HEART FAILURE | Facility: CLINIC | Age: 50
End: 2025-07-11
Payer: COMMERCIAL

## 2025-07-11 VITALS
WEIGHT: 257.6 LBS | DIASTOLIC BLOOD PRESSURE: 95 MMHG | SYSTOLIC BLOOD PRESSURE: 146 MMHG | BODY MASS INDEX: 33.06 KG/M2 | OXYGEN SATURATION: 97 % | HEIGHT: 74 IN | HEART RATE: 68 BPM

## 2025-07-11 PROBLEM — E03.9 HYPOTHYROIDISM, UNSPECIFIED TYPE: Status: ACTIVE | Noted: 2024-10-03

## 2025-07-11 PROCEDURE — 93000 ELECTROCARDIOGRAM COMPLETE: CPT

## 2025-07-11 PROCEDURE — 99214 OFFICE O/P EST MOD 30 MIN: CPT

## (undated) DEVICE — SUT SILK 5-0 60" TIES

## (undated) DEVICE — SUT PROLENE 5-0 36" RB-1

## (undated) DEVICE — TUBING STRYKEFLOW II SUCTION / IRRIGATOR

## (undated) DEVICE — DRAIN RESERVOIR FOR JACKSON PRATT 100CC CARDINAL

## (undated) DEVICE — ELCTR STRYKER NEPTUNE SMOKE EVACUATION PENCIL (GREEN)

## (undated) DEVICE — SUT PROLENE 3-0 36" SH-1

## (undated) DEVICE — DRSG TAPE UMBILICAL COTTON 2" X 30 X 1/8"

## (undated) DEVICE — DRAPE PROBE COVER 5" X 96"

## (undated) DEVICE — DRSG TEGADERM CHG 4X6-1/8"

## (undated) DEVICE — SUT VICRYL 0 27" CT

## (undated) DEVICE — KIT APPLICATOR SPRAY FOR HARVEST SYSTEM

## (undated) DEVICE — TOURNIQUET SET TOURNIKWIK 12FR (2 TUBES, 1 SNARE) 6"

## (undated) DEVICE — SUT ETHIBOND 2-0 30" SH 5 GREEN 5 WHITE

## (undated) DEVICE — PACING CABLE (BLUE) ATRIAL TEMP SCREW DOWN 12FT

## (undated) DEVICE — SUMP INTRACARDIAC/PERICARDIAL 20FR 1/4" ADULT

## (undated) DEVICE — PACK PROC CV DRAPE

## (undated) DEVICE — SUT PROLENE 6-0 30" RB-2

## (undated) DEVICE — SUT TICRON 2-0 36" CV-316 DA

## (undated) DEVICE — CONNECTOR "Y" 1/2 X 3/8 X 3/8"

## (undated) DEVICE — Device

## (undated) DEVICE — SUT PROLENE 3-0 36" SH

## (undated) DEVICE — TOURNIQUET SET 12FR (1 RED, 2 BLUE, 3 CLEAR, 1 SNARE) 5.5"

## (undated) DEVICE — GUIDE SELECTION FOR STUDY ONLY G180173 F/ATRICLIP LAA SYS

## (undated) DEVICE — DRSG TEGADERM 4X4.75"

## (undated) DEVICE — SUT VICRYL 2-0 27" CT-1

## (undated) DEVICE — CATH CV TRAY INSR ST UNIV

## (undated) DEVICE — SUT NUROLON 1 18" OS-8 (POP-OFF)

## (undated) DEVICE — ELCTR STRYKER EXTENSION SUCTION TIP 125MM

## (undated) DEVICE — TUBING SMOKE EVAC 3/8" X 10FT FOR NEPTUNE

## (undated) DEVICE — TUBING PERFUSION ORGANIZER

## (undated) DEVICE — POSITIONER FOAM EGG CRATE ULNAR 2PCS (PINK)

## (undated) DEVICE — SUCTION CATH ARGYLE WHISTLE TIP 14FR STRAIGHT PACKED

## (undated) DEVICE — SUT PROLENE 4-0 36" RB-1

## (undated) DEVICE — DRSG TRACH DRAINAGE 4X4

## (undated) DEVICE — PACK OPEN HEART LNX

## (undated) DEVICE — SUT STAINLESS STEEL 6 4-18" CCS

## (undated) DEVICE — ACROBAT I-STABILIZER

## (undated) DEVICE — SPONGE PEANUT AUTO COUNT

## (undated) DEVICE — SUT DOUBLE 6 WIRE STERNAL

## (undated) DEVICE — SUT MONOCRYL 4-0 27" PS-2 UNDYED

## (undated) DEVICE — NDL COUNTER FOAM AND MAGNET 20-40

## (undated) DEVICE — FOLEY TRAY 16FR 5CC LF LUBRISIL ADVANCE TEMP CLOSED

## (undated) DEVICE — DRSG DERMABOND 0.7ML

## (undated) DEVICE — PREP SCRUB BRUSH W CHG 4%

## (undated) DEVICE — TUBING TRUWAVE PRESSURE MALE/FEMALE 72"

## (undated) DEVICE — RIGID ADULT SUCKER

## (undated) DEVICE — GLV 7 PROTEXIS (WHITE)

## (undated) DEVICE — CHEST DRAIN PLEUR-EVAC DRY/WET ADULT-PEDS SINGLE (QUICK)

## (undated) DEVICE — SUT PLEDGET CV SOFT PTFE 3 X 7 X 1.5MM

## (undated) DEVICE — TUBING MEDI-VAC W MAXIGRIP CONNECTORS 1/4"X6'

## (undated) DEVICE — PACK PROCEDURE HARVEST SMARTPREP APC-60

## (undated) DEVICE — URETERAL CATH RED RUBBER 20FR (YELLOW)

## (undated) DEVICE — SUT ETHIBOND EXCEL 2-0 30" V-4

## (undated) DEVICE — SUT GORETEX CV-4 (3-0) 48" TH-18

## (undated) DEVICE — SUT SILK 0 18" CT-1 (POP-OFF)

## (undated) DEVICE — DRSG BIOPATCH DISK W CHG 1" W 4.0MM HOLE

## (undated) DEVICE — GOWN TRIMAX XXL

## (undated) DEVICE — SUT STAINLESS STEEL 7 4-18" CCS

## (undated) DEVICE — GETINGE VASOVIEW 7 ENDOSCOPIC VESSEL HARVESTING SYSTEM

## (undated) DEVICE — CATH TRIOX OXIMETRY 8F 3 LUMENS

## (undated) DEVICE — COVER PROBE INTRAOP ISOSILK 244X13CM

## (undated) DEVICE — DRAPE IOBAN 33" X 23"

## (undated) DEVICE — PACING CABLE (BROWN) A/V TEMP SCREW DOWN 12FT

## (undated) DEVICE — CATH NG SALEM SUMP 16FR

## (undated) DEVICE — SUT ETHIBOND 2-0 4-30" V-4 WHITE

## (undated) DEVICE — TUBING SUCTION NONCONDUCTIVE 6MM X 12FT

## (undated) DEVICE — DRSG ALLEVYN LIFE 6 X 6 (PINK)

## (undated) DEVICE — SUT VICRYL 1 36" CTX UNDYED

## (undated) DEVICE — TONGUE DEPRESSOR

## (undated) DEVICE — DRSG MEPILEX 10 X 25CM (4 X 10") AG

## (undated) DEVICE — DRAPE SLUSH / WARMER 44 X 66"

## (undated) DEVICE — SUT PLEDGET SOFT LARGE 3/8" X 3/16" X 1/16" X6

## (undated) DEVICE — TUBING BRAT 2 SUCTION ASSEMBLY TWIST LOCK